# Patient Record
Sex: FEMALE | Race: BLACK OR AFRICAN AMERICAN | NOT HISPANIC OR LATINO | Employment: UNEMPLOYED | URBAN - METROPOLITAN AREA
[De-identification: names, ages, dates, MRNs, and addresses within clinical notes are randomized per-mention and may not be internally consistent; named-entity substitution may affect disease eponyms.]

---

## 2017-01-13 ENCOUNTER — TELEPHONE (OUTPATIENT)
Dept: FAMILY MEDICINE | Facility: CLINIC | Age: 27
End: 2017-01-13

## 2017-01-13 ENCOUNTER — OFFICE VISIT (OUTPATIENT)
Dept: FAMILY MEDICINE | Facility: CLINIC | Age: 27
End: 2017-01-13

## 2017-01-13 VITALS
WEIGHT: 266 LBS | HEART RATE: 100 BPM | DIASTOLIC BLOOD PRESSURE: 80 MMHG | HEIGHT: 68 IN | TEMPERATURE: 98.2 F | OXYGEN SATURATION: 99 % | RESPIRATION RATE: 16 BRPM | SYSTOLIC BLOOD PRESSURE: 133 MMHG | BODY MASS INDEX: 40.32 KG/M2

## 2017-01-13 DIAGNOSIS — Z11.3 ROUTINE SCREENING FOR STI (SEXUALLY TRANSMITTED INFECTION): ICD-10-CM

## 2017-01-13 DIAGNOSIS — Z13.9 SCREENING FOR CONDITION: ICD-10-CM

## 2017-01-13 DIAGNOSIS — A59.01 TRICHOMONAL VAGINITIS: Primary | ICD-10-CM

## 2017-01-13 DIAGNOSIS — J06.9 VIRAL URI: ICD-10-CM

## 2017-01-13 LAB
BACTERIA: NORMAL
CLUE CELLS: NORMAL
HEMOGLOBIN: 14 G/DL (ref 11.7–15.7)
HIV 1+2 AB+HIV1 P24 AG SERPL QL IA: NEGATIVE
MOTILE TRICHOMONAS: POSITIVE
ODOR: NORMAL
PH WET PREP: NORMAL
WBC WET PREP: NORMAL
YEAST: NORMAL

## 2017-01-13 RX ORDER — METRONIDAZOLE 500 MG/1
TABLET ORAL
Qty: 4 TABLET | Refills: 0 | Status: ON HOLD | OUTPATIENT
Start: 2017-01-13 | End: 2018-04-19

## 2017-01-13 RX ORDER — IBUPROFEN 400 MG/1
400 TABLET, FILM COATED ORAL EVERY 6 HOURS PRN
Qty: 120 TABLET | Refills: 3 | Status: ON HOLD | OUTPATIENT
Start: 2017-01-13 | End: 2018-04-19

## 2017-01-13 NOTE — MR AVS SNAPSHOT
After Visit Summary   1/13/2017    Rianna Man    MRN: [id number]           Patient Information     Date Of Birth          1990        Visit Information        Provider Department      1/13/2017 3:00 PM Jane Paez APRN CNP [facility]        Today's Diagnoses     Viral URI    -  1     Routine screening for STI (sexually transmitted infection)         Screening for condition           Care Instructions          ~~~~~~~~~~~~~~~~~~~~~~~~~    Your medication list is printed, please keep this with you, it is helpful to bring this current list to any other medical appointments, the emergency room or hospital.    If you had lab testing today and your results are reassuring or normal they will be be mailed to you within 7 days.     If the lab tests need quick action we will call you with the results.   The phone number we will call with results is # [phone] (home) . If this is not the best number please call our clinic and change the number.    If you need any refills please call your pharmacy and they will contact us.    If you have any further concerns or wish to schedule another appointment you must call our office during normal business hours  [phone] (8-5:00 M-F)  If you have urgent medical questions that cannot wait  you may also call [phone] at any time of day.  If you have a medical emergency please call [phone].    Thank you for coming to [facility].          Follow-ups after your visit        Who to contact     Please call your clinic at [phone] to:    Ask questions about your health    Make or cancel appointments    Discuss your medicines    Learn about your test results    Speak to your doctor   If you have compliments or concerns about an experience at your clinic, or if you wish to file a complaint, please contact [facility] Patient Relations at [phone] or email us at [email or web address]       "   Additional Information About Your Visit        MyChart Information     [employer] is an electronic gateway that provides easy, online access to your medical records. With [employer], you can request a clinic appointment, read your test results, renew a prescription or communicate with your care team.     To sign up for [employer] visit the website at [email or web address]   You will be asked to enter the access code listed below, as well as some personal information. Please follow the directions to create your username and password.     Your access code is: [email or web address]  Expires: 2017  3:26 PM     Your access code will  in 90 days. If you need help or a new code, please contact your [facility] or call [phone] for assistance.        Care EveryWhere ID     This is your Care EveryWhere ID. This could be used by other organizations to access your [place] medical records  [id number]        Your Vitals Were     Pulse Temperature Respirations Height BMI (Body Mass Index) Pulse Oximetry    100 98.2  F (36.8  C) (Oral) 16 5' 8\" (172.7 cm) 40.45 kg/m2 99%       Blood Pressure from Last 3 Encounters:   17 133/80   16 117/82   06/15/15 111/71    Weight from Last 3 Encounters:   17 266 lb (120.657 kg)   16 250 lb 3.2 oz (113.49 kg)   06/15/15 219 lb 6.4 oz (99.519 kg)              We Performed the Following     Chlamydia/Gono Amplified ([facility])     Hemoglobin (HGB) ([facility])     Hepatitis B Surface Ab [facility]     Hepatitis B Surface Ag ([facility])     Hepatitis C Antibody ([facility])     HIV Ag/Ab Screen [place] ([facility])     Syphilis Screen [place] ([facility])     Wet Prep ([facility])        Primary Care Provider Office Phone # Fax #    Hilary Edouard [phone]       [facility] [address] [place] [address]        Thank you!     Thank you for choosing [facility]  for your care. Our goal is " always to provide you with excellent care. Hearing back from our patients is one way we can continue to improve our services. Please take a few minutes to complete the written survey that you may receive in the mail after your visit with us. Thank you!             Your Updated Medication List - Protect others around you: Learn how to safely use, store and throw away your medicines at www.disposemymeds.org.          This list is accurate as of: 1/13/17  4:55 PM.  Always use your most recent med list.                   Brand Name Dispense Instructions for use    etonogestrel 68 MG Impl    IMPLANON/NEXPLANON    1 each    1 each (68 mg) by Subdermal route once for 1 dose       ibuprofen 400 MG tablet    ADVIL/MOTRIN    120 tablet    Take 1 tablet (400 mg) by mouth every 6 hours as needed for moderate pain

## 2017-01-13 NOTE — PROGRESS NOTES
"SUBJECTIVE:   Rianna Man is a 26 year old female for routine annual evaluation.   /80 mmHg  Pulse 100  Temp(Src) 98.2  F (36.8  C) (Oral)  Resp 16  Ht 5' 8\" (172.7 cm)  Wt 266 lb (120.657 kg)  BMI 40.45 kg/m2  SpO2 99%   Obstetric History       T0      TAB0   SAB0   E0   M0   L0       # Outcome Date GA Lbr Dominik/2nd Weight Sex Delivery Anes PTL Lv   1 Para               Has a 6 year old son who lives with her.    Current medications:   Current Outpatient Prescriptions   Medication Sig Dispense Refill     etonogestrel (IMPLANON/NEXPLANON) 68 MG IMPL 1 each (68 mg) by Subdermal route once for 1 dose 1 each 0     ibuprofen (ADVIL,MOTRIN) 400 MG tablet Take 1 tablet (400 mg) by mouth every 6 hours as needed for moderate pain 120 tablet 3     Drug allergies: Lactose and No known allergies  Last mammogram: not performed  Last pap smear: 2/13/15- NILM-normal  Current Method Family Planning: Nexplanon in place. Was changed and a new constance inserted 2016.  Social History: Substance Use: Drinks on the weekends, smokes some weed at times. No trouble, controlling things better.   Lost a part-time contract recently, is looking for work and interviewing.  PSH-GYNE: .None.  PSH-general: None.  PMH: The patient has a history of alcohol dependence. Depression, obesity, smoker, and iron deficiency anemiaThe patient denies a   history of current alcohol or chemical dependence.Currently smokes about 8 cigarettes per day.Is not yet ready to quit but \"I think about it.\"  Past Medical History   Diagnosis Date     Varicella      had disease     Schizoaffective disorder (H)        Family History   Problem Relation Age of Onset     DIABETES Father      DIABETES Paternal Aunt      Thyroid Disease Mother      grave's disease     Rheumatoid Arthritis Sister      Crohn Disease Maternal Aunt      Past Gyne History: Pap 2015, normal.  Childbirth x1.  ROS:  No TIA's or unusual headaches, no dysphagia. No " "prolonged   cough. No dyspnea or chest pain on exertion.  No abdominal pain,   change in bowel habits, black or bloody stools.  No urinary tract   symptoms. On self exam, has noted no new or enlarging breast lumps,   nipple discharge or breast pain.    OBJECTIVE:   /80 mmHg  Pulse 100  Temp(Src) 98.2  F (36.8  C) (Oral)  Resp 16  Ht 5' 8\" (172.7 cm)  Wt 266 lb (120.657 kg)  BMI 40.45 kg/m2  SpO2 99%    HEAD AND NECK:  Ears normal.  Throat, oral cavity and tongue normal.    Neck supple. No adenopathy or masses in the neck or supraclavicular   regions.  No carotid bruits. No thyromegaly.  NEURO: Cranial nerves and fundi are normal. Neck supple.   DTR's normal and symmetric.    CHEST:  Clear, good air entry, no wheezes, rhonci or rales.   HEART:  S1 and S2 normal, no murmurs, clicks, gallops or rubs.    Regular rate and rhythm.  No edema.  ABDOMEN:  Soft without tenderness, guarding, mass or organomegaly.   No CVA tenderness or inguinal adenopathy.  EXTREMITIES:  Extremities, reflexes and peripheral pulses are   normal.    SKIN:  No rashes or suspicious skin lesions noted.  BREAST EXAM: Inspection negative. No nipple discharge or bleeding. No masses.  PELVIC EXAM: External genitalia and vagina normal. Bimanual and rectovaginal deferred due to physical discomfort with speculum exam.   Positive findings:  vaginal discharge - moderate, grey, thin, milky and somewhat odorous    Microscopic wet-mount exam shows trichomonads and excessive bacteria.    ASSESSMENT:   (A59.01) Trichomonal vaginitis  (primary encounter diagnosis)  Comment: Counseled re: vaginitis and trichomonas as STD-single celled organism which is harbored and proliferates in the vagina. Male partner(s) and any of his recent/current partners need treatment. May not believe he is infected as men tend to be without symptoms. No sexual contact until 1 week or more after partner(s) have completed treatment. No alcohol intake for 2 days before and 2 " days after completion of therapy to avoid interaction between metronidazole and ETOH, with severe vomiting and quite possibly dehydration.    Plan: metroNIDAZOLE (FLAGYL) 500 MG tablet        2 grams x1.    (Z11.3) Routine screening for STI (sexually transmitted infection)  Comment: All negative except trich on wet prep. Will try and contact by phone as patient plans to enter treatment soon and won't be reachable except VM may receive messages.  Plan: Chlamydia/Gono Amplified ([facility]), Wet Prep        ([facility]), Syphilis Screen [place] ([facility]),        HIV Ag/Ab Screen [place] ([facility]),         Hepatitis B Surface Ab ([facility]), Hepatitis         B Surface Ag ([facility]), Hepatitis C Antibody        ([facility])            (Z13.9) Screening for condition  Comment: Normal-patient requested screening for anemia today.  Plan: Hemoglobin (HGB) ([facility])    HEMOGLOBIN   Date Value Ref Range Status   01/13/2017 14.0 11.7 - 15.7 g/dL Final     (J06.9,  B97.89) Viral URI  Comment: symptomatic care.  Plan: ibuprofen (ADVIL/MOTRIN) 400 MG tablet     Rest, fluids and time required. RTC if cough complicates with increase in sputum production, SOB, pain in chest or chest wall, fever, chills, increasing fatigue    Encouraged patient in plans for drug treatment and assured her will balance need to contact her w/results with respect for her confidentiality-may wait for her RTC or contact by phone to provide   Test results as she prefers not to receive a letter at this time. Has no other questions or concerns.    45 minutes was spent on this visit, >50% counseling and coordination of care re:vaginitis, STI screening and treatment, partner treatment, chemical health.    Jane Paez,EMELIP

## 2017-01-13 NOTE — PATIENT INSTRUCTIONS
~~~~~~~~~~~~~~~~~~~~~~~~~    Your medication list is printed, please keep this with you, it is helpful to bring this current list to any other medical appointments, the emergency room or hospital.    If you had lab testing today and your results are reassuring or normal they will be be mailed to you within 7 days.     If the lab tests need quick action we will call you with the results.   The phone number we will call with results is # [phone] (home) . If this is not the best number please call our clinic and change the number.    If you need any refills please call your pharmacy and they will contact us.    If you have any further concerns or wish to schedule another appointment you must call our office during normal business hours  [phone] (8-5:00 M-F)  If you have urgent medical questions that cannot wait  you may also call [phone] at any time of day.  If you have a medical emergency please call [phone].    Thank you for coming to [facility].

## 2017-01-16 LAB
C TRACH RRNA CVX QL NAA+PROBE: NEGATIVE
HBV SURFACE AB SER-ACNC: POSITIVE M[IU]/ML
HBV SURFACE AG SERPL QL IA: NEGATIVE
HCV AB SER QL: NEGATIVE
N GONORRHOEA RRNA SPEC QL NAA+PROBE: NEGATIVE
RPR SER QL: NORMAL

## 2018-03-30 ENCOUNTER — OFFICE VISIT (OUTPATIENT)
Dept: FAMILY MEDICINE | Facility: CLINIC | Age: 28
End: 2018-03-30
Payer: COMMERCIAL

## 2018-03-30 VITALS
HEIGHT: 68 IN | SYSTOLIC BLOOD PRESSURE: 127 MMHG | OXYGEN SATURATION: 98 % | DIASTOLIC BLOOD PRESSURE: 86 MMHG | RESPIRATION RATE: 20 BRPM | WEIGHT: 236.4 LBS | TEMPERATURE: 98.4 F | BODY MASS INDEX: 35.83 KG/M2 | HEART RATE: 80 BPM

## 2018-03-30 DIAGNOSIS — B96.89 BACTERIAL VAGINOSIS: ICD-10-CM

## 2018-03-30 DIAGNOSIS — Z30.8 ENCOUNTER FOR OTHER CONTRACEPTIVE MANAGEMENT: ICD-10-CM

## 2018-03-30 DIAGNOSIS — Z11.3 SCREEN FOR STD (SEXUALLY TRANSMITTED DISEASE): Primary | ICD-10-CM

## 2018-03-30 DIAGNOSIS — N76.0 BACTERIAL VAGINOSIS: ICD-10-CM

## 2018-03-30 LAB
BACTERIA: NORMAL
BILIRUBIN UR: NEGATIVE
BLOOD UR: ABNORMAL
CLUE CELLS: NORMAL
GLUCOSE URINE: NEGATIVE
HCG UR QL: NEGATIVE
HIV 1+2 AB+HIV1 P24 AG SERPL QL IA: NEGATIVE
KETONES UR QL: NEGATIVE
LEUKOCYTE ESTERASE UR: ABNORMAL
MOTILE TRICHOMONAS: NEGATIVE
NITRITE UR QL STRIP: NEGATIVE
ODOR: NORMAL
PH UR STRIP: 6 [PH] (ref 5–7)
PH WET PREP: NORMAL
PROTEIN UR: NEGATIVE
SP GR UR STRIP: 1.02
UROBILINOGEN UR STRIP-ACNC: ABNORMAL
WBC WET PREP: <2
YEAST: NORMAL

## 2018-03-30 RX ORDER — METRONIDAZOLE 7.5 MG/G
1 GEL VAGINAL 2 TIMES DAILY
Qty: 70 G | Refills: 0 | Status: SHIPPED | OUTPATIENT
Start: 2018-03-30 | End: 2018-04-04

## 2018-03-30 NOTE — MR AVS SNAPSHOT
"              After Visit Summary   3/30/2018    Rianna Man    MRN: [id number]           Patient Information     Date Of Birth          1990        Visit Information        Provider Department      3/30/2018 2:40 PM Palla, Misbah Yousuf, MD [facility]        Today's Diagnoses     Screen for STD (sexually transmitted disease)    -  1    Bacterial vaginosis        Encounter for other contraceptive management           Follow-ups after your visit        Who to contact     Please call your clinic at [phone] to:    Ask questions about your health    Make or cancel appointments    Discuss your medicines    Learn about your test results    Speak to your doctor            Additional Information About Your Visit        Care EveryWhere ID     This is your Care EveryWhere ID. This could be used by other organizations to access your [place] medical records  [id number]        Your Vitals Were     Pulse Temperature Respirations Height Pulse Oximetry BMI (Body Mass Index)    80 98.4  F (36.9  C) (Oral) 20 5' 8\" (172.7 cm) 98% 35.94 kg/m2       Blood Pressure from Last 3 Encounters:   03/30/18 127/86   01/13/17 133/80   03/21/16 117/82    Weight from Last 3 Encounters:   03/30/18 236 lb 6.4 oz (107.2 kg)   01/13/17 266 lb (120.7 kg)   03/21/16 250 lb 3.2 oz (113.5 kg)              We Performed the Following     Chlamydia/Gono Amplified ([facility])     GYN Cytology ([facility])     HCG Qualitative Urine (UPT)  ([facility])     Hepatitis B Surface Ag ([facility])     Hepatitis C Antibody ([facility])     HIV Ag/Ab Screen [place] ([facility])     HPV [place] PCR ([facility])     Syphilis Screen [place] ([facility])     Urinalysis ([facility])     Urine Culture ([facility])     Wet Prep ([facility])          Today's Medication Changes          These changes are accurate as of 3/30/18 11:59 PM.  If you have any questions, ask your nurse or doctor.               Start taking these medicines.        Dose/Directions "    metroNIDAZOLE 0.75 % vaginal gel   Commonly known as:  METROGEL   Used for:  Screen for STD (sexually transmitted disease), Bacterial vaginosis   Started by:  Palla, Misbah Yousuf, MD        Dose:  1 applicator   Place 1 applicator (5 g) vaginally 2 times daily for 5 days   Quantity:  70 g   Refills:  0            Where to get your medicines      These medications were sent to [facility] ([place]) - [place], [place] - [address] [facility] [address]  [address], [facility] [address]     Phone:  [phone]     metroNIDAZOLE 0.75 % vaginal gel                Primary Care Provider Office Phone # Fax #    Hilary Edouard [phone]       [facility] [address] [place] [address]        Equal Access to Services     [facility] AH: Hadii aad ku hadasho Soomaali, waaxda luqadaha, qaybta kaalmada adeegyada, waxay idiin hayaan adesaurav ch . So [facility] [phone].    ATENCIÓN: Si habla español, tiene a roland disposición servicios gratuitos de asistencia lingüística. Llame al [phone].    We comply with applicable federal civil rights laws and [place] laws. We do not discriminate on the basis of race, color, national origin, age, disability, sex, sexual orientation, or gender identity.            Thank you!     Thank you for choosing [facility]  for your care. Our goal is always to provide you with excellent care. Hearing back from our patients is one way we can continue to improve our services. Please take a few minutes to complete the written survey that you may receive in the mail after your visit with us. Thank you!             Your Updated Medication List - Protect others around you: Learn how to safely use, store and throw away your medicines at www.disposemymeds.org.          This list is accurate as of 3/30/18 11:59 PM.  Always use your most recent med list.                   Brand Name Dispense Instructions  for use Diagnosis    etonogestrel 68 MG Impl    IMPLANON/NEXPLANON    1 each    1 each (68 mg) by Subdermal route once for 1 dose    Surveillance of previously prescribed implantable subdermal contraceptive       ibuprofen 400 MG tablet    ADVIL/MOTRIN    120 tablet    Take 1 tablet (400 mg) by mouth every 6 hours as needed for moderate pain    Viral URI       metroNIDAZOLE 0.75 % vaginal gel    METROGEL    70 g    Place 1 applicator (5 g) vaginally 2 times daily for 5 days    Screen for STD (sexually transmitted disease), Bacterial vaginosis       metroNIDAZOLE 500 MG tablet    FLAGYL    4 tablet    Take 4 tablets for one time with food and water.    Trichomonal vaginitis

## 2018-03-30 NOTE — PROGRESS NOTES
"HPI    Rianna Man is 28 year old yo female presenting for:    1. STD testing  - last unprotected sexual contact was 2 days ago  - asymptomatic   - no bleeding, discharge, pain/discomfort  - patient has a nexplanon in place which was placed in 3/2016 - expiration date listed at 3/2018  - UPT conducted today, negative    OBJECTIVE    Vitals  /86  Pulse 80  Temp 98.4  F (36.9  C) (Oral)  Resp 20  Ht 5' 8\" (172.7 cm)  Wt 236 lb 6.4 oz (107.2 kg)  SpO2 98%  BMI 35.94 kg/m2    Physical Exam  General: No acute distress  CV: RRR  Respiratory: CTA bilaterally, no wheezes/rhonchi/rhales appreciated, no respiratory distress  Back: no paraspinal tenderness, no spinal tenderness, no vertebral step offs appreciated  Abdomen: soft, non-distended, non-tender, normoactive bowel sounds  : normal external genitalia, no abnormal odor/discharge noted, cervix nulliparous, no discharge/bleeding  Neuro: no focal deficits noted, reflexes within normal limits  Psych: appropriate affect    ASSESSMENT/PLAN    # STI screening  - gonorrhea/chlamydia, wet prep pending  - PAP smear/gyn cytology w/HPV pending  - UPT negative    # Contraception  - schedule nexplanon removal and placement         Precepted with Dr. Astudillo    "

## 2018-03-31 LAB
CULTURE: NORMAL
HBV SURFACE AG SERPL QL IA: NEGATIVE
HCV AB SER QL: NEGATIVE
TREPONEMA ANTIBODY (SYPHILIS): NEGATIVE

## 2018-04-02 LAB
C TRACH RRNA SPEC QL NAA+PROBE: NEGATIVE
FINAL DIAGNOSIS: NORMAL
HPV 16 DNA: NEGATIVE
HPV 18 DNA: NEGATIVE
HPV SOURCE: NORMAL
N GONORRHOEA RRNA SPEC QL NAA+PROBE: NEGATIVE
OTHER HR HPV: NEGATIVE
SPECIMEN DESCRIPTION: NORMAL

## 2018-04-05 ENCOUNTER — RECORDS - HEALTHEAST (OUTPATIENT)
Dept: ADMINISTRATIVE | Facility: OTHER | Age: 28
End: 2018-04-05

## 2018-04-05 LAB
CYTOLOGY CVX/VAG DOC THIN PREP: NORMAL
HIGH RISK?: NO
HPV REFLEX?: NORMAL
LAB AP ABNORMAL BLEEDING: NO
LAB AP BIRTH CONTROL/HORMONES: NORMAL
LAB AP CASE REPORT: NORMAL
LAB AP CERVICAL APPEARANCE: NORMAL
LAB AP MALIGNANT?: NORMAL
LAB AP PATIENT STATUS: NORMAL
LAB AP PREVIOUS ABNL: NORMAL
LAB AP PREVIOUS NORMAL: NORMAL
LAST MENS PERIOD: NORMAL
SPECIMEN ADEQUACY:: NORMAL

## 2018-04-10 NOTE — PROGRESS NOTES
Preceptor Attestation:   Patient seen, evaluated and discussed with the resident. I have verified the content of the note, which accurately reflects my assessment of the patient and the plan of care.    Supervising Physician:Nino Astudillo MD    [facility]

## 2018-04-18 ENCOUNTER — HOSPITAL ENCOUNTER (INPATIENT)
Facility: CLINIC | Age: 28
LOS: 5 days | Discharge: HOME | DRG: 882 | End: 2018-04-24
Attending: EMERGENCY MEDICINE | Admitting: PSYCHIATRY & NEUROLOGY
Payer: COMMERCIAL

## 2018-04-18 DIAGNOSIS — F25.9 SCHIZOAFFECTIVE DISORDER, UNSPECIFIED TYPE (H): ICD-10-CM

## 2018-04-18 DIAGNOSIS — G43.909 MIGRAINE WITHOUT STATUS MIGRAINOSUS, NOT INTRACTABLE, UNSPECIFIED MIGRAINE TYPE: ICD-10-CM

## 2018-04-18 DIAGNOSIS — Z72.0 TOBACCO USE: ICD-10-CM

## 2018-04-18 DIAGNOSIS — L90.6 STRIAE DISTENSAE: ICD-10-CM

## 2018-04-18 DIAGNOSIS — F42.9 OBSESSIVE-COMPULSIVE DISORDER, UNSPECIFIED TYPE: ICD-10-CM

## 2018-04-18 DIAGNOSIS — F12.10 MARIJUANA ABUSE: ICD-10-CM

## 2018-04-18 DIAGNOSIS — F33.9 RECURRENT MAJOR DEPRESSIVE DISORDER, REMISSION STATUS UNSPECIFIED (H): Primary | ICD-10-CM

## 2018-04-18 LAB
ALCOHOL BREATH TEST: 0.18 (ref 0–0.01)
AMPHETAMINES UR QL SCN: NEGATIVE
BARBITURATES UR QL: NEGATIVE
BENZODIAZ UR QL: NEGATIVE
CANNABINOIDS UR QL SCN: POSITIVE
COCAINE UR QL: NEGATIVE
ETHANOL UR QL SCN: POSITIVE
HCG UR QL: NEGATIVE
OPIATES UR QL SCN: NEGATIVE

## 2018-04-18 PROCEDURE — 81025 URINE PREGNANCY TEST: CPT | Performed by: FAMILY MEDICINE

## 2018-04-18 PROCEDURE — 82075 ASSAY OF BREATH ETHANOL: CPT | Performed by: EMERGENCY MEDICINE

## 2018-04-18 PROCEDURE — 80307 DRUG TEST PRSMV CHEM ANLYZR: CPT | Performed by: FAMILY MEDICINE

## 2018-04-18 PROCEDURE — 90791 PSYCH DIAGNOSTIC EVALUATION: CPT

## 2018-04-18 PROCEDURE — 25000132 ZZH RX MED GY IP 250 OP 250 PS 637: Performed by: EMERGENCY MEDICINE

## 2018-04-18 PROCEDURE — 80320 DRUG SCREEN QUANTALCOHOLS: CPT | Performed by: FAMILY MEDICINE

## 2018-04-18 PROCEDURE — 99285 EMERGENCY DEPT VISIT HI MDM: CPT | Mod: 25 | Performed by: EMERGENCY MEDICINE

## 2018-04-18 PROCEDURE — 99285 EMERGENCY DEPT VISIT HI MDM: CPT | Mod: Z6 | Performed by: EMERGENCY MEDICINE

## 2018-04-18 PROCEDURE — HZ2ZZZZ DETOXIFICATION SERVICES FOR SUBSTANCE ABUSE TREATMENT: ICD-10-PCS | Performed by: PSYCHIATRY & NEUROLOGY

## 2018-04-18 RX ORDER — ACETAMINOPHEN 500 MG
1000 TABLET ORAL ONCE
Status: COMPLETED | OUTPATIENT
Start: 2018-04-18 | End: 2018-04-18

## 2018-04-18 RX ORDER — OLANZAPINE 10 MG/1
10 TABLET, ORALLY DISINTEGRATING ORAL ONCE
Status: COMPLETED | OUTPATIENT
Start: 2018-04-18 | End: 2018-04-18

## 2018-04-18 RX ADMIN — ACETAMINOPHEN 1000 MG: 500 TABLET, FILM COATED ORAL at 23:06

## 2018-04-18 RX ADMIN — OLANZAPINE 10 MG: 10 TABLET, ORALLY DISINTEGRATING ORAL at 23:06

## 2018-04-18 ASSESSMENT — ENCOUNTER SYMPTOMS
NERVOUS/ANXIOUS: 1
HEADACHES: 1
PHOTOPHOBIA: 1
SLEEP DISTURBANCE: 1
DECREASED CONCENTRATION: 1
DYSPHORIC MOOD: 1
HALLUCINATIONS: 1

## 2018-04-18 NOTE — LETTER
To Whom It May Concern,      Rianna Man was hospitalized at [facility] from 4/19/2018 - 4/24/2018. She has shown some improvement with medications and therapy offered in the hospital, however she will still need some time to recover, which would take 2-3 months. During this time, she would not be adequate to work given the level of dysfunction her illness has on her daily life. With continued treatment, social stability, and financial stability/assitance, she could be more adequate to obtain and sustain employment in the future.     Sincerely,      Seun Coleman MD  [facility]

## 2018-04-18 NOTE — IP AVS SNAPSHOT
STOP!!! DO NOT PRINT OR REFERENCE THIS AVS!!!  AVS displayed here is NOT the version that was given to the patient at discharge.  GO TO CHART REVIEW to print or review a copy of the AVS that was frozen/printed at time of discharge.                           MRN:[id number]                      After Visit Summary   4/18/2018    Rianna Man    MRN: [id number]           Thank you!     Thank you for choosing [facility] for your care. Our goal is always to provide you with excellent care.        Patient Information     Date Of Birth          1990        Designated Caregiver       Most Recent Value    Caregiver    Will someone help with your care after discharge? no      About your hospital stay     You were admitted on:  April 19, 2018 You last received care in the:  [facility]    You were discharged on:  April 24, 2018       Who to Call     For medical emergencies, please call 911.  For non-urgent questions about your medical care, please call your primary care provider or clinic, [phone]          Attending Provider     Provider Specialty    Becky Baldwin MD Emergency Medicine    Jared, Seun Carter MD Psychiatry       Primary Care Provider Office Phone # Fax #    Hilary Maral Edouard [phone]      Further instructions from your care team        Behavioral Discharge Planning and Instructions      Summary:  You were admitted on 4/18/2018  due to Depression and Anxiety.  You were treated by Dr Seun Coleman MD and discharged on 4/24/18 from [facility] to her brother's home.      Principal Diagnosis: Obsessive Compulsive Disorder; Bipolar Disorder, mixed, PTSD      Health Care Follow-up Appointments:   [facility]  Medication Management Walk-in Appointments every Thursday at 8:30am to establish care [address] [place], [place]  [phone]  [phone]    Therapist Latrell Loaiza - Ori Appointment on Wed April 9th at 3pm  [facility]  [facility]-- Go to the [place] [facility]/ Room [address] (Next [place])  [place] [address]  [phone]      RESOURCES:  [facility] Adult Shelter Team  [address] [place] [address]  [address]  [phone]    [facility] Economic Assistance - cash assistance  [address] [place] [address]  [place], [place]  [phone]    Social Security Office  [address] [place] [address]  [phone]      Attend all scheduled appointments with your outpatient providers. Call at least 24 hours in advance if you need to reschedule an appointment to ensure continued access to your outpatient providers.   Major Treatments, Procedures and Findings:  You were provided with: a psychiatric assessment, assessed for medical stability, medication evaluation and/or management, group therapy and milieu management    Symptoms to Report: losing more sleep, mood getting worse or thoughts of suicide    Early warning signs can include: sleep disturbances increased thoughts or behaviors of suicide or self-harm  increased unusual thinking, such as paranoia or hearing voices    Safety and Wellness:  Take all medicines as directed.  Make no changes unless your doctor suggests them.      Follow treatment recommendations.  Refrain from alcohol and non-prescribed drugs.  If there is a concern for safety, call 911.    Resources:   Crisis Intervention: 636.831.2420 or 791-948-1343 (TTY: 546.668.3875).  Call anytime for help.  [place] [employer] [place]ness (www.[place].hailey.org): 516.759.1463 or 318-172-4868.    The treatment team has appreciated the opportunity to work with you.     If you have any questions or concerns our unit number is [phone].          Pending Results     No orders found from 4/16/2018 to 4/19/2018.            Admission Information     Date & Time Department Dept. Phone    4/18/2018 [facility] [phone]      Your Vitals Were     Blood Pressure Pulse  "Temperature Respirations Height Weight    123/85 77 97.7  F (36.5  C) 16 1.727 m (5' 8\") 109.4 kg (241 lb 1.6 oz)    Pulse Oximetry BMI (Body Mass Index)                97% 36.66 kg/m2          Care EveryWhere ID     This is your Care EveryWhere ID. This could be used by other organizations to access your [facility] medical records  [id number]        Equal Access to Services     VICENTE SEE : Hadii jose cruz ku hadtjo Soestelaali, waaxda luqadaha, qaybta kaalmada adeegyada, aye hernandez pepevic rouse nayeli lagarethvic . So [facility] [phone].    ATENCIÓN: Si radhala emmett, tiene a roland disposición servicios gratuitos de asistencia lingüística. Llame al [phone].    We comply with applicable federal civil rights laws and [place] laws. We do not discriminate on the basis of race, color, national origin, age, disability, sex, sexual orientation, or gender identity.               Review of your medicines      START taking        Dose / Directions    escitalopram 10 MG tablet   Commonly known as:  LEXAPRO   Used for:  Obsessive-compulsive disorder, unspecified type, Schizoaffective disorder, unspecified type (H)        Dose:  10 mg   Take 1 tablet (10 mg) by mouth daily   Quantity:  30 tablet   Refills:  1       gabapentin 600 MG tablet   Commonly known as:  NEURONTIN   Used for:  Migraine without status migrainosus, not intractable, unspecified migraine type, Recurrent major depressive disorder, remission status unspecified (H), Obsessive-compulsive disorder, unspecified type        Dose:  600 mg   Take 1 tablet (600 mg) by mouth 3 times daily   Quantity:  90 tablet   Refills:  1       hydrocortisone 2.5 % cream   Used for:  Striae distensae        Apply topically 2 times daily as needed for rash   Quantity:  3.5 g   Refills:  1       hydrOXYzine 25 MG tablet   Commonly known as:  ATARAX   Used for:  Schizoaffective disorder, unspecified type (H)        Dose:  25 mg   Take 1 tablet (25 mg) by mouth every 4 hours as needed for " anxiety   Quantity:  60 tablet   Refills:  1       lactase 3000 UNIT tablet   Commonly known as:  LACTAID   Used for:  Lactase deficiency        Dose:  6000 Units   Take 2 tablets (6,000 Units) by mouth 3 times daily (with meals)   Quantity:  90 tablet   Refills:  1       mirtazapine 7.5 MG Tabs tablet   Commonly known as:  REMERON   Used for:  Schizoaffective disorder, unspecified type (H), Obsessive-compulsive disorder, unspecified type        Dose:  7.5 mg   Take 1 tablet (7.5 mg) by mouth At Bedtime   Quantity:  30 tablet   Refills:  1       multivitamin, therapeutic with minerals Tabs tablet   Used for:  Schizoaffective disorder, unspecified type (H)        Dose:  1 tablet   Take 1 tablet by mouth daily   Quantity:  30 each   Refills:  1       nicotine 14 MG/24HR 24 hr patch   Commonly known as:  NICODERM CQ   Used for:  Tobacco use        Dose:  1 patch   Place 1 patch onto the skin daily   Quantity:  30 patch   Refills:  1       * OLANZapine 10 MG tablet   Commonly known as:  zyPREXA   Used for:  Schizoaffective disorder, unspecified type (H)        Dose:  10 mg   Take 1 tablet (10 mg) by mouth 2 times daily as needed (For agitation, aggression, anxiety.)   Quantity:  60 tablet   Refills:  1       * OLANZapine 10 MG tablet   Commonly known as:  zyPREXA   Used for:  Schizoaffective disorder, unspecified type (H)        Dose:  10 mg   Take 1 tablet (10 mg) by mouth At Bedtime   Quantity:  30 tablet   Refills:  1       * Notice:  This list has 2 medication(s) that are the same as other medications prescribed for you. Read the directions carefully, and ask your doctor or other care provider to review them with you.      CONTINUE these medicines which have NOT CHANGED        Dose / Directions    aspirin-acetaminophen-caffeine 250-250-65 MG per tablet   Commonly known as:  EXCEDRIN MIGRAINE   Used for:  Migraine without status migrainosus, not intractable, unspecified migraine type        Dose:  2 tablet   Take 2  tablets by mouth every 6 hours as needed for headaches   Quantity:  60 tablet   Refills:  1       etonogestrel 68 MG Impl   Commonly known as:  IMPLANON/NEXPLANON   Used for:  Surveillance of previously prescribed implantable subdermal contraceptive        Dose:  1 each   1 each (68 mg) by Subdermal route once for 1 dose   Quantity:  1 each   Refills:  0            Where to get your medicines      These medications were sent to [id number] - [place], [place] - [address] AT [address]  [address], [place] [address]     Phone:  [phone]     aspirin-acetaminophen-caffeine 250-250-65 MG per tablet    escitalopram 10 MG tablet    gabapentin 600 MG tablet    hydrocortisone 2.5 % cream    hydrOXYzine 25 MG tablet    lactase 3000 UNIT tablet    mirtazapine 7.5 MG Tabs tablet    multivitamin, therapeutic with minerals Tabs tablet    nicotine 14 MG/24HR 24 hr patch    OLANZapine 10 MG tablet    OLANZapine 10 MG tablet                Protect others around you: Learn how to safely use, store and throw away your medicines at www.disposemymeds.org.             Medication List: This is a list of all your medications and when to take them. Check marks below indicate your daily home schedule. Keep this list as a reference.      Medications           Morning Afternoon Evening Bedtime As Needed    aspirin-acetaminophen-caffeine 250-250-65 MG per tablet   Commonly known as:  EXCEDRIN MIGRAINE   Take 2 tablets by mouth every 6 hours as needed for headaches   Last time this was given:  1 tablet on 5/17/2018  4:59 PM                                escitalopram 10 MG tablet   Commonly known as:  LEXAPRO   Take 1 tablet (10 mg) by mouth daily   Last time this was given:  10 mg on 5/18/2018  8:29 AM                                etonogestrel 68 MG Impl   Commonly known as:  IMPLANON/NEXPLANON   1 each (68 mg) by Subdermal route once for 1 dose                                gabapentin  600 MG tablet   Commonly known as:  NEURONTIN   Take 1 tablet (600 mg) by mouth 3 times daily   Last time this was given:  600 mg on 4/24/2018  2:46 PM                                hydrocortisone 2.5 % cream   Apply topically 2 times daily as needed for rash   Last time this was given:  4/22/2018  8:15 AM                                hydrOXYzine 25 MG tablet   Commonly known as:  ATARAX   Take 1 tablet (25 mg) by mouth every 4 hours as needed for anxiety   Last time this was given:  25 mg on 5/18/2018  8:31 AM                                lactase 3000 UNIT tablet   Commonly known as:  LACTAID   Take 2 tablets (6,000 Units) by mouth 3 times daily (with meals)   Last time this was given:  6,000 Units on 5/18/2018 12:00 PM                                mirtazapine 7.5 MG Tabs tablet   Commonly known as:  REMERON   Take 1 tablet (7.5 mg) by mouth At Bedtime   Last time this was given:  7.5 mg on 5/17/2018  8:24 PM                                multivitamin, therapeutic with minerals Tabs tablet   Take 1 tablet by mouth daily   Last time this was given:  1 tablet on 5/18/2018  8:29 AM                                nicotine 14 MG/24HR 24 hr patch   Commonly known as:  NICODERM CQ   Place 1 patch onto the skin daily   Last time this was given:  1 patch on 5/18/2018  8:28 AM                                * OLANZapine 10 MG tablet   Commonly known as:  zyPREXA   Take 1 tablet (10 mg) by mouth 2 times daily as needed (For agitation, aggression, anxiety.)   Last time this was given:  10 mg on 5/17/2018  8:24 PM                                * OLANZapine 10 MG tablet   Commonly known as:  zyPREXA   Take 1 tablet (10 mg) by mouth At Bedtime   Last time this was given:  10 mg on 5/17/2018  8:24 PM                                * Notice:  This list has 2 medication(s) that are the same as other medications prescribed for you. Read the directions carefully, and ask your doctor or other care provider to review them with  you.

## 2018-04-18 NOTE — IP AVS SNAPSHOT
[address]    [address] [place] [address]    Phone:  [phone]                                       After Visit Summary   4/18/2018    Rianna Man    MRN: [id number]           After Visit Summary Signature Page     I have received my discharge instructions, and my questions have been answered. I have discussed any challenges I see with this plan with the nurse or doctor.    ..........................................................................................................................................  Patient/Patient Representative Signature      ..........................................................................................................................................  Patient Representative Print Name and Relationship to Patient    ..................................................               ................................................  Date                                            Time    ..........................................................................................................................................  Reviewed by Signature/Title    ...................................................              ..............................................  Date                                                            Time

## 2018-04-19 PROBLEM — F30.9 MANIA (H): Status: ACTIVE | Noted: 2018-04-19

## 2018-04-19 LAB
ALCOHOL BREATH TEST: 0 (ref 0–0.01)
BACTERIA SPEC CULT: NORMAL
C TRACH SPEC QL CULT: NORMAL
CHLAMYDIA PRELIM: NORMAL
SPECIMEN SOURCE: NORMAL
SPECIMEN SOURCE: NORMAL

## 2018-04-19 PROCEDURE — 99223 1ST HOSP IP/OBS HIGH 75: CPT | Mod: AI | Performed by: PSYCHIATRY & NEUROLOGY

## 2018-04-19 PROCEDURE — 12400007 ZZH R&B MH INTERMEDIATE UMMC

## 2018-04-19 PROCEDURE — 25000132 ZZH RX MED GY IP 250 OP 250 PS 637: Performed by: NURSE PRACTITIONER

## 2018-04-19 PROCEDURE — 90853 GROUP PSYCHOTHERAPY: CPT

## 2018-04-19 PROCEDURE — 87491 CHLMYD TRACH DNA AMP PROBE: CPT | Performed by: NURSE PRACTITIONER

## 2018-04-19 PROCEDURE — 25000132 ZZH RX MED GY IP 250 OP 250 PS 637: Performed by: PSYCHIATRY & NEUROLOGY

## 2018-04-19 PROCEDURE — 87591 N.GONORRHOEAE DNA AMP PROB: CPT | Performed by: NURSE PRACTITIONER

## 2018-04-19 RX ORDER — MIRTAZAPINE 15 MG/1
15 TABLET, FILM COATED ORAL AT BEDTIME
Status: DISCONTINUED | OUTPATIENT
Start: 2018-04-19 | End: 2018-04-23

## 2018-04-19 RX ORDER — OLANZAPINE 10 MG/1
10 TABLET, ORALLY DISINTEGRATING ORAL AT BEDTIME
Status: DISCONTINUED | OUTPATIENT
Start: 2018-04-19 | End: 2018-04-20

## 2018-04-19 RX ORDER — LORAZEPAM 1 MG/1
1-4 TABLET ORAL EVERY 30 MIN PRN
Status: DISCONTINUED | OUTPATIENT
Start: 2018-04-19 | End: 2018-04-21

## 2018-04-19 RX ORDER — MULTIPLE VITAMINS W/ MINERALS TAB 9MG-400MCG
1 TAB ORAL DAILY
Status: DISCONTINUED | OUTPATIENT
Start: 2018-04-19 | End: 2018-04-24 | Stop reason: HOSPADM

## 2018-04-19 RX ORDER — CHOLECALCIFEROL (VITAMIN D3) 125 MCG
6000 CAPSULE ORAL
Status: DISCONTINUED | OUTPATIENT
Start: 2018-04-19 | End: 2018-04-24 | Stop reason: HOSPADM

## 2018-04-19 RX ORDER — HYDROXYZINE HYDROCHLORIDE 25 MG/1
25 TABLET, FILM COATED ORAL EVERY 4 HOURS PRN
Status: DISCONTINUED | OUTPATIENT
Start: 2018-04-19 | End: 2018-04-24 | Stop reason: HOSPADM

## 2018-04-19 RX ORDER — HYDROCORTISONE 2.5 %
CREAM (GRAM) TOPICAL 2 TIMES DAILY
Status: DISCONTINUED | OUTPATIENT
Start: 2018-04-19 | End: 2018-04-24

## 2018-04-19 RX ORDER — OLANZAPINE 10 MG/1
10 TABLET ORAL 3 TIMES DAILY PRN
Status: DISCONTINUED | OUTPATIENT
Start: 2018-04-19 | End: 2018-04-24 | Stop reason: HOSPADM

## 2018-04-19 RX ORDER — NICOTINE 21 MG/24HR
1 PATCH, TRANSDERMAL 24 HOURS TRANSDERMAL DAILY
Status: DISCONTINUED | OUTPATIENT
Start: 2018-04-19 | End: 2018-04-24 | Stop reason: HOSPADM

## 2018-04-19 RX ORDER — GABAPENTIN 400 MG/1
400 CAPSULE ORAL 3 TIMES DAILY
Status: DISCONTINUED | OUTPATIENT
Start: 2018-04-19 | End: 2018-04-23

## 2018-04-19 RX ORDER — OLANZAPINE 10 MG/2ML
10 INJECTION, POWDER, FOR SOLUTION INTRAMUSCULAR 3 TIMES DAILY PRN
Status: DISCONTINUED | OUTPATIENT
Start: 2018-04-19 | End: 2018-04-24 | Stop reason: HOSPADM

## 2018-04-19 RX ORDER — IBUPROFEN 400 MG/1
400 TABLET, FILM COATED ORAL EVERY 6 HOURS PRN
Status: DISCONTINUED | OUTPATIENT
Start: 2018-04-19 | End: 2018-04-24 | Stop reason: HOSPADM

## 2018-04-19 RX ADMIN — HYDROCORTISONE: 25 CREAM TOPICAL at 21:56

## 2018-04-19 RX ADMIN — MULTIPLE VITAMINS W/ MINERALS TAB 1 TABLET: TAB at 15:10

## 2018-04-19 RX ADMIN — LACTASE TAB 3000 UNIT 6000 UNITS: 3000 TAB at 18:22

## 2018-04-19 RX ADMIN — MIRTAZAPINE 15 MG: 15 TABLET, FILM COATED ORAL at 21:56

## 2018-04-19 RX ADMIN — ACETAMINOPHEN, ASPIRIN AND CAFFEINE 1 TABLET: 250; 250; 65 TABLET, FILM COATED ORAL at 13:41

## 2018-04-19 RX ADMIN — GABAPENTIN 400 MG: 400 CAPSULE ORAL at 15:10

## 2018-04-19 RX ADMIN — GABAPENTIN 400 MG: 400 CAPSULE ORAL at 20:37

## 2018-04-19 RX ADMIN — NICOTINE 1 PATCH: 14 PATCH, EXTENDED RELEASE TRANSDERMAL at 16:08

## 2018-04-19 RX ADMIN — OLANZAPINE 10 MG: 10 TABLET, ORALLY DISINTEGRATING ORAL at 20:37

## 2018-04-19 ASSESSMENT — ACTIVITIES OF DAILY LIVING (ADL)
DRESS: SCRUBS (BEHAVIORAL HEALTH);STREET CLOTHES
RETIRED_COMMUNICATION: 0-->UNDERSTANDS/COMMUNICATES WITHOUT DIFFICULTY
TOILETING: 0-->INDEPENDENT
TRANSFERRING: 0-->INDEPENDENT
ORAL_HYGIENE: INDEPENDENT
SWALLOWING: 0-->SWALLOWS FOODS/LIQUIDS WITHOUT DIFFICULTY
FALL_HISTORY_WITHIN_LAST_SIX_MONTHS: NO
ORAL_HYGIENE: INDEPENDENT
AMBULATION: 0-->INDEPENDENT
BATHING: 0-->INDEPENDENT
LAUNDRY: WITH SUPERVISION
GROOMING: INDEPENDENT
RETIRED_EATING: 0-->INDEPENDENT
DRESS: 0-->INDEPENDENT
DRESS: SCRUBS (BEHAVIORAL HEALTH);INDEPENDENT
WHICH_OF_THE_ABOVE_FUNCTIONAL_RISKS_HAD_A_RECENT_ONSET_OR_CHANGE?: COGNITION
COGNITION: 2 - DIFFICULTY WITH ORGANIZING THOUGHTS

## 2018-04-19 NOTE — ED PROVIDER NOTES
The pt was signed out at shift change pending inpt bed availability. She rested comfortably throughout the night without difficulty. The pt will be signed out again at shift change, still awaiting and inpt bed.       Lacey Montenegro MD  04/19/18 0735
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
## 2018-04-19 NOTE — ED PROVIDER NOTES
"     Emergency Department Patient Sign-out       Brief HPI:  This is a 28 year old female signed out to me by Dr. Montenegro.  See initial ED Provider note for details of the presentation.          Patient is medically cleared for admission to a Behavioral Health unit.      The patient is not on a hold.      The patient has required medication for agitation. Zyprexa. Also given Tylenol.  Awaiting Transfer to Mental Health Facility        Significant Events prior to my assuming care: Hx of Schizoaffective disorder, not taking medications. Having SI and HI.       Exam:   Patient Vitals for the past 24 hrs:   BP Temp Temp src Heart Rate Resp SpO2 Height Weight   04/18/18 2007 (!) 134/95 96.8  F (36  C) Oral 113 18 98 % 1.727 m (5' 8\") 105.2 kg (232 lb)           ED RESULTS:   Results for orders placed or performed during the hospital encounter of 04/18/18 (from the past 24 hour(s))   Drug abuse screen 6 urine (tox)     Status: Abnormal    Collection Time: 04/18/18  8:14 PM   Result Value Ref Range    Amphetamine Qual Urine Negative NEG^Negative    Barbiturates Qual Urine Negative NEG^Negative    Benzodiazepine Qual Urine Negative NEG^Negative    Cannabinoids Qual Urine Positive (A) NEG^Negative    Cocaine Qual Urine Negative NEG^Negative    Ethanol Qual Urine Positive (A) NEG^Negative    Opiates Qualitative Urine Negative NEG^Negative   HCG qualitative urine     Status: None    Collection Time: 04/18/18  8:14 PM   Result Value Ref Range    HCG Qual Urine Negative NEG^Negative   Alcohol breath test POCT     Status: Abnormal    Collection Time: 04/18/18 11:13 PM   Result Value Ref Range    Alcohol Breath Test 0.182 (A) 0.00 - 0.01       ED MEDICATIONS:   Medications   OLANZapine zydis (zyPREXA) ODT tab 10 mg (10 mg Oral Given 4/18/18 2306)   acetaminophen (TYLENOL) tablet 1,000 mg (1,000 mg Oral Given 4/18/18 2306)         Impression:    ICD-10-CM    1. Schizoaffective disorder, unspecified type (H) F25.9    2. Marijuana " abuse F12.10    3. Migraine without status migrainosus, not intractable, unspecified migraine type G43.909        Plan:    Awaiting admission to mental health unit.      MD DIANNA Diaz Katrina Anne, MD  04/19/18 0734

## 2018-04-19 NOTE — ED NOTES
"ED to Behavioral Floor Handoff    SITUATION  Rianna Man is a 28 year old female who speaks English and lives is homeless with others The patient arrived in the ED by private car from home with a complaint of Suicidal (Suicidal with plan to jump from bridge in [facility]. H/O previous attempts) and Hallucinations (c/o auditory hallucinations)  .The patient's current symptoms started/worsened 7 day(s) ago and during this time the symptoms have increased.   In the ED, pt was diagnosed with   Final diagnoses:   Schizoaffective disorder, unspecified type (H)   Marijuana abuse   Migraine without status migrainosus, not intractable, unspecified migraine type        Initial vitals were: BP: (!) 134/95  Heart Rate: 113  Temp: 96.8  F (36  C)  Resp: 18  Height: 172.7 cm (5' 8\")  Weight: 105.2 kg (232 lb)  SpO2: 98 %   --------  Is the patient diabetic? No   If yes, last blood glucose? --     If yes, was this treated in the ED? --  --------  Is the patient inebriated (ETOH) No or Impaired on other substances? No  MSSA done? Yes    Last MSSA score:7   --    Were withdrawal symptoms treated? No  Does the patient have a seizure history? No. If yes, date of most recent seizure--  --------  Is the patient patient experiencing suicidal ideation? reports the following suicide factors: suicidal thoughts and hallucinations.     Homicidal ideation? denies current or recent homicidal ideation or behaviors.    Self-injurious behavior/urges? denies current or recent self injurious behavior or ideation.  ------  Was pt aggressive in the ED No  Was a code called no  Is the pt now cooperative? Yes  -------  Meds given in ED:   Medications   OLANZapine zydis (zyPREXA) ODT tab 10 mg (10 mg Oral Given 4/18/18 2306)   acetaminophen (TYLENOL) tablet 1,000 mg (1,000 mg Oral Given 4/18/18 2306)      Family present during ED course? No  Family currently present? No    BACKGROUND  Does the patient have a cognitive impairment or developmental " "disability? No  Allergies:   Allergies   Allergen Reactions     Lactose Other (See Comments)     No Known Allergies    .   Social demographics are   Social History     Social History     Marital status: Single     Spouse name: N/A     Number of children: N/A     Years of education: N/A     Social History Main Topics     Smoking status: Current Every Day Smoker     Packs/day: 0.40     Types: Cigarettes     Smokeless tobacco: Never Used      Comment: less than 1/2 pack     Alcohol use 0.0 oz/week     0 Standard drinks or equivalent per week      Comment: Drinks socially weekends.     Drug use: Yes     Special: Marijuana     Sexual activity: Yes     Partners: Male      Comment: monogamous relationship     Other Topics Concern     None     Social History Narrative        ASSESSMENT  Labs results   Labs Ordered and Resulted from Time of ED Arrival Up to the Time of Departure from the ED   DRUG ABUSE SCREEN 6 CHEM DEP URINE ([facility]) - Abnormal; Notable for the following:        Result Value    Cannabinoids Qual Urine Positive (*)     Ethanol Qual Urine Positive (*)     All other components within normal limits   ALCOHOL BREATH TEST POCT - Abnormal; Notable for the following:     Alcohol Breath Test 0.182 (*)     All other components within normal limits   ALCOHOL BREATH TEST POCT - Normal   HCG QUALITATIVE URINE      Imaging Studies: No results found for this or any previous visit (from the past 24 hour(s)).   Most recent vital signs /76  Temp 97.8  F (36.6  C) (Oral)  Resp 16  Ht 1.727 m (5' 8\")  Wt 105.2 kg (232 lb)  SpO2 98%  Breastfeeding? No  BMI 35.28 kg/m2   Abnormal labs/tests/findings requiring intervention:---   Pain control: pt had none  Nausea control: pt had none    RECOMMENDATION  Are any infection precautions needed (MRSA, VRE, etc.)? No If yes, what infection? --  ---  Does the patient have mobility issues? independently. If yes, what device does the pt use? ---  ---  Is patient on 72 hour hold " or commitment? No If on 72 hour hold, have hold and rights been given to patient? N/A  Are admitting orders written if after 10 p.m. ?N/A  Tasks needing to be completed:---     Donna Rizzo   [facility]-- [phone] [place] [facility]   [phone] [facility] ED
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
## 2018-04-19 NOTE — ED NOTES
"Pt viewed the \"Welcome to Behavioral Health\" video and was given an opportunity to ask questions.  "
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
## 2018-04-19 NOTE — PLAN OF CARE
Problem: Patient Care Overview  Goal: Team Discussion  Team Plan:   BEHAVIORAL TEAM DISCUSSION    Participants: Dr. Seun Coleman; Julia Ellis LICSW; Beverley Anderson OTR/L; Vielka Jean-Baptiste RN    Progress: Patient in the milieu and is attending all programming offered.     Continued Stay Criteria/Rationale: New admit    Medical/Physical: See consult     Precautions:   Behavioral Orders   Procedures     Code 1 - Restrict to Unit     Routine Programming     As clinically indicated     Sexual precautions     Status 15     Every 15 minutes.     Plan: The patient needs medication management.  She will let the staff know if she has questions or concerns.  She will need therapist, psychiatric med management at discharge.      Rationale for change in precautions or plan: No changes
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
## 2018-04-19 NOTE — ED NOTES
Pt. wanded and safety search conducted by writer. Pt. cooperative w/search and belongings given to security. UA collected and warm blanket offered to pt.

## 2018-04-19 NOTE — PROGRESS NOTES
04/19/18 1233   Patient Belongings   Did you bring any home meds/supplements to the hospital?  No   Disposition of Belongings Other (see comment)   Belongings Search Yes   Clothing Search Yes   Second Staff Zonia       White jeans, white long sleeve shirt, grey sweatshirt, white bandana, brown boots, black smart phone, green winter jacket, army patterned backpack, 2 pair jeans, pair pajama shorts, swim bottoms, pair grey jeans, pair white socks, Deoderant, toothbrush, body spray, green long sleeve shirt, grey cristian, pink long sleeve blouse, plaid button up, green sweater, pink cristian, blue tank top, LG smart phone w/cracked screen, pink smart phone, comb, lotion, 1 pack cigarettes, red wallet, [place] drivers license, [employer] card, [employer] card, $2.00 cash    In envelope [id number]:  Blue [employer] visa *6386  [employer] visa *9419  Black mastercard *8762  Black visa debit *0151  [employer] visa *6408  [employer] visa *5603  [employer] loyalty visa *6639  [place] EBT *5739  [place] pay card visa *8933  [facility] visa-debit *3331  Social security card                   Admission:  I am responsible for any personal items that are not sent to the safe or pharmacy.  [facility] is not responsible for loss, theft or damage of any property in my possession.    Signature:  _________________________________ Date: _______  Time: _____                                              Staff Signature:  ____________________________ Date: ________  Time: _____      2nd Staff person, if patient is unable/unwilling to sign:    Signature: ________________________________ Date: ________  Time: _____     Discharge:  [facility] has returned all of my personal belongings:    Signature: _________________________________ Date: ________  Time: _____                                          Staff Signature:  ____________________________ Date: ________  Time: _____

## 2018-04-19 NOTE — ED NOTES
"ED to Behavioral Floor Handoff    SITUATION  Rianna Man is a 28 year old female who speaks English and lives in a home unknown The patient arrived in the ED by public transportation from home with a complaint of Suicidal (Suicidal with plan to jump from bridge in [facility] H/O previous attempts) and Hallucinations (c/o auditory hallucinations)  .The patient's current symptoms started/worsened over the last month and during this time the symptoms have increased.   In the ED, pt was diagnosed with   Final diagnoses:   Schizoaffective disorder, unspecified type (H)   Marijuana abuse   Migraine without status migrainosus, not intractable, unspecified migraine type        Initial vitals were: BP: (!) 134/95  Heart Rate: 113  Temp: 96.8  F (36  C)  Resp: 18  Height: 172.7 cm (5' 8\")  Weight: 105.2 kg (232 lb)  SpO2: 98 %   --------  Is the patient diabetic? No   If yes, last blood glucose? --     If yes, was this treated in the ED? --  --------  Is the patient inebriated (ETOH) Yes or Impaired on other substances? No  MSSA done? Not yet  Last MSSA score: --    Were withdrawal symptoms treated? N/A  Does the patient have a seizure history? No. If yes, date of most recent seizure--  --------  Is the patient patient experiencing suicidal ideation? Suicidal with a plan to jump from a bridge; pt has command hallucinations telling her to jump  Homicidal ideation? denies current or recent homicidal ideation or behaviors.    Self-injurious behavior/urges? Pt was seen scratching at arm with folded up piece of paper while in ED.   ------  Was pt aggressive in the ED No  Was a code called No  Is the pt now cooperative? Yes  -------  Meds given in ED:   Medications   OLANZapine zydis (zyPREXA) ODT tab 10 mg (10 mg Oral Given 4/18/18 2306)   acetaminophen (TYLENOL) tablet 1,000 mg (1,000 mg Oral Given 4/18/18 2306)      Family present during ED course? No  Family currently present? No    BACKGROUND  Does the patient have a " "cognitive impairment or developmental disability? No  Allergies:   Allergies   Allergen Reactions     Lactose Other (See Comments)     No Known Allergies    .   Social demographics are   Social History     Social History     Marital status: Single     Spouse name: N/A     Number of children: N/A     Years of education: N/A     Social History Main Topics     Smoking status: Current Every Day Smoker     Packs/day: 0.40     Types: Cigarettes     Smokeless tobacco: Never Used      Comment: less than 1/2 pack     Alcohol use 0.0 oz/week     0 Standard drinks or equivalent per week      Comment: Drinks socially weekends.     Drug use: Yes     Special: Marijuana     Sexual activity: Yes     Partners: Male      Comment: monogamous relationship     Other Topics Concern     None     Social History Narrative        ASSESSMENT  Labs results   Labs Ordered and Resulted from Time of ED Arrival Up to the Time of Departure from the ED   DRUG ABUSE SCREEN 6 CHEM DEP URINE ([facility]) - Abnormal; Notable for the following:        Result Value    Cannabinoids Qual Urine Positive (*)     Ethanol Qual Urine Positive (*)     All other components within normal limits   ALCOHOL BREATH TEST POCT - Abnormal; Notable for the following:     Alcohol Breath Test 0.182 (*)     All other components within normal limits   HCG QUALITATIVE URINE      Imaging Studies: No results found for this or any previous visit (from the past 24 hour(s)).   Most recent vital signs BP (!) 134/95  Temp 96.8  F (36  C) (Oral)  Resp 18  Ht 1.727 m (5' 8\")  Wt 105.2 kg (232 lb)  SpO2 98%  Breastfeeding? No  BMI 35.28 kg/m2   Abnormal labs/tests/findings requiring intervention:---   Pain control: fair  Nausea control: pt had none    RECOMMENDATION  Are any infection precautions needed (MRSA, VRE, etc.)? No If yes, what infection? --  ---  Does the patient have mobility issues? independently. If yes, what device does the pt use? ---  ---  Is patient on 72 hour hold or " commitment? No If on 72 hour hold, have hold and rights been given to patient? No  Are admitting orders written if after 10 p.m. ?Not yet  Tasks needing to be completed:---     Patel Barroso   ascom-- [phone] [place] [facility]   [phone] [facility]

## 2018-04-19 NOTE — ED NOTES
Pt was found scratching her R forearm with a folded up piece of paper. This RN removed the paper from pt's possession. No injuries noted.

## 2018-04-20 LAB
ALBUMIN SERPL-MCNC: 3 G/DL (ref 3.4–5)
ALP SERPL-CCNC: 82 U/L (ref 40–150)
ALT SERPL W P-5'-P-CCNC: 22 U/L (ref 0–50)
ANION GAP SERPL CALCULATED.3IONS-SCNC: 7 MMOL/L (ref 3–14)
AST SERPL W P-5'-P-CCNC: 14 U/L (ref 0–45)
BASOPHILS # BLD AUTO: 0 10E9/L (ref 0–0.2)
BASOPHILS NFR BLD AUTO: 0.2 %
BILIRUB DIRECT SERPL-MCNC: 0.1 MG/DL (ref 0–0.2)
BILIRUB SERPL-MCNC: 0.4 MG/DL (ref 0.2–1.3)
BUN SERPL-MCNC: 10 MG/DL (ref 7–30)
C TRACH DNA SPEC QL NAA+PROBE: NEGATIVE
CALCIUM SERPL-MCNC: 9.1 MG/DL (ref 8.5–10.1)
CHLORIDE SERPL-SCNC: 110 MMOL/L (ref 94–109)
CHOLEST SERPL-MCNC: 176 MG/DL
CO2 SERPL-SCNC: 26 MMOL/L (ref 20–32)
CREAT SERPL-MCNC: 0.92 MG/DL (ref 0.52–1.04)
DIFFERENTIAL METHOD BLD: ABNORMAL
EOSINOPHIL # BLD AUTO: 0.1 10E9/L (ref 0–0.7)
EOSINOPHIL NFR BLD AUTO: 1.9 %
ERYTHROCYTE [DISTWIDTH] IN BLOOD BY AUTOMATED COUNT: 13.5 % (ref 10–15)
GFR SERPL CREATININE-BSD FRML MDRD: 73 ML/MIN/1.7M2
GLUCOSE SERPL-MCNC: 93 MG/DL (ref 70–99)
HBV SURFACE AG SERPL QL IA: NONREACTIVE
HCT VFR BLD AUTO: 47.9 % (ref 35–47)
HCV AB SERPL QL IA: NONREACTIVE
HDLC SERPL-MCNC: 95 MG/DL
HGB BLD-MCNC: 15.1 G/DL (ref 11.7–15.7)
HIV 1+2 AB+HIV1 P24 AG SERPL QL IA: NONREACTIVE
IMM GRANULOCYTES # BLD: 0 10E9/L (ref 0–0.4)
IMM GRANULOCYTES NFR BLD: 0.2 %
INTERPRETATION ECG - MUSE: NORMAL
LDLC SERPL CALC-MCNC: 70 MG/DL
LYMPHOCYTES # BLD AUTO: 2.2 10E9/L (ref 0.8–5.3)
LYMPHOCYTES NFR BLD AUTO: 45.3 %
MCH RBC QN AUTO: 29.5 PG (ref 26.5–33)
MCHC RBC AUTO-ENTMCNC: 31.5 G/DL (ref 31.5–36.5)
MCV RBC AUTO: 94 FL (ref 78–100)
MONOCYTES # BLD AUTO: 0.5 10E9/L (ref 0–1.3)
MONOCYTES NFR BLD AUTO: 10.4 %
N GONORRHOEA DNA SPEC QL NAA+PROBE: NEGATIVE
NEUTROPHILS # BLD AUTO: 2 10E9/L (ref 1.6–8.3)
NEUTROPHILS NFR BLD AUTO: 42 %
NONHDLC SERPL-MCNC: 81 MG/DL
NRBC # BLD AUTO: 0 10*3/UL
NRBC BLD AUTO-RTO: 0 /100
PLATELET # BLD AUTO: 217 10E9/L (ref 150–450)
POTASSIUM SERPL-SCNC: 4 MMOL/L (ref 3.4–5.3)
PROT SERPL-MCNC: 6.7 G/DL (ref 6.8–8.8)
RBC # BLD AUTO: 5.11 10E12/L (ref 3.8–5.2)
SODIUM SERPL-SCNC: 143 MMOL/L (ref 133–144)
SPECIMEN SOURCE: NORMAL
SPECIMEN SOURCE: NORMAL
TRIGL SERPL-MCNC: 54 MG/DL
WBC # BLD AUTO: 4.8 10E9/L (ref 4–11)

## 2018-04-20 PROCEDURE — 12400007 ZZH R&B MH INTERMEDIATE UMMC

## 2018-04-20 PROCEDURE — 25000132 ZZH RX MED GY IP 250 OP 250 PS 637: Performed by: PSYCHIATRY & NEUROLOGY

## 2018-04-20 PROCEDURE — 93005 ELECTROCARDIOGRAM TRACING: CPT | Performed by: PSYCHIATRY & NEUROLOGY

## 2018-04-20 PROCEDURE — 99207 ZZC CDG-MDM COMPONENT: MEETS MODERATE - UP CODED: CPT | Performed by: PSYCHIATRY & NEUROLOGY

## 2018-04-20 PROCEDURE — 86803 HEPATITIS C AB TEST: CPT | Performed by: PSYCHIATRY & NEUROLOGY

## 2018-04-20 PROCEDURE — 80061 LIPID PANEL: CPT | Performed by: PSYCHIATRY & NEUROLOGY

## 2018-04-20 PROCEDURE — 87340 HEPATITIS B SURFACE AG IA: CPT | Performed by: PSYCHIATRY & NEUROLOGY

## 2018-04-20 PROCEDURE — 80076 HEPATIC FUNCTION PANEL: CPT | Performed by: PSYCHIATRY & NEUROLOGY

## 2018-04-20 PROCEDURE — 80048 BASIC METABOLIC PNL TOTAL CA: CPT | Performed by: PSYCHIATRY & NEUROLOGY

## 2018-04-20 PROCEDURE — 85025 COMPLETE CBC W/AUTO DIFF WBC: CPT | Performed by: PSYCHIATRY & NEUROLOGY

## 2018-04-20 PROCEDURE — 99232 SBSQ HOSP IP/OBS MODERATE 35: CPT | Performed by: PSYCHIATRY & NEUROLOGY

## 2018-04-20 PROCEDURE — 87389 HIV-1 AG W/HIV-1&-2 AB AG IA: CPT | Performed by: PSYCHIATRY & NEUROLOGY

## 2018-04-20 PROCEDURE — 36415 COLL VENOUS BLD VENIPUNCTURE: CPT | Performed by: PSYCHIATRY & NEUROLOGY

## 2018-04-20 RX ORDER — ESCITALOPRAM OXALATE 5 MG/1
5 TABLET ORAL DAILY
Status: DISCONTINUED | OUTPATIENT
Start: 2018-04-20 | End: 2018-04-23

## 2018-04-20 RX ADMIN — ESCITALOPRAM 5 MG: 5 TABLET, FILM COATED ORAL at 12:03

## 2018-04-20 RX ADMIN — GABAPENTIN 400 MG: 400 CAPSULE ORAL at 19:18

## 2018-04-20 RX ADMIN — ACETAMINOPHEN, ASPIRIN AND CAFFEINE 2 TABLET: 250; 250; 65 TABLET, FILM COATED ORAL at 16:56

## 2018-04-20 RX ADMIN — LACTASE TAB 3000 UNIT 6000 UNITS: 3000 TAB at 08:03

## 2018-04-20 RX ADMIN — OLANZAPINE 10 MG: 10 TABLET, FILM COATED ORAL at 05:37

## 2018-04-20 RX ADMIN — LACTASE TAB 3000 UNIT 6000 UNITS: 3000 TAB at 17:57

## 2018-04-20 RX ADMIN — HYDROCORTISONE: 25 CREAM TOPICAL at 09:07

## 2018-04-20 RX ADMIN — OLANZAPINE 7.5 MG: 5 TABLET, FILM COATED ORAL at 20:39

## 2018-04-20 RX ADMIN — GABAPENTIN 400 MG: 400 CAPSULE ORAL at 08:03

## 2018-04-20 RX ADMIN — GABAPENTIN 400 MG: 400 CAPSULE ORAL at 13:36

## 2018-04-20 RX ADMIN — HYDROCORTISONE: 25 CREAM TOPICAL at 19:18

## 2018-04-20 RX ADMIN — MULTIPLE VITAMINS W/ MINERALS TAB 1 TABLET: TAB at 08:03

## 2018-04-20 RX ADMIN — NICOTINE 1 PATCH: 14 PATCH, EXTENDED RELEASE TRANSDERMAL at 08:03

## 2018-04-20 RX ADMIN — OLANZAPINE 10 MG: 10 TABLET, FILM COATED ORAL at 16:56

## 2018-04-20 RX ADMIN — MIRTAZAPINE 15 MG: 15 TABLET, FILM COATED ORAL at 20:40

## 2018-04-20 RX ADMIN — LACTASE TAB 3000 UNIT 6000 UNITS: 3000 TAB at 12:03

## 2018-04-20 ASSESSMENT — ACTIVITIES OF DAILY LIVING (ADL)
DRESS: INDEPENDENT
GROOMING: INDEPENDENT
GROOMING: SHOWER;INDEPENDENT
ORAL_HYGIENE: INDEPENDENT
LAUNDRY: UNABLE TO COMPLETE
ORAL_HYGIENE: INDEPENDENT
DRESS: SCRUBS (BEHAVIORAL HEALTH)

## 2018-04-20 NOTE — PROGRESS NOTES
Pt. Was visible in the milieu, social and engaged with others. She declined to attend groups today. Pt. Reports auditory hallucinations, but states they are quiet and mumbling. She paced the halls listening to headphones at times. Pt. Stated she feels safe while in the hospital and the last time she was hospitalized she found it to be helpful. She endorses anxiety and depression. Pt. Reports an increase in appetite, she is unsure whether this is due to her medications. She reports poor sleep last evening. Pt. Stated it feels good to drink water and not alcohol and that she is physically feeling better. Denies SI/SIB.       04/20/18 1329   Behavioral Health   Hallucinations auditory   Thinking distractable   Orientation person: oriented;place: oriented;date: oriented   Memory baseline memory   Insight admits / accepts   Judgement impaired   Eye Contact at examiner   Affect full range affect   Mood anxious   Physical Appearance/Attire disheveled   Hygiene neglected grooming - unclean body, hair, teeth   Suicidality other (see comments)  (denies)   1. Wish to be Dead No   2. Non-Specific Active Suicidal Thoughts  No   Self Injury other (see comment)  (denies)   Elopement (Pt. is not presently on elopement precautions.)   Activity other (see comment)  (Visible in the milieu, social and engaged with others.)   Speech clear;coherent   Medication Sensitivity no stated side effects   Psychomotor / Gait balanced;steady   Coping/Psychosocial   Verbalized Emotional State acceptance;anxiety;depression   Psycho Education   Type of Intervention 1:1 intervention   Response participates, initiates socially appropriate   Hours 0.5   Treatment Detail 1:1 check-in   Activities of Daily Living   Hygiene/Grooming independent   Oral Hygiene independent   Dress scrubs (behavioral health)   Laundry unable to complete   Room Organization independent

## 2018-04-20 NOTE — PROGRESS NOTES
"CLINICAL NUTRITION SERVICES - ASSESSMENT NOTE     Nutrition Prescription    RECOMMENDATIONS FOR MDs/PROVIDERS TO ORDER:  None today     Malnutrition Status:    Severe malnutrition in the context of acute illness.     Recommendations already ordered by Registered Dietitian (RD):  Ordered Boost Plus (strawberry) TID with meals    Future/Additional Recommendations:  Encourage PO intakes of TID meals and supplements. Adjust per pt preference or if pt begins to gain weight excessively.      REASON FOR ASSESSMENT  Rianna Man is a 28 year old female assessed by the dietitian for Admission Nutrition Risk Screen for unintentional loss of 10# or more in the past two months    NUTRITION HISTORY  Patient reports she has had no appetite over the past 2 months. She would typically eat 1 meal/day but would often go a few days without eating.     CURRENT NUTRITION ORDERS  Diet: Regular  Intake/Tolerance: Patient reports she ate a little more today than usual, likely because she doesn't have to think about the food or what to have, it just appears.     LABS  Labs reviewed    MEDICATIONS  Medications reviewed  Lactaid with meals   Multivitamin with minerals    ANTHROPOMETRICS  Height: 172.7 cm (5' 8\")  Most Recent Weight: 103.4 kg (228 lb)    IBW: 64 kg  BMI: Obesity Grade I BMI 30-34.9  Weight History: Patient reports UBW of 260 lb x 1 month ago. Weight loss of 14.8 kg (13%).   Wt Readings from Last 10 Encounters:   04/19/18 103.4 kg (228 lb)   03/30/18 107.2 kg (236 lb 6.4 oz)   01/13/17 120.7 kg (266 lb)   03/21/16 113.5 kg (250 lb 3.2 oz)   06/15/15 99.5 kg (219 lb 6.4 oz)   06/10/15 99.6 kg (219 lb 9.6 oz)   02/13/15 99.5 kg (219 lb 6.4 oz)   02/09/15 100.2 kg (220 lb 12.8 oz)   01/23/15 98.9 kg (218 lb)   04/23/14 95.3 kg (210 lb)     Dosing Weight: 74 kg (adjusted for obesity)     ASSESSED NUTRITION NEEDS  Estimated Energy Needs: 2028-4786 kcals/day (25 - 30 kcals/kg)  Justification: Maintenance  Estimated Protein " Needs: 74-89 grams protein/day (1 - 1.2 grams of pro/kg)  Justification: Repletion  Estimated Fluid Needs: 1 mL/kcal  Justification: Maintenance    PHYSICAL FINDINGS  See malnutrition section below.    MALNUTRITION  % Intake: </= 50% for >/= 5 days (severe)  % Weight Loss: > 5% in 1 month (severe)  Subcutaneous Fat Loss: None observed  Muscle Loss: None observed  Fluid Accumulation/Edema: None noted  Malnutrition Diagnosis: Severe malnutrition in the context of acute illness.     NUTRITION DIAGNOSIS  Inadequate oral intake related to decreased appetite 2/2 mental health status as evidenced by patient report of eating 1x/day, likely <50% for > 5 days, weight loss of 13% body weight x 1 month.       INTERVENTIONS  Implementation  Nutrition education for recommended modifications - discussed importance of small, frequent meals/snacks to optimize nutritional intake with poor appetite. Encouraged pt to come out for meals and at least try to eat something TID. Discussed supplements as another option to improve nutritional intake, pt requests Boost Plus.   Medical food supplement therapy - ordered Boost Plus TID (strawberry) with meals    Goals  Patient to consume % of nutritionally adequate meal trays TID, or the equivalent with supplements/snacks.     Monitoring/Evaluation  Progress toward goals will be monitored and evaluated per protocol.    Ronit Valentine RD, LD  Unit Pager: [phone]
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
## 2018-04-20 NOTE — PROGRESS NOTES
"[facility], [place]   Psychiatric Progress Note        Interim History:   Reason for Hospitalization:Rianna is a 28 year old female with history of depression/manic symptoms, substance use issues (alcohol, cannabis, cocaine), and trauma history who is admitted on a voluntary basis for continued depressive moods, suicidal ideation, and worsening voices.     Subjective: \"I want to feel better and willing to take medications and do therapy to get better\"     As per today's interview: Patient had felt some grogginess this morning, but felt some calmness with the medications that were started yesterday (Gabapentin and Zyprexa). She has been going to groups and eating her meals. She describes her sexual thoughts/behaviors are ongoing, persistent, intrusive and both distressive and pleasurable at times. She admits that these thoughts lead to social, interpersonal, occupational dysfunction. Willing to accept any recommendations.          Medications:       escitalopram  5 mg Oral Daily     gabapentin  400 mg Oral TID     hydrocortisone   Topical BID     lactase  6,000 Units Oral TID w/meals     mirtazapine  15 mg Oral At Bedtime     multivitamin, therapeutic with minerals  1 tablet Oral Daily     nicotine  1 patch Transdermal Daily     nicotine   Transdermal Q8H     nicotine   Transdermal Daily     OLANZapine  7.5 mg Oral At Bedtime          Allergies:     Allergies   Allergen Reactions     No Known Allergies           Labs:     Recent Results (from the past 48 hour(s))   Drug abuse screen 6 urine (tox)    Collection Time: 04/18/18  8:14 PM   Result Value Ref Range    Amphetamine Qual Urine Negative NEG^Negative    Barbiturates Qual Urine Negative NEG^Negative    Benzodiazepine Qual Urine Negative NEG^Negative    Cannabinoids Qual Urine Positive (A) NEG^Negative    Cocaine Qual Urine Negative NEG^Negative    Ethanol Qual Urine Positive (A) NEG^Negative    Opiates Qualitative Urine Negative " NEG^Negative   HCG qualitative urine    Collection Time: 04/18/18  8:14 PM   Result Value Ref Range    HCG Qual Urine Negative NEG^Negative   Alcohol breath test POCT    Collection Time: 04/18/18 11:13 PM   Result Value Ref Range    Alcohol Breath Test 0.182 (A) 0.00 - 0.01   Alcohol breath test POCT    Collection Time: 04/19/18  9:56 AM   Result Value Ref Range    Alcohol Breath Test 0.00 0.00 - 0.01   Neisseria gonorrhoea culture    Collection Time: 04/19/18  4:42 PM   Result Value Ref Range    Specimen Description Urine     Culture Micro       Canceled, Test credited  Test canceled by physician  PCR being performed instead. Received call from Abacast canjosé miguel for Caden Jamil.     Chlamydia culture    Collection Time: 04/19/18  4:42 PM   Result Value Ref Range    Chlamydia Culture Specimen Urine     Chlamydia Prelim Canceled, Test credited     Chlamydia Cult Final Canceled, Test credited    CBC with platelets differential    Collection Time: 04/20/18  7:52 AM   Result Value Ref Range    WBC 4.8 4.0 - 11.0 10e9/L    RBC Count 5.11 3.8 - 5.2 10e12/L    Hemoglobin 15.1 11.7 - 15.7 g/dL    Hematocrit 47.9 (H) 35.0 - 47.0 %    MCV 94 78 - 100 fl    MCH 29.5 26.5 - 33.0 pg    MCHC 31.5 31.5 - 36.5 g/dL    RDW 13.5 10.0 - 15.0 %    Platelet Count 217 150 - 450 10e9/L    Diff Method Automated Method     % Neutrophils 42.0 %    % Lymphocytes 45.3 %    % Monocytes 10.4 %    % Eosinophils 1.9 %    % Basophils 0.2 %    % Immature Granulocytes 0.2 %    Nucleated RBCs 0 0 /100    Absolute Neutrophil 2.0 1.6 - 8.3 10e9/L    Absolute Lymphocytes 2.2 0.8 - 5.3 10e9/L    Absolute Monocytes 0.5 0.0 - 1.3 10e9/L    Absolute Eosinophils 0.1 0.0 - 0.7 10e9/L    Absolute Basophils 0.0 0.0 - 0.2 10e9/L    Abs Immature Granulocytes 0.0 0 - 0.4 10e9/L    Absolute Nucleated RBC 0.0    Basic metabolic panel    Collection Time: 04/20/18  7:52 AM   Result Value Ref Range    Sodium 143 133 - 144 mmol/L    Potassium 4.0 3.4 - 5.3 mmol/L     "Chloride 110 (H) 94 - 109 mmol/L    Carbon Dioxide 26 20 - 32 mmol/L    Anion Gap 7 3 - 14 mmol/L    Glucose 93 70 - 99 mg/dL    Urea Nitrogen 10 7 - 30 mg/dL    Creatinine 0.92 0.52 - 1.04 mg/dL    GFR Estimate 73 >60 mL/min/1.7m2    GFR Estimate If Black 88 >60 mL/min/1.7m2    Calcium 9.1 8.5 - 10.1 mg/dL   Hepatic panel    Collection Time: 04/20/18  7:52 AM   Result Value Ref Range    Bilirubin Direct 0.1 0.0 - 0.2 mg/dL    Bilirubin Total 0.4 0.2 - 1.3 mg/dL    Albumin 3.0 (L) 3.4 - 5.0 g/dL    Protein Total 6.7 (L) 6.8 - 8.8 g/dL    Alkaline Phosphatase 82 40 - 150 U/L    ALT 22 0 - 50 U/L    AST 14 0 - 45 U/L   Lipid profile    Collection Time: 04/20/18  7:52 AM   Result Value Ref Range    Cholesterol 176 <200 mg/dL    Triglycerides 54 <150 mg/dL    HDL Cholesterol 95 >49 mg/dL    LDL Cholesterol Calculated 70 <100 mg/dL    Non HDL Cholesterol 81 <130 mg/dL   EKG 12-lead, complete    Collection Time: 04/20/18  8:56 AM   Result Value Ref Range    Interpretation ECG Click View Image link to view waveform and result           Psychiatric Examination:   /87 (BP Location: Left arm)  Pulse 77  Temp 98.1  F (36.7  C)  Resp 18  Ht 1.727 m (5' 8\")  Wt 103.4 kg (228 lb)  SpO2 97%  Breastfeeding? No  BMI 34.67 kg/m2  Weight is 228 lbs 0 oz  Body mass index is 34.67 kg/(m^2).    Appearance:  female, mild-moderately disheveled appearance. Mild emotional discomfort.   Attitude:  Somewhat anxious, but pleasant and cooperative.   Eye Contact:  Adequate   Mood:  \"Worried\"  Affect:  Anxious   Speech:  Regular in rate and rhythm.   Language: fluent and intact in English  Psychomotor, Gait, Musculoskeletal:  Unremarkable.   Throught Process:  Linear, coherent   Associations:  Intact   Thought Content:  no evidence of suicidal ideation or homicidal ideation, no evidence of psychotic thought, no auditory hallucinations present and no visual hallucinations present  Insight:  fair  Judgement:  " poor  Oriented to:  time, person, and place  Attention Span and Concentration:  intact  Recent and Remote Memory:  intact  Fund of Knowledge:  appropriate        Assessment & Plan       Principal Diagnosis:   Obsessive Compulsive Disorder vs. Compulsive Sexual Behavior   Bipolar 1 Disorder, recent episode mixed   PTSD  Cluster B Personality Disorder Traits (particularly Borderline Personality Disorder)   Alcohol Use Disorder  Cannabis Use Disorder  Cocaine Use Disorder  Migraine without status migrainosus, not intractable, unspecified migraine type      Assessment:  (Initial Assessment 4/19): Rianna appears to have had exacerbation of her manic symptoms, including impulsivity (particularly sexual promiscuity), rapid/flight of ideas, lack of sleep, and increased bouts of energy lasting most of the day. She also has intrusive auditory hallucinations that are derogatory and commanding, which have worsened overtime, particularly in times of stress. She seems to have PTSD symptoms as well, which could also be a factor contributing to her anxiety, auditory hallucinations, and perseverative thoughts. She has also been utilizing illicit substances to cope with her symptoms and stressors, which can also perpetuate her mood lability/dysreagulation. An ongoing psychosocial stressor likely precipitating/perpetuating her symptoms is her stress of homelessness after breaking up with her boyfriend in January. She has not been able to sustain continued employment after being homeless, and seems to have an unstable living environment. She is willing to get back on psychotropic medications, along with getting established with outpatient providers (therapy and psychiatry). Given her continued alcohol use prior to admission (BAL 0.18), and unclear frequency of use, I will start MSSA protocol (with Ativan). STD testing will be checked again since she had unprotected sex with multiple partners in the context of maria isabel.     Follow Up  "(4/20): Today, we discussed her sexual behavior more in depth. She describes having recurring overpowering obsessive thoughts about sex, and compulsively watches porn, masturbates, and/or has sex with multiple different men (even when she was in a relationship). She watches porn excessively (usually the violent, aggressive, bondage genre), and masturbates excessively to the point of missing out on self care, eating, work and other responsibilities. Patient describes these thoughts and behaviors as sometimes pleasurable and also distressing, although she does admit these thoughts/behaviors lead to much dysfunction in her social, interpersonal, and occupational life. Her friends have told her that she has a \"sexual addiction\" problem. The important difference between Compulsive Sexual Behavior vs. OCD is that in the former, the thoughts/behavior are pleasurable, while in the latter, the thoughts/behaviors are distressing (ego-syntonic vs. Ego- dystonic). Nevertheless, apart from semantics, and diagnostic nosology of OCD and CSB, both have empiric evidence of improvement with SSRIs. We discussed benefit of Lexapro, which she was open to taking. Of note, her Hep, HIV, GC/Chlamymia tests were negative. EKG showed a QTc of 424.      Plan:  1.) Medication Plan:  - Lexapro 5 mg Daily   - Zyprexa  5 mg HS  - Gabapentin 400 mg TID  - Remeron 15 mg HS  - Lactaid 6000 units TID with meals   - Excedrin 2 tablets Q6H PRN. Can also use Imitrex 50 mg if migraine pain recurs.   - MSSA protocol with Ativan available (can discontinue MSSA protocol if patient continues to have low (<5) scores for the next 24 hours).      2.) Psychosocial Plans:  - Work with CTC's to help set up resources/referrals for CBT therapy.        Legal:  Voluntary      Disposition:  Discharge when patient is more stable, likely next week.        Safety Assessment:   Checks: Status 15  Precautions: Sexual  Pt has not required locked seclusion or restraints in " the past 24 hours to maintain safety, please refer to RN documentation for further details.       The risks, benefits, alternatives and side effects have been discussed and are understood by the patient and other caregivers.     Seun Coleman MD  [facility] Psychiatry

## 2018-04-20 NOTE — PROGRESS NOTES
Pt has only attended x5 minutes of an occupational therapy group since admission. Encourage attendance and participation. Pt will be given self-assessment form, and OT staff will explain the purpose of including them in their treatment plan and offer options for meeting their needs.

## 2018-04-20 NOTE — PROGRESS NOTES
SPIRITUAL HEALTH SERVICES    [facility] ([facility]) [address]  ON-CALL VISIT      REFERRAL SOURCE: patient/family request at admission for chaplaincy support    Offered visit to pt who declined stating that her stomach hurt and she wanted to rest. She would rather speak with a  another day.    PLAN: Refer to on-call  for triage or follow up.                                                                                                                            Zonia Barreto MDiv, [facility]  Staff   Pager [phone]

## 2018-04-20 NOTE — PROGRESS NOTES
Pt was calm and socially interactive with peers. Pt was visible in lounge and was calm and cooperative upon check in. Pt endorsed having auditory hallucinations and rated 8 for depression, 10 for sadness, and 8 for anxiety. Pt denied having thoughts of paranoia and thoughts of SI/SIB/HI. Pt reported having to staff having poor concentrations. Pt took shower and ate her dinner. Nothing else to report.        04/19/18 2131   Behavioral Health   Hallucinations denies / not responding to hallucinations   Thinking poor concentration   Orientation place: oriented;date: oriented;person: oriented;time: oriented   Memory baseline memory   Insight poor   Eye Contact at examiner   Affect full range affect   Mood anxious;depressed;mood is calm;labile   Physical Appearance/Attire neat   Hygiene well groomed   Suicidality other (see comments)  (Denied)   Self Injury other (see comment)  (Denied)   Activity other (see comment)  (Interactive and social)   Speech clear;coherent   Medication Sensitivity no stated side effects;no observed side effects   Psychomotor / Gait balanced;steady   Activities of Daily Living   Hygiene/Grooming shower;independent;handwashing;bath   Oral Hygiene independent   Dress scrubs (behavioral health);street clothes   Laundry with supervision   Room Organization independent

## 2018-04-21 PROCEDURE — 25000132 ZZH RX MED GY IP 250 OP 250 PS 637: Performed by: PSYCHIATRY & NEUROLOGY

## 2018-04-21 PROCEDURE — 12400007 ZZH R&B MH INTERMEDIATE UMMC

## 2018-04-21 PROCEDURE — 90853 GROUP PSYCHOTHERAPY: CPT

## 2018-04-21 RX ORDER — ALUMINA, MAGNESIA, AND SIMETHICONE 2400; 2400; 240 MG/30ML; MG/30ML; MG/30ML
30 SUSPENSION ORAL EVERY 4 HOURS PRN
Status: DISCONTINUED | OUTPATIENT
Start: 2018-04-21 | End: 2018-04-24 | Stop reason: HOSPADM

## 2018-04-21 RX ADMIN — MIRTAZAPINE 15 MG: 15 TABLET, FILM COATED ORAL at 21:08

## 2018-04-21 RX ADMIN — OLANZAPINE 10 MG: 10 TABLET, FILM COATED ORAL at 11:18

## 2018-04-21 RX ADMIN — LACTASE TAB 3000 UNIT 6000 UNITS: 3000 TAB at 11:18

## 2018-04-21 RX ADMIN — LACTASE TAB 3000 UNIT 6000 UNITS: 3000 TAB at 08:09

## 2018-04-21 RX ADMIN — ESCITALOPRAM 5 MG: 5 TABLET, FILM COATED ORAL at 08:09

## 2018-04-21 RX ADMIN — GABAPENTIN 400 MG: 400 CAPSULE ORAL at 14:25

## 2018-04-21 RX ADMIN — MULTIPLE VITAMINS W/ MINERALS TAB 1 TABLET: TAB at 08:09

## 2018-04-21 RX ADMIN — ACETAMINOPHEN, ASPIRIN AND CAFFEINE 1 TABLET: 250; 250; 65 TABLET, FILM COATED ORAL at 08:10

## 2018-04-21 RX ADMIN — GABAPENTIN 400 MG: 400 CAPSULE ORAL at 08:09

## 2018-04-21 RX ADMIN — GABAPENTIN 400 MG: 400 CAPSULE ORAL at 19:42

## 2018-04-21 RX ADMIN — NICOTINE 1 PATCH: 14 PATCH, EXTENDED RELEASE TRANSDERMAL at 08:09

## 2018-04-21 RX ADMIN — HYDROXYZINE HYDROCHLORIDE 25 MG: 25 TABLET ORAL at 17:11

## 2018-04-21 RX ADMIN — HYDROCORTISONE: 25 CREAM TOPICAL at 21:08

## 2018-04-21 RX ADMIN — OLANZAPINE 10 MG: 10 TABLET, FILM COATED ORAL at 19:42

## 2018-04-21 RX ADMIN — ACETAMINOPHEN, ASPIRIN AND CAFFEINE 1 TABLET: 250; 250; 65 TABLET, FILM COATED ORAL at 08:35

## 2018-04-21 RX ADMIN — OLANZAPINE 10 MG: 10 TABLET, FILM COATED ORAL at 08:35

## 2018-04-21 RX ADMIN — ALUMINUM HYDROXIDE, MAGNESIUM HYDROXIDE, AND DIMETHICONE 30 ML: 400; 400; 40 SUSPENSION ORAL at 21:10

## 2018-04-21 RX ADMIN — ACETAMINOPHEN, ASPIRIN AND CAFFEINE 2 TABLET: 250; 250; 65 TABLET, FILM COATED ORAL at 17:09

## 2018-04-21 ASSESSMENT — ACTIVITIES OF DAILY LIVING (ADL)
DRESS: SCRUBS (BEHAVIORAL HEALTH)
GROOMING: INDEPENDENT
LAUNDRY: WITH SUPERVISION
ORAL_HYGIENE: INDEPENDENT

## 2018-04-21 NOTE — PROGRESS NOTES
"SPIRITUAL HEALTH SERVICES   Spiritual Assessment Progress Note (Behavioral Health Focus)  [facility] ([facility]) 10NB    REFERRAL SOURCE: Patient requested a SH encounter.     On this visit, Patient shared with me that she wonders why she is this way, \"I just want to be normal.\"      EXPERIENCE OF ILLNESS/HOSPITALIZATION:  She shared that she was on a  unit as a teenager and has not been admitted since that time.    MEANING-MAKING:  She is trying to make meaning of her life but is having a difficult time.    SPIRITUALITY/VALUES/Quaker:  She states that she was raised [place], and is spiritual, and prays, but is finding it hard to feel God's presence.  She voices low self esteem at present but wants to feel that God is with her helping her through this.    COPING/SPIRITUAL PRACTICES: Patient asked for prayer    SUPPORT SYSTEMS: She did not discuss any outside support other than her ricardo today.     PLAN: I let the patient know that she can request a SH visit and I told her about the groups provided by unit .  I will notify unit  of care provided today.                                                                                                                                               Marisela Castelan  Staff   Pager [phone]  "

## 2018-04-21 NOTE — PROVIDER NOTIFICATION
Dr. Abreu informed by telephone of pt's abnormal EKG results. He stated Dr. Coleman should be informed Monday, or Internal Medicine if they are on the unit in the meantime.

## 2018-04-21 NOTE — PROGRESS NOTES
"Pt came to writer stating her voices were \"strong\" and she needed her \"head medicine\" PRN zyprexa 10mg given with very little relief and was given a second dose which she said was very helpful. PT ate breakfast and lunch and a snack before lunch. Pt stated she had new back pain and was given an egg crate mattress and Excedrin with positive results. Pt states she continues to have bad dreams. Encouraged pt to not sleep with her nicotine patch and pt stated she had the nightmares at home without the patch on. MSSA 6. Social with select peers.   "

## 2018-04-21 NOTE — PROGRESS NOTES
"   04/20/18 1903   Behavioral Health   Hallucinations auditory   Thinking poor concentration   Orientation person: oriented;place: oriented;date: oriented;time: oriented   Memory long term   Insight admits / accepts   Judgement intact   Affect full range affect   Mood mood is calm   Physical Appearance/Attire attire appropriate to age and situation;appears stated age   Hygiene well groomed   Suicidality (denies)   1. Wish to be Dead No   2. Non-Specific Active Suicidal Thoughts  No   Activity (Mingle with peers)   Speech clear;coherent   Sleep/Rest/Relaxation   Sleep/Rest/Relaxation sleep interrupted   Daily Care   Activity activity adjusted per tolerance   Patient Performed Hygiene shower;shampoo;teeth brushed   Activities of Daily Living   Hygiene/Grooming shower;independent   Oral Hygiene independent   Dress independent   Laundry (Independent)   Room Organization stan Real was very pleasant, calm and mingle with her peers. Pt slept for some part of the evening and woke around dinner time. Pt was social with peers after dinner, washed her hair and took a shower. When patient was asked if she is feeling suicidal or have any self-injury ideations, pt denies SI and SIB. Pt reported that she does feel a bit confused and rated it a 7/10. Pt rated depression 6/10 and anxiety 4/10. Pt shared that she has been experiencing racing thoughts and having poor concentration. \"i was trying to read my book and I read the same page more than ten times. I'm just worried about the future\". Pt stated that sleep has been good with a little bit of difficulties staying asleep. \" I hear voices but they are pretty quiet right now. They are more active at night.\" \" sleep is sabine bad because I have vivid dreams\". Pt stated that her appetite has been good and denies any pain. She reported that she only have headaches. Her daily goal is : \" Checking in with staff and let them know how i'm feeling and doing.\" Her means of " "upholding this goal is: \"to just be open about how i'm feeling and taking feedback.\"   "

## 2018-04-21 NOTE — PROGRESS NOTES
Pt attended OT clinic group, was interested in learning how to make a dream catcher, stating she has bad dreams.  Appreciative of assistance from writer, very polite and patient when asking for help.  Appeared relaxed while engage in activity, most conversation had to do with project.

## 2018-04-21 NOTE — PROGRESS NOTES
Pt was visible in the milieu for part of the shift, she attended group, socialized with a couple of peers, and was calm and cooperative with staff. Pt denied SI, SIB depression and VH. Pt endorsed AH stating they do tell her to do things, but she reported she feels safe. Pt endorsed anxiety rating it 6/10.        04/21/18 4355   Behavioral Health   Hallucinations auditory;appears responding   Thinking distractable;poor concentration   Orientation person: oriented;place: oriented   Insight poor   Judgement impaired   Eye Contact at examiner   Affect blunted, flat   Mood anxious;mood is calm   Physical Appearance/Attire posture slouched;untidy;multiple layers;disheveled   Hygiene well groomed   Suicidality other (see comments)  (Denies)   1. Wish to be Dead No   Self Injury other (see comment)  (Denies)   Elopement (None observed)   Activity isolative;withdrawn   Speech clear;coherent   Psychomotor / Gait slow;balanced;steady   Activities of Daily Living   Hygiene/Grooming independent   Oral Hygiene independent   Dress scrubs (behavioral health)   Laundry with supervision   Room Organization independent

## 2018-04-22 PROCEDURE — 25000132 ZZH RX MED GY IP 250 OP 250 PS 637: Performed by: PSYCHIATRY & NEUROLOGY

## 2018-04-22 PROCEDURE — 12400007 ZZH R&B MH INTERMEDIATE UMMC

## 2018-04-22 RX ORDER — RISPERIDONE 1 MG/1
1 TABLET ORAL ONCE
Status: COMPLETED | OUTPATIENT
Start: 2018-04-22 | End: 2018-04-22

## 2018-04-22 RX ADMIN — MULTIPLE VITAMINS W/ MINERALS TAB 1 TABLET: TAB at 08:11

## 2018-04-22 RX ADMIN — LACTASE TAB 3000 UNIT 6000 UNITS: 3000 TAB at 17:47

## 2018-04-22 RX ADMIN — OLANZAPINE 10 MG: 10 TABLET, FILM COATED ORAL at 20:58

## 2018-04-22 RX ADMIN — GABAPENTIN 400 MG: 400 CAPSULE ORAL at 20:58

## 2018-04-22 RX ADMIN — HYDROXYZINE HYDROCHLORIDE 25 MG: 25 TABLET ORAL at 12:02

## 2018-04-22 RX ADMIN — ESCITALOPRAM 5 MG: 5 TABLET, FILM COATED ORAL at 08:11

## 2018-04-22 RX ADMIN — LACTASE TAB 3000 UNIT 6000 UNITS: 3000 TAB at 08:11

## 2018-04-22 RX ADMIN — MIRTAZAPINE 15 MG: 15 TABLET, FILM COATED ORAL at 20:58

## 2018-04-22 RX ADMIN — NICOTINE 1 PATCH: 14 PATCH, EXTENDED RELEASE TRANSDERMAL at 08:11

## 2018-04-22 RX ADMIN — RISPERIDONE 1 MG: 1 TABLET ORAL at 16:55

## 2018-04-22 RX ADMIN — GABAPENTIN 400 MG: 400 CAPSULE ORAL at 16:16

## 2018-04-22 RX ADMIN — HYDROCORTISONE: 25 CREAM TOPICAL at 08:15

## 2018-04-22 RX ADMIN — LACTASE TAB 3000 UNIT 6000 UNITS: 3000 TAB at 12:02

## 2018-04-22 RX ADMIN — GABAPENTIN 400 MG: 400 CAPSULE ORAL at 08:12

## 2018-04-22 RX ADMIN — OLANZAPINE 10 MG: 10 TABLET, FILM COATED ORAL at 09:30

## 2018-04-22 ASSESSMENT — ACTIVITIES OF DAILY LIVING (ADL)
ORAL_HYGIENE: INDEPENDENT
GROOMING: INDEPENDENT
GROOMING: INDEPENDENT
LAUNDRY: WITH SUPERVISION
ORAL_HYGIENE: INDEPENDENT
DRESS: SCRUBS (BEHAVIORAL HEALTH)
DRESS: SCRUBS (BEHAVIORAL HEALTH)
LAUNDRY: WITH SUPERVISION

## 2018-04-22 NOTE — PLAN OF CARE
"Problem: Cognitive Impairment (Psychotic Signs/Symptoms) (Adult)  Goal: Improved Thought Clarity/Organization (Psychotic Signs/Symptoms)  Outcome: Therapy, progress toward functional goals is gradual    RN ASSESSMENT    Pt visible in milieu, social with peers. Appears bright when interacting with peers, seems to be a good distraction technique for her. At other times, pt appears tense, anxious, preoccupied. States her anxiety level has been \"really up and down\" today. Also states she has been experiencing AH off and on throughout the day and afternoon. States Zyprexa PRN is helpful. Pt took Zyprexa 10 mg PRN x 2 earlier on day shift. Asking for another dose this evening around 1700. Informed pt that if she took another dose she would reach the daily recommended max of 30 mg before taking an additional 7.5 mg at HS. She agreed to plan to take Vistaril 25 mg po PNR at that time, skip the 7.5 mg scheduled dose at HS and to take another 10 mg po PRN at HS instead, which she did at 1942, thus reaching her daily max of 30 mg. She stated at that time she would like to discuss with her pscyhiatrist on Monday the possibility of decreasing the daytime PRN dose and increasing the scheduled HS dose. She stated she would also like to discuss increasing the Vistaril PRN dose. Pt denies depression per se and feels relatively hopeful; denies SI/SIB. Appetite, hygiene, sleep good. Pt requested to be allowed to wear bandana in her locker as hair covering. Dr. Abreu approved provided pt give it to staff when not wearing (pt agreed but states she wants to wear it x 24 hour/day anyway). Pt is pleasant, polite, cooperative. Continue with current treatment plan and recommendations. Continue to monitor and reassess symptoms. Monitor response to medications. Monitor progress towards treatment goals. Encourage groups and participation.      "

## 2018-04-23 PROCEDURE — 97150 GROUP THERAPEUTIC PROCEDURES: CPT | Mod: GO

## 2018-04-23 PROCEDURE — 99232 SBSQ HOSP IP/OBS MODERATE 35: CPT | Performed by: PSYCHIATRY & NEUROLOGY

## 2018-04-23 PROCEDURE — 25000132 ZZH RX MED GY IP 250 OP 250 PS 637: Performed by: PSYCHIATRY & NEUROLOGY

## 2018-04-23 PROCEDURE — 99207 ZZC CDG-MDM COMPONENT: MEETS MODERATE - UP CODED: CPT | Performed by: PSYCHIATRY & NEUROLOGY

## 2018-04-23 PROCEDURE — 12400007 ZZH R&B MH INTERMEDIATE UMMC

## 2018-04-23 RX ORDER — GABAPENTIN 600 MG/1
600 TABLET ORAL 3 TIMES DAILY
Status: DISCONTINUED | OUTPATIENT
Start: 2018-04-23 | End: 2018-04-24 | Stop reason: HOSPADM

## 2018-04-23 RX ORDER — MIRTAZAPINE 7.5 MG/1
7.5 TABLET, FILM COATED ORAL AT BEDTIME
Status: DISCONTINUED | OUTPATIENT
Start: 2018-04-23 | End: 2018-04-24 | Stop reason: HOSPADM

## 2018-04-23 RX ORDER — ESCITALOPRAM OXALATE 10 MG/1
10 TABLET ORAL DAILY
Status: DISCONTINUED | OUTPATIENT
Start: 2018-04-24 | End: 2018-04-24 | Stop reason: HOSPADM

## 2018-04-23 RX ORDER — OLANZAPINE 5 MG/1
5 TABLET ORAL DAILY
Status: DISCONTINUED | OUTPATIENT
Start: 2018-04-23 | End: 2018-04-24 | Stop reason: HOSPADM

## 2018-04-23 RX ADMIN — LACTASE TAB 3000 UNIT 6000 UNITS: 3000 TAB at 08:47

## 2018-04-23 RX ADMIN — OLANZAPINE 10 MG: 10 TABLET, FILM COATED ORAL at 20:53

## 2018-04-23 RX ADMIN — MULTIPLE VITAMINS W/ MINERALS TAB 1 TABLET: TAB at 08:41

## 2018-04-23 RX ADMIN — HYDROXYZINE HYDROCHLORIDE 25 MG: 25 TABLET ORAL at 12:35

## 2018-04-23 RX ADMIN — MIRTAZAPINE 7.5 MG: 7.5 TABLET, FILM COATED ORAL at 20:53

## 2018-04-23 RX ADMIN — ACETAMINOPHEN, ASPIRIN AND CAFFEINE 2 TABLET: 250; 250; 65 TABLET, FILM COATED ORAL at 08:41

## 2018-04-23 RX ADMIN — LACTASE TAB 3000 UNIT 6000 UNITS: 3000 TAB at 17:45

## 2018-04-23 RX ADMIN — GABAPENTIN 400 MG: 400 CAPSULE ORAL at 08:41

## 2018-04-23 RX ADMIN — GABAPENTIN 600 MG: 600 TABLET, FILM COATED ORAL at 20:53

## 2018-04-23 RX ADMIN — HYDROXYZINE HYDROCHLORIDE 25 MG: 25 TABLET ORAL at 08:41

## 2018-04-23 RX ADMIN — HYDROXYZINE HYDROCHLORIDE 25 MG: 25 TABLET ORAL at 20:55

## 2018-04-23 RX ADMIN — ESCITALOPRAM 5 MG: 5 TABLET, FILM COATED ORAL at 08:41

## 2018-04-23 RX ADMIN — LACTASE TAB 3000 UNIT 6000 UNITS: 3000 TAB at 12:35

## 2018-04-23 RX ADMIN — OLANZAPINE 10 MG: 10 TABLET, FILM COATED ORAL at 08:41

## 2018-04-23 RX ADMIN — NICOTINE 1 PATCH: 14 PATCH, EXTENDED RELEASE TRANSDERMAL at 08:41

## 2018-04-23 RX ADMIN — GABAPENTIN 600 MG: 600 TABLET, FILM COATED ORAL at 16:09

## 2018-04-23 RX ADMIN — OLANZAPINE 5 MG: 5 TABLET, FILM COATED ORAL at 12:35

## 2018-04-23 ASSESSMENT — ACTIVITIES OF DAILY LIVING (ADL)
DRESS: SCRUBS (BEHAVIORAL HEALTH)
GROOMING: INDEPENDENT
GROOMING: INDEPENDENT
ORAL_HYGIENE: INDEPENDENT
DRESS: INDEPENDENT
ORAL_HYGIENE: INDEPENDENT

## 2018-04-23 NOTE — PROGRESS NOTES
"Pt requested Zyprexa 10 mg po PRN this afternoon. Had taken same earlier today x 1. Stated \"Am I getting a lower dose of this lately? It worked better when I got the higher dose.\" Stated the 10 mg PRN dose was the max possible single dose and that it had not been changed. She stated it was not helpful with anxiety or AH, which used to be more frequent and intense in the evenings but are now bothering her throughout the day. (Yesterday pt used 30 mg of Zyprexa total.) Writer offered to contact on-call psychiatrist for recommendation prior to administering another dose of Zyprexa. Dr. Abreu recommended trying Risperdal 1 mg po PRN and assessing its efficacy. He further recommended relating this information to attending on Monday.     Pt later stated Risperdal had noticeably helped diminsh her AH.  "

## 2018-04-23 NOTE — PROGRESS NOTES
INITIAL OT ASSESSMENT       04/23/18 1600   General Information   Date Initially Attended OT 04/21/18   Clinical Impression   Affect Flat;Restricted   Orientation Oriented to person, place and time   Appearance and ADLs Neglected   Attention to Internal Stimuli No observed signs   Interaction Skills Interacts appropriately with staff;Interacts appropriately with peers   Ability to Communicate Needs Independent   Verbal Content Appropriate to topic   Ability to Maintain Boundaries Maintains appropriate physical boundaries;Maintains appropriate verbal boundaries   Participation Independently participates   Concentration Concentrates 50 minutes   Ability to Concentrate Without difficulty   Follows and Comprehends Directions Independently follows multi-step directions   Memory Delayed and immediate recall intact   Organization Independently organizes all tasks   Decision Making Independent   Planning and Problem Solving Independently plans ahead   Ability to Apply and Learn Concepts Applies within group structure   Frustrations / Stress Tolerance Independently identifies skills    Level of Insight Identifies needs with structure/support   Self Esteem Poor self esteem   Social Supports Identifies utilizing supports

## 2018-04-23 NOTE — PLAN OF CARE
"Problem: Cognitive Impairment (Psychotic Signs/Symptoms) (Adult)  Goal: Improved Thought Clarity/Organization (Psychotic Signs/Symptoms)  Outcome: No Change  Pts goal today was to not sleep so much and pt states she was not able to met this goal.  Pt is unsure where she will living once she is discharged and states she needs assistance finding housing. Rates her depression 7/10 and her anxiety as high.  Pt continues to have auditory hallucinations but states they are \"more quiet\"   Concentration is \"horrible\" and yes to racing thoughts. Pt denies SI and SIB but states \"it would be easier if dead\" Pt contracts for safety.  Pt is irritable. Pt states she woke up angry and is angry at everything.  Pt states she is really hungry. Informed her double portion has been ordered for her. Pt was eating snacks at the time and requested a sandwich which pt gave her as well. Pt states she did not sleep well last night due to going to bathroom several times and having vivid dreams.  Pt denies pain or side effects from the medications.       "

## 2018-04-23 NOTE — PLAN OF CARE
"Problem: Social/Occupational/Functional Impairment (Psychotic Signs/Symptoms) (Adult)  Goal: Improved Social/Occupational/Functional Skills (Psychotic Signs/Symptoms)    Pt attended 2 out of 3 OT groups offered. Pt actively participated in occupational therapy clinic. Pt was able to ask for assistance as needed, and independently returned to a multi-step, goal-directed task. Pt demonstrated good focus, planning, and problem solving in the context of a more complex task. Organized in her task approach. Pt mostly kept to herself during group, though did appropriately assist a peer who was working on a similar task. Expressed pride in her finished project, and shared that she was looking forward to using it to cope with her \"nightmares.\" Blunted affect. Calm and cooperative throughout group.    OT Self-Assessment  Pt was given and completed a written self-assessment form. OT staff reviewed with pt and explained the value of having them involved in their treatment plan, and provided options to meet current needs/self-identified goals.     Pt identified \"being homeless\" as stressors/events that led to hospitalization.    Pt identified the following symptoms that they are currently dealing with:   Emotions: Pt was unable to prioritize, as she selected all emotions on the list.  Thoughts: Pt was unable to prioritize, as she selected all thoughts on the list.   Behaviors: Pt was unable to prioritize, as she selected all behaviors on the list, including eating disorder (\"not eating\")    Self-identified coping skills: \"writing\"  Self-identified social supports: \"family, friends, therapist\"  Self-identified personal strengths: Pt left this section blank - poor self-esteem noted.    Goals: Work on accepting \"life,\" manage anxiety, manage anger, and improve self-esteem        "

## 2018-04-23 NOTE — PROGRESS NOTES
04/22/18 2103   Behavioral Health   Hallucinations denies / not responding to hallucinations   Thinking poor concentration   Orientation time: oriented;date: oriented;place: oriented   Memory baseline memory   Insight insight appropriate to events   Judgement impaired   Eye Contact at examiner   Affect blunted, flat   Mood mood is calm   Physical Appearance/Attire attire appropriate to age and situation   Hygiene neglected grooming - unclean body, hair, teeth   Suicidality (denied )   1. Wish to be Dead No   2. Non-Specific Active Suicidal Thoughts  No   Self Injury (denied )   Elopement (denied )   Activity (attending groups )   Speech coherent;clear   Medication Sensitivity no observed side effects;no stated side effects   Psychomotor / Gait balanced   Psycho Education   Type of Intervention 1:1 intervention   Response participates, initiates socially appropriate   Hours 0.5   Activities of Daily Living   Hygiene/Grooming independent   Oral Hygiene independent   Dress scrubs (behavioral health)   Laundry with supervision   Room Organization independent   Activity   Activity Assistance Provided independent     Pt was was calm, cooperative and polite with blunted mood affect. She was visibile in milieu for the majority part of the shift watching tv. And socializing with peer. She remains experiencing  some auditory hallucinations. Rate her depression 7/10 and anxiety 7/10. Denied SI/SIB. No major concerns during this shift. Appetite was good and sleeping well.

## 2018-04-23 NOTE — PROGRESS NOTES
"Pt has been in the milieu.  She attended OT groups and has been social with select peers.  Pt reports that she is feeling frustrated and irritable.  She reports that is \"not having a good day\".  Pt reports that she is hearing voices today.  Pt reports having SI thoughts but no active plan and can contract for safety.  Pt reports her depression level is 8/10 and her anxiety level is 10/10 with 10 the most.      04/23/18 1406   Behavioral Health   Hallucinations auditory   Thinking distractable;poor concentration   Orientation person: oriented;place: oriented;date: oriented   Insight (fair)   Judgement impaired   Eye Contact at examiner   Affect blunted, flat;tense   Mood irritable   Physical Appearance/Attire attire appropriate to age and situation   Hygiene neglected grooming - unclean body, hair, teeth   Suicidality thoughts only   1. Wish to be Dead (no)   2. Non-Specific Active Suicidal Thoughts  (no)   Self Injury (denies SIB thinking)   Elopement (none observed)   Activity (attending OT groups/social with select peers)   Speech clear;coherent   Psychomotor / Gait balanced;steady   Activities of Daily Living   Hygiene/Grooming independent   Oral Hygiene independent   Dress scrubs (behavioral health)   Room Organization independent      "

## 2018-04-24 VITALS
BODY MASS INDEX: 36.54 KG/M2 | TEMPERATURE: 97.7 F | WEIGHT: 241.1 LBS | RESPIRATION RATE: 16 BRPM | HEART RATE: 77 BPM | SYSTOLIC BLOOD PRESSURE: 123 MMHG | HEIGHT: 68 IN | DIASTOLIC BLOOD PRESSURE: 85 MMHG | OXYGEN SATURATION: 97 %

## 2018-04-24 PROBLEM — Z72.0 TOBACCO USE: Status: ACTIVE | Noted: 2018-04-24

## 2018-04-24 PROBLEM — F41.1 GENERALIZED ANXIETY DISORDER: Status: ACTIVE | Noted: 2018-04-24

## 2018-04-24 PROBLEM — F42.9 OCD (OBSESSIVE COMPULSIVE DISORDER): Status: ACTIVE | Noted: 2018-04-24

## 2018-04-24 PROCEDURE — 90853 GROUP PSYCHOTHERAPY: CPT

## 2018-04-24 PROCEDURE — 97150 GROUP THERAPEUTIC PROCEDURES: CPT | Mod: GO

## 2018-04-24 PROCEDURE — 25000132 ZZH RX MED GY IP 250 OP 250 PS 637: Performed by: PSYCHIATRY & NEUROLOGY

## 2018-04-24 PROCEDURE — 99238 HOSP IP/OBS DSCHRG MGMT 30/<: CPT | Performed by: PSYCHIATRY & NEUROLOGY

## 2018-04-24 RX ORDER — MIRTAZAPINE 7.5 MG/1
7.5 TABLET, FILM COATED ORAL AT BEDTIME
Qty: 30 TABLET | Refills: 1 | Status: ON HOLD | OUTPATIENT
Start: 2018-04-24 | End: 2018-05-23

## 2018-04-24 RX ORDER — HYDROXYZINE HYDROCHLORIDE 25 MG/1
25 TABLET, FILM COATED ORAL EVERY 4 HOURS PRN
Qty: 60 TABLET | Refills: 1 | Status: ON HOLD | OUTPATIENT
Start: 2018-04-24 | End: 2018-05-23

## 2018-04-24 RX ORDER — HYDROCORTISONE 2.5 %
CREAM (GRAM) TOPICAL 2 TIMES DAILY PRN
Qty: 3.5 G | Refills: 1 | Status: ON HOLD | OUTPATIENT
Start: 2018-04-24 | End: 2018-05-23

## 2018-04-24 RX ORDER — GABAPENTIN 600 MG/1
600 TABLET ORAL 3 TIMES DAILY
Qty: 90 TABLET | Refills: 1 | Status: ON HOLD | OUTPATIENT
Start: 2018-04-24 | End: 2018-05-23

## 2018-04-24 RX ORDER — CHOLECALCIFEROL (VITAMIN D3) 125 MCG
6000 CAPSULE ORAL
Qty: 90 TABLET | Refills: 1 | Status: ON HOLD | OUTPATIENT
Start: 2018-04-24 | End: 2018-05-23

## 2018-04-24 RX ORDER — MULTIPLE VITAMINS W/ MINERALS TAB 9MG-400MCG
1 TAB ORAL DAILY
Qty: 30 EACH | Refills: 1 | Status: ON HOLD | OUTPATIENT
Start: 2018-04-25 | End: 2018-05-23

## 2018-04-24 RX ORDER — OLANZAPINE 10 MG/1
10 TABLET ORAL AT BEDTIME
Status: DISCONTINUED | OUTPATIENT
Start: 2018-04-24 | End: 2018-04-24 | Stop reason: HOSPADM

## 2018-04-24 RX ORDER — OLANZAPINE 10 MG/1
10 TABLET ORAL AT BEDTIME
Qty: 30 TABLET | Refills: 1 | Status: ON HOLD | OUTPATIENT
Start: 2018-04-24 | End: 2018-05-23

## 2018-04-24 RX ORDER — ESCITALOPRAM OXALATE 10 MG/1
10 TABLET ORAL DAILY
Qty: 30 TABLET | Refills: 1 | Status: ON HOLD | OUTPATIENT
Start: 2018-04-25 | End: 2018-05-23

## 2018-04-24 RX ORDER — NICOTINE 21 MG/24HR
1 PATCH, TRANSDERMAL 24 HOURS TRANSDERMAL DAILY
Qty: 30 PATCH | Refills: 1 | Status: ON HOLD | OUTPATIENT
Start: 2018-04-25 | End: 2018-05-23

## 2018-04-24 RX ORDER — HYDROCORTISONE 2.5 %
CREAM (GRAM) TOPICAL 2 TIMES DAILY PRN
Status: DISCONTINUED | OUTPATIENT
Start: 2018-04-24 | End: 2018-04-24 | Stop reason: HOSPADM

## 2018-04-24 RX ORDER — OLANZAPINE 10 MG/1
10 TABLET ORAL 2 TIMES DAILY PRN
Qty: 60 TABLET | Refills: 1 | Status: ON HOLD | OUTPATIENT
Start: 2018-04-24 | End: 2018-05-23

## 2018-04-24 RX ADMIN — LACTASE TAB 3000 UNIT 6000 UNITS: 3000 TAB at 12:17

## 2018-04-24 RX ADMIN — GABAPENTIN 600 MG: 600 TABLET, FILM COATED ORAL at 08:51

## 2018-04-24 RX ADMIN — OLANZAPINE 5 MG: 5 TABLET, FILM COATED ORAL at 12:17

## 2018-04-24 RX ADMIN — OLANZAPINE 10 MG: 10 TABLET, FILM COATED ORAL at 08:51

## 2018-04-24 RX ADMIN — MULTIPLE VITAMINS W/ MINERALS TAB 1 TABLET: TAB at 08:51

## 2018-04-24 RX ADMIN — NICOTINE 1 PATCH: 14 PATCH, EXTENDED RELEASE TRANSDERMAL at 08:51

## 2018-04-24 RX ADMIN — ESCITALOPRAM OXALATE 10 MG: 10 TABLET ORAL at 08:51

## 2018-04-24 RX ADMIN — GABAPENTIN 600 MG: 600 TABLET, FILM COATED ORAL at 14:46

## 2018-04-24 RX ADMIN — ACETAMINOPHEN, ASPIRIN AND CAFFEINE 2 TABLET: 250; 250; 65 TABLET, FILM COATED ORAL at 08:51

## 2018-04-24 ASSESSMENT — ACTIVITIES OF DAILY LIVING (ADL)
ORAL_HYGIENE: INDEPENDENT
LAUNDRY: WITH SUPERVISION
GROOMING: INDEPENDENT;SHOWER
DRESS: STREET CLOTHES;INDEPENDENT

## 2018-04-24 NOTE — PROGRESS NOTES
"[facility], [place]   Psychiatric Progress Note        Interim History:   Reason for Hospitalization:Rianna is a 28 year old female with history of depression/manic symptoms, substance use issues (alcohol, cannabis, cocaine), and trauma history who is admitted on a voluntary basis for continued depressive moods, suicidal ideation, and worsening voices.     Subjective: \"I want to feel better and willing to take medications and do therapy to get better\"     As per today's interview: Patient had felt ongoing anxiety and some improvement in her depression. She has been going to groups and eating her meals. She describes her sexual thoughts/behaviors are ongoing, persistent, intrusive and both distressive and pleasurable at times. She feels that she prefers Zyprexa for her anxiety, but does not want to sleep all day. Denies current SI/HI/AH/VH. She feels uncertainty about her living situation post hospitalization.          Medications:       [START ON 4/24/2018] escitalopram  10 mg Oral Daily     gabapentin  600 mg Oral TID     hydrocortisone   Topical BID     lactase  6,000 Units Oral TID w/meals     mirtazapine  7.5 mg Oral At Bedtime     multivitamin, therapeutic with minerals  1 tablet Oral Daily     nicotine  1 patch Transdermal Daily     nicotine   Transdermal Q8H     nicotine   Transdermal Daily     OLANZapine  5 mg Oral Daily     OLANZapine  7.5 mg Oral At Bedtime          Allergies:     Allergies   Allergen Reactions     No Known Allergies           Labs:     No results found for this or any previous visit (from the past 48 hour(s)).       Psychiatric Examination:   /85  Pulse 77  Temp 96  F (35.6  C) (Oral)  Resp 18  Ht 1.727 m (5' 8\")  Wt 108.2 kg (238 lb 8 oz)  SpO2 97%  Breastfeeding? No  BMI 36.26 kg/m2  Weight is 238 lbs 8 oz  Body mass index is 36.26 kg/(m^2).    Appearance:  female, mild-moderately disheveled appearance. Mild emotional " "discomfort.   Attitude:  Somewhat anxious, but pleasant and cooperative.   Eye Contact:  Adequate   Mood:  \"Worried\"  Affect:  Anxious   Speech:  Regular in rate and rhythm.   Language: fluent and intact in English  Psychomotor, Gait, Musculoskeletal:  Unremarkable.   Throught Process:  Linear, coherent   Associations:  Intact   Thought Content:  no evidence of suicidal ideation or homicidal ideation, no evidence of psychotic thought, no auditory hallucinations present and no visual hallucinations present  Insight:  fair  Judgement:  poor  Oriented to:  time, person, and place  Attention Span and Concentration:  intact  Recent and Remote Memory:  intact  Fund of Knowledge:  appropriate        Assessment & Plan       Principal Diagnosis:   Obsessive Compulsive Disorder vs. Compulsive Sexual Behavior   Bipolar 1 Disorder, recent episode mixed   PTSD  Cluster B Personality Disorder Traits (particularly Borderline Personality Disorder)   Alcohol Use Disorder  Cannabis Use Disorder  Cocaine Use Disorder  Migraine without status migrainosus, not intractable, unspecified migraine type      Assessment:  (Initial Assessment 4/19): Rianna appears to have had exacerbation of her manic symptoms, including impulsivity (particularly sexual promiscuity), rapid/flight of ideas, lack of sleep, and increased bouts of energy lasting most of the day. She also has intrusive auditory hallucinations that are derogatory and commanding, which have worsened overtime, particularly in times of stress. She seems to have PTSD symptoms as well, which could also be a factor contributing to her anxiety, auditory hallucinations, and perseverative thoughts. She has also been utilizing illicit substances to cope with her symptoms and stressors, which can also perpetuate her mood lability/dysreagulation. An ongoing psychosocial stressor likely precipitating/perpetuating her symptoms is her stress of homelessness after breaking up with her boyfriend " "in January. She has not been able to sustain continued employment after being homeless, and seems to have an unstable living environment. She is willing to get back on psychotropic medications, along with getting established with outpatient providers (therapy and psychiatry). Given her continued alcohol use prior to admission (BAL 0.18), and unclear frequency of use, I will start MSSA protocol (with Ativan). STD testing will be checked again since she had unprotected sex with multiple partners in the context of maria isabel.     Follow Up (4/20): Today, we discussed her sexual behavior more in depth. She describes having recurring overpowering obsessive thoughts about sex, and compulsively watches porn, masturbates, and/or has sex with multiple different men (even when she was in a relationship). She watches porn excessively (usually the violent, aggressive, bondage genre), and masturbates excessively to the point of missing out on self care, eating, work and other responsibilities. Patient describes these thoughts and behaviors as sometimes pleasurable and also distressing, although she does admit these thoughts/behaviors lead to much dysfunction in her social, interpersonal, and occupational life. Her friends have told her that she has a \"sexual addiction\" problem. The important difference between Compulsive Sexual Behavior vs. OCD is that in the former, the thoughts/behavior are pleasurable, while in the latter, the thoughts/behaviors are distressing (ego-syntonic vs. Ego- dystonic). Nevertheless, apart from semantics, and diagnostic nosology of OCD and CSB, both have empiric evidence of improvement with SSRIs. We discussed benefit of Lexapro, which she was open to taking. Of note, her Hep, HIV, GC/Chlamymia tests were negative. EKG showed a QTc of 424.     Follow Up (4/23): Continued anxiety, but some mild improvement in depression. She tried Risperdal over the weekend (provided by the on call provider), although " prefers Zyprexa and has been taking 2-3 doses of 10 mg PRN tabs per day (which seems to offer some mild subjective benefit for her anxiety). She still has intrusive obsession of sexual content, but denied any compulsive behavior of any kind while in the hospital. Feels worried about the uncertainly of her living situation post hospitalization. Discussed adjusting her Gabapentin dose, Zyprexa dose, and Lexapro dose.      Plan:  1.) Medication Plan:  - Lexapro 10 mg Daily   - Zyprexa  5 mg at noon and 7.5 mg HS (10 mg tabs available UP TO 30 mg per day as PRNs)   - Gabapentin 600 mg TID  - Remeron 7.5 mg HS  - Lactaid 6000 units TID with meals   - Excedrin 2 tablets Q6H PRN. Can also use Imitrex 50 mg if migraine pain recurs.        2.) Psychosocial Plans:  - Work with CTC's to help set up resources/referrals for CBT therapy.        Legal:  Voluntary      Disposition:  Discharge when patient is more stable, likely later this week       Safety Assessment:   Checks: Status 15  Precautions: Sexual  Pt has not required locked seclusion or restraints in the past 24 hours to maintain safety, please refer to RN documentation for further details.       The risks, benefits, alternatives and side effects have been discussed and are understood by the patient and other caregivers.     Seun Coleman MD  [facility]

## 2018-04-24 NOTE — DISCHARGE INSTRUCTIONS
Behavioral Discharge Planning and Instructions      Summary:  You were admitted on 4/18/2018  due to Depression and Anxiety.  You were treated by Dr Seun Coleman MD and discharged on 4/24/18 from [facility] to her brother's home.      Principal Diagnosis: Obsessive Compulsive Disorder; Bipolar Disorder, mixed, PTSD      Health Care Follow-up Appointments:   [facility]  Medication Management Walk-in Appointments every Thursday at 8:30am to establish care  [address] [place], [place]  [phone]  [phone]    Therapist Latrell Loaiza - Ori Appointment on Wed April 9th at 3pm  [facility]-- Go to the [facility]/ [address] ([place] [address] [phone]      RESOURCES:  [facility] Adult Shelter Team  [address]  [phone]    [facility] Economic Assistance - cash assistance  [address] [place] [address]  [place], [place]  [phone]    Social Security Office  [address] [place] [id number] [phone]      Attend all scheduled appointments with your outpatient providers. Call at least 24 hours in advance if you need to reschedule an appointment to ensure continued access to your outpatient providers.   Major Treatments, Procedures and Findings:  You were provided with: a psychiatric assessment, assessed for medical stability, medication evaluation and/or management, group therapy and milieu management    Symptoms to Report: losing more sleep, mood getting worse or thoughts of suicide    Early warning signs can include: sleep disturbances increased thoughts or behaviors of suicide or self-harm  increased unusual thinking, such as paranoia or hearing voices    Safety and Wellness:  Take all medicines as directed.  Make no changes unless your doctor suggests them.      Follow treatment recommendations.  Refrain from  alcohol and non-prescribed drugs.  If there is a concern for safety, call 911.    Resources:   Crisis Intervention: [phone] or [phone] (TTY: [phone]).  Call anytime for help.  [place] [employer]ness (www.[place].hailey.org): 605.932.1898 or 673-240-6520.    The treatment team has appreciated the opportunity to work with you.     If you have any questions or concerns our unit number is [phone].

## 2018-04-24 NOTE — PROGRESS NOTES
The patient will be discharged soon.  Discussed outpatient plan with her.  She is homeless and cannot live with her family.  Patient reports she was looking at transitional housing but she has no income to pay for it.  Will refer her to [facility] because it is close to [facility].  Will refer her to the shelter team.      Patient now states she can stay with her brother.  She has psychiatric appt, therapy, resources for Economic Assistance, shelter team (if neeed) and also Social Security Office in [facility].  Discharging today.

## 2018-04-24 NOTE — PROGRESS NOTES
"   04/23/18 2135   Behavioral Health   Hallucinations auditory   Thinking poor concentration   Orientation person: oriented;place: oriented;date: oriented;time: oriented   Memory baseline memory   Insight admits / accepts   Judgement intact   Eye Contact at examiner   Affect full range affect   Mood mood is calm;anxious   Activities of Daily Living   Hygiene/Grooming independent   Oral Hygiene independent   Dress independent   Laundry (Independent)   Room Organization independent       Pt appears calm, anxious but corporative with staff and get along very well with peers. Pt concerns are not knowing what is going to be her housing situation when is get discharged from here. \" I just want to get back to my normal life and i'm really worried if I'll get housing\". Pt rated depression and anxiety both a 5/10. Denies SI, SIB and racing thoughts. Pt stated however that her thoughts race a little when she thinks about her life outside of these doors. \" im just worried about what i'm going to do when I leave here\".  Overall pt appetite has been good, sleep is still an issue. \" hard to stay asleep. I get reoccurring nightmares. Pt goal is to keep journaling an steps to get DC are \" by making a safety plan and figuring out where I'm going to go from here.  "

## 2018-04-24 NOTE — PROGRESS NOTES
Pt states her rash has resolved and refused the cream.  Pt also requested to take 10mg zyprexa PRN vs the scheduled dose of 7.5mg.  PRN visteral given per pts request as well at hs

## 2018-04-24 NOTE — PLAN OF CARE
"Problem: Mental State/Mood Impairment (Psychotic Signs/Symptoms) (Adult)  Goal: Improved Mental State/Mood (Psychotic Signs/Symptoms)  Outcome: Adequate for Discharge Date Met: 04/24/18 04/24/18 1517   Improved Mental State/Mood (Psychotic Signs/Symptoms)   Improved Mental State/Mood Time Frame for Action Step (STG) 1 day     48 hour nursing observation     Marijuana abuse [F12.10]  Schizoaffective disorder, unspecified type (H) [F25.9]  Migraine without status migrainosus, not intractable, unspecified migraine type [G43.909]    Admit Date: 4/18/2018    Patient evaluation continues. Assessed mood,anxiety,thoughts and behavior. Patient is progressing towards goals. Patient is encouraged to participate in groups and assisted to develop healthy coping skills.      /85  Pulse 77  Temp 97.7  F (36.5  C)  Resp 16  Ht 1.727 m (5' 8\")  Wt 109.4 kg (241 lb 1.6 oz)  SpO2 97%  Breastfeeding? No  BMI 36.66 kg/m2    Patient reports depression level of 5 and anxiety level of  5 with 10 being the worst. Patient's affect is a little brighter at times and she states she feels ready for discharge today. Patient denies SI, denies SIB, denies HI and states she feels hopeful about the future. Patient states concentration is \"so-so\" but denies racing thoughts.  Patient states \"the voices are quiet right now, just hear humming\".  Pt denies visual hallucinations or paranoia. Patient reports sleeping 10 hours last night and appetite is good.  Patient is compliant with medications and side effects reported are none. Patient attended some groups, visible, social with select peers.  ADLs are good, showers every day.     Writer reviewed AVS/meds with pt and given a copy-she states she understands.  Please see AVS for further details.  Pt discharged to her friend's house at 1515 with all of her belongings.  Discharge meds called into OP [facility] by Dr Coleman and pt is aware that she needs to pick them up.  Pt stated " she was going to call her medical transport for a ride to her friend's but also asked for a bus token.  Pt was escorted to [facility] Entrance by Susana Harrington, Psych Assoc.  Stable discharge status.

## 2018-04-30 NOTE — DISCHARGE SUMMARY
[facility], [place]   Psychiatric Discharge Summary      Rianna Man MRN# [id number]   Age: 28 year old YOB: 1990     Date of Admission:  4/18/2018  Date of Discharge:  04/24/18  Admitting Physician:  Seun Coleman MD  Discharge Physician:  Seun Coleman MD         Summary/Hospital Course/Disposition:   Reason for Hospitalization: Rianna is a 28 year old female with history of depression/manic symptoms, substance use issues (alcohol, cannabis, cocaine), and trauma history who is admitted on a voluntary basis for continued depressive moods, suicidal ideation, and worsening voices.       Hospital Course:   (Initial Assessment 4/19): Rianna appears to have had exacerbation of her manic symptoms, including impulsivity (particularly sexual promiscuity), rapid/flight of ideas, lack of sleep, and increased bouts of energy lasting most of the day. She also has intrusive auditory hallucinations that are derogatory and commanding, which have worsened overtime, particularly in times of stress. She seems to have PTSD symptoms as well, which could also be a factor contributing to her anxiety, auditory hallucinations, and perseverative thoughts. She has also been utilizing illicit substances to cope with her symptoms and stressors, which can also perpetuate her mood lability/dysreagulation. An ongoing psychosocial stressor likely precipitating/perpetuating her symptoms is her stress of homelessness after breaking up with her boyfriend in January. She has not been able to sustain continued employment after being homeless, and seems to have an unstable living environment. She is willing to get back on psychotropic medications, along with getting established with outpatient providers (therapy and psychiatry). Given her continued alcohol use prior to admission (BAL 0.18), and unclear frequency of use, I will start MSSA protocol (with Ativan). STD testing will be checked  "again since she had unprotected sex with multiple partners in the context of maria isabel.      Follow Up (4/20): Today, we discussed her sexual behavior more in depth. She describes having recurring overpowering obsessive thoughts about sex, and compulsively watches porn, masturbates, and/or has sex with multiple different men (even when she was in a relationship). She watches porn excessively (usually the violent, aggressive, bondage genre), and masturbates excessively to the point of missing out on self care, eating, work and other responsibilities. Patient describes these thoughts and behaviors as sometimes pleasurable and also distressing, although she does admit these thoughts/behaviors lead to much dysfunction in her social, interpersonal, and occupational life. Her friends have told her that she has a \"sexual addiction\" problem. The important difference between Compulsive Sexual Behavior vs. OCD is that in the former, the thoughts/behavior are pleasurable, while in the latter, the thoughts/behaviors are distressing (ego-syntonic vs. Ego- dystonic). Nevertheless, apart from semantics, and diagnostic nosology of OCD and CSB, both have empiric evidence of improvement with SSRIs. We discussed benefit of Lexapro, which she was open to taking. Of note, her Hep, HIV, GC/Chlamymia tests were negative. EKG showed a QTc of 424.      Follow Up (4/23): Continued anxiety, but some mild improvement in depression. She tried Risperdal over the weekend (provided by the on call provider), although prefers Zyprexa and has been taking 2-3 doses of 10 mg PRN tabs per day (which seems to offer some mild subjective benefit for her anxiety). She still has intrusive obsession of sexual content, but denied any compulsive behavior of any kind while in the hospital. Feels worried about the uncertainly of her living situation post hospitalization. Discussed adjusting her Gabapentin dose, Zyprexa dose, and Lexapro dose.     Day of Discharge " (4/24): Patient felt subjective improvement with the medication she had been taking in the hospital. She felt relative calmness in her intrusive sexual thoughts along with her AH. She denied SI/HI. She was future oriented. Her plan was to stay with a family member (her brother), and follow up with her recommended follow ups (which include therapy, particularly CBT therapist). She desired to be discharged, at which point I did not have any reason to hold her against her will.          DIagnoses:   Obsessive Compulsive Disorder vs. Compulsive Sexual Behavior   Bipolar 1 Disorder, recent episode mixed   PTSD  Cluster B Personality Disorder Traits (particularly Borderline Personality Disorder)   Alcohol Use Disorder  Cannabis Use Disorder  Cocaine Use Disorder  Migraine without status migrainosus, not intractable, unspecified migraine type         Labs:   No results found for this or any previous visit (from the past 24 hour(s)).         Consults:   None            Discharge Medications:        Review of your medicines      START taking       Dose / Directions    escitalopram 10 MG tablet   Commonly known as:  LEXAPRO   Used for:  Obsessive-compulsive disorder, unspecified type, Schizoaffective disorder, unspecified type (H)        Dose:  10 mg   Take 1 tablet (10 mg) by mouth daily   Quantity:  30 tablet   Refills:  1       gabapentin 600 MG tablet   Commonly known as:  NEURONTIN   Used for:  Migraine without status migrainosus, not intractable, unspecified migraine type, Recurrent major depressive disorder, remission status unspecified (H), Obsessive-compulsive disorder, unspecified type        Dose:  600 mg   Take 1 tablet (600 mg) by mouth 3 times daily   Quantity:  90 tablet   Refills:  1       hydrocortisone 2.5 % cream   Used for:  Striae distensae        Apply topically 2 times daily as needed for rash   Quantity:  3.5 g   Refills:  1       hydrOXYzine 25 MG tablet   Commonly known as:  ATARAX   Used for:   Schizoaffective disorder, unspecified type (H)        Dose:  25 mg   Take 1 tablet (25 mg) by mouth every 4 hours as needed for anxiety   Quantity:  60 tablet   Refills:  1       lactase 3000 UNIT tablet   Commonly known as:  LACTAID   Used for:  Lactase deficiency        Dose:  6000 Units   Take 2 tablets (6,000 Units) by mouth 3 times daily (with meals)   Quantity:  90 tablet   Refills:  1       mirtazapine 7.5 MG Tabs tablet   Commonly known as:  REMERON   Used for:  Schizoaffective disorder, unspecified type (H), Obsessive-compulsive disorder, unspecified type        Dose:  7.5 mg   Take 1 tablet (7.5 mg) by mouth At Bedtime   Quantity:  30 tablet   Refills:  1       multivitamin, therapeutic with minerals Tabs tablet   Used for:  Schizoaffective disorder, unspecified type (H)        Dose:  1 tablet   Take 1 tablet by mouth daily   Quantity:  30 each   Refills:  1       nicotine 14 MG/24HR 24 hr patch   Commonly known as:  NICODERM CQ   Used for:  Tobacco use        Dose:  1 patch   Place 1 patch onto the skin daily   Quantity:  30 patch   Refills:  1       * OLANZapine 10 MG tablet   Commonly known as:  zyPREXA   Used for:  Schizoaffective disorder, unspecified type (H)        Dose:  10 mg   Take 1 tablet (10 mg) by mouth 2 times daily as needed (For agitation, aggression, anxiety.)   Quantity:  60 tablet   Refills:  1       * OLANZapine 10 MG tablet   Commonly known as:  zyPREXA   Used for:  Schizoaffective disorder, unspecified type (H)        Dose:  10 mg   Take 1 tablet (10 mg) by mouth At Bedtime   Quantity:  30 tablet   Refills:  1       * Notice:  This list has 2 medication(s) that are the same as other medications prescribed for you. Read the directions carefully, and ask your doctor or other care provider to review them with you.      CONTINUE these medicines which have NOT CHANGED       Dose / Directions    aspirin-acetaminophen-caffeine 250-250-65 MG per tablet   Commonly known as:  EXCEDRIN MIGRAINE  "  Used for:  Migraine without status migrainosus, not intractable, unspecified migraine type        Dose:  2 tablet   Take 2 tablets by mouth every 6 hours as needed for headaches   Quantity:  60 tablet   Refills:  1       etonogestrel 68 MG Impl   Commonly known as:  IMPLANON/NEXPLANON   Used for:  Surveillance of previously prescribed implantable subdermal contraceptive        Dose:  1 each   1 each (68 mg) by Subdermal route once for 1 dose   Quantity:  1 each   Refills:  0            Where to get your medicines      These medications were sent to [id number] - [place], [place] - [address]  [address], [place] [address]     Phone:  [phone]      aspirin-acetaminophen-caffeine 250-250-65 MG per tablet     escitalopram 10 MG tablet     gabapentin 600 MG tablet     hydrocortisone 2.5 % cream     hydrOXYzine 25 MG tablet     lactase 3000 UNIT tablet     mirtazapine 7.5 MG Tabs tablet     multivitamin, therapeutic with minerals Tabs tablet     nicotine 14 MG/24HR 24 hr patch     OLANZapine 10 MG tablet     OLANZapine 10 MG tablet                  Mental Status Examination:   Appearance:  female, mild-moderately disheveled appearance. Mild emotional discomfort.   Attitude:  Somewhat anxious, but pleasant and cooperative.   Eye Contact:  Adequate   Mood:  \"Better\"  Affect: Relatively less Anxious   Speech:  Regular in rate and rhythm.   Language: fluent and intact in English  Psychomotor, Gait, Musculoskeletal:  Unremarkable.   Throught Process:  Linear, coherent   Associations:  Intact   Thought Content:  no evidence of suicidal ideation or homicidal ideation, no evidence of psychotic thought, no auditory hallucinations present and no visual hallucinations present  Insight:  fair  Judgement:  poor  Oriented to:  time, person, and place  Attention Span and Concentration:  intact  Recent and Remote Memory:  intact  Fund of Knowledge: "  appropriate         Discharge Plan:   No discharge procedures on file.     - Patient discharged with medications (sent to her choice pharmacy)       As per :  Discussed outpatient plan with her.  She is homeless and cannot live with her family.  Patient reports she was looking at transitional housing but she has no income to pay for it.  Will refer her to [facility] because it is close to [facility].  Will refer her to the shelter team.       Patient now states she can stay with her brother.  She has psychiatric appt, therapy, resources for Economic Assistance, shelter team (if neeed) and also Social Security Office in [facility].  Discharging today.    Seun Coleman MD  [facility]

## 2018-05-17 ENCOUNTER — HOSPITAL ENCOUNTER (INPATIENT)
Facility: CLINIC | Age: 28
LOS: 7 days | Discharge: HOME | End: 2018-05-24
Attending: PSYCHIATRY & NEUROLOGY | Admitting: PSYCHIATRY & NEUROLOGY
Payer: MEDICAID

## 2018-05-17 ENCOUNTER — TRANSFERRED RECORDS (OUTPATIENT)
Dept: HEALTH INFORMATION MANAGEMENT | Facility: CLINIC | Age: 28
End: 2018-05-17

## 2018-05-17 DIAGNOSIS — F41.1 GENERALIZED ANXIETY DISORDER: ICD-10-CM

## 2018-05-17 DIAGNOSIS — T30.0 BURN: ICD-10-CM

## 2018-05-17 DIAGNOSIS — F25.9 SCHIZOAFFECTIVE DISORDER, UNSPECIFIED TYPE (H): ICD-10-CM

## 2018-05-17 DIAGNOSIS — G43.719 INTRACTABLE CHRONIC MIGRAINE WITHOUT AURA AND WITHOUT STATUS MIGRAINOSUS: Primary | ICD-10-CM

## 2018-05-17 DIAGNOSIS — L90.6 STRIAE DISTENSAE: ICD-10-CM

## 2018-05-17 DIAGNOSIS — F42.9 OBSESSIVE-COMPULSIVE DISORDER, UNSPECIFIED TYPE: ICD-10-CM

## 2018-05-17 DIAGNOSIS — Z72.0 TOBACCO USE: ICD-10-CM

## 2018-05-17 DIAGNOSIS — F12.10 MARIJUANA ABUSE: ICD-10-CM

## 2018-05-17 PROBLEM — R45.851 SUICIDAL IDEATIONS: Status: ACTIVE | Noted: 2018-05-17

## 2018-05-17 LAB
AMPHETAMINES UR QL SCN: NEGATIVE
BARBITURATES UR QL: NEGATIVE
BENZODIAZ UR QL: NEGATIVE
CANNABINOIDS UR QL SCN: POSITIVE
COCAINE UR QL: NEGATIVE
ETHANOL UR QL SCN: NEGATIVE
HCG UR QL: NEGATIVE
OPIATES UR QL SCN: NEGATIVE

## 2018-05-17 PROCEDURE — 25000132 ZZH RX MED GY IP 250 OP 250 PS 637: Performed by: PSYCHIATRY & NEUROLOGY

## 2018-05-17 PROCEDURE — 80307 DRUG TEST PRSMV CHEM ANLYZR: CPT | Performed by: FAMILY MEDICINE

## 2018-05-17 PROCEDURE — 81025 URINE PREGNANCY TEST: CPT | Performed by: FAMILY MEDICINE

## 2018-05-17 PROCEDURE — 80320 DRUG SCREEN QUANTALCOHOLS: CPT | Performed by: FAMILY MEDICINE

## 2018-05-17 PROCEDURE — 25000132 ZZH RX MED GY IP 250 OP 250 PS 637: Performed by: NURSE PRACTITIONER

## 2018-05-17 PROCEDURE — 99285 EMERGENCY DEPT VISIT HI MDM: CPT | Mod: 25 | Performed by: PSYCHIATRY & NEUROLOGY

## 2018-05-17 PROCEDURE — 12400007 ZZH R&B MH INTERMEDIATE UMMC

## 2018-05-17 PROCEDURE — 99285 EMERGENCY DEPT VISIT HI MDM: CPT | Mod: Z6 | Performed by: PSYCHIATRY & NEUROLOGY

## 2018-05-17 PROCEDURE — 90791 PSYCH DIAGNOSTIC EVALUATION: CPT

## 2018-05-17 RX ORDER — TRAZODONE HYDROCHLORIDE 50 MG/1
50 TABLET, FILM COATED ORAL
Status: DISCONTINUED | OUTPATIENT
Start: 2018-05-17 | End: 2018-05-24 | Stop reason: HOSPADM

## 2018-05-17 RX ORDER — ALUMINA, MAGNESIA, AND SIMETHICONE 2400; 2400; 240 MG/30ML; MG/30ML; MG/30ML
30 SUSPENSION ORAL EVERY 4 HOURS PRN
Status: DISCONTINUED | OUTPATIENT
Start: 2018-05-17 | End: 2018-05-24 | Stop reason: HOSPADM

## 2018-05-17 RX ORDER — CHOLECALCIFEROL (VITAMIN D3) 125 MCG
6000 CAPSULE ORAL
Status: DISCONTINUED | OUTPATIENT
Start: 2018-05-17 | End: 2018-05-24 | Stop reason: HOSPADM

## 2018-05-17 RX ORDER — HYDROXYZINE HYDROCHLORIDE 25 MG/1
25 TABLET, FILM COATED ORAL EVERY 4 HOURS PRN
Status: DISCONTINUED | OUTPATIENT
Start: 2018-05-17 | End: 2018-05-22

## 2018-05-17 RX ORDER — MULTIPLE VITAMINS W/ MINERALS TAB 9MG-400MCG
1 TAB ORAL DAILY
Status: DISCONTINUED | OUTPATIENT
Start: 2018-05-17 | End: 2018-05-24 | Stop reason: HOSPADM

## 2018-05-17 RX ORDER — ESCITALOPRAM OXALATE 10 MG/1
10 TABLET ORAL DAILY
Status: DISCONTINUED | OUTPATIENT
Start: 2018-05-17 | End: 2018-05-24 | Stop reason: HOSPADM

## 2018-05-17 RX ORDER — MIRTAZAPINE 7.5 MG/1
7.5 TABLET, FILM COATED ORAL AT BEDTIME
Status: DISCONTINUED | OUTPATIENT
Start: 2018-05-17 | End: 2018-05-24 | Stop reason: HOSPADM

## 2018-05-17 RX ORDER — GABAPENTIN 300 MG/1
300 CAPSULE ORAL 3 TIMES DAILY
Status: DISCONTINUED | OUTPATIENT
Start: 2018-05-17 | End: 2018-05-24 | Stop reason: HOSPADM

## 2018-05-17 RX ORDER — BISACODYL 10 MG
10 SUPPOSITORY, RECTAL RECTAL DAILY PRN
Status: DISCONTINUED | OUTPATIENT
Start: 2018-05-17 | End: 2018-05-24 | Stop reason: HOSPADM

## 2018-05-17 RX ORDER — NICOTINE 21 MG/24HR
1 PATCH, TRANSDERMAL 24 HOURS TRANSDERMAL DAILY
Status: DISCONTINUED | OUTPATIENT
Start: 2018-05-17 | End: 2018-05-17

## 2018-05-17 RX ORDER — OLANZAPINE 10 MG/1
10 TABLET ORAL AT BEDTIME
Status: DISCONTINUED | OUTPATIENT
Start: 2018-05-17 | End: 2018-05-21

## 2018-05-17 RX ORDER — GINSENG 100 MG
CAPSULE ORAL 2 TIMES DAILY
Status: DISCONTINUED | OUTPATIENT
Start: 2018-05-17 | End: 2018-05-24 | Stop reason: HOSPADM

## 2018-05-17 RX ORDER — NICOTINE 21 MG/24HR
1 PATCH, TRANSDERMAL 24 HOURS TRANSDERMAL DAILY
Status: DISCONTINUED | OUTPATIENT
Start: 2018-05-18 | End: 2018-05-24 | Stop reason: HOSPADM

## 2018-05-17 RX ORDER — OLANZAPINE 10 MG/2ML
10 INJECTION, POWDER, FOR SOLUTION INTRAMUSCULAR 2 TIMES DAILY PRN
Status: DISCONTINUED | OUTPATIENT
Start: 2018-05-17 | End: 2018-05-24 | Stop reason: HOSPADM

## 2018-05-17 RX ORDER — OLANZAPINE 10 MG/1
10 TABLET ORAL 2 TIMES DAILY PRN
Status: DISCONTINUED | OUTPATIENT
Start: 2018-05-17 | End: 2018-05-24 | Stop reason: HOSPADM

## 2018-05-17 RX ADMIN — LACTASE TAB 3000 UNIT 6000 UNITS: 3000 TAB at 20:24

## 2018-05-17 RX ADMIN — ACETAMINOPHEN, ASPIRIN AND CAFFEINE 1 TABLET: 250; 250; 65 TABLET, FILM COATED ORAL at 16:59

## 2018-05-17 RX ADMIN — OLANZAPINE 10 MG: 10 TABLET, FILM COATED ORAL at 20:24

## 2018-05-17 RX ADMIN — ESCITALOPRAM OXALATE 10 MG: 10 TABLET ORAL at 20:24

## 2018-05-17 RX ADMIN — GABAPENTIN 300 MG: 300 CAPSULE ORAL at 20:24

## 2018-05-17 RX ADMIN — TRAZODONE HYDROCHLORIDE 50 MG: 50 TABLET ORAL at 21:59

## 2018-05-17 RX ADMIN — HYDROXYZINE HYDROCHLORIDE 25 MG: 25 TABLET ORAL at 20:24

## 2018-05-17 RX ADMIN — MULTIPLE VITAMINS W/ MINERALS TAB 1 TABLET: TAB at 20:24

## 2018-05-17 RX ADMIN — MIRTAZAPINE 7.5 MG: 7.5 TABLET, FILM COATED ORAL at 20:24

## 2018-05-17 ASSESSMENT — ENCOUNTER SYMPTOMS
SHORTNESS OF BREATH: 0
FEVER: 0
CHEST TIGHTNESS: 0
HALLUCINATIONS: 1
NERVOUS/ANXIOUS: 1
DYSPHORIC MOOD: 1
BACK PAIN: 0
ABDOMINAL PAIN: 0
DIZZINESS: 0

## 2018-05-17 NOTE — ED NOTES
Burned self on top of left hand on Monday with a lighter.. Has a small open area of open noted on top of left hand. Since being discharged last month, pt states she has done too well

## 2018-05-17 NOTE — ED PROVIDER NOTES
History     Chief Complaint   Patient presents with     Suicidal     has a plan: thoughts on actring on plan: seen not to long ago: noit able to get meds     The history is provided by the patient and medical records.     Rianna Man is a 28 year old female who comes in due to her feeling suicidal.  She has several plans including hanging, overdose or cutting herself.  She states she has tried in the past by cutting (although it was hard to distinguish from self injury).  She states she hears voices telling her to kill herself but this actually sounds like her internal dialogue instead of an actual hallucination.  She is homeless and has been couch surfing.  She often exchanges sex for a place to stay. She admits to using marijuana, alcohol, meth and cocaine but only marijuana regularly.  She denies that it is an issue.  She was admitted and discharged a month ago.  She did not  her medications that were ordered.  She seems to have little insight into her issues.  She was diagnosed with schizoaffective disorder.  She missed her follow up appointment that was set up by the inpatient team.  She is tearful.  She did burn herself with a lighter on her left hand by her thumb.  It is around dime size open wound.  There are no signs of infection.      Please see the 's assessment in EPIC from today (5/17/18) for further details.    I have reviewed the Medications, Allergies, Past Medical and Surgical History, and Social History in the Epic system.    Review of Systems   Constitutional: Negative for fever.   Eyes: Negative for visual disturbance.   Respiratory: Negative for chest tightness and shortness of breath.    Cardiovascular: Negative for chest pain.   Gastrointestinal: Negative for abdominal pain.   Musculoskeletal: Negative for back pain.   Neurological: Negative for dizziness.   Psychiatric/Behavioral: Positive for dysphoric mood, hallucinations and suicidal ideas. Negative for  self-injury. The patient is nervous/anxious.    All other systems reviewed and are negative.      Physical Exam   BP: (!) 143/103  Temp: 98.5  F (36.9  C)  Resp: 16  SpO2: 98 %      Physical Exam   Constitutional: She is oriented to person, place, and time. She appears well-developed and well-nourished.   HENT:   Head: Normocephalic and atraumatic.   Mouth/Throat: Oropharynx is clear and moist.   Eyes: Pupils are equal, round, and reactive to light.   Neck: Normal range of motion. Neck supple.   Cardiovascular: Normal rate, regular rhythm and normal heart sounds.    Pulmonary/Chest: Effort normal and breath sounds normal.   Abdominal: Soft. Bowel sounds are normal.   Musculoskeletal: Normal range of motion.   Neurological: She is alert and oriented to person, place, and time.   Skin: Skin is warm and dry.   Psychiatric: Her speech is normal. Her mood appears anxious. She is actively hallucinating (question if they are real hallucinations or her own voice in her head). Thought content is not paranoid and not delusional. Cognition and memory are normal. She expresses inappropriate judgment. She exhibits a depressed mood. She expresses suicidal ideation. She expresses no homicidal ideation. She expresses suicidal plans. She expresses no homicidal plans.   Rianna is a 29 y/o female who looks her age.  She is well groomed with good eye contact.   Nursing note and vitals reviewed.      ED Course     ED Course     Procedures               Labs Ordered and Resulted from Time of ED Arrival Up to the Time of Departure from the ED   DRUG ABUSE SCREEN 6 CHEM DEP URINE ([facility]) - Abnormal; Notable for the following:        Result Value    Cannabinoids Qual Urine Positive (*)     All other components within normal limits   HCG QUALITATIVE URINE            Assessments & Plan (with Medical Decision Making)   Rianna will be admitted to the hospital due to her worsening symptoms including possible hallucinations, suicidal thoughts  with several plans and not taking medications.  She seems lower in functioning whether from her mental illness, drugs or otherwise and may need many services in the community in order to be successful.  She will go to station 10 under Dr. Coleman.    I have reviewed the nursing notes.    I have reviewed the findings, diagnosis, plan and need for follow up with the patient.    New Prescriptions    No medications on file       Final diagnoses:   Schizoaffective disorder, unspecified type (H)   Marijuana abuse       5/17/2018   [facility] [place], EMERGENCY DEPARTMENT     Bertrand Goldstein MD  05/17/18 [id number]

## 2018-05-17 NOTE — ED NOTES
ED to Behavioral Floor Handoff    SITUATION  Rianna Man is a 28 year old female who speaks English and lives in a home unknown The patient arrived in the ED by private car from home with a complaint of Suicidal (has a plan: thoughts on actring on plan: seen not to long ago: noit able to get meds)  .The patient's current symptoms started/worsened 2 month(s) ago and during this time the symptoms have increased.   In the ED, pt was diagnosed with   Final diagnoses:   Schizoaffective disorder, unspecified type (H)   Marijuana abuse        Initial vitals were: BP: (!) 143/103  Temp: 98.5  F (36.9  C)  Resp: 16  SpO2: 98 %   --------  Is the patient diabetic? No   If yes, last blood glucose? --     If yes, was this treated in the ED? --  --------  Is the patient inebriated (ETOH) No or Impaired on other substances? No  MSSA done? N/A  Last MSSA score: --    Were withdrawal symptoms treated? N/A  Does the patient have a seizure history? No. If yes, date of most recent seizure--  --------  Is the patient patient experiencing suicidal ideation? reports suicidal ideation with out intention or a suicidal plan    Homicidal ideation? denies current or recent homicidal ideation or behaviors.    Self-injurious behavior/urges? denies current or recent self injurious behavior or ideation.  ------  Was pt aggressive in the ED No  Was a code called No  Is the pt now cooperative? Yes  -------  Meds given in ED:   Medications   aspirin-acetaminophen-caffeine (EXCEDRIN MIGRAINE) per tablet 1 tablet (not administered)      Family present during ED course? No  Family currently present? No    BACKGROUND  Does the patient have a cognitive impairment or developmental disability? No  Allergies:   Allergies   Allergen Reactions     No Known Allergies    .   Social demographics are   Social History     Social History     Marital status: Single     Spouse name: N/A     Number of children: N/A     Years of education: N/A     Social  History Main Topics     Smoking status: Current Every Day Smoker     Packs/day: 0.40     Types: Cigarettes     Smokeless tobacco: Never Used      Comment: less than 1/2 pack     Alcohol use 0.0 oz/week     0 Standard drinks or equivalent per week      Comment: Drinks socially weekends.     Drug use: Yes     Special: Marijuana     Sexual activity: Yes     Partners: Male      Comment: monogamous relationship     Other Topics Concern     None     Social History Narrative        ASSESSMENT  Labs results   Labs Ordered and Resulted from Time of ED Arrival Up to the Time of Departure from the ED   DRUG ABUSE SCREEN 6 CHEM DEP URINE ([facility]) - Abnormal; Notable for the following:        Result Value    Cannabinoids Qual Urine Positive (*)     All other components within normal limits   HCG QUALITATIVE URINE      Imaging Studies: No results found for this or any previous visit (from the past 24 hour(s)).   Most recent vital signs BP (!) 143/103  Temp 98.5  F (36.9  C) (Oral)  Resp 16  SpO2 98%   Abnormal labs/tests/findings requiring intervention:---   Pain control: pt had none  Nausea control: pt had none    RECOMMENDATION  Are any infection precautions needed (MRSA, VRE, etc.)? No If yes, what infection? --  ---  Does the patient have mobility issues? independently. If yes, what device does the pt use? ---  ---  Is patient on 72 hour hold or commitment? No If on 72 hour hold, have hold and rights been given to patient? No  Are admitting orders written if after 10 p.m. ?N/A  Tasks needing to be completed:---     Sophie linton--    [phone] [place] ED   [phone] [facility] ED

## 2018-05-17 NOTE — IP AVS SNAPSHOT
[address] [address]    Phone:  [phone]                                       After Visit Summary   5/17/2018    Rianna Man    MRN: [id number]           After Visit Summary Signature Page     I have received my discharge instructions, and my questions have been answered. I have discussed any challenges I see with this plan with the nurse or doctor.    ..........................................................................................................................................  Patient/Patient Representative Signature      ..........................................................................................................................................  Patient Representative Print Name and Relationship to Patient    ..................................................               ................................................  Date                                            Time    ..........................................................................................................................................  Reviewed by Signature/Title    ...................................................              ..............................................  Date                                                            Time

## 2018-05-17 NOTE — IP AVS SNAPSHOT
MRN:[id number]                      After Visit Summary   5/17/2018    Rianna Man    MRN: [id number]           Thank you!     Thank you for choosing [facility] for your care. Our goal is always to provide you with excellent care.        Patient Information     Date Of Birth          1990        About your hospital stay     You were admitted on:  May 17, 2018 You last received care in the:   [facility]    You were discharged on:  May 24, 2018       Who to Call     For medical emergencies, please call 911.  For non-urgent questions about your medical care, please call your primary care provider or clinic, [phone]          Attending Provider     Provider Specialty    Bertrand Goldstein MD Emergency Medicine    Jared, Seun Carter MD Psychiatry    Desrosier, Giuliano Madrigal MD Psychiatry       Primary Care Provider Office Phone # Fax #    Hilary Maral Edouard [phone]      Additional Services     Medication Therapy Management Referral       MTM referral reason            antidepressants: 3 or more prescribed     This service is designed to help you get the most from your medications.  A specially trained pharmacist will work closely with you and your doctors  to solve any problems related to your medications and to help you get the   best results from taking them.      The Medication Therapy Management staff will call you to schedule an appointment.                  Further instructions from your care team        Behavioral Discharge Planning and Instructions      Summary:  You were admitted on 5/17/2018  due to passive suicidal ideation and homicidal ideation toward no on in particular that you could name.  You were treated by Dr. Giuliano Huynh MD and Dr. Seun Coleman MD and discharged on 5/24/18 from [facility]  to [facility] with [facility].       Principal Diagnosis: Obsessive Compulsive Disorder; Bipolar Disorder; PTSD; Sexual  addiction      Health Care Follow-up:  [facility]  Post Hospital Walk-in Appts on Thursdays at 9am for ongoing medication management  [address] [place], [place]    [phone]    [facility] Shelter Team   [address]  [address]  [phone]    [facility]  [address]  [place], [place]  [phone]     Rule 25 Appointment Information  [facility] Central Unit  Front Door  [phone]    Social Security Office  [address] [place]  [address]  [phone]    Attend all scheduled appointments with your outpatient providers. Call at least 24 hours in advance if you need to reschedule an appointment to ensure continued access to your outpatient providers.   Major Treatments, Procedures and Findings:  You were provided with: a psychiatric assessment, assessed for medical stability, medication evaluation and/or management, group therapy and milieu management    Symptoms to Report: thoughts of suicide    Early warning signs can include: increased thoughts or behaviors of suicide or self-harm     Safety and Wellness:  Take all medicines as directed.  Make no changes unless your doctor suggests them.      Follow treatment recommendations.  Refrain from alcohol and non-prescribed drugs.  If there is a concern for safety, call 911.    Resources:   Crisis Intervention: 608.733.1009 or 098-348-0725 (TTY: 462.922.4845).  Call anytime for help.  [place] on Mental [place]ness (www.[place].hailey.org): 929.926.9866 or 341-275-7296.    The treatment team has appreciated the opportunity to work with you.     If you have any questions or concerns our unit number is [phone].      Pending Results     No orders found from 5/15/2018 to 5/18/2018.            Admission Information     Date & Time Provider Department Dept. Phone    5/17/2018 Giuliano Huynh MD UR  [facility] [phone]      Your Vitals Were     Blood Pressure Pulse Temperature Respirations Height Weight    128/77 90 99  F (37.2  C) 16 1.727 m (5' 8\") 115.8 kg (255 lb 3.2 oz)    Pulse Oximetry BMI (Body Mass Index)                98% 38.8 kg/m2          Care EveryWhere ID     This is your Care EveryWhere ID. This could be used by other organizations to access your [facility] medical records  [id number]        Equal Access to Services     VICENTE SEE : Hadii jose cruz ku hadasho Soomaali, waaxda luqadaha, qaybta kaalmada adeegyada, waxay jhin darion hoang. So [facility] [phone].    ATENCIÓN: Si habla español, tiene a roland disposición servicios gratuitos de asistencia lingüística. [facility] [phone].    We comply with applicable federal civil rights laws and [place] laws. We do not discriminate on the basis of race, color, national origin, age, disability, sex, sexual orientation, or gender identity.               Review of your medicines      START taking        Dose / Directions    bacitracin 500 UNIT/GM Oint        Apply topically 2 times daily   Quantity:  1 Tube   Refills:  0       gabapentin 300 MG capsule   Commonly known as:  NEURONTIN   Used for:  Generalized anxiety disorder   Replaces:  gabapentin 600 MG tablet        Dose:  300 mg   Take 1 capsule (300 mg) by mouth 3 times daily   Quantity:  90 capsule   Refills:  1       prazosin 2 MG capsule   Commonly known as:  MINIPRESS   Used for:  Schizoaffective disorder, unspecified type (H), Obsessive-compulsive disorder, unspecified type        Dose:  2 mg   Take 1 capsule (2 mg) by mouth At Bedtime   Quantity:  30 capsule   Refills:  1       traZODone 50 MG tablet   Commonly known as:  DESYREL   Used for:  Obsessive-compulsive disorder, unspecified type, Generalized anxiety disorder        Dose:  50 mg   Take 1 tablet (50 mg) by mouth nightly as needed for sleep   Quantity:  30 tablet   Refills:  1         CONTINUE these medicines which may " have CHANGED, or have new prescriptions. If we are uncertain of the size of tablets/capsules you have at home, strength may be listed as something that might have changed.        Dose / Directions    hydrOXYzine 50 MG tablet   Commonly known as:  ATARAX   This may have changed:    - medication strength  - how much to take   Used for:  Schizoaffective disorder, unspecified type (H), Obsessive-compulsive disorder, unspecified type, Generalized anxiety disorder        Dose:  50 mg   Take 1 tablet (50 mg) by mouth every 4 hours as needed for anxiety   Quantity:  90 tablet   Refills:  1       OLANZapine 20 MG tablet   Commonly known as:  zyPREXA   This may have changed:    - medication strength  - how much to take  - Another medication with the same name was removed. Continue taking this medication, and follow the directions you see here.   Used for:  Schizoaffective disorder, unspecified type (H), Obsessive-compulsive disorder, unspecified type, Generalized anxiety disorder        Dose:  20 mg   Take 1 tablet (20 mg) by mouth At Bedtime   Quantity:  30 tablet   Refills:  1         CONTINUE these medicines which have NOT CHANGED        Dose / Directions    aspirin-acetaminophen-caffeine 250-250-65 MG per tablet   Commonly known as:  EXCEDRIN MIGRAINE        Dose:  2 tablet   Take 2 tablets by mouth every 6 hours as needed for headaches   Quantity:  90 tablet   Refills:  1       escitalopram 10 MG tablet   Commonly known as:  LEXAPRO   Used for:  Obsessive-compulsive disorder, unspecified type, Schizoaffective disorder, unspecified type (H)        Dose:  10 mg   Take 1 tablet (10 mg) by mouth daily   Quantity:  30 tablet   Refills:  1       etonogestrel 68 MG Impl   Commonly known as:  IMPLANON/NEXPLANON   Used for:  Surveillance of previously prescribed implantable subdermal contraceptive        Dose:  1 each   1 each (68 mg) by Subdermal route once for 1 dose   Quantity:  1 each   Refills:  0       hydrocortisone 2.5 %  cream   Used for:  Striae distensae        Apply topically 2 times daily as needed for rash   Quantity:  3.5 g   Refills:  1       lactase 3000 UNIT tablet   Commonly known as:  LACTAID   Used for:  Lactase deficiency        Dose:  6000 Units   Take 2 tablets (6,000 Units) by mouth 3 times daily (with meals)   Quantity:  180 tablet   Refills:  1       mirtazapine 7.5 MG Tabs tablet   Commonly known as:  REMERON   Used for:  Schizoaffective disorder, unspecified type (H), Obsessive-compulsive disorder, unspecified type        Dose:  7.5 mg   Take 1 tablet (7.5 mg) by mouth At Bedtime   Quantity:  30 tablet   Refills:  1       multivitamin, therapeutic with minerals Tabs tablet   Used for:  Schizoaffective disorder, unspecified type (H)        Dose:  1 tablet   Take 1 tablet by mouth daily   Quantity:  30 each   Refills:  1       nicotine 14 MG/24HR 24 hr patch   Commonly known as:  NICODERM CQ   Used for:  Tobacco use        Dose:  1 patch   Place 1 patch onto the skin daily   Quantity:  30 patch   Refills:  1         STOP taking     gabapentin 600 MG tablet   Commonly known as:  NEURONTIN   Replaced by:  gabapentin 300 MG capsule                Where to get your medicines      These medications were sent to [facility] [place], [place] - [address]  [address] [address]     Phone:  [phone]     aspirin-acetaminophen-caffeine 250-250-65 MG per tablet    bacitracin 500 UNIT/GM Oint    escitalopram 10 MG tablet    gabapentin 300 MG capsule    hydrocortisone 2.5 % cream    hydrOXYzine 50 MG tablet    lactase 3000 UNIT tablet    mirtazapine 7.5 MG Tabs tablet    multivitamin, therapeutic with minerals Tabs tablet    nicotine 14 MG/24HR 24 hr patch    OLANZapine 20 MG tablet    prazosin 2 MG capsule    traZODone 50 MG tablet                Protect others around you: Learn how to safely use, store and throw away your medicines at www.disposemymeds.org.             Medication  List: This is a list of all your medications and when to take them. Check marks below indicate your daily home schedule. Keep this list as a reference.      Medications           Morning Afternoon Evening Bedtime As Needed    aspirin-acetaminophen-caffeine 250-250-65 MG per tablet   Commonly known as:  EXCEDRIN MIGRAINE   Take 2 tablets by mouth every 6 hours as needed for headaches   Last time this was given:  2 tablets on 5/23/2018  8:55 AM                                bacitracin 500 UNIT/GM Oint   Apply topically 2 times daily   Last time this was given:  5/23/2018  8:39 PM                                escitalopram 10 MG tablet   Commonly known as:  LEXAPRO   Take 1 tablet (10 mg) by mouth daily   Last time this was given:  10 mg on 5/24/2018  8:26 AM                                etonogestrel 68 MG Impl   Commonly known as:  IMPLANON/NEXPLANON   1 each (68 mg) by Subdermal route once for 1 dose                                gabapentin 300 MG capsule   Commonly known as:  NEURONTIN   Take 1 capsule (300 mg) by mouth 3 times daily   Last time this was given:  300 mg on 5/24/2018  8:26 AM                                hydrocortisone 2.5 % cream   Apply topically 2 times daily as needed for rash                                hydrOXYzine 50 MG tablet   Commonly known as:  ATARAX   Take 1 tablet (50 mg) by mouth every 4 hours as needed for anxiety   Last time this was given:  50 mg on 5/24/2018  8:29 AM                                lactase 3000 UNIT tablet   Commonly known as:  LACTAID   Take 2 tablets (6,000 Units) by mouth 3 times daily (with meals)   Last time this was given:  6,000 Units on 5/24/2018 12:10 PM                                mirtazapine 7.5 MG Tabs tablet   Commonly known as:  REMERON   Take 1 tablet (7.5 mg) by mouth At Bedtime   Last time this was given:  7.5 mg on 5/23/2018  7:07 PM                                multivitamin, therapeutic with minerals Tabs tablet   Take 1 tablet by  mouth daily   Last time this was given:  1 tablet on 5/24/2018  8:26 AM                                nicotine 14 MG/24HR 24 hr patch   Commonly known as:  NICODERM CQ   Place 1 patch onto the skin daily   Last time this was given:  1 patch on 5/24/2018  8:25 AM                                OLANZapine 20 MG tablet   Commonly known as:  zyPREXA   Take 1 tablet (20 mg) by mouth At Bedtime   Last time this was given:  20 mg on 5/23/2018  8:35 PM                                prazosin 2 MG capsule   Commonly known as:  MINIPRESS   Take 1 capsule (2 mg) by mouth At Bedtime   Last time this was given:  2 mg on 5/23/2018  8:35 PM                                traZODone 50 MG tablet   Commonly known as:  DESYREL   Take 1 tablet (50 mg) by mouth nightly as needed for sleep   Last time this was given:  50 mg on 5/23/2018  9:25 PM

## 2018-05-17 NOTE — PHARMACY-ADMISSION MEDICATION HISTORY
Admission medication history for the May 17, 2018 admission is complete.     Interview sources:  Patient, chart review    Reliability of source: Moderate (see comments below)    Medication compliance: Poor - patient stopped all medications following discharge from here on 4/24/18. She never picked up the prescriptions after discharge.     Changes made to PTA medication list (reason)  Added: none  Deleted: none  Changed: none    Additional medication history information:   - recently hospitalized here from 4/18-4/24/18. Patient never picked up her prescriptions following discharge and thus has not taken medications since 4/24/18.     Prior to Admission medications    Medication Sig Last Dose Taking? Auth Provider   aspirin-acetaminophen-caffeine (EXCEDRIN MIGRAINE) 250-250-65 MG per tablet Take 2 tablets by mouth every 6 hours as needed for headaches not taking since 4/24/17  Seun Coleman MD   escitalopram (LEXAPRO) 10 MG tablet Take 1 tablet (10 mg) by mouth daily not taking since 4/24/17  Jared, Seun Carter MD   etonogestrel (IMPLANON/NEXPLANON) 68 MG IMPL 1 each (68 mg) by Subdermal route once for 1 dose In place  Sandy Rudd MD   gabapentin (NEURONTIN) 600 MG tablet Take 1 tablet (600 mg) by mouth 3 times daily not taking since 4/24/17  Jared, Seun Carter MD   hydrocortisone 2.5 % cream Apply topically 2 times daily as needed for rash not taking since 4/24/17  Seun Coleman MD   hydrOXYzine (ATARAX) 25 MG tablet Take 1 tablet (25 mg) by mouth every 4 hours as needed for anxiety not taking since 4/24/17  Seun Coleman MD   lactase (LACTAID) 3000 UNIT tablet Take 2 tablets (6,000 Units) by mouth 3 times daily (with meals) not taking since 4/24/17  Seun Coleman MD   mirtazapine (REMERON) 7.5 MG TABS tablet Take 1 tablet (7.5 mg) by mouth At Bedtime not taking since 4/24/17  Jared, Seun Carter MD   multivitamin, therapeutic with minerals (THERA-VIT-M) TABS tablet Take  1 tablet by mouth daily not taking since 4/24/17  Seun Coleman MD   nicotine (NICODERM CQ) 14 MG/24HR 24 hr patch Place 1 patch onto the skin daily not taking since 4/24/17  Seun Coleman MD   OLANZapine (ZYPREXA) 10 MG tablet Take 1 tablet (10 mg) by mouth 2 times daily as needed (For agitation, aggression, anxiety.) not taking since 4/24/17  Seun Coleman MD   OLANZapine (ZYPREXA) 10 MG tablet Take 1 tablet (10 mg) by mouth At Bedtime not taking since 4/24/17  Seun Coleman MD       Time spent: 15 minutes    Medication history completed by:   Lupe Vargas, Luis  [facility]  Available daily from 1-9 PM: phone [phone], pager [phone]

## 2018-05-17 NOTE — ED NOTES
I have performed an in person assessment of the patient. Based on this assessment the patient no longer requires a one on one attendant at this point in time.    Ashkan Rushing MD  2:44 PM  May 17, 2018           Boy Luther MD  05/17/18 1446

## 2018-05-17 NOTE — ED NOTES
Patient arrives to [facility]. Psych Associate explains process and gives patient urine cup. Patient told about meeting with Mental Health  and Psychiatrist. Patient told about 2-5 hour time frame for complete evaluation.

## 2018-05-18 PROCEDURE — 25000132 ZZH RX MED GY IP 250 OP 250 PS 637: Performed by: NURSE PRACTITIONER

## 2018-05-18 PROCEDURE — 12400007 ZZH R&B MH INTERMEDIATE UMMC

## 2018-05-18 PROCEDURE — 25000132 ZZH RX MED GY IP 250 OP 250 PS 637: Performed by: PSYCHIATRY & NEUROLOGY

## 2018-05-18 PROCEDURE — 99222 1ST HOSP IP/OBS MODERATE 55: CPT | Mod: AI | Performed by: PSYCHIATRY & NEUROLOGY

## 2018-05-18 PROCEDURE — 99207 ZZC CDG-MDM COMPONENT: MEETS LOW - DOWN CODED: CPT | Performed by: PSYCHIATRY & NEUROLOGY

## 2018-05-18 PROCEDURE — 25000125 ZZHC RX 250: Performed by: NURSE PRACTITIONER

## 2018-05-18 RX ADMIN — GABAPENTIN 300 MG: 300 CAPSULE ORAL at 19:36

## 2018-05-18 RX ADMIN — HYDROXYZINE HYDROCHLORIDE 25 MG: 25 TABLET ORAL at 08:31

## 2018-05-18 RX ADMIN — LACTASE TAB 3000 UNIT 6000 UNITS: 3000 TAB at 12:00

## 2018-05-18 RX ADMIN — NICOTINE 1 PATCH: 14 PATCH, EXTENDED RELEASE TRANSDERMAL at 08:28

## 2018-05-18 RX ADMIN — HYDROXYZINE HYDROCHLORIDE 25 MG: 25 TABLET ORAL at 13:21

## 2018-05-18 RX ADMIN — OLANZAPINE 10 MG: 10 TABLET, FILM COATED ORAL at 19:36

## 2018-05-18 RX ADMIN — GABAPENTIN 300 MG: 300 CAPSULE ORAL at 08:29

## 2018-05-18 RX ADMIN — GABAPENTIN 300 MG: 300 CAPSULE ORAL at 13:21

## 2018-05-18 RX ADMIN — MULTIPLE VITAMINS W/ MINERALS TAB 1 TABLET: TAB at 08:29

## 2018-05-18 RX ADMIN — BACITRACIN: 500 OINTMENT TOPICAL at 08:34

## 2018-05-18 RX ADMIN — ESCITALOPRAM OXALATE 10 MG: 10 TABLET ORAL at 08:29

## 2018-05-18 RX ADMIN — LACTASE TAB 3000 UNIT 6000 UNITS: 3000 TAB at 08:29

## 2018-05-18 RX ADMIN — HYDROXYZINE HYDROCHLORIDE 25 MG: 25 TABLET ORAL at 17:33

## 2018-05-18 RX ADMIN — MIRTAZAPINE 7.5 MG: 7.5 TABLET, FILM COATED ORAL at 21:54

## 2018-05-18 RX ADMIN — LACTASE TAB 3000 UNIT 6000 UNITS: 3000 TAB at 17:33

## 2018-05-18 ASSESSMENT — ACTIVITIES OF DAILY LIVING (ADL)
DRESS: SCRUBS (BEHAVIORAL HEALTH);INDEPENDENT
LAUNDRY: WITH SUPERVISION
GROOMING: INDEPENDENT
ORAL_HYGIENE: INDEPENDENT
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
## 2018-05-18 NOTE — PROGRESS NOTES
Pt continues to have SI thoughts with no intent or plan in the hospital. Pt contracts for safety. When writer reassessed her SIB thoughts pt stated she did not have any. Pt states she will come to staff if thoughts increase or she feels she will act on these thoughts. Left hand wound has no drainage and no signs of infections, bacitracin was applied as ordered and a band-aid was placed per pts request.

## 2018-05-18 NOTE — PROGRESS NOTES
SPIRITUAL HEALTH SERVICES  SPIRITUAL ASSESSMENT Progress Note  [facility] ([facility]) [facility]NB     REFERRAL SOURCE: Hospital  request    Consulted with unit staff to assess immediate spiritual health needs. Pt has just been admitted. Will re-assess early next week as is appropriate.    PLAN: Will continue to follow while on unit.  [facility] remains available for any further needs or requests.      Lobito Rodriguez  Chaplain Resident  Pager [phone]

## 2018-05-18 NOTE — PROGRESS NOTES
05/17/18 1927   Patient Belongings   Did you bring any home meds/supplements to the hospital?  No   Patient Belongings body jewelry;cell phone/electronics;clothing;earings;ring;shoes;wallet;other (see comments)   Disposition of Belongings Locker;Sent to security per site process   Belongings Search Yes   Clothing Search Yes   Second Staff Tierra BARBOUR     With patient: silver colored ring, earrings, bra, 3 books    In patient locker: backpack, misc. Body jewelry, toothbrush, mascara, lotion, deodorant x2, various clothing, brown boots, sandals, brown belt, perfume, rebollar, lighter x3, blue headphones, 2 hairbrush, black ZTE phone (no back), pony tails, jocelyn pins, pink bag, hair gel, wig, black LG phone (cracked screen), black and pink kyocera phone (cracked screen), toothpaste, eye shadow palette, red wallet, medica card, [place] ID x2    To security (#[id number]): social security card, EBT card #[id number], [employer] gift card, Mastercard #[id number], [employer] debit card #[id number], Visa #'s [id number], [id number] [phone], [phone] [phone] [id number]  A               Admission:  I am responsible for any personal items that are not sent to the safe or pharmacy.  [facility] is not responsible for loss, theft or damage of any property in my possession.    Signature:  _________________________________ Date: _______  Time: _____                                              Staff Signature:  ____________________________ Date: ________  Time: _____      2nd Staff person, if patient is unable/unwilling to sign:    Signature: ________________________________ Date: ________  Time: _____     Discharge:  [facility] has returned all of my personal belongings:    Signature: _________________________________ Date: ________  Time: _____                                          Staff Signature:  ____________________________ Date: ________  Time: _____

## 2018-05-18 NOTE — PLAN OF CARE
Problem: Patient Care Overview  Goal: Team Discussion  Team Plan:   BEHAVIORAL TEAM DISCUSSION    Participants: Dr. Seun Coleman; Julai Ellis LICSW; Rosita Murray OTR/L; Eusebia Mejia RN     Progress: The patient is refusing to attend programming and has spent the day lounging with a book out in the milieu.  No interaction with staff or other patients.    Continued Stay Criteria/Rationale: New admit    Medical/Physical: See Consult    Precautions:   Behavioral Orders   Procedures     Assault precautions     Code 1 - Restrict to Unit     Routine Programming     As clinically indicated     Self Injury Precaution     Sexual precautions     Single Room     Status 15     Every 15 minutes.     Suicide precautions     Patients on Suicide Precautions should have a Combination Diet ordered that includes a Diet selection(s) AND a Behavioral Tray selection for Safe Tray - with utensils, or Safe Tray - NO utensils       Plan: The patient was just here a month ago.  She refused to  her dc medications and did not follow through with any appointments or resources.  She reported she was passively suicidal with no specific plan and made a statement about being homicidal but had no one particular in mind.  Team to consider malingering.      Rationale for change in precautions or plan: No changes

## 2018-05-18 NOTE — PROGRESS NOTES
"Pt c/o her breakfast being \"wrong\" and fussed a little but settled down when this writer came up with other food. Pt was calm and appropriate otherwise. While eating she read her book and did not socialize at all. Pt did not attend any groups. Pt slept much of the day. Pt has thoughts of SI and SIB but feels safe here and contracted for safety. Pt said she's depressed and anxious but did not give any number ratings.     05/18/18 3994   Behavioral Health   Hallucinations denies / not responding to hallucinations   Thinking distractable   Orientation person: oriented;place: oriented;date: oriented;time: oriented   Memory baseline memory   Insight poor   Judgement impaired   Eye Contact out of corner of eyes   Affect blunted, flat   Mood depressed;anxious   Physical Appearance/Attire appears stated age;untidy   Hygiene (fair)   Suicidality thoughts only   1. Wish to be Dead No   2. Non-Specific Active Suicidal Thoughts  No   Self Injury thoughts only   Elopement (not an elopement risk)   Activity isolative;withdrawn   Speech coherent;clear   Medication Sensitivity no stated side effects;no observed side effects   Psychomotor / Gait steady;balanced;slow     "
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
## 2018-05-19 PROCEDURE — 25000132 ZZH RX MED GY IP 250 OP 250 PS 637: Performed by: PSYCHIATRY & NEUROLOGY

## 2018-05-19 PROCEDURE — 25000132 ZZH RX MED GY IP 250 OP 250 PS 637: Performed by: NURSE PRACTITIONER

## 2018-05-19 PROCEDURE — 12400007 ZZH R&B MH INTERMEDIATE UMMC

## 2018-05-19 RX ADMIN — ESCITALOPRAM OXALATE 10 MG: 10 TABLET ORAL at 07:59

## 2018-05-19 RX ADMIN — MIRTAZAPINE 7.5 MG: 7.5 TABLET, FILM COATED ORAL at 20:07

## 2018-05-19 RX ADMIN — LACTASE TAB 3000 UNIT 6000 UNITS: 3000 TAB at 12:23

## 2018-05-19 RX ADMIN — TRAZODONE HYDROCHLORIDE 50 MG: 50 TABLET ORAL at 21:42

## 2018-05-19 RX ADMIN — LACTASE TAB 3000 UNIT 6000 UNITS: 3000 TAB at 17:53

## 2018-05-19 RX ADMIN — LACTASE TAB 3000 UNIT 6000 UNITS: 3000 TAB at 07:59

## 2018-05-19 RX ADMIN — GABAPENTIN 300 MG: 300 CAPSULE ORAL at 14:07

## 2018-05-19 RX ADMIN — MULTIPLE VITAMINS W/ MINERALS TAB 1 TABLET: TAB at 07:59

## 2018-05-19 RX ADMIN — HYDROXYZINE HYDROCHLORIDE 25 MG: 25 TABLET ORAL at 12:23

## 2018-05-19 RX ADMIN — BACITRACIN: 500 OINTMENT TOPICAL at 20:08

## 2018-05-19 RX ADMIN — GABAPENTIN 300 MG: 300 CAPSULE ORAL at 07:59

## 2018-05-19 RX ADMIN — BACITRACIN: 500 OINTMENT TOPICAL at 08:03

## 2018-05-19 RX ADMIN — OLANZAPINE 10 MG: 10 TABLET, FILM COATED ORAL at 22:44

## 2018-05-19 RX ADMIN — OLANZAPINE 10 MG: 10 TABLET, FILM COATED ORAL at 20:06

## 2018-05-19 RX ADMIN — NICOTINE 1 PATCH: 14 PATCH, EXTENDED RELEASE TRANSDERMAL at 07:58

## 2018-05-19 RX ADMIN — HYDROXYZINE HYDROCHLORIDE 25 MG: 25 TABLET ORAL at 07:59

## 2018-05-19 RX ADMIN — GABAPENTIN 300 MG: 300 CAPSULE ORAL at 20:06

## 2018-05-19 ASSESSMENT — ACTIVITIES OF DAILY LIVING (ADL)
GROOMING: INDEPENDENT
ORAL_HYGIENE: INDEPENDENT
LAUNDRY: WITH SUPERVISION
DRESS: INDEPENDENT

## 2018-05-19 NOTE — PROGRESS NOTES
"Pt was visible in the milieu for most of the shift. She was very polite and cooperative with staff, she was social with a few peers, and engaged in group activities. Pt endorsed depression and anxiety, she rated depression 7-8/10 and anxiety 8/10. Pt stated \"my anxiety is pretty bad right now because of what just happened, it's kind of a trigger for me\" (Pt was referring to the Code 21 with security alert that was called on a different patient around 21:00. Writer asked if pt was ok and she replied \"yes\" when asked what kinds of coping methods she uses for her anxiety she stated \"I've tried meditation and mindfulness, but it's not helping right now\". Writer offered her a lavender patch and a sound machine for her room, pt accepted both and said \"they both helped a lot\". Writer asked if pt felt safe pt replied \"yes\". When asked about SI pt said \"I don't wish to die, but sometimes I feel so overwhelmed I think about suicide\". Pt said she has SI thoughts only, no plan or intent to act. Pt did contract for safety. Pt denied SIB. Pt endorsed having Ah at time, but \"right now they are quiet\", pt did state \"I will let you know if they come back\". Pt expressed several concerns she stated \" I need help finding a stable place to live. I don't want to be like this anymore, I'm so tired\".        "

## 2018-05-19 NOTE — PROGRESS NOTES
Occupational Therapy Initial Note:         Pt attended occupational therapy group today independently.  Pt was cooperative and chose project independently.  Pt appeared to have difficulty initiating project once chose.  Pt was given 1-2 verbal cues, and then was able to begin.  Pt was quiet and sat by herself at a table. Affect was flat and pt appeared depressed.  Pt did not interact with peers however would interact with staff if approached.  Pt was able to focus on 1 step of the task, but then  needed a verbal cue to move onto the next part.  Pt will continue to be offered groups for further assessment and to address goals identified on plan of care.
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
## 2018-05-20 PROCEDURE — 25000132 ZZH RX MED GY IP 250 OP 250 PS 637: Performed by: PSYCHIATRY & NEUROLOGY

## 2018-05-20 PROCEDURE — 90853 GROUP PSYCHOTHERAPY: CPT

## 2018-05-20 PROCEDURE — 25000132 ZZH RX MED GY IP 250 OP 250 PS 637: Performed by: NURSE PRACTITIONER

## 2018-05-20 PROCEDURE — 12400007 ZZH R&B MH INTERMEDIATE UMMC

## 2018-05-20 RX ADMIN — ESCITALOPRAM OXALATE 10 MG: 10 TABLET ORAL at 08:48

## 2018-05-20 RX ADMIN — TRAZODONE HYDROCHLORIDE 50 MG: 50 TABLET ORAL at 22:04

## 2018-05-20 RX ADMIN — LACTASE TAB 3000 UNIT 6000 UNITS: 3000 TAB at 12:13

## 2018-05-20 RX ADMIN — HYDROXYZINE HYDROCHLORIDE 25 MG: 25 TABLET ORAL at 12:13

## 2018-05-20 RX ADMIN — NICOTINE 1 PATCH: 14 PATCH, EXTENDED RELEASE TRANSDERMAL at 08:48

## 2018-05-20 RX ADMIN — GABAPENTIN 300 MG: 300 CAPSULE ORAL at 13:14

## 2018-05-20 RX ADMIN — TRAZODONE HYDROCHLORIDE 50 MG: 50 TABLET ORAL at 21:08

## 2018-05-20 RX ADMIN — HYDROXYZINE HYDROCHLORIDE 25 MG: 25 TABLET ORAL at 17:02

## 2018-05-20 RX ADMIN — MIRTAZAPINE 7.5 MG: 7.5 TABLET, FILM COATED ORAL at 21:07

## 2018-05-20 RX ADMIN — GABAPENTIN 300 MG: 300 CAPSULE ORAL at 08:48

## 2018-05-20 RX ADMIN — GABAPENTIN 300 MG: 300 CAPSULE ORAL at 21:07

## 2018-05-20 RX ADMIN — ALUMINUM HYDROXIDE, MAGNESIUM HYDROXIDE, AND DIMETHICONE 30 ML: 400; 400; 40 SUSPENSION ORAL at 22:05

## 2018-05-20 RX ADMIN — OLANZAPINE 10 MG: 10 TABLET, FILM COATED ORAL at 21:08

## 2018-05-20 RX ADMIN — LACTASE TAB 3000 UNIT 6000 UNITS: 3000 TAB at 17:02

## 2018-05-20 RX ADMIN — BACITRACIN: 500 OINTMENT TOPICAL at 21:10

## 2018-05-20 RX ADMIN — ACETAMINOPHEN, ASPIRIN AND CAFFEINE 2 TABLET: 250; 250; 65 TABLET, FILM COATED ORAL at 13:14

## 2018-05-20 RX ADMIN — MULTIPLE VITAMINS W/ MINERALS TAB 1 TABLET: TAB at 08:48

## 2018-05-20 RX ADMIN — LACTASE TAB 3000 UNIT 6000 UNITS: 3000 TAB at 08:48

## 2018-05-20 ASSESSMENT — ACTIVITIES OF DAILY LIVING (ADL)
DRESS: INDEPENDENT
LAUNDRY: WITH SUPERVISION
GROOMING: INDEPENDENT
ORAL_HYGIENE: INDEPENDENT

## 2018-05-20 NOTE — PROGRESS NOTES
"Pt c/o blurred vision. Reports it began yesterday. She has also realized she is unable to focus on distant words now either. Plan: pass on info to oncoming night & day nurses, requesting on-call physician be notified tomorrow when he is on the unit/in the building.    Also c/o poor sleep and \"freaky dreams.\" Took Trazodone 50 mg and returned for another dose, but accepted offer of Zyprexa 10 mg po PRN due to c/o racing thoughts. Pt states Seroquel works better for sleep than Trazodone and plans to talk her sleep issues over with her physician on Monday.  "

## 2018-05-20 NOTE — PROGRESS NOTES
Pt continues to state blurry vision. PT states the blurry vision started on Saturday. VS this am were sitting 138/99 pulse 79 then standing 143/104 pulse 135.  Dr. Oreilly was made aware of both of these items and stated Dr. Coleman would follow up with them on Monday.  Attempted to recheck VS but pt slept most of the morning after breakfast. On recheck sitting was 129/86 pulse 102 and standing 117/82 pulse of 67.  Will continue to monitor pt's VS and blurry vision as needed. Pt also continues to have nightmares. Writer encouraged pt to take off nicotine patch when she sleeps and pt stated she had the nightmares before using the nicotine patch and does not believe that will help but said she would try it tonight. Writer explained to pt that she can receive a new patch as soon as she wakes in the morning if that would help.

## 2018-05-20 NOTE — PROGRESS NOTES
SPIRITUAL HEALTH SERVICES  SPIRITUAL ASSESSMENT Progress Note  [facility] [place] [facility]     REFERRAL SOURCE: [facility] consult order for emotional support    Consulted with pt's nurse and other unit staff to assess immediate needs. Staff said that Rianna had asked for a  a couple times but was not asking for one today. They said the plan made on Friday was that the unit  Lobito would visit with Rianna on Monday.     PLAN: Will notify unit  of triage and staff consultation. [facility] remains available for any further needs or requests.    ALAN Gonzalez.  Associate    Pager [phone]    * [facility] remains available 24/7 for emergent requests/referrals, either by having the switchboard page the on-call  or by entering an ASAP/STAT consult in Epic (this will also page the on-call ).*

## 2018-05-20 NOTE — PROGRESS NOTES
"Pt has been in her room most of the day, but has come out for meals and meds. Pt has a good appetite but said she's sleeping \"bad\" and added, \"I'm having disturbing dreams so bad that I don't want to go to bed\". Pt said she is still hearing voices but \"not as much today\". Pt said she is depressed and anxious but denies SI and SIB thoughts.      05/20/18 1404   Behavioral Health   Hallucinations auditory   Thinking distractable   Orientation person: oriented;place: oriented;date: oriented;time: oriented   Memory baseline memory   Insight insight appropriate to events   Judgement impaired   Eye Contact at examiner   Affect blunted, flat   Mood depressed;anxious   Physical Appearance/Attire appears stated age;untidy   Hygiene (fair)   Suicidality (denies)   1. Wish to be Dead No   2. Non-Specific Active Suicidal Thoughts  No   Self Injury (denies)   Elopement (not an elopement risk)   Activity isolative;withdrawn   Speech coherent;clear   Medication Sensitivity no observed side effects;no stated side effects   Psychomotor / Gait steady;balanced;slow     "

## 2018-05-21 PROCEDURE — 99232 SBSQ HOSP IP/OBS MODERATE 35: CPT | Performed by: PSYCHIATRY & NEUROLOGY

## 2018-05-21 PROCEDURE — 25000132 ZZH RX MED GY IP 250 OP 250 PS 637: Performed by: PSYCHIATRY & NEUROLOGY

## 2018-05-21 PROCEDURE — 97150 GROUP THERAPEUTIC PROCEDURES: CPT | Mod: GO

## 2018-05-21 PROCEDURE — 99207 ZZC CDG-MDM COMPONENT: MEETS MODERATE - UP CODED: CPT | Performed by: PSYCHIATRY & NEUROLOGY

## 2018-05-21 PROCEDURE — 12400007 ZZH R&B MH INTERMEDIATE UMMC

## 2018-05-21 PROCEDURE — 25000132 ZZH RX MED GY IP 250 OP 250 PS 637: Performed by: NURSE PRACTITIONER

## 2018-05-21 RX ORDER — OLANZAPINE 10 MG/1
20 TABLET ORAL AT BEDTIME
Status: DISCONTINUED | OUTPATIENT
Start: 2018-05-21 | End: 2018-05-24 | Stop reason: HOSPADM

## 2018-05-21 RX ORDER — PRAZOSIN HYDROCHLORIDE 2 MG/1
2 CAPSULE ORAL AT BEDTIME
Status: DISCONTINUED | OUTPATIENT
Start: 2018-05-21 | End: 2018-05-24 | Stop reason: HOSPADM

## 2018-05-21 RX ADMIN — ACETAMINOPHEN, ASPIRIN AND CAFFEINE 2 TABLET: 250; 250; 65 TABLET, FILM COATED ORAL at 08:29

## 2018-05-21 RX ADMIN — OLANZAPINE 20 MG: 10 TABLET, FILM COATED ORAL at 20:54

## 2018-05-21 RX ADMIN — LACTASE TAB 3000 UNIT 6000 UNITS: 3000 TAB at 08:21

## 2018-05-21 RX ADMIN — ESCITALOPRAM OXALATE 10 MG: 10 TABLET ORAL at 08:21

## 2018-05-21 RX ADMIN — HYDROXYZINE HYDROCHLORIDE 25 MG: 25 TABLET ORAL at 09:24

## 2018-05-21 RX ADMIN — TRAZODONE HYDROCHLORIDE 50 MG: 50 TABLET ORAL at 21:46

## 2018-05-21 RX ADMIN — MULTIPLE VITAMINS W/ MINERALS TAB 1 TABLET: TAB at 08:21

## 2018-05-21 RX ADMIN — GABAPENTIN 300 MG: 300 CAPSULE ORAL at 13:23

## 2018-05-21 RX ADMIN — NICOTINE 1 PATCH: 14 PATCH, EXTENDED RELEASE TRANSDERMAL at 08:21

## 2018-05-21 RX ADMIN — BACITRACIN: 500 OINTMENT TOPICAL at 20:53

## 2018-05-21 RX ADMIN — HYDROXYZINE HYDROCHLORIDE 25 MG: 25 TABLET ORAL at 16:42

## 2018-05-21 RX ADMIN — HYDROXYZINE HYDROCHLORIDE 25 MG: 25 TABLET ORAL at 20:54

## 2018-05-21 RX ADMIN — LACTASE TAB 3000 UNIT 6000 UNITS: 3000 TAB at 17:49

## 2018-05-21 RX ADMIN — LACTASE TAB 3000 UNIT 6000 UNITS: 3000 TAB at 12:35

## 2018-05-21 RX ADMIN — GABAPENTIN 300 MG: 300 CAPSULE ORAL at 20:54

## 2018-05-21 RX ADMIN — GABAPENTIN 300 MG: 300 CAPSULE ORAL at 08:21

## 2018-05-21 RX ADMIN — OLANZAPINE 10 MG: 10 TABLET, FILM COATED ORAL at 19:37

## 2018-05-21 RX ADMIN — MIRTAZAPINE 7.5 MG: 7.5 TABLET, FILM COATED ORAL at 20:54

## 2018-05-21 ASSESSMENT — ACTIVITIES OF DAILY LIVING (ADL)
GROOMING: INDEPENDENT
LAUNDRY: WITH SUPERVISION
ORAL_HYGIENE: INDEPENDENT
DRESS: INDEPENDENT

## 2018-05-21 NOTE — PROGRESS NOTES
"   05/20/18 2200   Behavioral Health   Hallucinations auditory   Thinking distractable   Orientation person: oriented;place: oriented   Memory baseline memory   Insight insight appropriate to situation   Judgement impaired   Eye Contact at examiner   Affect blunted, flat   Mood mood is calm   Physical Appearance/Attire neat   Hygiene well groomed   Suicidality other (see comments)  (denies )   1. Wish to be Dead No   2. Non-Specific Active Suicidal Thoughts  No   Self Injury other (see comment)  (denies )   Elopement (none reported or observed )   Activity other (see comment)  (visible in the milieu and socialized with others )   Speech clear;coherent   Activities of Daily Living   Hygiene/Grooming independent   Oral Hygiene independent   Dress independent   Laundry with supervision   Room Organization independent       Pt denied SI and SIB.  Pt reported feeling depressed (5), agitated (4), irritated (2), sad (7), confused (3), anxious (8) and labile (5) \" I'm just afraid that I'm not going to get better leave the hospital no where to go you know.\"  Pt reported hearing voices \" hurting myself\" \" hurting other people\" \" I'm not worth it.\"  Pt reported experiencing psychotic symptoms and racing thought.  Pt seems calm, visible in the milieu, coloring, watching tv, ate supper, showered and socialized with others.  Pt daily goal \" I don't have one.\"  Pt goal after discharge \" hopefully getting into some place that can help me with my mental healthy stay on my meds.\"  Pt reported good appetite, sleep \" not good\" and no pain.  Pt reported blurry vision and dry mouth from zyprexa.  Pt wants visitors.     "

## 2018-05-21 NOTE — PROGRESS NOTES
"SPIRITUAL HEALTH SERVICES  SPIRITUAL ASSESSMENT Progress Note  [facility] ([facility]) 10NB     REFERRAL SOURCE: Hospital  request    Rianna was tentative about meeting but agreed \"for just a minute\". She stated she is having a migraine and that \"it is hard to think\". We sat in the dimly lit are of the common space. Rianna shared that she was raised in the [employer] tradition, attended [employer] with her family and likes to read the bible.  She is here due to suicidal ideation and sought help by admitting here. She is open to meeting again to share scripture reading and work on some breathing techniques.    PLAN: Will continue to follow while on unit.  [facility] remains available for any further needs or requests.      Lobito Rodriguez  Chaplain Resident  Pager [phone]    "

## 2018-05-21 NOTE — PLAN OF CARE
Problem: Patient Care Overview  Goal: Team Discussion  Team Plan:   BEHAVIORAL TEAM DISCUSSION    Participants: ORSY Dunn, Seun Coleman MD, Vaughn Ponce RN  Progress: Pt is back on her medicine  Continued Stay Criteria/Rationale: Stabilization  Medical/Physical: Per Internal Medicine  Precautions:   Behavioral Orders   Procedures     Assault precautions     Code 1 - Restrict to Unit     Routine Programming     As clinically indicated     Self Injury Precaution     Sexual precautions     Single Room     Status 15     Every 15 minutes.     Suicide precautions     Patients on Suicide Precautions should have a Combination Diet ordered that includes a Diet selection(s) AND a Behavioral Tray selection for Safe Tray - with utensils, or Safe Tray - NO utensils       Plan: Stabilize and discharge with aftercare plan to follow up at [facility] outpatient clinics.  Rationale for change in precautions or plan: No changes.

## 2018-05-21 NOTE — PLAN OF CARE
Problem: Social/Occupational/Functional Impairment (Depressive Signs/Symptoms) (Adult)  Goal: Improved Social/Occupational/Functional Skills (Depressive Signs/Symptoms)    Pt attended 2 out of 2 OT groups offered. Pt actively participated in occupational therapy clinic. Pt was able to ask for assistance as needed, and independently return to a novel, creative expression task. Pt demonstrated good focus, planning, and problem solving. Pt sat at a table by herself and did not interact with peers, however frequently requested guidance from writer and was pleasant upon interaction. Pt had a flat affect throughout group, and thanked writer for group prior to leaving room.

## 2018-05-21 NOTE — PROGRESS NOTES
Met with Pt briefly to discuss aftercare plan.  Told her plan is to refer her to [facility] walk in clinic where she can get a referral to the [facility] 25 .  She said this does not work for her she has to get a placement because she has no place to live.  Told her this is the Team plan and if she disagrees she can discuss it tomorrow with the

## 2018-05-21 NOTE — PLAN OF CARE
"Problem: Social/Occupational/Functional Impairment (Depressive Signs/Symptoms) (Adult)  Goal: Improved Social/Occupational/Functional Skills (Depressive Signs/Symptoms)    OT Self-Assessment  Pt was given and completed a written self-assessment form. OT staff reviewed with pt and explained the value of having them involved in their treatment plan, and provided options to meet current needs/self-identified goals.     Pt did not identify any stressors/events that led to hospitalization.    Pt identified the following symptoms that they are currently dealing with:   Emotions: selected all options  Thoughts: selected all options  Behaviors: selected all options    Self-identified coping skills: none identified  Self-identified social supports: \"no one, maybe mom\"  Self-identified personal strengths: \"N/A\"    Goals: selected all options        "

## 2018-05-22 PROCEDURE — 99232 SBSQ HOSP IP/OBS MODERATE 35: CPT | Performed by: PSYCHIATRY & NEUROLOGY

## 2018-05-22 PROCEDURE — 12400007 ZZH R&B MH INTERMEDIATE UMMC

## 2018-05-22 PROCEDURE — 25000132 ZZH RX MED GY IP 250 OP 250 PS 637: Performed by: PSYCHIATRY & NEUROLOGY

## 2018-05-22 PROCEDURE — 25000132 ZZH RX MED GY IP 250 OP 250 PS 637: Performed by: NURSE PRACTITIONER

## 2018-05-22 RX ORDER — HYDROXYZINE HYDROCHLORIDE 50 MG/1
50 TABLET, FILM COATED ORAL EVERY 4 HOURS PRN
Status: DISCONTINUED | OUTPATIENT
Start: 2018-05-22 | End: 2018-05-24 | Stop reason: HOSPADM

## 2018-05-22 RX ADMIN — ALUMINUM HYDROXIDE, MAGNESIUM HYDROXIDE, AND DIMETHICONE 30 ML: 400; 400; 40 SUSPENSION ORAL at 19:17

## 2018-05-22 RX ADMIN — ESCITALOPRAM OXALATE 10 MG: 10 TABLET ORAL at 08:31

## 2018-05-22 RX ADMIN — TRAZODONE HYDROCHLORIDE 50 MG: 50 TABLET ORAL at 21:02

## 2018-05-22 RX ADMIN — GABAPENTIN 300 MG: 300 CAPSULE ORAL at 13:27

## 2018-05-22 RX ADMIN — BACITRACIN: 500 OINTMENT TOPICAL at 08:33

## 2018-05-22 RX ADMIN — HYDROXYZINE HYDROCHLORIDE 50 MG: 50 TABLET, FILM COATED ORAL at 18:07

## 2018-05-22 RX ADMIN — GABAPENTIN 300 MG: 300 CAPSULE ORAL at 08:31

## 2018-05-22 RX ADMIN — TRAZODONE HYDROCHLORIDE 50 MG: 50 TABLET ORAL at 21:45

## 2018-05-22 RX ADMIN — LACTASE TAB 3000 UNIT 6000 UNITS: 3000 TAB at 12:05

## 2018-05-22 RX ADMIN — PRAZOSIN HYDROCHLORIDE 2 MG: 2 CAPSULE ORAL at 21:02

## 2018-05-22 RX ADMIN — NICOTINE 1 PATCH: 14 PATCH, EXTENDED RELEASE TRANSDERMAL at 08:30

## 2018-05-22 RX ADMIN — GABAPENTIN 300 MG: 300 CAPSULE ORAL at 21:02

## 2018-05-22 RX ADMIN — OLANZAPINE 20 MG: 10 TABLET, FILM COATED ORAL at 21:02

## 2018-05-22 RX ADMIN — HYDROXYZINE HYDROCHLORIDE 25 MG: 25 TABLET ORAL at 10:59

## 2018-05-22 RX ADMIN — HYDROXYZINE HYDROCHLORIDE 50 MG: 50 TABLET, FILM COATED ORAL at 13:27

## 2018-05-22 RX ADMIN — LACTASE TAB 3000 UNIT 6000 UNITS: 3000 TAB at 08:31

## 2018-05-22 RX ADMIN — BACITRACIN: 500 OINTMENT TOPICAL at 21:15

## 2018-05-22 RX ADMIN — MIRTAZAPINE 7.5 MG: 7.5 TABLET, FILM COATED ORAL at 21:02

## 2018-05-22 RX ADMIN — MULTIPLE VITAMINS W/ MINERALS TAB 1 TABLET: TAB at 08:31

## 2018-05-22 ASSESSMENT — ACTIVITIES OF DAILY LIVING (ADL)
GROOMING: INDEPENDENT
LAUNDRY: WITH SUPERVISION
ORAL_HYGIENE: INDEPENDENT
DRESS: INDEPENDENT

## 2018-05-22 NOTE — PROGRESS NOTES
"[facility], [place]   Psychiatric Progress Note        Interim History:   Reason for Hospitalization:Jessica is a 28 year old female with history of Bipolar Disorder, substance use disorder (alcohol, cannabis, cocaine), and PTSD is admitted on a voluntary basis for worsening depressive moods, suicidal thoughts, with intent and plan.    Subjective: \"I'm not ready to go, I'm still feeling suicidal and depressed\"     As per today's interview: Patient was seen attending groups, and was anxious and cooperative to meet with me. She feels that she's still depressed and harbors passive SI. The primary source of anxiety is not having a place to stay. She mentions that \"the problems after my last discharge is that I didn't have a place to stay, and ended up doing bad things and becoming pretty depressed.\" She wanted to have an inpatient Rule 25 and have certainty for housing.          Medications:       bacitracin   Topical BID     escitalopram  10 mg Oral Daily     gabapentin  300 mg Oral TID     lactase  6,000 Units Oral TID w/meals     mirtazapine  7.5 mg Oral At Bedtime     multivitamin, therapeutic with minerals  1 tablet Oral Daily     nicotine  1 patch Transdermal Daily     nicotine   Transdermal Q8H     nicotine   Transdermal Daily     OLANZapine  15 mg Oral At Bedtime     prazosin  2 mg Oral At Bedtime          Allergies:     Allergies   Allergen Reactions     No Known Allergies           Labs:   No results found for this or any previous visit (from the past 48 hour(s)).       Psychiatric Examination:   /59  Temp 96.2  F (35.7  C) (Oral)  Resp 16  Ht 1.727 m (5' 8\")  Wt 110.2 kg (243 lb)  SpO2 98%  BMI 36.95 kg/m2  Weight is 243 lbs 0 oz  Body mass index is 36.95 kg/(m^2).    Appearance:  female, mild-moderately disheveled appearance. Mild emotional discomfort.   Attitude:  Somewhat anxious, but pleasant and cooperative.   Eye Contact:  Adequate   Mood: " " \"Worried\"  Affect:  Anxious   Speech:  Regular in rate and rhythm.   Language: fluent and intact in English  Psychomotor, Gait, Musculoskeletal:  Unremarkable.   Throught Process:  Linear, coherent   Associations:  Intact   Thought Content:  Passive suicidal thoughts. No evidence of psychotic thought, no auditory hallucinations present and no visual hallucinations present  Insight:  fair  Judgement:  poor  Oriented to:  time, person, and place  Attention Span and Concentration:  intact  Recent and Remote Memory:  intact  Fund of Knowledge:  appropriate      Assessment & Plan       Principal Diagnosis:   Bipolar 1 Disorder with psychotic symptoms vs. Schizoaffective Disorder   PTSD  Cluster B Personality Disorder Traits (particularly Borderline Personality Disorder)   Alcohol Use Disorder  Cannabis Use Disorder  Cocaine Use Disorder  Migraine without status migrainosus, not intractable, unspecified migraine type      Assessment:  (Initial Assessment 5/18): Rianna appears to have decompensated again after recently being hospitalized less than a month ago. She did not follow through with any outpatient follow up plan, did not  her medication, nor even look into shelter stay. 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 She recently injured herself, which is characteristic of her Cluster B traits (particularly BPD). No other SIB noted. She will be reinstated on her prior medications which I had initiated in her prior hospitalization, which seems to have helped her symptoms back then.     Follow Up (5/21): Has been attending groups, and seen in the milieu. Med compliant, and minimal subjective improvement. She attributes much of her problems to not having housing, and feels that getting a inpt Rule 25 will provide her with housing. I explained to her that after her last discharge, she did not " "follow up with outpatient resources, did not  medications, nor shelter resources. She was under the impression that shelters are all booked for months, and had to explain that shelters are emptied out every morning, and she would have to arrive there daily to have a spot. Will make medication adjustments today, and likely discharge tomorrow, with the idea that she has approached a limit to how much inpatient stay can help her, and that we cannot provide housing for patients. Rule 25 would take a long time inpatient and the wait for CD facility would take even longer. It would not be appropriate to \"house\" a patient in the hospital until CD treatment is acquired.  Regarding her suicidal thoughts, she was not suicidal on 5/18, but when the reality of her not securing housing while she was hospitalized became evident, she suddenly is now suicidal. This behavior does point to more characterological, reactive SI, and thus a chronic risk rather than an imminent risk.      Plan:  1.) Medication Plan:  - Zyprexa 20 mg HS  - Prazosin 2 mg HS   - Lexapro 10 mg Daily   - Gabapentin 300 mg TID  - Remeron 15 mg HS  - Lactaid 6000 units TID with meals   - Excedrin 2 tablets Q6H PRN. Can also use Imitrex 50 mg if migraine pain recurs.      2.) Psychosocial Plan:  - Will again ensure that patient has resources for therapy, medication management and shelter      Legal:  Voluntary      Disposition:  Likely discharge tomorrow with medications and resources      Legal Status: Voluntary     Safety Assessment:   Checks: Status 15  Precautions: Suicide  Self-harm  Assault  Sexual  Pt has not required locked seclusion or restraints in the past 24 hours to maintain safety, please refer to RN documentation for further details.       The risks, benefits, alternatives and side effects have been discussed and are understood by the patient and other caregivers.       Seun Coleman MD  [facility]       "

## 2018-05-22 NOTE — PROGRESS NOTES
"   05/21/18 1945   Behavioral Health   Hallucinations denies / not responding to hallucinations   Thinking poor concentration   Orientation person: oriented;place: oriented;date: oriented;time: oriented   Memory baseline memory   Insight insight appropriate to situation   Judgement intact   Eye Contact at examiner   Affect sad   Mood anxious;depressed;hopeless   Hygiene well groomed   1. Wish to be Dead No   2. Non-Specific Active Suicidal Thoughts  No   Activities of Daily Living   Hygiene/Grooming independent   Oral Hygiene independent   Dress independent   Laundry with supervision   Room Organization independent     Pt was active and social with others for the most part of the evening. Pt had couple of break downs about an hour before dinner and few minutes after dinner. Pt expressed concerns about her safety when she leaves the hospital when asked about SI and SIB. \"I won't feel safe if I eave here and don't feel like I've gotten the help that I needed yet\". Pt stated that she doesn't feel like hurting herself while in her but just doesn't feel stable mentally and emotionally due to everything that is going on her life. Pt said yes to issues concentrating and yes to racing thoughts. Pt rated depression 6/10 and anxiety 7/10 and expressed feeling hopeless because she doesn't know where her life is headed. \"I just want my life to get better so I can feel human again\". \" my goal is to talk to this lady for my housing but she never called me back and my goal towards getting discharge is find a place to go to because I have no place else to go and that makes my depression worse. Pt said her appetite good and sleep is \"messed up\" . Pt did express that her sleep is messed due to voices and all of her worries.   "

## 2018-05-23 PROBLEM — T30.0 BURN: Status: ACTIVE | Noted: 2018-05-23

## 2018-05-23 PROBLEM — G43.719 INTRACTABLE CHRONIC MIGRAINE WITHOUT AURA: Status: ACTIVE | Noted: 2018-05-23

## 2018-05-23 PROCEDURE — 25000132 ZZH RX MED GY IP 250 OP 250 PS 637: Performed by: PSYCHIATRY & NEUROLOGY

## 2018-05-23 PROCEDURE — 25000132 ZZH RX MED GY IP 250 OP 250 PS 637: Performed by: NURSE PRACTITIONER

## 2018-05-23 PROCEDURE — 99233 SBSQ HOSP IP/OBS HIGH 50: CPT | Performed by: PSYCHIATRY & NEUROLOGY

## 2018-05-23 PROCEDURE — 90853 GROUP PSYCHOTHERAPY: CPT

## 2018-05-23 PROCEDURE — 12400007 ZZH R&B MH INTERMEDIATE UMMC

## 2018-05-23 RX ORDER — NICOTINE 21 MG/24HR
1 PATCH, TRANSDERMAL 24 HOURS TRANSDERMAL DAILY
Qty: 30 PATCH | Refills: 1 | Status: ON HOLD | OUTPATIENT
Start: 2018-05-23 | End: 2018-07-03

## 2018-05-23 RX ORDER — ESCITALOPRAM OXALATE 10 MG/1
10 TABLET ORAL DAILY
Qty: 30 TABLET | Refills: 1 | Status: ON HOLD | OUTPATIENT
Start: 2018-05-23 | End: 2018-07-03

## 2018-05-23 RX ORDER — CHOLECALCIFEROL (VITAMIN D3) 125 MCG
6000 CAPSULE ORAL
Qty: 180 TABLET | Refills: 1 | Status: ON HOLD | OUTPATIENT
Start: 2018-05-23 | End: 2018-07-03

## 2018-05-23 RX ORDER — GINSENG 100 MG
CAPSULE ORAL 2 TIMES DAILY
Qty: 1 TUBE | Refills: 0 | Status: SHIPPED | OUTPATIENT
Start: 2018-05-23 | End: 2018-06-27

## 2018-05-23 RX ORDER — GABAPENTIN 300 MG/1
300 CAPSULE ORAL 3 TIMES DAILY
Qty: 90 CAPSULE | Refills: 1 | Status: ON HOLD | OUTPATIENT
Start: 2018-05-23 | End: 2018-07-03

## 2018-05-23 RX ORDER — MULTIPLE VITAMINS W/ MINERALS TAB 9MG-400MCG
1 TAB ORAL DAILY
Qty: 30 EACH | Refills: 1 | Status: SHIPPED | OUTPATIENT
Start: 2018-05-23 | End: 2019-06-11

## 2018-05-23 RX ORDER — MIRTAZAPINE 7.5 MG/1
7.5 TABLET, FILM COATED ORAL AT BEDTIME
Qty: 30 TABLET | Refills: 1 | Status: ON HOLD | OUTPATIENT
Start: 2018-05-23 | End: 2018-07-03

## 2018-05-23 RX ORDER — OLANZAPINE 20 MG/1
20 TABLET ORAL AT BEDTIME
Qty: 30 TABLET | Refills: 1 | Status: ON HOLD | OUTPATIENT
Start: 2018-05-23 | End: 2018-07-03

## 2018-05-23 RX ORDER — HYDROCORTISONE 2.5 %
CREAM (GRAM) TOPICAL 2 TIMES DAILY PRN
Qty: 3.5 G | Refills: 1 | Status: SHIPPED | OUTPATIENT
Start: 2018-05-23 | End: 2018-06-27

## 2018-05-23 RX ORDER — TRAZODONE HYDROCHLORIDE 50 MG/1
50 TABLET, FILM COATED ORAL
Qty: 30 TABLET | Refills: 1 | Status: ON HOLD | OUTPATIENT
Start: 2018-05-23 | End: 2018-07-03

## 2018-05-23 RX ORDER — HYDROXYZINE HYDROCHLORIDE 50 MG/1
50 TABLET, FILM COATED ORAL EVERY 4 HOURS PRN
Qty: 90 TABLET | Refills: 1 | Status: ON HOLD | OUTPATIENT
Start: 2018-05-23 | End: 2018-07-03

## 2018-05-23 RX ORDER — PRAZOSIN HYDROCHLORIDE 2 MG/1
2 CAPSULE ORAL AT BEDTIME
Qty: 30 CAPSULE | Refills: 1 | Status: ON HOLD | OUTPATIENT
Start: 2018-05-23 | End: 2018-07-03

## 2018-05-23 RX ADMIN — LACTASE TAB 3000 UNIT 6000 UNITS: 3000 TAB at 11:42

## 2018-05-23 RX ADMIN — BACITRACIN: 500 OINTMENT TOPICAL at 08:56

## 2018-05-23 RX ADMIN — HYDROXYZINE HYDROCHLORIDE 50 MG: 50 TABLET, FILM COATED ORAL at 17:32

## 2018-05-23 RX ADMIN — ACETAMINOPHEN, ASPIRIN AND CAFFEINE 2 TABLET: 250; 250; 65 TABLET, FILM COATED ORAL at 08:55

## 2018-05-23 RX ADMIN — HYDROXYZINE HYDROCHLORIDE 50 MG: 50 TABLET, FILM COATED ORAL at 11:42

## 2018-05-23 RX ADMIN — ESCITALOPRAM OXALATE 10 MG: 10 TABLET ORAL at 08:56

## 2018-05-23 RX ADMIN — TRAZODONE HYDROCHLORIDE 50 MG: 50 TABLET ORAL at 20:35

## 2018-05-23 RX ADMIN — OLANZAPINE 20 MG: 10 TABLET, FILM COATED ORAL at 20:35

## 2018-05-23 RX ADMIN — MIRTAZAPINE 7.5 MG: 7.5 TABLET, FILM COATED ORAL at 19:07

## 2018-05-23 RX ADMIN — MULTIPLE VITAMINS W/ MINERALS TAB 1 TABLET: TAB at 08:55

## 2018-05-23 RX ADMIN — GABAPENTIN 300 MG: 300 CAPSULE ORAL at 08:56

## 2018-05-23 RX ADMIN — BACITRACIN: 500 OINTMENT TOPICAL at 20:39

## 2018-05-23 RX ADMIN — TRAZODONE HYDROCHLORIDE 50 MG: 50 TABLET ORAL at 21:25

## 2018-05-23 RX ADMIN — GABAPENTIN 300 MG: 300 CAPSULE ORAL at 13:28

## 2018-05-23 RX ADMIN — PRAZOSIN HYDROCHLORIDE 2 MG: 2 CAPSULE ORAL at 20:35

## 2018-05-23 RX ADMIN — NICOTINE 1 PATCH: 14 PATCH, EXTENDED RELEASE TRANSDERMAL at 08:55

## 2018-05-23 RX ADMIN — LACTASE TAB 3000 UNIT 6000 UNITS: 3000 TAB at 08:56

## 2018-05-23 RX ADMIN — LACTASE TAB 3000 UNIT 6000 UNITS: 3000 TAB at 17:32

## 2018-05-23 RX ADMIN — GABAPENTIN 300 MG: 300 CAPSULE ORAL at 19:07

## 2018-05-23 RX ADMIN — OLANZAPINE 10 MG: 10 TABLET, FILM COATED ORAL at 08:56

## 2018-05-23 ASSESSMENT — ACTIVITIES OF DAILY LIVING (ADL)
DRESS: INDEPENDENT
ORAL_HYGIENE: INDEPENDENT
GROOMING: SHOWER

## 2018-05-23 NOTE — PROGRESS NOTES
[facility], [place]   Psychiatric Progress Note  Rianna Man  [id number]  05/23/18    Chief Complaint: Continued medical care  Time: 38 minutes on encounter, >50% of which was spent in counseling and/or coordination of care consisting of: communication and education with the patient, communication with family and or friends if documented in note, lab/image/study evaluation, support staff communication, and other sources pertinent to excellent patient care.          Interim History:   The patient's care was discussed with the treatment team during the daily team meeting and/or staff's chart notes were reviewed.  Staff report patient is upset that she was called manipulative and that she is not ready to leave the hospital.    Upon meeting with her she reports that her sleep was not very good overnight and that she sometimes still has auditory hallucinations and seemed to fluctuate with her mood.  She does describe auditory hallucinations as potentially being all over her head sometimes awaken her from sleep and that the medications are helpful.  She has been under a great deal of stress because of her homelessness not having any support in the area her mother currently resides in [place].  She does not feel safe and does not believe she could set up an outpatient plan by herself and go to a rule 25 assessment in the outpatient setting.  She denies any side effects from medication and believes she has a sex addiction and feels overwhelmed by what she has to get done.  Denies any hopelessness or helplessness and is hoping to get a rule 25 completed here.  Denies any thoughts of harming herself or others currently or any worsening of hallucinations or paranoia.    We discussed how it is reasonable to potentially get a rule 25 done though we would likely not wait for a treatment bed availability prior to discharge which she was accepting of.  Additionally discussed the possibility of  "trying naltrexone for her sexual addiction behaviors.         Medications:       bacitracin   Topical BID     escitalopram  10 mg Oral Daily     gabapentin  300 mg Oral TID     lactase  6,000 Units Oral TID w/meals     mirtazapine  7.5 mg Oral At Bedtime     multivitamin, therapeutic with minerals  1 tablet Oral Daily     nicotine  1 patch Transdermal Daily     nicotine   Transdermal Q8H     nicotine   Transdermal Daily     OLANZapine  20 mg Oral At Bedtime     prazosin  2 mg Oral At Bedtime          Allergies:     Allergies   Allergen Reactions     No Known Allergies           Labs:   No results found for this or any previous visit (from the past 24 hour(s)).       Psychiatric Examination:     /80  Pulse 102  Temp 97.1  F (36.2  C) (Oral)  Resp 14  Ht 1.727 m (5' 8\")  Wt 112.9 kg (249 lb)  SpO2 98%  BMI 37.86 kg/m2  Weight is 249 lbs 0 oz  Body mass index is 37.86 kg/(m^2).                Sitting Orthostatic BP: 130/86      Sitting Orthostatic Pulse: 83 bpm      Standing Orthostatic BP: 134/85      Standing Orthostatic Pulse: 103 bpm       Weight over time:  Vitals:    05/20/18 0826 05/22/18 0839   Weight: 110.2 kg (243 lb) 112.9 kg (249 lb)       Orthostatic Vitals       Most Recent      Sitting Orthostatic /86 05/23 0843    Sitting Orthostatic Pulse (bpm) 83 05/23 1320    Standing Orthostatic /85 05/23 0843    Standing Orthostatic Pulse (bpm) 103 05/23 1320            Rianna is a 28-year-old female wearing a gray shirt.  Her speech is of an appropriate rate and tone and her language is intact.  Her behavior is appropriate and she does not have any abnormal movements.  Her affect is neutral and she smiles at times.  Her mood she describes as okay.  Her thought content consists of the above without thoughts of harming herself or others or current delusions.  Her thought processes logical without looseness of association.  She does not have any abnormal perceptions.  She is alert and aware " of her current location and circumstances.  Her attention concentration appears adequate.  Her cognition and fund of knowledge appears normal.  Her long-term/short-term/remote memory appears intact.  Her insight and judgment are both limited.         Precautions:     Behavioral Orders   Procedures     Assault precautions     Code 1 - Restrict to Unit     Routine Programming     As clinically indicated     Self Injury Precaution     Sexual precautions     Single Room     Status 15     Every 15 minutes.     Suicide precautions     Patients on Suicide Precautions should have a Combination Diet ordered that includes a Diet selection(s) AND a Behavioral Tray selection for Safe Tray - with utensils, or Safe Tray - NO utensils            DIagnoses:     Borderline personality disorder  Rule out schizoaffective disorder  Rule out malingering  Alcohol use disorder  Cannabis use disorder  Cocaine use disorder  Sexual addiction         Assessment & Plan:     Rianna seems to have been under the assumption that she would get a rule 25 completed inside the hospital because she is incapable of planning and organizing it herself.  Unclear to me at this point in time if she has made this up for this was previously mentioned as a possibility for her.  At any rate I believe to complete patient care we should give her the rule 25 and then have her follow-up in the outpatient setting.  In the future if she does not comply after the service is done would not offer it to her and have her do it in the outpatient setting.  She may need a care plan in place for future hospitalizations.    Continue voluntary hospitalization  Complete rule 25 with subsequent discharge  Consider naltrexone for sexual addiction    The risks, benefits, alternatives and side effects have been discussed and are understood by the patient and other caregivers.      Giuliano Adams  [facility] Psychiatry      The following document has been created  with voice recognition software and may contain unintentional word substitutions.    Non clinically relevant CMS requirements:  Clinical Global Impressions  First:  Considering your total clinical experience with this particular patient population, how severe are the patient's symptoms at this time?: 5 (05/18/18 1711)  Compared to the patient's condition at the START of treatment, this patient's condition is:: 5 (05/18/18 1711)  Most recent:  Considering your total clinical experience with this particular patient population, how severe are the patient's symptoms at this time?: 5 (05/18/18 1711)  Compared to the patient's condition at the START of treatment, this patient's condition is:: 5 (05/18/18 1711)    # Pain Assessment:  Current Pain Score 5/23/2018   Patient currently in pain? yes   Pain score (0-10) -   Pain location Head   Pain descriptors Aching;Constant;Throbbing       Any incidence of pain both chronic or acute reported will be documented in above documentation though further documentation can also be found in the internal medicine documentation or pain specialist documentation.

## 2018-05-23 NOTE — PROGRESS NOTES
"[facility], [place]   Psychiatric Progress Note        Interim History:   Reason for Hospitalization:Jessica is a 28 year old female with history of Bipolar Disorder, substance use disorder (alcohol, cannabis, cocaine), and PTSD is admitted on a voluntary basis for worsening depressive moods, suicidal thoughts, with intent and plan.    Subjective: \"I'm not ready to go, I'm still feeling suicidal and depressed\"     As per today's interview: Patient was seen attending groups, and was anxious and cooperative to meet with me. She feels that she's still depressed and harbors passive SI. The primary source of anxiety is not having a place to stay. She mentions that \"the problems after my last discharge is that I didn't have a place to stay, and ended up doing bad things and becoming pretty depressed.\" She then mentioned \"Maybe I should do something to myself so I can stay longer.\" I mentioned that this was rather manipulative to say something like that, to which she became upset and requested another provider. She wanted to have an inpatient Rule 25 and have certainty for housing.          Medications:       bacitracin   Topical BID     escitalopram  10 mg Oral Daily     gabapentin  300 mg Oral TID     lactase  6,000 Units Oral TID w/meals     mirtazapine  7.5 mg Oral At Bedtime     multivitamin, therapeutic with minerals  1 tablet Oral Daily     nicotine  1 patch Transdermal Daily     nicotine   Transdermal Q8H     nicotine   Transdermal Daily     OLANZapine  20 mg Oral At Bedtime     prazosin  2 mg Oral At Bedtime          Allergies:     Allergies   Allergen Reactions     No Known Allergies           Labs:   No results found for this or any previous visit (from the past 48 hour(s)).       Psychiatric Examination:   /80  Pulse 102  Temp 98.1  F (36.7  C) (Tympanic)  Resp 16  Ht 1.727 m (5' 8\")  Wt 112.9 kg (249 lb)  SpO2 98%  BMI 37.86 kg/m2  Weight is 249 lbs 0 oz  Body mass " "index is 37.86 kg/(m^2).    Appearance:  female, mild-moderately disheveled appearance. Mild emotional discomfort.   Attitude:  Somewhat anxious, but pleasant and cooperative.   Eye Contact:  Adequate   Mood:  \"Worried\"  Affect:  Anxious   Speech:  Regular in rate and rhythm.   Language: fluent and intact in English  Psychomotor, Gait, Musculoskeletal:  Unremarkable.   Throught Process:  Linear, coherent   Associations:  Intact   Thought Content:  Passive suicidal thoughts. No evidence of psychotic thought, no auditory hallucinations present and no visual hallucinations present  Insight:  fair  Judgement:  poor  Oriented to:  time, person, and place  Attention Span and Concentration:  intact  Recent and Remote Memory:  intact  Fund of Knowledge:  appropriate      Assessment & Plan       Principal Diagnosis:   Bipolar 1 Disorder with psychotic symptoms vs. Schizoaffective Disorder   PTSD  Cluster B Personality Disorder Traits (particularly Borderline Personality Disorder)   Alcohol Use Disorder  Cannabis Use Disorder  Cocaine Use Disorder  Migraine without status migrainosus, not intractable, unspecified migraine type      Assessment:  (Initial Assessment 5/18): Rianna appears to have decompensated again after recently being hospitalized less than a month ago. She did not follow through with any outpatient follow up plan, did not  her medication, nor even look into shelter stay. 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 She recently injured herself, which is characteristic of her Cluster B traits (particularly BPD). No other SIB noted. She will be reinstated on her prior medications which I had initiated in her prior hospitalization, which seems to have helped her symptoms back then.     Follow Up (5/21): Has been attending groups, and seen in the milieu. Med compliant, and " "minimal subjective improvement. She attributes much of her problems to not having housing, and feels that getting a inpt Rule 25 will provide her with housing. I explained to her that after her last discharge, she did not follow up with outpatient resources, did not  medications, nor shelter resources. She was under the impression that shelters are all booked for months, and had to explain that shelters are emptied out every morning, and she would have to arrive there daily to have a spot. Will make medication adjustments today, and likely discharge tomorrow, with the idea that she has approached a limit to how much inpatient stay can help her, and that we cannot provide housing for patients. Rule 25 would take a long time inpatient and the wait for CD facility would take even longer. It would not be appropriate to \"house\" a patient in the hospital until CD treatment is acquired.  Regarding her suicidal thoughts, she was not suicidal on 5/18, but when the reality of her not securing housing while she was hospitalized became evident, she suddenly is now suicidal. This behavior does point to more characterological, reactive SI, and thus a chronic risk rather than an imminent risk.     Follow up (5/22): Please refer to prior note. The patient continues to complain of depressive moods and suicidal thoughts.She will continue to harbor chronic suicidal ideation as long as her stressors external of the hospital are continued, particularly with homelessness. I expressed in length that the continued level of care would likely not warrant inpatient treatment. SW was looking into if we could provide inpatient Rule 25, but likely this cannot be done. She told me that she might intentially do something to herself so she could stay in the hospital, to which I responded with telling her that it was manipulative to tell me about such behavior (that she would intentially harm herself to stay hospitalized longer). She became " angry and told me that she wanted another provider. I discussed the case with Dr. Huynh, who will take over seeing the patient tomorrow.      Plan:  1.) Medication Plan:  - Zyprexa 20 mg HS  - Prazosin 2 mg HS   - Lexapro 10 mg Daily   - Gabapentin 300 mg TID  - Remeron 15 mg HS  - Lactaid 6000 units TID with meals   - Excedrin 2 tablets Q6H PRN. Can also use Imitrex 50 mg if migraine pain recurs.      2.) Psychosocial Plan:  - Will again ensure that patient has resources for therapy, medication management and shelter      Legal:  Voluntary      Disposition:  Likely discharge this week with medications and resources      Legal Status: Voluntary     Safety Assessment:   Checks: Status 15  Precautions: Suicide  Self-harm  Assault  Sexual  Pt has not required locked seclusion or restraints in the past 24 hours to maintain safety, please refer to RN documentation for further details.       The risks, benefits, alternatives and side effects have been discussed and are understood by the patient and other caregivers.       Seun Coleman MD  [facility]iat[facility]

## 2018-05-23 NOTE — PROGRESS NOTES
"Pt's affect has been blunted. Pt rated her depression and anxiety an \"8\" and a \"4\" respectively. Pt said that she is having suicidal thoughts and \"wouldn't care if [she] \". She is safe here but said, \"I would probably do something if I were discharged\". Pt has attended apprx 25% of the groups but did not stay for the whole time. Pt has a huge appetite and ate twice as much as what was on her trays. Pt said she slept \"bad\" last night; \"I'm afraid to fall asleep because of the bad dreams\". Pt slept a lot during the day shift.     18 1500   Behavioral Health   Hallucinations auditory  (per pt's report)   Thinking distractable   Orientation person: oriented;place: oriented;date: oriented;time: oriented   Memory baseline memory   Insight poor   Judgement impaired   Eye Contact at examiner   Affect blunted, flat;sad   Mood depressed;hopeless;anxious   Physical Appearance/Attire appears stated age;attire appropriate to age and situation;neat   Hygiene well groomed   Suicidality chronic thoughts with no stated plan   1. Wish to be Dead Yes   2. Non-Specific Active Suicidal Thoughts  Yes   Self Injury (denies)   Elopement (not an elopement risk)   Activity withdrawn   Speech coherent;clear   Medication Sensitivity no stated side effects;no observed side effects   Psychomotor / Gait balanced;steady;slow     "

## 2018-05-23 NOTE — PLAN OF CARE
"Problem: Feelings of Worthlessness, Hopelessness, Excessive Guilt (Depressive Signs/Symptoms) (Adult)  Intervention: Promote Confidence and Self-Esteem  RN 48 hour assessment note:  Patient endorses chronic SI, no plans for suicide while in the hospital. Denies SIB and HI. Affect flat, blunted, and tense. Patient reports feeling anxious and depressed. Patient reports she is not ready for discharge and is upset at being called \"manipulative.\" Patient stated that she doesn't want to think about outpatient care until she is ready for discharge. Patient reports if she is discharged tomorrow \"I might not make it back to the hospital before I do something to myself, or hurt someone else.\" Patient agrees to be free from self harm or suicide gestures while in the hospital at this time. Wound on hand is healing. Given bacitracin and new bandaid this evening.         "

## 2018-05-23 NOTE — PROGRESS NOTES
SPIRITUAL HEALTH SERVICES  SPIRITUAL ASSESSMENT Progress Note  [facility] ([facility]) 10NB     REFERRAL SOURCE: Hospital  follow up    Left bible with unit staff. Pt was sleeping soundly at time of visit.    PLAN: Will continue to follow while on unit.  [facility] remains available for any further needs or requests.      Lobito Rodriguez  Chaplain Resident  Pager [phone]

## 2018-05-24 VITALS
DIASTOLIC BLOOD PRESSURE: 77 MMHG | SYSTOLIC BLOOD PRESSURE: 128 MMHG | RESPIRATION RATE: 16 BRPM | HEIGHT: 68 IN | TEMPERATURE: 99 F | OXYGEN SATURATION: 98 % | WEIGHT: 255.2 LBS | BODY MASS INDEX: 38.68 KG/M2 | HEART RATE: 90 BPM

## 2018-05-24 PROCEDURE — 99238 HOSP IP/OBS DSCHRG MGMT 30/<: CPT | Performed by: PSYCHIATRY & NEUROLOGY

## 2018-05-24 PROCEDURE — 25000132 ZZH RX MED GY IP 250 OP 250 PS 637: Performed by: NURSE PRACTITIONER

## 2018-05-24 PROCEDURE — 97150 GROUP THERAPEUTIC PROCEDURES: CPT | Mod: GO

## 2018-05-24 PROCEDURE — 90853 GROUP PSYCHOTHERAPY: CPT

## 2018-05-24 PROCEDURE — 25000132 ZZH RX MED GY IP 250 OP 250 PS 637: Performed by: PSYCHIATRY & NEUROLOGY

## 2018-05-24 RX ADMIN — MULTIPLE VITAMINS W/ MINERALS TAB 1 TABLET: TAB at 08:26

## 2018-05-24 RX ADMIN — GABAPENTIN 300 MG: 300 CAPSULE ORAL at 08:26

## 2018-05-24 RX ADMIN — HYDROXYZINE HYDROCHLORIDE 50 MG: 50 TABLET, FILM COATED ORAL at 08:29

## 2018-05-24 RX ADMIN — LACTASE TAB 3000 UNIT 6000 UNITS: 3000 TAB at 08:26

## 2018-05-24 RX ADMIN — ESCITALOPRAM OXALATE 10 MG: 10 TABLET ORAL at 08:26

## 2018-05-24 RX ADMIN — LACTASE TAB 3000 UNIT 6000 UNITS: 3000 TAB at 12:10

## 2018-05-24 RX ADMIN — NICOTINE 1 PATCH: 14 PATCH, EXTENDED RELEASE TRANSDERMAL at 08:25

## 2018-05-24 RX ADMIN — GABAPENTIN 300 MG: 300 CAPSULE ORAL at 13:41

## 2018-05-24 RX ADMIN — HYDROXYZINE HYDROCHLORIDE 50 MG: 50 TABLET, FILM COATED ORAL at 13:41

## 2018-05-24 ASSESSMENT — ACTIVITIES OF DAILY LIVING (ADL)
ORAL_HYGIENE: INDEPENDENT
DRESS: STREET CLOTHES;INDEPENDENT
GROOMING: INDEPENDENT
LAUNDRY: WITH SUPERVISION

## 2018-05-24 NOTE — PLAN OF CARE
"Problem: Mood Impairment (Depressive Signs/Symptoms) (Adult)  Goal: Improved Mood Symptoms (Depressive Signs/Symptoms)  Outcome: Adequate for Discharge Date Met: 05/24/18 05/24/18 1433   Improved Mood Symptoms (Depressive Signs/Symptoms)   Improved Mood Symptoms Action Step (STG) Outcome making progress toward outcome     48 hour nursing observation     Marijuana abuse [F12.10]  Schizoaffective disorder, unspecified type (H) [F25.9]    Admit Date: 5/17/2018    Patient evaluation continues. Assessed mood,anxiety,thoughts and behavior. Patient is progressing towards goals. Patient is encouraged to participate in groups and assisted to develop healthy coping skills.      /77  Pulse 90  Temp 99  F (37.2  C)  Resp 16  Ht 1.727 m (5' 8\")  Wt 115.8 kg (255 lb 3.2 oz)  SpO2 98%  BMI 38.8 kg/m2    Pt came to writer this AM and stated, \"I want to leave today, I am just too anxious to be here\".  Dr Coleman (on-call for Dr Huynh) notified who did see pt to assess and discharge order for today written. Patient reports depression level of 5 and anxiety level of  8 with 10 being the worst. Patient's affect is overall still flat and blunted. Patient denies SI, denies SIB, denies HI and states she does feel hopeful about the future. Patient states concentration is \"bad and my thoughts always go too fast\".  Patient denies auditory or visual hallucinations. Patient reports sleeping \"really bad last night\"  and appetite \"good\".      Patient is compliant with medications and side effects reported are none. Reinforced with pt the importance of staying on her meds and pt states she understands. Pt denies any physical pain and VSS. Patient attended no groups, mostly isolative to her room, cooperative and appropriate.  ADLs are fair.  Writer reviewed AVS/meds with pt and given a copy-pt states she understands.  Please see AVS for further details.  Pt was discharged to her friend's house at 1433 with all of her belongings " and a 30 day supply of current meds.  Stable discharge status.  Pt transported via cab and also given 1 bus token at her request.

## 2018-05-24 NOTE — PROGRESS NOTES
Writer met with patient to conduct Rule 25 assessment.  Pt collaborated in the assessment.  She was assessed at the residential level of care.      She verbalized preference for placement at [facility]'s residential treatment.  Patient signed RONNI for [facility] and assessment was sent via fax ([phone]-fax [phone]).      Patient signed all funding authorization documents for [facility], and these were sent via fax to ([phone]/fax [phone])    Patient will discharge from our facility today and will follow up with Jabier Godfrey herself.  AVS updated.

## 2018-05-24 NOTE — PROGRESS NOTES
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 volunteer, spiritual practices. Include time spent obtaining, using, recovering from drugs or alcohol. (DSM) I haven't been doing much of anything as of late and spend most of the day using or with things related to my use. I don't do much of anything except for using and I lack daily structure.      2B. How often do you spend more time than you planned using or use more than you planned? (DSM)   All the time when I am actively using.     3. How important is using to your social connections? Do many of your family or friends use?   Most of my friends use but it's not important to me anymore.     4A. Are you currently in a significant relationship? No    4C. Sexual Orientation: Heterosexual    5A. Who do you live with?  Homeless - will be staying with a friend (hopefully) in [facility] upon discharge    5B. Tell me about their alcohol/drug use and mental health issues. NA    5C. Are you concerned for your safety there? no.    5D. Are you concerned about the safety of anyone else who lives with you? no.    6A. Do you have children who live with you? No    6B. Do you have children who do not live with you? Yes.  (Ask follow up  questions to learn where the children are, who has custody and what the person s relationship and responsibility is with these children and what hopes the person has for his or her future with these children.) Her son is 7 and he lives with his father    7A. Who supports you in making changes in your alcohol or drug use? What are they willing to do to support you? Who is upset or angry about you making changes in your alcohol or drug use? How big a problem is this for you?      My family and friends are supportive. I am sure some would help if I needed something and if they knew I was working hard on my sobriety.     7B. This table is provided to record information about the person s relationships and available support It is not necessary to ask each item; only to get a comprehensive picture of their support system.  How often can you count on the following people when you need someone?   Partner / Spouse N/A   Parent(s)/Aunt(s)/Uncle(s)/Grandparents Usually supportive   Sibling(s)/Cousin(s) Usually supportive   Child(subhash) Usually supportive   Other relative(s) Usually supportive   Friend(s)/neighbor(s) N/A   Child(subhash) s father(s)/mother(s) Rarely supportive   Support group member(s) Usually supportive   Community of ricardo members N/A   /counselor/therapist/healer N/A   Other (specify) N/A     8A. What is your current living situation? I am currently living Homeless - I stay with friends most of the time in [facility].    8B. What is your long term plan for where you will be living? I would like to be independent    8C. Tell me about your living environment/neighborhood? Ask enough follow up questions to determine safety, criminal activity, availability of alcohol and drugs, supportive or antagonistic to the person making changes.      I'm homeless - I do need my own situation     9. Criminal justice history: Gather current/recent history and any significant history related to substance use--Arrests?  Convictions? Circumstances? Alcohol or drug involvement? Sentences? Still on probation or parole? Expectations of the court? Current court order? Any sex offenses - lifetime? What level? (DSM)  Violated probation after DUI for not taking driving with care class    10. What obstacles exist to participating in treatment? (Time off work, childcare, funding, transportation, pending custodial time, living situation)  Court date coming up in [facility] on     Dimension VI Ratings   Recovery environment - The placing authority must use the criteria in Dimension VI to determine a client s recovery environment.   RISK DESCRIPTIONS - Severity ratin Client has (A) Chronically antagonistic significant other, living environment, family, peer group or long-term criminal justice involvement that is harmful to recovery or treatment progress, or (B) Client has an actively antagonistic significant other, family, work, or living environment with immediate threat to the client s safety and well-being.    REASONS SEVERITY WAS ASSIGNED Patient reports that her current living situation is unsupportive towards her recovery. Pt reports that she is currently homeless in the [facility]. Pt reports that she lacks a daily structure and meaningful activities. Pt reports that she is currently unemployed and is not attending school at this time. Pt reports fracturing relationships with family and friends due to her use. Pt lacks a sober support network. Pt reports that she has some legal involvement for DUI/Probation violation and is on probation for it.        Client Choice/Exceptions   Would you like services specific to language, age, gender, culture, Anglican preference, race, ethnicity, sexual orientation or disability?  No    What particular treatment choices and options would you like to have? Residential treatment   Do you have a preference for a particular treatment program? [facility]    Criteria for Diagnosis  "    Criteria for Diagnosis  DSM-5 Criteria for Substance Use Disorder  Instructions: Determine whether the client currently meets the criteria for Substance Use Disorder using the diagnostic criteria in the DSM-V pp.481-587. Current means during the most recent 12 months outside a facility that controls access to substances    Category of Substance Severity (ICD-10 Code / DSM 5 Code)     Alcohol Use Disorder Severe  (10.20) (303.90)   Cannabis Use Disorder Moderate  (F12.20) (304.30)   Hallucinogen Use Disorder NA   Inhalant Use Disorder NA   Opioid Use Disorder NA   Sedative, Hypnotic, or Anxiolytic Use Disorder NA   Stimulant Related Disorder NA   Tobacco Use Disorder Moderate   (F17.200) (305.1)   Other (or unknown) Substance Use Disorder NA     Collateral Contact Summary   Number of contacts made: 2    Contact with referring person:  N/A.    If court related records were reviewed, summarize here: N/A    Rule 25 Assessment Summary and Plan   's Recommendation    1)  Complete a residential based treatment program similar to [facility]'s Program.   2)  Abstain from all mood-altering chemicals unless prescribed by a licensed provider.   3)  Attend weekly 12-step support group meetings.     4)  Actively work with a female recovery mentor/sponsor or  through [place] [employer] ([phone]).   5)  Follow all the recommendations of your treatment/medical providers.  6)  Remain law abiding and follow all recommendations of the Courts/PO.  7)  Patient may benefit from obtaining a full mental health evaluation and 1:1 psychotherapy      Collateral Contacts     Name    Seun Coleman MD Relationship    ER Physician Phone Number    [phone] Releases           \"Patient's H&P follows\"  Seun Coleman MD Physician Signed Psychiatry H&P   Date of Service: 5/18/2018 12:12 PM Creation Time: 5/18/2018  5:12 PM      Expand All Collapse All    []Hide copied text  [facility]   [place] & Physical  Admission date: 5/17/2018         Chief Complaint:   \"I just couldn't handle being out there and things went downhill\"          HPI:      Jessica is a 28 year old female with history of Bipolar Disorder, substance use disorder (alcohol, cannabis, cocaine), and PTSD is admitted on a voluntary basis for worsening depressive moods, suicidal thoughts, with intent and plan. The patient was recently hospitalized from 4/18/2018 - 4/24/2018 for depression and SI and was discharged with medications (sent to her choice pharmacy), outpatient follow up for medication management/therapy. She mentions that she did not follow up, and did not  her medications. She did not utilize there Shelter information the  had provided her, and instead had exchanged sex for temporary housing during the past couple weeks. Reports indicate that she had used various substances during the interim (including meth, THC, cocaine, alcohol abuse), but her recent Utox had only been positive for THC. She reports experiencing worsening depressive moods, guilt, hopelessness, poor sleep, and suicidal ideation. She seems to have engaged in self injurious behavior via burning her hand with a lighter. She mentions having passive suicidal ideation currently, but no intent or plan. Denies current homicidal ideation, intent or plan. She mentions having some intrusive AH involving derogatory voices. She also continues to have intrusive sexual thoughts. Regarding her other social issues, she also seems to have failed to follow up with court orders, and has a planned court appearance at [facility] on June 6th.             Past Psychiatric History:   Past inpatient treatment: Recent [facility] hospitalization from 4/18/2018 - 4/24/2018. Had been hospitalized when she was 16 years old for a suicide attempt.   Current outpatient psychiatrist: None   Current outpatient therapist: None   Other " "(ACT team etc): None   Past suicide attempts: Admits to one attempt in her teens via cutting her forearm, and \"overdosing\" on alcohol.   History of commitments: None  History of ECT: Denies          Substance Use and History:   History of using meth, cocaine, alcohol and cannabis. Reports indicate recent use of various illicit substances, although recent Utox reveals only THC positive. Mentions that she's gone to MI/CD program at  in the past.          Past Medical History:   PAST MEDICAL HISTORY:    Past Medical History         Past Medical History:   Diagnosis Date     Schizoaffective disorder (H)       Varicella       had disease            PAST SURGICAL HISTORY:    Past Surgical History          Past Surgical History:   Procedure Laterality Date     NO HISTORY OF SURGERY                       Family History:   FAMILY HISTORY:    Family History           Family History   Problem Relation Age of Onset     DIABETES Father       DIABETES Paternal Aunt       Thyroid Disease Mother         grave's disease     Rheumatoid Arthritis Sister       Crohn Disease Maternal Aunt                  Social History:   SOCIAL HISTORY:           Social History   Substance Use Topics     Smoking status: Current Every Day Smoker       Packs/day: 0.40       Types: Cigarettes     Smokeless tobacco: Never Used         Comment: less than 1/2 pack     Alcohol use 0.0 oz/week        0 Standard drinks or equivalent per week          Comment: Drinks socially weekends.              Physical ROS:   The patient endorsed fatigue, lethargy, and some hand pain. The remainder of 10-point review of systems was negative except as noted in HPI.          PTA Medications:       Prescriptions Prior to Admission            Prescriptions Prior to Admission   Medication Sig Dispense Refill Last Dose     aspirin-acetaminophen-caffeine (EXCEDRIN MIGRAINE) 250-250-65 MG per tablet Take 2 tablets by mouth every 6 hours as needed for headaches 60 tablet 1 not " taking since 4/24/17     escitalopram (LEXAPRO) 10 MG tablet Take 1 tablet (10 mg) by mouth daily 30 tablet 1 not taking since 4/24/17     etonogestrel (IMPLANON/NEXPLANON) 68 MG IMPL 1 each (68 mg) by Subdermal route once for 1 dose 1 each 0       gabapentin (NEURONTIN) 600 MG tablet Take 1 tablet (600 mg) by mouth 3 times daily 90 tablet 1 not taking since 4/24/17     hydrocortisone 2.5 % cream Apply topically 2 times daily as needed for rash 3.5 g 1 not taking since 4/24/17     hydrOXYzine (ATARAX) 25 MG tablet Take 1 tablet (25 mg) by mouth every 4 hours as needed for anxiety 60 tablet 1 not taking since 4/24/17     lactase (LACTAID) 3000 UNIT tablet Take 2 tablets (6,000 Units) by mouth 3 times daily (with meals) 90 tablet 1 not taking since 4/24/17     mirtazapine (REMERON) 7.5 MG TABS tablet Take 1 tablet (7.5 mg) by mouth At Bedtime 30 tablet 1 not taking since 4/24/17     multivitamin, therapeutic with minerals (THERA-VIT-M) TABS tablet Take 1 tablet by mouth daily 30 each 1 not taking since 4/24/17     nicotine (NICODERM CQ) 14 MG/24HR 24 hr patch Place 1 patch onto the skin daily 30 patch 1 not taking since 4/24/17     OLANZapine (ZYPREXA) 10 MG tablet Take 1 tablet (10 mg) by mouth 2 times daily as needed (For agitation, aggression, anxiety.) 60 tablet 1 not taking since 4/24/17     OLANZapine (ZYPREXA) 10 MG tablet Take 1 tablet (10 mg) by mouth At Bedtime 30 tablet 1 not taking since 4/24/17              Allergies:           Allergies   Allergen Reactions     No Known Allergies             Labs:       Recent Results          Recent Results (from the past 48 hour(s))   Drug abuse screen 6 urine (tox)     Collection Time: 05/17/18  2:24 PM   Result Value Ref Range     Amphetamine Qual Urine Negative NEG^Negative     Barbiturates Qual Urine Negative NEG^Negative     Benzodiazepine Qual Urine Negative NEG^Negative     Cannabinoids Qual Urine Positive (A) NEG^Negative     Cocaine Qual Urine Negative  "NEG^Negative     Ethanol Qual Urine Negative NEG^Negative     Opiates Qualitative Urine Negative NEG^Negative   HCG qualitative urine     Collection Time: 05/17/18  2:24 PM   Result Value Ref Range     HCG Qual Urine Negative NEG^Negative              Physical and Psychiatric Examination:      /59  Temp 98.1  F (36.7  C) (Oral)  Resp 14  Ht 1.727 m (5' 8\")  SpO2 99%  Weight is 0 lbs 0 oz  There is no height or weight on file to calculate BMI.     Physical Exam:  I have reviewed the physical exam as documented by ED provider and agree with findings and assessment and have no additional findings to add at this time.     Mental Status Exam:  Appearance:  female, mild-moderately disheveled appearance. Mild emotional discomfort.   Attitude:  Somewhat anxious, but pleasant and cooperative.   Eye Contact:  Adequate   Mood:  \"Worried\"  Affect:  Anxious   Speech:  Regular in rate and rhythm.   Language: fluent and intact in English  Psychomotor, Gait, Musculoskeletal:  Unremarkable.   Throught Process:  Linear, coherent   Associations:  Intact   Thought Content:  no evidence of suicidal ideation or homicidal ideation, no evidence of psychotic thought, no auditory hallucinations present and no visual hallucinations present  Insight:  fair  Judgement:  poor  Oriented to:  time, person, and place  Attention Span and Concentration:  intact  Recent and Remote Memory:  intact  Fund of Knowledge:  appropriate          Admission Diagnoses:       Bipolar 1 Disorder with psychotic symptoms vs. Schizoaffective Disorder   PTSD  Cluster B Personality Disorder Traits (particularly Borderline Personality Disorder)   Alcohol Use Disorder  Cannabis Use Disorder  Cocaine Use Disorder  Migraine without status migrainosus, not intractable, unspecified migraine type          Assessment & Plan:      Assessment:  Rianna appears to have decompensated again after recently being hospitalized less than a month ago. She did " not follow through with any outpatient follow up plan, did not  her medication, nor even look into shelter stay. 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 She recently injured herself, which is characteristic of her Cluster B traits (particularly BPD). No other SIB noted. She will be reinstated on her prior medications which I had initiated in her prior hospitalization, which seems to have helped her symptoms back then.      Plan:  1.) Medication Plan:  - Zyprexa 10 mg HS  - Lexapro 10 mg Daily   - Gabapentin 300 mg TID  - Remeron 15 mg HS  - Lactaid 6000 units TID with meals   - Excedrin 2 tablets Q6H PRN. Can also use Imitrex 50 mg if migraine pain recurs.      2.) Psychosocial Plan:  - Will again ensure that patient has resources for therapy, medication management and shelter      Legal:  Voluntary      Disposition:  Likely discharge early next week with medications and resources         Seun Coleman MD  [facility] Psychiatry               Collateral Contacts     Name    [facility]'s EMR   Relationship     Phone Number     Releases           Information Provided:  This writer reviewed this patients Electronic Medical Record and the information reviewed largely supports the information the patient reported during their CD evaluation.    llateral Contacts      A problematic pattern of alcohol/drug use leading to clinically significant impairment or distress, as manifested by at least two of the following, occurring within a 12-month period:    Alcohol/drug is often taken in larger amounts or over a longer period than was intended.  There is a persistent desire or unsuccessful efforts to cut down or control alcohol/drug use  A great deal of time is spent in activities necessary to obtain alcohol, use alcohol, or recover from its effects.  Craving, or a strong desire or  urge to use alcohol/drug  Recurrent alcohol/drug use resulting in a failure to fulfill major role obligations at work, school or home.  Continued alcohol use despite having persistent or recurrent social or interpersonal problems caused or exacerbated by the effects of alcohol/drug.  Important social, occupational, or recreational activities are given up or reduced because of alcohol/drug use.  Recurrent alcohol/drug use in situations in which it is physically hazardous.  Alcohol/drug use is continued despite knowledge of having a persistent or recurrent physical or psychological problem that is likely to have been caused or exacerbated by alcohol.      Specify if: In early remission:  Severe: Presence of 6 or more symptoms

## 2018-05-24 NOTE — PROGRESS NOTES
Patient states her SI thoughts have gotten less and she is doing better than other days. Patients still feels low and was very grateful for some new books to read. Patient was visible on the unit and watched the movie with peers. Patient was calm on the unit and did shower this shift. Still hearing voices.

## 2018-05-24 NOTE — DISCHARGE INSTRUCTIONS
Behavioral Discharge Planning and Instructions      Summary:  You were admitted on 5/17/2018  due to passive suicidal ideation and homicidal ideation toward no on in particular that you could name.  You were treated by Dr. Giuliano Huynh MD and Dr. Seun Coleman MD and discharged on 5/24/18 from [facility]  to [facility] with [facility].       Principal Diagnosis: Obsessive Compulsive Disorder; Bipolar Disorder; PTSD; Sexual addiction    You received a Rule 25 assessment during your hospitalization and were referred for residential treatment at [facility].  You can follow up with them at the contact info listed below:    [facility] Substance Use Treatment  [phone]    Health Care Follow-up:  [facility]  Post Hospital Walk-in Appts on Thursdays at 9am for ongoing medication management  [address] [place], [place]    [phone]    [facility] Team   [address]  [address]  [phone]    [facility] Economic Assistance  [address] [place] [address]  [place], [place]  [phone]     Social Security Office  [address] [place] [address]  [id number]  [phone]    Attend all scheduled appointments with your outpatient providers. Call at least 24 hours in advance if you need to reschedule an appointment to ensure continued access to your outpatient providers.   Major Treatments, Procedures and Findings:  You were provided with: a psychiatric assessment, assessed for medical stability, medication evaluation and/or management, group therapy and milieu management    Symptoms to Report: thoughts of suicide    Early warning signs can include: increased thoughts or behaviors of suicide or self-harm     Safety and Wellness:  Take all medicines as directed.  Make no changes unless your doctor suggests them.      Follow treatment recommendations.  Refrain from  alcohol and non-prescribed drugs.  If there is a concern for safety, call 911.    Resources:   Crisis Intervention: 845.227.9450 or 112-350-9353 (TTY: 300.414.1205).  Call anytime for help.  [place] [employer] Mental Illness (www.[place].hailey.org): 212.356.5007 or 781-799-6115.    The treatment team has appreciated the opportunity to work with you.     If you have any questions or concerns our unit number is [phone].

## 2018-05-30 ENCOUNTER — TELEPHONE (OUTPATIENT)
Dept: PHARMACY | Facility: CLINIC | Age: 28
End: 2018-05-30

## 2018-05-30 NOTE — TELEPHONE ENCOUNTER
Attempted to reach patient for scheduled [facility] transitions of care visit today, 5/30/18 at 10 am. Left message for her to return call to [facility] pharmacist at [phone].     Tiffani Fraser, Pharm.D.  Medication Therapy Management Resident  Phone: [phone]  Pager: [phone]

## 2018-06-06 NOTE — DISCHARGE SUMMARY
[facility], [place]   Psychiatric Discharge Summary      Rianna Man MRN# [id number]   Age: 28 year old YOB: 1990     Date of Admission:  5/17/2018  Date of Discharge:  05/24//18  Admitting Physician:  Seun Coleman MD  Discharge Physician:  Seun Coleman MD         Summary/Hospital Course/Disposition:   Reason for Hospitalization: Jessica is a 28 year old female with history of Bipolar Disorder, substance use disorder (alcohol, cannabis, cocaine), and PTSD is admitted on a voluntary basis for worsening depressive moods, suicidal thoughts, with intent and plan.      Hospital Course:   (Initial Assessment 5/18): Rianna appears to have decompensated again after recently being hospitalized less than a month ago. She did not follow through with any outpatient follow up plan, did not  her medication, nor even look into shelter stay. 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 She recently injured herself, which is characteristic of her Cluster B traits (particularly BPD). No other SIB noted. She will be reinstated on her prior medications which I had initiated in her prior hospitalization, which seems to have helped her symptoms back then.      Follow Up (5/21): Has been attending groups, and seen in the milieu. Med compliant, and minimal subjective improvement. She attributes much of her problems to not having housing, and feels that getting a inpt Rule 25 will provide her with housing. I explained to her that after her last discharge, she did not follow up with outpatient resources, did not  medications, nor shelter resources. She was under the impression that shelters are all booked for months, and had to explain that shelters are emptied out every morning, and she would have to arrive there daily to have a spot. Will make  "medication adjustments today, and likely discharge tomorrow, with the idea that she has approached a limit to how much inpatient stay can help her, and that we cannot provide housing for patients. Rule 25 would take a long time inpatient and the wait for CD facility would take even longer. It would not be appropriate to \"house\" a patient in the hospital until CD treatment is acquired.  Regarding her suicidal thoughts, she was not suicidal on 5/18, but when the reality of her not securing housing while she was hospitalized became evident, she suddenly is now suicidal. This behavior does point to more characterological, reactive SI, and thus a chronic risk rather than an imminent risk.      Follow up (5/22): Please refer to prior note. The patient continues to complain of depressive moods and suicidal thoughts.She will continue to harbor chronic suicidal ideation as long as her stressors external of the hospital are continued, particularly with homelessness. I expressed in length that the continued level of care would likely not warrant inpatient treatment. SW was looking into if we could provide inpatient Rule 25, but likely this cannot be done. She told me that she might intentially do something to herself so she could stay in the hospital, to which I responded with telling her that it was manipulative to tell me about such behavior (that she would intentially harm herself to stay hospitalized longer). She became angry and told me that she wanted another provider. I discussed the case with Dr. Huynh, who will take over seeing the patient tomorrow.     Follow Up (5/23): Rianna seems to have been under the assumption that she would get a rule 25 completed inside the hospital because she is incapable of planning and organizing it herself.  Unclear to me at this point in time if she has made this up for this was previously mentioned as a possibility for her.  At any rate I believe to complete patient care we " should give her the rule 25 and then have her follow-up in the outpatient setting.  In the future if she does not comply after the service is done would not offer it to her and have her do it in the outpatient setting.  She may need a care plan in place for future hospitalizations.    Day of Discharge Assessment (5/24): Patient had Rule 25 assessment. She told  that she preferred [facility]. Later in the day, she requested to leave because she felt anxious in the hospital. She denied any SI/intent or plan, nor any HI/AH/VH. She had some anxious thoughts, but they were not as intense as before. She will plan to stay with a friend post discharge. She agreed to follow up with outpatient recommendations. She was given a 30 day supply of her medications and safely discharged.          DIagnoses:     Bipolar 1 Disorder with psychotic symptoms vs. Schizoaffective Disorder   PTSD  Cluster B Personality Disorder Traits (particularly Borderline Personality Disorder)   Alcohol Use Disorder  Cannabis Use Disorder  Cocaine Use Disorder  Migraine without status migrainosus, not intractable, unspecified migraine type       Labs:   No results found for this or any previous visit (from the past 24 hour(s)).         Consults:   None            Discharge Medications:        Review of your medicines      START taking       Dose / Directions    bacitracin 500 UNIT/GM Oint   Used for:  Burn        Apply topically 2 times daily   Quantity:  1 Tube   Refills:  0       gabapentin 300 MG capsule   Commonly known as:  NEURONTIN   Used for:  Generalized anxiety disorder   Replaces:  gabapentin 600 MG tablet        Dose:  300 mg   Take 1 capsule (300 mg) by mouth 3 times daily   Quantity:  90 capsule   Refills:  1       prazosin 2 MG capsule   Commonly known as:  MINIPRESS   Used for:  Schizoaffective disorder, unspecified type (H), Obsessive-compulsive disorder, unspecified type        Dose:  2 mg   Take 1  capsule (2 mg) by mouth At Bedtime   Quantity:  30 capsule   Refills:  1       traZODone 50 MG tablet   Commonly known as:  DESYREL   Used for:  Obsessive-compulsive disorder, unspecified type, Generalized anxiety disorder        Dose:  50 mg   Take 1 tablet (50 mg) by mouth nightly as needed for sleep   Quantity:  30 tablet   Refills:  1         CONTINUE these medicines which may have CHANGED, or have new prescriptions. If we are uncertain of the size of tablets/capsules you have at home, strength may be listed as something that might have changed.       Dose / Directions    hydrOXYzine 50 MG tablet   Commonly known as:  ATARAX   This may have changed:    - medication strength  - how much to take   Used for:  Schizoaffective disorder, unspecified type (H), Obsessive-compulsive disorder, unspecified type, Generalized anxiety disorder        Dose:  50 mg   Take 1 tablet (50 mg) by mouth every 4 hours as needed for anxiety   Quantity:  90 tablet   Refills:  1       OLANZapine 20 MG tablet   Commonly known as:  zyPREXA   This may have changed:    - medication strength  - how much to take  - Another medication with the same name was removed. Continue taking this medication, and follow the directions you see here.   Used for:  Schizoaffective disorder, unspecified type (H), Obsessive-compulsive disorder, unspecified type, Generalized anxiety disorder        Dose:  20 mg   Take 1 tablet (20 mg) by mouth At Bedtime   Quantity:  30 tablet   Refills:  1         CONTINUE these medicines which have NOT CHANGED       Dose / Directions    aspirin-acetaminophen-caffeine 250-250-65 MG per tablet   Commonly known as:  EXCEDRIN MIGRAINE   Used for:  Intractable chronic migraine without aura and without status migrainosus        Dose:  2 tablet   Take 2 tablets by mouth every 6 hours as needed for headaches   Quantity:  90 tablet   Refills:  1       escitalopram 10 MG tablet   Commonly known as:  LEXAPRO   Used for:   Obsessive-compulsive disorder, unspecified type, Schizoaffective disorder, unspecified type (H)        Dose:  10 mg   Take 1 tablet (10 mg) by mouth daily   Quantity:  30 tablet   Refills:  1       etonogestrel 68 MG Impl   Commonly known as:  IMPLANON/NEXPLANON   Used for:  Surveillance of previously prescribed implantable subdermal contraceptive        Dose:  1 each   1 each (68 mg) by Subdermal route once for 1 dose   Quantity:  1 each   Refills:  0       hydrocortisone 2.5 % cream   Used for:  Striae distensae        Apply topically 2 times daily as needed for rash   Quantity:  3.5 g   Refills:  1       lactase 3000 UNIT tablet   Commonly known as:  LACTAID   Used for:  Lactase deficiency        Dose:  6000 Units   Take 2 tablets (6,000 Units) by mouth 3 times daily (with meals)   Quantity:  180 tablet   Refills:  1       mirtazapine 7.5 MG Tabs tablet   Commonly known as:  REMERON   Used for:  Schizoaffective disorder, unspecified type (H), Obsessive-compulsive disorder, unspecified type        Dose:  7.5 mg   Take 1 tablet (7.5 mg) by mouth At Bedtime   Quantity:  30 tablet   Refills:  1       multivitamin, therapeutic with minerals Tabs tablet   Used for:  Schizoaffective disorder, unspecified type (H)        Dose:  1 tablet   Take 1 tablet by mouth daily   Quantity:  30 each   Refills:  1       nicotine 14 MG/24HR 24 hr patch   Commonly known as:  NICODERM CQ   Used for:  Tobacco use        Dose:  1 patch   Place 1 patch onto the skin daily   Quantity:  30 patch   Refills:  1         STOP taking          gabapentin 600 MG tablet   Commonly known as:  NEURONTIN   Replaced by:  gabapentin 300 MG capsule                Where to get your medicines      These medications were sent to [facility] [place], [place] - [address]  [address] [address]     Phone:  [phone]      aspirin-acetaminophen-caffeine 250-250-65 MG per tablet     bacitracin 500 UNIT/GM Oint      "escitalopram 10 MG tablet     gabapentin 300 MG capsule     hydrocortisone 2.5 % cream     hydrOXYzine 50 MG tablet     lactase 3000 UNIT tablet     mirtazapine 7.5 MG Tabs tablet     multivitamin, therapeutic with minerals Tabs tablet     nicotine 14 MG/24HR 24 hr patch     OLANZapine 20 MG tablet     prazosin 2 MG capsule     traZODone 50 MG tablet                  Mental Status Examination:   Appearance:  female, mild-moderately disheveled appearance. Mild emotional discomfort.   Attitude:  Somewhat anxious, but pleasant and cooperative.   Eye Contact:  Adequate   Mood:  \"Worried\"  Affect:  Anxious   Speech:  Regular in rate and rhythm.   Language: fluent and intact in English  Psychomotor, Gait, Musculoskeletal:  Unremarkable.   Throught Process:  Linear, coherent   Associations:  Intact   Thought Content:  Denied SI, intent or plan. No evidence of psychotic thought, no auditory hallucinations present and no visual hallucinations present  Insight:  fair  Judgement:  poor  Oriented to:  time, person, and place  Attention Span and Concentration:  intact  Recent and Remote Memory:  intact  Fund of Knowledge:  appropriate         Discharge Plan:     Medication Therapy Management Referral         - Patient was safely discharged with 30 day supply of her medications and informations/resources for follow up.     Seun Coleman MD  [facility]    "

## 2018-06-27 ENCOUNTER — HOSPITAL ENCOUNTER (INPATIENT)
Facility: CLINIC | Age: 28
LOS: 6 days | Discharge: PSYCHIATRIC OR SUBSTANCE USE FACILITY | DRG: 897 | End: 2018-07-03
Attending: EMERGENCY MEDICINE | Admitting: PSYCHIATRY & NEUROLOGY
Payer: COMMERCIAL

## 2018-06-27 DIAGNOSIS — F41.1 GENERALIZED ANXIETY DISORDER: Primary | ICD-10-CM

## 2018-06-27 DIAGNOSIS — F25.9 SCHIZOAFFECTIVE DISORDER, UNSPECIFIED TYPE (H): ICD-10-CM

## 2018-06-27 DIAGNOSIS — F19.10 POLYSUBSTANCE ABUSE (H): ICD-10-CM

## 2018-06-27 DIAGNOSIS — F42.9 OBSESSIVE-COMPULSIVE DISORDER, UNSPECIFIED TYPE: ICD-10-CM

## 2018-06-27 DIAGNOSIS — R45.851 SUICIDAL IDEATION: ICD-10-CM

## 2018-06-27 DIAGNOSIS — G47.9 TROUBLE IN SLEEPING: ICD-10-CM

## 2018-06-27 LAB
ALBUMIN SERPL-MCNC: 3.5 G/DL (ref 3.4–5)
ALCOHOL BREATH TEST: 0.01 (ref 0–0.01)
ALP SERPL-CCNC: 65 U/L (ref 40–150)
ALT SERPL W P-5'-P-CCNC: 20 U/L (ref 0–50)
AMPHETAMINES UR QL SCN: POSITIVE
ANION GAP SERPL CALCULATED.3IONS-SCNC: 13 MMOL/L (ref 3–14)
APAP SERPL-MCNC: <2 MG/L (ref 10–20)
AST SERPL W P-5'-P-CCNC: 24 U/L (ref 0–45)
BARBITURATES UR QL: NEGATIVE
BASOPHILS # BLD AUTO: 0 10E9/L (ref 0–0.2)
BASOPHILS NFR BLD AUTO: 0.4 %
BENZODIAZ UR QL: NEGATIVE
BILIRUB SERPL-MCNC: 0.6 MG/DL (ref 0.2–1.3)
BUN SERPL-MCNC: 13 MG/DL (ref 7–30)
CALCIUM SERPL-MCNC: 8.5 MG/DL (ref 8.5–10.1)
CANNABINOIDS UR QL SCN: POSITIVE
CHLORIDE SERPL-SCNC: 107 MMOL/L (ref 94–109)
CO2 SERPL-SCNC: 22 MMOL/L (ref 20–32)
COCAINE UR QL: NEGATIVE
CREAT SERPL-MCNC: 0.92 MG/DL (ref 0.52–1.04)
DIFFERENTIAL METHOD BLD: NORMAL
EOSINOPHIL # BLD AUTO: 0.1 10E9/L (ref 0–0.7)
EOSINOPHIL NFR BLD AUTO: 1.4 %
ERYTHROCYTE [DISTWIDTH] IN BLOOD BY AUTOMATED COUNT: 12.9 % (ref 10–15)
ETHANOL UR QL SCN: POSITIVE
GFR SERPL CREATININE-BSD FRML MDRD: 73 ML/MIN/1.7M2
GLUCOSE SERPL-MCNC: 71 MG/DL (ref 70–99)
HCG UR QL: NEGATIVE
HCT VFR BLD AUTO: 39.7 % (ref 35–47)
HGB BLD-MCNC: 13.2 G/DL (ref 11.7–15.7)
IMM GRANULOCYTES # BLD: 0 10E9/L (ref 0–0.4)
IMM GRANULOCYTES NFR BLD: 0.3 %
LYMPHOCYTES # BLD AUTO: 3.1 10E9/L (ref 0.8–5.3)
LYMPHOCYTES NFR BLD AUTO: 39.1 %
MCH RBC QN AUTO: 29.4 PG (ref 26.5–33)
MCHC RBC AUTO-ENTMCNC: 33.2 G/DL (ref 31.5–36.5)
MCV RBC AUTO: 88 FL (ref 78–100)
MONOCYTES # BLD AUTO: 0.5 10E9/L (ref 0–1.3)
MONOCYTES NFR BLD AUTO: 6.4 %
NEUTROPHILS # BLD AUTO: 4.1 10E9/L (ref 1.6–8.3)
NEUTROPHILS NFR BLD AUTO: 52.4 %
NRBC # BLD AUTO: 0 10*3/UL
NRBC BLD AUTO-RTO: 0 /100
OPIATES UR QL SCN: NEGATIVE
PLATELET # BLD AUTO: 290 10E9/L (ref 150–450)
POTASSIUM SERPL-SCNC: 4 MMOL/L (ref 3.4–5.3)
PROT SERPL-MCNC: 7 G/DL (ref 6.8–8.8)
RBC # BLD AUTO: 4.49 10E12/L (ref 3.8–5.2)
SALICYLATES SERPL-MCNC: 3 MG/DL
SODIUM SERPL-SCNC: 142 MMOL/L (ref 133–144)
WBC # BLD AUTO: 7.8 10E9/L (ref 4–11)

## 2018-06-27 PROCEDURE — 80329 ANALGESICS NON-OPIOID 1 OR 2: CPT | Performed by: EMERGENCY MEDICINE

## 2018-06-27 PROCEDURE — 12400007 ZZH R&B MH INTERMEDIATE UMMC

## 2018-06-27 PROCEDURE — 80307 DRUG TEST PRSMV CHEM ANLYZR: CPT | Performed by: FAMILY MEDICINE

## 2018-06-27 PROCEDURE — 80320 DRUG SCREEN QUANTALCOHOLS: CPT | Performed by: FAMILY MEDICINE

## 2018-06-27 PROCEDURE — 99285 EMERGENCY DEPT VISIT HI MDM: CPT | Mod: 25 | Performed by: EMERGENCY MEDICINE

## 2018-06-27 PROCEDURE — 85025 COMPLETE CBC W/AUTO DIFF WBC: CPT | Performed by: EMERGENCY MEDICINE

## 2018-06-27 PROCEDURE — 99285 EMERGENCY DEPT VISIT HI MDM: CPT | Mod: Z6 | Performed by: EMERGENCY MEDICINE

## 2018-06-27 PROCEDURE — 25000132 ZZH RX MED GY IP 250 OP 250 PS 637: Performed by: STUDENT IN AN ORGANIZED HEALTH CARE EDUCATION/TRAINING PROGRAM

## 2018-06-27 PROCEDURE — 90791 PSYCH DIAGNOSTIC EVALUATION: CPT

## 2018-06-27 PROCEDURE — 81025 URINE PREGNANCY TEST: CPT | Performed by: FAMILY MEDICINE

## 2018-06-27 PROCEDURE — 80053 COMPREHEN METABOLIC PANEL: CPT | Performed by: EMERGENCY MEDICINE

## 2018-06-27 RX ORDER — HYDROXYZINE HYDROCHLORIDE 50 MG/1
50 TABLET, FILM COATED ORAL EVERY 4 HOURS PRN
Status: DISCONTINUED | OUTPATIENT
Start: 2018-06-27 | End: 2018-07-03 | Stop reason: HOSPADM

## 2018-06-27 RX ORDER — OLANZAPINE 10 MG/1
10 TABLET ORAL AT BEDTIME
Status: DISCONTINUED | OUTPATIENT
Start: 2018-06-27 | End: 2018-07-03 | Stop reason: HOSPADM

## 2018-06-27 RX ORDER — OLANZAPINE 10 MG/1
10 TABLET ORAL
Status: DISCONTINUED | OUTPATIENT
Start: 2018-06-27 | End: 2018-07-02

## 2018-06-27 RX ORDER — FOLIC ACID 1 MG/1
1 TABLET ORAL DAILY
Status: DISCONTINUED | OUTPATIENT
Start: 2018-06-28 | End: 2018-07-03 | Stop reason: HOSPADM

## 2018-06-27 RX ORDER — OLANZAPINE 10 MG/2ML
10 INJECTION, POWDER, FOR SOLUTION INTRAMUSCULAR
Status: DISCONTINUED | OUTPATIENT
Start: 2018-06-27 | End: 2018-07-02

## 2018-06-27 RX ORDER — MULTIPLE VITAMINS W/ MINERALS TAB 9MG-400MCG
1 TAB ORAL DAILY
Status: DISCONTINUED | OUTPATIENT
Start: 2018-06-28 | End: 2018-07-03 | Stop reason: HOSPADM

## 2018-06-27 RX ORDER — TRAZODONE HYDROCHLORIDE 50 MG/1
50 TABLET, FILM COATED ORAL
Status: DISCONTINUED | OUTPATIENT
Start: 2018-06-27 | End: 2018-06-29 | Stop reason: ALTCHOICE

## 2018-06-27 RX ORDER — LANOLIN ALCOHOL/MO/W.PET/CERES
100 CREAM (GRAM) TOPICAL DAILY
Status: DISCONTINUED | OUTPATIENT
Start: 2018-06-28 | End: 2018-07-03 | Stop reason: HOSPADM

## 2018-06-27 RX ADMIN — HYDROXYZINE HYDROCHLORIDE 50 MG: 50 TABLET, FILM COATED ORAL at 21:26

## 2018-06-27 RX ADMIN — ACETAMINOPHEN, ASPIRIN AND CAFFEINE 2 TABLET: 250; 250; 65 TABLET, FILM COATED ORAL at 21:26

## 2018-06-27 RX ADMIN — HYDROXYZINE HYDROCHLORIDE 50 MG: 50 TABLET, FILM COATED ORAL at 21:27

## 2018-06-27 RX ADMIN — NICOTINE 1 PATCH: 7 PATCH TRANSDERMAL at 21:22

## 2018-06-27 RX ADMIN — ACETAMINOPHEN, ASPIRIN AND CAFFEINE 2 TABLET: 250; 250; 65 TABLET, FILM COATED ORAL at 21:28

## 2018-06-27 RX ADMIN — TRAZODONE HYDROCHLORIDE 50 MG: 50 TABLET ORAL at 21:22

## 2018-06-27 RX ADMIN — OLANZAPINE 10 MG: 10 TABLET, FILM COATED ORAL at 21:22

## 2018-06-27 ASSESSMENT — ENCOUNTER SYMPTOMS
NERVOUS/ANXIOUS: 1
HALLUCINATIONS: 0
DYSPHORIC MOOD: 1

## 2018-06-27 ASSESSMENT — ACTIVITIES OF DAILY LIVING (ADL)
LAUNDRY: WITH SUPERVISION
GROOMING: INDEPENDENT
DRESS: INDEPENDENT
ORAL_HYGIENE: INDEPENDENT

## 2018-06-27 NOTE — IP AVS SNAPSHOT
[address] [address]    Phone:  [phone]                                       After Visit Summary   6/27/2018    Rianna Man    MRN: [id number]           After Visit Summary Signature Page     I have received my discharge instructions, and my questions have been answered. I have discussed any challenges I see with this plan with the nurse or doctor.    ..........................................................................................................................................  Patient/Patient Representative Signature      ..........................................................................................................................................  Patient Representative Print Name and Relationship to Patient    ..................................................               ................................................  Date                                            Time    ..........................................................................................................................................  Reviewed by Signature/Title    ...................................................              ..............................................  Date                                                            Time

## 2018-06-27 NOTE — ED NOTES
ED to Behavioral Floor Handoff    SITUATION  Rianna Man is a 28 year old female who speaks English and lives is homeless alone The patient arrived in the ED by walking from home with a complaint of Suicidal (Suicidal with plan to OD on meds.  Wants CD treatment for cocaine and alcohol.  Insomnia and has not eaten since Sat.)  .The patient's current symptoms started/worsened 2 day(s) ago and during this time the symptoms have increased.   In the ED, pt was diagnosed with   Final diagnoses:   Suicidal ideation   Polysubstance abuse        Initial vitals were: BP: 136/87  Pulse: 101  Temp: 98.8  F (37.1  C)  Resp: 16  Weight: 114.6 kg (252 lb 9 oz)  SpO2: 96 %   --------  Is the patient diabetic? No   If yes, last blood glucose? --     If yes, was this treated in the ED? --  --------  Is the patient inebriated (ETOH) No or Impaired on other substances? No  MSSA done? N/A  Last MSSA score: --    Were withdrawal symptoms treated? N/A  Does the patient have a seizure history? No. If yes, date of most recent seizure--  --------  Is the patient patient experiencing suicidal ideation? reports the following suicide factors: suicidal with plan to overdose on drugs, attempted overdose on drugs yesterday    Homicidal ideation? reports current or recent homicidal ideation or behaviors including wants to hurt/scare father of her child who lives in [place]    Self-injurious behavior/urges? reports current or recent self injurious behavior or ideation including cutting 1-2 weeks ago.  ------  Was pt aggressive in the ED No  Was a code called No  Is the pt now cooperative? Yes  -------  Meds given in ED: Medications - No data to display   Family present during ED course? No  Family currently present? No    BACKGROUND  Does the patient have a cognitive impairment or developmental disability? No  Allergies:   Allergies   Allergen Reactions     No Known Allergies    .   Social demographics are   Social History     Social  History     Marital status: Single     Spouse name: N/A     Number of children: N/A     Years of education: N/A     Social History Main Topics     Smoking status: Current Every Day Smoker     Packs/day: 0.50     Types: Cigarettes     Smokeless tobacco: Never Used      Comment: less than 1/2 pack     Alcohol use 0.0 oz/week     0 Standard drinks or equivalent per week      Comment: Drinks every chance I get.     Drug use: Yes     Special: Marijuana, Codeine      Comment: Snorts cocaine     Sexual activity: Yes     Partners: Male     Birth control/ protection: Implant      Comment: monogamous relationship     Other Topics Concern     None     Social History Narrative        ASSESSMENT  Labs results   Labs Ordered and Resulted from Time of ED Arrival Up to the Time of Departure from the ED   DRUG ABUSE SCREEN 6 CHEM DEP URINE ([facility]) - Abnormal; Notable for the following:        Result Value    Amphetamine Qual Urine Positive (*)     Cannabinoids Qual Urine Positive (*)     Ethanol Qual Urine Positive (*)     All other components within normal limits   ALCOHOL BREATH TEST POCT - Normal   HCG QUALITATIVE URINE   COMPREHENSIVE METABOLIC PANEL   ACETAMINOPHEN LEVEL   SALICYLATE LEVEL   CBC WITH PLATELETS DIFFERENTIAL      Imaging Studies: No results found for this or any previous visit (from the past 24 hour(s)).   Most recent vital signs /87  Pulse 101  Temp 98.8  F (37.1  C) (Oral)  Resp 16  Wt 114.6 kg (252 lb 9 oz)  SpO2 96%  Breastfeeding? No  BMI 38.4 kg/m2   Abnormal labs/tests/findings requiring intervention:---   Pain control: pt had none  Nausea control: pt had none    RECOMMENDATION  Are any infection precautions needed (MRSA, VRE, etc.)? No If yes, what infection? --  ---  Does the patient have mobility issues? independently. If yes, what device does the pt use? ---  ---  Is patient on 72 hour hold or commitment? No If on 72 hour hold, have hold and rights been given to patient? N/A  Are admitting  orders written if after 10 p.m. ?N/A  Tasks needing to be completed:---     , Karl hannah-- [phone] [facility]   [phone] [facility]

## 2018-06-27 NOTE — ED NOTES
I have performed an in person assessment of the patient. Based on this assessment the patient no longer requires a one on one attendant at this point in time.    Ralph Murray MD  1:26 PM  June 27, 2018           Ralph Murray MD  06/27/18 [id number]

## 2018-06-27 NOTE — IP AVS SNAPSHOT
MRN:[id number]                      After Visit Summary   6/27/2018    Rianna Man    MRN: [id number]           Thank you!     Thank you for choosing [facility] for your care. Our goal is always to provide you with excellent care.        Patient Information     Date Of Birth          1990        Designated Caregiver       Most Recent Value    Caregiver    Will someone help with your care after discharge? no      About your hospital stay     You were admitted on:  June 27, 2018 You last received care in the:  [facility]    You were discharged on:  July 3, 2018       Who to Call     For medical emergencies, please call 911.  For non-urgent questions about your medical care, please call your primary care provider or clinic, [phone]          Attending Provider     Provider Specialty    Becky Baldwin MD Emergency Medicine    [facility], Rosa Glass MD Psychiatry    Jaiden Tong MD Psychiatry       Primary Care Provider Office Phone # Fax #    Hilary Maral Edouard [phone]      Further instructions from your care team        Behavioral Discharge Planning and Instructions      Summary:  You were admitted on 6/27/2018  due to Suicidal Ideations and Homicidal Ideations/Threatening Behaviors.  You were treated by Dr. Jaiden Tong MD and discharged on 7/3/18 [facility].       Principal Diagnosis: Substance induced mood disorder   BPAD- depression with psychotic symptoms      Health Care Follow-up Appointments:     You will be attending MI/CD treatment at [facility] where you will be seeing mental health providers while in their program. After discharge from [facility] you will need to schedule with an outpatient medication provider for continued care  .  [facility]  Medication Management Walk-in Appointments every Thursday at 8:30am to establish care  [address] [place],  "[place]  [phone]  [phone]    Major Treatments, Procedures and Findings:  You were provided with: a psychiatric assessment, medication evaluation and/or management, group therapy, CD evaluation/assessment and milieu management    Symptoms to Report: feeling more aggressive, increased confusion or thoughts of suicide    Early warning signs can include: increased depression or anxiety increased thoughts or behaviors of suicide or self-harm  increased unusual thinking, such as paranoia or hearing voices    Safety and Wellness:  Take all medicines as directed.  Make no changes unless your doctor suggests them.      Follow treatment recommendations.  Refrain from alcohol and non-prescribed drugs.  If there is a concern for safety, call 911.    Resources:   [facility] Adult Shelter Team  [address]  [address]  [phone]    [facility] Economic Assistance - cash assistance  [address]  [place], [place]  [phone]    Social Security Office  [address] [place] [address]  [phone]  Crisis Intervention: 614.424.5823 or 250-524-5993 (TTY: 317.644.9475).  Call anytime for help.  [place] on Mental Illness (www.[place].hailey.org): 342.706.3768 or 791-027-1568.  National Suicide Prevention Line (www.mentalhealthmn.org): 646-319-IJLV (3136)  Mental Health Consumer/Survivor Network of [place] (www.mhcsn.net): 238.336.9305 or 127-272-9091  Mental Health Association of [place] (www.mentalhealth.org): 322.225.6353 or 824-174-7919  Self- Management and Recovery Training., SMART-- Toll free: 792.722.9200  www.[employer].org  [facility] Crisis (COPE) Response - Adult 700 362-7378  Text 4 Life: txt \"LIFE\" to 26554 for immediate support and crisis intervention  Crisis text line: Text \"[place]\" to 404480. Free, confidential, 24/7.  Crisis Intervention: 971.296.5202 or 342-808-1943. Call anytime for help.   [facility]" "Health Crisis Team - Child: [phone]    The treatment team has appreciated the opportunity to work with you.     If you have any questions or concerns our unit number is [phone]  You may be receiving a follow-up phone call within the next three days from a representative from behavioral health.    You have identified the best phone number to reach you as [phone]          Pending Results     No orders found from 2018 to 2018.            Statement of Approval     Ordered          18 7773  I have reviewed and agree with all the recommendations and orders detailed in this document.  EFFECTIVE NOW     Approved and electronically signed by:  Nai Xavier MD             Admission Information     Date & Time Provider Department Dept. Phone    2018 Jaiden Tong MD  [facility] [phone]      Your Vitals Were     Blood Pressure Pulse Temperature Respirations Height Weight    125/85 92 97.9  F (36.6  C) (Tympanic) 16 1.727 m (5' 8\") 118.4 kg (261 lb)    Pulse Oximetry BMI (Body Mass Index)                98% 39.68 kg/m2          MyChart Information     [employer] lets you send messages to your doctor, view your test results, renew your prescriptions, schedule appointments and more. To sign up, go to [email or web address] . Click on \"Log in\" on the left side of the screen, which will take you to the Welcome page. Then click on \"Sign up Now\" on the right side of the page.     You will be asked to enter the access code listed below, as well as some personal information. Please follow the directions to create your username and password.     Your access code is: [email or web address]  Expires: 10/1/2018  4:14 PM     Your access code will  in 90 days. If you need help or a new code, please call your [facility] clinic or [phone].        Care EveryWhere ID     This is your Care EveryWhere ID. This could be used by other organizations to access your [facility] medical records  [id number]   "      Equal Access to Services     [facility] : Hadii jose cruz Ma, waaxda luqadaha, qaybta elaine pandey, aye hoang. So [facility] [phone].    ATENCIÓN: Si habla español, tiene a roland disposición servicios gratuitos de asistencia lingüística. Llame al [phone].    We comply with applicable federal civil rights laws and [place] laws. We do not discriminate on the basis of race, color, national origin, age, disability, sex, sexual orientation, or gender identity.               Review of your medicines      CONTINUE these medicines which may have CHANGED, or have new prescriptions. If we are uncertain of the size of tablets/capsules you have at home, strength may be listed as something that might have changed.        Dose / Directions    * gabapentin 100 MG capsule   Commonly known as:  NEURONTIN   This may have changed:    - medication strength  - how much to take  - when to take this  - reasons to take this   Used for:  Generalized anxiety disorder        Dose:  200 mg   Take 2 capsules (200 mg) by mouth 3 times daily as needed (for anxiety)   Quantity:  90 capsule   Refills:  0       * gabapentin 400 MG capsule   Commonly known as:  NEURONTIN   This may have changed:  You were already taking a medication with the same name, and this prescription was added. Make sure you understand how and when to take each.   Used for:  Generalized anxiety disorder        Dose:  400 mg   Take 1 capsule (400 mg) by mouth At Bedtime   Quantity:  90 capsule   Refills:  0       * gabapentin 100 MG capsule   Commonly known as:  NEURONTIN   This may have changed:  You were already taking a medication with the same name, and this prescription was added. Make sure you understand how and when to take each.   Used for:  Generalized anxiety disorder        Dose:  200 mg   Start taking on:  7/4/2018   Take 2 capsules (200 mg) by mouth 2 times daily   Quantity:  90 capsule   Refills:  0        OLANZapine 10 MG tablet   Commonly known as:  zyPREXA   This may have changed:    - medication strength  - how much to take   Used for:  Suicidal ideation, Schizoaffective disorder, unspecified type (H)        Dose:  10 mg   Take 1 tablet (10 mg) by mouth At Bedtime   Quantity:  30 tablet   Refills:  0       prazosin 2 MG capsule   Commonly known as:  MINIPRESS   This may have changed:  how much to take   Used for:  Schizoaffective disorder, unspecified type (H), Obsessive-compulsive disorder, unspecified type        Dose:  4 mg   Take 2 capsules (4 mg) by mouth At Bedtime   Quantity:  30 capsule   Refills:  1       traZODone 100 MG tablet   Commonly known as:  DESYREL   This may have changed:    - medication strength  - how much to take   Used for:  Trouble in sleeping        Dose:  100 mg   Take 1 tablet (100 mg) by mouth nightly as needed for sleep   Quantity:  15 tablet   Refills:  0       * Notice:  This list has 3 medication(s) that are the same as other medications prescribed for you. Read the directions carefully, and ask your doctor or other care provider to review them with you.      CONTINUE these medicines which have NOT CHANGED        Dose / Directions    multivitamin, therapeutic with minerals Tabs tablet   Used for:  Schizoaffective disorder, unspecified type (H)        Dose:  1 tablet   Take 1 tablet by mouth daily   Quantity:  30 each   Refills:  1         STOP taking     aspirin-acetaminophen-caffeine 250-250-65 MG per tablet   Commonly known as:  EXCEDRIN MIGRAINE           escitalopram 10 MG tablet   Commonly known as:  LEXAPRO           etonogestrel 68 MG Impl   Commonly known as:  IMPLANON/NEXPLANON           hydrOXYzine 50 MG tablet   Commonly known as:  ATARAX           lactase 3000 UNIT tablet   Commonly known as:  LACTAID           mirtazapine 7.5 MG Tabs tablet   Commonly known as:  REMERON           nicotine 14 MG/24HR 24 hr patch   Commonly known as:  NICODERM CQ                Where to  get your medicines      These medications were sent to [facility] [place], [place] - [address]  [address] [facility], [facility] [address]     Phone:  [phone]     gabapentin 100 MG capsule    gabapentin 100 MG capsule    gabapentin 400 MG capsule    OLANZapine 10 MG tablet    prazosin 2 MG capsule    traZODone 100 MG tablet                Protect others around you: Learn how to safely use, store and throw away your medicines at www.disposemymeds.org.             Medication List: This is a list of all your medications and when to take them. Check marks below indicate your daily home schedule. Keep this list as a reference.      Medications           Morning Afternoon Evening Bedtime As Needed    * gabapentin 100 MG capsule   Commonly known as:  NEURONTIN   Take 2 capsules (200 mg) by mouth 3 times daily as needed (for anxiety)   Last time this was given:  200 mg on 7/3/2018 12:39 PM                                * gabapentin 400 MG capsule   Commonly known as:  NEURONTIN   Take 1 capsule (400 mg) by mouth At Bedtime   Last time this was given:  200 mg on 7/3/2018 12:39 PM                                * gabapentin 100 MG capsule   Commonly known as:  NEURONTIN   Take 2 capsules (200 mg) by mouth 2 times daily   Start taking on:  7/4/2018   Last time this was given:  200 mg on 7/3/2018 12:39 PM                                multivitamin, therapeutic with minerals Tabs tablet   Take 1 tablet by mouth daily   Last time this was given:  1 tablet on 7/3/2018  8:15 AM                                OLANZapine 10 MG tablet   Commonly known as:  zyPREXA   Take 1 tablet (10 mg) by mouth At Bedtime   Last time this was given:  5 mg on 7/3/2018  3:42 PM                                prazosin 2 MG capsule   Commonly known as:  MINIPRESS   Take 2 capsules (4 mg) by mouth At Bedtime   Last time this was given:  3 mg on 7/2/2018  9:42 PM                                traZODone 100 MG tablet    Commonly known as:  DESYREL   Take 1 tablet (100 mg) by mouth nightly as needed for sleep   Last time this was given:  100 mg on 7/2/2018  9:42 PM                                * Notice:  This list has 3 medication(s) that are the same as other medications prescribed for you. Read the directions carefully, and ask your doctor or other care provider to review them with you.
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
## 2018-06-27 NOTE — ED PROVIDER NOTES
History     Chief Complaint   Patient presents with     Suicidal     Suicidal with plan to OD on meds.  Wants CD treatment for cocaine and alcohol.  Insomnia and has not eaten since Sat.     HPI  Rianna Man is a 28 year old female who presents to the ED stating she is suicidal.  She was recently admitted here.  She says she has no support after discharge and is unable to get to the appointments.  She says she has not eaten in several days because she is depressed.  She says she is ready to give up and is tired of living.  She is feeling suicidal  She says last night she tried to overdose on melatonin and drugs.  She says she has been using drugs every day over the past few days.  She uses meth, cocaine and alcohol.  She is homeless.  She has been involved in risky sexual behavior.  She has hx of cutting and last cut 1-2 weeks ago to feel something.  She says she feels homicidal towards her child's father who lives in [place].  He won't let her see her child.   She hasn't seen her son in years.  She would like to shoot him or run him over.  She has no providers.  She says she quit her job.  She has been stealing.  She lost her wallet.  She has a dwi pending.  She says her whole body hurts.        I have reviewed the Medications, Allergies, Past Medical and Surgical History, and Social History in the Epic system.    Review of Systems   Psychiatric/Behavioral: Positive for dysphoric mood, self-injury and suicidal ideas. Negative for hallucinations. The patient is nervous/anxious.    All other systems reviewed and are negative.      Physical Exam   BP: 136/87  Pulse: 101  Temp: 98.8  F (37.1  C)  Resp: 16  Weight: 114.6 kg (252 lb 9 oz)  SpO2: 96 %      Physical Exam   Constitutional: She is oriented to person, place, and time. She appears well-developed and well-nourished. No distress.   HENT:   Head: Normocephalic and atraumatic.   Right Ear: External ear normal.   Left Ear: External ear normal.   Nose:  Nose normal.   Eyes: EOM are normal.   Neck: Normal range of motion. Neck supple.   Cardiovascular: Normal rate, regular rhythm and normal heart sounds.    Pulmonary/Chest: Effort normal and breath sounds normal.   Abdominal: Soft.   Musculoskeletal: Normal range of motion.   Neurological: She is alert and oriented to person, place, and time.   Skin: She is not diaphoretic.   Old cutting scars right forearm.    Psychiatric: Her speech is normal and behavior is normal. Judgment normal. Cognition and memory are normal. She exhibits a depressed mood. She expresses suicidal ideation.   Nursing note and vitals reviewed.      ED Course     ED Course     Procedures           Labs Ordered and Resulted from Time of ED Arrival Up to the Time of Departure from the ED   DRUG ABUSE SCREEN 6 CHEM DEP URINE ([facility]) - Abnormal; Notable for the following:        Result Value    Amphetamine Qual Urine Positive (*)     Cannabinoids Qual Urine Positive (*)     Ethanol Qual Urine Positive (*)     All other components within normal limits   ALCOHOL BREATH TEST POCT - Normal   HCG QUALITATIVE URINE   COMPREHENSIVE METABOLIC PANEL   ACETAMINOPHEN LEVEL   SALICYLATE LEVEL   CBC WITH PLATELETS DIFFERENTIAL            Assessments & Plan (with Medical Decision Making)   The patient is a voluntary admission to mental health for suicidal ideation. She says she is just done with life and has suicidal thoughts.  She says she tried to overdose on drugs last night.  She was seen by myself and the DEC  and is medically cleared for admission to inpatient mental health.     I have reviewed the nursing notes.    I have reviewed the findings, diagnosis, plan and need for follow up with the patient.    New Prescriptions    No medications on file       Final diagnoses:   Suicidal ideation   Polysubstance abuse       6/27/2018   [facility], [place], EMERGENCY DEPARTMENT     Becky Baldwin MD  06/27/18 1523       Becky Baldwin MD  06/27/18 1524

## 2018-06-27 NOTE — PHARMACY-ADMISSION MEDICATION HISTORY
Admission medication history for the June 27, 2018 admission is complete.     Interview sources:  patient    Reliability of source: moderate     Medication compliance: poor - patient reports she has not taken any of her medications since her last visit on 5/17/2018.     Changes made to PTA medication list (reason)  Added: N/A    Deleted:     Bacitracin 500 unit/gm ointment: Apply topically 2 times daily.   -patient reports that she no longer uses or needs this ointment    Hydrocortisone 2.5% cream: Apply topically 2 times daily as needed for rash.   -patient reports that she no longer uses or needs this cream    Changed: N/A    Additional medication history information:     The patient was a decent historian. She reported that she has not taken any of her medications since the last time she was here (5/17/2018). The patient confirmed that she had been prescribed each of the medications on the list although she has not been taking them. She also reported that she is no longer using or in need of the bacitracin ointment and hydrocortisone cream. She also stated that she had filled medications at [facility] in the past. The patient denied all other prescription and over-the-counter medications.    FYI: The patient complained of nausea and asked if there was medication she could be given to control her nausea symptoms.    Prior to Admission medications    Medication Sig Last Dose Taking? Auth Provider   aspirin-acetaminophen-caffeine (EXCEDRIN MIGRAINE) 250-250-65 MG per tablet Take 2 tablets by mouth every 6 hours as needed for headaches More than a month at Unknown time  Seun Coleman MD   escitalopram (LEXAPRO) 10 MG tablet Take 1 tablet (10 mg) by mouth daily More than a month at Unknown time  Seun Coleman MD   etonogestrel (IMPLANON/NEXPLANON) 68 MG IMPL 1 each (68 mg) by Subdermal route once for 1 dose   Sandy Rudd MD   gabapentin (NEURONTIN) 300 MG capsule Take 1 capsule (300 mg) by mouth 3  times daily More than a month at Unknown time  Seun Coleman MD   hydrOXYzine (ATARAX) 50 MG tablet Take 1 tablet (50 mg) by mouth every 4 hours as needed for anxiety More than a month at Unknown time  Seun Coleman MD   lactase (LACTAID) 3000 UNIT tablet Take 2 tablets (6,000 Units) by mouth 3 times daily (with meals) More than a month at Unknown time  Seun Coleman MD   mirtazapine (REMERON) 7.5 MG TABS tablet Take 1 tablet (7.5 mg) by mouth At Bedtime More than a month at Unknown time  Seun Coleman MD   multivitamin, therapeutic with minerals (THERA-VIT-M) TABS tablet Take 1 tablet by mouth daily More than a month at Unknown time  Seun Coleman MD   nicotine (NICODERM CQ) 14 MG/24HR 24 hr patch Place 1 patch onto the skin daily More than a month at Unknown time  Seun Coleman MD   OLANZapine (ZYPREXA) 20 MG tablet Take 1 tablet (20 mg) by mouth At Bedtime More than a month at Unknown time  Seun Coleman MD   prazosin (MINIPRESS) 2 MG capsule Take 1 capsule (2 mg) by mouth At Bedtime More than a month at Unknown time  Seun Coleman MD   traZODone (DESYREL) 50 MG tablet Take 1 tablet (50 mg) by mouth nightly as needed for sleep More than a month at Unknown time  Seun Coleman MD       Time spent: 30 minutes    Medication history completed by: Winter Aranda

## 2018-06-28 PROCEDURE — 12400007 ZZH R&B MH INTERMEDIATE UMMC

## 2018-06-28 PROCEDURE — 25000132 ZZH RX MED GY IP 250 OP 250 PS 637: Performed by: STUDENT IN AN ORGANIZED HEALTH CARE EDUCATION/TRAINING PROGRAM

## 2018-06-28 PROCEDURE — 99223 1ST HOSP IP/OBS HIGH 75: CPT | Mod: AI | Performed by: PSYCHIATRY & NEUROLOGY

## 2018-06-28 RX ORDER — PRAZOSIN HYDROCHLORIDE 1 MG/1
2 CAPSULE ORAL AT BEDTIME
Status: DISCONTINUED | OUTPATIENT
Start: 2018-06-28 | End: 2018-07-02

## 2018-06-28 RX ADMIN — OLANZAPINE 10 MG: 10 TABLET, FILM COATED ORAL at 22:03

## 2018-06-28 RX ADMIN — TRAZODONE HYDROCHLORIDE 50 MG: 50 TABLET ORAL at 22:03

## 2018-06-28 RX ADMIN — NICOTINE 1 PATCH: 7 PATCH TRANSDERMAL at 08:35

## 2018-06-28 RX ADMIN — HYDROXYZINE HYDROCHLORIDE 50 MG: 50 TABLET, FILM COATED ORAL at 08:35

## 2018-06-28 RX ADMIN — FOLIC ACID 1 MG: 1 TABLET ORAL at 08:35

## 2018-06-28 RX ADMIN — Medication 100 MG: at 08:35

## 2018-06-28 RX ADMIN — MULTIPLE VITAMINS W/ MINERALS TAB 1 TABLET: TAB at 08:35

## 2018-06-28 RX ADMIN — HYDROXYZINE HYDROCHLORIDE 50 MG: 50 TABLET, FILM COATED ORAL at 18:40

## 2018-06-28 RX ADMIN — OLANZAPINE 10 MG: 10 TABLET, FILM COATED ORAL at 10:56

## 2018-06-28 RX ADMIN — PRAZOSIN HYDROCHLORIDE 2 MG: 1 CAPSULE ORAL at 22:27

## 2018-06-28 NOTE — PLAN OF CARE
Problem: Patient Care Overview  Goal: Team Discussion  Team Plan:   BEHAVIORAL TEAM DISCUSSION    Participants: Roseanne Neely SW, Dr. Jaiden Tong MD, Nellie Hampton MD, Nai Xavier RN, Juliano Martell MS4  Progress: Initiated with hospitalization  Continued Stay Criteria/Rationale: Admitted due to suicidal and homicidal ideation.   Medical/Physical: No complaints  Precautions:   Behavioral Orders   Procedures     Code 1 - Restrict to Unit     Routine Programming     As clinically indicated     Self Injury Precaution     Sexual precautions     Single Room     Status 15     Every 15 minutes.     Suicide precautions     Patients on Suicide Precautions should have a Combination Diet ordered that includes a Diet selection(s) AND a Behavioral Tray selection for Safe Tray - with utensils, or Safe Tray - NO utensils       Withdrawal precautions     Plan: Continue to monitor patient's symptoms as a medication regiment is initiated and document changes until proper discharge is set in place.   Rationale for change in precautions or plan: No change has been indicated.

## 2018-06-28 NOTE — PROGRESS NOTES
"    ----------------------------------------------------------------------------------------------------------  [facility], [place]   Psychiatric Progress Note  Hospital Day #1     Interim History:   The patient's care was discussed with the treatment team and chart notes were reviewed.    Sleep: 7 hours  Scheduled Medications: Adherent   Staff Report: Patient was observed to be looking distressed and reporting SI. She asked for \"better food\" and wanted to sleep as she had not slept in days. She slept without any disturbance after admission to the seth. The nursing staff did not note any signs of withdrawal an did not observe any clinical signs or autonomic instability. She was mostly calm after the admission with limited interaction with other patients and staff.     Patient Interview: Patient was interviewed in the milieu and in the conference room. History of presenting complaints was verified with focus on her current suicidal ideation, depressive and psychotic symptoms and substance use. Past history and treatments given were also reviewed. Patient reported having multiple hospital admissions and periods of sobriety lasting upto a year in between. She endorses continuing depressive symptoms and thoughts of self harm. She reports that her auditory hallucinations were more yesterday and she gets them when she is actively on substances of abuse. She reported infrequent use of alcohol, and continued use of nicotine and cannabis with craving for nicotine. She reported feeling sleepy but did not have any other clinical signs of withdrawal.  We discussed her current medication plan including continuing with lower dose of Olanzapine at 10mg a day and restarting Prazosin 0.2mg at night for sleep disturbance and PTSD symptoms. Other psychiatric medication (Gabapentin, Escitalopram, Mirtazapine) which had been discontinued by patient following last hospital admission were not re-started at " "this time.We discussed her current homelessness and making a plan for continued care following discharge from inpatient unit. Factors for past medication and treatment non-compliance were explored. She reported trying to get help for disability but had issues with communication with providers, which she needs help with. The need for continued regular psychiatric care was stressed upon the patient.   The patient had earlier expressed wanting to kill her ex- while in the ED. On enquiry she reports feeling frustrated and sad that she hasn't seen her son Micah for a long time. She reports that she was angry but would not act out on her actions. We discussed checking in with her about these thoughts of harming someone else and need for safety precautions. Treating team is continuing to assess risk of harm to other persons on a continuing basis and considering reporting as necessary based on clinical condition.       The risks, benefits, alternatives and side effects of any medication changes have been discussed and are understood by the patient and other caregivers.    Review of systems:     ROS was negative unless noted above.          Allergies:     Allergies   Allergen Reactions     No Known Allergies             Psychiatric Examination:   /74  Pulse 115  Temp 97.9  F (36.6  C) (Tympanic)  Resp 16  Ht 1.727 m (5' 8\")  Wt 113.4 kg (250 lb)  SpO2 95%  Breastfeeding? No  BMI 38.01 kg/m2  Weight is 250 lbs 0 oz  Body mass index is 38.01 kg/(m^2).    Appearance:  dressed in hospital scrubs and poorly groomed  Attitude:  somewhat cooperative  Eye Contact:  fair  Mood:  depressed  Affect:  intensity is blunted  Speech:  decreased prosody  Psychomotor Behavior:  no evidence of tardive dyskinesia, dystonia, or tics and physical retardation  Thought Process:  linear  Associations:  no loose associations  Thought Content:  active suicidal ideation present  Insight:  limited  Judgment:  poor  Oriented to:  " time, person, and place  Attention Span and Concentration:  intact  Recent and Remote Memory:  intact  Language: Able to name objects, Able to repeat phrases and Able to read and write  Fund of Knowledge: appropriate  Muscle Strength and Tone: normal  Gait and Station: Normal         Labs:     Positive Urine tox screen for amphetamines, cannabis and alcohol     Assessment    Diagnostic Impression: 28 year old lady with family history of mood and psychotic illness, premorbid cluster B traits and probable traumatic life events in childhood and adulthood requiring further clarification, who presented with intentional self harm by consumption of amphetamines, cocaine, cannabis and alcohol, with syndrome of depressive symptoms, command auditory hallucinations and referential delusions, probable brief substance related manic symptoms and disturbed sleep. She also reported increased libido and multiple unprotected sexual encounters.   At the time of admission the possibility of substance related mood disorder was considered with temporal correlation of mood and psychotic symptoms to substance use and remission during periods of sobriety. Further longitudinal history with improving patient condition, attempts at collateral information and review of past reports was planned to clarify the presence of an independent Bipolar disorder. Underlying personality and psychosocial factors associated with her condition including unemployment, homelessness, loss of finances and social support require to be addressed. Risk of harm to self and others requiring continuous precautions and assessment are to implemented.       Hospital course: Rianna Man was admitted to station 22 as a voluntary patient  Discontinued Medications (& Rationale): Patient stopped all medication from past admission as inpatient at [facility] in May immediately after discharge.     Medical course: Patient was seen in ED and cleared medically by ED  physician.    Plan   Principal Diagnosis:   Biploar disorder mixed episode with psychosis vs substance induced psychosis  Intentional self harm  Secondary psychiatric diagnoses of concern this admission:   PTSD  Cluster B traits  Cannabis use  Cocaine use  Alcohol use  Nicotine dependence      Psychotropic Medications:    Start  Medication:    Olanazapine 10mg at night  Prazosin 0.1mg at night    Tablet olanzapine 10mg P.O for agitation.    Inj Olanzapine 10mg prn for agitation  Hydroxyzine 50 mg prn Q4hrly for sedation as needed      Patient will be treated in therapeutic milieu with appropriate individual and group therapies as described.      Medical diagnoses to be addressed this admission:    Increased body mass index - Lipid profile and dietary and exercise recommendations  Address risk of sexually transmitted infections  On implanted estrogen for contraception    Consults: Nil pending further results    Legal Status: Voluntary    Safety Assessment:   Behavioral Orders   Procedures     Code 1 - Restrict to Unit     Routine Programming     As clinically indicated     Self Injury Precaution     Sexual precautions     Single Room     Status 15     Every 15 minutes.     Suicide precautions     Patients on Suicide Precautions should have a Combination Diet ordered that includes a Diet selection(s) AND a Behavioral Tray selection for Safe Tray - with utensils, or Safe Tray - NO utensils       Withdrawal precautions       Disposition: Pending further stabilization and treatment.      This documentation accurately reflects the services I personally performed and treatment decisions made by me, in consultation with the attending physician Jaiden Tong MD.      Nai Xavier MD,  PGY1 Psychiatry resident    Psychiatry Attending Attestation:  This patient has been seen and evaluated by me, Jaiden Tong M.D.  The patient's condition and treatment plan were discussed with the resident, and care coordinated with  the CTC and RN. I reviewed, edited and agree with the findings and plan in this note.     Jaiden Tong M.D.

## 2018-06-28 NOTE — PROGRESS NOTES
1. What PRN did patient receive? Zyprexa    2. What was the patient doing that led to the PRN medication? Anxiety/agitation    3. Did they require R/S? No    4. Side effects to PRN medication? none    5. After 1 Hour, patient appeared: calm and cooperative

## 2018-06-28 NOTE — PROGRESS NOTES
06/27/18 2222   Patient Belongings   Did you bring any home meds/supplements to the hospital?  No   Patient Belongings cell phone/electronics;clothing;jewelry;money (see comment);purse;shoes;other (see comments)   Disposition of Belongings Kept with patient;Locker;Sent to security per site process   Belongings Search Yes   Clothing Search Yes   Second Staff Anny FREY       -Items kept on pt-  [place]  [place]  2 bras    -Items in pt locker-  Phone  2 purses  Shoes with laces  5 pairs of socks  Dorian pin  Hoodie with strings  Rubber duckie  Loose change  Cigarettes  Lighter  Jeans  2 long sleeve tops  3 tank tops  earbuds  4 short sleeve tops  Shorts  2 shorts with strings  3 leggings   Pants  Deodorant  Fragrance  Lotions  Toothpaste   Toothbrush  2 hair brushes  Hair gel    -Items sent to security in envelope #[id number]  Go to card [id number]  Visa Debit Card [id number]    A               Admission:  I am responsible for any personal items that are not sent to the safe or pharmacy.  [facility] is not responsible for loss, theft or damage of any property in my possession.    Signature:  _________________________________ Date: _______  Time: _____                                              Staff Signature:  ____________________________ Date: ________  Time: _____      2nd Staff person, if patient is unable/unwilling to sign:    Signature: ________________________________ Date: ________  Time: _____     Discharge:  [facility] has returned all of my personal belongings:    Signature: _________________________________ Date: ________  Time: _____                                          Staff Signature:  ____________________________ Date: ________  Time: _____

## 2018-06-28 NOTE — PROGRESS NOTES
Initial Psychosocial Assessment    I have reviewed the chart, met with the patient, and developed Care Plan.  Information for assessment was obtained from:     Patient: Interview and Chart: Reviewed    Presenting Problem:  Per H&P: She presented to the ED stating she is suicidal.  She was recently admitted there.  She says she has no support after discharge and was unable to get to the appointments.  She says she had not eaten in several days because she is depressed.  She said she was ready to give up and was tired of living.  She was feeling suicidal  She says last night she tried to overdose on melatonin and drugs.  She says she has been using drugs every day over the past few days.     History of Mental Health and Chemical Dependency:  Has completed CD treatment in the past at age 20 was sober for a number of years but has been abusing drugs for many years now.     Family Description ([place], Family Psychiatric History):  Grew up in [place] with both parents and siblings.  Has a 7 year old son who is with his father.     Significant Life Events (Illness, Abuse, Trauma, Death):  Start drinking and doing drugs at 13 years old     Living Situation:  Homeless     Educational Background:  Dropped out in 11th grade     Occupational History:  Unemployed     Financial Status:  Food stamps    Legal Issues:  Past DWI     Ethnic/Cultural Issues:  None    Spiritual Orientation:  [facility]      Service History:  None    Social Functioning (organization, interests):  None stated     Current Treatment Providers are:  None    Social Service Assessment/Plan:  Patient has been admitted for psychiatric stabilization for this acute crisis. Patient will meet with treatment team including a psychiatrist to have psychiatric assessment and medication management. Team will coordinate with the any outpatient medication providers to review and adjust medications as appropriate. Staff will provide therapeutic  programming and structure to help maintain a safe environment for healing. Staff will continue to assess patient as needed. Patient will participate in unit groups and activities. Patient will receive individual and group support on the unit. CTC will coordinate with outpatient providers and will place referrals to ensure appropriate follow up care is in place. Follow up with CD assessment for placement in appropriate program.

## 2018-06-28 NOTE — PLAN OF CARE
Admission:     Admitted voluntarily to [facility] for increasing suicidal ideation. States she was doing meth, cocaine, and alcohol since last weekend in an attempt at suicide. Denies IV drug use. Also reports engaging in risky sexual behavior. Is currently homeless and reports no support system. States mom is supportive, but lives in [place] and she has infrequent contact with her. Past hx of MH and CD admissions. States she has not slept since Saturday. MSSA upon admission is 6. Reporting derogatory, and command auditory hallucinations. Hx sib. (See chart review for further details and hx)    Presents as calm, polite, and cooperative completing admission. Affect is flat, appears tired. Provided meal. Oriented to room and unit. Encouraged sleep and nutrition. Pt denies current thoughts to harm self or others.Encouraged to voice needs, questions, and concerns. Pt verbalizes understanding.

## 2018-06-28 NOTE — PROGRESS NOTES
----------------------------------------------------------------------------------------------------------  [facility], [place]   Psychiatric Progress Note  Hospital Day #1     Interim History:   The patient's care was discussed with the treatment team and chart notes were reviewed.    Sleep: 7 hours  Scheduled Medications: Adherent   Staff Report: Slept for 7 hours after admission. No signs of withdrawal clinically. Actively speaks about SI and feeling depressed. Also reports feeling tired and hungry.    Patient Interview: Patient was interviewed in conference room by treating team. Her history was clarified including past use of substances which started at age 13, and past treatment at a CD facility till when she turned 20 years old. She does not report past sexual trauma at this time, which will be explored later depending on her mental status. She appears distressed and angry. She speaks about wanting to die as she feels hopeless.No manic symptoms were noted. She reports being angry at her ex  and wanting to kill him, but she clarifies that she will not act out on that threat.( Continued assessment to determine need to mandatory report of HI required).  She reports command auditory hallucinations, though she says it is less than yesterday. She reports frustration at not being able to communicate with her past providers and her unemployment and homelessness contributing to her poor mental status and poor medication compliance. She expressed interest in CD treatment facilities and agrees to speak to  about the same.   Upon clarification of past medical history, she does not report sustained improvement with any past treatments, with noted inadequate trials of all psychotropics. She requests being on gabapentin as a mood stabiliser. The plan to maintain on a low dose of Olanzapine and prazosin for now was discussed with her.      The risks, benefits, alternatives  "and side effects of any medication changes have been discussed and are understood by the patient and other caregivers.    Review of systems:     ROS was negative unless noted above.          Allergies:     Allergies   Allergen Reactions     No Known Allergies             Psychiatric Examination:   /74  Pulse 115  Temp 97.9  F (36.6  C) (Tympanic)  Resp 16  Ht 1.727 m (5' 8\")  Wt 113.4 kg (250 lb)  SpO2 95%  Breastfeeding? No  BMI 38.01 kg/m2  Weight is 250 lbs 0 oz  Body mass index is 38.01 kg/(m^2).    Appearance:  poorly groomed, morbidly obese and disheveled   Attitude:  somewhat cooperative  Eye Contact:  poor   Mood:  sad   Affect:  mood congruent  Speech:  clear, coherent  Psychomotor Behavior:  no evidence of tardive dyskinesia, dystonia, or tics and physical retardation  Thought Process:  linear  Associations:  no loose associations  Thought Content:  active suicidal ideation present  Insight:  limited  Judgment:  limited  Oriented to:  time, person, and place  Attention Span and Concentration:  intact  Recent and Remote Memory:  intact  Language: Able to name objects, Able to repeat phrases and Able to read and write  Fund of Knowledge: low-normal  Muscle Strength and Tone: normal  Gait and Station: Normal         Labs:   Positive Urine Tox screen for Amphetamines, Cannabis and Alcohol       Assessment    Diagnostic Impression: This patient is a 28 year old female with history of BPAD type 1/ Schizoaffective disorder, PTSD, Cluster B traits, Cocaine abuse, Cannabis abuse, alcohol abuse who presented to [facility] ED  on 06/27/2018 due to recent suicide attempt and continuing SI. This is in the context of underlying untreated psychiatric illness, substance use and psychosocial stressors and personality factors.   Family history is notable for mood and psychotic illness and MSE was notable for depressive syndrome with CAH, referential ideas and ongoing SI.   The patient is admitted with a probable " substance induced mood disorder with differential diagnosis of BPAD..       Hospital course: Rianna Man was admitted to station 22 green team as a voluntary patient.  Since she had discontinued Medications following past admission in May she was reassessed and started on low dose Olanzapine 10mg/day for reported CAH, agitation and need for sedation. Considering recent substance abuse, she was monitored for evolving withdrawal symptoms which she did not exhibit after 24 hours of admission, but delayed symptoms  were monitored for. She was noted to be calmer after admission though she initially still endorsed suicidal ideas. A close follow up under supervision for stated Hi against ex  was planned and reporting was to be considered as necessary.    Medical course : She was cleared from ED for medical issues and had no further medical concerns after admisison.    Plan   Principal Diagnosis:   Substance induced mood disorder   BPAD- depression with psychotic symptoms          Secondary psychiatric diagnoses of concern this admission:   Cannabis abuse  Alcohol abuse  Cocaine use  Amphetamine use  PTSD  Cluster B traits      Psychotropic Medications:  Restart:    Tab Olanzapine 10mg at night  Tab Prazosin 0.1mg at night    Tab Hydroxyzine 25mg prn q4hrly  Tab Trazadone 50mg prn hsod  Olanzapine PO or IM 10mg prn Q2hrly prn    Patient will be treated in therapeutic milieu with appropriate individual and group therapies as described.      Medical diagnoses to be addressed this admission:    Migraine unspecified on Excedrin, to follow up with outpatient provider      Legal Status: Voluntary    Safety Assessment:   Behavioral Orders   Procedures     Code 1 - Restrict to Unit     Routine Programming     As clinically indicated     Self Injury Precaution     Sexual precautions     Single Room     Status 15     Every 15 minutes.     Suicide precautions     Patients on Suicide Precautions should have a  Combination Diet ordered that includes a Diet selection(s) AND a Behavioral Tray selection for Safe Tray - with utensils, or Safe Tray - NO utensils       Withdrawal precautions       Disposition: Pending further stabilization and treatment.       I have reviewed and edited the documentation recorded by the scribe. This documentation accurately reflects the services I personally performed and treatment decisions made by me, in consultation with the attending physician Jaiden Tong MD.      Nai Xavier MD,  PGY1 Psychiatry resident    Attestation:

## 2018-06-28 NOTE — PROGRESS NOTES
Pt refused to check in with writer due to being asleep. Pt was sleeping most of the shift. Pt did not attend any groups, did not shower, but she ate meals.      06/28/18 1500   Behavioral Health   Hallucinations denies / not responding to hallucinations   Thinking distractable;poor concentration   Affect tense;blunted, flat   Mood mood is calm   Physical Appearance/Attire disheveled   Hygiene neglected grooming - unclean body, hair, teeth

## 2018-06-28 NOTE — PROGRESS NOTES
SPIRITUAL HEALTH SERVICES  SPIRITUAL ASSESSMENT Progress Note  [facility] Station 22    REFERRAL SOURCE: I tried to visit pt Rianna this morning twice but pt was asleep of my both visit attempts. However, on my third visit attempts pt was half awake but she rather want to sleep and told me to come back some other day. I did respect what pt needs at this moment but I will try to visit her again when she is ready to talk.    PLAN: I will try to visit the pt again tomorrow.    Vijay Martin M.Div. ([place]), M.Th., D.Min., [facility]  Staff   Pager [phone]

## 2018-06-29 LAB
CHOLEST SERPL-MCNC: 166 MG/DL
HDLC SERPL-MCNC: 88 MG/DL
LDLC SERPL CALC-MCNC: 62 MG/DL
NONHDLC SERPL-MCNC: 78 MG/DL
TRIGL SERPL-MCNC: 81 MG/DL

## 2018-06-29 PROCEDURE — 80061 LIPID PANEL: CPT | Performed by: STUDENT IN AN ORGANIZED HEALTH CARE EDUCATION/TRAINING PROGRAM

## 2018-06-29 PROCEDURE — 25000132 ZZH RX MED GY IP 250 OP 250 PS 637: Performed by: STUDENT IN AN ORGANIZED HEALTH CARE EDUCATION/TRAINING PROGRAM

## 2018-06-29 PROCEDURE — 99232 SBSQ HOSP IP/OBS MODERATE 35: CPT | Mod: GC | Performed by: PSYCHIATRY & NEUROLOGY

## 2018-06-29 PROCEDURE — 25000132 ZZH RX MED GY IP 250 OP 250 PS 637

## 2018-06-29 PROCEDURE — 36415 COLL VENOUS BLD VENIPUNCTURE: CPT | Performed by: STUDENT IN AN ORGANIZED HEALTH CARE EDUCATION/TRAINING PROGRAM

## 2018-06-29 PROCEDURE — 12400007 ZZH R&B MH INTERMEDIATE UMMC

## 2018-06-29 RX ORDER — GABAPENTIN 100 MG/1
200 CAPSULE ORAL AT BEDTIME
Status: DISCONTINUED | OUTPATIENT
Start: 2018-06-29 | End: 2018-07-02

## 2018-06-29 RX ORDER — ACETAMINOPHEN 325 MG/1
650 TABLET ORAL EVERY 6 HOURS PRN
Status: DISCONTINUED | OUTPATIENT
Start: 2018-06-29 | End: 2018-07-03 | Stop reason: HOSPADM

## 2018-06-29 RX ORDER — NICOTINE 21 MG/24HR
1 PATCH, TRANSDERMAL 24 HOURS TRANSDERMAL DAILY
Status: DISCONTINUED | OUTPATIENT
Start: 2018-06-30 | End: 2018-07-03 | Stop reason: HOSPADM

## 2018-06-29 RX ORDER — TRAZODONE HYDROCHLORIDE 50 MG/1
50 TABLET, FILM COATED ORAL
Status: DISCONTINUED | OUTPATIENT
Start: 2018-06-29 | End: 2018-07-02

## 2018-06-29 RX ORDER — GABAPENTIN 100 MG/1
200 CAPSULE ORAL 3 TIMES DAILY PRN
Status: DISCONTINUED | OUTPATIENT
Start: 2018-06-29 | End: 2018-07-03 | Stop reason: HOSPADM

## 2018-06-29 RX ADMIN — GABAPENTIN 200 MG: 100 CAPSULE ORAL at 19:57

## 2018-06-29 RX ADMIN — GABAPENTIN 200 MG: 100 CAPSULE ORAL at 21:14

## 2018-06-29 RX ADMIN — GABAPENTIN 200 MG: 100 CAPSULE ORAL at 13:19

## 2018-06-29 RX ADMIN — TRAZODONE HYDROCHLORIDE 50 MG: 50 TABLET ORAL at 21:33

## 2018-06-29 RX ADMIN — MULTIPLE VITAMINS W/ MINERALS TAB 1 TABLET: TAB at 08:24

## 2018-06-29 RX ADMIN — HYDROXYZINE HYDROCHLORIDE 50 MG: 50 TABLET, FILM COATED ORAL at 08:28

## 2018-06-29 RX ADMIN — NICOTINE 1 PATCH: 7 PATCH TRANSDERMAL at 08:22

## 2018-06-29 RX ADMIN — Medication 100 MG: at 08:24

## 2018-06-29 RX ADMIN — FOLIC ACID 1 MG: 1 TABLET ORAL at 08:24

## 2018-06-29 RX ADMIN — PRAZOSIN HYDROCHLORIDE 2 MG: 1 CAPSULE ORAL at 21:12

## 2018-06-29 RX ADMIN — HYDROXYZINE HYDROCHLORIDE 50 MG: 50 TABLET, FILM COATED ORAL at 19:57

## 2018-06-29 RX ADMIN — OLANZAPINE 10 MG: 10 TABLET, FILM COATED ORAL at 13:17

## 2018-06-29 ASSESSMENT — ACTIVITIES OF DAILY LIVING (ADL)
DRESS: INDEPENDENT
GROOMING: INDEPENDENT
LAUNDRY: UNABLE TO COMPLETE
GROOMING: INDEPENDENT
ORAL_HYGIENE: INDEPENDENT

## 2018-06-29 NOTE — PROGRESS NOTES
Pt is pleasant and cooperative, but spent most of the day in her room sleeping. Asked RN for tylenol, but by the time nurse got it for her she was sleeping. She ate meals in the milieu. Did not attend any groups. RN mentioned that pt has been hearing voices. Pt did not mention anything about SI/SIB. Pt has a blunted/flat affect, and was withdrawn for most of the shift.       06/29/18 1429   Behavioral Health   Hallucinations auditory   Thinking poor concentration   Orientation person: oriented;place: oriented   Memory baseline memory   Insight poor   Judgement impaired   Eye Contact at examiner   Affect blunted, flat;sad;irritable   Mood depressed;mood is calm   Physical Appearance/Attire untidy   Hygiene other (see comment)  (fair)   Suicidality (nothing stated/observed)   1. Wish to be Dead No   2. Non-Specific Active Suicidal Thoughts  No   Self Injury other (see comment)  (nothing stated/observed)   Elopement (nothing observed this shift)   Activity withdrawn   Speech clear;coherent   Medication Sensitivity no stated side effects;no observed side effects   Psychomotor / Gait balanced;steady   Psycho Education   Type of Intervention other (see comment)   Response unavailable   Treatment Detail slept most of day, did not attend groups   Activities of Daily Living   Hygiene/Grooming independent   Oral Hygiene independent   Dress independent   Laundry unable to complete   Room Organization independent   Groups   Details did not attend

## 2018-06-29 NOTE — PROGRESS NOTES
"    ----------------------------------------------------------------------------------------------------------  [facility], [place]   Psychiatric Progress Note  Hospital Day #2     Interim History:   The patient's care was discussed with the treatment team and chart notes were reviewed.    Sleep: 7 hours  Scheduled Medications: Adherent   Staff Report: No signs of withdrawal clinically.requests extra food as feeling hungry all the time. Appears less dysphoric. Does not interact much in groups. No active elaboration of suicidal ideation.    Patient Interview: Patient was interviewed in her room by treating team. She reported being angry that she was not receiving more medication for mood stabilization. She indicates that all her problems are related to the fact that her mind is not alright and she needs more medication. Upon bringing up her substance use and correlation of her condition to substance she refers to self medicating herself because she doesn't have the medication. She wants to start Gabapentin for \"mood stabilization'. She reports not having active suicidal or homicidal ideation but feeling distressed. She did not report any current hallucinations  But at a later time in the day , approached the staff nurse and reported needing prn Olanzapine as she was having hallucinations. The rationale for medication selection, the need to avoid polypharmacy especially with previously ineffective medication, potential side effects and long term risks were discussed. We planned  on starting gabapentin 200mg hsod and 200mg tid prn while increasing her  Prazosin to 0.2mg at night for reducing her nightmares and helping with anxiety. The need for non-pharmacological measures, the availability of OT services and groups was discussed with the patient.     The risks, benefits, alternatives and side effects of any medication changes have been discussed and are understood by the patient.    Review " "of systems:     ROS was negative unless noted above.          Allergies:     Allergies   Allergen Reactions     No Known Allergies             Psychiatric Examination:   /81  Pulse 108  Temp 98.7  F (37.1  C) (Tympanic)  Resp 16  Ht 1.727 m (5' 8\")  Wt 113.4 kg (250 lb)  SpO2 98%  Breastfeeding? No  BMI 38.01 kg/m2  Weight is 250 lbs 0 oz  Body mass index is 38.01 kg/(m^2).    Appearance:  poorly groomed, morbidly obese and disheveled   Attitude:  somewhat cooperative  Eye Contact:  poor   Mood:  sad   Affect:  mood congruent  Speech:  clear, coherent  Psychomotor Behavior:  no evidence of tardive dyskinesia, dystonia, or tics and physical retardation  Thought Process:  linear  Associations:  no loose associations  Thought Content:  No active suicidal ideation, no active delusions reported  Insight:  limited  Judgment:  limited  Oriented to:  time, person, and place  Attention Span and Concentration:  intact  Recent and Remote Memory:  intact  Language: Able to name objects, Able to repeat phrases and Able to read and write  Fund of Knowledge: low-normal  Muscle Strength and Tone: normal  Gait and Station: Normal         Labs:   Lipid profile was within normal limits    Consult : Nutrition services for adjustment in dietary recommendations       Assessment    Diagnostic Impression: This patient is a 28 year old female with history of BPAD type 1/ Schizoaffective disorder, PTSD, Cluster B traits, Cocaine abuse, Cannabis abuse, alcohol abuse who presented to [facility] ED  on 06/27/2018 due to recent suicide attempt and continuing SI. This is in the context of underlying untreated psychiatric illness, substance use and psychosocial stressors and personality factors.   Family history is notable for mood and psychotic illness and MSE was notable for depressive syndrome with CAH, referential ideas and ongoing SI.   The patient is admitted with a probable substance induced mood disorder with differential diagnosis " of BPAD..       Hospital course: Rianna Man was admitted to station 22 green team as a voluntary patient. Since she had discontinued all medications following past admission in May she was reassessed and started on low dose Olanzapine 10mg/day for reported CAH, agitation and need for sedation. Considering recent substance abuse, she was monitored for evolving withdrawal symptoms which she did not exhibit after 24 hours of admission, but delayed symptoms  were monitored for. She was noted to be calmer after admission though she initially still endorsed suicidal ideas.  On day 2 of her admission, we started Gabapentin 200mg QHS and 200mg tid prn while increasing her  Prazosin to 0.2mg at night for reducing her nightmares and helping with anxiety. She was intermittently irritable, asking for her meds to be restarted but without clear indication or history of benefit.       Medical course : She was cleared from ED for medical issues and had no further medical concerns after admisison.    Plan   Principal Diagnosis:   Substance induced mood disorder   BPAD- depression with psychotic symptoms          Secondary psychiatric diagnoses of concern this admission:   Cannabis abuse  Alcohol abuse  Cocaine use  Amphetamine use  PTSD  Cluster B traits      Psychotropic Medications:      Tab Olanzapine 10mg at night  Tab Prazosin 0.2mg at night  Tab gabapentin 200mg at night  Tab gabapentin 200mg tid prn    Tab Hydroxyzine 25mg prn q4hrly  Tab Trazadone 50mg prn hsod  Olanzapine PO or IM 10mg prn Q2hrly prn    Patient will be treated in therapeutic milieu with appropriate individual and group therapies as described.      Medical diagnoses to be addressed this admission:    Migraine unspecified on Excedrin, to follow up with outpatient provider      Legal Status: Voluntary    Safety Assessment:   Behavioral Orders   Procedures     Code 1 - Restrict to Unit     Routine Programming     As clinically indicated     Self  Injury Precaution     Sexual precautions     Single Room     Status 15     Every 15 minutes.     Suicide precautions     Patients on Suicide Precautions should have a Combination Diet ordered that includes a Diet selection(s) AND a Behavioral Tray selection for Safe Tray - with utensils, or Safe Tray - NO utensils       Withdrawal precautions       Disposition: Pending further stabilization and treatment.       I have reviewed and edited the documentation recorded by the scribe. This documentation accurately reflects the services I personally performed and treatment decisions made by me, in consultation with the attending physician Jaiden Tong MD.      Nai Xavier MD,  PGY1 Psychiatry resident    Psychiatry Attending Attestation:  This patient has been seen and evaluated by me, Jaiden Tong M.D.  The patient's condition and treatment plan were discussed with the resident, and care coordinated with the CTC and RN. I reviewed, edited and agree with the findings and plan in this note.     Jaiden Tong M.D.

## 2018-06-29 NOTE — PROGRESS NOTES
SPIRITUAL HEALTH SERVICES  SPIRITUAL ASSESSMENT Progress Note  [facility] ([facility]) Station 22     REFERRAL SOURCE: I tried to visit pt Rianna this morning per follow up visit but pt still sleeping like yesterday. I informed to the unit staff about my visit attempts. Pt prefer to sleep than to go out from her room and due to that reason I couldn't get a chance to talk with the pt.    PLAN: I will remain open to provide spiritual care for the pt as needed.    Vijay Martin M.Div. ([place]), M.Th., D.Min., [facility]  Staff   Pager [phone]

## 2018-06-29 NOTE — PROGRESS NOTES
Rule 25 Chemical Health Assessment Update:   Rianna Man had a Rule 25 Evaluation with RAE Petty LPC at [facility] on 5/24/18 and is in the patients EMR ([employer]). The original Rule 25 chemical health assessment was reviewed and updated on 6/29/18 by Rima Mtz LPC.  Please refer to the patients EMR (BeOnDesk) for the aforementioned assessment.    Rianna Man is a 28 year old female who presents to the ED stating she is suicidal.  She was recently admitted here.  She says she has no support after discharge and is unable to get to the appointments.  She says she has not eaten in several days because she is depressed.  She says she is ready to give up and is tired of living.  She is feeling suicidal  She says last night she tried to overdose on melatonin and drugs.  She says she has been using drugs every day over the past few days.  She uses meth, cocaine and alcohol.  She is homeless.  She has been involved in risky sexual behavior.  She has hx of cutting and last cut 1-2 weeks ago to feel something.  She says she feels homicidal towards her child's father who lives in [place].  He won't let her see her child.   She hasn't seen her son in years.  She would like to shoot him or run him over.  She has no providers.  She says she quit her job.  She has been stealing.  She lost her wallet.  She has a dwi pending.  She says her whole body hurts.     Updated Information:    DIMENSION I - Acute Intoxication /Withdrawal Potential   1. Chemical use most recent 12 months outside a facility and other significant use history (client self-report)              X = Primary Drug Used   Age of First Use Most Recent Pattern of Use and Duration   Need enough information to show pattern (both frequency and amounts) and to show tolerance for each chemical that has a diagnosis   Date of last use and time, if needed   Withdrawal Potential? Requiring special care Method of use  (oral, smoked, snort,  IV, etc)      Alcohol     13  Current: 1-2 x/week 1/2 liter vodka   6/27/18 No Oral      Marijuana/  Hashish   16 Current: 1 g/day for past 2 weeks 6/24/18 No Smoke      Cocaine/Crack     22 Current: 1g/day since Sunday 6/24, only used 2 days 6/27/18 No Snort      Meth/  Amphetamines   28 Current: First use, unsure of ammount used since it was pt's first use.  6/27/18 No Snort  Smoke      Heroin     N/A          Other Opiates/  Synthetics   N/A          Inhalants     N/A          Benzodiazepines     N/A          Hallucinogens     N/A          Barbiturates/  Sedatives/  Hypnotics N/A          Over-the-Counter Drugs   N/A          Other     N/A          Nicotine     13 1/2 pack per day 6/27/18 Yes Smoke     ASAM PLACEMENT CRITERIA:   DIMENSION 1: Intoxication/Withdrawal: Risk level 0. The patient reports drug of choice is alcohol and last date of use is 06/26/18. Pt was admitted to [facility] for symptoms of withdrawal and mental health. Withdrawal should not interfere with her ability to participate in treatment.   DIMENSION 2: Biomedical Conditions: Risk level 1. The patient denies any major medical conditions that would interfere with treatment. Reports history of low Iron. Pt reports last medical exam was March 2018 to discuss birth control and safe sex. Denies any concerns that came about. Pt is able to navigate health care system and get services as needed.    DIMENSION 3: Emotional/Behavioral: Risk level 2. The patient reports she was raised by both parents whom are currently . Pt reports a total of 8 siblings from different partnerships. Pt reports family history of substance abuse with 2 brothers and her father. Reports family history of mental health with grandmother, mother, and sister. Pt denies history of childhood abuse or neglect. Reports she felt supported while growing up. Pt reports  diagnosis of bipolar disorder for which she has been hospitalized for. Pt reports history  "of suicide ideation, presently hospitalized for suicidal thinking. Reports history of self-harm by cutting. Pt has difficulty with impulse control and lacks coping skills. Pt has difficulty functioning in significant life areas.  DIMENSION 4: Treatment Readiness: Risk level 2. The patient has continued to use despite consequences. Pt appears to be in the \"pre-contemplation\" stage of change from within the stages of change model.   DIMENSION 5: Relapse & Continued Use Potential: Risk level 4. Patient reports having been involved in 10 past treatments (completed 5), 2 past detox admissions, and minimal past 12-Step support group participation. Pt reports having minimal sober time (2 months) and has tried to quit using and drinking in the past but relapsed. Pt lacks insight into her personal relapse process along with early warning signs and triggers. Pt lacks impulse control, sober coping skills and long-term sober maintenance skills. Pt lacks insight into the effects her use has had on her physical and mental health. Pt is at a high risk for relapse/continued use.  DIMENSION 6: Recovery Environment: Risk level 4. The patient reports she is currently homeless and unemployed. Pt lacks daily structure and significant meaningful activity. Pt reports fractured relationships with family and friends due to her use. Pt has current legal involvement for DUI and a probation violation charge. Denies history of sex offenses. Pt would benefit from residential treatment and building sober support system.    Counselor Notes: N/A    Rima Mtz LPC    "

## 2018-06-29 NOTE — PROGRESS NOTES
Pt sleeping or in bed for entire shift besides dinner. Pt neurtral to pleasant for the short interactions this writer had with Pt. Pt was short in responses and had no concerns besides feeling tired and a little light headed. When asked if Pt had any hallucinations Pt responded no, however hesitated before answering as if thinking. Pt's answers appeared superficial. Denied SI/SIB.         06/28/18 2117   Behavioral Health   Hallucinations denies / not responding to hallucinations   Thinking distractable   Insight poor   Judgement impaired   Affect blunted, flat   Mood mood is calm   Physical Appearance/Attire appears stated age   1. Wish to be Dead No   2. Non-Specific Active Suicidal Thoughts  No

## 2018-06-29 NOTE — PROGRESS NOTES
Writer met with pt, completed Rule 25 update, 2 page assessment and placement summary, and CPA form. Faxed to [facility] requesting funding for [employer]. Faxed to [facility] for admission. Pt is aware that [facility] has no openings for 3 weeks and states this is her only choice. Pt is aware that she will need to follow-up with [facility] after hospital d/c to finalize admission. CTC to follow-up with [facility] to verify funding for [employer] has been approved.

## 2018-06-29 NOTE — PLAN OF CARE
Problem: Social/Occupational/Functional Impairment (Depressive Signs/Symptoms) (Adult)  Goal: Improved Social/Occupational/Functional Skills (Depressive Signs/Symptoms)    Pt has not attended OT since admit.  Will continue to encourage participation and completion of  self-assessment as able. OT staff will explain the purpose of being involved with treatment plan and provide options to meet current needs and goals.

## 2018-06-30 PROCEDURE — 12400007 ZZH R&B MH INTERMEDIATE UMMC

## 2018-06-30 PROCEDURE — 25000132 ZZH RX MED GY IP 250 OP 250 PS 637: Performed by: STUDENT IN AN ORGANIZED HEALTH CARE EDUCATION/TRAINING PROGRAM

## 2018-06-30 PROCEDURE — 25000132 ZZH RX MED GY IP 250 OP 250 PS 637

## 2018-06-30 RX ADMIN — HYDROXYZINE HYDROCHLORIDE 50 MG: 50 TABLET, FILM COATED ORAL at 17:41

## 2018-06-30 RX ADMIN — ACETAMINOPHEN 650 MG: 325 TABLET, FILM COATED ORAL at 19:15

## 2018-06-30 RX ADMIN — TRAZODONE HYDROCHLORIDE 50 MG: 50 TABLET ORAL at 21:48

## 2018-06-30 RX ADMIN — OLANZAPINE 10 MG: 10 TABLET, FILM COATED ORAL at 08:35

## 2018-06-30 RX ADMIN — GABAPENTIN 200 MG: 100 CAPSULE ORAL at 01:39

## 2018-06-30 RX ADMIN — GABAPENTIN 200 MG: 100 CAPSULE ORAL at 17:41

## 2018-06-30 RX ADMIN — NICOTINE 1 PATCH: 14 PATCH, EXTENDED RELEASE TRANSDERMAL at 08:34

## 2018-06-30 RX ADMIN — GABAPENTIN 200 MG: 100 CAPSULE ORAL at 21:43

## 2018-06-30 RX ADMIN — HYDROXYZINE HYDROCHLORIDE 50 MG: 50 TABLET, FILM COATED ORAL at 01:39

## 2018-06-30 RX ADMIN — FOLIC ACID 1 MG: 1 TABLET ORAL at 08:34

## 2018-06-30 RX ADMIN — Medication 100 MG: at 08:34

## 2018-06-30 RX ADMIN — PRAZOSIN HYDROCHLORIDE 2 MG: 1 CAPSULE ORAL at 21:44

## 2018-06-30 RX ADMIN — GABAPENTIN 200 MG: 100 CAPSULE ORAL at 09:47

## 2018-06-30 RX ADMIN — OLANZAPINE 10 MG: 10 TABLET, FILM COATED ORAL at 21:43

## 2018-06-30 RX ADMIN — MULTIPLE VITAMINS W/ MINERALS TAB 1 TABLET: TAB at 08:34

## 2018-06-30 ASSESSMENT — ACTIVITIES OF DAILY LIVING (ADL)
ORAL_HYGIENE: INDEPENDENT
DRESS: SCRUBS (BEHAVIORAL HEALTH);INDEPENDENT
GROOMING: INDEPENDENT
DRESS: SCRUBS (BEHAVIORAL HEALTH)
LAUNDRY: WITH SUPERVISION
ORAL_HYGIENE: INDEPENDENT
GROOMING: SHOWER;INDEPENDENT

## 2018-06-30 NOTE — PLAN OF CARE
"Problem: Mood Impairment (Depressive Signs/Symptoms) (Adult)  Goal: Improved Mood Symptoms (Depressive Signs/Symptoms)  Rianna came to med window with demands for her morning medication, her head scarf and all the prn's she could have at once,  \"The doctor told me that I could wear the scarf.  I have been wearing it and I won't take meds or have blood pressure until you give my scarf back!\".  She did take medications and given one prn at time.  She wanted Zyprexa first , \"For voices and anxiety.  I should have (Neurontin, Vistaril) too.  They give it to me all at once.\".  Tried to bargain about 2nd prn , \"How about in 30 minutes?,  Thirty minutes please, okay?\".  She fell asleep in the chair outside med room waiting the one hour and allowed the rationale that doctor/team better able to assess which meds help for what.  When asked she said, \"Yeah, the Zypexa helped for the voices a little bit.  A little it for the anxiety but I hardly noticed it.\".  She appeared to sleep in chair but might have been resting until another prn available.  \"I need different meds for different reasons at once.  The voices for one and the anxiety for another.\".  At first she was hard to hear, even thought stated demands loudly her words were not distinct.  She was angry, challenging but worked out of it and came back for Neurontin at 0945, \"Thank you.\".  She put a towel over her head.  Her diction much more able to be understood readily.      "

## 2018-06-30 NOTE — PROGRESS NOTES
Brief Psychiatry Progress Note:  Was informed by nursing staff that patient is requesting prn Trazodone. Per chart review, she has received this the last 2 nights. This medication was discontinued this morning, but the medication is listed in the most recent psychiatry progress note as an active medication.   - Restart Trazodone 50 mg HS prn  - Primary team can reassess after the weekend

## 2018-06-30 NOTE — PROGRESS NOTES
"CLINICAL NUTRITION SERVICES - brief note    Received patient/family request - \"Patient feels excessively hungry and requests more frequent meals and consult with nutrition services.\"    Per discussion with patient, since admission she has been experiencing increased hunger and questions if this is related to medications she is taking. She currently consumes three meals/day, but is requesting snacks between meals to help satisfy hunger. Currently follows a regular diet, with no known food allergies.     Nutrition Interventions:  Nutrition Education - Discussed healthy tips for coping with increased hunger. Recommended increase in fruits/vegetables/whole grains as these foods are higher in fiber and will help to satisfy hunger. Discussed small, frequent meals with scheduled snacks per below:   10am: boiled eggs, fruit   2pm: yogurt, carrots with ranch   8pm: 1/2 sandwich (turkey, cheese, lettuce/tomato, nichole), fruit    No nutrition follow-up warranted at this time.  Please consult if further needs arise.    Jenelle Crawford RD, LD  Weekend/On-call Pager: [phone]    "

## 2018-06-30 NOTE — PROGRESS NOTES
06/29/18 2051   Behavioral Health   Hallucinations auditory   Thinking intact   Insight insight appropriate to situation   Judgement intact   Eye Contact at examiner   Affect blunted, flat   Mood mood is calm   Physical Appearance/Attire attire appropriate to age and situation   Hygiene well groomed   Activity isolative   Speech clear;coherent   Psycho Education   Type of Intervention 1:1 intervention   Response participates, initiates socially appropriate   Hours 0.5   Activities of Daily Living   Hygiene/Grooming independent   Pt affect is blunted. She is visible in milieu watching tv most evening. Pt denies SI and HI. Pt endorsing auditory hallucinations stating she tom by attending groups and watching tv. Pt is upset she is being taken off of medications and hopes for meds to help with her sx's. Pt states no further concerns.

## 2018-07-01 PROCEDURE — 25000132 ZZH RX MED GY IP 250 OP 250 PS 637: Performed by: STUDENT IN AN ORGANIZED HEALTH CARE EDUCATION/TRAINING PROGRAM

## 2018-07-01 PROCEDURE — 25000132 ZZH RX MED GY IP 250 OP 250 PS 637

## 2018-07-01 PROCEDURE — 12400007 ZZH R&B MH INTERMEDIATE UMMC

## 2018-07-01 RX ADMIN — FOLIC ACID 1 MG: 1 TABLET ORAL at 08:58

## 2018-07-01 RX ADMIN — NICOTINE 1 PATCH: 14 PATCH, EXTENDED RELEASE TRANSDERMAL at 08:58

## 2018-07-01 RX ADMIN — Medication 100 MG: at 08:58

## 2018-07-01 RX ADMIN — ACETAMINOPHEN, ASPIRIN AND CAFFEINE 2 TABLET: 250; 250; 65 TABLET, FILM COATED ORAL at 20:01

## 2018-07-01 RX ADMIN — OLANZAPINE 10 MG: 10 TABLET, FILM COATED ORAL at 08:59

## 2018-07-01 RX ADMIN — GABAPENTIN 200 MG: 100 CAPSULE ORAL at 17:16

## 2018-07-01 RX ADMIN — MULTIPLE VITAMINS W/ MINERALS TAB 1 TABLET: TAB at 08:58

## 2018-07-01 RX ADMIN — HYDROXYZINE HYDROCHLORIDE 50 MG: 50 TABLET, FILM COATED ORAL at 00:28

## 2018-07-01 RX ADMIN — OLANZAPINE 10 MG: 10 TABLET, FILM COATED ORAL at 20:01

## 2018-07-01 RX ADMIN — PRAZOSIN HYDROCHLORIDE 2 MG: 1 CAPSULE ORAL at 21:40

## 2018-07-01 RX ADMIN — TRAZODONE HYDROCHLORIDE 50 MG: 50 TABLET ORAL at 21:40

## 2018-07-01 RX ADMIN — HYDROXYZINE HYDROCHLORIDE 50 MG: 50 TABLET, FILM COATED ORAL at 22:22

## 2018-07-01 RX ADMIN — GABAPENTIN 200 MG: 100 CAPSULE ORAL at 21:40

## 2018-07-01 ASSESSMENT — ACTIVITIES OF DAILY LIVING (ADL)
GROOMING: INDEPENDENT
ORAL_HYGIENE: INDEPENDENT
DRESS: SCRUBS (BEHAVIORAL HEALTH)
LAUNDRY: WITH SUPERVISION

## 2018-07-01 NOTE — PROGRESS NOTES
Pt present in milieu, however isolative. Pt reported feeling sad because she doesn't get any visitors, and reported feeling irritable and angry and was not able to understand why. Pt wants to talk with doctors as she has questions and concerns about her medications. Pt said she is not sleeping at night and that is why she sleeps during the day.,       07/01/18 1811   Behavioral Health   Hallucinations auditory   Thinking distractable   Orientation person: oriented;place: oriented;date: oriented;time: oriented   Memory baseline memory   Insight admits / accepts   Judgement impaired   Eye Contact at examiner   Affect sad;blunted, flat;irritable   Mood mood is calm   Physical Appearance/Attire attire appropriate to age and situation   Hygiene well groomed   1. Wish to be Dead No   2. Non-Specific Active Suicidal Thoughts  No   Speech clear;coherent   Psychomotor / Gait balanced;steady

## 2018-07-01 NOTE — PLAN OF CARE
"Problem: Feelings of Worthlessness, Hopelessness, Excessive Guilt (Depressive Signs/Symptoms) (Adult)  Goal: Enhanced Self-Esteem/Confidence (Depressive Signs/Symptoms)  Rianna waited fairly patiently at window for her meds.  She did pace while waiting, and flared up a few times, then said, \"Give me all the rest (meaning prn's).  Why do you ask me why I want my Trazadone or why I want Zyprexa.  It's there - why don't you ask me why I want my vitamin it is the same thing.\".  \"I want Zyprexa and Neurontin now.\".  She did agree to take the Zyprexa first and come back in an hour if she wanted more prn's.  She took Zyprexa, \"For the voices.\", and then went to her room and appeared to sleep for about 3 hours.  She was up around 1100 then back to sleep.  She is irritable with her requests, tries to provide some logic for her requests and then does comply with trying one prn (wants for anxiety, voices).  She was very polite when offered service in the form of getting her extra juice with her breakfast.  An offer to clear away her empty dishes etc., caused her to thank staff twice, give eye contact and appreciate these small things.  When asked about her mood she shook her head \"yes\" that she was sad,  \"I am irritable.  I was irritable. Sleeping late is just me, not the medicine.\".    "

## 2018-07-01 NOTE — PROGRESS NOTES
Pt was present but was socially withdrawn in the milieu throughout the shift. Pt was observed watchign TV, laying in bed, and eating dinner. Pt did not attend the community meeting. During check-in pt presented with blunt/flat affect and stated she was feeling a bit depressed. Pt also stated she had been experiencing auditory hallucinations. This  did not observe any major safety concerns with the pt throughout the shift.        06/30/18 2201   Behavioral Health   Hallucinations auditory   Thinking poor concentration   Orientation person: oriented;place: oriented   Memory (appears baseline)   Insight poor   Judgement impaired   Eye Contact at examiner   Affect blunted, flat   Mood mood is calm   Physical Appearance/Attire attire appropriate to age and situation   Hygiene well groomed   Suicidality (pt did not report thoughts or urges)   Self Injury (pt did not report thoughts or urges)   Elopement (no apparent risk)   Activity (pt present in the milieu)   Speech clear;coherent   Medication Sensitivity no stated side effects;no observed side effects   Psychomotor / Gait balanced;steady   Psycho Education   Type of Intervention 1:1 intervention   Response participates with encouragement   Hours 0.5   Treatment Detail (pt did not attend community meeting,participated in check-in)   Activities of Daily Living   Hygiene/Grooming shower;independent   Oral Hygiene independent   Dress scrubs (behavioral health);independent   Laundry (pt did not complete)   Room Organization independent   Activity   Activity Assistance Provided independent        06/30/18 2201   Behavioral Health   Hallucinations auditory   Thinking poor concentration   Orientation person: oriented;place: oriented   Memory (appears baseline)   Insight poor   Judgement impaired   Eye Contact at examiner   Affect blunted, flat   Mood mood is calm   Physical Appearance/Attire attire appropriate to age and situation   Hygiene well groomed   Suicidality  (pt did not report thoughts or urges)   Self Injury (pt did not report thoughts or urges)   Elopement (no apparent risk)   Activity (pt present in the milieu)   Speech clear;coherent   Medication Sensitivity no stated side effects;no observed side effects   Psychomotor / Gait balanced;steady   Psycho Education   Type of Intervention 1:1 intervention   Response participates with encouragement   Hours 0.5   Treatment Detail (pt did not attend community meeting,participated in check-in)   Activities of Daily Living   Hygiene/Grooming shower;independent   Oral Hygiene independent   Dress scrubs (behavioral health);independent   Laundry (pt did not complete)   Room Organization independent   Activity   Activity Assistance Provided independent

## 2018-07-02 PROCEDURE — 25000132 ZZH RX MED GY IP 250 OP 250 PS 637: Performed by: STUDENT IN AN ORGANIZED HEALTH CARE EDUCATION/TRAINING PROGRAM

## 2018-07-02 PROCEDURE — 99232 SBSQ HOSP IP/OBS MODERATE 35: CPT | Mod: GC | Performed by: PSYCHIATRY & NEUROLOGY

## 2018-07-02 PROCEDURE — 97150 GROUP THERAPEUTIC PROCEDURES: CPT | Mod: GO

## 2018-07-02 PROCEDURE — 25000132 ZZH RX MED GY IP 250 OP 250 PS 637

## 2018-07-02 PROCEDURE — 12400007 ZZH R&B MH INTERMEDIATE UMMC

## 2018-07-02 RX ORDER — OLANZAPINE 5 MG/1
5-10 TABLET ORAL
Status: DISCONTINUED | OUTPATIENT
Start: 2018-07-02 | End: 2018-07-03

## 2018-07-02 RX ORDER — GABAPENTIN 300 MG/1
300 CAPSULE ORAL AT BEDTIME
Status: DISCONTINUED | OUTPATIENT
Start: 2018-07-02 | End: 2018-07-02

## 2018-07-02 RX ORDER — GABAPENTIN 100 MG/1
200 CAPSULE ORAL EVERY MORNING
Status: DISCONTINUED | OUTPATIENT
Start: 2018-07-03 | End: 2018-07-02

## 2018-07-02 RX ORDER — GABAPENTIN 100 MG/1
200 CAPSULE ORAL 2 TIMES DAILY
Status: DISCONTINUED | OUTPATIENT
Start: 2018-07-02 | End: 2018-07-03 | Stop reason: HOSPADM

## 2018-07-02 RX ORDER — OLANZAPINE 10 MG/2ML
10 INJECTION, POWDER, FOR SOLUTION INTRAMUSCULAR
Status: DISCONTINUED | OUTPATIENT
Start: 2018-07-02 | End: 2018-07-03

## 2018-07-02 RX ORDER — PRAZOSIN HYDROCHLORIDE 1 MG/1
3 CAPSULE ORAL AT BEDTIME
Status: DISCONTINUED | OUTPATIENT
Start: 2018-07-02 | End: 2018-07-03 | Stop reason: HOSPADM

## 2018-07-02 RX ORDER — TRAZODONE HYDROCHLORIDE 100 MG/1
100 TABLET ORAL
Status: DISCONTINUED | OUTPATIENT
Start: 2018-07-02 | End: 2018-07-03 | Stop reason: HOSPADM

## 2018-07-02 RX ADMIN — ACETAMINOPHEN, ASPIRIN AND CAFFEINE 2 TABLET: 250; 250; 65 TABLET, FILM COATED ORAL at 17:28

## 2018-07-02 RX ADMIN — OLANZAPINE 10 MG: 10 TABLET, FILM COATED ORAL at 11:28

## 2018-07-02 RX ADMIN — TRAZODONE HYDROCHLORIDE 100 MG: 100 TABLET ORAL at 21:42

## 2018-07-02 RX ADMIN — MULTIPLE VITAMINS W/ MINERALS TAB 1 TABLET: TAB at 08:48

## 2018-07-02 RX ADMIN — PRAZOSIN HYDROCHLORIDE 3 MG: 1 CAPSULE ORAL at 21:42

## 2018-07-02 RX ADMIN — GABAPENTIN 200 MG: 100 CAPSULE ORAL at 12:59

## 2018-07-02 RX ADMIN — GABAPENTIN 200 MG: 100 CAPSULE ORAL at 11:21

## 2018-07-02 RX ADMIN — GABAPENTIN 400 MG: 300 CAPSULE ORAL at 21:41

## 2018-07-02 RX ADMIN — OLANZAPINE 10 MG: 10 TABLET, FILM COATED ORAL at 19:39

## 2018-07-02 RX ADMIN — GABAPENTIN 200 MG: 100 CAPSULE ORAL at 08:48

## 2018-07-02 RX ADMIN — GABAPENTIN 200 MG: 100 CAPSULE ORAL at 17:28

## 2018-07-02 RX ADMIN — NICOTINE 1 PATCH: 14 PATCH, EXTENDED RELEASE TRANSDERMAL at 08:48

## 2018-07-02 RX ADMIN — Medication 100 MG: at 08:48

## 2018-07-02 RX ADMIN — FOLIC ACID 1 MG: 1 TABLET ORAL at 08:48

## 2018-07-02 ASSESSMENT — ACTIVITIES OF DAILY LIVING (ADL)
DRESS: SCRUBS (BEHAVIORAL HEALTH);INDEPENDENT
HYGIENE/GROOMING: INDEPENDENT
ORAL_HYGIENE: INDEPENDENT
GROOMING: INDEPENDENT
ORAL_HYGIENE: INDEPENDENT
DRESS: SCRUBS (BEHAVIORAL HEALTH)
LAUNDRY: WITH SUPERVISION

## 2018-07-02 NOTE — PROGRESS NOTES
County of financial responsibility is [facility]. Writer forwarded patient's Rule 25 to [facility] for approval.

## 2018-07-02 NOTE — PROGRESS NOTES
07/02/18 1341   Behavioral Health   Hallucinations auditory   Thinking confused;distractable   Orientation person: oriented;place: oriented;time: oriented   Memory baseline memory   Insight admits / accepts   Judgement impaired   Eye Contact at examiner   Affect blunted, flat;sad;irritable   Mood mood is calm;irritable   Physical Appearance/Attire attire appropriate to age and situation   Hygiene well groomed   Suicidality other (see comments)  (Pt denies)   1. Wish to be Dead No   2. Non-Specific Active Suicidal Thoughts  No   Self Injury other (see comment)  (Pt denies)   Speech clear;coherent   Psychomotor / Gait balanced;steady   Activities of Daily Living   Hygiene/Grooming independent   Oral Hygiene independent   Dress scrubs (behavioral health)   Laundry with supervision   Room Organization independent     Pt denied thoughts of SI/SIB. Pt stated that sh's still hearing voices in her head. Pt ate all meals and attended groups. Pt active in milieu and talked with staff and peers. Pt watched a movie with peers.

## 2018-07-02 NOTE — PROGRESS NOTES
"    ----------------------------------------------------------------------------------------------------------  [facility], [place]   Psychiatric Progress Note  Hospital Day #5     Interim History:   The patient's care was discussed with the treatment team and chart notes were reviewed.    Sleep: 6 hours  Scheduled Medications: Adherent   Staff Report: Patient has been irritable. Refuses vitals checks. She has been asking for more medication for her mood, anxiety and sleep. She feels like no one is listening to her. She has repeatedly asked for prn Zyprexa for \"hallucinations\" and \"feeling agitated\".    Patient Interview: Patient was interviewed in the milieu by treating team. She expressed her concerns about not feeling better after coming to the hospital as she was under-medicated still. On specific questioning, she reported that she was still hearing \"voices\" and was feeling low and angry and upset and frustrated. She appeared distressed about her nightmares, which she was apparently continuing to have. She also reported a poor subjective quality of sleep. Through the interview, her distress and concerns were validated. There were no active threats of harming self or others. She was tearful during the interview. Patient reported being unable to read her book because of concentration problems. The need for medication and treatment compliance, her rule 25 assessment and plan for follow up in an inpatient treatment facility, for which she appeared to show interest. She was encouraged to connect with  to work on appropriate referrals for continued care. Through sessions with patient in room, TIPP skills were discussed as well. Safety plans were discussed and she agreed to report to staff in case of any SI/HI.  Her concerns about medication changes were discussed and a round the clock regimen of Gabapentin and increased dose of Prazosin 3mg/day was implemented. We also " "discussed use of prn Gabapentin for anxiety when patient requested.   The risks, benefits, alternatives and side effects of any medication changes have been discussed and are understood by the patient.    Review of systems:     ROS was negative unless noted above.          Allergies:     Allergies   Allergen Reactions     No Known Allergies             Psychiatric Examination:   /83  Pulse 115  Temp 98.7  F (37.1  C) (Tympanic)  Resp 17  Ht 1.727 m (5' 8\")  Wt 113.4 kg (250 lb)  SpO2 98%  Breastfeeding? No  BMI 38.01 kg/m2  Weight is 250 lbs 0 oz  Body mass index is 38.01 kg/(m^2).    Appearance:  poorly groomed, morbidly obese and disheveled   Attitude:  somewhat cooperative  Eye Contact:  poor   Mood:  sad   Affect:  mood congruent  Speech:  clear, coherent  Psychomotor Behavior:  no evidence of tardive dyskinesia, dystonia, or tics and physical retardation  Thought Process:  linear  Associations:  no loose associations  Thought Content:  Reports occasional wishes to die with no active suicide plans, no active delusions reported  Insight:  limited  Judgment:  limited  Oriented to:  time, person, and place  Attention Span and Concentration:  intact  Recent and Remote Memory:  intact  Language: Able to name objects, Able to repeat phrases and Able to read and write  Fund of Knowledge: low-normal  Muscle Strength and Tone: normal  Gait and Station: Normal         Labs:   Lipid profile was within normal limits    Consult : Nutrition services for adjustment in dietary recommendations       Assessment    Diagnostic Impression: This patient is a 28 year old female with history of BPAD type 1/ Schizoaffective disorder, PTSD, Cluster B traits, Cocaine abuse, Cannabis abuse, alcohol abuse who presented to [facility] ED  on 06/27/2018 due to recent suicide attempt and continuing SI. This is in the context of underlying untreated psychiatric illness, substance use and psychosocial stressors and personality factors.   " Family history is notable for mood and psychotic illness and MSE was notable for depressive syndrome with CAH, referential ideas and ongoing SI.   The patient is admitted with a probable substance induced mood disorder with differential diagnosis of BPAD..       Hospital course: Rianna Man was admitted to station 22 green team as a voluntary patient. Since she had discontinued all medications following past admission in May she was reassessed and started on low dose Olanzapine 10mg/day for reported CAH, agitation and need for sedation. Considering recent substance abuse, she was monitored for evolving withdrawal symptoms which she did not exhibit after 24 hours of admission, but delayed symptoms  were monitored for. She was noted to be calmer after admission though she initially still endorsed suicidal ideas.  On day 2 of her admission, we started Gabapentin 200mg QHS and 200mg tid prn while increasing her  Prazosin to 2mg at night for reducing her nightmares and helping with anxiety. She was intermittently irritable, asking for her meds to be restarted but without clear indication or history of benefit.  Patient continued to report nightmares and poor quality of sleep along with minimal reduction in her mood and anxiety symptoms on day 4 of her admission. Gabapentin was scheduled at 200-200-400 mg with additional prn doses of 200 mg as needed and Prazosin was increased to 3 mg at night. Her distress and concerns were validated during sessions and TIPP skills were discussed with the patient. She was agreeable for continued care at an inpatient treatment facility and was willing to work with the team and  towards that goal.      Medical course : She was cleared from ED for medical issues and had no further medical concerns after admisison.    Plan   Principal Diagnosis:   Substance induced mood disorder   BPAD- depression with psychotic symptoms          Secondary psychiatric diagnoses of  concern this admission:   Cannabis abuse  Alcohol abuse  Cocaine use  Amphetamine use  PTSD  Cluster B traits      Psychotropic Medications:      Tab Olanzapine 10mg at night  Tab Prazosin 3 mg at night  Tab gabapentin 200mg- 200- 400mg  Tab gabapentin 200mg tid prn    Tab Hydroxyzine 25mg prn q4hrly  Tab Trazadone 100mg prn hsod  Olanzapine PO or IM 10mg prn Q2hrly prn    Patient will be treated in therapeutic milieu with appropriate individual and group therapies as described.      Medical diagnoses to be addressed this admission:    Migraine unspecified on Excedrin, to follow up with outpatient provider      Legal Status: Voluntary    Safety Assessment:   Behavioral Orders   Procedures     Code 1 - Restrict to Unit     Routine Programming     As clinically indicated     Self Injury Precaution     Sexual precautions     Single Room     Status 15     Every 15 minutes.     Suicide precautions     Patients on Suicide Precautions should have a Combination Diet ordered that includes a Diet selection(s) AND a Behavioral Tray selection for Safe Tray - with utensils, or Safe Tray - NO utensils       Withdrawal precautions       Disposition: Pending further stabilization and treatment.       This documentation accurately reflects the services I personally performed and treatment decisions made by me, in consultation with the attending physician Jaiden Tong MD.      Nai Xavier MD,  PGY1 Psychiatry resident    Psychiatry Attending Attestation:  This patient has been seen and evaluated by me, Jaiden Tong M.D.  The patient's condition and treatment plan were discussed with the resident, and care coordinated with the CTC and RN. I reviewed, edited and agree with the findings and plan in this note.     Jaiden Tong M.D.   of Psychiatry

## 2018-07-02 NOTE — PROGRESS NOTES
Rianna initially seen in OT this date.  Actively participated in 3 of 3 groups offered.  Completed self-assessment and reviewed with this writer.  OT evaluation to follow.

## 2018-07-03 VITALS
HEART RATE: 92 BPM | SYSTOLIC BLOOD PRESSURE: 125 MMHG | WEIGHT: 261 LBS | BODY MASS INDEX: 39.56 KG/M2 | OXYGEN SATURATION: 98 % | HEIGHT: 68 IN | DIASTOLIC BLOOD PRESSURE: 85 MMHG | RESPIRATION RATE: 16 BRPM | TEMPERATURE: 97.9 F

## 2018-07-03 PROCEDURE — G0177 OPPS/PHP; TRAIN & EDUC SERV: HCPCS

## 2018-07-03 PROCEDURE — 25000132 ZZH RX MED GY IP 250 OP 250 PS 637

## 2018-07-03 PROCEDURE — 25000132 ZZH RX MED GY IP 250 OP 250 PS 637: Performed by: STUDENT IN AN ORGANIZED HEALTH CARE EDUCATION/TRAINING PROGRAM

## 2018-07-03 PROCEDURE — 99238 HOSP IP/OBS DSCHRG MGMT 30/<: CPT | Mod: GC | Performed by: PSYCHIATRY & NEUROLOGY

## 2018-07-03 PROCEDURE — 97150 GROUP THERAPEUTIC PROCEDURES: CPT | Mod: GO

## 2018-07-03 RX ORDER — OLANZAPINE 10 MG/1
10 TABLET ORAL AT BEDTIME
Qty: 30 TABLET | Refills: 0 | Status: ON HOLD | OUTPATIENT
Start: 2018-07-03 | End: 2018-08-31

## 2018-07-03 RX ORDER — OLANZAPINE 10 MG/2ML
10 INJECTION, POWDER, FOR SOLUTION INTRAMUSCULAR
Status: DISCONTINUED | OUTPATIENT
Start: 2018-07-03 | End: 2018-07-03 | Stop reason: HOSPADM

## 2018-07-03 RX ORDER — GABAPENTIN 400 MG/1
400 CAPSULE ORAL AT BEDTIME
Qty: 90 CAPSULE | Refills: 0 | Status: SHIPPED | OUTPATIENT
Start: 2018-07-03 | End: 2019-06-11

## 2018-07-03 RX ORDER — TRAZODONE HYDROCHLORIDE 100 MG/1
100 TABLET ORAL
Qty: 15 TABLET | Refills: 0 | Status: ON HOLD | OUTPATIENT
Start: 2018-07-03 | End: 2018-08-31

## 2018-07-03 RX ORDER — GABAPENTIN 100 MG/1
200 CAPSULE ORAL 2 TIMES DAILY
Qty: 90 CAPSULE | Refills: 0 | Status: SHIPPED | OUTPATIENT
Start: 2018-07-04 | End: 2019-06-11

## 2018-07-03 RX ORDER — PRAZOSIN HYDROCHLORIDE 2 MG/1
4 CAPSULE ORAL AT BEDTIME
Qty: 30 CAPSULE | Refills: 1 | Status: ON HOLD | OUTPATIENT
Start: 2018-07-03 | End: 2018-08-31

## 2018-07-03 RX ORDER — GABAPENTIN 100 MG/1
200 CAPSULE ORAL 3 TIMES DAILY PRN
Qty: 90 CAPSULE | Refills: 0 | Status: SHIPPED | OUTPATIENT
Start: 2018-07-03 | End: 2019-06-11

## 2018-07-03 RX ORDER — OLANZAPINE 5 MG/1
5 TABLET ORAL
Status: DISCONTINUED | OUTPATIENT
Start: 2018-07-03 | End: 2018-07-03 | Stop reason: HOSPADM

## 2018-07-03 RX ADMIN — Medication 100 MG: at 08:15

## 2018-07-03 RX ADMIN — FOLIC ACID 1 MG: 1 TABLET ORAL at 08:15

## 2018-07-03 RX ADMIN — GABAPENTIN 200 MG: 100 CAPSULE ORAL at 08:16

## 2018-07-03 RX ADMIN — GABAPENTIN 200 MG: 100 CAPSULE ORAL at 12:39

## 2018-07-03 RX ADMIN — NICOTINE 1 PATCH: 14 PATCH, EXTENDED RELEASE TRANSDERMAL at 08:14

## 2018-07-03 RX ADMIN — OLANZAPINE 5 MG: 5 TABLET, FILM COATED ORAL at 15:42

## 2018-07-03 RX ADMIN — OLANZAPINE 5 MG: 10 TABLET, FILM COATED ORAL at 10:27

## 2018-07-03 RX ADMIN — MULTIPLE VITAMINS W/ MINERALS TAB 1 TABLET: TAB at 08:15

## 2018-07-03 ASSESSMENT — ACTIVITIES OF DAILY LIVING (ADL)
LAUNDRY: WITH SUPERVISION
DRESS: INDEPENDENT
ORAL_HYGIENE: INDEPENDENT
GROOMING: INDEPENDENT

## 2018-07-03 NOTE — PROGRESS NOTES
Patient left with all belongings, but would not wait for DC meds from pharmacy.   Pt denies any SI/SIB.

## 2018-07-03 NOTE — PROGRESS NOTES
Pt was visible in the milieu, slightly withdrawn from peers. She denies SI/SIB and medication side affects. Expressed feeling anxious about going to a new place and about her sister who may visit today. Pt stated she is having homicidal thoughts, they are not towards a particular pt, just thoughts because of the milieu energy. Pt was calm throughout the shift, stated she would be able to approach staff when thoughts become overwhelming.      07/03/18 1358   Behavioral Health   Hallucinations auditory   Thinking distractable   Orientation person: oriented;place: oriented;date: oriented;time: oriented   Memory baseline memory   Insight admits / accepts   Judgement impaired   Eye Contact at examiner   Affect blunted, flat;irritable   Mood irritable;mood is calm   Physical Appearance/Attire attire appropriate to age and situation   Hygiene well groomed   Suicidality other (see comments)  (denies )   1. Wish to be Dead No   2. Non-Specific Active Suicidal Thoughts  No   Self Injury other (see comment)  (denies)   Elopement (No current concerns )   Activity other (see comment)   Speech clear;coherent   Medication Sensitivity no stated side effects;no observed side effects   Psychomotor / Gait balanced;steady   Psycho Education   Type of Intervention 1:1 intervention   Response participates with encouragement   Hours 0.5   Treatment Detail check in    Activities of Daily Living   Hygiene/Grooming independent   Oral Hygiene independent   Dress independent   Laundry with supervision   Room Organization independent   Activity   Activity Assistance Provided independent
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
## 2018-07-03 NOTE — PROGRESS NOTES
"Pt was present in the milieu throughout the majority of the shift but was somewhat socially withdrawn. Pt was observed watching TV, eating dinner, and attending the community meeting. During staff check-in the pt presented with a blunt/flat affect and appeared somewhat depressed. Pt stated she was experiencing the same mental health problems as yesterday but was more depressed today because she couldn't see her sister today (her birthday.) Pt denied SI, HI, or SIB. Pt also stated she had a preoccupations with cleaning her hands today, which she described as a feeling which \"comes and goes.\"        07/02/18 2131   Behavioral Health   Hallucinations auditory   Thinking distractable   Orientation person: oriented;place: oriented;date: oriented;time: oriented   Memory (appears baseline)   Insight admits / accepts   Judgement impaired   Eye Contact at examiner   Affect blunted, flat;irritable   Mood mood is calm;irritable   Physical Appearance/Attire attire appropriate to age and situation   Hygiene well groomed   Suicidality (pt denied thoughts and urges)   Self Injury (pt denied thoughts and urges)   Elopement (no apparent risk)   Activity withdrawn   Speech clear;coherent   Medication Sensitivity no stated side effects;no observed side effects   Psychomotor / Gait balanced;steady   Psycho Education   Type of Intervention (structured group, 1:1 intervention)   Response participates with encouragement   Hours 0.5   Treatment Detail (community meeting, check-in)   Activities of Daily Living   Hygiene/Grooming independent   Oral Hygiene independent   Dress scrubs (behavioral health);independent   Laundry (pt did not complete)   Room Organization independent   Activity   Activity Assistance Provided independent     "

## 2018-07-03 NOTE — DISCHARGE INSTRUCTIONS
Behavioral Discharge Planning and Instructions      Summary:  You were admitted on 6/27/2018  due to Suicidal Ideations and Homicidal Ideations/Threatening Behaviors.  You were treated by Dr. Jaiden Tong MD and discharged on 7/3/18 from [facility] to [facility] Abuse Treatment Program [facility].       Principal Diagnosis: Substance induced mood disorder   BPAD- depression with psychotic symptoms      Health Care Follow-up Appointments:     You will be attending MI/CD treatment at [facility] where you will be seeing mental health providers while in their program. After discharge from [facility] you will need to schedule with an outpatient medication provider for continued care  .  [facility]  Medication Management Walk-in Appointments every Thursday at 8:30am to establish care  [address] [place], [place]  [phone]  [phone]    Major Treatments, Procedures and Findings:  You were provided with: a psychiatric assessment, medication evaluation and/or management, group therapy, CD evaluation/assessment and milieu management    Symptoms to Report: feeling more aggressive, increased confusion or thoughts of suicide    Early warning signs can include: increased depression or anxiety increased thoughts or behaviors of suicide or self-harm  increased unusual thinking, such as paranoia or hearing voices    Safety and Wellness:  Take all medicines as directed.  Make no changes unless your doctor suggests them.      Follow treatment recommendations.  Refrain from alcohol and non-prescribed drugs.  If there is a concern for safety, call 911.    Resources:   [facility] Adult Shelter Team  [address]  [phone]    [facility] Economic Assistance - cash assistance  [address] [place] [address]  [place], [place]  [phone]    Social Security Office  [address] [place] [address]  "[id number]  [phone]  Crisis Intervention: 647.239.3545 or 196-339-8020 (TTY: 185.858.3336).  Call anytime for help.  National [place] on Mental Illness (www.[place].hailey.org): 943.687.1389 or 865-276-5395.  National Suicide Prevention Line (www.mentalhealthmn.org): 690-145-ALPL (9617)  Mental Health Consumer/Survivor Network of [place] (www.mhcsn.net): 524.291.1509 or 606-824-4256  Mental Health Association of [place] (www.mentalhealth.org): 475.603.1894 or 378-932-3486  Self- Management and Recovery Training., SMART-- Toll free: 542.440.4247  www.[employer]  [facility] Crisis (COPE) Response - Adult 745 758-1149  Text 4 Life: txt \"LIFE\" to 58731 for immediate support and crisis intervention  Crisis text line: Text \"[place]\" to 749064. Free, confidential, 24/7.  Crisis Intervention: 282.560.4686 or 682-561-3707. Call anytime for help.   [facility] Mental Health Crisis Team - Child: [phone]    The treatment team has appreciated the opportunity to work with you.     If you have any questions or concerns our unit number is [phone]  You may be receiving a follow-up phone call within the next three days from a representative from behavioral health.    You have identified the best phone number to reach you as [phone]        "

## 2018-07-03 NOTE — PROGRESS NOTES
"    ----------------------------------------------------------------------------------------------------------  [facility] [place]   Psychiatric Progress Note  Hospital Day #6     Interim History:   The patient's care was discussed with the treatment team and chart notes were reviewed.    Sleep: 6.75  hours  Scheduled Medications: Adherent   Staff Report: Patient has been looking blunt, depressed. She was upset about missing her sister's birthday yesterday. She has been attending OT and group, but appears to be isolated, wanting to avoid company.  Patient Interview: Patient was interviewed in the conference room. She reported a reduction in hallucinations which have become a \"hum\" now. She did not describe active suicidal ideation.Patient's affect was more reactive during the interview. She looked less dysphoric than during admission. She reports that she is still unable to concentrate and read her book and that her quality of sleep was still poor. She reports having nightmares and vivid dreams. Her concerns about medication changes were discussed and increased dose of Prazosin 4mg/day was planned from the night.  We also discussed use of prn Gabapentin for anxiety when patient requested.   The risks, benefits, alternatives and side effects of any medication changes have been discussed and are understood by the patient.Patient's concerns and distress were validated and explored coping strategies. She as encouraged to participate in her treatment program. The risks, benefits, indications and alternatives to her current medications were discussed with the patient. She was also encouraged to work with [facility] for appropriate referrals to MICD programs.      Review of systems:     ROS was negative unless noted above.          Allergies:     Allergies   Allergen Reactions     No Known Allergies             Psychiatric Examination:   /85  Pulse 92  Temp 97.9  F (36.6  C) (Tympanic)  Resp " "16  Ht 1.727 m (5' 8\")  Wt 118.4 kg (261 lb)  SpO2 98%  Breastfeeding? No  BMI 39.68 kg/m2  Weight is 261 lbs 0 oz  Body mass index is 39.68 kg/(m^2).    Appearance:  poorly groomed, morbidly obese and disheveled   Attitude:  somewhat cooperative  Eye Contact:  poor   Mood:  sad   Affect:  mood congruent  Speech:  clear, coherent  Psychomotor Behavior:  no evidence of tardive dyskinesia, dystonia, or tics and physical retardation  Thought Process:  linear  Associations:  no loose associations  Thought Content:  Reports occasional wishes to die with no active suicide plans, no active delusions reported  Insight:  limited  Judgment:  limited  Oriented to:  time, person, and place  Attention Span and Concentration:  intact  Recent and Remote Memory:  intact  Language: Able to name objects, Able to repeat phrases and Able to read and write  Fund of Knowledge: low-normal  Muscle Strength and Tone: normal  Gait and Station: Normal         Labs:   Lipid profile was within normal limits    Consult : Nutrition services for adjustment in dietary recommendations       Assessment    Diagnostic Impression: This patient is a 28 year old female with history of BPAD type 1/ Schizoaffective disorder, PTSD, Cluster B traits, Cocaine abuse, Cannabis abuse, alcohol abuse who presented to [facility] ED  on 06/27/2018 due to recent suicide attempt and continuing SI. This is in the context of underlying untreated psychiatric illness, substance use and psychosocial stressors and personality factors.   Family history is notable for mood and psychotic illness and MSE was notable for depressive syndrome with CAH, referential ideas and ongoing SI.   The patient is admitted with a probable substance induced mood disorder with differential diagnosis of BPAD..       Hospital course: Rianna Man was admitted to station 22 green team as a voluntary patient. Since she had discontinued all medications following past admission in May she was " reassessed and started on low dose Olanzapine 10mg/day for reported CAH, agitation and need for sedation. Considering recent substance abuse, she was monitored for evolving withdrawal symptoms which she did not exhibit after 24 hours of admission, but delayed symptoms  were monitored for. She was noted to be calmer after admission though she initially still endorsed suicidal ideas.  On day 2 of her admission, we started Gabapentin 200mg QHS and 200mg tid prn while increasing her  Prazosin to 2mg at night for reducing her nightmares and helping with anxiety. She was intermittently irritable, asking for her meds to be restarted but without clear indication or history of benefit.  Patient continued to report nightmares and poor quality of sleep along with minimal reduction in her mood and anxiety symptoms on day 4 of her admission. Gabapentin was scheduled at 200-200-400 mg with additional prn doses of 200 mg as needed and Prazosin was increased to 3 mg at night. Her distress and concerns were validated during sessions and TIPP skills were discussed with the patient. She was agreeable for continued care at an inpatient treatment facility and was willing to work with the team and  towards that goal. Patient continued to report nightmares and decreased sleep as on 07/03/2018 and Prazosin was increased to 4 mg/day (Blood pressure today 125/85mmHg). Evaluation of risk of SI/HI was continued along with safety precautions in the inpatient setting.  Patient's concerns and distress were validated and explored coping strategies. She as encouraged to participate in her treatment program. The risks, benefits, indications and alternatives to her current medications were discussed with the patient. She was also encouraged to work with [facility] for appropriate referrals to MICD programs.    Medical course : She was cleared from ED for medical issues and had no further medical concerns after admisison.    Plan   Principal  Diagnosis:   Substance induced mood disorder   BPAD- depression with psychotic symptoms          Secondary psychiatric diagnoses of concern this admission:   Cannabis abuse  Alcohol abuse  Cocaine use  Amphetamine use  PTSD  Cluster B traits      Psychotropic Medications: (recent changes in bold)    Tab Prazosin 4 mg at night  Tab Olanzapine 10mg at night  Tab gabapentin 200mg- 200- 400mg  Tab gabapentin 200mg tid prn    Tab Hydroxyzine 25mg prn q4hrly  Tab Trazadone 100mg prn hsod  [id number] PO or IM 10mg prn Q2hrly prn    Patient will be treated in therapeutic milieu with appropriate individual and group therapies as described.      Medical diagnoses to be addressed this admission:    Migraine unspecified on Excedrin, to follow up with outpatient provider      Legal Status: Voluntary    Safety Assessment:   Behavioral Orders   Procedures     Code 1 - Restrict to Unit     Routine Programming     As clinically indicated     Self Injury Precaution     Sexual precautions     Single Room     Status 15     Every 15 minutes.     Suicide precautions     Patients on Suicide Precautions should have a Combination Diet ordered that includes a Diet selection(s) AND a Behavioral Tray selection for Safe Tray - with utensils, or Safe Tray - NO utensils       Withdrawal precautions       Disposition: Pending further stabilization and treatment.       This documentation accurately reflects the services I personally performed and treatment decisions made by me, in consultation with the attending physician Jaiden Tong MD.      Nai Xavier MD,  PGY1 Psychiatry resident    Psychiatry Attending Attestation:  This patient has been seen and evaluated by me, Jaiden Tong M.D.  The patient's condition and treatment plan were discussed with the resident, and care coordinated with the CTC and RN. I reviewed, edited and agree with the findings and plan in this note.     Jaiden Tong M.D.   of  Psychiatry

## 2018-07-03 NOTE — PROGRESS NOTES
"INITIAL O.T. ASSESSMENT:  Bhanu has attended OT for 5 sessions since admission. Actively participated in all.  Good focus, planning and follow through on task. Has handled frustration well and able to adapt plans and expectations.  In discussion groups has talked about need to stay away from unhealthy relationships and trying to stay positive. Has been willing to address disruptive behavior of another group member in clear and calm way.    Was given and completed a written self assessment. Cited \"feeling down\" as the reason for current hospitalization. When asked what staff can do to be most helpful during hospitalization, pt responded \"listen to me\".      Goals prioritized for hospitalization (self-described):  Getting on medications and learn coping skills, particularly with anger,    OT staff explained the purpose of pt being involved in current treatment plan.      Plan: Encourage ongoing attendance as able to meet self-stated goals, implement positive coping skills, engage in graded opportunities for success, and provide assessment ongoing.          07/02/18 1500   General Information   Date Initially Attended OT 07/02/18   Clinical Impression   Affect Appropriate to situation   Orientation Oriented to person, place and time   Appearance and ADLs General cleanliness observed in most areas   Attention to Internal Stimuli No observed signs   Interaction Skills Interacts appropriately with staff;Interacts appropriately with peers   Ability to Communicate Needs Independent   Verbal Content Clear;Appropriate to topic   Ability to Maintain Boundaries Maintains appropriate physical boundaries;Maintains appropriate verbal boundaries   Participation Initiates participation   Concentration Concentrates 50 minutes   Ability to Concentrate Without difficulty   Follows and Comprehends Directions Independently follows multi-step directions   Memory Delayed and immediate recall intact   Organization Independently " organizes all tasks   Decision Making Independent   Planning and Problem Solving Independently plans ahead   Ability to Apply and Learn Concepts Applies within group structure   Frustrations / Stress Tolerance Independently identifies sources of frustration/stress  (negative relationships, chemical use, impulsive behavior)   Level of Insight Insightful into needs, issues, goals   Self Esteem Accepts positive feedback   Social Supports Has knowledge of support systems   General Observation/Plan   General Observations/Plan See Comments

## 2018-08-21 ENCOUNTER — HOSPITAL ENCOUNTER (INPATIENT)
Facility: CLINIC | Age: 28
LOS: 9 days | Discharge: HOME | DRG: 885 | End: 2018-08-31
Attending: EMERGENCY MEDICINE | Admitting: PSYCHIATRY & NEUROLOGY
Payer: COMMERCIAL

## 2018-08-21 DIAGNOSIS — R45.851 SUICIDAL IDEATIONS: ICD-10-CM

## 2018-08-21 DIAGNOSIS — E55.9 VITAMIN D DEFICIENCY: ICD-10-CM

## 2018-08-21 DIAGNOSIS — G47.9 TROUBLE IN SLEEPING: Primary | ICD-10-CM

## 2018-08-21 DIAGNOSIS — F25.9 SCHIZOAFFECTIVE DISORDER, UNSPECIFIED TYPE (H): ICD-10-CM

## 2018-08-21 DIAGNOSIS — F19.10 DRUG ABUSE (H): ICD-10-CM

## 2018-08-21 DIAGNOSIS — F19.10 POLYSUBSTANCE ABUSE (H): ICD-10-CM

## 2018-08-21 PROCEDURE — 99285 EMERGENCY DEPT VISIT HI MDM: CPT | Mod: 25

## 2018-08-21 PROCEDURE — 99284 EMERGENCY DEPT VISIT MOD MDM: CPT | Mod: Z6 | Performed by: EMERGENCY MEDICINE

## 2018-08-21 NOTE — IP AVS SNAPSHOT
MRN:[id number]                      After Visit Summary   8/21/2018    Rianna Man    MRN: [id number]           Thank you!     Thank you for choosing [facility] for your care. Our goal is always to provide you with excellent care.        Patient Information     Date Of Birth          1990        About your hospital stay     You were admitted on:  August 22, 2018 You last received care in the:  [facility]    You were discharged on:  August 31, 2018       Who to Call     For medical emergencies, please call 911.  For non-urgent questions about your medical care, please call your primary care provider or clinic, [phone]          Attending Provider     Provider Specialty    Arcadio Cervantes MD Emergency Medicine    Shyam Mary MD Psychiatry       Primary Care Provider Office Phone # Fax #    Hilary Maral Edouard [phone]      Further instructions from your care team       Behavioral Discharge Planning and Instructions    Summary:   You were admitted to [facility] on 8/21/18 with increased depression, suicidal ideation and polysubstance use under the care of Dr. Mary.  You met with Dr. Mary and his team daily for ongoing psychiatric assessment and medication management.  You had opportunities to participate in therapeutic groups on the unit.   You completed a CD assessment and were waiting on funding/placement but requested to leave the hospital with plans to move to [place] with your mother.  At this time you report your mood has stabilized and you report you are not having thoughts or intent to harm yourself or others. You will be discharged home and will make arrangements for psychiatric care when you arrive in [place].    Main Diagnosis:   Schizoaffective Disorder  Polysubstance Use Disorder: cannabis, cocaine, methamphetamine and alcohol.    Major Treatments, Procedures and Findings:   Medications were  managed throughout your stay. An internal  medicine consult was completed during your stay. You had the opportunity to participate in treatment programming while on the unit including occupational therapy, mental health support and education and spiritual services.     Symptoms to Report:   Please report if you are experiencing increased aggression and/or confusion, problematic loss of sleep, worsening mood, or thoughts of suicide to your treatment team or notify your primary provider.   IF THE SYMPTOMS YOU ARE EXPERIENCING ARE A MEDICAL EMERGENCY, CALL 911 IMMEDIATELY    Lifestyle Adjustment:   1. Adjust your lifestyle to get enough sleep, relaxation, exercise and good nutrition.  Continue to develop healthy coping skills to decrease stress and promote a healthy and sober lifestyle.  2. Abstain from all substances of abuse.  3. Take medications as prescribed.  Please work with your doctor to discuss any concerns you have with your medications or side effects you may be experiencing.  4. Follow up with appointments as scheduled.        Psychiatry Follow-up:   Patient plans to move to [place] next week.      [facility]  Medication Management Walk-in Appointments every Thursday at 8:30am to establish care  [address] [place], [place]  [phone]    Your CD assessment was completed by [facility]: [phone]      Resources:   [facility] Adult Shelter Team  [address]  [address]  [phone]     [facility] Economic Assistance - cash assistance  [address] [place] [address]  [place], [place]  [phone]     Social Security Office  [address] [place] [address]  [phone]    Resources:   *[facility] Crisis: COPE: ([phone]) 24 hour mobile crisis support for people having a mental health crisis in [facility].   *Acute Psychiatric Services ([phone]). 24-hour walk-in crisis psychiatric support  "at [facility]; Emergency Medications Clinic available 7:30am - 2:00pm  *Fraq5gtzw: Text  \"life\"  to [phone]   A trained crisis counselor will respond immediately  *[facility] Emergency Room: [phone]    General Medication Instructions:   See your medication sheet(s) for instructions.   Take all medicines as directed.  Make no changes unless your doctor suggests them.   Go to all your doctor visits.  Be sure to have all your required lab tests. This way, your medicines can be refilled on time.  Do not use any drugs not prescribed by your doctor.    The treatment team has appreciated the opportunity to work with you.  We wish you the best in the future.    If you have any questions or concerns our unit number is [phone]. .      Pending Results     No orders found from 8/19/2018 to 8/22/2018.            Statement of Approval     Ordered          08/31/18 1556  I have reviewed and agree with all the recommendations and orders detailed in this document.  EFFECTIVE NOW     Approved and electronically signed by:  Nai Xavier MD             Admission Information     Date & Time Provider Department Dept. Phone    8/21/2018 Shyam Mary MD [facility] [phone]      Your Vitals Were     Blood Pressure Pulse Temperature Respirations Height Weight    132/82 88 98.4  F (36.9  C) (Oral) 16 1.727 m (5' 8\") 127 kg (280 lb)    Pulse Oximetry BMI (Body Mass Index)                95% 42.57 kg/m2          [employer] Information     [employer] lets you send messages to your doctor, view your test results, renew your prescriptions, schedule appointments and more. To sign up, go to [email or web address] . Click on \"Log in\" on the left side of the screen, which will take you to the Welcome page. Then click on \"Sign up Now\" on the right side of the page.     You will be asked to enter the access code listed below, as well as some personal information. Please follow the " directions to create your username and password.     Your access code is: [email or web address]  Expires: 10/1/2018  4:14 PM     Your access code will  in 90 days. If you need help or a new code, please call your [facility] clinic or [phone].        Care EveryWhere ID     This is your Care EveryWhere ID. This could be used by other organizations to access your [facility] medical records  [id number]        Equal Access to Services     VICENTE SEE : Hadii jose cruz stewart hadasho Soomaali, waaxda luqadaha, qaybta kaalmada adeegyada, aye ch . So [facility] [phone].    ATENCIÓN: Si habla español, tiene a roland disposición servicios gratuitos de asistencia lingüística. Llame al [phone].    We comply with applicable federal civil rights laws and [place] laws. We do not discriminate on the basis of race, color, national origin, age, disability, sex, sexual orientation, or gender identity.               Review of your medicines      START taking        Dose / Directions    calcium carbonate 500 mg-vitamin D 200 units 500-200 MG-UNIT per tablet   Commonly known as:  OSCAL with D;OYSTER SHELL CALCIUM   Used for:  Vitamin D deficiency        Dose:  1 tablet   Start taking on:  2018   Take 1 tablet by mouth daily   Quantity:  90 tablet   Refills:  0       cyproheptadine 4 MG tablet   Commonly known as:  PERIACTIN   Used for:  Trouble in sleeping        Dose:  8 mg   Take 2 tablets (8 mg) by mouth At Bedtime   Quantity:  60 tablet   Refills:  0       DRISDOL 43849 units Caps   Used for:  Vitamin D deficiency        Dose:  85239 Units   Start taking on:  2018   Take 50,000 Units by mouth every 7 days   Quantity:  10 capsule   Refills:  0       naltrexone 50 MG tablet   Commonly known as:  DEPADE;REVIA   Used for:  Polysubstance abuse        Dose:  50 mg   Start taking on:  2018   Take 1 tablet (50 mg) by mouth daily   Quantity:  30 tablet   Refills:  0       risperiDONE 4 MG tablet    Commonly known as:  risperDAL   Used for:  Schizoaffective disorder, unspecified type (H)        Dose:  4 mg   Take 1 tablet (4 mg) by mouth daily   Quantity:  30 tablet   Refills:  0         CONTINUE these medicines which have NOT CHANGED        Dose / Directions    * gabapentin 100 MG capsule   Commonly known as:  NEURONTIN   Used for:  Generalized anxiety disorder        Dose:  200 mg   Take 2 capsules (200 mg) by mouth 3 times daily as needed (for anxiety)   Quantity:  90 capsule   Refills:  0       * gabapentin 400 MG capsule   Commonly known as:  NEURONTIN   Used for:  Generalized anxiety disorder        Dose:  400 mg   Take 1 capsule (400 mg) by mouth At Bedtime   Quantity:  90 capsule   Refills:  0       * gabapentin 100 MG capsule   Commonly known as:  NEURONTIN   Used for:  Generalized anxiety disorder        Dose:  200 mg   Take 2 capsules (200 mg) by mouth 2 times daily   Quantity:  90 capsule   Refills:  0       multivitamin, therapeutic with minerals Tabs tablet   Used for:  Schizoaffective disorder, unspecified type (H)        Dose:  1 tablet   Take 1 tablet by mouth daily   Quantity:  30 each   Refills:  1       prazosin 2 MG capsule   Commonly known as:  MINIPRESS   Used for:  Trouble in sleeping        Dose:  4 mg   Take 2 capsules (4 mg) by mouth At Bedtime   Quantity:  60 capsule   Refills:  0       * Notice:  This list has 3 medication(s) that are the same as other medications prescribed for you. Read the directions carefully, and ask your doctor or other care provider to review them with you.      STOP taking     OLANZapine 10 MG tablet   Commonly known as:  zyPREXA           traZODone 100 MG tablet   Commonly known as:  DESYREL                Where to get your medicines      These medications were sent to [facility] [place], [place] - [address]  [address] [address]     Phone:  [phone]     cyproheptadine 4 MG tablet    DRISDOL 28108 units  Caps    naltrexone 50 MG tablet    prazosin 2 MG capsule    risperiDONE 4 MG tablet         Some of these will need a paper prescription and others can be bought over the counter. Ask your nurse if you have questions.     Bring a paper prescription for each of these medications     calcium carbonate 500 mg-vitamin D 200 units 500-200 MG-UNIT per tablet                Protect others around you: Learn how to safely use, store and throw away your medicines at www.disposemymeds.org.             Medication List: This is a list of all your medications and when to take them. Check marks below indicate your daily home schedule. Keep this list as a reference.      Medications           Morning Afternoon Evening Bedtime As Needed    calcium carbonate 500 mg-vitamin D 200 units 500-200 MG-UNIT per tablet   Commonly known as:  OSCAL with D;OYSTER SHELL CALCIUM   Take 1 tablet by mouth daily   Start taking on:  9/1/2018   Last time this was given:  1 tablet on 8/31/2018  8:54 AM                                cyproheptadine 4 MG tablet   Commonly known as:  PERIACTIN   Take 2 tablets (8 mg) by mouth At Bedtime   Last time this was given:  8 mg on 8/30/2018  9:34 PM                                DRISDOL 15816 units Caps   Take 50,000 Units by mouth every 7 days   Start taking on:  9/6/2018   Last time this was given:  50,000 Units on 8/30/2018  9:33 PM                                * gabapentin 100 MG capsule   Commonly known as:  NEURONTIN   Take 2 capsules (200 mg) by mouth 3 times daily as needed (for anxiety)   Last time this was given:  200 mg on 8/31/2018  2:34 PM                                * gabapentin 400 MG capsule   Commonly known as:  NEURONTIN   Take 1 capsule (400 mg) by mouth At Bedtime   Last time this was given:  200 mg on 8/31/2018  2:34 PM                                * gabapentin 100 MG capsule   Commonly known as:  NEURONTIN   Take 2 capsules (200 mg) by mouth 2 times daily   Last time this was given:   200 mg on 8/31/2018  2:34 PM                                multivitamin, therapeutic with minerals Tabs tablet   Take 1 tablet by mouth daily   Last time this was given:  1 tablet on 8/31/2018  8:54 AM                                naltrexone 50 MG tablet   Commonly known as:  DEPADE;REVIA   Take 1 tablet (50 mg) by mouth daily   Start taking on:  9/1/2018   Last time this was given:  50 mg on 8/31/2018  8:54 AM                                prazosin 2 MG capsule   Commonly known as:  MINIPRESS   Take 2 capsules (4 mg) by mouth At Bedtime   Last time this was given:  4 mg on 8/30/2018  9:34 PM                                risperiDONE 4 MG tablet   Commonly known as:  risperDAL   Take 1 tablet (4 mg) by mouth daily   Last time this was given:  4 mg on 8/30/2018  9:34 PM                                * Notice:  This list has 3 medication(s) that are the same as other medications prescribed for you. Read the directions carefully, and ask your doctor or other care provider to review them with you.

## 2018-08-21 NOTE — IP AVS SNAPSHOT
[address] [address]    Phone:  [phone]                                       After Visit Summary   8/21/2018    Rianna Man    MRN: [id number]           After Visit Summary Signature Page     I have received my discharge instructions, and my questions have been answered. I have discussed any challenges I see with this plan with the nurse or doctor.    ..........................................................................................................................................  Patient/Patient Representative Signature      ..........................................................................................................................................  Patient Representative Print Name and Relationship to Patient    ..................................................               ................................................  Date                                            Time    ..........................................................................................................................................  Reviewed by Signature/Title    ...................................................              ..............................................  Date                                                            Time          22EPIC Rev 08/18

## 2018-08-22 LAB
ALCOHOL BREATH TEST: 0.07 (ref 0–0.01)
AMPHETAMINES UR QL SCN: POSITIVE
BARBITURATES UR QL: NEGATIVE
BENZODIAZ UR QL: NEGATIVE
CANNABINOIDS UR QL SCN: POSITIVE
COCAINE UR QL: POSITIVE
DEPRECATED CALCIDIOL+CALCIFEROL SERPL-MC: 13 UG/L (ref 20–75)
ETHANOL UR QL SCN: POSITIVE
HCG UR QL: NEGATIVE
HCV AB SERPL QL IA: NONREACTIVE
HIV 1+2 AB+HIV1 P24 AG SERPL QL IA: NONREACTIVE
OPIATES UR QL SCN: NEGATIVE
T PALLIDUM AB SER QL: NONREACTIVE
TSH SERPL DL<=0.005 MIU/L-ACNC: 1.54 MU/L (ref 0.4–4)

## 2018-08-22 PROCEDURE — 12400007 ZZH R&B MH INTERMEDIATE UMMC

## 2018-08-22 PROCEDURE — 36415 COLL VENOUS BLD VENIPUNCTURE: CPT | Performed by: STUDENT IN AN ORGANIZED HEALTH CARE EDUCATION/TRAINING PROGRAM

## 2018-08-22 PROCEDURE — 86803 HEPATITIS C AB TEST: CPT | Performed by: STUDENT IN AN ORGANIZED HEALTH CARE EDUCATION/TRAINING PROGRAM

## 2018-08-22 PROCEDURE — 87591 N.GONORRHOEAE DNA AMP PROB: CPT | Performed by: STUDENT IN AN ORGANIZED HEALTH CARE EDUCATION/TRAINING PROGRAM

## 2018-08-22 PROCEDURE — 84443 ASSAY THYROID STIM HORMONE: CPT | Performed by: STUDENT IN AN ORGANIZED HEALTH CARE EDUCATION/TRAINING PROGRAM

## 2018-08-22 PROCEDURE — 82306 VITAMIN D 25 HYDROXY: CPT | Performed by: STUDENT IN AN ORGANIZED HEALTH CARE EDUCATION/TRAINING PROGRAM

## 2018-08-22 PROCEDURE — 25000132 ZZH RX MED GY IP 250 OP 250 PS 637: Performed by: STUDENT IN AN ORGANIZED HEALTH CARE EDUCATION/TRAINING PROGRAM

## 2018-08-22 PROCEDURE — 81025 URINE PREGNANCY TEST: CPT | Performed by: EMERGENCY MEDICINE

## 2018-08-22 PROCEDURE — 87389 HIV-1 AG W/HIV-1&-2 AB AG IA: CPT | Performed by: STUDENT IN AN ORGANIZED HEALTH CARE EDUCATION/TRAINING PROGRAM

## 2018-08-22 PROCEDURE — 90791 PSYCH DIAGNOSTIC EVALUATION: CPT

## 2018-08-22 PROCEDURE — 80307 DRUG TEST PRSMV CHEM ANLYZR: CPT | Performed by: EMERGENCY MEDICINE

## 2018-08-22 PROCEDURE — 80320 DRUG SCREEN QUANTALCOHOLS: CPT | Performed by: EMERGENCY MEDICINE

## 2018-08-22 PROCEDURE — HZ2ZZZZ DETOXIFICATION SERVICES FOR SUBSTANCE ABUSE TREATMENT: ICD-10-PCS | Performed by: PSYCHIATRY & NEUROLOGY

## 2018-08-22 PROCEDURE — 86780 TREPONEMA PALLIDUM: CPT | Performed by: STUDENT IN AN ORGANIZED HEALTH CARE EDUCATION/TRAINING PROGRAM

## 2018-08-22 PROCEDURE — 87491 CHLMYD TRACH DNA AMP PROBE: CPT | Performed by: STUDENT IN AN ORGANIZED HEALTH CARE EDUCATION/TRAINING PROGRAM

## 2018-08-22 PROCEDURE — 99223 1ST HOSP IP/OBS HIGH 75: CPT | Mod: AI | Performed by: PSYCHIATRY & NEUROLOGY

## 2018-08-22 RX ORDER — TRAZODONE HYDROCHLORIDE 100 MG/1
100 TABLET ORAL
Status: DISCONTINUED | OUTPATIENT
Start: 2018-08-22 | End: 2018-08-27

## 2018-08-22 RX ORDER — PRAZOSIN HYDROCHLORIDE 2 MG/1
4 CAPSULE ORAL AT BEDTIME
Status: DISCONTINUED | OUTPATIENT
Start: 2018-08-22 | End: 2018-08-29

## 2018-08-22 RX ORDER — ACETAMINOPHEN 325 MG/1
650 TABLET ORAL EVERY 4 HOURS PRN
Status: DISCONTINUED | OUTPATIENT
Start: 2018-08-22 | End: 2018-08-31 | Stop reason: HOSPADM

## 2018-08-22 RX ORDER — LORAZEPAM 1 MG/1
1-4 TABLET ORAL EVERY 30 MIN PRN
Status: DISCONTINUED | OUTPATIENT
Start: 2018-08-22 | End: 2018-08-27

## 2018-08-22 RX ORDER — OLANZAPINE 10 MG/1
10 TABLET ORAL
Status: DISCONTINUED | OUTPATIENT
Start: 2018-08-22 | End: 2018-08-31 | Stop reason: HOSPADM

## 2018-08-22 RX ORDER — OLANZAPINE 10 MG/1
10 TABLET ORAL AT BEDTIME
Status: DISCONTINUED | OUTPATIENT
Start: 2018-08-22 | End: 2018-08-22

## 2018-08-22 RX ORDER — GABAPENTIN 100 MG/1
200 CAPSULE ORAL 3 TIMES DAILY PRN
Status: DISCONTINUED | OUTPATIENT
Start: 2018-08-22 | End: 2018-08-28

## 2018-08-22 RX ORDER — GABAPENTIN 400 MG/1
400 CAPSULE ORAL AT BEDTIME
Status: DISCONTINUED | OUTPATIENT
Start: 2018-08-22 | End: 2018-08-31 | Stop reason: HOSPADM

## 2018-08-22 RX ORDER — RISPERIDONE 1 MG/1
1 TABLET ORAL DAILY
Status: DISCONTINUED | OUTPATIENT
Start: 2018-08-22 | End: 2018-08-24

## 2018-08-22 RX ORDER — OLANZAPINE 10 MG/2ML
10 INJECTION, POWDER, FOR SOLUTION INTRAMUSCULAR
Status: DISCONTINUED | OUTPATIENT
Start: 2018-08-22 | End: 2018-08-31 | Stop reason: HOSPADM

## 2018-08-22 RX ORDER — IBUPROFEN 600 MG/1
600 TABLET, FILM COATED ORAL EVERY 6 HOURS PRN
Status: DISCONTINUED | OUTPATIENT
Start: 2018-08-22 | End: 2018-08-31 | Stop reason: HOSPADM

## 2018-08-22 RX ORDER — MULTIPLE VITAMINS W/ MINERALS TAB 9MG-400MCG
1 TAB ORAL DAILY
Status: DISCONTINUED | OUTPATIENT
Start: 2018-08-22 | End: 2018-08-31 | Stop reason: HOSPADM

## 2018-08-22 RX ORDER — GABAPENTIN 100 MG/1
200 CAPSULE ORAL 2 TIMES DAILY
Status: DISCONTINUED | OUTPATIENT
Start: 2018-08-22 | End: 2018-08-31 | Stop reason: HOSPADM

## 2018-08-22 RX ORDER — NICOTINE 21 MG/24HR
1 PATCH, TRANSDERMAL 24 HOURS TRANSDERMAL DAILY
Status: DISCONTINUED | OUTPATIENT
Start: 2018-08-22 | End: 2018-08-31 | Stop reason: HOSPADM

## 2018-08-22 RX ORDER — OLANZAPINE 5 MG/1
5 TABLET ORAL AT BEDTIME
Status: DISCONTINUED | OUTPATIENT
Start: 2018-08-22 | End: 2018-08-22

## 2018-08-22 RX ADMIN — GABAPENTIN 400 MG: 400 CAPSULE ORAL at 20:31

## 2018-08-22 RX ADMIN — TRAZODONE HYDROCHLORIDE 100 MG: 100 TABLET ORAL at 20:33

## 2018-08-22 RX ADMIN — RISPERIDONE 1 MG: 1 TABLET ORAL at 20:31

## 2018-08-22 RX ADMIN — NICOTINE 1 PATCH: 21 PATCH, EXTENDED RELEASE TRANSDERMAL at 14:59

## 2018-08-22 RX ADMIN — NICOTINE POLACRILEX 4 MG: 2 GUM, CHEWING BUCCAL at 09:50

## 2018-08-22 RX ADMIN — PRAZOSIN HYDROCHLORIDE 4 MG: 2 CAPSULE ORAL at 20:31

## 2018-08-22 RX ADMIN — MULTIPLE VITAMINS W/ MINERALS TAB 1 TABLET: TAB at 09:50

## 2018-08-22 RX ADMIN — ACETAMINOPHEN 650 MG: 325 TABLET, FILM COATED ORAL at 09:54

## 2018-08-22 RX ADMIN — GABAPENTIN 200 MG: 100 CAPSULE ORAL at 09:50

## 2018-08-22 ASSESSMENT — ACTIVITIES OF DAILY LIVING (ADL)
ORAL_HYGIENE: INDEPENDENT
DRESS: SCRUBS (BEHAVIORAL HEALTH)
DRESS: SCRUBS (BEHAVIORAL HEALTH);INDEPENDENT
LAUNDRY: WITH SUPERVISION
ORAL_HYGIENE: INDEPENDENT
GROOMING: INDEPENDENT
GROOMING: INDEPENDENT

## 2018-08-22 NOTE — PROGRESS NOTES
Pt has been out of room for meals only.  Pt denied SI.  Pt stated that she would like to go to [place] this is where her mother is lives  Pt states she does not have nay money to get there..  Pt teared up while talking about this.  Pt when asked what CD tx she liked pt stated she didn't like any of them.  Pt no s/sx  s of alcohol withdrawal this shift.

## 2018-08-22 NOTE — PROGRESS NOTES
08/22/18 0725   Patient Belongings   Patient Belongings clothing;cell phone/electronics;money (see comment)   A               Admission:2 cell phone,  Phone ,shoes. Unit(s) care insurance. Perfumes not to be given to the pt. Knife(security). Credit card to security # [id number]  I am responsible for any personal items that are not sent to the safe or pharmacy.  [facility] is not responsible for loss, theft or damage of any property in my possession.    Signature:  _________________________________ Date: _______  Time: _____                                              Staff Signature:  ____________________________ Date: ________  Time: _____      2nd Staff person, if patient is unable/unwilling to sign:    Signature: ________________________________ Date: ________  Time: _____     Discharge:  [facility] has returned all of my personal belongings:    Signature: _________________________________ Date: ________  Time: _____                                          Staff Signature:  ____________________________ Date: ________  Time: _____
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
## 2018-08-22 NOTE — PLAN OF CARE
Problem: Patient Care Overview  Goal: Team Discussion  Team Plan:   BEHAVIORAL TEAM DISCUSSION    Participants:   Dr. Mary, Dr. Moura, Dr. Xavier, Jane Romero MA.LP, med student, pharm student    Continued Stay Criteria/Rationale:   Suicidal ideation    Medical/Physical:   Migraine HA's    Precautions:   Behavioral Orders   Procedures     Code 1 - Restrict to Unit     Routine Programming     As clinically indicated     Sexual precautions     Single Room     Status 15     Every 15 minutes.     Suicide precautions     Patients on Suicide Precautions should have a Combination Diet ordered that includes a Diet selection(s) AND a Behavioral Tray selection for Safe Tray - with utensils, or Safe Tray - NO utensils       Withdrawal precautions     Plan:  Patient will have  psychiatric assessment.  Patient will be monitored for alcohol withdrawal. Medications will be reviewed and adjusted per MD as indicated. Substance use will be addressed- treatment options will be offered.  Hospital staff will provide a safe environment and a therapeutic milieu. Patient will be encouraged to participate in unit groups and activities.    CTC will continue to assess needs and make appropriate referrals as indicated.     Rationale for change in precautions or plan:   No change in plan/precautions    I have reviewed and agree with the plan of care as written above.    Giuseppe Moura DO, MA  PGY-2 Psychiatry Resident  Pager: [phone]

## 2018-08-22 NOTE — PROGRESS NOTES
Pt has not attended scheduled occupational therapy sessions. Encourage attendance and participation.  As group attendance is established, Pt will be given self-assessment form and OT will explain the purpose of including them in their treatment plan and offer options for meeting their needs.

## 2018-08-22 NOTE — PROGRESS NOTES
SPIRITUAL HEALTH SERVICES   Spiritual Assessment Progress Note (Behavioral Health)  [facility] ([facility]) 20NB    REFERRAL SOURCE: Patient requested a Sh encounter    On this visit, Patient shared that feeling depressed is not new for her.   She wished she had reached out to her Latter-day sooner.     EXPERIENCE OF ILLNESS/HOSPITALIZATION:  She voices that she is glad she is here as she feels she needs help.     MEANING-MAKING:  Not discussed today.     SPIRITUALITY/VALUES/Spiritism:  Patient by ricardo tradition is part of the [employer].  She asked if we could supply a bible today.     COPING/SPIRITUAL PRACTICES: She voices that her ricardo is important to her and her Latter-day has helped her in the past.  She asked for scripture and prayer today.     SUPPORT SYSTEMS: We did not discuss other than her Latter-day support.    PLAN: I let he know that she can request a  visit as needed for support.  I dropped a bible off at the nurses station for her.                                                                                                                                                Marisela Castelan  Staff   Pager [phone]
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
## 2018-08-22 NOTE — ED NOTES
ED to Behavioral Floor Handoff    SITUATION  Rianna Man is a 28 year old female who speaks English and lives is homeless with others The patient arrived in the ED by walking from relatives' house with a complaint of Suicidal (recent death in family, having suicidal htoughts. Stated she would take pills. pt tearful.ETOh)  .The patient's current symptoms started/worsened 1 day(s) ago and during this time the symptoms have increased.   In the ED, pt was diagnosed with   Final diagnoses:   Suicidal ideations   Polysubstance abuse        Initial vitals were: BP: 133/84  Heart Rate: 90  Temp: 97.9  F (36.6  C)  Resp: 18  SpO2: 98 %   --------  Is the patient diabetic? No   If yes, last blood glucose? --     If yes, was this treated in the ED? --  --------  Is the patient inebriated (ETOH) Yes or Impaired on other substances? Yes  MSSA done? N/A  Last MSSA score: --    Were withdrawal symptoms treated? N/A  Does the patient have a seizure history? No. If yes, date of most recent seizure--  --------  Is the patient patient experiencing suicidal ideation? reports occasional suicidal thoughts representing feeling that life is not worth feeling    Homicidal ideation? denies current or recent homicidal ideation or behaviors.    Self-injurious behavior/urges? denies current or recent self injurious behavior or ideation.  ------  Was pt aggressive in the ED No  Was a code called No  Is the pt now cooperative? No  -------  Meds given in ED: Medications - No data to display   Family present during ED course? No  Family currently present? No    BACKGROUND  Does the patient have a cognitive impairment or developmental disability? No  Allergies:   Allergies   Allergen Reactions     No Known Allergies    .   Social demographics are   Social History     Social History     Marital status: Single     Spouse name: N/A     Number of children: N/A     Years of education: N/A     Social History Main Topics     Smoking status:  Current Every Day Smoker     Packs/day: 0.50     Types: Cigarettes     Smokeless tobacco: Never Used      Comment: less than 1/2 pack     Alcohol use 0.0 oz/week     0 Standard drinks or equivalent per week      Comment: occasional, drank today     Drug use: Yes     Special: Marijuana      Comment: cocaine in the past     Sexual activity: Yes     Partners: Male     Birth control/ protection: Implant      Comment: monogamous relationship     Other Topics Concern     None     Social History Narrative        ASSESSMENT  Labs results   Labs Ordered and Resulted from Time of ED Arrival Up to the Time of Departure from the ED   DRUG ABUSE SCREEN 6 CHEM DEP URINE ([facility]) - Abnormal; Notable for the following:        Result Value    Amphetamine Qual Urine Positive (*)     Cannabinoids Qual Urine Positive (*)     Cocaine Qual Urine Positive (*)     Ethanol Qual Urine Positive (*)     All other components within normal limits   ALCOHOL BREATH TEST POCT - Abnormal; Notable for the following:     Alcohol Breath Test 0.075 (*)     All other components within normal limits      Imaging Studies: No results found for this or any previous visit (from the past 24 hour(s)).   Most recent vital signs /84  Temp 97.9  F (36.6  C)  Resp 18  SpO2 98%   Abnormal labs/tests/findings requiring intervention:---   Pain control: pt had none  Nausea control: pt had none    RECOMMENDATION  Are any infection precautions needed (MRSA, VRE, etc.)? No If yes, what infection? --  ---  Does the patient have mobility issues? independently. If yes, what device does the pt use? ---  ---  Is patient on 72 hour hold or commitment? No If on 72 hour hold, have hold and rights been given to patient? N/A  Are admitting orders written if after 10 p.m. ?N/A  Tasks needing to be completed:---     Haley Pena   [facility]-- [phone] ED   [phone] [address] ED

## 2018-08-22 NOTE — PROGRESS NOTES
"Patient admitted to unit from  ED as a voluntary patient.  She presents here \"because I was feeling suicidal\".  However patient reports that she is not actively suicidal here in the hospital and will let staff know if that changes. Per report, patient has a history of schizoaffective disorder, OCD and FITZ.  Utox is positive for amphetamines, cocaine, cannibinoids and alcohol.  MSSA score is 6.  Patient denies any history of seizures.  By report, patient is homeless.     Patient presents calmly and cooperatively.  She is quite fatigued and reports having a headache \"like a migraine\".  Resident MD was informed of this and ibuprofen was ordered and administered.  Patient requested to go right to sleep.  She was escorted to her room where she currently appears to be sleeping.  "

## 2018-08-22 NOTE — ED PROVIDER NOTES
"  History     Chief Complaint   Patient presents with     Suicidal     recent death in family, having suicidal htoughts. Stated she would take pills. pt tearful.ETOh     HPI  Rianna Yamila Man is a 28 year old female with history of bipolar disorder and polysubstance abuse who presents with suicidal ideation, auditory hallucinations including command type hallucinations. She reports using alcohol, marijuana, and cocaine.   No recent illness or injury.  I have reviewed the Medications, Allergies, Past Medical and Surgical History, and Social History in the [employer] system.  Past Medical History:   Diagnosis Date     Schizoaffective disorder (H)      Varicella     had disease       Review of Systems   Constitutional: Positive for fatigue. Negative for chills, diaphoresis and fever.   HENT: Negative for congestion, rhinorrhea and sore throat.    Eyes: Negative for visual disturbance.   Respiratory: Negative for cough, chest tightness and shortness of breath.    Cardiovascular: Negative for chest pain and palpitations.   Gastrointestinal: Negative for abdominal pain, nausea and vomiting.   Musculoskeletal: Negative for arthralgias, gait problem, neck pain and neck stiffness.   Skin: Negative for rash and wound.   Psychiatric/Behavioral: Positive for decreased concentration, dysphoric mood, hallucinations, sleep disturbance and suicidal ideas. Negative for agitation and confusion. The patient is nervous/anxious.        Physical Exam   BP: 133/84  Pulse: 82  Heart Rate: 90  Temp: 97.9  F (36.6  C)  Resp: 18  Height: 172.7 cm (5' 8\")  Weight: 117 kg (258 lb)  SpO2: 98 %  Sitting Orthostatic BP: 143/80  Sitting Orthostatic Pulse: 94 bpm  Standing Orthostatic BP: 141/87  Standing Orthostatic Pulse: 114 bpm      Physical Exam   Constitutional: She is oriented to person, place, and time. She appears well-developed and well-nourished. No distress.   HENT:   Head: Normocephalic and atraumatic.   Mouth/Throat: Oropharynx is clear " and moist.   Eyes: Conjunctivae and EOM are normal. Pupils are equal, round, and reactive to light.   Neck: Normal range of motion. Neck supple.   Cardiovascular: Normal rate, regular rhythm, normal heart sounds and intact distal pulses.    Pulmonary/Chest: Effort normal and breath sounds normal. No respiratory distress.   Abdominal: Soft. There is no tenderness.   Musculoskeletal: Normal range of motion.   Neurological: She is alert and oriented to person, place, and time.   Skin: Skin is warm and dry. No rash noted.   Psychiatric: Her speech is normal. Her mood appears anxious. She is actively hallucinating. She is not agitated. Thought content is not paranoid. She exhibits a depressed mood. She expresses suicidal ideation. She expresses no homicidal ideation. She expresses suicidal plans.   Nursing note and vitals reviewed.      ED Course     ED Course     Procedures             Critical Care time:  none             Labs Ordered and Resulted from Time of ED Arrival Up to the Time of Departure from the ED   DRUG ABUSE SCREEN 6 CHEM DEP URINE ([facility]) - Abnormal; Notable for the following:        Result Value    Amphetamine Qual Urine Positive (*)     Cannabinoids Qual Urine Positive (*)     Cocaine Qual Urine Positive (*)     Ethanol Qual Urine Positive (*)     All other components within normal limits   ALCOHOL BREATH TEST POCT - Abnormal; Notable for the following:     Alcohol Breath Test 0.075 (*)     All other components within normal limits            Assessments & Plan (with Medical Decision Making)   Emergency admission to psychiatry with suicidal ideation and plan. See also mental health  note.    I have reviewed the nursing notes.    I have reviewed the findings, diagnosis, plan and need for follow up with the patient.    Discharge Medication List as of 8/31/2018  4:29 PM      START taking these medications    Details   calcium carbonate 500 mg-vitamin D 200 units (OSCAL WITH D;OYSTER SHELL CALCIUM)  500-200 MG-UNIT per tablet Take 1 tablet by mouth daily, Disp-90 tablet, R-0, Local Print      cyproheptadine (PERIACTIN) 4 MG tablet Take 2 tablets (8 mg) by mouth At Bedtime, Disp-60 tablet, R-0, E-Prescribe      Ergocalciferol (VITAMIN D) 31759 units CAPS Take 50,000 Units by mouth every 7 days, Disp-10 capsule, R-0, E-Prescribe      naltrexone (DEPADE;REVIA) 50 MG tablet Take 1 tablet (50 mg) by mouth daily, Disp-30 tablet, R-0, E-Prescribe      risperiDONE (RISPERDAL) 4 MG tablet Take 1 tablet (4 mg) by mouth daily, Disp-30 tablet, R-0, E-Prescribe             Final diagnoses:   Suicidal ideations   Polysubstance abuse       8/21/2018   [facility], [place], EMERGENCY DEPARTMENT     Arcadio Cervantes MD  09/28/18 0112

## 2018-08-22 NOTE — ED NOTES
I have performed an in person assessment of the patient. Based on this assessment the patient no longer requires a one on one attendant at this point in time.    Johnnie Medeiros MD  12:03 AM  August 22, 2018           Johnnie Medeiros MD  08/22/18 0003

## 2018-08-22 NOTE — PLAN OF CARE
Problem: Patient Care Overview  Goal: Individualization & Mutuality  Patient's goals:  Feel better    Plan for admission:  1. Stabilization of mood disorder symptoms  2. Absence of SI- safe with self  3. Medication management per MD's  4. Safe withdrawal from substances complete- Substance use addressed  5. Coordination of care with outpatient providers, family  6. Psychiatric follow up care in place

## 2018-08-23 LAB
C TRACH DNA SPEC QL NAA+PROBE: NEGATIVE
N GONORRHOEA DNA SPEC QL NAA+PROBE: NEGATIVE
SPECIMEN SOURCE: NORMAL
SPECIMEN SOURCE: NORMAL

## 2018-08-23 PROCEDURE — 25000132 ZZH RX MED GY IP 250 OP 250 PS 637: Performed by: STUDENT IN AN ORGANIZED HEALTH CARE EDUCATION/TRAINING PROGRAM

## 2018-08-23 PROCEDURE — 12400007 ZZH R&B MH INTERMEDIATE UMMC

## 2018-08-23 PROCEDURE — 99232 SBSQ HOSP IP/OBS MODERATE 35: CPT | Mod: GC | Performed by: PSYCHIATRY & NEUROLOGY

## 2018-08-23 RX ORDER — ERGOCALCIFEROL 1.25 MG/1
50000 CAPSULE, LIQUID FILLED ORAL
Status: DISCONTINUED | OUTPATIENT
Start: 2018-08-23 | End: 2018-08-31 | Stop reason: HOSPADM

## 2018-08-23 RX ADMIN — RISPERIDONE 1 MG: 1 TABLET ORAL at 20:15

## 2018-08-23 RX ADMIN — LORAZEPAM 2 MG: 1 TABLET ORAL at 03:20

## 2018-08-23 RX ADMIN — ERGOCALCIFEROL 50000 UNITS: 1.25 CAPSULE ORAL at 13:50

## 2018-08-23 RX ADMIN — GABAPENTIN 200 MG: 100 CAPSULE ORAL at 18:13

## 2018-08-23 RX ADMIN — MULTIPLE VITAMINS W/ MINERALS TAB 1 TABLET: TAB at 09:27

## 2018-08-23 RX ADMIN — PRAZOSIN HYDROCHLORIDE 4 MG: 2 CAPSULE ORAL at 20:14

## 2018-08-23 RX ADMIN — GABAPENTIN 200 MG: 100 CAPSULE ORAL at 09:27

## 2018-08-23 RX ADMIN — TRAZODONE HYDROCHLORIDE 100 MG: 100 TABLET ORAL at 20:16

## 2018-08-23 RX ADMIN — NICOTINE 1 PATCH: 21 PATCH, EXTENDED RELEASE TRANSDERMAL at 09:27

## 2018-08-23 RX ADMIN — LORAZEPAM 2 MG: 1 TABLET ORAL at 12:15

## 2018-08-23 RX ADMIN — BENZOCAINE, MENTHOL 1 LOZENGE: 15; 3.6 LOZENGE ORAL at 22:44

## 2018-08-23 RX ADMIN — OYSTER SHELL CALCIUM WITH VITAMIN D 1 TABLET: 500; 200 TABLET, FILM COATED ORAL at 13:50

## 2018-08-23 RX ADMIN — GABAPENTIN 200 MG: 100 CAPSULE ORAL at 13:50

## 2018-08-23 RX ADMIN — GABAPENTIN 400 MG: 400 CAPSULE ORAL at 20:14

## 2018-08-23 NOTE — PROGRESS NOTES
Pt spent most of her time in room lying in her bed reading and she comes out for meals and making requests.. She states that she is hearing voices to hurts others. She denies suicidal ideations and appears cooperative. Pt had no interaction with others this shift except with staff.     08/22/18 2143   Behavioral Health   Hallucinations auditory   Thinking intact   Orientation time: oriented;date: oriented;place: oriented;person: oriented   Memory baseline memory   Insight poor   Judgement impaired   Eye Contact at examiner   Affect full range affect   Mood mood is calm;depressed   Hygiene well groomed   Suicidality other (see comments)  (Denies)   1. Wish to be Dead No   2. Non-Specific Active Suicidal Thoughts  No   Speech clear;coherent   Medication Sensitivity no stated side effects   Psychomotor / Gait balanced;steady   Psycho Education   Type of Intervention 1:1 intervention   Response participates, initiates socially appropriate   Hours 0.5   Activities of Daily Living   Hygiene/Grooming independent   Oral Hygiene independent   Dress scrubs (behavioral health)   Laundry with supervision   Room Organization independent   Activity   Activity Assistance Provided independent
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
## 2018-08-23 NOTE — PROGRESS NOTES
Pt spent the shift resting or napping in her room. She ate well, but did have to be redirected for taking food off of another peer's tray. Pt endorses paranoia and auditory hallucinations, rates her depression at a 7 and anxiety at a 5. Denies SI/SIB.          08/23/18 1437   Behavioral Health   Self Injury other (see comment)  (Denies )   Activity withdrawn;isolative   Speech clear;coherent   Medication Sensitivity no observed side effects;no stated side effects   Psychomotor / Gait steady;balanced   Activities of Daily Living   Hygiene/Grooming independent   Oral Hygiene independent   Dress scrubs (behavioral health);independent   Laundry with supervision   Room Organization independent   Activity   Activity Assistance Provided independent

## 2018-08-24 PROCEDURE — 25000132 ZZH RX MED GY IP 250 OP 250 PS 637: Performed by: STUDENT IN AN ORGANIZED HEALTH CARE EDUCATION/TRAINING PROGRAM

## 2018-08-24 PROCEDURE — 99232 SBSQ HOSP IP/OBS MODERATE 35: CPT | Mod: GC | Performed by: PSYCHIATRY & NEUROLOGY

## 2018-08-24 PROCEDURE — 97127 ZZHC OT THERAPEUTIC INTERVENTION W/FOCUS ON COGNITIVE FUNCTION,EA 15 MIN: CPT | Mod: GO

## 2018-08-24 PROCEDURE — 12400007 ZZH R&B MH INTERMEDIATE UMMC

## 2018-08-24 RX ORDER — HYDROXYZINE HYDROCHLORIDE 25 MG/1
25-50 TABLET, FILM COATED ORAL 3 TIMES DAILY PRN
Status: DISCONTINUED | OUTPATIENT
Start: 2018-08-24 | End: 2018-08-31 | Stop reason: HOSPADM

## 2018-08-24 RX ORDER — DIPHENHYDRAMINE HCL 12.5MG/5ML
25 LIQUID (ML) ORAL EVERY 8 HOURS PRN
Status: DISCONTINUED | OUTPATIENT
Start: 2018-08-24 | End: 2018-08-31 | Stop reason: HOSPADM

## 2018-08-24 RX ORDER — RISPERIDONE 1 MG/1
2 TABLET ORAL DAILY
Status: DISCONTINUED | OUTPATIENT
Start: 2018-08-24 | End: 2018-08-27

## 2018-08-24 RX ADMIN — NICOTINE 1 PATCH: 21 PATCH, EXTENDED RELEASE TRANSDERMAL at 09:21

## 2018-08-24 RX ADMIN — TRAZODONE HYDROCHLORIDE 100 MG: 100 TABLET ORAL at 21:52

## 2018-08-24 RX ADMIN — RISPERIDONE 2 MG: 1 TABLET ORAL at 20:57

## 2018-08-24 RX ADMIN — OYSTER SHELL CALCIUM WITH VITAMIN D 1 TABLET: 500; 200 TABLET, FILM COATED ORAL at 09:20

## 2018-08-24 RX ADMIN — GABAPENTIN 200 MG: 100 CAPSULE ORAL at 09:20

## 2018-08-24 RX ADMIN — BENZOCAINE, MENTHOL 1 LOZENGE: 15; 3.6 LOZENGE ORAL at 09:21

## 2018-08-24 RX ADMIN — HYDROXYZINE HYDROCHLORIDE 50 MG: 25 TABLET ORAL at 16:08

## 2018-08-24 RX ADMIN — MULTIPLE VITAMINS W/ MINERALS TAB 1 TABLET: TAB at 09:20

## 2018-08-24 RX ADMIN — BENZOCAINE, MENTHOL 1 LOZENGE: 15; 3.6 LOZENGE ORAL at 13:05

## 2018-08-24 RX ADMIN — OLANZAPINE 10 MG: 10 TABLET, FILM COATED ORAL at 16:08

## 2018-08-24 RX ADMIN — BENZOCAINE AND MENTHOL 1 LOZENGE: 15; 3.6 LOZENGE ORAL at 21:52

## 2018-08-24 RX ADMIN — ACETAMINOPHEN 650 MG: 325 TABLET, FILM COATED ORAL at 13:04

## 2018-08-24 RX ADMIN — BENZOCAINE AND MENTHOL 1 LOZENGE: 15; 3.6 LOZENGE ORAL at 15:30

## 2018-08-24 RX ADMIN — GABAPENTIN 200 MG: 100 CAPSULE ORAL at 13:04

## 2018-08-24 RX ADMIN — GABAPENTIN 400 MG: 400 CAPSULE ORAL at 20:57

## 2018-08-24 RX ADMIN — LORAZEPAM 2 MG: 1 TABLET ORAL at 03:31

## 2018-08-24 RX ADMIN — PRAZOSIN HYDROCHLORIDE 4 MG: 2 CAPSULE ORAL at 20:57

## 2018-08-24 ASSESSMENT — ACTIVITIES OF DAILY LIVING (ADL)
GROOMING: INDEPENDENT
LAUNDRY: WITH SUPERVISION
DRESS: INDEPENDENT
LAUNDRY: WITH SUPERVISION
DRESS: INDEPENDENT
GROOMING: INDEPENDENT
ORAL_HYGIENE: INDEPENDENT
ORAL_HYGIENE: INDEPENDENT

## 2018-08-24 NOTE — PLAN OF CARE
Problem: Occupational Therapy Goals (Adult)  Goal: Occupational Therapy Goals  Stand Alone Therapy Goal    Pt attended 1 out of 3 OT groups offered.   Pt attended leisure exploration and participation group for increased intrinsic motivation to engage in social, non-obligatory occupations. Structured group was used to promote sharing and discussion for group cohesion and individual self-esteem. Pt actively participated in proposed activity. Congruent and engaged throughout group. Demonstrated openness with writer in conversation; interests in playing games and leisure pursuits around hometown.

## 2018-08-24 NOTE — PROGRESS NOTES
"   08/23/18 2100   Behavioral Health   Hallucinations denies / not responding to hallucinations   Thinking poor concentration   Orientation person: oriented;place: oriented   Memory baseline memory   Insight poor   Judgement impaired   Eye Contact at examiner   Affect blunted, flat   Mood depressed;anxious;mood is calm   Physical Appearance/Attire attire appropriate to age and situation   Hygiene other (see comment)   1. Wish to be Dead No   2. Non-Specific Active Suicidal Thoughts  No   Pt isolated in room out for meals. Pt states\" I feeling depressed  7 out of 10, racing thoughts nightmares at night and anxious\" I am homeless and hopeless because I have no where to go and nobody to help me\". Pt reports \" my mother lives in [facility]. Pt reports SI with no plan.  "
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
## 2018-08-24 NOTE — PROGRESS NOTES
Patient spent the majority of the shift napping. Came out of room mid shift sat in lounge and watched television for few minutes. Patient was calm, had a flat affect, isolative and withdrawn to self. No interaction or engaging with staff or other patients. Attended and participated in OT leisure group only. Reports poor sleep at night due to sleeping during the day and wokeing up by nightmares. States she is afraid of the dark. Reports feeling depressed (6), suicidal ideation and hearing voices. States voices are telling her to hurt herself and others. Patient observed taking food from other patients' trays. When notified patient stated she thought the food was out for grasps.        08/24/18 1410   Behavioral Health   Hallucinations denies / not responding to hallucinations   Thinking distractable;poor concentration   Orientation person: oriented;place: oriented;date: oriented;time: oriented   Memory baseline memory   Insight poor   Judgement impaired   Eye Contact at examiner   Affect blunted, flat   Mood depressed   Physical Appearance/Attire appears stated age   Hygiene well groomed   Suicidality thoughts only   1. Wish to be Dead No   2. Non-Specific Active Suicidal Thoughts  No   Self Injury other (see comment)  (denied )   Elopement (none observed )   Activity isolative;withdrawn   Speech clear;coherent   Medication Sensitivity no stated side effects   Psychomotor / Gait balanced;steady   Safety   Suicidality Status 15   Psycho Education   Type of Intervention 1:1 intervention   Response participates, initiates socially appropriate   Hours 0.5   Treatment Detail (check in)   Activities of Daily Living   Hygiene/Grooming independent   Oral Hygiene independent   Dress independent   Laundry with supervision   Room Organization independent   Groups   Details (attended and participated in OT leisure group only)

## 2018-08-25 PROCEDURE — 12400007 ZZH R&B MH INTERMEDIATE UMMC

## 2018-08-25 PROCEDURE — 25000132 ZZH RX MED GY IP 250 OP 250 PS 637: Performed by: STUDENT IN AN ORGANIZED HEALTH CARE EDUCATION/TRAINING PROGRAM

## 2018-08-25 RX ADMIN — LORAZEPAM 1 MG: 1 TABLET ORAL at 10:10

## 2018-08-25 RX ADMIN — GABAPENTIN 200 MG: 100 CAPSULE ORAL at 13:39

## 2018-08-25 RX ADMIN — NICOTINE 1 PATCH: 21 PATCH, EXTENDED RELEASE TRANSDERMAL at 09:05

## 2018-08-25 RX ADMIN — BENZOCAINE AND MENTHOL 1 LOZENGE: 15; 3.6 LOZENGE ORAL at 09:29

## 2018-08-25 RX ADMIN — MULTIPLE VITAMINS W/ MINERALS TAB 1 TABLET: TAB at 09:04

## 2018-08-25 RX ADMIN — OYSTER SHELL CALCIUM WITH VITAMIN D 1 TABLET: 500; 200 TABLET, FILM COATED ORAL at 09:04

## 2018-08-25 RX ADMIN — OLANZAPINE 10 MG: 10 TABLET, FILM COATED ORAL at 09:09

## 2018-08-25 RX ADMIN — GABAPENTIN 400 MG: 400 CAPSULE ORAL at 20:27

## 2018-08-25 RX ADMIN — HYDROXYZINE HYDROCHLORIDE 50 MG: 25 TABLET ORAL at 09:09

## 2018-08-25 RX ADMIN — RISPERIDONE 2 MG: 1 TABLET ORAL at 20:27

## 2018-08-25 RX ADMIN — GABAPENTIN 200 MG: 100 CAPSULE ORAL at 09:05

## 2018-08-25 RX ADMIN — TRAZODONE HYDROCHLORIDE 100 MG: 100 TABLET ORAL at 20:27

## 2018-08-25 RX ADMIN — PRAZOSIN HYDROCHLORIDE 4 MG: 2 CAPSULE ORAL at 20:27

## 2018-08-25 RX ADMIN — HYDROXYZINE HYDROCHLORIDE 50 MG: 25 TABLET ORAL at 20:27

## 2018-08-25 ASSESSMENT — ACTIVITIES OF DAILY LIVING (ADL)
GROOMING: INDEPENDENT
DRESS: INDEPENDENT
ORAL_HYGIENE: INDEPENDENT
LAUNDRY: WITH SUPERVISION

## 2018-08-25 NOTE — PLAN OF CARE
Problem: Suicide Risk (Adult)  Goal: Identify Related Risk Factors and Signs and Symptoms  Related risk factors and signs and symptoms are identified upon initiation of Human Response Clinical Practice Guideline (CPG).   Outcome: Improving  Patient denied suicidal ideations or wishing to be dead now. She was able to contract for safety on the unit. Patient is active and participate in offered groups and community meeting. Patient is willing to stay clean and sober after discharge and follow with [facility].     Problem: Substance Withdrawal  Goal: Substance Withdrawal  Signs and symptoms of listed problems will be absent or manageable.  Outcome: Improving  Patient reported increased anxiety this morning. Her HR was up to 120/regular at rest, patient said she had been told that she has a heart murmur in the past. Patient has upper respiratory congestion and productive moist cough. Lungs are clear in inspiration.   MSSA was scored at 8  X 2 due to increased heart rate. Given PRN Lorazepam 1 mg PO as per MSSA. Patient also took prn Hydroxyzine and Zyprexa early in the morning for feeling anxious and agitated.   Patient was able to eat well. Temp 98.2. She is able to participate in offered groups and community meeting. Encouraged to increase fluid intake and offered hot beverage. Will continue to monitor and inform MD if patient does not exhibit improvement of heart rate after ativan administration.     Addendum:  Patient's MSSA score was 6 on recheck. However, her heart rate is still elevated at this time at 118, regular.

## 2018-08-25 NOTE — PROGRESS NOTES
Patient denied suicidal ideations or wishing to be dead now. She was able to contract for safety on the unit. Patient is active and participate in offered groups and community meeting. Patient is willing to stay clean and sober after discharge and follow with [facility].      ETOH Withdrawal    Patient reported increased anxiety this morning. Her HR was up to 120/regular at rest, patient said she had been told that she has a heart murmur in the past. Patient has upper respiratory congestion and productive moist cough. Lungs are clear in inspiration.   MSSA was scored at 8  X 2 due to increased heart rate. Given PRN Lorazepam 1 mg PO as per MSSA. Patient also took prn Hydroxyzine and Zyprexa early in the morning for feeling anxious and agitated.   Patient was able to eat well. Temp 98.2. She is able to participate in offered groups and community meeting. Encouraged to increase fluid intake and offered hot beverage. Will continue to monitor and inform MD if patient does not exhibit improvement of heart rate after ativan administration.      Addendum:  Patient's MSSA score was 6 on recheck. However, her heart rate is still elevated at this time at 118, regular. On call resident was updated.     Addendum:  The next MSSA was scored at 6 again due to HR of 111.

## 2018-08-25 NOTE — PROGRESS NOTES
08/24/18 2000   Behavioral Health   Hallucinations denies / not responding to hallucinations   Thinking distractable   Orientation person: oriented;place: oriented   Memory baseline memory   Insight poor   Judgement impaired   Eye Contact at examiner   Affect blunted, flat   Mood mood is calm   Physical Appearance/Attire neat   Hygiene well groomed   Suicidality other (see comments)  (denies )   1. Wish to be Dead No   2. Non-Specific Active Suicidal Thoughts  No   Self Injury other (see comment)  (denies )   Elopement (none reported or observed )   Activity other (see comment)  (attended group and socialized with others )   Speech clear;coherent   Activities of Daily Living   Hygiene/Grooming independent   Oral Hygiene independent   Dress independent   Laundry with supervision   Room Organization independent        08/24/18 2000   Behavioral Health   Hallucinations denies / not responding to hallucinations   Thinking distractable   Orientation person: oriented;place: oriented   Memory baseline memory   Insight poor   Judgement impaired   Eye Contact at examiner   Affect blunted, flat   Mood mood is calm   Physical Appearance/Attire neat   Hygiene well groomed   Suicidality other (see comments)  (denies )   1. Wish to be Dead No   2. Non-Specific Active Suicidal Thoughts  No   Self Injury other (see comment)  (denies )   Elopement (none reported or observed )   Activity other (see comment)  (attended group and socialized with others )   Speech clear;coherent   Activities of Daily Living   Hygiene/Grooming independent   Oral Hygiene independent   Dress independent   Laundry with supervision   Room Organization independent       Pt denied SI and SIB.  Pt seems calm, ate supper and visible in the milieu.  Pt is independent with ADL's.

## 2018-08-26 PROCEDURE — 12400007 ZZH R&B MH INTERMEDIATE UMMC

## 2018-08-26 PROCEDURE — 25000132 ZZH RX MED GY IP 250 OP 250 PS 637: Performed by: STUDENT IN AN ORGANIZED HEALTH CARE EDUCATION/TRAINING PROGRAM

## 2018-08-26 PROCEDURE — 97127 ZZHC OT THERAPEUTIC INTERVENTION W/FOCUS ON COGNITIVE FUNCTION,EA 15 MIN: CPT | Mod: GO

## 2018-08-26 RX ADMIN — RISPERIDONE 2 MG: 1 TABLET ORAL at 19:53

## 2018-08-26 RX ADMIN — OLANZAPINE 10 MG: 10 TABLET, FILM COATED ORAL at 08:53

## 2018-08-26 RX ADMIN — HYDROXYZINE HYDROCHLORIDE 50 MG: 25 TABLET ORAL at 08:53

## 2018-08-26 RX ADMIN — MULTIPLE VITAMINS W/ MINERALS TAB 1 TABLET: TAB at 08:50

## 2018-08-26 RX ADMIN — NICOTINE 1 PATCH: 21 PATCH, EXTENDED RELEASE TRANSDERMAL at 08:51

## 2018-08-26 RX ADMIN — BENZOCAINE AND MENTHOL 1 LOZENGE: 15; 3.6 LOZENGE ORAL at 17:29

## 2018-08-26 RX ADMIN — PRAZOSIN HYDROCHLORIDE 4 MG: 2 CAPSULE ORAL at 19:53

## 2018-08-26 RX ADMIN — GABAPENTIN 400 MG: 400 CAPSULE ORAL at 19:53

## 2018-08-26 RX ADMIN — GABAPENTIN 200 MG: 100 CAPSULE ORAL at 08:51

## 2018-08-26 RX ADMIN — GABAPENTIN 200 MG: 100 CAPSULE ORAL at 14:36

## 2018-08-26 RX ADMIN — OLANZAPINE 10 MG: 10 TABLET, FILM COATED ORAL at 17:29

## 2018-08-26 RX ADMIN — HYDROXYZINE HYDROCHLORIDE 50 MG: 25 TABLET ORAL at 17:29

## 2018-08-26 RX ADMIN — BENZOCAINE AND MENTHOL 1 LOZENGE: 15; 3.6 LOZENGE ORAL at 09:03

## 2018-08-26 RX ADMIN — OYSTER SHELL CALCIUM WITH VITAMIN D 1 TABLET: 500; 200 TABLET, FILM COATED ORAL at 08:50

## 2018-08-26 ASSESSMENT — ACTIVITIES OF DAILY LIVING (ADL)
LAUNDRY: WITH SUPERVISION
GROOMING: INDEPENDENT
DRESS: SCRUBS (BEHAVIORAL HEALTH)
ORAL_HYGIENE: INDEPENDENT
GROOMING: INDEPENDENT
LAUNDRY: WITH SUPERVISION
ORAL_HYGIENE: INDEPENDENT
DRESS: SCRUBS (BEHAVIORAL HEALTH)

## 2018-08-26 NOTE — PLAN OF CARE
Problem: Occupational Therapy Goals (Adult)  Goal: Occupational Therapy Goals  Stand Alone Therapy Goal     Pt actively participated in occupational therapy clinic. Overall affect/behavior/cognition: content, quiet, yet highly engaged during independent project. Pt demonstrated great focus, planning, and problem solving. Performance skills: Able to independently initiate self-selected task, gather materials, sequence multi-step task, and adjust to workspace demands as needed. Social Interaction: Able to ask for assistance as needed and appeared comfortable interacting with staff.

## 2018-08-26 NOTE — PROGRESS NOTES
Pt complained of having cold sx.  T 97.6 B/P 126/89 P-105.O2 Sats were 98% She had a non- productive cough. She received a Cepacol cough lozenge and was encouraged  Fluids.

## 2018-08-26 NOTE — PROGRESS NOTES
Pt attended groups this shift and she was observed watching shows on TV. She somewhat control the TV when she is in the lounge not allowing other to watch any channel till done her show program. She denies suicidal ideation /hallucinations. She went back to bed after lunch.     08/26/18 1446   Behavioral Health   Hallucinations denies / not responding to hallucinations   Thinking poor concentration   Orientation time: oriented;date: oriented;person: oriented;place: oriented   Memory baseline memory   Insight poor   Judgement impaired   Eye Contact at examiner   Affect irritable;blunted, flat   Mood irritable   Physical Appearance/Attire appears stated age   Hygiene well groomed   Suicidality other (see comments)  (Denies)   1. Wish to be Dead No   2. Non-Specific Active Suicidal Thoughts  No   Self Injury other (see comment)  (Denies)   Activity isolative;withdrawn   Speech clear;coherent   Medication Sensitivity no stated side effects   Psychomotor / Gait balanced;steady   Psycho Education   Type of Intervention 1:1 intervention   Response participates, initiates socially appropriate   Hours 0.5   Activities of Daily Living   Hygiene/Grooming independent   Oral Hygiene independent   Dress scrubs (behavioral health)   Laundry with supervision   Room Organization independent   Activity   Activity Assistance Provided independent

## 2018-08-26 NOTE — PROGRESS NOTES
Pt calm this shift. Says her goal was to not isolate so she stayed in milieu all shift but was mostly dozing off or sleeping in the chairs. Mostly isolative and not as social w/ peers; just watching movies. Claims she doesn't sleep well due to her night terrors and voices at night so sleeps throughout the morning instead. Along with her cold, she is just very tired. Pt denying any SI/SIB. Also denying she has issues so she doesn't need treatment but then says she has a mental illness which makes it hard for others to understand. Hopes to go to a group home afterwards. Thinks she might have a visitor tomorrow so hoping for some chiptole. Otherwise, no major concerns presented.        08/25/18 2133   Behavioral Health   Hallucinations auditory   Thinking distractable   Orientation place: oriented;person: oriented   Memory baseline memory   Insight poor   Judgement impaired   Eye Contact at examiner   Affect blunted, flat   Mood depressed   Physical Appearance/Attire disheveled   Hygiene neglected grooming - unclean body, hair, teeth   Suicidality other (see comments)  (denies)   1. Wish to be Dead No   2. Non-Specific Active Suicidal Thoughts  No   Self Injury other (see comment)  (denies)   Elopement (none)   Activity other (see comment);isolative  (in milieu but dozing or sleeping)   Speech coherent;clear   Medication Sensitivity no stated side effects;no observed side effects   Psychomotor / Gait steady;balanced

## 2018-08-27 PROCEDURE — 25000132 ZZH RX MED GY IP 250 OP 250 PS 637: Performed by: STUDENT IN AN ORGANIZED HEALTH CARE EDUCATION/TRAINING PROGRAM

## 2018-08-27 PROCEDURE — 12400007 ZZH R&B MH INTERMEDIATE UMMC

## 2018-08-27 PROCEDURE — 97127 ZZHC OT THERAPEUTIC INTERVENTION W/FOCUS ON COGNITIVE FUNCTION,EA 15 MIN: CPT | Mod: GO

## 2018-08-27 PROCEDURE — 99231 SBSQ HOSP IP/OBS SF/LOW 25: CPT | Mod: GC | Performed by: PSYCHIATRY & NEUROLOGY

## 2018-08-27 RX ORDER — RISPERIDONE 1 MG/1
3 TABLET ORAL DAILY
Status: DISCONTINUED | OUTPATIENT
Start: 2018-08-27 | End: 2018-08-27

## 2018-08-27 RX ORDER — RIZATRIPTAN BENZOATE 5 MG/1
5 TABLET ORAL DAILY PRN
Status: COMPLETED | OUTPATIENT
Start: 2018-08-27 | End: 2018-08-27

## 2018-08-27 RX ORDER — RISPERIDONE 4 MG/1
4 TABLET ORAL DAILY
Status: DISCONTINUED | OUTPATIENT
Start: 2018-08-27 | End: 2018-08-31 | Stop reason: HOSPADM

## 2018-08-27 RX ORDER — TRAZODONE HYDROCHLORIDE 100 MG/1
200 TABLET ORAL
Status: DISCONTINUED | OUTPATIENT
Start: 2018-08-27 | End: 2018-08-31 | Stop reason: HOSPADM

## 2018-08-27 RX ADMIN — GABAPENTIN 200 MG: 100 CAPSULE ORAL at 15:22

## 2018-08-27 RX ADMIN — RIZATRIPTAN BENZOATE 5 MG: 5 TABLET ORAL at 13:23

## 2018-08-27 RX ADMIN — OYSTER SHELL CALCIUM WITH VITAMIN D 1 TABLET: 500; 200 TABLET, FILM COATED ORAL at 07:43

## 2018-08-27 RX ADMIN — HYDROXYZINE HYDROCHLORIDE 50 MG: 25 TABLET ORAL at 10:11

## 2018-08-27 RX ADMIN — RISPERIDONE 4 MG: 4 TABLET ORAL at 20:42

## 2018-08-27 RX ADMIN — PRAZOSIN HYDROCHLORIDE 4 MG: 2 CAPSULE ORAL at 20:41

## 2018-08-27 RX ADMIN — OLANZAPINE 10 MG: 10 TABLET, FILM COATED ORAL at 18:10

## 2018-08-27 RX ADMIN — GABAPENTIN 400 MG: 400 CAPSULE ORAL at 20:40

## 2018-08-27 RX ADMIN — TRAZODONE HYDROCHLORIDE 200 MG: 100 TABLET ORAL at 22:03

## 2018-08-27 RX ADMIN — MULTIPLE VITAMINS W/ MINERALS TAB 1 TABLET: TAB at 07:42

## 2018-08-27 RX ADMIN — BENZOCAINE AND MENTHOL 1 LOZENGE: 15; 3.6 LOZENGE ORAL at 17:07

## 2018-08-27 RX ADMIN — GABAPENTIN 200 MG: 100 CAPSULE ORAL at 07:43

## 2018-08-27 RX ADMIN — OLANZAPINE 10 MG: 10 TABLET, FILM COATED ORAL at 10:11

## 2018-08-27 RX ADMIN — NICOTINE 1 PATCH: 21 PATCH, EXTENDED RELEASE TRANSDERMAL at 07:44

## 2018-08-27 RX ADMIN — ACETAMINOPHEN 650 MG: 325 TABLET, FILM COATED ORAL at 12:42

## 2018-08-27 ASSESSMENT — ACTIVITIES OF DAILY LIVING (ADL)
LAUNDRY: WITH SUPERVISION
DRESS: INDEPENDENT
GROOMING: SHOWER;INDEPENDENT
DRESS: SCRUBS (BEHAVIORAL HEALTH);INDEPENDENT
GROOMING: INDEPENDENT
ORAL_HYGIENE: INDEPENDENT
ORAL_HYGIENE: INDEPENDENT
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
## 2018-08-27 NOTE — PLAN OF CARE
"Problem: Suicide Risk (Adult)  Goal: Identify Related Risk Factors and Signs and Symptoms  Related risk factors and signs and symptoms are identified upon initiation of Human Response Clinical Practice Guideline (CPG). Illness Management Recovery model: Objectives    Patient will identify reason(s) for hospitalization from their perspective.  Patient will identify a minimum of three goals for discharge.  Patient will identify a minimum of three triggers that may increase their symptoms.  Patient will identify a minimum of three coping skills they can do to stay well.   Patient will identify their support system to demonstrate readiness for discharge.  Outcome: No Change  48 hour assessment- pt states her auditory hallucinations get worse at night.  Pt stated her voices have not gotten better since admission.  Pt had CD eval today.  Asked pt if she was going ot CD treatment pt responded, \"something like that.\"  Pt for a period of time sitting in the lounge social with another pt.  Pt was asking for food off other patients trays.          "

## 2018-08-27 NOTE — PROGRESS NOTES
Pt observed in the lounge watching movies with others, She stated to the  writer that she hears voices. She appears isolative to self and has minimal interactions. She denies suicidal ideations.     08/26/18 2209   Behavioral Health   Hallucinations denies / not responding to hallucinations   Thinking distractable   Orientation time: oriented;date: oriented;place: oriented;person: oriented   Memory baseline memory   Insight insight appropriate to situation   Judgement impaired   Eye Contact at examiner   Affect full range affect   Mood mood is calm   Physical Appearance/Attire appears stated age   Hygiene well groomed   Suicidality other (see comments)  (Denies)   1. Wish to be Dead No   2. Non-Specific Active Suicidal Thoughts  No   Self Injury other (see comment)  (Denies)   Activity isolative;withdrawn   Speech clear;coherent   Medication Sensitivity no stated side effects   Psychomotor / Gait balanced;steady   Psycho Education   Type of Intervention 1:1 intervention   Response participates, initiates socially appropriate   Hours 0.5   Activities of Daily Living   Hygiene/Grooming independent   Oral Hygiene independent   Dress scrubs (behavioral health)   Laundry with supervision   Room Organization independent   Activity   Activity Assistance Provided independent

## 2018-08-27 NOTE — PROGRESS NOTES
Patient requested IRTS placement however due to her ongoing substance use, I suggested that she need more CD treatment and sobriety prior to placement in an IRTS facility.  Patient was agreeable to this and I was able to arrange a CD assessment this afternoon with [facility].   Will await recommendations and coordinate care as indicated.

## 2018-08-28 PROCEDURE — 25000132 ZZH RX MED GY IP 250 OP 250 PS 637: Performed by: STUDENT IN AN ORGANIZED HEALTH CARE EDUCATION/TRAINING PROGRAM

## 2018-08-28 PROCEDURE — 99232 SBSQ HOSP IP/OBS MODERATE 35: CPT | Mod: GC | Performed by: PSYCHIATRY & NEUROLOGY

## 2018-08-28 PROCEDURE — 97127 ZZHC OT THERAPEUTIC INTERVENTION W/FOCUS ON COGNITIVE FUNCTION,EA 15 MIN: CPT | Mod: GO

## 2018-08-28 PROCEDURE — 12400007 ZZH R&B MH INTERMEDIATE UMMC

## 2018-08-28 RX ORDER — GABAPENTIN 300 MG/1
300 CAPSULE ORAL 3 TIMES DAILY PRN
Status: DISCONTINUED | OUTPATIENT
Start: 2018-08-28 | End: 2018-08-31 | Stop reason: HOSPADM

## 2018-08-28 RX ORDER — LANOLIN ALCOHOL/MO/W.PET/CERES
3 CREAM (GRAM) TOPICAL AT BEDTIME
Status: DISCONTINUED | OUTPATIENT
Start: 2018-08-28 | End: 2018-08-31 | Stop reason: HOSPADM

## 2018-08-28 RX ADMIN — Medication 25 MG: at 11:17

## 2018-08-28 RX ADMIN — TRAZODONE HYDROCHLORIDE 200 MG: 100 TABLET ORAL at 22:13

## 2018-08-28 RX ADMIN — HYDROXYZINE HYDROCHLORIDE 50 MG: 25 TABLET ORAL at 01:14

## 2018-08-28 RX ADMIN — OLANZAPINE 10 MG: 10 TABLET, FILM COATED ORAL at 09:17

## 2018-08-28 RX ADMIN — HYDROXYZINE HYDROCHLORIDE 50 MG: 25 TABLET ORAL at 17:20

## 2018-08-28 RX ADMIN — BENZOCAINE AND MENTHOL 1 LOZENGE: 15; 3.6 LOZENGE ORAL at 14:45

## 2018-08-28 RX ADMIN — NICOTINE 1 PATCH: 21 PATCH, EXTENDED RELEASE TRANSDERMAL at 09:11

## 2018-08-28 RX ADMIN — MULTIPLE VITAMINS W/ MINERALS TAB 1 TABLET: TAB at 09:10

## 2018-08-28 RX ADMIN — PRAZOSIN HYDROCHLORIDE 4 MG: 2 CAPSULE ORAL at 20:19

## 2018-08-28 RX ADMIN — GABAPENTIN 300 MG: 300 CAPSULE ORAL at 12:13

## 2018-08-28 RX ADMIN — MELATONIN TAB 3 MG 3 MG: 3 TAB at 22:14

## 2018-08-28 RX ADMIN — RANITIDINE 150 MG: 150 TABLET ORAL at 14:46

## 2018-08-28 RX ADMIN — GABAPENTIN 400 MG: 400 CAPSULE ORAL at 20:20

## 2018-08-28 RX ADMIN — RISPERIDONE 4 MG: 4 TABLET ORAL at 20:19

## 2018-08-28 RX ADMIN — GABAPENTIN 200 MG: 100 CAPSULE ORAL at 09:10

## 2018-08-28 RX ADMIN — GABAPENTIN 200 MG: 100 CAPSULE ORAL at 14:45

## 2018-08-28 RX ADMIN — OYSTER SHELL CALCIUM WITH VITAMIN D 1 TABLET: 500; 200 TABLET, FILM COATED ORAL at 09:10

## 2018-08-28 ASSESSMENT — ACTIVITIES OF DAILY LIVING (ADL)
LAUNDRY: WITH SUPERVISION
ORAL_HYGIENE: INDEPENDENT
LAUNDRY: WITH SUPERVISION
GROOMING: INDEPENDENT
GROOMING: INDEPENDENT
DRESS: SCRUBS (BEHAVIORAL HEALTH)
ORAL_HYGIENE: INDEPENDENT
DRESS: SCRUBS (BEHAVIORAL HEALTH)

## 2018-08-28 NOTE — PROGRESS NOTES
Patient completed a CD assessment yesterday with [facility]  She has made recommendation for [facility]- dual diagnosis residential tx.  I left a  this morning to coordinate care and nquire what the wait is.    Patient requested assistance with [place] application.  Application was completed by patient and send by mail to Chava Roberts today.

## 2018-08-28 NOTE — PLAN OF CARE
"Problem: Occupational Therapy Goals (Adult)  Goal: Occupational Therapy Goals  Stand Alone Therapy Goal     Pt attended 3 out of 3 OT groups offered.   Pt actively participated in occupational therapy clinic. Overall affect/behavior/cognition: congruent and engaged. Pt demonstrated good focus, planning, and problem solving to complete multiple projects. Performance skills: Able to independently initiate self-selected task, gather materials, sequence multi-step task, and adjust to workspace demands as needed. Social Interaction: Able to ask for assistance as needed and appeared comfortable interacting with peers and staff.     1:30 Group: Pt appeared excessively bright and laughable; reported new medications \"are making me loopy\". No apparent disurbances in ability to participate to 60-minute game, however distractible 3x with 1 VC to redirect.   Leisure exploration and participation group was used for increased intrinsic motivation to engage in social, non-obligatory occupations. Structured group was used to promote sharing and discussion for group cohesion and individual self-esteem.      "

## 2018-08-28 NOTE — PROGRESS NOTES
08/27/18 1859   Clinical Impression   Affect Appropriate to situation   Orientation Oriented to person, place and time   Appearance and ADLs Neatly groomed   Attention to Internal Stimuli No observed signs   Interaction Skills Interacts appropriately with staff;Interacts appropriately with peers   Ability to Communicate Needs Independent   Verbal Content Clear;Appropriate to topic   Ability to Maintain Boundaries Maintains appropriate physical boundaries;Maintains appropriate verbal boundaries   Participation Initiates participation   Concentration Concentrates 50 minutes   Ability to Concentrate Without difficulty   Follows and Comprehends Directions Independently follows multi-step directions   Memory Delayed and immediate recall intact   Organization Independently organizes all tasks   Decision Making Independent   Planning and Problem Solving Independently plans ahead   Ability to Apply and Learn Concepts Applies within group structure   Frustrations / Stress Tolerance Independently identifies sources of frustration/stress   Level of Insight Identifies needs with structure/support   Self Esteem Needs further assessment   Social Supports Has knowledge of support systems

## 2018-08-28 NOTE — PROGRESS NOTES
"   08/27/18 2000   Behavioral Health   Hallucinations denies / not responding to hallucinations   Thinking intact   Orientation person: oriented;place: oriented   Memory baseline memory   Insight poor   Judgement impaired   Eye Contact at examiner   Affect full range affect   Mood mood is calm   Physical Appearance/Attire neat   Hygiene well groomed   Suicidality other (see comments)  (denies )   1. Wish to be Dead No   2. Non-Specific Active Suicidal Thoughts  No   Self Injury other (see comment)  (denies )   Activity other (see comment)  (visible in the milieu and socialized with others )   Speech clear;coherent   Activities of Daily Living   Hygiene/Grooming independent   Oral Hygiene independent   Dress independent   Laundry with supervision   Room Organization independent       Pt denied SI and SIB.  Pt reported feeling depressed (4), sad (4) and anxious (7) \" I'm not sure.\"  Pt reported overall feeling \" aright.\"  Pt seems calm, showered, ate sipper, visible in thew milieu, attended group and socialized with others.  Pt is independent with ADL's.  Pt reported no nutritional concerns.    "

## 2018-08-28 NOTE — PROGRESS NOTES
Up times one for a snack. Says poor sleep due to nightmares. Took Vistaril and appeared to sleep since then.

## 2018-08-28 NOTE — PROGRESS NOTES
"   08/28/18 1323   Behavioral Health   Hallucinations denies / not responding to hallucinations   Thinking intact   Orientation person: oriented;place: oriented;date: oriented;time: oriented   Memory baseline memory   Insight poor   Judgement impaired   Eye Contact at examiner   Affect full range affect   Mood mood is calm   Physical Appearance/Attire neat   Hygiene well groomed   Suicidality other (see comments)  (denies)   1. Wish to be Dead No   2. Non-Specific Active Suicidal Thoughts  No   Self Injury other (see comment)  (denies)   Activity other (see comment)  (particpated in groups)   Speech clear;coherent   Psychomotor / Gait balanced;steady   Safety   Suicidality Status 15   Activities of Daily Living   Hygiene/Grooming independent   Oral Hygiene independent   Dress scrubs (behavioral health)   Laundry with supervision   Room Organization independent   Activity   Activity Assistance Provided independent         Pt was visible in the milieu throughout the day shift. Pt participated in groups and did laundry. Pt rates her anxiety at a 8/10 and depression at a 6/10. \"I'm irritated because people are whispering about me and that makes me paranoid. I feel like hurting them. They are whispering because I am watching the TV and using the washing machine, I was in line first!\" When writer asked pt about HI, pt states \"I am feeling a little homicidal against those two patients who are whispering about me.\" Pt denies SI/SIB.   "
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
## 2018-08-28 NOTE — PROGRESS NOTES
Pt had anxious mood but had full range of affect. Pt was social with others. She appeared craving for drugs. She asked for all her available prn medications. Pt needed to be redirected. Pt denied having SI or SIB. She denied having auditory or visual hallucination. She told staff that she was depressed, sad, and anxious. Pt took a shower this evening. She participated in group.

## 2018-08-28 NOTE — PLAN OF CARE
"Problem: Occupational Therapy Goals (Adult)  Goal: Occupational Therapy Goals  Stand Alone Therapy Goal     Pt attended 3 out of 3 OT groups offered.   Pt actively participated in a mental health management group with a focus on coping through movement to facilitate relaxation and stress management via chair yoga and guided visualization. Pt reported participating in biking as a way to maintain physical wellness. Requested to try guided visualization for additional relaxation. Pt followed and engaged in all of the yoga poses and visualization techniques, and verbalized feeling \"so relaxed.. As if I just got high\" at the end of group.  Pt actively participated in occupational therapy clinic. Pt expressed positive feelings r/t finished result from previous project. Overall affect/behavior/cognition: congruent and highly engaged. Pt demonstrated good focus, planning, and problem solving. Performance skills: Able to independently initiate self-selected task, gather materials, sequence multi-step task, and adjust to workspace demands as needed. Social Interaction: Able to ask for assistance as needed and appeared comfortable interacting with peers and staff.         "

## 2018-08-29 PROCEDURE — 25000132 ZZH RX MED GY IP 250 OP 250 PS 637: Performed by: STUDENT IN AN ORGANIZED HEALTH CARE EDUCATION/TRAINING PROGRAM

## 2018-08-29 PROCEDURE — 97127 ZZHC OT THERAPEUTIC INTERVENTION W/FOCUS ON COGNITIVE FUNCTION,EA 15 MIN: CPT | Mod: GO

## 2018-08-29 PROCEDURE — 12400007 ZZH R&B MH INTERMEDIATE UMMC

## 2018-08-29 PROCEDURE — 99232 SBSQ HOSP IP/OBS MODERATE 35: CPT | Mod: GC | Performed by: PSYCHIATRY & NEUROLOGY

## 2018-08-29 RX ORDER — CYPROHEPTADINE HYDROCHLORIDE 4 MG/1
4 TABLET ORAL AT BEDTIME
Status: DISCONTINUED | OUTPATIENT
Start: 2018-08-29 | End: 2018-08-30

## 2018-08-29 RX ORDER — PRAZOSIN HYDROCHLORIDE 2 MG/1
2 CAPSULE ORAL AT BEDTIME
Status: DISCONTINUED | OUTPATIENT
Start: 2018-08-29 | End: 2018-08-30

## 2018-08-29 RX ADMIN — OYSTER SHELL CALCIUM WITH VITAMIN D 1 TABLET: 500; 200 TABLET, FILM COATED ORAL at 09:13

## 2018-08-29 RX ADMIN — TRAZODONE HYDROCHLORIDE 200 MG: 100 TABLET ORAL at 21:59

## 2018-08-29 RX ADMIN — Medication 25 MG: at 09:13

## 2018-08-29 RX ADMIN — GABAPENTIN 200 MG: 100 CAPSULE ORAL at 09:13

## 2018-08-29 RX ADMIN — GABAPENTIN 400 MG: 400 CAPSULE ORAL at 21:19

## 2018-08-29 RX ADMIN — RANITIDINE 150 MG: 150 TABLET ORAL at 18:17

## 2018-08-29 RX ADMIN — CYPROHEPTADINE HYDROCHLORIDE 4 MG: 4 TABLET ORAL at 21:20

## 2018-08-29 RX ADMIN — NICOTINE 1 PATCH: 21 PATCH, EXTENDED RELEASE TRANSDERMAL at 09:14

## 2018-08-29 RX ADMIN — TRAZODONE HYDROCHLORIDE 200 MG: 100 TABLET ORAL at 21:19

## 2018-08-29 RX ADMIN — RISPERIDONE 4 MG: 4 TABLET ORAL at 21:19

## 2018-08-29 RX ADMIN — GABAPENTIN 200 MG: 100 CAPSULE ORAL at 14:37

## 2018-08-29 RX ADMIN — PRAZOSIN HYDROCHLORIDE 2 MG: 2 CAPSULE ORAL at 21:20

## 2018-08-29 RX ADMIN — MULTIPLE VITAMINS W/ MINERALS TAB 1 TABLET: TAB at 09:13

## 2018-08-29 RX ADMIN — HYDROXYZINE HYDROCHLORIDE 50 MG: 25 TABLET ORAL at 18:18

## 2018-08-29 RX ADMIN — HYDROXYZINE HYDROCHLORIDE 50 MG: 25 TABLET ORAL at 11:57

## 2018-08-29 RX ADMIN — MELATONIN TAB 3 MG 3 MG: 3 TAB at 21:19

## 2018-08-29 ASSESSMENT — ACTIVITIES OF DAILY LIVING (ADL)
DRESS: SCRUBS (BEHAVIORAL HEALTH)
ORAL_HYGIENE: INDEPENDENT
GROOMING: INDEPENDENT
LAUNDRY: WITH SUPERVISION
ORAL_HYGIENE: INDEPENDENT
DRESS: STREET CLOTHES
LAUNDRY: WITH SUPERVISION
GROOMING: INDEPENDENT

## 2018-08-29 NOTE — PROGRESS NOTES
Received VM from [facility] stating that they have reviewed patient's assessment and declined referral as they feel they can not address  patient's mental health needs.  I have faxed patient's CD assessment to [facility] and will await review.

## 2018-08-29 NOTE — PLAN OF CARE
Problem: Patient Care Overview  Goal: Team Discussion  Team Plan:   BEHAVIORAL TEAM DISCUSSION    Participants:   Dr. Mary, Dr. Xavier, Jane Romero MA.LP, Pharm student    Progress:   Patient reporting AH, intrusive sexual thoughts.    Continued Stay Criteria/Rationale:  Psychotic sx's, OCD    Medical/Physical:   Migraine HA's     Precautions:   Behavioral Orders   Procedures     Code 1 - Restrict to Unit     Routine Programming     As clinically indicated     Sexual precautions     Single Room     Status 15     Every 15 minutes.     Suicide precautions     Patients on Suicide Precautions should have a Combination Diet ordered that includes a Diet selection(s) AND a Behavioral Tray selection for Safe Tray - with utensils, or Safe Tray - NO utensils       Plan:   Meds continue to be adjusted per MD's.  Patient completed a CD assessment - awaiting funding and placement authorization.    Rationale for change in precautions or plan:   No change in plan/precautions

## 2018-08-29 NOTE — PROGRESS NOTES
Pt attended groups this shift and she denies suicidal ideations/hallucinations. She reports feeling much better and she is waiting for placement.     08/29/18 1413   Behavioral Health   Hallucinations denies / not responding to hallucinations   Thinking intact   Orientation time: oriented;date: oriented;place: oriented;person: oriented   Memory baseline memory   Insight insight appropriate to situation   Judgement impaired   Eye Contact at examiner   Affect full range affect   Mood mood is calm   Physical Appearance/Attire appears stated age   Hygiene well groomed   1. Wish to be Dead No   2. Non-Specific Active Suicidal Thoughts  No   Self Injury other (see comment)  (Denies)   Speech coherent;clear   Medication Sensitivity no stated side effects   Psychomotor / Gait balanced;steady   Psycho Education   Type of Intervention 1:1 intervention   Response participates, initiates socially appropriate   Hours 0.5   Activities of Daily Living   Hygiene/Grooming independent   Oral Hygiene independent   Dress scrubs (behavioral health)   Laundry with supervision   Room Organization independent   Activity   Activity Assistance Provided independent

## 2018-08-29 NOTE — PLAN OF CARE
Problem: Occupational Therapy Goals (Adult)  Goal: Occupational Therapy Goals  Stand Alone Therapy Goal     Pt attended 2 out of 2 OT groups offered.   Participated in Leisure Pictionary game. Education was provided on purpose of leisure as an occupation in daily/weekly routine and exploration of healthy leisure interests that support recovery. Displayed congruent affect and demonstrated appropriate interaction with peers.   Pt actively participated in occupational therapy clinic. Pt expressed interest exploring additional 'beadie critter' craft. Overall affect/behavior/cognition: congruent and highly engaged. Pt demonstrated excellent focus, planning, and problem solving through the use of individual ipad to obtain and correctly follow visual and written directions. Performance skills: Able to independently initiate self-selected task, gather materials, sequence multi-step task, and adjust to workspace demands as needed. Social Interaction: Able to ask for assistance as needed and appeared comfortable interacting with peers and staff.
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
## 2018-08-29 NOTE — PLAN OF CARE
"Problem: Suicide Risk (Adult)  Intervention: Promote/Enhance Psychosocial Well-Being  Patient denies SI/SIB. Patient affect full range, mood is calm. Patient trending towards impulsive behavior and inappropriate hypersexual talk. Patient needed to be re-directed. Was asking \"Can my boobs be used to check blood pressure?\" and laughed about it. Patient also rubbed up against writer and patient asked to refrain, and patient did. Patient otherwise visible in the milieu and social with peers. Patient showered. Reported right was twitching, but it seemed to resolve. Medication compliant. Had prn hydroxyzine 50 mg for anxiety, and took prn trazodone 200 mg at HS for sleep. No other concerns reported.        "

## 2018-08-30 PROCEDURE — 12400007 ZZH R&B MH INTERMEDIATE UMMC

## 2018-08-30 PROCEDURE — 99232 SBSQ HOSP IP/OBS MODERATE 35: CPT | Mod: GC | Performed by: PSYCHIATRY & NEUROLOGY

## 2018-08-30 PROCEDURE — 25000132 ZZH RX MED GY IP 250 OP 250 PS 637: Performed by: STUDENT IN AN ORGANIZED HEALTH CARE EDUCATION/TRAINING PROGRAM

## 2018-08-30 RX ORDER — SUMATRIPTAN 25 MG/1
25 TABLET, FILM COATED ORAL ONCE
Status: COMPLETED | OUTPATIENT
Start: 2018-08-30 | End: 2018-08-30

## 2018-08-30 RX ORDER — CYPROHEPTADINE HYDROCHLORIDE 4 MG/1
8 TABLET ORAL AT BEDTIME
Status: DISCONTINUED | OUTPATIENT
Start: 2018-08-30 | End: 2018-08-31 | Stop reason: HOSPADM

## 2018-08-30 RX ORDER — PRAZOSIN HYDROCHLORIDE 2 MG/1
4 CAPSULE ORAL AT BEDTIME
Status: DISCONTINUED | OUTPATIENT
Start: 2018-08-30 | End: 2018-08-31 | Stop reason: HOSPADM

## 2018-08-30 RX ORDER — NALTREXONE HYDROCHLORIDE 50 MG/1
50 TABLET, FILM COATED ORAL DAILY
Status: DISCONTINUED | OUTPATIENT
Start: 2018-08-31 | End: 2018-08-31 | Stop reason: HOSPADM

## 2018-08-30 RX ADMIN — RISPERIDONE 4 MG: 4 TABLET ORAL at 21:34

## 2018-08-30 RX ADMIN — GABAPENTIN 200 MG: 100 CAPSULE ORAL at 08:09

## 2018-08-30 RX ADMIN — NICOTINE 1 PATCH: 21 PATCH, EXTENDED RELEASE TRANSDERMAL at 08:09

## 2018-08-30 RX ADMIN — GABAPENTIN 400 MG: 400 CAPSULE ORAL at 21:34

## 2018-08-30 RX ADMIN — MULTIPLE VITAMINS W/ MINERALS TAB 1 TABLET: TAB at 08:08

## 2018-08-30 RX ADMIN — BENZOCAINE AND MENTHOL 1 LOZENGE: 15; 3.6 LOZENGE ORAL at 10:47

## 2018-08-30 RX ADMIN — GABAPENTIN 300 MG: 300 CAPSULE ORAL at 10:10

## 2018-08-30 RX ADMIN — Medication 25 MG: at 08:09

## 2018-08-30 RX ADMIN — MELATONIN TAB 3 MG 3 MG: 3 TAB at 21:37

## 2018-08-30 RX ADMIN — CYPROHEPTADINE HYDROCHLORIDE 8 MG: 4 TABLET ORAL at 21:34

## 2018-08-30 RX ADMIN — GABAPENTIN 200 MG: 100 CAPSULE ORAL at 14:50

## 2018-08-30 RX ADMIN — SUMATRIPTAN 25 MG: 25 TABLET, FILM COATED ORAL at 17:58

## 2018-08-30 RX ADMIN — PRAZOSIN HYDROCHLORIDE 4 MG: 2 CAPSULE ORAL at 21:34

## 2018-08-30 RX ADMIN — HYDROXYZINE HYDROCHLORIDE 50 MG: 25 TABLET ORAL at 03:31

## 2018-08-30 RX ADMIN — OYSTER SHELL CALCIUM WITH VITAMIN D 1 TABLET: 500; 200 TABLET, FILM COATED ORAL at 08:09

## 2018-08-30 RX ADMIN — ERGOCALCIFEROL 50000 UNITS: 1.25 CAPSULE ORAL at 21:33

## 2018-08-30 RX ADMIN — TRAZODONE HYDROCHLORIDE 200 MG: 100 TABLET ORAL at 21:36

## 2018-08-30 ASSESSMENT — ACTIVITIES OF DAILY LIVING (ADL)
ORAL_HYGIENE: INDEPENDENT
LAUNDRY: WITH SUPERVISION
HYGIENE/GROOMING: INDEPENDENT
DRESS: INDEPENDENT
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
## 2018-08-30 NOTE — PROGRESS NOTES
Patient woke up and reported that she had had a nightmare.  She reported that she had hoped to not have a nightmare as she had started a new medication (Prazosin) to manage the nightmares. She appears to be quite distressed by this experience.  50mg hydroxyzine PO PRN was administered.

## 2018-08-30 NOTE — PROGRESS NOTES
08/30/18 1447   Behavioral Health   Hallucinations denies / not responding to hallucinations   Thinking intact   Orientation person: oriented;place: oriented;time: oriented;date: oriented   Memory baseline memory   Insight admits / accepts   Judgement impaired   Eye Contact at examiner   Affect blunted, flat   Mood mood is calm   Physical Appearance/Attire neat   Hygiene well groomed   Suicidality other (see comments)  (See notes)   1. Wish to be Dead No   2. Non-Specific Active Suicidal Thoughts  No   Self Injury other (see comment)  (Denied)   Elopement (None observed)   Activity other (see comment)  (Engaging in groups)   Speech coherent;clear   Medication Sensitivity no stated side effects   Psychomotor / Gait balanced;steady   Psycho Education   Type of Intervention 1:1 intervention   Response participates, initiates socially appropriate   Hours 0.5   Treatment Detail Check-In   Activities of Daily Living   Hygiene/Grooming independent   Oral Hygiene independent   Dress independent   Laundry with supervision   Room Organization independent     Pt was isolative in her room for half of the shift and visible in the milieu for the other half. While in the milieu, pt attended and engaged in community meeting and almost all other therapeutic groups. Pt expressed feeling very exhausted. Pt reported having nightmares last night and due to that, pt was not able to get any sleep. Moreover,  Pt reported pain all of her body, especially her knees. When asked the pt if she was having any SI and SIB thoughts, pt answered yes, when she woke up from her nightmares. But denied at the time this writer checked in with her.

## 2018-08-31 VITALS
WEIGHT: 280 LBS | BODY MASS INDEX: 42.44 KG/M2 | HEIGHT: 68 IN | OXYGEN SATURATION: 95 % | SYSTOLIC BLOOD PRESSURE: 132 MMHG | TEMPERATURE: 98.4 F | RESPIRATION RATE: 16 BRPM | DIASTOLIC BLOOD PRESSURE: 82 MMHG | HEART RATE: 88 BPM

## 2018-08-31 PROCEDURE — 99238 HOSP IP/OBS DSCHRG MGMT 30/<: CPT | Mod: GC | Performed by: PSYCHIATRY & NEUROLOGY

## 2018-08-31 PROCEDURE — 97127 ZZHC OT THERAPEUTIC INTERVENTION W/FOCUS ON COGNITIVE FUNCTION,EA 15 MIN: CPT | Mod: GO

## 2018-08-31 PROCEDURE — 25000132 ZZH RX MED GY IP 250 OP 250 PS 637: Performed by: STUDENT IN AN ORGANIZED HEALTH CARE EDUCATION/TRAINING PROGRAM

## 2018-08-31 PROCEDURE — 25000128 H RX IP 250 OP 636: Performed by: STUDENT IN AN ORGANIZED HEALTH CARE EDUCATION/TRAINING PROGRAM

## 2018-08-31 RX ORDER — SUMATRIPTAN 25 MG/1
25 TABLET, FILM COATED ORAL EVERY 12 HOURS PRN
Status: DISCONTINUED | OUTPATIENT
Start: 2018-08-31 | End: 2018-08-31 | Stop reason: HOSPADM

## 2018-08-31 RX ORDER — NALTREXONE HYDROCHLORIDE 50 MG/1
50 TABLET, FILM COATED ORAL DAILY
Qty: 30 TABLET | Refills: 0 | Status: SHIPPED | OUTPATIENT
Start: 2018-09-01 | End: 2019-06-11

## 2018-08-31 RX ORDER — CYPROHEPTADINE HYDROCHLORIDE 4 MG/1
8 TABLET ORAL AT BEDTIME
Qty: 60 TABLET | Refills: 0 | Status: SHIPPED | OUTPATIENT
Start: 2018-08-31 | End: 2019-06-11

## 2018-08-31 RX ORDER — PRAZOSIN HYDROCHLORIDE 2 MG/1
4 CAPSULE ORAL AT BEDTIME
Qty: 60 CAPSULE | Refills: 0 | Status: SHIPPED | OUTPATIENT
Start: 2018-08-31 | End: 2019-06-11

## 2018-08-31 RX ORDER — RISPERIDONE 4 MG/1
4 TABLET ORAL DAILY
Qty: 30 TABLET | Refills: 0 | Status: SHIPPED | OUTPATIENT
Start: 2018-08-31 | End: 2019-06-11

## 2018-08-31 RX ORDER — ERGOCALCIFEROL 1.25 MG/1
50000 CAPSULE, LIQUID FILLED ORAL
Qty: 10 CAPSULE | Refills: 0 | Status: SHIPPED | OUTPATIENT
Start: 2018-09-06 | End: 2019-06-11

## 2018-08-31 RX ADMIN — MULTIPLE VITAMINS W/ MINERALS TAB 1 TABLET: TAB at 08:54

## 2018-08-31 RX ADMIN — NALTREXONE HYDROCHLORIDE 50 MG: 50 TABLET, FILM COATED ORAL at 08:54

## 2018-08-31 RX ADMIN — GABAPENTIN 200 MG: 100 CAPSULE ORAL at 14:34

## 2018-08-31 RX ADMIN — RISPERIDONE 25 MG: KIT at 16:10

## 2018-08-31 RX ADMIN — GABAPENTIN 200 MG: 100 CAPSULE ORAL at 08:54

## 2018-08-31 RX ADMIN — RANITIDINE 150 MG: 150 TABLET ORAL at 14:10

## 2018-08-31 RX ADMIN — NICOTINE 1 PATCH: 21 PATCH, EXTENDED RELEASE TRANSDERMAL at 08:54

## 2018-08-31 RX ADMIN — OYSTER SHELL CALCIUM WITH VITAMIN D 1 TABLET: 500; 200 TABLET, FILM COATED ORAL at 08:54

## 2018-08-31 ASSESSMENT — ACTIVITIES OF DAILY LIVING (ADL)
LAUNDRY: WITH SUPERVISION
DRESS: SCRUBS (BEHAVIORAL HEALTH)
GROOMING: INDEPENDENT
ORAL_HYGIENE: INDEPENDENT

## 2018-08-31 NOTE — PROGRESS NOTES
"Pt states she has no SI or SIB. She feels \"much better\". She denies halving auditory or visual hallucination. She states she is \"ready to go home\". She requests a cab ride. Pt's mood is calm and has full range of affect. She is social with others. At discharge, pt states she is not a danger to herself or to others.   Discharge instructions are reviewed with pt. She has no concerns. She verbalizes understanding the discharge instructions. Discharge medications are given to pt and instructions are reviewed with pt. Her belongings are returned to pt. Pt is escorted to security by Maxime villarreal to retrieve her valuables and knife. Pt is discharged to her brother's home per physician order.   Pt has received Risperdal Consta per physician order prior to discharge. No side effect is observed at discharge. This writer encourage pt to call if any concerns. Pt agrees. Station [facility] phone number is given to pt.   "

## 2018-08-31 NOTE — PLAN OF CARE
"Problem: Occupational Therapy Goals (Adult)  Goal: Occupational Therapy Goals  Stand Alone Therapy Goal     Pt attended 1 out of 3 OT groups offered.   Pt actively participated in occupational therapy clinic. Overall affect/behavior/cognition: content, pleasant, and highly engaged in independent project. Pt demonstrated excellent focus, planning, and problem solving. Performance skills: Able to independently initiate self-selected task, gather materials, sequence multi-step task, and adjust to workspace demands as needed. Social Interaction: Able to ask for assistance as needed and appeared comfortable interacting with peers and staff.     Expressed 1 sexual comment r/t individual project. After completion of animal beaded keychain feet Pt stated \"It looks like he has some huge kahonas\". Pt was redirected and given positive feedback on realistic depiction of animal feet - Pt accepted feedback and verbalized no further sexual thoughts.         "
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
## 2018-08-31 NOTE — PROGRESS NOTES
Pt denies suicidal ideations and hallucinations. She reports doing well and discharged today. She has been cooperative and pleasant. She did shower and laundry.     08/31/18 1411   Behavioral Health   Hallucinations denies / not responding to hallucinations   Thinking intact   Orientation time: oriented;date: oriented;place: oriented;person: oriented   Memory baseline memory   Insight insight appropriate to situation   Judgement impaired   Eye Contact at examiner   Affect full range affect   Mood mood is calm   Physical Appearance/Attire appears stated age   Hygiene well groomed   Suicidality other (see comments)  (Denies)   1. Wish to be Dead No   2. Non-Specific Active Suicidal Thoughts  No   Self Injury other (see comment)  (Denies)   Activity other (see comment)  (Social on approach)   Speech coherent;clear   Medication Sensitivity no stated side effects   Psychomotor / Gait balanced;steady   Psycho Education   Type of Intervention 1:1 intervention   Response participates, initiates socially appropriate   Hours 0.5   Activities of Daily Living   Hygiene/Grooming independent   Oral Hygiene independent   Dress scrubs (behavioral health)   Laundry with supervision   Room Organization independent   Activity   Activity Assistance Provided independent

## 2018-08-31 NOTE — DISCHARGE INSTRUCTIONS
Behavioral Discharge Planning and Instructions    Summary:   You were admitted to [facility] on 8/21/18 with increased depression, suicidal ideation and polysubstance use under the care of Dr. Mary.  You met with Dr. Mary and his team daily for ongoing psychiatric assessment and medication management.  You had opportunities to participate in therapeutic groups on the unit.   You completed a CD assessment and were waiting on funding/placement but requested to leave the hospital with plans to move to [place] with your mother.  At this time you report your mood has stabilized and you report you are not having thoughts or intent to harm yourself or others. You will be discharged home and will make arrangements for psychiatric care when you arrive in [place].    Main Diagnosis:   Schizoaffective Disorder  Polysubstance Use Disorder: cannabis, cocaine, methamphetamine and alcohol.    Major Treatments, Procedures and Findings:   Medications were  managed throughout your stay. An internal medicine consult was completed during your stay. You had the opportunity to participate in treatment programming while on the unit including occupational therapy, mental health support and education and spiritual services.     Symptoms to Report:   Please report if you are experiencing increased aggression and/or confusion, problematic loss of sleep, worsening mood, or thoughts of suicide to your treatment team or notify your primary provider.   IF THE SYMPTOMS YOU ARE EXPERIENCING ARE A MEDICAL EMERGENCY, CALL 911 IMMEDIATELY    Lifestyle Adjustment:   1. Adjust your lifestyle to get enough sleep, relaxation, exercise and good nutrition.  Continue to develop healthy coping skills to decrease stress and promote a healthy and sober lifestyle.  2. Abstain from all substances of abuse.  3. Take medications as prescribed.  Please work with your doctor to discuss any concerns you have with your medications or side effects you may be  "experiencing.  4. Follow up with appointments as scheduled.        Psychiatry Follow-up:   Patient plans to move to [place] next week.      [facility]  Medication Management Walk-in Appointments every Thursday at 8:30am to establish care  [address] [place], [place]  [phone]    Your CD assessment was completed by [facility]: [phone]      Resources:   [facility] Adult Shelter Team  [address]  [address]  [phone]     [facility] Economic Assistance - cash assistance  [address] [place] [address]  [place], [place]  [phone]     Social Security Office  [address] [place] [address]  [phone]    Resources:   *[facility] Crisis: COPE: ([phone]) 24 hour mobile crisis support for people having a mental health crisis in [facility].   *Acute Psychiatric Services ([phone]). 24-hour walk-in crisis psychiatric support at [facility]; Emergency Medications Clinic available 7:30am - 2:00pm  *[employer]: Text  \"life\"  to 62939   A trained crisis counselor will respond immediately  *[facility] Emergency Room: [phone]    General Medication Instructions:   See your medication sheet(s) for instructions.   Take all medicines as directed.  Make no changes unless your doctor suggests them.   Go to all your doctor visits.  Be sure to have all your required lab tests. This way, your medicines can be refilled on time.  Do not use any drugs not prescribed by your doctor.    The treatment team has appreciated the opportunity to work with you.  We wish you the best in the future.    If you have any questions or concerns our unit number is [phone]. .    "

## 2018-08-31 NOTE — PROGRESS NOTES
Patient requested she be discharged today.  Stated that she spoke with her mother in [facility] and mother has agreed to have her move there next week.  Patient can stay with her brother here until she leaves.  Patient had completed a CD eval and we were awaiting funding/authorization for treatment.  Patient stated that she will get care set up in [place].  She stated that her sister has bipolar d/o and she can arrange to see her sisters' outpatient providers.  Patient was encouraged to stay in the hospital until more substancial arrangements can be made but patient declined.  She reports she is feeling much better than when admitted and denies any thoughts of self harm.  Patient will be provided resources for assistance/care if she changes her mind and wishes to remain in [place].

## 2018-08-31 NOTE — PROGRESS NOTES
"The pt was pleasant, calm and appropriate in the milieu.  She did report to this writer that she has been having \"night terrors that are violent and unpleasant.  I feel like my sexual addiction has seeped into my dreams and I do stuff I don't want to do.\"  She is hopeful that a new medication can help with that.  She stated to this writer that she has been thinking about going to live with her mom in [place], because she is a safe person to be around \"she doesn't do drugs, she's retired and she loves me and wants me there.\"  She told this writer that she is \"the type of person who keeps my problems to myself, but wants to get better so she has to let people help her.\"  This writer encouraged her to be honest with her treatment team so that they have the knowledge they need to adequately help her and meet her needs.  She watched movies, was laughing at times and social at times as well.  She reports that her voices \"get worse at night\".  SHe denies any active SI/SIB urges at this time.     08/30/18 2056   Behavioral Health   Hallucinations auditory;other (see comment)  (baseline has voices)   Thinking intact   Orientation person: oriented;place: oriented;date: oriented;time: oriented   Memory baseline memory   Insight admits / accepts   Judgement impaired   Eye Contact at examiner   Affect full range affect   Mood mood is calm   Physical Appearance/Attire attire appropriate to age and situation   Hygiene well groomed   Suicidality other (see comments)  (pt denies)   1. Wish to be Dead No   2. Non-Specific Active Suicidal Thoughts  No   Self Injury other (see comment)  (pt denies)   Elopement (no statements or attempts)   Activity other (see comment)  (attended groups, visible and social)   Speech clear;coherent   Medication Sensitivity no stated side effects   Psychomotor / Gait balanced;steady     "

## 2018-09-28 ASSESSMENT — ENCOUNTER SYMPTOMS
NERVOUS/ANXIOUS: 1
ABDOMINAL PAIN: 0
WOUND: 0
VOMITING: 0
DECREASED CONCENTRATION: 1
FATIGUE: 1
SLEEP DISTURBANCE: 1
CHILLS: 0
DIAPHORESIS: 0
CHEST TIGHTNESS: 0
RHINORRHEA: 0
NAUSEA: 0
HALLUCINATIONS: 1
PALPITATIONS: 0
ARTHRALGIAS: 0
SHORTNESS OF BREATH: 0
SORE THROAT: 0
AGITATION: 0
DYSPHORIC MOOD: 1
FEVER: 0
COUGH: 0
NECK STIFFNESS: 0
CONFUSION: 0
NECK PAIN: 0

## 2019-05-01 ENCOUNTER — TELEPHONE (OUTPATIENT)
Dept: PSYCHIATRY | Facility: CLINIC | Age: 29
End: 2019-05-01

## 2019-05-13 DIAGNOSIS — K04.7 DENTAL ABSCESS: Primary | ICD-10-CM

## 2019-05-13 RX ORDER — AMOXICILLIN 500 MG/1
500 CAPSULE ORAL 3 TIMES DAILY
Qty: 21 CAPSULE | Refills: 0 | Status: SHIPPED | OUTPATIENT
Start: 2019-05-13 | End: 2019-06-11

## 2019-05-13 NOTE — PROGRESS NOTES
Pharmacy called to clarify rx.  Patient prescribed amoxicillin 500mg cap 3x/day x 7 days after dental visit.  Patient cannot get filled unless signed by PCP.  Has tolerated amoxicillin in past.      Agreed to fill but patient needs appointment for further prscriptions

## 2019-05-14 ENCOUNTER — TELEPHONE (OUTPATIENT)
Dept: FAMILY MEDICINE | Facility: CLINIC | Age: 29
End: 2019-05-14

## 2019-05-14 NOTE — TELEPHONE ENCOUNTER
Received faxed request from [facility] in [place] for Tylenol with Codeine #3 # 8 take 1 to 2 tablets by mouth every 4 hours as needed for pain. Dentist is not a Medicaid provider and would like to know if Dr Edouard would be able to write this prescription for patient. I discussed this request with Dr Edouard. Per clinic policy and also the fact Dr Edouard has seen or patient has established care with her, per physician Rianna will need to be seen prior to completing above request.    Informed Rianna of above recommendation. Barrier to coming into clinic is transportation. Will plan on going into Urgent Care. She states understanding and will come in, in the future to re-establish care with new provider. Previous providers she has seen are no longer practicing at [facility]. Afua LLOYD

## 2019-06-11 ENCOUNTER — OFFICE VISIT (OUTPATIENT)
Dept: FAMILY MEDICINE | Facility: CLINIC | Age: 29
End: 2019-06-11
Payer: MEDICAID

## 2019-06-11 VITALS
HEIGHT: 68 IN | SYSTOLIC BLOOD PRESSURE: 114 MMHG | OXYGEN SATURATION: 95 % | WEIGHT: 226 LBS | TEMPERATURE: 99.1 F | HEART RATE: 99 BPM | BODY MASS INDEX: 34.25 KG/M2 | DIASTOLIC BLOOD PRESSURE: 80 MMHG | RESPIRATION RATE: 18 BRPM

## 2019-06-11 DIAGNOSIS — R30.0 DYSURIA: ICD-10-CM

## 2019-06-11 DIAGNOSIS — J30.1 SEASONAL ALLERGIC RHINITIS DUE TO POLLEN: ICD-10-CM

## 2019-06-11 DIAGNOSIS — F33.9 RECURRENT MAJOR DEPRESSIVE DISORDER, REMISSION STATUS UNSPECIFIED (H): ICD-10-CM

## 2019-06-11 DIAGNOSIS — J06.9 VIRAL URI WITH COUGH: Primary | ICD-10-CM

## 2019-06-11 DIAGNOSIS — F41.1 GENERALIZED ANXIETY DISORDER: ICD-10-CM

## 2019-06-11 DIAGNOSIS — F25.9 SCHIZOAFFECTIVE DISORDER, UNSPECIFIED TYPE (H): ICD-10-CM

## 2019-06-11 DIAGNOSIS — Z11.3 SCREEN FOR STD (SEXUALLY TRANSMITTED DISEASE): ICD-10-CM

## 2019-06-11 DIAGNOSIS — F42.9 OBSESSIVE-COMPULSIVE DISORDER, UNSPECIFIED TYPE: ICD-10-CM

## 2019-06-11 LAB
BILIRUBIN UR: NEGATIVE
BLOOD UR: NEGATIVE
GLUCOSE URINE: NEGATIVE
HIV 1+2 AB+HIV1 P24 AG SERPL QL IA: NEGATIVE
KETONES UR QL: NEGATIVE
LEUKOCYTE ESTERASE UR: NEGATIVE
NITRITE UR QL STRIP: POSITIVE
PH UR STRIP: 5.5 [PH] (ref 4.5–8)
PROTEIN UR: NEGATIVE
SP GR UR STRIP: 1.02 (ref 1–1.03)
UROBILINOGEN UR STRIP-ACNC: ABNORMAL

## 2019-06-11 RX ORDER — FLUTICASONE PROPIONATE 50 MCG
1 SPRAY, SUSPENSION (ML) NASAL DAILY
Qty: 15.8 ML | Refills: 0 | Status: SHIPPED | OUTPATIENT
Start: 2019-06-11 | End: 2020-05-13

## 2019-06-11 RX ORDER — NITROFURANTOIN 25; 75 MG/1; MG/1
100 CAPSULE ORAL 2 TIMES DAILY
Qty: 14 CAPSULE | Refills: 0 | Status: SHIPPED | OUTPATIENT
Start: 2019-06-11 | End: 2019-06-18

## 2019-06-11 RX ORDER — CETIRIZINE HYDROCHLORIDE 5 MG/1
5 TABLET ORAL DAILY
Qty: 30 TABLET | Refills: 0 | Status: SHIPPED | OUTPATIENT
Start: 2019-06-11 | End: 2019-06-30

## 2019-06-11 RX ORDER — ACETAMINOPHEN 500 MG
500-1000 TABLET ORAL EVERY 6 HOURS PRN
Qty: 100 TABLET | Refills: 0 | Status: SHIPPED | OUTPATIENT
Start: 2019-06-11 | End: 2020-05-13

## 2019-06-11 ASSESSMENT — MIFFLIN-ST. JEOR: SCORE: 1803.63

## 2019-06-11 NOTE — PATIENT INSTRUCTIONS

## 2019-06-11 NOTE — PROGRESS NOTES
Preceptor Attestation:   Patient seen, evaluated and discussed with the resident. I have verified the content of the note, which accurately reflects my assessment of the patient and the plan of care.  Supervising Physician:Hope Ayon MD  [facility]

## 2019-06-11 NOTE — PROGRESS NOTES
HPI:       Rianna Man is a 29 year old  female with a significant past medical history of anemia, schizoaffective disorder, depression, OCD, anxiety and hx of suicidal ideations  who presents for the new concern(s) of    Cold Symptoms  - Started about 1 week ago with coughing, vocal hoarseness, subjective fevers, rhinorrhea, teary ear, ear fullness, wheezing and dyspnea, hearing changes or vision changes, diarrhea and some itchiness with urinating  - No chest pain, nausea, vomiting, weakness or numbness  - Was stung by an insect 4 days ago, black color insect but unclear what exact was the insect  - Monday, pulled out a possible stinger  - Has tried tylenol with minimal relief, topical hydrocortisone which seems to help a bit  - No sick contacts or recent travel  - Hx of season allergies but currently on no medications    Psych  - Is planning on follow up with [facility]  - Has not been on any of her medications including Gabapentin and Risperidone  - Would like to wait until she sees psychiatry before restarting any medications  - Feels safe at this time, no HI or SI    Sexual Transmitted Disease  - Two new sex partners (but has had sex with them prior), unprotected both times  - No new vaginal discharge or groin skin changes  - Normally check every 3 months with public health but given new partners would like to be recheck         PMHX:     Patient Active Problem List   Diagnosis     Trouble in sleeping     Contact dermatitis and other eczema, due to unspecified cause     Schizoaffective disorder (H)     Striae distensae     Nexplanon in place     H/O alcohol dependence (H)     Pap smear for cervical cancer screening     Iron deficiency anemia     Encounter for initial prescription of implantable subdermal contraceptive     Recurrent major depressive disorder (H)     Heather (H)     OCD (obsessive compulsive disorder)     Generalized anxiety disorder     Lactase deficiency     Tobacco use      Suicidal ideations     Intractable chronic migraine without aura     Burn     Suicidal intent     Suicidal ideation       Current Outpatient Medications   Medication Sig Dispense Refill     amoxicillin (AMOXIL) 500 MG capsule Take 1 capsule (500 mg) by mouth 3 times daily (Patient not taking: Reported on 6/11/2019) 21 capsule 0     calcium carbonate 500 mg-vitamin D 200 units (OSCAL WITH D;OYSTER SHELL CALCIUM) 500-200 MG-UNIT per tablet Take 1 tablet by mouth daily (Patient not taking: Reported on 6/11/2019) 90 tablet 0     cyproheptadine (PERIACTIN) 4 MG tablet Take 2 tablets (8 mg) by mouth At Bedtime (Patient not taking: Reported on 6/11/2019) 60 tablet 0     Ergocalciferol (VITAMIN D) 79499 units CAPS Take 50,000 Units by mouth every 7 days (Patient not taking: Reported on 6/11/2019) 10 capsule 0     gabapentin (NEURONTIN) 100 MG capsule Take 2 capsules (200 mg) by mouth 3 times daily as needed (for anxiety) (Patient not taking: Reported on 6/11/2019) 90 capsule 0     gabapentin (NEURONTIN) 100 MG capsule Take 2 capsules (200 mg) by mouth 2 times daily (Patient not taking: Reported on 6/11/2019) 90 capsule 0     gabapentin (NEURONTIN) 400 MG capsule Take 1 capsule (400 mg) by mouth At Bedtime (Patient not taking: Reported on 6/11/2019) 90 capsule 0     multivitamin, therapeutic with minerals (THERA-VIT-M) TABS tablet Take 1 tablet by mouth daily (Patient not taking: Reported on 6/11/2019) 30 each 1     naltrexone (DEPADE;REVIA) 50 MG tablet Take 1 tablet (50 mg) by mouth daily (Patient not taking: Reported on 6/11/2019) 30 tablet 0     prazosin (MINIPRESS) 2 MG capsule Take 2 capsules (4 mg) by mouth At Bedtime (Patient not taking: Reported on 6/11/2019) 60 capsule 0     risperiDONE (RISPERDAL) 4 MG tablet Take 1 tablet (4 mg) by mouth daily (Patient not taking: Reported on 6/11/2019) 30 tablet 0       Social History     Socioeconomic History     Marital status: Single     Spouse name: Not on file     Number of  children: Not on file     Years of education: Not on file     Highest education level: Not on file   Occupational History     Not on file   Social Needs     Financial resource strain: Not on file     Food insecurity:     Worry: Not on file     Inability: Not on file     Transportation needs:     Medical: Not on file     Non-medical: Not on file   Tobacco Use     Smoking status: Current Every Day Smoker     Packs/day: 0.50     Types: Cigarettes     Smokeless tobacco: Never Used     Tobacco comment: less than 1/2 pack   Substance and Sexual Activity     Alcohol use: Yes     Alcohol/week: 0.0 oz     Comment: occasional, drank today     Drug use: Yes     Types: Marijuana     Comment: cocaine in the past     Sexual activity: Yes     Partners: Male     Birth control/protection: Implant     Comment: monogamous relationship   Lifestyle     Physical activity:     Days per week: Not on file     Minutes per session: Not on file     Stress: Not on file   Relationships     Social connections:     Talks on phone: Not on file     Gets together: Not on file     Attends Restorationism service: Not on file     Active member of club or organization: Not on file     Attends meetings of clubs or organizations: Not on file     Relationship status: Not on file     Intimate partner violence:     Fear of current or ex partner: Not on file     Emotionally abused: Not on file     Physically abused: Not on file     Forced sexual activity: Not on file   Other Topics Concern     Parent/sibling w/ CABG, MI or angioplasty before 65F 55M? Not Asked   Social History Narrative     Not on file       Family History   Problem Relation Age of Onset     Diabetes Father      Diabetes Paternal Aunt      Thyroid Disease Mother         grave's disease     Rheumatoid Arthritis Sister      Crohn's Disease Maternal Aunt           Allergies   Allergen Reactions     No Known Allergies        No results found for this or any previous visit (from the past 24  "hour(s)).         Review of Systems:   CONSTITUTIONAL: Fatigue  INTEGUMENTARY/SKIN: Chest skin with irritation  E: NEGATIVE for acute vision problems or changes  ENT/MOUTH:Coughing, rhinorrhea, ear fullness  RESP:Coughing  CV: NEGATIVE for chest pain, palpitations or new or worsening peripheral edema  GI: Diarrhea, no constipation, nausea, vomiting   male :Dysuria  M: NEGATIVE for claudication, myalgias  N: NEGATIVE for weakness, dizziness, syncope, headaches  P: NEGATIVE for changes in mood or affect          Physical Exam:     Vitals:    06/11/19 1424   BP: 114/80   Pulse: 99   Resp: 18   Temp: 99.1  F (37.3  C)   TempSrc: Oral   SpO2: 95%   Weight: 102.5 kg (226 lb)   Height: 1.727 m (5' 8\")     Body mass index is 34.36 kg/m .    GENERAL APPEARANCE: healthy, alert and no distress,  HENT: Right TM was normal with slightly redness at the rim, bilaterally inflamed turbinates, thick mucus in the posterior pharynx  RESP: lungs clear to auscultation - no rales, rhonchi or wheezes  CV: regular rate and rhythm,  and no murmur, click,  rub or gallop  ABDOMEN: soft, nontender, without hepatosplenomegaly or masses  MS: extremities normal- no gross deformities noted  SKIN: papule with hyperpigmented lesion measure 2 cm x 3 cm over the midline anterior check  NEURO: Grossly normal  PSYCH: mood and affect normal/bright    Assessment and Plan     1. Viral URI with cough  Likely given history and exam. Likely given acute symptoms. Lung exam was unremarkable making pneumonia less likely. Discuss with patient likely etiology and plans for supportive cares which she is agreeable to.  - Tylenol as needed for discomfort  - Continue with good hydration and nutrition  - Gave warning signs to follow up in clinic, which patient understands    2. Seasonal allergic rhinitis due to pollen  Hx of seasonal allergies with similar symptoms in the past. Does have associated bilateral teary eyes. Discuss with patient and will treat " supportively.   - fluticasone (FLONASE) 50 MCG/ACT nasal spray; Spray 1 spray into both nostrils daily  Dispense: 15.8 mL; Refill: 0  - cetirizine (ZYRTEC) 5 MG tablet; Take 1 tablet (5 mg) by mouth daily  Dispense: 30 tablet; Refill: 0    3. Dysuria  Since onset of viral symptoms 1 week ago. Likely irritation from dehydration secondary to viral illness but will obtained urinalysis for concerns of UTI.  UA shows isolated nitrates. Given symptoms and positive UA, will treat with Macrobid.  - Macrobid 100 mg BID x 7 days  - Urine Culture pending    4. Screen for STD (sexually transmitted disease)  New exposure with concerns in the setting of unprotected sex. No symptoms other than irritation with urination. No discharge. Request STI testing which would be reasonable given history  - Chlamydia/Gono Amplified ([facility])  - Syphilis Screen [place] ([facility])  - HIV Ag/Ab Screen [place] ([facility])    5. Schizoaffective disorder, unspecified type (H)  6. Recurrent major depressive disorder, remission status unspecified (H)  7. Obsessive-compulsive disorder, unspecified type  8. Generalized anxiety disorder  No acute concerns. Lost to following up but planning on re-establishing with [facility]. Currently on no medications but deferred until seen by psychiatry.  - Contracted for safety  - Follow up with Psychiatry as scheduled    Options for treatment and follow-up care were reviewed with the patient and/or guardian. Rianna Man and/or guardian engaged in the decision making process and verbalized understanding of the options discussed and agreed with the final plan.    Terence Leroy MD  [facility]  Pager: [phone]    Precepted today with: Hope Ayon MD

## 2019-06-11 NOTE — LETTER
June 14, 2019      Rianna Man  [address]  [place] [facility] [address]        Dear Rianna,    Thank you for visiting our clinic on Jun 11, 2019.     The result of your recent labwork (Urine Culture) has returned and was positive for Escherichia coli infection of the urine. Sensitivities shows that it is sensitive to Macrobid which is the antibiotic that you are taking. Continue to take your medications as prescribed. Return to clinic if you are having more symptoms or any new concerns.     If you have any questions or concerns, please feel free to give us a call.     Please see below for your test results.    Resulted Orders   Urinalysis ([facility])   Result Value Ref Range    Specific Gravity Urine 1.020 1.005 - 1.030    pH Urine 5.5 4.5 - 8.0    Leukocyte Esterase UR Negative -ATIVE    Nitrite Urine Positive (A) -ATIVE    Protein UR Negative -ATIVE    Glucose Urine Negative -ATIVE    Ketones Urine Negative -ATIVE    Urobilinogen mg/dL 0.2 E.U./dL 0.2 E.U./dL    Bilirubin UR Negative -ATIVE    Blood UR Negative -ATIVE   Chlamydia/Gono Amplified ([facility])   Result Value Ref Range    Chlamydia trac,Amplified Prb Negative Negative    N gonorrhoeae,Amplified Prb Negative Negative    Narrative    Test performed by:  [facility] [address] [place] [address]   Syphilis Screen [place] ([facility])   Result Value Ref Range    Treponema Antibody (Syphilis) Negative Negative    Narrative    Test performed by:  [facility] [address] [place] [address]   HIV Ag/Ab Screen [place] ([facility])   Result Value Ref Range    HIV Antigen/Antibody Negative Negative    Narrative    Test performed by:  [facility] [address] [place] [address]  Method is Abbott HIV Ag/Ab for the detection of HIV p24 antigen, HIV-1   antibodies and HIV-2 antibodies.   Culture Urine ([facility])   Result Value Ref Range    Culture (A)       Comment:      CULTURE, URINE   SOURCE: Urine, Clean  Catch   CULTURE RESULTS:    Org 1: >100,000 col/ml Escherichia coli    Org 2: 10,000-50,000 col/ml Group B Streptococcus                                      Org 1  Amikacin                         <=8 S                           Amoxicillin + Clavulanate        8/4 S                           Ampicillin                       >16 R                           Cefazolin                        2 S                             Cefepime                         <=1 S                           Ceftriaxone                      <=1 S                           Ciprofloxacin                    <=0.5 S                         Gentamicin                       >8 R                            Imipenem                         0.5 S                           Levofloxacin                     <=1 S                           Meropenem                        <=0.25 S                        Nitrofurantoin                   <=16 S                          Tetracycline                       <=2 S                           Tobramycin                       8 I                             Trimethoprim + Sulfamethoxazole  <=0.5 S                             Narrative    Test performed by:  [facility]  [address] [place] [address]       If you have any questions, please call the clinic to make an appointment.    Sincerely,    Terence Leroy MD

## 2019-06-12 LAB
C TRACH RRNA SPEC QL NAA+PROBE: NEGATIVE
N GONORRHOEA RRNA SPEC QL NAA+PROBE: NEGATIVE
TREPONEMA ANTIBODY (SYPHILIS): NEGATIVE

## 2019-06-13 ENCOUNTER — TELEPHONE (OUTPATIENT)
Dept: FAMILY MEDICINE | Facility: CLINIC | Age: 29
End: 2019-06-13

## 2019-06-13 NOTE — TELEPHONE ENCOUNTER
Second attempt to contact patient. No answer, did not leave VM r/t previous. Patient to schedule today or tomorrow, may also report to ED if symptoms become worse overnight. Lupe LLOYD

## 2019-06-13 NOTE — TELEPHONE ENCOUNTER
Called patient to discuss concerns. Given abnormal UC and worsening symptoms,  advises patient come into clinic today or tomorrow to be seen to rule out pyelonephritis. Also wanted to confirm patient is taking prescribed macrobid. No answer, left VM to call clinic back. Lupe LLOYD

## 2019-06-13 NOTE — TELEPHONE ENCOUNTER
[facility] phone call message-patient reporting a symptom:     Symptom: Not feeling well, chills, upset stomach    Same Day Visit Offered: Yes, declined    Additional comments: Patient states she was seen earlier this week and was told to call the provider if symptoms got worse. Patient states she was feeling better but then today she started with the chills and an upset stomach. Patient would like to know what is recommended to do. Please call and advise.     OK to leave message on voice mail? Yes    Primary language: English      needed? No    Call taken on June 13, 2019 at 12:26 PM by Rachael Riojas

## 2019-06-14 LAB — CULTURE: ABNORMAL

## 2019-06-30 ENCOUNTER — TELEPHONE (OUTPATIENT)
Dept: BEHAVIORAL HEALTH | Facility: CLINIC | Age: 29
End: 2019-06-30

## 2019-06-30 ENCOUNTER — HOSPITAL ENCOUNTER (INPATIENT)
Facility: CLINIC | Age: 29
LOS: 2 days | Discharge: LEFT BEFORE DISCHARGE | End: 2019-07-03
Attending: FAMILY MEDICINE | Admitting: PSYCHIATRY & NEUROLOGY
Payer: MEDICAID

## 2019-06-30 DIAGNOSIS — R45.851 SUICIDAL IDEATION: ICD-10-CM

## 2019-06-30 DIAGNOSIS — F25.1 SCHIZOAFFECTIVE DISORDER, DEPRESSIVE TYPE (H): ICD-10-CM

## 2019-06-30 LAB
AMPHETAMINES UR QL SCN: POSITIVE
BARBITURATES UR QL: NEGATIVE
BENZODIAZ UR QL: NEGATIVE
CANNABINOIDS UR QL SCN: POSITIVE
COCAINE UR QL: NEGATIVE
ETHANOL UR QL SCN: NEGATIVE
OPIATES UR QL SCN: NEGATIVE

## 2019-06-30 PROCEDURE — 80307 DRUG TEST PRSMV CHEM ANLYZR: CPT | Performed by: FAMILY MEDICINE

## 2019-06-30 PROCEDURE — 80320 DRUG SCREEN QUANTALCOHOLS: CPT | Performed by: FAMILY MEDICINE

## 2019-06-30 PROCEDURE — 25000131 ZZH RX MED GY IP 250 OP 636 PS 637: Performed by: FAMILY MEDICINE

## 2019-06-30 PROCEDURE — 99285 EMERGENCY DEPT VISIT HI MDM: CPT | Mod: 25 | Performed by: FAMILY MEDICINE

## 2019-06-30 PROCEDURE — 25000132 ZZH RX MED GY IP 250 OP 250 PS 637: Performed by: FAMILY MEDICINE

## 2019-06-30 PROCEDURE — 99285 EMERGENCY DEPT VISIT HI MDM: CPT | Mod: Z6 | Performed by: FAMILY MEDICINE

## 2019-06-30 PROCEDURE — 90791 PSYCH DIAGNOSTIC EVALUATION: CPT

## 2019-06-30 RX ORDER — NICOTINE 21 MG/24HR
1 PATCH, TRANSDERMAL 24 HOURS TRANSDERMAL ONCE
Status: COMPLETED | OUTPATIENT
Start: 2019-06-30 | End: 2019-06-30

## 2019-06-30 RX ORDER — RISPERIDONE 3 MG/1
3 TABLET ORAL 2 TIMES DAILY
COMMUNITY
End: 2019-12-29

## 2019-06-30 RX ORDER — CETIRIZINE HYDROCHLORIDE 10 MG/1
10 TABLET ORAL DAILY
COMMUNITY
End: 2020-01-24

## 2019-06-30 RX ORDER — MIRTAZAPINE 15 MG/1
15 TABLET, FILM COATED ORAL ONCE
Status: COMPLETED | OUTPATIENT
Start: 2019-06-30 | End: 2019-06-30

## 2019-06-30 RX ORDER — ONDANSETRON 4 MG/1
4 TABLET, ORALLY DISINTEGRATING ORAL ONCE
Status: COMPLETED | OUTPATIENT
Start: 2019-06-30 | End: 2019-06-30

## 2019-06-30 RX ORDER — GABAPENTIN 300 MG/1
300 CAPSULE ORAL 3 TIMES DAILY
Status: DISCONTINUED | OUTPATIENT
Start: 2019-06-30 | End: 2019-07-03 | Stop reason: HOSPADM

## 2019-06-30 RX ORDER — CLOTRIMAZOLE AND BETAMETHASONE DIPROPIONATE 10; .64 MG/G; MG/G
CREAM TOPICAL 2 TIMES DAILY
Status: DISCONTINUED | OUTPATIENT
Start: 2019-06-30 | End: 2019-07-03 | Stop reason: HOSPADM

## 2019-06-30 RX ORDER — RISPERIDONE 1 MG/1
3 TABLET, ORALLY DISINTEGRATING ORAL ONCE
Status: COMPLETED | OUTPATIENT
Start: 2019-06-30 | End: 2019-06-30

## 2019-06-30 RX ADMIN — MIRTAZAPINE 15 MG: 15 TABLET, FILM COATED ORAL at 22:04

## 2019-06-30 RX ADMIN — ONDANSETRON 4 MG: 4 TABLET, ORALLY DISINTEGRATING ORAL at 15:32

## 2019-06-30 RX ADMIN — NICOTINE 1 PATCH: 21 PATCH, EXTENDED RELEASE TRANSDERMAL at 20:23

## 2019-06-30 RX ADMIN — RISPERIDONE 3 MG: 1 TABLET, ORALLY DISINTEGRATING ORAL at 22:04

## 2019-06-30 RX ADMIN — GABAPENTIN 300 MG: 300 CAPSULE ORAL at 22:04

## 2019-06-30 RX ADMIN — CLOTRIMAZOLE AND BETAMETHASONE DIPROPIONATE: 10; .64 CREAM TOPICAL at 22:37

## 2019-06-30 ASSESSMENT — ENCOUNTER SYMPTOMS
SHORTNESS OF BREATH: 0
NERVOUS/ANXIOUS: 1
ABDOMINAL PAIN: 0
FEVER: 0
DYSPHORIC MOOD: 1

## 2019-06-30 NOTE — PHARMACY-ADMISSION MEDICATION HISTORY
"Admission medication history interview status for the 6/30/2019 admission is complete. See Epic admission navigator for allergy information, pharmacy, prior to admission medications and immunization status.     Medication history interview sources:  Patient, [facility] ([phone]), [facility][phone]), [facility] ([phone]), Patient's Chart    Changes made to PTA medication list (reason)  Added: none  Deleted: none  Changed: Cetrizine 10 mg every day (confirmed from [facility])    Additional medication history information (including reliability of information, actions taken by pharmacist):    Patient confirmed what was currently on her medication list. Patient was unable to remember the dose of Cetirizine she was on and was able to confirm that it was Cetirizine 10 mg every day from [facility] which was last filled on [6/11/2019]. Patient states she took Risperidone 3 mg yesterday morning [6/29/2019], but didn't take it last night because she \"was drinking\". She also stated she takes Remeron \"for sleep\", and her last dose would have been 2 nights ago [6/28/2019]. There is no record of her filling Risperidone or Remeron at [facility], [facility] or [facility]. There is also a progress note from [6/11/2019] that states she doesn't want to take Risperidone or Gabapentin until she is seen. When asked if she is taking Gabapentin, she stated that \"someone threw it away\" and that she hasn't taken it since January.       Prior to Admission medications    Medication Sig Last Dose Taking? Auth Provider   cetirizine (ZYRTEC) 10 MG tablet Take 10 mg by mouth daily 6/28/2019 Yes Unknown, Entered By History   fluticasone (FLONASE) 50 MCG/ACT nasal spray Spray 1 spray into both nostrils daily 6/30/2019 at AM Yes Hope Ayon MD   risperiDONE (RISPERDAL) 3 MG tablet Take 3 mg by mouth 2 times daily 6/29/2019 at AM Yes Reported, Patient   acetaminophen (TYLENOL) 500 MG tablet Take 1-2 tablets (500-1,000 mg) by mouth every 6 hours as " needed for mild pain Unknown at Unknown time  Hope Ayon MD         Medication history completed by: Mario Sood, Pharmacy Intern

## 2019-06-30 NOTE — ED NOTES
I have performed an in person assessment of the patient. Based on this assessment the patient no longer requires a one on one attendant at this point in time.    Garcia Ann MD  1:55 PM  June 30, 2019         Garcia Ann MD  06/30/19 [id number]

## 2019-06-30 NOTE — TELEPHONE ENCOUNTER
"S:Pt is a 28 yo female in the [place] ED for SI/HI/hallucinations.     B: Pt has a hx of schizoaffective do. IP 4 times in the past. Brought self in due to several stressors. Thoughts of being dead due to no support. Thoughts to jump off of bridge and not feeling safe outside hospital. Stressors include father overdosing, fight with BF, lack of stable housing, engaging in risky behaviors (sex and \"other things\") Warrant out for arrest since 2015,unable to get job. yesterday used meth, weed, and etoh. Reports daily Auditory Hallucinations telling her to hurt self or others. 8 year old son, hasn't seen since 6 yo. Drinking daily for several years. Psych consult in June and prescribe Risperdal and Remeron. Pt also endorsed past suicide attempts by overdose and cutting    A: Pt is voluntary. Pt willing to go outside [facility] system for admission.     R:   "

## 2019-07-01 PROCEDURE — 25000132 ZZH RX MED GY IP 250 OP 250 PS 637: Performed by: FAMILY MEDICINE

## 2019-07-01 PROCEDURE — 25000132 ZZH RX MED GY IP 250 OP 250 PS 637: Performed by: EMERGENCY MEDICINE

## 2019-07-01 PROCEDURE — HZ2ZZZZ DETOXIFICATION SERVICES FOR SUBSTANCE ABUSE TREATMENT: ICD-10-PCS | Performed by: FAMILY MEDICINE

## 2019-07-01 PROCEDURE — 25000132 ZZH RX MED GY IP 250 OP 250 PS 637: Performed by: PSYCHIATRY & NEUROLOGY

## 2019-07-01 PROCEDURE — 12400001 ZZH R&B MH UMMC

## 2019-07-01 RX ORDER — HYDROXYZINE HYDROCHLORIDE 50 MG/1
50 TABLET, FILM COATED ORAL EVERY 4 HOURS PRN
Status: DISCONTINUED | OUTPATIENT
Start: 2019-07-01 | End: 2019-07-03

## 2019-07-01 RX ORDER — ALUMINA, MAGNESIA, AND SIMETHICONE 2400; 2400; 240 MG/30ML; MG/30ML; MG/30ML
30 SUSPENSION ORAL EVERY 4 HOURS PRN
Status: DISCONTINUED | OUTPATIENT
Start: 2019-07-01 | End: 2019-07-03 | Stop reason: HOSPADM

## 2019-07-01 RX ORDER — CHOLECALCIFEROL (VITAMIN D3) 125 MCG
6000 CAPSULE ORAL
Status: DISCONTINUED | OUTPATIENT
Start: 2019-07-01 | End: 2019-07-03 | Stop reason: HOSPADM

## 2019-07-01 RX ORDER — TRAZODONE HYDROCHLORIDE 50 MG/1
50 TABLET, FILM COATED ORAL
Status: DISCONTINUED | OUTPATIENT
Start: 2019-07-01 | End: 2019-07-03 | Stop reason: HOSPADM

## 2019-07-01 RX ORDER — NICOTINE 21 MG/24HR
1 PATCH, TRANSDERMAL 24 HOURS TRANSDERMAL DAILY
Status: DISCONTINUED | OUTPATIENT
Start: 2019-07-01 | End: 2019-07-03 | Stop reason: HOSPADM

## 2019-07-01 RX ORDER — FLUTICASONE PROPIONATE 50 MCG
1 SPRAY, SUSPENSION (ML) NASAL DAILY
Status: DISCONTINUED | OUTPATIENT
Start: 2019-07-01 | End: 2019-07-03 | Stop reason: HOSPADM

## 2019-07-01 RX ORDER — ACETAMINOPHEN 325 MG/1
650 TABLET ORAL EVERY 4 HOURS PRN
Status: DISCONTINUED | OUTPATIENT
Start: 2019-07-01 | End: 2019-07-03 | Stop reason: HOSPADM

## 2019-07-01 RX ORDER — LANOLIN ALCOHOL/MO/W.PET/CERES
100 CREAM (GRAM) TOPICAL DAILY
Status: DISCONTINUED | OUTPATIENT
Start: 2019-07-01 | End: 2019-07-03 | Stop reason: HOSPADM

## 2019-07-01 RX ORDER — RISPERIDONE 3 MG/1
3 TABLET ORAL 2 TIMES DAILY
Status: DISCONTINUED | OUTPATIENT
Start: 2019-07-01 | End: 2019-07-01

## 2019-07-01 RX ORDER — CETIRIZINE HYDROCHLORIDE 10 MG/1
10 TABLET ORAL DAILY
Status: DISCONTINUED | OUTPATIENT
Start: 2019-07-01 | End: 2019-07-03 | Stop reason: HOSPADM

## 2019-07-01 RX ORDER — DIAZEPAM 5 MG
5-20 TABLET ORAL EVERY 30 MIN PRN
Status: DISCONTINUED | OUTPATIENT
Start: 2019-07-01 | End: 2019-07-03

## 2019-07-01 RX ADMIN — GABAPENTIN 300 MG: 300 CAPSULE ORAL at 22:12

## 2019-07-01 RX ADMIN — NICOTINE 1 PATCH: 21 PATCH, EXTENDED RELEASE TRANSDERMAL at 16:05

## 2019-07-01 RX ADMIN — TRAZODONE HYDROCHLORIDE 50 MG: 50 TABLET ORAL at 22:15

## 2019-07-01 RX ADMIN — FLUTICASONE PROPIONATE 1 SPRAY: 50 SPRAY, METERED NASAL at 19:00

## 2019-07-01 RX ADMIN — THIAMINE HCL TAB 100 MG 100 MG: 100 TAB at 19:00

## 2019-07-01 RX ADMIN — TRAZODONE HYDROCHLORIDE 50 MG: 50 TABLET ORAL at 23:02

## 2019-07-01 RX ADMIN — GABAPENTIN 300 MG: 300 CAPSULE ORAL at 07:45

## 2019-07-01 RX ADMIN — RISPERIDONE 3 MG: 1 TABLET, ORALLY DISINTEGRATING ORAL at 07:45

## 2019-07-01 RX ADMIN — RISPERIDONE 3 MG: 1 TABLET, ORALLY DISINTEGRATING ORAL at 22:12

## 2019-07-01 RX ADMIN — GABAPENTIN 300 MG: 300 CAPSULE ORAL at 16:05

## 2019-07-01 RX ADMIN — HYDROXYZINE HYDROCHLORIDE 50 MG: 50 TABLET, FILM COATED ORAL at 19:11

## 2019-07-01 RX ADMIN — CLOTRIMAZOLE AND BETAMETHASONE DIPROPIONATE: 10; .64 CREAM TOPICAL at 07:45

## 2019-07-01 RX ADMIN — CETIRIZINE HYDROCHLORIDE 10 MG: 10 TABLET, FILM COATED ORAL at 19:00

## 2019-07-01 ASSESSMENT — ACTIVITIES OF DAILY LIVING (ADL)
AMBULATION: 0-->INDEPENDENT
HYGIENE/GROOMING: INDEPENDENT
ORAL_HYGIENE: INDEPENDENT
FALL_HISTORY_WITHIN_LAST_SIX_MONTHS: NO
TRANSFERRING: 0-->INDEPENDENT
RETIRED_EATING: 0-->INDEPENDENT
TOILETING: 0-->INDEPENDENT
SWALLOWING: 0-->SWALLOWS FOODS/LIQUIDS WITHOUT DIFFICULTY
DRESS: INDEPENDENT
PRIOR_FUNCTIONAL_LEVEL_COMMENT: GOOD
BATHING: 0-->INDEPENDENT
DRESS: 0-->INDEPENDENT
RETIRED_COMMUNICATION: 0-->UNDERSTANDS/COMMUNICATES WITHOUT DIFFICULTY
COGNITION: 0 - NO COGNITION ISSUES REPORTED

## 2019-07-01 ASSESSMENT — MIFFLIN-ST. JEOR: SCORE: 1835.38

## 2019-07-01 NOTE — ED NOTES
ED to Behavioral Floor Handoff    SITUATION  Rianna Mna is a 29 year old female who speaks English and lives in a home with others The patient arrived in the ED by private car from home with a complaint of Suicidal  .The patient's current symptoms started/worsened 2 week(s) ago and during this time the symptoms have increased.   In the ED, pt was diagnosed with   Final diagnoses:   Schizoaffective disorder, depressive type (H)   Suicidal ideation        Initial vitals were: BP: 142/89  Pulse: 92  Temp: 97.1  F (36.2  C)  Resp: 18  SpO2: 96 %   --------  Is the patient diabetic? No   If yes, last blood glucose? --     If yes, was this treated in the ED? --  --------  Is the patient inebriated (ETOH) No or Impaired on other substances? Yes  MSSA done? N/A  Last MSSA score: --    Were withdrawal symptoms treated? N/A  Does the patient have a seizure history? No. If yes, date of most recent seizure--  --------  Is the patient patient experiencing suicidal ideation? reports the following suicide factors: recent break up with boyfriend, dad overdosed.    Homicidal ideation? denies current or recent homicidal ideation or behaviors.    Self-injurious behavior/urges? reports current or recent self injurious behavior or ideation including drinking and drugs daily..  ------  Was pt aggressive in the ED No  Was a code called No  Is the pt now cooperative? Yes  -------  Meds given in ED:   Medications   gabapentin (NEURONTIN) capsule 300 mg (300 mg Oral Given 6/30/19 2204)   clotrimazole-betamethasone (LOTRISONE) cream ( Topical Given 6/30/19 2237)   ondansetron (ZOFRAN-ODT) ODT tab 4 mg (4 mg Oral Given 6/30/19 1532)   nicotine (NICODERM CQ) 21 MG/24HR 24 hr patch 1 patch (1 patch Transdermal Given 6/30/19 2023)   mirtazapine (REMERON) tablet 15 mg (15 mg Oral Given 6/30/19 2204)   risperiDONE (risperDAL M-TABS) ODT tab 3 mg (3 mg Oral Given 6/30/19 2204)      Family present during ED course? No  Family currently  present? No    BACKGROUND  Does the patient have a cognitive impairment or developmental disability? No  Allergies:   Allergies   Allergen Reactions     No Known Allergies    .   Social demographics are   Social History     Socioeconomic History     Marital status: Single     Spouse name: None     Number of children: None     Years of education: None     Highest education level: None   Occupational History     None   Social Needs     Financial resource strain: None     Food insecurity:     Worry: None     Inability: None     Transportation needs:     Medical: None     Non-medical: None   Tobacco Use     Smoking status: Current Every Day Smoker     Packs/day: 0.50     Types: Cigarettes     Smokeless tobacco: Never Used     Tobacco comment: less than 1/2 pack   Substance and Sexual Activity     Alcohol use: Yes     Alcohol/week: 0.0 oz     Comment: occasional, drank today     Drug use: Yes     Types: Marijuana, Methamphetamines     Comment: cocaine in the past     Sexual activity: Yes     Partners: Male     Birth control/protection: Implant     Comment: monogamous relationship   Lifestyle     Physical activity:     Days per week: None     Minutes per session: None     Stress: None   Relationships     Social connections:     Talks on phone: None     Gets together: None     Attends Druze service: None     Active member of club or organization: None     Attends meetings of clubs or organizations: None     Relationship status: None     Intimate partner violence:     Fear of current or ex partner: None     Emotionally abused: None     Physically abused: None     Forced sexual activity: None   Other Topics Concern     Parent/sibling w/ CABG, MI or angioplasty before 65F 55M? Not Asked   Social History Narrative     None        ASSESSMENT  Labs results   Labs Ordered and Resulted from Time of ED Arrival Up to the Time of Departure from the ED   DRUG ABUSE SCREEN 6 CHEM DEP URINE ([facility]) - Abnormal; Notable for the  following components:       Result Value    Amphetamine Qual Urine Positive (*)     Cannabinoids Qual Urine Positive (*)     All other components within normal limits      Imaging Studies: No results found for this or any previous visit (from the past 24 hour(s)).   Most recent vital signs /83   Pulse 83   Temp 97.3  F (36.3  C) (Oral)   Resp 16   SpO2 98%    Abnormal labs/tests/findings requiring intervention:---   Pain control: good  Nausea control: good    RECOMMENDATION  Are any infection precautions needed (MRSA, VRE, etc.)? No If yes, what infection? --  ---  Does the patient have mobility issues? independently. If yes, what device does the pt use? ---  ---  Is patient on 72 hour hold or commitment? No If on 72 hour hold, have hold and rights been given to patient? N/A  Are admitting orders written if after 10 p.m. ?N/A  Tasks needing to be completed:---     Nicole Arias   [facility]-- [phone] [place] [facility]   [phone] [facility] ED

## 2019-07-01 NOTE — ED NOTES
Emergency Department Patient Sign-out       Brief HPI:  This is a 29 year old female signed out to me by Dr. Anguiano.  See initial ED Provider note for details of the presentation.       Patient is medically cleared for admission to a Behavioral Health unit.      The patient is not on a hold.      The patient has not required medication for agitation.    Awaiting Transfer to Mental Health Facility        Significant Events prior to my assuming care: Positive urine drug screen for methamphetamine and cannabinoids      Exam:   Patient Vitals for the past 24 hrs:   BP Temp Temp src Pulse Resp SpO2   07/01/19 0653 118/70 96.9  F (36.1  C) Oral 80 15 99 %   06/30/19 2025 125/83 97.3  F (36.3  C) Oral 83 16 98 %   06/30/19 1257 142/89 97.1  F (36.2  C) Oral 92 18 96 %           ED RESULTS:   Results for orders placed or performed during the hospital encounter of 06/30/19 (from the past 24 hour(s))   Drug abuse screen 6 urine (tox)     Status: Abnormal    Collection Time: 06/30/19  2:51 PM   Result Value Ref Range    Amphetamine Qual Urine Positive (A) NEG^Negative    Barbiturates Qual Urine Negative NEG^Negative    Benzodiazepine Qual Urine Negative NEG^Negative    Cannabinoids Qual Urine Positive (A) NEG^Negative    Cocaine Qual Urine Negative NEG^Negative    Ethanol Qual Urine Negative NEG^Negative    Opiates Qualitative Urine Negative NEG^Negative       ED MEDICATIONS:   Medications   gabapentin (NEURONTIN) capsule 300 mg (300 mg Oral Given 6/30/19 2204)   clotrimazole-betamethasone (LOTRISONE) cream ( Topical Given 6/30/19 2237)   risperiDONE (risperDAL M-TABS) ODT tab 3 mg (has no administration in time range)   ondansetron (ZOFRAN-ODT) ODT tab 4 mg (4 mg Oral Given 6/30/19 1532)   nicotine (NICODERM CQ) 21 MG/24HR 24 hr patch 1 patch (1 patch Transdermal Given 6/30/19 2023)   mirtazapine (REMERON) tablet 15 mg (15 mg Oral Given 6/30/19 2204)   risperiDONE (risperDAL M-TABS) ODT tab 3 mg (3 mg Oral Given 6/30/19  2204)         Impression:    ICD-10-CM    1. Schizoaffective disorder, depressive type (H) F25.1    2. Suicidal ideation R45.851        Plan:    29-year-old female with schizoaffective disorder who presented with suicidal ideation and inability to contract for safety.  Morning medications including risperidone 3 mg twice daily ordered.  Awaiting placement in mental health.      Gary Mancia MD  07/01/19 0761
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
## 2019-07-01 NOTE — ED NOTES
Writer provided pt with new scrubs, hygiene materials (soap, no-wash shower cap, toothbrush, toothpaste, Deoderant, ect.) and a boxed lunch.

## 2019-07-01 NOTE — ED NOTES
"Pt reports chronic auditory hallucinations that are constant. Pt reports not feeling homicidal stating, \"I don't want to hurt anybody, I just am feeling down and sad and want help with that.\"  "
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
## 2019-07-02 PROCEDURE — 25000132 ZZH RX MED GY IP 250 OP 250 PS 637: Performed by: FAMILY MEDICINE

## 2019-07-02 PROCEDURE — 12400001 ZZH R&B MH UMMC

## 2019-07-02 PROCEDURE — 99223 1ST HOSP IP/OBS HIGH 75: CPT | Mod: AI | Performed by: PSYCHIATRY & NEUROLOGY

## 2019-07-02 PROCEDURE — 25000132 ZZH RX MED GY IP 250 OP 250 PS 637: Performed by: EMERGENCY MEDICINE

## 2019-07-02 PROCEDURE — 25000132 ZZH RX MED GY IP 250 OP 250 PS 637: Performed by: PSYCHIATRY & NEUROLOGY

## 2019-07-02 RX ORDER — MIRTAZAPINE 7.5 MG/1
7.5 TABLET, FILM COATED ORAL AT BEDTIME
Status: DISCONTINUED | OUTPATIENT
Start: 2019-07-02 | End: 2019-07-03 | Stop reason: HOSPADM

## 2019-07-02 RX ORDER — PRAZOSIN HYDROCHLORIDE 1 MG/1
1 CAPSULE ORAL AT BEDTIME
Status: DISCONTINUED | OUTPATIENT
Start: 2019-07-02 | End: 2019-07-03 | Stop reason: HOSPADM

## 2019-07-02 RX ADMIN — CETIRIZINE HYDROCHLORIDE 10 MG: 10 TABLET, FILM COATED ORAL at 09:11

## 2019-07-02 RX ADMIN — GABAPENTIN 300 MG: 300 CAPSULE ORAL at 14:36

## 2019-07-02 RX ADMIN — THIAMINE HCL TAB 100 MG 100 MG: 100 TAB at 09:11

## 2019-07-02 RX ADMIN — RISPERIDONE 3 MG: 1 TABLET, ORALLY DISINTEGRATING ORAL at 09:11

## 2019-07-02 RX ADMIN — CLOTRIMAZOLE AND BETAMETHASONE DIPROPIONATE: 10; .64 CREAM TOPICAL at 19:00

## 2019-07-02 RX ADMIN — LACTASE TAB 3000 UNIT 6000 UNITS: 3000 TAB at 09:11

## 2019-07-02 RX ADMIN — GABAPENTIN 300 MG: 300 CAPSULE ORAL at 20:40

## 2019-07-02 RX ADMIN — MIRTAZAPINE 7.5 MG: 7.5 TABLET, FILM COATED ORAL at 20:40

## 2019-07-02 RX ADMIN — GABAPENTIN 300 MG: 300 CAPSULE ORAL at 09:11

## 2019-07-02 RX ADMIN — FLUTICASONE PROPIONATE 1 SPRAY: 50 SPRAY, METERED NASAL at 09:14

## 2019-07-02 RX ADMIN — PRAZOSIN HYDROCHLORIDE 1 MG: 1 CAPSULE ORAL at 20:40

## 2019-07-02 RX ADMIN — LACTASE TAB 3000 UNIT 6000 UNITS: 3000 TAB at 18:07

## 2019-07-02 RX ADMIN — CLOTRIMAZOLE AND BETAMETHASONE DIPROPIONATE: 10; .64 CREAM TOPICAL at 09:14

## 2019-07-02 RX ADMIN — NICOTINE 1 PATCH: 21 PATCH, EXTENDED RELEASE TRANSDERMAL at 09:11

## 2019-07-02 RX ADMIN — LACTASE TAB 3000 UNIT 6000 UNITS: 3000 TAB at 12:37

## 2019-07-02 RX ADMIN — RISPERIDONE 3 MG: 1 TABLET, ORALLY DISINTEGRATING ORAL at 20:40

## 2019-07-02 ASSESSMENT — ACTIVITIES OF DAILY LIVING (ADL)
LAUNDRY: WITH SUPERVISION
DRESS: INDEPENDENT
HYGIENE/GROOMING: INDEPENDENT
ORAL_HYGIENE: INDEPENDENT

## 2019-07-02 ASSESSMENT — MIFFLIN-ST. JEOR: SCORE: 1821.77

## 2019-07-02 NOTE — PROGRESS NOTES
Locker: Leopard print scarf, Samsung tablet with case, tan backpack, feminine care products, lotion, toothpaste, perfume, flushable wipes, smoking pipe, blue purse, reading glasses, sunglasses, charging cable, makeup products, personal massager, soap, ear buds, $12 cash, and [place] pack of cigarettes,        07/01/19 1855   Patient Belongings   Did you bring any home meds/supplements to the hospital?  No   Patient Belongings other (see comments)   Belongings Search Yes     .A               Admission:  I am responsible for any personal items that are not sent to the safe or pharmacy.  [facility] is not responsible for loss, theft or damage of any property in my possession.    Signature:  _________________________________ Date: _______  Time: _____                                              Staff Signature:  ____________________________ Date: ________  Time: _____      2nd Staff person, if patient is unable/unwilling to sign:    Signature: ________________________________ Date: ________  Time: _____     Discharge:  [facility] has returned all of my personal belongings:    Signature: _________________________________ Date: ________  Time: _____                                          Staff Signature:  ____________________________ Date: ________  Time: _____
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
## 2019-07-02 NOTE — PLAN OF CARE
BEHAVIORAL TEAM DISCUSSION    Participants:Giuliano Adams MD; GUANAKITO Fields, [facility]; Sonal Little RN  Progress: Rianna Man, 29 year old  female was admitted to [facility] station 30 on a voluntary basis due to suicidal ideation with plan to jump off a bridge. She has a history of schizoaffective disorder, substance use disorder, PTSD, and cluster B traits. She reported feeling increasingly overwhelmed and stressed out due to many stressors.  She notes a lack of social supports.  She also endorses daily auditory hallucinations that command her to hurt herself and other.   Continued Stay Criteria/Rationale: suicidal ideations with plan.  Medical/Physical: no acute issues.   Precautions:   Behavioral Orders   Procedures    Code 1 - Restrict to Unit    Routine Programming     As clinically indicated    Self Injury Precaution    Status 15     Every 15 minutes.    Suicide precautions     Patients on Suicide Precautions should have a Combination Diet ordered that includes a Diet selection(s) AND a Behavioral Tray selection for Safe Tray - with utensils, or Safe Tray - NO utensils      Withdrawal precautions     Plan: Patient will continue stay on a voluntary basis.  Patient will meet with attending psychiatrist to discuss medication therapy interventions.  At that time medications may be started, stopped, and/or adjusted in hopes of targeting current symptoms.  Psychosocial stressors will be addressed with group programing and referrals and resources provided by the unit .  Patient will remain hospitalized until symptoms are stable and suicidal thoughts are diminished.  Rationale for change in precautions or plan: no changes.

## 2019-07-02 NOTE — PROGRESS NOTES
SPIRITUAL HEALTH SERVICES  SPIRITUAL ASSESSMENT Progress Note  [facility] ([facility] [place], [place] Station 30    REFERRAL SOURCE: I tried to visit patient Yamila this morning twice but in my both visit attempts pt was asleep. However, I informed for the unit staff about my both visit attempts.    PLAN: Pt was asleep this morning and can't visit him today but I will remain open to provide spiritual care for the pt as needed.    Vijay Martin M.Div. ([place]), M.Th., D.Min., [facility]  Staff   Pager [phone]

## 2019-07-02 NOTE — PROGRESS NOTES
patient spent most of the shift sleeping, coming out briefly for a group and for meals. No check-in possible this shift.

## 2019-07-02 NOTE — PROGRESS NOTES
CTC attempted to meet with patient, however patient was in her bed sleeping with the blanket over her head.  CTC called patient's name twice but patient did not respond.  Will check in with patient tomorrow.

## 2019-07-02 NOTE — PROGRESS NOTES
"   07/01/19 2300   Behavioral Health   Hallucinations auditory   Thinking distractable   Orientation person: oriented;place: oriented   Memory baseline memory   Insight admits / accepts   Judgement impaired   Eye Contact at examiner   Affect sad   Mood mood is calm   Physical Appearance/Attire attire appropriate to age and situation   Hygiene well groomed   1. Wish to be Dead No   2. Non-Specific Active Suicidal Thoughts  No   Self Injury   (pt denies current SIB)   Elopement   (none stated/observed)   Activity   (active on milieu)   Speech clear;coherent   Medication Sensitivity no observed side effects   Psychomotor / Gait balanced;steady   Activities of Daily Living   Hygiene/Grooming independent   Oral Hygiene independent   Dress independent   Room Organization independent     Pt has been seen out in milieu throughout this shift. She is social with peers, often socializing and playing games. Pt denies current SI/SIB/HI but does admit that she is hearing voices that tell her \"I'm not worth it\". Pt rates her anxiety and depression at a 6 out of 10. Pt states she is also a bit shaky this evening and thinks it's due to withdrawal.   "

## 2019-07-03 VITALS
RESPIRATION RATE: 16 BRPM | HEART RATE: 77 BPM | WEIGHT: 230 LBS | TEMPERATURE: 98.8 F | OXYGEN SATURATION: 97 % | HEIGHT: 68 IN | DIASTOLIC BLOOD PRESSURE: 74 MMHG | BODY MASS INDEX: 34.86 KG/M2 | SYSTOLIC BLOOD PRESSURE: 119 MMHG

## 2019-07-03 PROCEDURE — G0177 OPPS/PHP; TRAIN & EDUC SERV: HCPCS

## 2019-07-03 PROCEDURE — 99239 HOSP IP/OBS DSCHRG MGMT >30: CPT | Performed by: PSYCHIATRY & NEUROLOGY

## 2019-07-03 PROCEDURE — 25000132 ZZH RX MED GY IP 250 OP 250 PS 637: Performed by: EMERGENCY MEDICINE

## 2019-07-03 PROCEDURE — 90853 GROUP PSYCHOTHERAPY: CPT

## 2019-07-03 PROCEDURE — 25000132 ZZH RX MED GY IP 250 OP 250 PS 637: Performed by: PSYCHIATRY & NEUROLOGY

## 2019-07-03 PROCEDURE — 99232 SBSQ HOSP IP/OBS MODERATE 35: CPT | Performed by: PHYSICIAN ASSISTANT

## 2019-07-03 PROCEDURE — 25000132 ZZH RX MED GY IP 250 OP 250 PS 637: Performed by: FAMILY MEDICINE

## 2019-07-03 RX ORDER — HYDROXYZINE HYDROCHLORIDE 25 MG/1
25-50 TABLET, FILM COATED ORAL EVERY 4 HOURS PRN
Status: DISCONTINUED | OUTPATIENT
Start: 2019-07-03 | End: 2019-07-03 | Stop reason: HOSPADM

## 2019-07-03 RX ADMIN — RISPERIDONE 3 MG: 1 TABLET, ORALLY DISINTEGRATING ORAL at 20:03

## 2019-07-03 RX ADMIN — FLUTICASONE PROPIONATE 1 SPRAY: 50 SPRAY, METERED NASAL at 07:53

## 2019-07-03 RX ADMIN — LACTASE TAB 3000 UNIT 6000 UNITS: 3000 TAB at 12:32

## 2019-07-03 RX ADMIN — GABAPENTIN 300 MG: 300 CAPSULE ORAL at 14:55

## 2019-07-03 RX ADMIN — NICOTINE 1 PATCH: 21 PATCH, EXTENDED RELEASE TRANSDERMAL at 07:48

## 2019-07-03 RX ADMIN — LACTASE TAB 3000 UNIT 6000 UNITS: 3000 TAB at 07:52

## 2019-07-03 RX ADMIN — THIAMINE HCL TAB 100 MG 100 MG: 100 TAB at 07:48

## 2019-07-03 RX ADMIN — GABAPENTIN 300 MG: 300 CAPSULE ORAL at 07:48

## 2019-07-03 RX ADMIN — CLOTRIMAZOLE AND BETAMETHASONE DIPROPIONATE: 10; .64 CREAM TOPICAL at 07:53

## 2019-07-03 RX ADMIN — GABAPENTIN 300 MG: 300 CAPSULE ORAL at 20:03

## 2019-07-03 RX ADMIN — LACTASE TAB 3000 UNIT 6000 UNITS: 3000 TAB at 16:39

## 2019-07-03 RX ADMIN — CETIRIZINE HYDROCHLORIDE 10 MG: 10 TABLET, FILM COATED ORAL at 07:48

## 2019-07-03 RX ADMIN — RISPERIDONE 3 MG: 1 TABLET, ORALLY DISINTEGRATING ORAL at 07:48

## 2019-07-03 RX ADMIN — PRAZOSIN HYDROCHLORIDE 1 MG: 1 CAPSULE ORAL at 20:04

## 2019-07-03 ASSESSMENT — ACTIVITIES OF DAILY LIVING (ADL)
DRESS: INDEPENDENT
DRESS: INDEPENDENT
HYGIENE/GROOMING: INDEPENDENT
HYGIENE/GROOMING: INDEPENDENT
LAUNDRY: WITH SUPERVISION
ORAL_HYGIENE: INDEPENDENT
LAUNDRY: WITH SUPERVISION
ORAL_HYGIENE: INDEPENDENT

## 2019-07-03 NOTE — PLAN OF CARE
Initial Note:     Patient participated in ot clinic. She knew what she wanted to work on and was familiar with the task. She was a bit demanding in her communication style but receptive to clear responses and expectations from writer. She asked staff to print off a pattern for her, which she independently read, followed, and duplicated. She demonstrated focus and problem solving. She communicated clearly what she wanted and accepted compliments from others. She was responsive to clean up cues.

## 2019-07-03 NOTE — PLAN OF CARE
"Pt states she is feeling kind of down today and \"flat.\" Denies SI, but endorses some depression and anxiety. States voices are at baseline.   She has been resting this evening in the lounge and has been less social this evening than yesterday.   No withdrawal sx noted.   No paranoia observed or endorsed.   Length of stay: 1     Assessment of mood, thought process, response to medications and potential side effects continues.     Patient encouraged to participate in therapeutic groups and develop self-care skills.     Appropriate precautions maintained.    "
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
## 2019-07-03 NOTE — CONSULTS
Internal Medicine Initial Visit      Rianna Man MRN# [id number]   YOB: 1990 Age: 29 year old   Date of Admission: 6/30/2019  PCP: Hilary Edouard    Referring Provider: Behavioral Health - Khoa Juares MD  Reason for Visit: Rash         Assessment and Recommendations:   Rianna Man is a 29 year old year old woman with a history of schizoaffective disorder bipolar type, PTSD, borderline personality disorder, and substance abuse including methamphetamines who is admitted to station 30 with suicidal ideation and auditory hallucinations.        Schizoaffective Disorder. Management per psychiatry team.     Pruritis. For unclear amount of time involving primarily the breasts but also a few areas on the neck and arms. No obvious rash. Does not appear c/w scabies. Could be related to recent methamphetamine use.   - Reasonable to continue trialing cream ordered by primary team as this has steroid for itching but also clotrimazole in case of fungal component w/ few areas of skin darkening.   - Can use hydroxyzine PRN for itching.   - Continue home cetirizine.   - OP dermatology eval if persistent. Call IM if worsening here.     Medicine will sign off, please page with any additional concerns.     Francheska Shaw PA-C  Hospitalist Service   Pager: [phone]     Reason for Admission:   Suicidal Ideation         History of Present Illness:   History is obtained from the patient and medical record.     Rianna Man is a 29 year old year old woman with a history of schizoaffective disorder and substance abuse admitted to behavioral health for worsening auditory hallucinations and suicidal thoughts in context of multiple stressors.     Internal Medicine service was asked to see patient for a rash. Currently, patient reports itching primarily of her breasts but also the arms. States she has not tried anything for this. Denies any known exposures or close contacts with  itching. Denies any STI concerns or other physical complaints. Had eczema and allergies as a child.           Review of Systems:   4 point ROS performed and negative unless otherwise noted in HPI (CV, Resp, Skin, ).           Past Medical History:   Reviewed and updated in Epic.  Past Medical History:   Diagnosis Date     Anxiety      Depressive disorder      Schizoaffective disorder (H)      Varicella     had disease             Past Surgical History:   Reviewed and updated in Epic.  Past Surgical History:   Procedure Laterality Date     NO HISTORY OF SURGERY               Social History:     0.25 ppd smoker.   Methamphetamine use - last 2 days PTA.           Family History:   Reviewed and updated in Epic.  Family History   Problem Relation Age of Onset     Diabetes Father      Substance Abuse Father      Diabetes Paternal Aunt      Thyroid Disease Mother         grave's disease     Rheumatoid Arthritis Sister      Depression Sister      Crohn's Disease Maternal Aunt      Schizophrenia Maternal Grandmother              Allergies:     Allergies   Allergen Reactions     No Known Allergies              Medications:     Medications Prior to Admission   Medication Sig Dispense Refill Last Dose     cetirizine (ZYRTEC) 10 MG tablet Take 10 mg by mouth daily   2019     fluticasone (FLONASE) 50 MCG/ACT nasal spray Spray 1 spray into both nostrils daily 15.8 mL 0 2019 at AM     risperiDONE (RISPERDAL) 3 MG tablet Take 3 mg by mouth 2 times daily   2019 at AM     acetaminophen (TYLENOL) 500 MG tablet Take 1-2 tablets (500-1,000 mg) by mouth every 6 hours as needed for mild pain 100 tablet 0 Unknown at Unknown time     [] nitroFURantoin macrocrystal-monohydrate (MACROBID) 100 MG capsule Take 1 capsule (100 mg) by mouth 2 times daily for 7 days 14 capsule 0         Current Facility-Administered Medications   Medication     acetaminophen (TYLENOL) tablet 650 mg     alum & mag hydroxide-simethicone  "(MYLANTA ES/MAALOX  ES) suspension 30 mL     aspirin-acetaminophen-caffeine (EXCEDRIN MIGRAINE) per tablet 1 tablet     cetirizine (zyrTEC) tablet 10 mg     clotrimazole-betamethasone (LOTRISONE) cream     diazepam (VALIUM) tablet 5-20 mg     fluticasone (FLONASE) 50 MCG/ACT spray 1 spray     gabapentin (NEURONTIN) capsule 300 mg     hydrOXYzine (ATARAX) tablet 25-50 mg     lactase (LACTAID) tablet 6,000 Units     magnesium hydroxide (MILK OF MAGNESIA) suspension 30 mL     mirtazapine (REMERON) tablet TABS 7.5 mg     nicotine (NICODERM CQ) 21 MG/24HR 24 hr patch 1 patch     nicotine Patch in Place     nicotine patch REMOVAL     prazosin (MINIPRESS) capsule 1 mg     risperiDONE (risperDAL M-TABS) ODT tab 3 mg     traZODone (DESYREL) tablet 50 mg     vitamin B1 (THIAMINE) tablet 100 mg            Physical Exam:   Blood pressure 121/87, pulse 92, temperature 97.2  F (36.2  C), temperature source Tympanic, resp. rate 16, height 1.727 m (5' 8\"), weight 104.3 kg (230 lb), SpO2 97 %, not currently breastfeeding.  Body mass index is 34.97 kg/m .  GENERAL: Alert and oriented x 3. Sitting up in bed, appears comfortable. Conversant.   HEENT: Anicteric sclera. Mucous membranes moist.   NEUROLOGICAL: No focal deficits. Moves all extremities.   SKIN: Single erythematous papule on right forearm that may be c/w insect bite. Otherwise has a few areas of slight discoloration/darkening of the skin on right neck and left breast.           Data:   CBC:  Recent Labs   Lab Test 06/27/18  1506   WBC 7.8   RBC 4.49   HGB 13.2   HCT 39.7   MCV 88   MCH 29.4   MCHC 33.2   RDW 12.9          CMP:  Recent Labs   Lab Test 06/27/18  1506      POTASSIUM 4.0   CHLORIDE 107   GISELLA 8.5   CO2 22   BUN 13   CR 0.92   GLC 71   AST 24   ALT 20   BILITOTAL 0.6   ALBUMIN 3.5   PROTTOTAL 7.0   ALKPHOS 65       TSH:  TSH   Date Value Ref Range Status   08/22/2018 1.54 0.40 - 4.00 mU/L Final       Unresulted Labs Ordered in the Past 30 Days of this " Admission     No orders found from 5/1/2019 to 7/1/2019.

## 2019-07-03 NOTE — PROGRESS NOTES
"[facility], [place]   Psychiatric Progress Note        Interim History:   The patient's care was discussed with the treatment team during the daily team meeting and/or staff's chart notes were reviewed.  Staff report patient scored low on MSSA scale. She socialized with some peers and was compliant with meds. During today's visit with me she admitted to drinking from 1L to 1.75 L of hard liquor on pretty much daily basis, still said that she was not interested in CD treatment: \"I can do it myself\". She reported that she recently been under quite a lot of stress:\" but now I am doing better\". She told me that she would be glad to go to a IRTS program. She denied presence of Suicidal ideation, subjectively, appeared to be sad and drawn, had poor eye contact.          Medications:       cetirizine  10 mg Oral Daily     clotrimazole-betamethasone   Topical BID     fluticasone  1 spray Both Nostrils Daily     gabapentin  300 mg Oral TID     lactase  6,000 Units Oral TID w/meals     mirtazapine  7.5 mg Oral At Bedtime     nicotine  1 patch Transdermal Daily     nicotine   Transdermal Q8H     nicotine   Transdermal Daily     prazosin  1 mg Oral At Bedtime     risperiDONE  3 mg Oral BID     vitamin B1  100 mg Oral Daily          Allergies:     Allergies   Allergen Reactions     No Known Allergies           Labs:   No results found for this or any previous visit (from the past 24 hour(s)).       Psychiatric Examination:     /74   Pulse 77   Temp 98.8  F (37.1  C) (Oral)   Resp 16   Ht 1.727 m (5' 8\")   Wt 104.3 kg (230 lb)   SpO2 97%   BMI 34.97 kg/m    Weight is 230 lbs 0 oz  Body mass index is 34.97 kg/m .  Orthostatic Vitals       Most Recent      Sitting Orthostatic /74 07/03 0720    Sitting Orthostatic Pulse (bpm) 82 07/03 0720    Standing Orthostatic /94 07/03 0720    Standing Orthostatic Pulse (bpm) 115 07/03 0720          Appearance: appeared older than stated " age  Attitude:  guarded and somewhat cooperative  Eye Contact:  poor   Mood:  anxious and depressed  Affect:  restricted range  Speech:  decreased prosody and paucity of speech  Psychomotor Behavior:  physical retardation  Throught Process:  linear  Associations:  no loose associations  Thought Content:  no evidence of suicidal ideation or homicidal ideation, no evidence of psychotic thought and no auditory hallucinations present  Insight:  limited  Judgement:  limited  Oriented to:  time, person, and place  Attention Span and Concentration:  fair  Recent and Remote Memory:  fair    Clinical Global Impressions  First:  Considering your total clinical experience with this particular patient population, how severe are the patient's symptoms at this time?: 7 (07/02/19 2047)  Compared to the patient's condition at the START of treatment, this patient's condition is:: 4 (07/02/19 2047)  Most recent:  Considering your total clinical experience with this particular patient population, how severe are the patient's symptoms at this time?: 7 (07/02/19 2047)  Compared to the patient's condition at the START of treatment, this patient's condition is:: 4 (07/02/19 2047)         Precautions:     Behavioral Orders   Procedures     Code 1 - Restrict to Unit     Routine Programming     As clinically indicated     Self Injury Precaution     Status 15     Every 15 minutes.     Suicide precautions     Patients on Suicide Precautions should have a Combination Diet ordered that includes a Diet selection(s) AND a Behavioral Tray selection for Safe Tray - with utensils, or Safe Tray - NO utensils            DIagnoses:   Schizoaffective disorder bipolar type  Posttraumatic stress disorder  Borderline personality disorder  Traumatic brain injury  Obsessive-compulsive disorder traits  Pornography addiction  Shopping addiction  Alcohol use disorder severe  Cannabis use disorder  Methamphetamine use disorder  Rule out malingering         Plan:    Will discontinue MSSA. I discussed with the patient need for CD treatment, but she was adamant about not going there. Will continue to provide support and structure, refer to IRTS.

## 2019-07-04 NOTE — PROGRESS NOTES
07/03/19 1700   Group Therapy Session   Group Attendance attended group session   Total Time (minutes) 30   Group Type psychotherapeutic   Group Topic Covered emotions/expression   Patient Participation/Contribution cooperative with task;discussed personal experience with topic   Psychotherapy group goal:  Identify and process emotions using techniques from expressive therapies.    Rianna arrived late to group but was able to catch up and complete the activity as well as share with the group. Mood pleasant, affect flat.

## 2019-07-04 NOTE — DISCHARGE INSTRUCTIONS
Behavioral Discharge Planning and Instructions      Summary:  You were admitted on 6/30/2019  due to Suicidal Ideations.  You were treated by Dr. Giuliano Huynh MD and Dr. Mor MD and discharged on 07/03/2019 from [facility] back to your home. You signed a 12 hour intent to leave, you were encouraged to stay until tomorrow, but are wanting to leave this evening. You are being discharged against medical advice      Principal Diagnosis:   Schizoaffective disorder bipolar type  Posttraumatic stress disorder  Borderline personality disorder  Traumatic brain injury  Obsessive-compulsive disorder traits  Pornography addiction  Shopping addiction  Alcohol use disorder severe  Cannabis use disorder  Methamphetamine use disorder  Rule out malingering    Health Care Follow-up Appointments:     If no appointments scheduled, Patient leaving AMA, no appointments set up for patient, states she sees a therapist and will do on her own.   Attend all scheduled appointments with your outpatient providers. Call at least 24 hours in advance if you need to reschedule an appointment to ensure continued access to your outpatient providers.   Major Treatments, Procedures and Findings:  You were provided with: a psychiatric assessment, assessed for medical stability, medication evaluation and/or management, group therapy, CD evaluation/assessment, milieu management and medical interventions    Symptoms to Report: feeling more aggressive, increased confusion, losing more sleep, mood getting worse or thoughts of suicide    Early warning signs can include: increased depression or anxiety sleep disturbances increased thoughts or behaviors of suicide or self-harm  increased unusual thinking, such as paranoia or hearing voices    Safety and Wellness:  Take all medicines as directed.  Make no changes unless your doctor suggests them.      Follow treatment recommendations.  Refrain from alcohol and non-prescribed drugs.  If  "there is a concern for safety, call 911.    Resources:   National [place] on Mental Illness (www.[place].hailey.org): 548.138.3024 or 373-252-1790.  Alcoholics Anonymous (www.alcoholics-anonymous.org): Check your phone book for your local chapter.  Suicide Awareness Voices of Education (SAVE) (www.save.org): 240-482-OPNK (0316)  National Suicide Prevention Line (www.mentalhealthmn.org): 917-725-DEBM (9638)  Mental Health Consumer/Survivor Network of [place] (www.mhcsn.net): 345.635.2560 or 013-567-6177  Mental Health Association of [place] (www.mentalhealth.org): 968.127.2240 or 391-481-6723  Self- Management and Recovery Training., SMART-- Toll free: 644.395.1124  www.[employer]  [facility] Crisis Response - Adult [phone]  Text 4 Life: txt \"LIFE\" to 10038 for immediate support and crisis intervention  Crisis text line: Text \"[place]\" to 172882. Free, confidential, 24/7.  [facility] Crisis Response [facility]: - [phone]  Crisis Stabilization Services are available for [facility] [place] at this phone number. - [phone]  Urgent Care for Adult Mental Health ([facility] and [facility])   [address] [place] [address] - [facility] [place] - [phone], also is a 24/7 Mobile Crisis Team and Crisis Phone Line   Monday - Friday 8:00 a.m. to 9:00 p.m.   Saturday - Sunday 11:00 a.m. to 3:00 p.m.   [facility] provides a safe, supportive environment for crisis stabilization and promoting personal responsibility for [facility] and [facility] residents with mental illness.  [address] [place] [address]  Phone: [phone]  Fax: [phone]  [facility] Emergency Room: Phone:[phone].    In the metro area, people in crisis can call **CRISIS (883128) from a mobile phone. This mobile phone service will soon be available statewide.     Crisis Text Line is a text-based crisis line available 24/7. Text [place] to " 067869.    The National Suicide Prevention Lifeline will continue to be available without interruption at 636-348-NYBV (5966).     National [place] of Mental Illness  You and your family would benefit from the support groups at [facility].   www.namihelps.org  www.[facility].org    The [facility] [place] [facility] has a parent support and educator staff - Mina Porras - that can be a support for your parents. There are also many classes that are available to you and your family.  Mina can be reached at [phone] xt 106 or through e-mail at [email or web address].    The treatment team has appreciated the opportunity to work with you.     If you have any questions or concerns our unit number is [phone].  If you have the need for records regarding this hospitalization please contact the medical records department at [phone].  You may be receiving a follow-up phone call within the next three days from a representative from behavioral health.    You have identified the best phone number to reach you as _______________________

## 2019-07-04 NOTE — PROGRESS NOTES
Patient discharging 7/3/2019 accompanied by self and destination is back home.     Discharge paperwork reviewed and medications reviewed with Rianna Man who verbalizes understanding.     Patient has verbalized understanding of discharge instructions and medication administration.     Patient approached writer earlier in shift asking about leaving and what I thought. I encouraged patient to speak with her psychiatrist tomorrow and leave with her prescribed medications and any appointments the  might set up for her. She agreed and then about hour later stated that she wanted to go, a 12 hour intent was given. Patient psychiatrist was paged and patient is discharging AMA.     Patient denies suicidal ideation and self injurious thoughts. Denies homicidal ideation. States her anxiety is a 2 (1 low-10 high) and doesn't feel depressed. Continues to have auditory hallucinations but says there always there but less and more manageable. Affect is bright and pleasant on approach.     Copy of AVS received and signed for.    Locker items returned and signed for.    Survey provided

## 2019-07-10 NOTE — DISCHARGE SUMMARY
Admit Date:     06/30/2019   Discharge Date:     07/03/2019      PSYCHIATRIC DISCHARGE SUMMARY      The patient was hospitalized at this facility between 06/30/2019-07/03/2019.        TOTAL TIME SPENT:  On the day of discharge, I spent with the patient 40 minutes; greater than 50% of the time was spent on counseling and coordinating care, clarifying diagnostic prognostic issues, and discharge planning.       DISPOSITION:  The patient was discharged against medical advice.  I met with her twice on the day of discharge; our first time during a regular visit, then she approached me again and requested to be discharged.      CHIEF COMPLAINT AND REASON FOR ADMISSION:  The patient is a 29-year-old female with past history of schizoaffective disorder bipolar type, migraine, anxiety, polysubstance use, posttraumatic stress disorder, and borderline personality disorder who was admitted for worsening auditory hallucinations, suicidal thoughts in the context of multiple stressors.  It appears that she did not follow through with previous recommendations about care plans.  Auditory hallucinations had gotten worse including thoughts of harming herself, a recent breakup with boyfriend, father accidentally overdosed on medications.  Those were additional major stressors.  The patient reported that she wanted to change her medications back to previous meds and she just found out that she is wanted for DUI charges.  She started using methamphetamines, cannabis and alcohol again.  For more details of the patient's presentation, in past psychiatric history, please see Dr. Giuliano Huynh's note from 07/02/2019.  I reviewed this note and agree with it.      DISCHARGE DIAGNOSES:   1.  Schizoaffective disorder, bipolar type.   2.  Posttraumatic stress disorder.   3.  Borderline personality disorder.     4.  Traumatic brain injury.     5.  Obsessive-compulsive disorder traits of shopping addiction and pornography addiction.   6.  Alcohol  "use disorder, severe.   7.  Cannabis use disorder.   8.  Methamphetamine use disorder.      CONSULTATIONS:  The patient was seen by Internal Medicine due to concerns of pruritus of unclear origin.  Internal Medicine recommended treating the patient with clotrimazole steroid cream.  I appreciate Internal Medicine's help with this patient.      LABORATORY WORK:  Urine drug screen was positive for amphetamines and positive for cannabis.  The rest of test was negative.      HOSPITAL COURSE:  The patient was restarted on her home medications.  Small dose of Remeron was added for sleep.  I met with the patient 1 time only.  I was covering for Dr. Huynh.  The patient was put on MSSA protocol, but scored low.  Overall during the only visit with me she admitted to drinking from 1-1.75 L of hard liquor on pretty much a daily basis.  She still said  that she was not interested in CD treatment; stated \"I can do it myself.\"  She also reported \"I've recently been under quite a lot of stress, but now I am doing better.\"  The patient showed limited cooperation throughout this hospitalization.  She, for example, refused to talk to clinical treatment coordinator.  On the day of discharge, she met with me and initially did not ask to be discharged; however, later on approached again and said that she would like to be discharged.  She contracted for safety and was discharged against medical advice.  The patient was recommended to continue her home medications so they were not refilled in our pharmacy.      DISCHARGE HOME MEDICATIONS:   1.  Tylenol 500-1000 mg every 6 hours as needed for mild pain.   2.  Zyrtec 10 mg daily.   3.  Flonase 50 mcg 1 spray to both nostrils daily.   4.  Macrobid 100 mg 2 times a day for 7 days.     5.  Risperdal 3 mg 2 times a day.        No appointments were scheduled as the patient left AMA.         SUJEY GILLESPIE MD             D: 07/09/2019   T: 07/09/2019   MT:       Name:     JOSE ALLEN "   MRN:      [id number]        Account:        [id number]   :      1990           Admit Date:     2019                                  Discharge Date: 2019      Document: [id number]       cc: GUNJAN BUSTILLO MD

## 2019-07-31 ENCOUNTER — OFFICE VISIT (OUTPATIENT)
Dept: FAMILY MEDICINE | Facility: CLINIC | Age: 29
End: 2019-07-31
Payer: COMMERCIAL

## 2019-07-31 VITALS
HEIGHT: 67 IN | RESPIRATION RATE: 18 BRPM | DIASTOLIC BLOOD PRESSURE: 81 MMHG | BODY MASS INDEX: 36.82 KG/M2 | HEART RATE: 87 BPM | TEMPERATURE: 97.9 F | WEIGHT: 234.6 LBS | OXYGEN SATURATION: 100 % | SYSTOLIC BLOOD PRESSURE: 121 MMHG

## 2019-07-31 DIAGNOSIS — Z72.51 UNPROTECTED SEXUAL INTERCOURSE: ICD-10-CM

## 2019-07-31 DIAGNOSIS — R21 RASH: ICD-10-CM

## 2019-07-31 DIAGNOSIS — Z11.3 ROUTINE SCREENING FOR STI (SEXUALLY TRANSMITTED INFECTION): Primary | ICD-10-CM

## 2019-07-31 DIAGNOSIS — Z30.46 SURVEILLANCE OF PREVIOUSLY PRESCRIBED IMPLANTABLE SUBDERMAL CONTRACEPTIVE: ICD-10-CM

## 2019-07-31 LAB — HIV 1+2 AB+HIV1 P24 AG SERPL QL IA: NEGATIVE

## 2019-07-31 RX ORDER — PRENATAL VIT/IRON FUM/FOLIC AC 27MG-0.8MG
1 TABLET ORAL DAILY
Qty: 90 TABLET | Refills: 3 | Status: SHIPPED | OUTPATIENT
Start: 2019-07-31 | End: 2020-01-24

## 2019-07-31 RX ORDER — CETIRIZINE HYDROCHLORIDE 10 MG/1
10 TABLET ORAL DAILY
Qty: 30 TABLET | Refills: 2 | Status: SHIPPED | OUTPATIENT
Start: 2019-07-31 | End: 2020-01-24

## 2019-07-31 ASSESSMENT — MIFFLIN-ST. JEOR: SCORE: 1825.64

## 2019-07-31 NOTE — PROGRESS NOTES
"Assessment and Plan       Rianna Man is a 29 year old female with past medical hx including *** who presented to clinic today for ***    Screening for condition  ***  - Chlamydia/Gono Amplified ([facility])  - Syphilis Screen [place] ([facility])  - HIV Ag/Ab Screen [place] ([facility])      ***    Options for treatment and follow-up care were reviewed with the ***. Rianna Man ***engaged in the decision making process and verbalized understanding of the options discussed and agreed with the final plan.    Benjamin Rosenstein, MD, MA  [facility]  P: [phone]      Precepted today with: {pvpreceptors:070033}             HPI:       Chief Complaint   Patient presents with     Physical     rash both breast, arms, and stomach. started a couple months ago, hydrocortisone has not help     Procedure     remove nexplaoon     Medication Reconciliation       Rianna Man is a 29 year old  female { :832599} who presents {FOLLOW UP OR NEW CONCERN:809285}    Birth Control   -Would like to have birth control removed  -Has had implant for 8 years continuously  -Planning to have children, wants one more child  -Been with current partner for about 1 year  -No menstrual period since having implant    STI screening  -Desire STI screening as previously had been with other partners  -Thinks had Trichomoniasis 4-5 years ago  -No vaginal itching, burning, no abnormal discharge no dysuria/hematuria    Rash  -Rash on arms, belly, neck  -On and of pruritic  -Tried hydrocortisone cream for it  -Does not seem to be getting better  -Does have seasonal  -Poison sumac near beginning of June     {:901319}         Review of Systems:     {ROS DM :259008::\"C: NEGATIVE for fatigue, unexpected change in weight\",\"E: NEGATIVE for acute vision problems or changes\",\"R: NEGATIVE for significant cough or shortness of breath\",\"CV: NEGATIVE for chest pain, palpitations or new or worsening peripheral " "edema\",\"P: NEGATIVE for changes in mood or affect\"}            PFSH:   Problem List   Patient Active Problem List   Diagnosis     Trouble in sleeping     Contact dermatitis and other eczema, due to unspecified cause     Schizoaffective disorder (H)     Striae distensae     Nexplanon in place     H/O alcohol dependence (H)     Pap smear for cervical cancer screening     Iron deficiency anemia     Encounter for initial prescription of implantable subdermal contraceptive     Recurrent major depressive disorder (H)     Heather (H)     OCD (obsessive compulsive disorder)     Generalized anxiety disorder     Lactase deficiency     Tobacco use     Suicidal ideations     Intractable chronic migraine without aura     Burn     Suicidal intent     Suicidal ideation        Medications   Current Outpatient Medications   Medication Sig Dispense Refill     acetaminophen (TYLENOL) 500 MG tablet Take 1-2 tablets (500-1,000 mg) by mouth every 6 hours as needed for mild pain 100 tablet 0     cetirizine (ZYRTEC) 10 MG tablet Take 10 mg by mouth daily       fluticasone (FLONASE) 50 MCG/ACT nasal spray Spray 1 spray into both nostrils daily 15.8 mL 0     risperiDONE (RISPERDAL) 3 MG tablet Take 3 mg by mouth 2 times daily          Social History    {SOCIAL HISTORY:598629842}  History   Smoking Status     Current Every Day Smoker     Packs/day: 0.50     Types: Cigarettes   Smokeless Tobacco     Never Used     Comment: less than 1/2 pack          Allergies   Allergen Reactions     No Known Allergies    tgtg  ***: {For any other updates to [facility]}  Physical Exam        Physical Exam:     Vitals:    07/31/19 1542   BP: 121/81   Pulse: 87   Resp: 18   Temp: 97.9  F (36.6  C)   TempSrc: Oral   SpO2: 100%   Weight: 106.4 kg (234 lb 9.6 oz)   Height: 1.7 m (5' 6.93\")     Body mass index is 36.82 kg/m .    {EXAM -OPEN/NORMAL/ABNL:678191::\"GENERAL APPEARANCE: healthy, alert and no distress,\"}      No results found for this or any previous visit (from " "the past 24 hour(s)).    MDM- For Billing Purposes Only  Diagnosis:  {MDMDiag :860902}  ***  Data  {MDMData:197151}  ***  Risk:  {[facility] Risk:775052}  ***    There are no discontinued medications.    Patient Instructions   There are no Patient Instructions on file for this visit.        This note was completed with the assistance of dictation software. Typos and word substitution-errors are expected and unintended.          Procedure Note-Nexplanon Removal    Rianna Man is a patient of Hilary Plata here for removal of etonogestrel implant Nexplanon/Implanon    Indication: ***    Consent: Affirmation of informed consent was signed and scanned into the medical record. Risks, benefits and alternatives were discussed. Patient's questions were elicited and answered.   Procedure safety checklist was completed:  {YES / NO SURGERY:317431}  Time Out (Pause for the Cause) completed: {YES / NO SURGERY:512704}      Preoperative Diagnosis: etonogestrel implant  Postoperative Diagnosis: etonogestrel implant removed    Technique:   Skin prep {BETADINE/TECHNICARE:199772209}  Anesthesia {LIDOCAINE:677348737}  Procedure: Small incision (<5mm) was made at distal end of palpable implant, curved hemostat was used to isolate the implant and bring it to the incision, the fibrous capsule containing the implant  was incised and the Implant was removed intact.  EBL: minimal  Complications:  {YES / NO SURGERY:678171}  Tolerance:  Pt tolerated procedure well and was in stable condition.     Contraception was discussed and patient chose the following method {CONTRACEPTION:706}      Follow up: Pt was instructed to call if bleeding, severe pain or foul smell.     {FOLLOW UP Plan Flexible (DB):950798::\"Follow up in 2-4 weeks.\"}      Resident: Benjamin Rosenstein  Faculty: Benjamin Rosenstein, MD present for and supervised this entire procedure.    "

## 2019-07-31 NOTE — PROGRESS NOTES
Preceptor Attestation:   Patient seen, evaluated and discussed with the resident. I was present for and supervised the entire procedure. I have verified the content of the note, which accurately reflects my assessment of the patient and the plan of care.  Supervising Physician:Hope Ayon MD  [facility]

## 2019-07-31 NOTE — PATIENT INSTRUCTIONS
Thank you for coming in    For your rash, use aquaphor or cetaphil after you shower    Take the zyrtec daily to help with itching    We will call with results of your tests

## 2019-08-01 NOTE — RESULT ENCOUNTER NOTE
Please call patient with the following    Luis Angel Blanca      The STI testing we did was all negative. Please start your prenatal vitamin daily if you have not yet already.     If you have further questions or concerns, please call the clinic or schedule a follow up visit.    Thank you  Benjamin Rosenstein, MD, MA

## 2019-08-01 NOTE — PROGRESS NOTES
Assessment and Plan       Rianna Man is a 29 year old female with past medical hx including significant psychiatric history with recent admission who presented to clinic today for the follwoing    Routine screening for STI (sexually transmitted infection)  No concerns for recent exposure. Reports possible history of trichomoniasis, unable to find this in chart or care everywhere. Desires routine screening due to history of multiple partners and plans for unprotected intercourse with current partner to become pregnant.  - Chlamydia/Gono Amplified ([facility])  - Syphilis Screen [place] ([facility])  - HIV Ag/Ab Screen [place] ([facility])    Unprotected sexual intercourse  Reviewed healthful changes for pregnancy including cessation of smoking, weight loss, and starting prenatal vitamin. Is currently reducing smoking on own, states now at 1 pack/week. Agrees to start prenatal vitamin.   - Prenatal Vit-Fe Fumarate-FA (PRENATAL MULTIVITAMIN W/IRON) 27-0.8 MG tablet  Dispense: 90 tablet; Refill: 3    Surveillance of previously prescribed implantable subdermal contraceptive  Desires removal for pregnancy  - REMOVAL NEXPLANON    Procedure Note-Nexplanon Removal    Rianna Man is a patient of Hilary Plata here for removal of etonogestrel implant Nexplanon/Implanon    Indication: Desire for pregnancy    Consent: Affirmation of informed consent was signed and scanned into the medical record. Risks, benefits and alternatives were discussed. Patient's questions were elicited and answered.   Procedure safety checklist was completed:  Yes  Time Out (Pause for the Cause) completed: Yes      Preoperative Diagnosis: etonogestrel implant  Postoperative Diagnosis: etonogestrel implant removed    Technique:   Skin prep Betadine  Anesthesia 1% lidocaine, with epi  Procedure: Small incision (<5mm) was made at distal end of palpable implant, curved hemostat was used to isolate the implant and bring it to  the incision, the fibrous capsule containing the implant  was incised and the Implant was removed intact. Steri-strips placed and arms wrapped with coband  EBL: minimal  Complications:  No  Tolerance:  Pt tolerated procedure well and was in stable condition.     Contraception was discussed and patient chose the following method none as desires to be pregnancy    Follow up: Pt was instructed to call if bleeding, severe pain or foul smell.     Follow up in 2-4 weeks.      Resident: Benjamin Rosenstein  Faculty: Dr. Hope Ayon was present for and supervised this entire procedure.    Rash  Likely irritant dermatitis vs dyshydrosis related to skin-skin contact as well as collection of sweat. Advised moisturizing with aquaphor or cetaphil followign showers. Zyrtec prescribed for itching and possible allergy component.   - cetirizine (ZYRTEC) 10 MG tablet  Dispense: 30 tablet; Refill: 2    Options for treatment and follow-up care were reviewed with the patient. Rianna Man engaged in the decision making process and verbalized understanding of the options discussed and agreed with the final plan.    Benjamin Rosenstein, MD, MA  [facility]  P: [phone]      Precepted today with: Hope Ayon MD           HPI:       Chief Complaint   Patient presents with     Physical     rash both breast, arms, and stomach. started a couple months ago, hydrocortisone has not help     Procedure     remove nexplaoon     Medication Reconciliation       Rianna Man is a 29 year old  female with pmhx including schizoaffective disorder - bipolar, PTSD, TBI, polysubstance use, and borderline personality  who presents concerns as below    Birth Control   -Would like to have birth control removed  -Has had implant for 8 years continuously  -Planning to have children, wants one more child. Other child is now 9 years old with prior partner  -Been with current partner for about 1 year, monogamous relationship   -No  menstrual period since having implant  -States was told by department of health, current implant is good for 4 years  -Denies any nausea, vomiting, breast tenderness    STI screening  -Desire STI screening as previously had been with other partners  -No concerns with current partner, likes to be tested regularly though  -Historically has used condoms in addition to nexplanon  -Thinks had Trichomoniasis 4-5 years ago  -No vaginal itching, burning, no abnormal discharge, no dysuria/hematuria    Rash  -Rash in multiple places  -Small patches on neck, breasts, abdomen, and forearm flexural surfaces  -Has had previously, months to at least 1 year, possibly longer  -Has previously used hydrocortisone creams, helpful with itching but rash returns  -Mainly pruritic, waxes and wains  -Some patches darker and areas of arms appear lighter  -Seems to be getting better currently  -Has seasonal allergies and usually seems to get rash during summers  -Notes recent exposure to poison sumac early in June  -No erythema, swelling, no purulent discharge. No fevers or chills.     PMHx  -As above  -Migraines  -Anemia    Surg Hx  -Negative    Fam Hx  -Denies    Social Hx  -Currently returning to school, not currently working  -Still smoking. Reducing use, down to 1 pack per week  -Denies other current substance use    Healthcare Maintenance  -Pap Tests: Negative cytology, negative HPV (3/2018); Negative cytology (2/2015)             Review of Systems:      ROS: 10 point ROS neg other than the symptoms noted above in the HPI.              PFSH:   Problem List   Patient Active Problem List   Diagnosis     Trouble in sleeping     Contact dermatitis and other eczema, due to unspecified cause     Schizoaffective disorder (H)     Striae distensae     Nexplanon in place     H/O alcohol dependence (H)     Pap smear for cervical cancer screening     Iron deficiency anemia     Encounter for initial prescription of implantable subdermal  "contraceptive     Recurrent major depressive disorder (H)     Heather (H)     OCD (obsessive compulsive disorder)     Generalized anxiety disorder     Lactase deficiency     Tobacco use     Suicidal ideations     Intractable chronic migraine without aura     Burn     Suicidal intent     Suicidal ideation        Medications   Current Outpatient Medications   Medication Sig Dispense Refill     acetaminophen (TYLENOL) 500 MG tablet Take 1-2 tablets (500-1,000 mg) by mouth every 6 hours as needed for mild pain 100 tablet 0     cetirizine (ZYRTEC) 10 MG tablet Take 10 mg by mouth daily       fluticasone (FLONASE) 50 MCG/ACT nasal spray Spray 1 spray into both nostrils daily 15.8 mL 0     risperiDONE (RISPERDAL) 3 MG tablet Take 3 mg by mouth 2 times daily          Social History      History   Smoking Status     Current Every Day Smoker     Packs/day: 0.50     Types: Cigarettes   Smokeless Tobacco     Never Used     Comment: less than 1/2 pack          Allergies   Allergen Reactions     No Known Allergies      Physical Exam        Physical Exam:     Vitals:    07/31/19 1542   BP: 121/81   Pulse: 87   Resp: 18   Temp: 97.9  F (36.6  C)   TempSrc: Oral   SpO2: 100%   Weight: 106.4 kg (234 lb 9.6 oz)   Height: 1.7 m (5' 6.93\")     Body mass index is 36.82 kg/m .    General: Awake, seated comfortably, appears well and NAD  HEENT:  - Head: Atraumatic, normocephalic  - Eyes: Corneas clear, sclera without injection, no discharge, EOMI, PERRLA  - Throat: Oropharynx clear without lesions, tonsils normal, posterior pharynx without erythema, swelling, or exudate   CV: RRR, normal S1/S2, no murmurs/rubs/gallops  Pulm: Normal WOB, lungs CTAB, no wheezes or crackles, good air movement   GI: Abdomen obese, soft, not tender, not distended, BS normal and active throughout   MSK: No gross deformities, full AROM throughout   Skin: Mild hypopigmentation with excoriation of bilateral antecubital fossae, few isolated hyperpigmented macules " under skin folds of abdomen, between breasts, more consolidated scale on posterior neck under bra and tank-top straps  Psych: Alert, answering questions appropriately, normal thought processes; mood normal, affect congruent    Results for orders placed or performed in visit on 07/31/19   HIV Ag/Ab Screen [place] ([facility])   Result Value Ref Range    HIV Antigen/Antibody Negative Negative    Narrative    Test performed by:  [facility] [address] [place] [address]  Method is Abbott HIV Ag/Ab for the detection of HIV p24 antigen, HIV-1   antibodies and HIV-2 antibodies.         There are no discontinued medications.    Patient Instructions   There are no Patient Instructions on file for this visit.        This note was completed with the assistance of dictation software. Typos and word substitution-errors are expected and unintended.

## 2019-08-01 NOTE — PROGRESS NOTES
Assessment and Plan       Rianna Man is a 29 year old female with past medical hx including *** who presented to clinic today for ***    Routine screening for STI (sexually transmitted infection)  ***  - Chlamydia/Gono Amplified ([facility])  - Syphilis Screen [place] ([facility])  - HIV Ag/Ab Screen [place] ([facility])    Unprotected sexual intercourse  ***  - Prenatal Vit-Fe Fumarate-FA (PRENATAL MULTIVITAMIN W/IRON) 27-0.8 MG tablet  Dispense: 90 tablet; Refill: 3    Surveillance of previously prescribed implantable subdermal contraceptive  ***  - REMOVAL NEXPLANON    Rash  ***  - cetirizine (ZYRTEC) 10 MG tablet  Dispense: 30 tablet; Refill: 2      ***    Options for treatment and follow-up care were reviewed with the ***. Rianna Man ***engaged in the decision making process and verbalized understanding of the options discussed and agreed with the final plan.    Benjamin Rosenstein, MD, MA  [facility]  P: [phone]      Precepted today with: {pvpreceptors:251041}             HPI:       Chief Complaint   Patient presents with     Physical     rash both breast, arms, and stomach. started a couple months ago, hydrocortisone has not help     Procedure     remove nexplaoon     Medication Reconciliation       Rianna Man is a 29 year old  female with pmhx including schizoaffective disorder - bipolar, PTSD, TBI, polysubstance use, and borderline personality  who presents concerns as below    Birth Control   -Would like to have birth control removed  -Has had implant for 8 years continuously  -Planning to have children, wants one more child. Other child is now 9 years old with prior partner  -Been with current partner for about 1 year, monogamous relationship   -No menstrual period since having implant  -States was told by department of health, current implant is good for 4 years  -Denies any nausea, vomiting, breast tenderness    STI screening  -Desire STI screening as  previously had been with other partners  -No concerns with current partner, likes to be tested regularly though  -Historically has used condoms in addition to nexplanon  -Thinks had Trichomoniasis 4-5 years ago  -No vaginal itching, burning, no abnormal discharge, no dysuria/hematuria    Rash  -Rash in multiple places  -Small patches on neck, breasts, abdomen, and forearm flexural surfaces  -Has had previously, months to at least 1 year, possibly longer  -Has previously used hydrocortisone creams, helpful with itching but rash returns  -Mainly pruritic, waxes and wains  -Some patches darker and areas of arms appear lighter  -Seems to be getting better currently  -Has seasonal allergies and usually seems to get rash during summers  -Notes recent exposure to poison sumac early in June  -No erythema, swelling, no purulent discharge. No fevers or chills.     PMHx  -As above  -Migraines  -Anemia    Surg Hx  -Negative    Fam Hx  -Denies    Social Hx  -Currently returning to school, not currently working  -Still smoking. Reducing use, down to 1 pack per week  -Denies other current substance use    Healthcare Maintenance  -Pap Tests: Negative cytology, negative HPV (3/2018);              Review of Systems:      ROS: 10 point ROS neg other than the symptoms noted above in the HPI.              PFSH:   Problem List   Patient Active Problem List   Diagnosis     Trouble in sleeping     Contact dermatitis and other eczema, due to unspecified cause     Schizoaffective disorder (H)     Striae distensae     Nexplanon in place     H/O alcohol dependence (H)     Pap smear for cervical cancer screening     Iron deficiency anemia     Encounter for initial prescription of implantable subdermal contraceptive     Recurrent major depressive disorder (H)     Heather (H)     OCD (obsessive compulsive disorder)     Generalized anxiety disorder     Lactase deficiency     Tobacco use     Suicidal ideations     Intractable chronic migraine without  "aura     Burn     Suicidal intent     Suicidal ideation        Medications   Current Outpatient Medications   Medication Sig Dispense Refill     acetaminophen (TYLENOL) 500 MG tablet Take 1-2 tablets (500-1,000 mg) by mouth every 6 hours as needed for mild pain 100 tablet 0     cetirizine (ZYRTEC) 10 MG tablet Take 10 mg by mouth daily       fluticasone (FLONASE) 50 MCG/ACT nasal spray Spray 1 spray into both nostrils daily 15.8 mL 0     risperiDONE (RISPERDAL) 3 MG tablet Take 3 mg by mouth 2 times daily          Social History      History   Smoking Status     Current Every Day Smoker     Packs/day: 0.50     Types: Cigarettes   Smokeless Tobacco     Never Used     Comment: less than 1/2 pack          Allergies   Allergen Reactions     No Known Allergies      Physical Exam        Physical Exam:     Vitals:    07/31/19 1542   BP: 121/81   Pulse: 87   Resp: 18   Temp: 97.9  F (36.6  C)   TempSrc: Oral   SpO2: 100%   Weight: 106.4 kg (234 lb 9.6 oz)   Height: 1.7 m (5' 6.93\")     Body mass index is 36.82 kg/m .    {EXAM -OPEN/NORMAL/ABNL:490016::\"GENERAL APPEARANCE: healthy, alert and no distress,\"}      No results found for this or any previous visit (from the past 24 hour(s)).    [facility]- For Billing Purposes Only  Diagnosis:  {MDMDiag :874341}  ***  Data  {MDMData:807750}  ***  Risk:  {[facility] Risk:154990}  ***    There are no discontinued medications.    Patient Instructions   There are no Patient Instructions on file for this visit.        This note was completed with the assistance of dictation software. Typos and word substitution-errors are expected and unintended.          Procedure Note-Nexplanon Removal    Rianna Man is a patient of Hilary Plata here for removal of etonogestrel implant Nexplanon/Implanon    Indication: ***    Consent: Affirmation of informed consent was signed and scanned into the medical record. Risks, benefits and alternatives were discussed. Patient's questions were " "elicited and answered.   Procedure safety checklist was completed:  {YES / NO SURGERY:892580}  Time Out (Pause for the Cause) completed: {YES / NO SURGERY:118333}      Preoperative Diagnosis: etonogestrel implant  Postoperative Diagnosis: etonogestrel implant removed    Technique:   Skin prep {BETADINE/TECHNICARE:888794057}  Anesthesia {LIDOCAINE:489709356}  Procedure: Small incision (<5mm) was made at distal end of palpable implant, curved hemostat was used to isolate the implant and bring it to the incision, the fibrous capsule containing the implant  was incised and the Implant was removed intact.  EBL: minimal  Complications:  {YES / NO SURGERY:764385}  Tolerance:  Pt tolerated procedure well and was in stable condition.     Contraception was discussed and patient chose the following method {CONTRACEPTION:706}      Follow up: Pt was instructed to call if bleeding, severe pain or foul smell.     {FOLLOW UP Plan Flexible (DB):552400::\"Follow up in 2-4 weeks.\"}      Resident: Benjamin Rosenstein  Faculty: Benjamin Rosenstein, MD present for and supervised this entire procedure.    "

## 2019-08-06 NOTE — RESULT ENCOUNTER NOTE
Called pt, left detailed message with results on voicemail. Patient had called last Thursday, 8/1/2019, at a time I was unable to talk to her but she stated it was okay to leave detailed message on voicemail.

## 2019-09-02 ENCOUNTER — HOSPITAL ENCOUNTER (INPATIENT)
Facility: CLINIC | Age: 29
LOS: 2 days | Discharge: LEFT BEFORE DISCHARGE | End: 2019-09-04
Attending: EMERGENCY MEDICINE | Admitting: PSYCHIATRY & NEUROLOGY
Payer: COMMERCIAL

## 2019-09-02 DIAGNOSIS — R45.851 SUICIDAL IDEATION: ICD-10-CM

## 2019-09-02 DIAGNOSIS — F25.1 SCHIZOAFFECTIVE DISORDER, DEPRESSIVE TYPE (H): ICD-10-CM

## 2019-09-02 PROBLEM — F32.A DEPRESSION: Status: ACTIVE | Noted: 2019-09-02

## 2019-09-02 LAB
ALCOHOL BREATH TEST: 0 (ref 0–0.01)
AMPHETAMINES UR QL SCN: POSITIVE
BARBITURATES UR QL: NEGATIVE
BENZODIAZ UR QL: NEGATIVE
CANNABINOIDS UR QL SCN: POSITIVE
COCAINE UR QL: NEGATIVE
ETHANOL UR QL SCN: NEGATIVE
HCG UR QL: NEGATIVE
OPIATES UR QL SCN: NEGATIVE

## 2019-09-02 PROCEDURE — 81025 URINE PREGNANCY TEST: CPT | Performed by: EMERGENCY MEDICINE

## 2019-09-02 PROCEDURE — HZ2ZZZZ DETOXIFICATION SERVICES FOR SUBSTANCE ABUSE TREATMENT: ICD-10-PCS | Performed by: PSYCHIATRY & NEUROLOGY

## 2019-09-02 PROCEDURE — 25000132 ZZH RX MED GY IP 250 OP 250 PS 637: Performed by: PSYCHIATRY & NEUROLOGY

## 2019-09-02 PROCEDURE — 82075 ASSAY OF BREATH ETHANOL: CPT | Performed by: EMERGENCY MEDICINE

## 2019-09-02 PROCEDURE — 12400001 ZZH R&B MH UMMC

## 2019-09-02 PROCEDURE — 99285 EMERGENCY DEPT VISIT HI MDM: CPT | Performed by: EMERGENCY MEDICINE

## 2019-09-02 PROCEDURE — 87591 N.GONORRHOEAE DNA AMP PROB: CPT | Performed by: PSYCHIATRY & NEUROLOGY

## 2019-09-02 PROCEDURE — 99285 EMERGENCY DEPT VISIT HI MDM: CPT | Mod: Z6 | Performed by: EMERGENCY MEDICINE

## 2019-09-02 PROCEDURE — 80320 DRUG SCREEN QUANTALCOHOLS: CPT | Performed by: EMERGENCY MEDICINE

## 2019-09-02 PROCEDURE — 80307 DRUG TEST PRSMV CHEM ANLYZR: CPT | Performed by: EMERGENCY MEDICINE

## 2019-09-02 PROCEDURE — 87491 CHLMYD TRACH DNA AMP PROBE: CPT | Performed by: PSYCHIATRY & NEUROLOGY

## 2019-09-02 RX ORDER — OLANZAPINE 10 MG/1
10 TABLET ORAL
Status: DISCONTINUED | OUTPATIENT
Start: 2019-09-02 | End: 2019-09-04 | Stop reason: HOSPADM

## 2019-09-02 RX ORDER — BISACODYL 10 MG
10 SUPPOSITORY, RECTAL RECTAL DAILY PRN
Status: DISCONTINUED | OUTPATIENT
Start: 2019-09-02 | End: 2019-09-04 | Stop reason: HOSPADM

## 2019-09-02 RX ORDER — HYDROXYZINE HYDROCHLORIDE 25 MG/1
25 TABLET, FILM COATED ORAL EVERY 4 HOURS PRN
Status: DISCONTINUED | OUTPATIENT
Start: 2019-09-02 | End: 2019-09-04 | Stop reason: HOSPADM

## 2019-09-02 RX ORDER — NICOTINE 21 MG/24HR
1 PATCH, TRANSDERMAL 24 HOURS TRANSDERMAL DAILY
Status: DISCONTINUED | OUTPATIENT
Start: 2019-09-02 | End: 2019-09-04 | Stop reason: HOSPADM

## 2019-09-02 RX ORDER — ALUMINA, MAGNESIA, AND SIMETHICONE 2400; 2400; 240 MG/30ML; MG/30ML; MG/30ML
30 SUSPENSION ORAL EVERY 4 HOURS PRN
Status: DISCONTINUED | OUTPATIENT
Start: 2019-09-02 | End: 2019-09-04 | Stop reason: HOSPADM

## 2019-09-02 RX ORDER — OLANZAPINE 10 MG/2ML
10 INJECTION, POWDER, FOR SOLUTION INTRAMUSCULAR
Status: DISCONTINUED | OUTPATIENT
Start: 2019-09-02 | End: 2019-09-04 | Stop reason: HOSPADM

## 2019-09-02 RX ORDER — LORAZEPAM 1 MG/1
1-4 TABLET ORAL EVERY 30 MIN PRN
Status: DISCONTINUED | OUTPATIENT
Start: 2019-09-02 | End: 2019-09-04

## 2019-09-02 RX ORDER — ACETAMINOPHEN 325 MG/1
650 TABLET ORAL EVERY 4 HOURS PRN
Status: DISCONTINUED | OUTPATIENT
Start: 2019-09-02 | End: 2019-09-04 | Stop reason: HOSPADM

## 2019-09-02 RX ORDER — TRAZODONE HYDROCHLORIDE 50 MG/1
50 TABLET, FILM COATED ORAL
Status: DISCONTINUED | OUTPATIENT
Start: 2019-09-02 | End: 2019-09-04 | Stop reason: HOSPADM

## 2019-09-02 RX ADMIN — TRAZODONE HYDROCHLORIDE 50 MG: 50 TABLET ORAL at 20:15

## 2019-09-02 RX ADMIN — ACETAMINOPHEN 650 MG: 325 TABLET ORAL at 20:43

## 2019-09-02 RX ADMIN — HYDROXYZINE HYDROCHLORIDE 25 MG: 25 TABLET, FILM COATED ORAL at 20:15

## 2019-09-02 ASSESSMENT — ENCOUNTER SYMPTOMS
SEIZURES: 0
SHORTNESS OF BREATH: 0
CHILLS: 0
COUGH: 0
FEVER: 0
DYSURIA: 0
DYSPHORIC MOOD: 1
HALLUCINATIONS: 1
ABDOMINAL PAIN: 0
TREMORS: 0
VOMITING: 0

## 2019-09-02 ASSESSMENT — ACTIVITIES OF DAILY LIVING (ADL)
HYGIENE/GROOMING: INDEPENDENT
ORAL_HYGIENE: INDEPENDENT
DRESS: INDEPENDENT
LAUNDRY: WITH SUPERVISION
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
## 2019-09-02 NOTE — PROGRESS NOTES
Could not complete MSSA as patient sleeping and declined vitals and assessment. No tremor or diaphoresis noted. Sleeping soundly.

## 2019-09-02 NOTE — ED NOTES
Pt requested pt relations number due to concerns about staff talking about her. Door is closed with tv on. Pt given pt relations number and assured staff was not talking about her or her history.

## 2019-09-02 NOTE — PROGRESS NOTES
09/02/19 1345   Patient Belongings   Did you bring any home meds/supplements to the hospital?  No   Patient Belongings locker   Patient Belongings Put in Hospital Secure Location (Security or Locker, etc.) clothing;keys;purse/wallet;other (see comments)   Belongings Search Yes      Items put in the patient's locker include:  Natural spirit cigarettes, white undershirt, black bin pack, sunglasses, perfume (roz secret) x3, lotion x3, charging cords x2, notebook, scarf, (empty) small blue bag,  Make up bag (containing: eyeshadows, lip gloss, mascara, eyeliner, lighter and condoms), tank top x2, print leggings, pink pants, blue shorts, teal shirt, white t shirt, black leggings, monster energy drink, wallet (containing: gift card x2, EBT card, metro card and 1 key), underwear x3, pink bag with tampons, pads, deodorant x2, toothpaste, purple nail polish, pink key chain, 1 sock, red camera (cool pix), wooden brush, pink comb, lip gloss, over the counter eye drops, condoms x20, jocelyn pins, hair binder, empty glass jar, 2x lighter    Items given to or taken by security: 2 pens containing cannabis or CBD oil, pink knife,pipe (which patient stated she used to smoke).     Given to security: Wallet with the 1 key, 2 [employer] gift cards, 2 metro cards, [place] EBT card, 1 visa debit card, 3 pennies, 1 dime
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
## 2019-09-02 NOTE — PROGRESS NOTES
Rianna refused her vital signs this afternoon. RN notified.       09/02/19 7255   Vital Signs   Temp   (Refused)   Pulse   (Refused)   BP   (Refused)

## 2019-09-03 LAB
ALBUMIN SERPL-MCNC: 3.8 G/DL (ref 3.4–5)
ALP SERPL-CCNC: 74 U/L (ref 40–150)
ALT SERPL W P-5'-P-CCNC: 16 U/L (ref 0–50)
ANION GAP SERPL CALCULATED.3IONS-SCNC: 9 MMOL/L (ref 3–14)
AST SERPL W P-5'-P-CCNC: 12 U/L (ref 0–45)
BASOPHILS # BLD AUTO: 0 10E9/L (ref 0–0.2)
BASOPHILS NFR BLD AUTO: 0.4 %
BILIRUB SERPL-MCNC: 0.9 MG/DL (ref 0.2–1.3)
BUN SERPL-MCNC: 9 MG/DL (ref 7–30)
C TRACH DNA SPEC QL NAA+PROBE: NEGATIVE
CALCIUM SERPL-MCNC: 8.8 MG/DL (ref 8.5–10.1)
CHLORIDE SERPL-SCNC: 102 MMOL/L (ref 94–109)
CHOLEST SERPL-MCNC: 226 MG/DL
CO2 SERPL-SCNC: 28 MMOL/L (ref 20–32)
CREAT SERPL-MCNC: 0.87 MG/DL (ref 0.52–1.04)
DIFFERENTIAL METHOD BLD: NORMAL
EOSINOPHIL # BLD AUTO: 0.1 10E9/L (ref 0–0.7)
EOSINOPHIL NFR BLD AUTO: 1.5 %
ERYTHROCYTE [DISTWIDTH] IN BLOOD BY AUTOMATED COUNT: 12.3 % (ref 10–15)
GFR SERPL CREATININE-BSD FRML MDRD: 90 ML/MIN/{1.73_M2}
GLUCOSE SERPL-MCNC: 85 MG/DL (ref 70–99)
HCT VFR BLD AUTO: 42.7 % (ref 35–47)
HDLC SERPL-MCNC: 97 MG/DL
HGB BLD-MCNC: 13.8 G/DL (ref 11.7–15.7)
HIV 1+2 AB+HIV1 P24 AG SERPL QL IA: NONREACTIVE
IMM GRANULOCYTES # BLD: 0 10E9/L (ref 0–0.4)
IMM GRANULOCYTES NFR BLD: 0 %
LDLC SERPL CALC-MCNC: 116 MG/DL
LYMPHOCYTES # BLD AUTO: 2.5 10E9/L (ref 0.8–5.3)
LYMPHOCYTES NFR BLD AUTO: 46.8 %
MCH RBC QN AUTO: 30.1 PG (ref 26.5–33)
MCHC RBC AUTO-ENTMCNC: 32.3 G/DL (ref 31.5–36.5)
MCV RBC AUTO: 93 FL (ref 78–100)
MONOCYTES # BLD AUTO: 0.5 10E9/L (ref 0–1.3)
MONOCYTES NFR BLD AUTO: 8.6 %
N GONORRHOEA DNA SPEC QL NAA+PROBE: NEGATIVE
NEUTROPHILS # BLD AUTO: 2.3 10E9/L (ref 1.6–8.3)
NEUTROPHILS NFR BLD AUTO: 42.7 %
NONHDLC SERPL-MCNC: 129 MG/DL
NRBC # BLD AUTO: 0 10*3/UL
NRBC BLD AUTO-RTO: 0 /100
PLATELET # BLD AUTO: 309 10E9/L (ref 150–450)
POTASSIUM SERPL-SCNC: 3.4 MMOL/L (ref 3.4–5.3)
PROT SERPL-MCNC: 7.7 G/DL (ref 6.8–8.8)
RBC # BLD AUTO: 4.58 10E12/L (ref 3.8–5.2)
SODIUM SERPL-SCNC: 139 MMOL/L (ref 133–144)
SPECIMEN SOURCE: NORMAL
SPECIMEN SOURCE: NORMAL
TRIGL SERPL-MCNC: 66 MG/DL
TSH SERPL DL<=0.005 MIU/L-ACNC: 0.77 MU/L (ref 0.4–4)
WBC # BLD AUTO: 5.3 10E9/L (ref 4–11)

## 2019-09-03 PROCEDURE — 87389 HIV-1 AG W/HIV-1&-2 AB AG IA: CPT | Performed by: PSYCHIATRY & NEUROLOGY

## 2019-09-03 PROCEDURE — 80053 COMPREHEN METABOLIC PANEL: CPT | Performed by: PSYCHIATRY & NEUROLOGY

## 2019-09-03 PROCEDURE — 99223 1ST HOSP IP/OBS HIGH 75: CPT | Mod: AI | Performed by: PSYCHIATRY & NEUROLOGY

## 2019-09-03 PROCEDURE — 84443 ASSAY THYROID STIM HORMONE: CPT | Performed by: PSYCHIATRY & NEUROLOGY

## 2019-09-03 PROCEDURE — 25000132 ZZH RX MED GY IP 250 OP 250 PS 637: Performed by: PSYCHIATRY & NEUROLOGY

## 2019-09-03 PROCEDURE — 85025 COMPLETE CBC W/AUTO DIFF WBC: CPT | Performed by: PSYCHIATRY & NEUROLOGY

## 2019-09-03 PROCEDURE — 36415 COLL VENOUS BLD VENIPUNCTURE: CPT | Performed by: PSYCHIATRY & NEUROLOGY

## 2019-09-03 PROCEDURE — 80061 LIPID PANEL: CPT | Performed by: PSYCHIATRY & NEUROLOGY

## 2019-09-03 PROCEDURE — 12400001 ZZH R&B MH UMMC

## 2019-09-03 RX ORDER — QUETIAPINE FUMARATE 50 MG/1
50 TABLET, FILM COATED ORAL
Status: DISCONTINUED | OUTPATIENT
Start: 2019-09-03 | End: 2019-09-04 | Stop reason: HOSPADM

## 2019-09-03 RX ORDER — SUMATRIPTAN 25 MG/1
25 TABLET, FILM COATED ORAL DAILY PRN
Status: DISCONTINUED | OUTPATIENT
Start: 2019-09-03 | End: 2019-09-04 | Stop reason: HOSPADM

## 2019-09-03 RX ORDER — MIRTAZAPINE 15 MG/1
15 TABLET, FILM COATED ORAL AT BEDTIME
Status: DISCONTINUED | OUTPATIENT
Start: 2019-09-03 | End: 2019-09-04 | Stop reason: HOSPADM

## 2019-09-03 RX ORDER — OLANZAPINE 7.5 MG/1
15 TABLET, FILM COATED ORAL AT BEDTIME
Status: DISCONTINUED | OUTPATIENT
Start: 2019-09-03 | End: 2019-09-04 | Stop reason: HOSPADM

## 2019-09-03 RX ADMIN — SUMATRIPTAN SUCCINATE 25 MG: 25 TABLET, FILM COATED ORAL at 19:39

## 2019-09-03 RX ADMIN — QUETIAPINE FUMARATE 50 MG: 50 TABLET ORAL at 21:15

## 2019-09-03 RX ADMIN — NICOTINE 1 PATCH: 21 PATCH, EXTENDED RELEASE TRANSDERMAL at 08:37

## 2019-09-03 RX ADMIN — HYDROXYZINE HYDROCHLORIDE 25 MG: 25 TABLET, FILM COATED ORAL at 21:14

## 2019-09-03 RX ADMIN — OLANZAPINE 15 MG: 7.5 TABLET, FILM COATED ORAL at 21:13

## 2019-09-03 RX ADMIN — HYDROXYZINE HYDROCHLORIDE 25 MG: 25 TABLET, FILM COATED ORAL at 08:37

## 2019-09-03 RX ADMIN — MIRTAZAPINE 15 MG: 15 TABLET, FILM COATED ORAL at 21:13

## 2019-09-03 RX ADMIN — HYDROXYZINE HYDROCHLORIDE 25 MG: 25 TABLET, FILM COATED ORAL at 19:39

## 2019-09-03 ASSESSMENT — ACTIVITIES OF DAILY LIVING (ADL)
DRESS: INDEPENDENT
HYGIENE/GROOMING: INDEPENDENT
LAUNDRY: WITH SUPERVISION
ORAL_HYGIENE: INDEPENDENT
DRESS: INDEPENDENT
HYGIENE/GROOMING: INDEPENDENT
ORAL_HYGIENE: INDEPENDENT

## 2019-09-03 NOTE — PLAN OF CARE
Pt states she continues to experience AH and SI. Hallucinations are quiet. Denies urges for self harm. Contracts for safety.   Patient spent most of the evening in her room resting. Ate a late dinner. Tylenol effective for headache. Admission profile partially completed.     Assessment of mood, thought process, response to medications and potential side effects continues.     Patient encouraged to participate in therapeutic groups and develop self-care skills.     Appropriate precautions maintained.

## 2019-09-03 NOTE — PROGRESS NOTES
/51   Pulse 96   Temp 98  F (36.7  C) (Oral)   Resp 20   Wt 96.2 kg (212 lb)   SpO2 98%   Breastfeeding? No   BMI 33.27 kg/m        MSSA for shift: 7; 4  -No visible signs of withdrawal; pt denies presence of withdrawal.

## 2019-09-03 NOTE — PROGRESS NOTES
"patient continues to report intense SI and AH \"up and down\" throughout the day. She feels confident she could come to staff if SI progresses. She primarily complains about her migraine, also wanting to know the results of some testing recently conducted. No issues this shift.       09/03/19 1424   Behavioral Health   Hallucinations auditory   Thinking poor concentration   Orientation person: oriented;place: oriented;date: oriented;time: oriented   Memory baseline memory   Insight poor   Judgement impaired   Eye Contact at examiner   Affect blunted, flat   Mood depressed   Physical Appearance/Attire appears stated age;attire appropriate to age and situation   Hygiene well groomed   Suicidality thoughts only   1. Wish to be Dead (Past Month) No   2. Non-Specific Active Suicidal Thoughts (Past Month) No   Activity isolative;withdrawn   Speech clear;coherent   Medication Sensitivity no stated side effects;no observed side effects   Psychomotor / Gait balanced;steady   Psycho Education   Type of Intervention 1:1 intervention   Response participates, initiates socially appropriate   Hours 0.5   Activities of Daily Living   Hygiene/Grooming independent   Oral Hygiene independent   Dress independent   Laundry with supervision   Room Organization independent     "

## 2019-09-03 NOTE — PLAN OF CARE
BEHAVIORAL TEAM DISCUSSION    Participants:   Julia Guadarrama, Aleksandra Israel  RN, Ban Vasquez MSR\OT        Progress:   This 29 year old patient was admitted after presenting to the ER. She has not been taking her medications.  Pt was having paranoia and auditory hallucinations.  Pt did report suicidal ideation.  Pt was asking for detoxification.    Utox positive for amphetamines and cannabis.      Anticipated length of stay:   One week      Continued Stay Criteria/Rationale:   The pt will be discharged when psychiatrically stable.      Medical/Physical:   Frequent STD testing      Precautions:   Behavioral Orders   Procedures     Code 1 - Restrict to Unit     Routine Programming     As clinically indicated     Status 15     Every 15 minutes.     Withdrawal precautions     Plan:   Multidisciplinary evaluation  Medication review and management         Rationale for change in precautions or plan:   Initial review
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
## 2019-09-04 VITALS
WEIGHT: 212 LBS | RESPIRATION RATE: 20 BRPM | SYSTOLIC BLOOD PRESSURE: 107 MMHG | TEMPERATURE: 97 F | DIASTOLIC BLOOD PRESSURE: 79 MMHG | BODY MASS INDEX: 33.27 KG/M2 | OXYGEN SATURATION: 98 % | HEART RATE: 82 BPM

## 2019-09-04 PROCEDURE — 25000132 ZZH RX MED GY IP 250 OP 250 PS 637: Performed by: PSYCHIATRY & NEUROLOGY

## 2019-09-04 PROCEDURE — 99232 SBSQ HOSP IP/OBS MODERATE 35: CPT | Performed by: PSYCHIATRY & NEUROLOGY

## 2019-09-04 RX ADMIN — NICOTINE 1 PATCH: 21 PATCH, EXTENDED RELEASE TRANSDERMAL at 13:49

## 2019-09-04 ASSESSMENT — ACTIVITIES OF DAILY LIVING (ADL)
DRESS: INDEPENDENT
ORAL_HYGIENE: INDEPENDENT
LAUNDRY: WITH SUPERVISION
HYGIENE/GROOMING: INDEPENDENT

## 2019-09-04 NOTE — PROGRESS NOTES
Pt denies SI/SIB. Pt reports she is depressed - pt stated her head hurts and that makes her depressed. Pt attended AA and watched part of movie with peers. Pt requested bra was told we did not have one - pt stated she will ask  If she could have bathing suit top - pt stated she will be safe.     09/03/19 2113   Behavioral Health   Hallucinations denies / not responding to hallucinations   Thinking poor concentration   Orientation person: oriented;place: oriented   Memory baseline memory   Insight poor   Eye Contact at examiner   Affect blunted, flat   Mood depressed;mood is calm   Suicidality other (see comments)  (pt denies )   Self Injury other (see comment)  (pt denies)   Activity   (pt attended AA / pt watched movie)   Speech clear;coherent   Activities of Daily Living   Hygiene/Grooming independent   Oral Hygiene independent   Dress independent   Room Organization prompts

## 2019-09-04 NOTE — PROGRESS NOTES
patient spent the shift isolating in her room. She reports headaches, SI, and AH as less than yesterday, albeit still present. patient feels confident she can contact staff if needed.        09/04/19 1234   Behavioral Health   Hallucinations auditory   Thinking poor concentration   Orientation person: oriented;place: oriented;date: oriented;time: oriented   Memory baseline memory   Insight insight appropriate to situation;insight appropriate to events   Judgement impaired   Eye Contact at examiner   Affect full range affect   Mood mood is calm   Physical Appearance/Attire appears stated age;attire appropriate to age and situation   Hygiene well groomed   Suicidality thoughts only   1. Wish to be Dead (Past Month) No   2. Non-Specific Active Suicidal Thoughts (Past Month) No   Activity isolative;withdrawn   Speech clear;coherent   Medication Sensitivity no stated side effects;no observed side effects   Psychomotor / Gait balanced;steady   Psycho Education   Type of Intervention 1:1 intervention   Response participates, initiates socially appropriate   Hours 0.5   Activities of Daily Living   Hygiene/Grooming independent   Oral Hygiene independent   Dress independent   Laundry with supervision   Room Organization independent

## 2019-09-04 NOTE — PROGRESS NOTES
Dr. Oreilly returned page. Per his instruction, patient was encouraged to stay to review sober living options and CD tx. If unwilling to stay, patient to discontinue AMA.   Patient stated she was hoping to get  A ticket to [place] from her mom and get out of [place]. She did not want to remain in the hospital until this plan was finalized. She has at this time chosen to discharge AMA.

## 2019-09-04 NOTE — PROGRESS NOTES
"Pt requested discharge this evening. Spoke with patient about reasons for leaving (\"I'm just feeling ready to go\"). Encouraged patient to stay and talk with Dr. Oreilly in the morning so she could get medications filled and ensure follow up appointments are in place. Patient declined to do this and signed a 12 hour intent to leave. She reported that she had a 3 month supply of medications from lat hospitalization.  Reviewed possible outcomes of signing the 12 hour intent and patient verbalized understanding. Will page on-call.   "

## 2019-09-04 NOTE — PROGRESS NOTES
[facility], [place]   Psychiatric Progress Note    Length of stay (days): 2        Interim History:   The patient's care was discussed with the treatment team during the daily team meeting and/or staff's chart notes were reviewed.  Staff report: no acute issues over night.  She attended an AA meeting last night.  Watched a movie with a few peers.    Depression severity scale 0-10 (10=most severe):  Today: 7    She reports that her mood is slightly better.  She slept well last night.  Auditory hallucinations continue to occur at a lower intensity.  Suicidal thoughts are present however described as more passive today.  No homicidal thoughts reported.  Tolerating medications without side effects.  Energy is low, concentration is limited, appetite is improving.           Medications:       mirtazapine  15 mg Oral At Bedtime     nicotine  1 patch Transdermal Daily     nicotine   Transdermal Q8H     nicotine   Transdermal Daily     OLANZapine  15 mg Oral At Bedtime          Allergies:     Allergies   Allergen Reactions     No Known Allergies           Labs:   No results found for this or any previous visit (from the past 24 hour(s)).       Psychiatric Examination:     /79   Pulse 82   Temp 97  F (36.1  C)   Resp 20   Wt 96.2 kg (212 lb)   SpO2 98%   Breastfeeding? No   BMI 33.27 kg/m    Weight is 212 lbs 0 oz  Body mass index is 33.27 kg/m .  Orthostatic Vitals       Most Recent      Sitting Orthostatic /146 09/04 0911    Sitting Orthostatic Pulse (bpm) 96 09/04 0911    Standing Orthostatic /91 09/04 0911    Standing Orthostatic Pulse (bpm) 128 09/04 0911            Appearance: awake, alert  Attitude:  cooperative  Eye Contact:  fair  Mood:  anxious and depressed  Affect:  mood congruent  Speech:  clear, coherent  Psychomotor Behavior:  no evidence of tardive dyskinesia, dystonia, or tics  Throught Process:  logical and linear  Associations:  no loose  associations  Thought Content:  passive suicidal ideation present and auditory hallucinations present  Insight:  fair  Judgement:  intact  Oriented to:  time, person, and place  Attention Span and Concentration:  intact  Recent and Remote Memory:  intact    Clinical Global Impressions  First:  Considering your total clinical experience with this particular patient population, how severe are the patient's symptoms at this time?: 7 (09/03/19 1522)  Compared to the patient's condition at the START of treatment, this patient's condition is:: 4 (09/03/19 1522)  Most recent:  Considering your total clinical experience with this particular patient population, how severe are the patient's symptoms at this time?: 7 (09/03/19 1522)  Compared to the patient's condition at the START of treatment, this patient's condition is:: 4 (09/03/19 1522)         Precautions:     Behavioral Orders   Procedures     Code 1 - Restrict to Unit     Routine Programming     As clinically indicated     Status 15     Every 15 minutes.     Withdrawal precautions          DIagnoses:     1.  Schizoaffective disorder, bipolar type.   2.  Posttraumatic stress disorder.   3.  Borderline personality disorder.     4.  Alcohol use disorder, moderate.   7.  Cannabis use disorder, moderate.   8.  Stimulant use disorder, amphetamine type substance, moderate       Plan:     Continue Zyprexa and mirtazapine to address mood stabilization and reduction of psychosis.  Discontinue MSSA protocol noting that she has not scored significantly in the past 24 hours.    Psychosocial treatments to be addressed with social work consulting groups.  We will assist the patient in obtaining a chemical health assessment.  She is interested in exploring sober home options while utilizing an intensive outpatient treatment program.    Legal status: Voluntary    Disposition: To be determined

## 2019-09-05 NOTE — PLAN OF CARE
Pt discharged AMA this shift. She was given a bus token at discharge. No prescriptions or follow up appts.   Denied active SI, stated AH were improved.

## 2019-09-27 ENCOUNTER — TELEPHONE (OUTPATIENT)
Dept: FAMILY MEDICINE | Facility: CLINIC | Age: 29
End: 2019-09-27

## 2019-09-27 NOTE — DISCHARGE SUMMARY
[facility]  Department of Psychiatry    DATE OF ADMISSION:  9/2/2019    DATE OF DISCHARGE:  9/4/2019    DISCHARGE DIAGNOSES:   1.  Schizoaffective disorder, bipolar type.   2.  Posttraumatic stress disorder.   3.  Borderline personality disorder.     4.  Alcohol use disorder, moderate.   7.  Cannabis use disorder, moderate.   8.  Stimulant use disorder, amphetamine type substance, moderate    HOSPITAL COURSE: (Refer to H&P, progress notes, and consult notes for details)    The patient was admitted to our Behavioral Health Unit under voluntary status for depressed mood, psychosis, and suicidal thoughts.  She also reported using substances including alcohol, cannabis, and stimulants.  On initial examination, Zyprexa was initiated targeting mood stabilization and reduction of psychosis while resuming mirtazapine to address insomnia and depressive symptoms.  She tolerated the medications well.  We discussed the plan that would involve pursuing a chemical health assessment.  Unfortunately, while she was awaiting to meet with the , the patient felt well enough to request discharge from the hospital.  Her request for discharge came after usual rounding hours therefore she did not plan her discharge with me.  Staff informed me that she was requesting to leave and I encouraged her to stay to complete her treatment plan as we had discussed.  Unfortunately, she was not willing to do so therefore she was discharged from the hospital AGAINST MEDICAL ADVICE.  She reported having 3 months of medications and would resume treatment on an outpatient basis.    Other interventions received during his hospitalization included:   Psychosocial treatments were addressed with groups, social work consult, and supportive milieu provided by staff.    CONDITION AT DISCHARGE:  Improved.  The patients acute suicide risk is low due to the following factors:  improved mood/anxiety symptoms.  Denies  suicidal ideations. Denies psychotic symptoms.  Not actively intoxicated and plans to abstain from illicit substances and alcohol.  Denies access to guns.  Denies feeling hopeless or helpless. At the time of discharge Rianna Man was determined to not be an immediate danger to herself or others. The patient's acute risk will be higher if noncompliant with treatment plan, medications, follow-up or using illicit substances or alcohol.  These findings along with the risks of noncompliance with medications and treatment plan, which could potentially cause decompensation and increase the risk for suicide, were discussed with the patient.  The patients chronic suicide risk is moderate given the following factors: past suicide attempts; diagnosis of Schizoaffective Disorder, history of chemical dependency; Denied a family history of suicide.  Preventative factors include: social supports, stable housing     MENTAL STATUS EXAMINATION AT TIME OF DISCHARGE:    Appearance: awake, alert  Attitude:  cooperative  Eye Contact:  fair  Mood:  anxious and depressed  Affect:  mood congruent  Speech:  clear, coherent  Psychomotor Behavior:  no evidence of tardive dyskinesia, dystonia, or tics  Throught Process:  logical and linear  Associations:  no loose associations  Thought Content:  passive suicidal ideation present and auditory hallucinations present  Insight:  fair  Judgement:  intact  Oriented to:  time, person, and place  Attention Span and Concentration:  intact  Recent and Remote Memory:  intact  Muscle strength tone and gait appeared normal on visual inspection.     DISPOSITION:  The patient is discharged home at her request and against my medical advice.    FOLLOWUP APPOINTMENTS:  ( per social workers notes and after visit summary)  Given the circumstances surrounding her request to be discharged, the patient did not allow for an opportunity to meet with her treatment team so that outpatient resources and  appointments could be provided to her.    DISCHARGE MEDICATIONS:   Discharge Medication List as of 9/4/2019  7:25 PM      CONTINUE these medications which have NOT CHANGED    Details   acetaminophen (TYLENOL) 500 MG tablet Take 1-2 tablets (500-1,000 mg) by mouth every 6 hours as needed for mild pain, Disp-100 tablet, R-0, E-Prescribe      !! cetirizine (ZYRTEC) 10 MG tablet Take 1 tablet (10 mg) by mouth daily, Disp-30 tablet, R-2, E-Prescribe      !! cetirizine (ZYRTEC) 10 MG tablet Take 10 mg by mouth daily, Historical      fluticasone (FLONASE) 50 MCG/ACT nasal spray Spray 1 spray into both nostrils daily, Disp-15.8 mL, R-0, E-Prescribe      Prenatal Vit-Fe Fumarate-FA (PRENATAL MULTIVITAMIN W/IRON) 27-0.8 MG tablet Take 1 tablet by mouth daily, Disp-90 tablet, R-3, E-Prescribe      risperiDONE (RISPERDAL) 3 MG tablet Take 3 mg by mouth 2 times daily, Historical       !! - Potential duplicate medications found. Please discuss with provider.      STOP taking these medications       nitroFURantoin macrocrystal-monohydrate (MACROBID) 100 MG capsule Comments:   Reason for Stopping:                LABORATORY RESULTS: (past 14 days)  No results found for this or any previous visit (from the past 336 hour(s)).    >30 minutes was spent on this discharge to allow for reviewing the patient's response to treatment, reviewing plan of care, education on medications and diagnosis, and conducting a risk assessment.

## 2019-09-27 NOTE — TELEPHONE ENCOUNTER
I got the pt ED discharge paperwork, I called to check up on the pt and help the pt setup a ED follow up.  The pt was at [facility] for right thumb pain.  I called the pt on 09/25/2019, 09/26/2019, and today.  I have left a vm for the pt to give me a call back, but have not heard from the pt.

## 2019-12-29 ENCOUNTER — HOSPITAL ENCOUNTER (EMERGENCY)
Facility: CLINIC | Age: 29
Discharge: HOME | End: 2019-12-29
Attending: PSYCHIATRY & NEUROLOGY | Admitting: PSYCHIATRY & NEUROLOGY
Payer: COMMERCIAL

## 2019-12-29 ENCOUNTER — TRANSFERRED RECORDS (OUTPATIENT)
Dept: HEALTH INFORMATION MANAGEMENT | Facility: CLINIC | Age: 29
End: 2019-12-29

## 2019-12-29 VITALS
DIASTOLIC BLOOD PRESSURE: 88 MMHG | TEMPERATURE: 98 F | HEART RATE: 82 BPM | RESPIRATION RATE: 18 BRPM | OXYGEN SATURATION: 95 % | SYSTOLIC BLOOD PRESSURE: 135 MMHG

## 2019-12-29 DIAGNOSIS — F43.10 PTSD (POST-TRAUMATIC STRESS DISORDER): ICD-10-CM

## 2019-12-29 DIAGNOSIS — F19.20 CHEMICAL DEPENDENCY (H): ICD-10-CM

## 2019-12-29 DIAGNOSIS — F25.1 SCHIZOAFFECTIVE DISORDER, DEPRESSIVE TYPE (H): ICD-10-CM

## 2019-12-29 PROCEDURE — 99284 EMERGENCY DEPT VISIT MOD MDM: CPT | Mod: Z6 | Performed by: PSYCHIATRY & NEUROLOGY

## 2019-12-29 PROCEDURE — 90791 PSYCH DIAGNOSTIC EVALUATION: CPT

## 2019-12-29 PROCEDURE — 99285 EMERGENCY DEPT VISIT HI MDM: CPT | Mod: 25 | Performed by: PSYCHIATRY & NEUROLOGY

## 2019-12-29 PROCEDURE — 80320 DRUG SCREEN QUANTALCOHOLS: CPT | Performed by: FAMILY MEDICINE

## 2019-12-29 PROCEDURE — 80307 DRUG TEST PRSMV CHEM ANLYZR: CPT | Performed by: FAMILY MEDICINE

## 2019-12-29 PROCEDURE — 81025 URINE PREGNANCY TEST: CPT | Performed by: FAMILY MEDICINE

## 2019-12-29 RX ORDER — ESCITALOPRAM OXALATE 10 MG/1
10 TABLET ORAL DAILY
Qty: 10 TABLET | Refills: 0 | Status: SHIPPED | OUTPATIENT
Start: 2019-12-29 | End: 2020-05-13

## 2019-12-29 RX ORDER — RISPERIDONE 2 MG/1
2 TABLET ORAL 2 TIMES DAILY
Qty: 20 TABLET | Refills: 0 | Status: SHIPPED | OUTPATIENT
Start: 2019-12-29 | End: 2020-05-13

## 2019-12-29 ASSESSMENT — ENCOUNTER SYMPTOMS
CARDIOVASCULAR NEGATIVE: 1
HALLUCINATIONS: 0
EYES NEGATIVE: 1
DYSPHORIC MOOD: 1
ACTIVITY CHANGE: 1
MUSCULOSKELETAL NEGATIVE: 1
ENDOCRINE NEGATIVE: 1
NEUROLOGICAL NEGATIVE: 1
HYPERACTIVE: 0
GASTROINTESTINAL NEGATIVE: 1
DECREASED CONCENTRATION: 1
RESPIRATORY NEGATIVE: 1
SLEEP DISTURBANCE: 1

## 2019-12-29 NOTE — ED TRIAGE NOTES
Pt complaining of feeling depressed and having suicidal thoughts.  Pt states she has been homeless for a couple of years and is having financial relationship issues.

## 2019-12-29 NOTE — ED AVS SNAPSHOT
[facility], [place], Emergency Department  [address]  [place] [address]  Phone:  [phone]  Fax:  [phone]                                    Rianna Man   MRN: [id number]    Department:  [facility], Emergency Department   Date of Visit:  12/29/2019           After Visit Summary Signature Page    I have received my discharge instructions, and my questions have been answered. I have discussed any challenges I see with this plan with the nurse or doctor.    ..........................................................................................................................................  Patient/Patient Representative Signature      ..........................................................................................................................................  Patient Representative Print Name and Relationship to Patient    ..................................................               ................................................  Date                                   Time    ..........................................................................................................................................  Reviewed by Signature/Title    ...................................................              ..............................................  Date                                               Time          22EPIC Rev 08/18

## 2019-12-29 NOTE — ED NOTES
Bed: ED16B  Expected date:   Expected time:   Means of arrival:   Comments:  [facility] 29F VIPIN Bhatti

## 2019-12-29 NOTE — ED NOTES
I have performed an in person assessment of the patient. Based on this assessment the patient no longer requires a one on one attendant at this point in time.    Ralph Murray MD  1:37 PM  December 29, 2019         Ralph Murray MD  12/29/19 2957
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
## 2019-12-29 NOTE — DISCHARGE INSTRUCTIONS
Mailed pamphlet. 10-16-19/lw   You are referred to [facility] for crisis stabilization  You are given a weeks worth of risperidone and Lexapro for mood stabilization  [facility] made a referral for a CD evaluation. Please follow-up with the assessment and treatment recommendations.  Follow-up established care and services for further med refills and monitoring and management

## 2020-01-07 PROCEDURE — 82075 ASSAY OF BREATH ETHANOL: CPT

## 2020-01-07 PROCEDURE — 99285 EMERGENCY DEPT VISIT HI MDM: CPT | Mod: 25

## 2020-01-07 PROCEDURE — 99284 EMERGENCY DEPT VISIT MOD MDM: CPT | Mod: Z6 | Performed by: EMERGENCY MEDICINE

## 2020-01-08 ENCOUNTER — HOSPITAL ENCOUNTER (EMERGENCY)
Facility: CLINIC | Age: 30
Discharge: HOME | End: 2020-01-08
Attending: EMERGENCY MEDICINE | Admitting: EMERGENCY MEDICINE
Payer: COMMERCIAL

## 2020-01-08 VITALS
RESPIRATION RATE: 89 BRPM | OXYGEN SATURATION: 99 % | TEMPERATURE: 98.3 F | HEART RATE: 89 BPM | SYSTOLIC BLOOD PRESSURE: 130 MMHG | DIASTOLIC BLOOD PRESSURE: 75 MMHG

## 2020-01-08 DIAGNOSIS — F60.89 CHRONIC HYPOMANIC PERSONALITY DISORDER (H): ICD-10-CM

## 2020-01-08 DIAGNOSIS — F12.20 CANNABIS DEPENDENCE, CONTINUOUS (H): ICD-10-CM

## 2020-01-08 DIAGNOSIS — R45.851 SUICIDAL IDEATION: ICD-10-CM

## 2020-01-08 DIAGNOSIS — F25.9 SCHIZOAFFECTIVE DISORDER, CHRONIC CONDITION WITH ACUTE EXACERBATION (H): ICD-10-CM

## 2020-01-08 DIAGNOSIS — F15.10 METHAMPHETAMINE USE (H): ICD-10-CM

## 2020-01-08 DIAGNOSIS — Z59.00 HOMELESSNESS: ICD-10-CM

## 2020-01-08 PROCEDURE — 90791 PSYCH DIAGNOSTIC EVALUATION: CPT

## 2020-01-08 PROCEDURE — 81025 URINE PREGNANCY TEST: CPT | Performed by: EMERGENCY MEDICINE

## 2020-01-08 PROCEDURE — 80307 DRUG TEST PRSMV CHEM ANLYZR: CPT | Performed by: EMERGENCY MEDICINE

## 2020-01-08 PROCEDURE — 25000132 ZZH RX MED GY IP 250 OP 250 PS 637: Performed by: EMERGENCY MEDICINE

## 2020-01-08 PROCEDURE — 80320 DRUG SCREEN QUANTALCOHOLS: CPT | Performed by: EMERGENCY MEDICINE

## 2020-01-08 RX ORDER — LANOLIN ALCOHOL/MO/W.PET/CERES
100 CREAM (GRAM) TOPICAL ONCE
Status: COMPLETED | OUTPATIENT
Start: 2020-01-08 | End: 2020-01-08

## 2020-01-08 RX ORDER — FOLIC ACID 1 MG/1
1 TABLET ORAL ONCE
Status: COMPLETED | OUTPATIENT
Start: 2020-01-08 | End: 2020-01-08

## 2020-01-08 RX ORDER — LORAZEPAM 1 MG/1
2 TABLET ORAL ONCE
Status: COMPLETED | OUTPATIENT
Start: 2020-01-08 | End: 2020-01-08

## 2020-01-08 RX ORDER — BENZOCAINE/MENTHOL 6 MG-10 MG
LOZENGE MUCOUS MEMBRANE 2 TIMES DAILY
Qty: 30 G | Refills: 0 | Status: SHIPPED | OUTPATIENT
Start: 2020-01-08 | End: 2020-05-13

## 2020-01-08 RX ORDER — MAGNESIUM OXIDE 400 MG/1
800 TABLET ORAL ONCE
Status: COMPLETED | OUTPATIENT
Start: 2020-01-08 | End: 2020-01-08

## 2020-01-08 RX ORDER — MULTIVITAMIN,THERAPEUTIC
1 TABLET ORAL ONCE
Status: COMPLETED | OUTPATIENT
Start: 2020-01-08 | End: 2020-01-08

## 2020-01-08 RX ADMIN — Medication 800 MG: at 01:39

## 2020-01-08 RX ADMIN — FOLIC ACID 1 MG: 1 TABLET ORAL at 01:39

## 2020-01-08 RX ADMIN — THERA TABS 1 TABLET: TAB at 01:40

## 2020-01-08 RX ADMIN — Medication 100 MG: at 01:39

## 2020-01-08 RX ADMIN — LORAZEPAM 2 MG: 1 TABLET ORAL at 01:39

## 2020-01-08 SDOH — ECONOMIC STABILITY - HOUSING INSECURITY: HOMELESSNESS UNSPECIFIED: Z59.00

## 2020-01-08 ASSESSMENT — ENCOUNTER SYMPTOMS
NECK STIFFNESS: 0
HEADACHES: 0
CHILLS: 0
RHINORRHEA: 0
NERVOUS/ANXIOUS: 1
TREMORS: 0
VOMITING: 0
DECREASED CONCENTRATION: 1
SORE THROAT: 0
NECK PAIN: 0
HALLUCINATIONS: 0
SHORTNESS OF BREATH: 0
SEIZURES: 0
DYSPHORIC MOOD: 1
MYALGIAS: 0
DIAPHORESIS: 0
FEVER: 0
FATIGUE: 0
DIZZINESS: 0
NUMBNESS: 0
DIARRHEA: 0
SLEEP DISTURBANCE: 1
CHEST TIGHTNESS: 0
WOUND: 0
PALPITATIONS: 0
NAUSEA: 0
BRUISES/BLEEDS EASILY: 0
COUGH: 0
ARTHRALGIAS: 0
ABDOMINAL PAIN: 0

## 2020-01-08 NOTE — ED NOTES
Patient now states that she is feeling suicidal since she feels like there is no professional help for her. No plan. Patient will be moved to [facility].

## 2020-01-08 NOTE — ED AVS SNAPSHOT
[facility], [place], Emergency Department  [address]  [facility] [address]  Phone:  [phone]  Fax:  [phone]                                    Rianna Man   MRN: [id number]    Department:  [facility], Emergency Department   Date of Visit:  1/7/2020           After Visit Summary Signature Page    I have received my discharge instructions, and my questions have been answered. I have discussed any challenges I see with this plan with the nurse or doctor.    ..........................................................................................................................................  Patient/Patient Representative Signature      ..........................................................................................................................................  Patient Representative Print Name and Relationship to Patient    ..................................................               ................................................  Date                                   Time    ..........................................................................................................................................  Reviewed by Signature/Title    ...................................................              ..............................................  Date                                               Time          22EPIC Rev 08/18

## 2020-01-08 NOTE — ED PROVIDER NOTES
[facility] EMERGENCY DEPARTMENT ([facility])    1/08/20        History     Chief Complaint   Patient presents with     Drug / Alcohol Assessment     Wants to get off of meth (smokes) and alcohol. Drinks one liter of vodka a day. Last use of meth today at 1400. Last drink of alcohol right before coming to ED. No hx of seizures.      Suicidal     The history is provided by the patient and medical records.     Rianna Man is a 29 year old female with a past medical history significant for schizoaffective disorder and polysubstance abuse who presents here to the Emergency Department seeking detox from methamphetamine and alcohol. Patient reports that she was at a party early tonight when she was partying with an individual, however, reported that she felt afraid of that person. Patient noted that she left the party and got into the car with a random person who dropped her off here. Patient reports that she is paranoid that the individual followed her here. Patient is also noting that she wants to get of of both methamphetamine and alcohol. Reports that she smokes the methamphetamine and last used around 2 PM. States that she uses 1 L of vodka daily and last drank PTA here in the ED. Denies withdrawal seizures. Denies suicidal ideation initially. She is homeless.     I have reviewed the Medications, Allergies, Past Medical and Surgical History, and Social History in the [employer] system.    Past Medical History:   Diagnosis Date     Anxiety      Depressive disorder      Schizoaffective disorder (H)      Varicella     had disease       Past Surgical History:   Procedure Laterality Date     NO HISTORY OF SURGERY         Family History   Problem Relation Age of Onset     Diabetes Father      Substance Abuse Father      Diabetes Paternal Aunt      Thyroid Disease Mother         grave's disease     Rheumatoid Arthritis Sister      Depression Sister      Crohn's Disease Maternal Aunt      Schizophrenia Maternal  Grandmother        Social History     Tobacco Use     Smoking status: Current Every Day Smoker     Packs/day: 0.50     Types: Cigarettes     Smokeless tobacco: Never Used     Tobacco comment: less than 1/2 pack   Substance Use Topics     Alcohol use: Yes     Alcohol/week: 0.0 standard drinks     Comment: Drinks a pint of alcohol a day.        No current facility-administered medications for this encounter.      Current Outpatient Medications   Medication     acetaminophen (TYLENOL) 500 MG tablet     cetirizine (ZYRTEC) 10 MG tablet     cetirizine (ZYRTEC) 10 MG tablet     escitalopram (LEXAPRO) 10 MG tablet     fluticasone (FLONASE) 50 MCG/ACT nasal spray     Prenatal Vit-Fe Fumarate-FA (PRENATAL MULTIVITAMIN W/IRON) 27-0.8 MG tablet     risperiDONE (RISPERDAL) 2 MG tablet        Allergies   Allergen Reactions     No Known Allergies          Review of Systems   Constitutional: Negative for chills, diaphoresis, fatigue and fever.   HENT: Negative for congestion, rhinorrhea and sore throat.    Eyes: Negative for visual disturbance.   Respiratory: Negative for cough, chest tightness and shortness of breath.    Cardiovascular: Negative for chest pain and palpitations.   Gastrointestinal: Negative for abdominal pain, diarrhea, nausea and vomiting.   Musculoskeletal: Negative for arthralgias, myalgias, neck pain and neck stiffness.   Skin: Negative for rash and wound.   Allergic/Immunologic: Negative for immunocompromised state.   Neurological: Negative for dizziness, tremors, seizures, syncope, numbness and headaches.   Hematological: Does not bruise/bleed easily.   Psychiatric/Behavioral: Positive for behavioral problems, decreased concentration, dysphoric mood and sleep disturbance. Negative for hallucinations, self-injury and suicidal ideas. The patient is nervous/anxious.         Positive for paranoia   All other systems reviewed and are negative.      Physical Exam   BP: (!) 150/94  Pulse: 115  Temp: 98.7  F (37.1   C)  Resp: 16  SpO2: 99 %      Physical Exam  Vitals signs and nursing note reviewed.   Constitutional:       General: She is not in acute distress.     Appearance: She is not ill-appearing or diaphoretic.   HENT:      Head: Normocephalic and atraumatic.      Nose: Nose normal.      Mouth/Throat:      Mouth: Mucous membranes are moist.      Pharynx: Oropharynx is clear. No oropharyngeal exudate.   Eyes:      General: No scleral icterus.     Extraocular Movements: Extraocular movements intact.      Conjunctiva/sclera: Conjunctivae normal.      Pupils: Pupils are equal, round, and reactive to light.   Neck:      Musculoskeletal: Normal range of motion and neck supple.   Cardiovascular:      Rate and Rhythm: Normal rate and regular rhythm.      Pulses: Normal pulses.      Heart sounds: Normal heart sounds.   Pulmonary:      Effort: Pulmonary effort is normal. No respiratory distress.      Breath sounds: Normal breath sounds.   Abdominal:      Palpations: Abdomen is soft.      Tenderness: There is no abdominal tenderness.   Musculoskeletal: Normal range of motion.         General: No swelling or tenderness.   Skin:     General: Skin is warm and dry.      Findings: No rash.   Neurological:      General: No focal deficit present.      Mental Status: She is alert and oriented to person, place, and time.   Psychiatric:         Attention and Perception: Attention and perception normal. She does not perceive auditory hallucinations.         Mood and Affect: Mood is anxious.         Speech: Speech normal.         Behavior: Behavior normal. Behavior is cooperative.         Thought Content: Thought content is not paranoid. Thought content does not include homicidal or suicidal ideation.         Cognition and Memory: Cognition normal.         ED Course        Procedures             Critical Care time:  none             Labs Ordered and Resulted from Time of ED Arrival Up to the Time of Departure from the ED   DRUG ABUSE SCREEN 6  CHEM DEP URINE ([facility]) - Abnormal; Notable for the following components:       Result Value    Amphetamine Qual Urine Positive (*)     Cannabinoids Qual Urine Positive (*)     All other components within normal limits   ALCOHOL BREATH TEST POCT - Normal   HCG QUALITATIVE URINE            Assessments & Plan (with Medical Decision Making)   Initial plan to discharge when sober to Addiction clinic. However, she now states she is suicidal and does not feel safe. Will have mental health  see her in the ED. Disposition is pending the assessment by mental health.    I have reviewed the nursing notes.    I have reviewed the findings, diagnosis, plan and need for follow up with the patient.    New Prescriptions    No medications on file       Final diagnoses:   Methamphetamine use (H)   Homelessness   Suicidal ideation     IShahid, am serving as a trained medical scribe to document services personally performed by Arcadio Cervantes MD, based on the provider's statements to me.   Arcadio HENSON MD, was physically present and have reviewed and verified the accuracy of this note documented by Shahid Leon.    1/7/2020   [facility], [place], EMERGENCY DEPARTMENT     Arcadio Cervantes MD  01/08/20 0728

## 2020-01-08 NOTE — ED NOTES
Patient states she was at a party and was partying with someone but will not disclose name and started to feel scared around this person. Pt states she left the party to get away from this person and got into a car with a random person and the person dropped her off here. Now she feels very paranoid and scared that this person is following her into the ER. Patient told that she is safe here.

## 2020-01-08 NOTE — ED PROVIDER NOTES
"    [facility] EMERGENCY DEPARTMENT ([facility])    1/08/20     ED 16C         Emergency Department Patient Sign-out       Brief HPI:  This is a 29 year old female with history of borderline personality disorder, bipolar disorder, schizoaffective disorder, and substance abuse who was signed out to me by Dr. Georges Cervantes.  See initial ED Provider note for details of the presentation.     Patient was allowed to sober and underwent DEC assessment. Please see DEC  notes for further details.     Patient is seen repeatedly in various ERs in the [facility]. Patient was seen by DEC  previously on 12/29/19 and was [facility]. She subsequently eloped from [facility] on 1/1/2020. She states she left because  they weren't meeting my mental health needs.  She went to [facility] on 1/3/2020 requesting detox. She had a full assessment, and was instructed to go back onto medications and have Rule 25.     Briefly, patient was at a party last night where she used alcohol, marijuana and meth. She started feeling uncomfortable and worried someone was going to hurt her and got someone to drop her off at hospital. She states, \"I need help, I am having a breakdown, I m having a midlife crisis.\" Asked if she was suicidal she states that if she is discharged from the ER  [place]  No history of suicide attempt.       Patient is medically cleared for admission to a Behavioral Health unit.      The patient is not on a hold.      The patient has not required medication for agitation.    Awaiting Behavioral Health Assessment (DEC)        Significant Events prior to my assuming care: None      Exam:   Patient Vitals for the past 24 hrs:   BP Temp Temp src Pulse Resp SpO2   01/08/20 0748 131/81 99  F (37.2  C) Oral 104 16 96 %   01/08/20 0115 (!) 150/94 98.7  F (37.1  C) Oral 115 16 99 %           ED RESULTS:   Results for orders placed or performed during the hospital " "encounter of 01/08/20 (from the past 24 hour(s))   Alcohol breath test POCT     Status: Normal    Collection Time: 01/08/20 12:51 AM   Result Value Ref Range    Alcohol Breath Test 0.000 0.00 - 0.01   Drug abuse screen 6 urine (tox)     Status: Abnormal    Collection Time: 01/08/20  1:59 AM   Result Value Ref Range    Amphetamine Qual Urine Positive (A) NEG^Negative    Barbiturates Qual Urine Negative NEG^Negative    Benzodiazepine Qual Urine Negative NEG^Negative    Cannabinoids Qual Urine Positive (A) NEG^Negative    Cocaine Qual Urine Negative NEG^Negative    Ethanol Qual Urine Negative NEG^Negative    Opiates Qualitative Urine Negative NEG^Negative   HCG qualitative urine     Status: None    Collection Time: 01/08/20  1:59 AM   Result Value Ref Range    HCG Qual Urine Negative NEG^Negative       ED MEDICATIONS:   Medications   LORazepam (ATIVAN) tablet 2 mg (2 mg Oral Given 1/8/20 0139)   multivitamin, therapeutic (THERA-VIT) tablet 1 tablet (1 tablet Oral Given 1/8/20 0140)   folic acid (FOLVITE) tablet 1 mg (1 mg Oral Given 1/8/20 0139)   vitamin B1 (THIAMINE) tablet 100 mg (100 mg Oral Given 1/8/20 0139)   magnesium oxide (MAG-OX) tablet 800 mg (800 mg Oral Given 1/8/20 0139)         Impression:    ICD-10-CM    1. Methamphetamine use (H) F15.10    2. Homelessness Z59.0    3. Suicidal ideation R45.851        Plan:    Patient was seen and examined.  Patient was signed out to me as pending reassessment.  This was accomplished and the case discussed at length with behavioral emergency room .  Patient has a safe place to go at a sibling's house.  Patient requested hydrocortisone for her\" eczema\".  Patient will be discharged.               Gary Babcock MD  01/08/20 1418    "

## 2020-01-24 ENCOUNTER — OFFICE VISIT (OUTPATIENT)
Dept: FAMILY MEDICINE | Facility: CLINIC | Age: 30
End: 2020-01-24
Payer: COMMERCIAL

## 2020-01-24 VITALS
HEART RATE: 86 BPM | TEMPERATURE: 98.1 F | OXYGEN SATURATION: 97 % | DIASTOLIC BLOOD PRESSURE: 86 MMHG | RESPIRATION RATE: 18 BRPM | SYSTOLIC BLOOD PRESSURE: 120 MMHG

## 2020-01-24 DIAGNOSIS — Z72.51 UNPROTECTED SEXUAL INTERCOURSE: ICD-10-CM

## 2020-01-24 DIAGNOSIS — L30.9 DERMATITIS: Primary | ICD-10-CM

## 2020-01-24 RX ORDER — DIPHENHYDRAMINE HCL 25 MG
25 CAPSULE ORAL
Qty: 30 CAPSULE | Refills: 1 | Status: SHIPPED | OUTPATIENT
Start: 2020-01-24 | End: 2020-05-13

## 2020-01-24 RX ORDER — TRIAMCINOLONE ACETONIDE 1 MG/G
OINTMENT TOPICAL 2 TIMES DAILY
Qty: 30 G | Refills: 0 | Status: SHIPPED | OUTPATIENT
Start: 2020-01-24 | End: 2020-05-13

## 2020-01-24 RX ORDER — CETIRIZINE HYDROCHLORIDE 10 MG/1
10 TABLET ORAL DAILY
Qty: 60 TABLET | Refills: 3 | Status: SHIPPED | OUTPATIENT
Start: 2020-01-24 | End: 2020-05-13

## 2020-01-24 RX ORDER — PRENATAL VIT/IRON FUM/FOLIC AC 27MG-0.8MG
1 TABLET ORAL DAILY
Qty: 90 TABLET | Refills: 3 | Status: SHIPPED | OUTPATIENT
Start: 2020-01-24 | End: 2020-05-13

## 2020-01-24 RX ORDER — TRIAMCINOLONE ACETONIDE 1 MG/G
OINTMENT TOPICAL 2 TIMES DAILY
Qty: 30 G | Refills: 0 | Status: SHIPPED | OUTPATIENT
Start: 2020-01-24 | End: 2020-01-24

## 2020-01-24 NOTE — PATIENT INSTRUCTIONS
Use zyrtec daily for the itching   Use benadryl at night for itching   Use the Eucerin cream on skin as often as needed   Use the kenalog ointment for 1 week twice a day

## 2020-01-24 NOTE — PROGRESS NOTES
HPI:       Rianna Man is a 29 year old  femalewho presents for:      1. Rash  - 2 weeks ago started on arms and chest, went to the ED and was given cream and now worse. Now on face, neck. Is itching and feels like her skin is peeling  - around this time she used a new perfume. No new lotions or soaps or detergents  - currently homeless and living with her friend so is trying to wash clothes as she is able that have been in contact with the perfume   - cream did not help   - went to the ED 2 weeks ago and was given a cream she used and sort of helped but was given only a small amount.   - calamine lotion helped   - no chills or fevers, diarrhea, vomiting, sob  - no new meds  - no one else with similar rash            PMHX:     Patient Active Problem List   Diagnosis     Trouble in sleeping     Contact dermatitis and other eczema, due to unspecified cause     Schizoaffective disorder (H)     Striae distensae     Nexplanon in place     H/O alcohol dependence (H)     Pap smear for cervical cancer screening     Iron deficiency anemia     Encounter for initial prescription of implantable subdermal contraceptive     Recurrent major depressive disorder (H)     Heather (H)     OCD (obsessive compulsive disorder)     Generalized anxiety disorder     Lactase deficiency     Tobacco use     Suicidal ideations     Intractable chronic migraine without aura     Burn     Suicidal intent     Suicidal ideation     Depression       Current Outpatient Medications   Medication Sig Dispense Refill     cetirizine (ZYRTEC) 10 MG tablet Take 1 tablet (10 mg) by mouth daily 60 tablet 3     diphenhydrAMINE (BENADRYL) 25 MG capsule Take 1 capsule (25 mg) by mouth nightly as needed for itching or allergies 30 capsule 1     Prenatal Vit-Fe Fumarate-FA (PRENATAL MULTIVITAMIN W/IRON) 27-0.8 MG tablet Take 1 tablet by mouth daily 90 tablet 3     Skin Protectants, Misc. (EUCERIN) cream Apply topically as needed for dry skin 120 g 3      triamcinolone (KENALOG) 0.1 % external ointment Apply topically 2 times daily for 7 days 30 g 0     acetaminophen (TYLENOL) 500 MG tablet Take 1-2 tablets (500-1,000 mg) by mouth every 6 hours as needed for mild pain 100 tablet 0     escitalopram (LEXAPRO) 10 MG tablet Take 1 tablet (10 mg) by mouth daily 10 tablet 0     fluticasone (FLONASE) 50 MCG/ACT nasal spray Spray 1 spray into both nostrils daily 15.8 mL 0     hydrocortisone (CORTAID) 1 % external cream Apply topically 2 times daily 30 g 0     risperiDONE (RISPERDAL) 2 MG tablet Take 1 tablet (2 mg) by mouth 2 times daily 20 tablet 0          Allergies   Allergen Reactions     No Known Allergies        No results found for this or any previous visit (from the past 24 hour(s)).         Review of Systems:   10 point ROS negative except noted in above in HPI         Physical Exam:     Vitals:    01/24/20 1244   BP: 120/86   Pulse: 86   Resp: 18   Temp: 98.1  F (36.7  C)   TempSrc: Oral   SpO2: 97%     There is no height or weight on file to calculate BMI.    GENERAL APPEARANCE: healthy, alert and no distress,  EYES: Eyes grossly normal to inspection,  PERRL  HENT: ear canals and TM's normal and nose and mouth without ulcers or lesions  NECK: no adenopathy, no asymmetry, masses, or scars and thyroid normal to palpation  SKIN: dry erythematous patches of skin on upper arms bilaterally, chest, neck, and cheeks. Evidence of excoriations.       Assessment and Plan     (L30.9) Dermatitis  (primary encounter diagnosis)  Comment: likely irritant dermatitis from perfume based on timeline and location. Is not in pattern of contact dermatitis or eczema. Not in mucous membranes.   Plan: cetirizine (ZYRTEC) 10 MG tablet,         diphenhydrAMINE (BENADRYL) 25 MG capsule, Skin         Protectants, Misc. (EUCERIN) cream,         triamcinolone (KENALOG) 0.1 % external         ointment, DISCONTINUED: triamcinolone (KENALOG)        0.1 % external ointment  - zyrtec daily and  benadryl at bedtime PRN for itching   - kenalog ointment daily for 7 days   - Eucerin cream ordered  - advised to wash all clothes that had the perfume on it       Options for treatment and follow-up care were reviewed with the patient and/or guardian. Rianna Man and/or guardian engaged in the decision making process and verbalized understanding of the options discussed and agreed with the final plan.      Dean Pandya MD   [facility] Resident   Pager: [phone]      Precepted today with: Nino Astudillo MD

## 2020-02-16 ENCOUNTER — HEALTH MAINTENANCE LETTER (OUTPATIENT)
Age: 30
End: 2020-02-16

## 2020-04-14 ENCOUNTER — TRANSFERRED RECORDS (OUTPATIENT)
Dept: HEALTH INFORMATION MANAGEMENT | Facility: CLINIC | Age: 30
End: 2020-04-14

## 2020-05-12 ENCOUNTER — VIRTUAL VISIT (OUTPATIENT)
Dept: FAMILY MEDICINE | Facility: CLINIC | Age: 30
End: 2020-05-12
Payer: COMMERCIAL

## 2020-05-12 DIAGNOSIS — R39.15 URINARY URGENCY: ICD-10-CM

## 2020-05-12 DIAGNOSIS — Z72.51 UNPROTECTED SEXUAL INTERCOURSE: Primary | ICD-10-CM

## 2020-05-12 RX ORDER — NITROFURANTOIN 25; 75 MG/1; MG/1
100 CAPSULE ORAL 2 TIMES DAILY
Qty: 14 CAPSULE | Refills: 0 | Status: ON HOLD | OUTPATIENT
Start: 2020-05-12 | End: 2020-05-16

## 2020-05-12 RX ORDER — MULTIVITAMIN
1 TABLET ORAL DAILY
Qty: 90 TABLET | Refills: 3 | Status: SHIPPED | OUTPATIENT
Start: 2020-05-12 | End: 2020-05-13

## 2020-05-12 RX ORDER — LEVONORGESTREL 1.5 MG/1
1.5 TABLET ORAL ONCE
Qty: 1 TABLET | Refills: 0 | Status: ON HOLD | OUTPATIENT
Start: 2020-05-12 | End: 2020-05-16

## 2020-05-12 NOTE — PROGRESS NOTES
"Family Medicine Telephone Visit Note  Rianna is being evaluated via a billable video visit.               Video Visit Consent     Patient was verbally read the following and verbal consent was obtained.  \"Video visits are billed at different rates depending on your insurance coverage. During this emergency period, for some insurers they may be billed the same as an in-person visit.  Please reach out to your insurance provider with any questions.  If during the course of the call the physician/provider feels a telephone visit is not appropriate, you will not be charged for this service.\"     (Name person giving consent:  Patient   Date verbal consent given:  5/12/2020  Time verbal consent given:  10:38 AM)    Patient would like the video invitation sent by: Text to cell phone: [phone]           HPI     Telephone start time: 10:55 (invitation sent via [employer] and no response by patient.  Patient reports she did not see text invitation.  Converted to telephone visit. )    This is a 30-year-old female presenting by phone to discuss urinary symptoms need for Plan B and desire for multivitamin.    Patient reports that she has one new male partner in the last few weeks.  They have been having unprotected sex.  Most recent unprotected sexual activity was today.  Patient reports that last period was on 17 April.  She uses a pregnancy tracker and reports that her next period should be around 20 May.  She would like Plan B today and would also like to talk about long-term contraception.  She is interested in getting Nexplanon again.    In addition to desire for contraception patient reports that she is having symptoms of urinary frequency as well as inability to create significant stream when she does urinate.  Denies any burning with peeing denies any back pain denies fevers.  She associates symptoms with those that she has had any previous urinary tract infections.  Denies vaginal discharge denies pelvic pain denies " "pain with intercourse she is not specifically concerned about sexually transmitted infection today.    Patient reports she had Nexplanon in the past and that was uncomplicated.    Current Outpatient Medications   Medication Sig Dispense Refill     acetaminophen (TYLENOL) 500 MG tablet Take 1-2 tablets (500-1,000 mg) by mouth every 6 hours as needed for mild pain 100 tablet 0     cetirizine (ZYRTEC) 10 MG tablet Take 1 tablet (10 mg) by mouth daily 60 tablet 3     diphenhydrAMINE (BENADRYL) 25 MG capsule Take 1 capsule (25 mg) by mouth nightly as needed for itching or allergies 30 capsule 1     escitalopram (LEXAPRO) 10 MG tablet Take 1 tablet (10 mg) by mouth daily 10 tablet 0     fluticasone (FLONASE) 50 MCG/ACT nasal spray Spray 1 spray into both nostrils daily 15.8 mL 0     hydrocortisone (CORTAID) 1 % external cream Apply topically 2 times daily 30 g 0     Prenatal Vit-Fe Fumarate-FA (PRENATAL MULTIVITAMIN W/IRON) 27-0.8 MG tablet Take 1 tablet by mouth daily 90 tablet 3     risperiDONE (RISPERDAL) 2 MG tablet Take 1 tablet (2 mg) by mouth 2 times daily 20 tablet 0     Skin Protectants, Misc. (EUCERIN) cream Apply topically as needed for dry skin 120 g 3     Allergies   Allergen Reactions     No Known Allergies               Review of Systems:     Constitutional, HEENT, cardiovascular, pulmonary, gi and gu systems are negative, except as otherwise noted.         Physical Exam:     There were no vitals taken for this visit.  Estimated body mass index is 33.27 kg/m  as calculated from the following:    Height as of 7/31/19: 1.7 m (5' 6.93\").    Weight as of 9/3/19: 96.2 kg (212 lb).    GEN: NAD, conversational  Psych: appropriate        Assessment and Plan   1. Unprotected sexual intercourse  - multivitamin (ONE-DAILY) tablet; Take 1 tablet by mouth daily  Dispense: 90 tablet; Refill: 3  - levonorgestrel (PLAN B) 1.5 MG tablet; Take 1 tablet (1.5 mg) by mouth once for 1 dose  Dispense: 1 tablet; Refill: 0  - " nitroFURantoin macrocrystal-monohydrate (MACROBID) 100 MG capsule; Take 1 capsule (100 mg) by mouth 2 times daily  Dispense: 14 capsule; Refill: 0    2. Urinary urgency  -will tx sx with macrobid    3. Desires contraception:discussed option of Nexplanon.  As she tolerated well in the past recommend continuing as patient does not wish to become pregnant.   Plan B today, period next week, Nexplanon 5/26.  Patient in agreement    After Visit Information:  Staff will call to schedule your Nexplanon insertion    Clinic location: [facility]    Phone end time: 11:07 (12 min total)    Hilary Edouard MD

## 2020-05-13 ENCOUNTER — TRANSFERRED RECORDS (OUTPATIENT)
Dept: HEALTH INFORMATION MANAGEMENT | Facility: CLINIC | Age: 30
End: 2020-05-13

## 2020-05-13 ENCOUNTER — HOSPITAL ENCOUNTER (INPATIENT)
Facility: CLINIC | Age: 30
LOS: 3 days | Discharge: HOME | End: 2020-05-16
Attending: EMERGENCY MEDICINE | Admitting: PSYCHIATRY & NEUROLOGY
Payer: COMMERCIAL

## 2020-05-13 ENCOUNTER — TELEPHONE (OUTPATIENT)
Dept: FAMILY MEDICINE | Facility: CLINIC | Age: 30
End: 2020-05-13

## 2020-05-13 ENCOUNTER — TELEPHONE (OUTPATIENT)
Dept: BEHAVIORAL HEALTH | Facility: CLINIC | Age: 30
End: 2020-05-13

## 2020-05-13 DIAGNOSIS — F43.10 POSTTRAUMATIC STRESS DISORDER: ICD-10-CM

## 2020-05-13 DIAGNOSIS — F25.9 SCHIZOAFFECTIVE DISORDER, UNSPECIFIED TYPE (H): ICD-10-CM

## 2020-05-13 DIAGNOSIS — F12.10 CANNABIS ABUSE, CONTINUOUS: ICD-10-CM

## 2020-05-13 DIAGNOSIS — Z72.51 UNPROTECTED SEXUAL INTERCOURSE: ICD-10-CM

## 2020-05-13 DIAGNOSIS — F10.10 ALCOHOL ABUSE, CONTINUOUS: ICD-10-CM

## 2020-05-13 DIAGNOSIS — F19.10 POLYSUBSTANCE ABUSE (H): ICD-10-CM

## 2020-05-13 DIAGNOSIS — F25.0 SCHIZOAFFECTIVE DISORDER, BIPOLAR TYPE (H): ICD-10-CM

## 2020-05-13 DIAGNOSIS — F17.200 NICOTINE USE DISORDER: ICD-10-CM

## 2020-05-13 DIAGNOSIS — F15.10 NONDEPENDENT AMPHETAMINE OR RELATED ACTING SYMPATHOMIMETIC ABUSE, CONTINUOUS (H): ICD-10-CM

## 2020-05-13 DIAGNOSIS — L30.9 ECZEMA, UNSPECIFIED TYPE: Primary | ICD-10-CM

## 2020-05-13 DIAGNOSIS — J30.2 SEASONAL ALLERGIC RHINITIS, UNSPECIFIED TRIGGER: ICD-10-CM

## 2020-05-13 DIAGNOSIS — Z03.818 ENCNTR FOR OBS FOR SUSP EXPSR TO OTH BIOLG AGENTS RULED OUT: ICD-10-CM

## 2020-05-13 LAB
ALBUMIN UR-MCNC: 30 MG/DL
AMORPH CRY #/AREA URNS HPF: ABNORMAL /HPF
AMPHETAMINES UR QL SCN: POSITIVE
APPEARANCE UR: ABNORMAL
BACTERIA #/AREA URNS HPF: ABNORMAL /HPF
BARBITURATES UR QL: NEGATIVE
BENZODIAZ UR QL: NEGATIVE
BILIRUB UR QL STRIP: NEGATIVE
CANNABINOIDS UR QL SCN: POSITIVE
COCAINE UR QL: NEGATIVE
COLOR UR AUTO: ABNORMAL
ETHANOL UR QL SCN: NEGATIVE
GLUCOSE UR STRIP-MCNC: NEGATIVE MG/DL
HCG UR QL: NEGATIVE
HGB UR QL STRIP: NEGATIVE
KETONES UR STRIP-MCNC: 80 MG/DL
LEUKOCYTE ESTERASE UR QL STRIP: NEGATIVE
MUCOUS THREADS #/AREA URNS LPF: PRESENT /LPF
NITRATE UR QL: NEGATIVE
OPIATES UR QL SCN: NEGATIVE
PH UR STRIP: 5.5 PH (ref 5–7)
RBC #/AREA URNS AUTO: 0 /HPF (ref 0–2)
SARS-COV-2 PCR COMMENT: NORMAL
SARS-COV-2 RNA SPEC QL NAA+PROBE: NEGATIVE
SARS-COV-2 RNA SPEC QL NAA+PROBE: NORMAL
SOURCE: ABNORMAL
SP GR UR STRIP: 1.03 (ref 1–1.03)
SPECIMEN SOURCE: NORMAL
SPECIMEN SOURCE: NORMAL
UROBILINOGEN UR STRIP-MCNC: NORMAL MG/DL (ref 0–2)
WBC #/AREA URNS AUTO: 3 /HPF (ref 0–5)

## 2020-05-13 PROCEDURE — 80307 DRUG TEST PRSMV CHEM ANLYZR: CPT | Performed by: FAMILY MEDICINE

## 2020-05-13 PROCEDURE — HZ2ZZZZ DETOXIFICATION SERVICES FOR SUBSTANCE ABUSE TREATMENT: ICD-10-PCS | Performed by: PSYCHIATRY & NEUROLOGY

## 2020-05-13 PROCEDURE — 87635 SARS-COV-2 COVID-19 AMP PRB: CPT | Performed by: EMERGENCY MEDICINE

## 2020-05-13 PROCEDURE — 81001 URINALYSIS AUTO W/SCOPE: CPT | Performed by: FAMILY MEDICINE

## 2020-05-13 PROCEDURE — 80320 DRUG SCREEN QUANTALCOHOLS: CPT | Performed by: FAMILY MEDICINE

## 2020-05-13 PROCEDURE — 87086 URINE CULTURE/COLONY COUNT: CPT | Performed by: EMERGENCY MEDICINE

## 2020-05-13 PROCEDURE — 25000132 ZZH RX MED GY IP 250 OP 250 PS 637: Performed by: CLINICAL NURSE SPECIALIST

## 2020-05-13 PROCEDURE — 99285 EMERGENCY DEPT VISIT HI MDM: CPT | Mod: 25 | Performed by: EMERGENCY MEDICINE

## 2020-05-13 PROCEDURE — 90791 PSYCH DIAGNOSTIC EVALUATION: CPT

## 2020-05-13 PROCEDURE — 12400001 ZZH R&B MH UMMC

## 2020-05-13 PROCEDURE — 99285 EMERGENCY DEPT VISIT HI MDM: CPT | Mod: Z6 | Performed by: EMERGENCY MEDICINE

## 2020-05-13 PROCEDURE — 81025 URINE PREGNANCY TEST: CPT | Performed by: FAMILY MEDICINE

## 2020-05-13 RX ORDER — MULTIPLE VITAMINS W/ MINERALS TAB 9MG-400MCG
1 TAB ORAL DAILY
Status: DISCONTINUED | OUTPATIENT
Start: 2020-05-13 | End: 2020-05-16 | Stop reason: HOSPADM

## 2020-05-13 RX ORDER — HYDROXYZINE HYDROCHLORIDE 25 MG/1
25 TABLET, FILM COATED ORAL EVERY 4 HOURS PRN
Status: DISCONTINUED | OUTPATIENT
Start: 2020-05-13 | End: 2020-05-14

## 2020-05-13 RX ORDER — ACETAMINOPHEN 325 MG/1
650 TABLET ORAL EVERY 4 HOURS PRN
Status: DISCONTINUED | OUTPATIENT
Start: 2020-05-13 | End: 2020-05-16 | Stop reason: HOSPADM

## 2020-05-13 RX ORDER — DIAZEPAM 5 MG
5-20 TABLET ORAL EVERY 30 MIN PRN
Status: DISCONTINUED | OUTPATIENT
Start: 2020-05-13 | End: 2020-05-15

## 2020-05-13 RX ORDER — ALUMINA, MAGNESIA, AND SIMETHICONE 2400; 2400; 240 MG/30ML; MG/30ML; MG/30ML
30 SUSPENSION ORAL EVERY 4 HOURS PRN
Status: DISCONTINUED | OUTPATIENT
Start: 2020-05-13 | End: 2020-05-16 | Stop reason: HOSPADM

## 2020-05-13 RX ORDER — OLANZAPINE 10 MG/1
10 TABLET ORAL
Status: DISCONTINUED | OUTPATIENT
Start: 2020-05-13 | End: 2020-05-16 | Stop reason: HOSPADM

## 2020-05-13 RX ORDER — ATENOLOL 50 MG/1
50 TABLET ORAL DAILY PRN
Status: DISCONTINUED | OUTPATIENT
Start: 2020-05-13 | End: 2020-05-15

## 2020-05-13 RX ORDER — LANOLIN ALCOHOL/MO/W.PET/CERES
100 CREAM (GRAM) TOPICAL DAILY
Status: DISCONTINUED | OUTPATIENT
Start: 2020-05-13 | End: 2020-05-16 | Stop reason: HOSPADM

## 2020-05-13 RX ORDER — BISACODYL 10 MG
10 SUPPOSITORY, RECTAL RECTAL DAILY PRN
Status: DISCONTINUED | OUTPATIENT
Start: 2020-05-13 | End: 2020-05-16 | Stop reason: HOSPADM

## 2020-05-13 RX ORDER — OLANZAPINE 10 MG/2ML
10 INJECTION, POWDER, FOR SOLUTION INTRAMUSCULAR
Status: DISCONTINUED | OUTPATIENT
Start: 2020-05-13 | End: 2020-05-16 | Stop reason: HOSPADM

## 2020-05-13 RX ORDER — FOLIC ACID 1 MG/1
1 TABLET ORAL DAILY
Status: DISCONTINUED | OUTPATIENT
Start: 2020-05-13 | End: 2020-05-16 | Stop reason: HOSPADM

## 2020-05-13 RX ORDER — NICOTINE 21 MG/24HR
1 PATCH, TRANSDERMAL 24 HOURS TRANSDERMAL DAILY
Status: DISCONTINUED | OUTPATIENT
Start: 2020-05-13 | End: 2020-05-16 | Stop reason: HOSPADM

## 2020-05-13 RX ORDER — TRAZODONE HYDROCHLORIDE 50 MG/1
50 TABLET, FILM COATED ORAL
Status: DISCONTINUED | OUTPATIENT
Start: 2020-05-13 | End: 2020-05-14

## 2020-05-13 RX ORDER — NITROFURANTOIN 25; 75 MG/1; MG/1
100 CAPSULE ORAL 2 TIMES DAILY
Status: DISCONTINUED | OUTPATIENT
Start: 2020-05-13 | End: 2020-05-16 | Stop reason: HOSPADM

## 2020-05-13 RX ORDER — LEVONORGESTREL 1.5 MG/1
1.5 TABLET ORAL ONCE
Status: COMPLETED | OUTPATIENT
Start: 2020-05-13 | End: 2020-05-13

## 2020-05-13 RX ADMIN — THIAMINE HCL TAB 100 MG 100 MG: 100 TAB at 19:42

## 2020-05-13 RX ADMIN — NITROFURANTOIN (MONOHYDRATE/MACROCRYSTALS) 100 MG: 75; 25 CAPSULE ORAL at 19:43

## 2020-05-13 RX ADMIN — MULTIPLE VITAMINS W/ MINERALS TAB 1 TABLET: TAB at 19:42

## 2020-05-13 RX ADMIN — TRAZODONE HYDROCHLORIDE 50 MG: 50 TABLET ORAL at 22:03

## 2020-05-13 RX ADMIN — NICOTINE 1 PATCH: 21 PATCH, EXTENDED RELEASE TRANSDERMAL at 19:43

## 2020-05-13 RX ADMIN — LEVONORGESTREL 1.5 MG: 1.5 TABLET ORAL at 19:51

## 2020-05-13 RX ADMIN — FOLIC ACID 1 MG: 1 TABLET ORAL at 19:42

## 2020-05-13 ASSESSMENT — ACTIVITIES OF DAILY LIVING (ADL)
RETIRED_EATING: 0-->INDEPENDENT
DRESS: 0-->INDEPENDENT
FALL_HISTORY_WITHIN_LAST_SIX_MONTHS: NO
ORAL_HYGIENE: INDEPENDENT
DRESS: INDEPENDENT
RETIRED_COMMUNICATION: 0-->UNDERSTANDS/COMMUNICATES WITHOUT DIFFICULTY
TOILETING: 0-->INDEPENDENT
SWALLOWING: 0-->SWALLOWS FOODS/LIQUIDS WITHOUT DIFFICULTY
AMBULATION: 0-->INDEPENDENT
BATHING: 0-->INDEPENDENT
TRANSFERRING: 0-->INDEPENDENT
LAUNDRY: WITH SUPERVISION
HYGIENE/GROOMING: INDEPENDENT
COGNITION: 0 - NO COGNITION ISSUES REPORTED

## 2020-05-13 ASSESSMENT — MIFFLIN-ST. JEOR: SCORE: 1799.54
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
## 2020-05-13 NOTE — ED TRIAGE NOTES
Picked up at a mental health clinic in [facility], was stating of having suicidal thoughts. Cooperative. Plan to jump off of bridge.

## 2020-05-13 NOTE — ED PROVIDER NOTES
ED Provider Note  [facility]      History     Chief Complaint   Patient presents with     Suicidal     HPI  Rianna Man is a 30 year old female with hx of schizoaffective disorder, substance abuse, personality disorder who presents to the ED feeling suicidal with plan.  She says she has been drinking to the point of passing out for the past 4 nights.  She presented to the [facility] today and they sent her here by ambulance due to suicidal thoughts with plan to jump off of a bridge.  She has prior suicide attempts by od and cutting herself.  She says life has been overwhelming and she is at her wits end.  She says she has had suicidal thoughts since October but they have worsened.  She is getting into verbal arguments with everybody.  She is making bad choices and putting herself in high risk sexual behavior.  She isn't caring for her hygiene.  She has an 9 yo sone she hasn't seen in 4 years.  She doesn't want to talk about that.  She denies hi.  She has hx of SIB.  She is using thc daily for the past year.   She has been binging on meth, etoh, ecstasy, cocaine.   She has hx of being homeless but says she has a place to live currently.  She has not been on her meds and wants to be admitted and get back on her meds.  She has not been taking her meds consistently.   She is concerned she could have a uti.  Her pmd called in a script but she hasn't gotten it yet.  She has not been sleeping well, isn't eating well.  She is having trouble getting showered or brushing her teeth.  She says her niece  by SIDS this past October.  She feels isolated and unsupported.  She has a pmd but no other providers.  She has been fighting with her .  Her  wants her to get back on her meds.  Hx of sib, last time was in April.  She says she hears voice sometimes but not currently.  She is experiencing visual trails.  She has been through cd treatment 6 times.  Multiple  prior admissions for mental health. Prior day treatment. Positive family history for mental health.       Past Medical History  Past Medical History:   Diagnosis Date     Anxiety      Depressive disorder      Schizoaffective disorder (H)      Varicella     had disease     Past Surgical History:   Procedure Laterality Date     NO HISTORY OF SURGERY       nitroFURantoin macrocrystal-monohydrate (MACROBID) 100 MG capsule  Prenatal Vit-Fe Fumarate-FA (PRENATAL MULTIVITAMIN W/IRON) 27-0.8 MG tablet  risperiDONE (RISPERDAL) 2 MG tablet  Skin Protectants, Misc. (EUCERIN) cream  levonorgestrel (PLAN B) 1.5 MG tablet      Allergies   Allergen Reactions     No Known Allergies      Past medical history, past surgical history, medications, and allergies were reviewed with the patient. Additional pertinent items: None    Family History  Family History   Problem Relation Age of Onset     Diabetes Father      Substance Abuse Father      Diabetes Paternal Aunt      Thyroid Disease Mother         grave's disease     Rheumatoid Arthritis Sister      Depression Sister      Crohn's Disease Maternal Aunt      Schizophrenia Maternal Grandmother      Family history was reviewed with the patient. Additional pertinent items: None    Social History  Social History     Tobacco Use     Smoking status: Current Every Day Smoker     Packs/day: 0.50     Types: Cigarettes     Smokeless tobacco: Never Used     Tobacco comment: less than 1/2 pack   Substance Use Topics     Alcohol use: Yes     Alcohol/week: 0.0 standard drinks     Comment: L of vodka daily     Drug use: Yes     Types: Marijuana, Methamphetamines     Comment: cocaine in the past      Social history was reviewed with the patient. Additional pertinent items: None    Review of Systems  A complete review of systems was performed with pertinent positives and negatives noted in the HPI, and all other systems negative.    Physical Exam   BP: (!) 153/95  Pulse: 96  Temp: 98.3  F (36.8   C)  Resp: 16  SpO2: 100 %  Physical Exam  Vitals signs and nursing note reviewed.   HENT:      Head: Normocephalic and atraumatic.      Nose: Nose normal.   Eyes:      Extraocular Movements: Extraocular movements intact.   Neck:      Musculoskeletal: Normal range of motion.   Cardiovascular:      Rate and Rhythm: Normal rate.   Pulmonary:      Effort: Pulmonary effort is normal.   Musculoskeletal: Normal range of motion.   Skin:     General: Skin is warm and dry.   Neurological:      General: No focal deficit present.      Mental Status: She is alert and oriented to person, place, and time.   Psychiatric:         Attention and Perception: Attention and perception normal.         Mood and Affect: Mood is depressed. Affect is blunt and flat.         Speech: Speech normal.         Behavior: Behavior normal. Behavior is cooperative.         Thought Content: Thought content includes suicidal ideation. Thought content includes suicidal plan.         Cognition and Memory: Cognition and memory normal.         Judgment: Judgment normal.         ED Course      Procedures         Results for orders placed or performed during the hospital encounter of 05/13/20   Drug abuse screen 6 urine (tox)     Status: Abnormal   Result Value Ref Range    Amphetamine Qual Urine Positive (A) NEG^Negative    Barbiturates Qual Urine Negative NEG^Negative    Benzodiazepine Qual Urine Negative NEG^Negative    Cannabinoids Qual Urine Positive (A) NEG^Negative    Cocaine Qual Urine Negative NEG^Negative    Ethanol Qual Urine Negative NEG^Negative    Opiates Qualitative Urine Negative NEG^Negative   HCG qualitative urine     Status: None   Result Value Ref Range    HCG Qual Urine Negative NEG^Negative     Medications - No data to display     Assessments & Plan (with Medical Decision Making)   The patient has hx of schizoaffective disorder, substance abuse, personality disorder who presents via ems due to suicidal thoughts with plan.  She also has  been abusing several substances.  She was seen by myself and the DEC  and we recommend admission for stabilization.  The patient is in agreement.  This is a voluntary admit.     I have reviewed the nursing notes. I have reviewed the findings, diagnosis, plan and need for follow up with the patient.    New Prescriptions    No medications on file       Final diagnoses:   Schizoaffective disorder, unspecified type (H)   Polysubstance abuse (H)       --  Becky Baldwin MD   Emergency Medicine   [facility], [place], EMERGENCY DEPARTMENT  5/13/2020     Becky Baldwin MD  05/13/20 153     Consent: The patient's verbal consent was obtained including but not limited to risks of crusting, scabbing, blistering, scarring, darker or lighter pigmentary change, recurrence, incomplete removal and infection. Medical Necessity Information: It is in your best interest to select a reason for this procedure from the list below. All of these items fulfill various CMS LCD requirements except the new and changing color options. Post-Care Instructions: I reviewed with the patient in detail post-care instructions. Patient is to wear sunprotection, and avoid picking at any of the treated lesions. Patient may apply Vaseline to crusted or scabbing areas. Render Post-Care Instructions In Note?: no Detail Level: Detailed

## 2020-05-13 NOTE — PHARMACY-ADMISSION MEDICATION HISTORY
Admission medication history for the May 13, 2020 admission is complete.     Interview Sources:  Patient, [facility] records    Reliability of Source: Good    Medication Adherence: Poor    Current Outpatient Pharmacy:    [facility] #[id number] - [place], [place] - [address] AT [facility] [address]  [facility] #[id number] - [place], [place] - [address] AT [id number] - [place], [place] - [address] AT [facility] [address]  [facility] [address] [place], [place] - [address]    Changes made to PTA medication list (reason)  Added: none  Deleted: (Patient is reported no longer taking the following medications)  Acetaminophen 500-1000 mg Q6 hours PRN mild pain  Cetirizine 10 mg daily  escitalopram 10 mg daily  Fluticasone nasal spray Instill 1 spray into both nostrils daily  Hydrocortisone 1% cream Apply topically twice daily  Multivitamin 1 tablet daily  Prenatal vitamin 1 tablet daily  Risperidone 2 mg twice daily  Eucerin cream Apply topically daily PRN  Triamcinolone 0.1% Apply topically BID (7 day course in 01/2020)  Changed: none    Additional medication history information:   The patient reported she is no longer taking any medications. From the [facility] records, she was seen via telemedicine on 05/12/20 and given prescriptions for Plan B and Macrobid (7 day course). The patient reported she has not started taking either of these medications.    The patient reported in the past that Lexapro, Risperdal, trazodone and Minipress worked well for her in the past and she would be willing to restart those medications again.    Prior to Admission Medication List:  Prior to Admission medications    Medication Sig Last Dose Taking? Auth Provider   levonorgestrel (PLAN B) 1.5 MG tablet Take 1 tablet (1.5 mg) by mouth once for 1 dose The patient never too it Yes Hilary Edouard MD    nitroFURantoin macrocrystal-monohydrate (MACROBID) 100 MG capsule Take 1 capsule (100 mg) by mouth 2 times daily The patient never started Yes Hilary Edouard MD       Time spent: 20 minutes    Medication history completed by:   Hailey Esqueda, PharmD, BCPP  Behavioral Health Pharmacist  [facility]  Emergency Room Ascom: *[phone]  Emergency Room Pharmacist phone: [phone]

## 2020-05-13 NOTE — TELEPHONE ENCOUNTER
[facility] crisis sending patient to ED  B: pt worsening depression and anxiety. Off meds for months, has otpt psychiatrist but has not been in contact since Covid.  Pt increase depression, isolating, lost contatc with family,. Irritable, risky sexual behaviors.  Pt has increased etoh use and states Sunday she tried to overdose on etoh and street drugs.  Pt  States SI with thghts to jump off the [place].   A: depression, cooperative

## 2020-05-13 NOTE — PLAN OF CARE
"Admission:     Admitted voluntarily to [facility] for increasing suicidal ideation with a plan to jump off a bridge. Reports increasing depression, isolating, irritability, substance abuse, risky sexual behaviors, no support system, and suicidal thoughts. Last at [facility] in Sept 2019 with a similar presentation where she left against medical advice. States she has been off all medications for the past month and would like to get \"on the right\" medications at this time. States she has been drinking \"more than a liter\" daily for the past month, last drink at 1 am last night. MSSA upon admission is 5. Denies hx of withdrawal seizures. States she has attempted suicide twice in the past, last attempt about 2 years ago. States no current outpatient services. Reports hx of self injurious behavior with cutting and burning. She does verbally contract for safety on the unit and agrees to notify staff of worsening symptoms. (See chart review for further details and hx)    Presents with blunted affect. Is well groomed. Speech is clear and coherent. Calm, polite and cooperative completing admission. Provided meal. Oriented to room and unit. Encouraged to notify staff of needs, questions, and concerns. Pt verbalizes understanding.   "

## 2020-05-13 NOTE — TELEPHONE ENCOUNTER
S: Dec  Mandi called with a clinical on a 30 year old female with SI with a plan.      B: Hx Schizoaffective disorder (H), Suicidal ideations and depression. Hx of overdose and cutting her arm last cut was April 2020 but denies harming other.  Pt States SI with thoughts to jump off the [place]. Dec reports pt is worsening depression and anxiety. Off meds for months, has outpatient psychiatrist but has not been in contact since Covid. Pt increase depression, isolating, and lost contact with family. Pt reports have risky sexual behaviors. Pt reports have increased drinking alcohol and last drink was last night. Pt has Hx of use cocaine and marijuana.    A: Vol  No medical concerns.  Travel screening has been completed.  Pt denied any COVID-related sx or exposure.  COVID-19 test has been ordered and pending result.  Drug screen has been completed: positive    Temp: 98.3 Pulse: 96 BP: 153/95       R: 0350 32/Carlo (Julianna) Placed on queue at 0352. Notified at 4. ED notified at 4:20.

## 2020-05-13 NOTE — PROGRESS NOTES
Patient Belonging in Locker:  Black   Alli slides  Leopard print purse  Blue jeans  White tanktop  Bra  Touch Screen [employer] phone  Pink socks  Hair tie  Wrist bands  Lighter  [place] drivers license  Loose key  [employer] insurance    Sent to Security:  Ring  Visa: [id number]  [employer] [phone]        ..A               Admission:  I am responsible for any personal items that are not sent to the safe or pharmacy.  [facility] is not responsible for loss, theft or damage of any property in my possession.    Signature:  _________________________________ Date: _______  Time: _____                                              Staff Signature:  ____________________________ Date: ________  Time: _____      2nd Staff person, if patient is unable/unwilling to sign:    Signature: ________________________________ Date: ________  Time: _____     Discharge:  [facility] has returned all of my personal belongings:    Signature: _________________________________ Date: ________  Time: _____                                          Staff Signature:  ____________________________ Date: ________  Time: _____

## 2020-05-14 ENCOUNTER — HOSPITAL ENCOUNTER (OUTPATIENT)
Dept: BEHAVIORAL HEALTH | Facility: CLINIC | Age: 30
End: 2020-05-14
Payer: COMMERCIAL

## 2020-05-14 PROBLEM — F30.9 MANIA (H): Status: RESOLVED | Noted: 2018-04-19 | Resolved: 2020-05-14

## 2020-05-14 PROBLEM — J30.2 SEASONAL ALLERGIC RHINITIS: Status: ACTIVE | Noted: 2020-05-14

## 2020-05-14 PROBLEM — F19.10 POLYSUBSTANCE ABUSE (H): Status: ACTIVE | Noted: 2020-05-14

## 2020-05-14 PROBLEM — R45.851 SUICIDAL IDEATIONS: Status: RESOLVED | Noted: 2018-05-17 | Resolved: 2020-05-14

## 2020-05-14 PROBLEM — R45.851 SUICIDAL INTENT: Status: RESOLVED | Noted: 2018-06-27 | Resolved: 2020-05-14

## 2020-05-14 PROBLEM — F32.A DEPRESSION: Status: RESOLVED | Noted: 2019-09-02 | Resolved: 2020-05-14

## 2020-05-14 PROBLEM — F12.10 CANNABIS ABUSE: Status: ACTIVE | Noted: 2020-05-14

## 2020-05-14 LAB
ALBUMIN SERPL-MCNC: 3.3 G/DL (ref 3.4–5)
ALP SERPL-CCNC: 66 U/L (ref 40–150)
ALT SERPL W P-5'-P-CCNC: 34 U/L (ref 0–50)
ANION GAP SERPL CALCULATED.3IONS-SCNC: 5 MMOL/L (ref 3–14)
AST SERPL W P-5'-P-CCNC: 31 U/L (ref 0–45)
BACTERIA SPEC CULT: NORMAL
BASOPHILS # BLD AUTO: 0 10E9/L (ref 0–0.2)
BASOPHILS NFR BLD AUTO: 0.6 %
BILIRUB SERPL-MCNC: 0.8 MG/DL (ref 0.2–1.3)
BUN SERPL-MCNC: 9 MG/DL (ref 7–30)
CALCIUM SERPL-MCNC: 8 MG/DL (ref 8.5–10.1)
CHLORIDE SERPL-SCNC: 109 MMOL/L (ref 94–109)
CHOLEST SERPL-MCNC: 188 MG/DL
CO2 SERPL-SCNC: 25 MMOL/L (ref 20–32)
CREAT SERPL-MCNC: 0.72 MG/DL (ref 0.52–1.04)
DIFFERENTIAL METHOD BLD: NORMAL
EOSINOPHIL # BLD AUTO: 0.1 10E9/L (ref 0–0.7)
EOSINOPHIL NFR BLD AUTO: 1.2 %
ERYTHROCYTE [DISTWIDTH] IN BLOOD BY AUTOMATED COUNT: 12.5 % (ref 10–15)
GFR SERPL CREATININE-BSD FRML MDRD: >90 ML/MIN/{1.73_M2}
GLUCOSE SERPL-MCNC: 100 MG/DL (ref 70–99)
HCT VFR BLD AUTO: 35.9 % (ref 35–47)
HDLC SERPL-MCNC: 126 MG/DL
HGB BLD-MCNC: 12.2 G/DL (ref 11.7–15.7)
IMM GRANULOCYTES # BLD: 0 10E9/L (ref 0–0.4)
IMM GRANULOCYTES NFR BLD: 0 %
LDLC SERPL CALC-MCNC: 51 MG/DL
LYMPHOCYTES # BLD AUTO: 2.9 10E9/L (ref 0.8–5.3)
LYMPHOCYTES NFR BLD AUTO: 58.4 %
Lab: NORMAL
MCH RBC QN AUTO: 30.1 PG (ref 26.5–33)
MCHC RBC AUTO-ENTMCNC: 34 G/DL (ref 31.5–36.5)
MCV RBC AUTO: 89 FL (ref 78–100)
MONOCYTES # BLD AUTO: 0.4 10E9/L (ref 0–1.3)
MONOCYTES NFR BLD AUTO: 8 %
NEUTROPHILS # BLD AUTO: 1.6 10E9/L (ref 1.6–8.3)
NEUTROPHILS NFR BLD AUTO: 31.8 %
NONHDLC SERPL-MCNC: 62 MG/DL
NRBC # BLD AUTO: 0 10*3/UL
NRBC BLD AUTO-RTO: 0 /100
PLATELET # BLD AUTO: 224 10E9/L (ref 150–450)
POTASSIUM SERPL-SCNC: 3.4 MMOL/L (ref 3.4–5.3)
PROT SERPL-MCNC: 6.5 G/DL (ref 6.8–8.8)
RBC # BLD AUTO: 4.05 10E12/L (ref 3.8–5.2)
SODIUM SERPL-SCNC: 139 MMOL/L (ref 133–144)
SPECIMEN SOURCE: NORMAL
TRIGL SERPL-MCNC: 56 MG/DL
TSH SERPL DL<=0.005 MIU/L-ACNC: 2.37 MU/L (ref 0.4–4)
WBC # BLD AUTO: 5 10E9/L (ref 4–11)

## 2020-05-14 PROCEDURE — 80053 COMPREHEN METABOLIC PANEL: CPT | Performed by: CLINICAL NURSE SPECIALIST

## 2020-05-14 PROCEDURE — 12400001 ZZH R&B MH UMMC

## 2020-05-14 PROCEDURE — 84443 ASSAY THYROID STIM HORMONE: CPT | Performed by: CLINICAL NURSE SPECIALIST

## 2020-05-14 PROCEDURE — 25000132 ZZH RX MED GY IP 250 OP 250 PS 637: Performed by: CLINICAL NURSE SPECIALIST

## 2020-05-14 PROCEDURE — 36415 COLL VENOUS BLD VENIPUNCTURE: CPT | Performed by: CLINICAL NURSE SPECIALIST

## 2020-05-14 PROCEDURE — 80061 LIPID PANEL: CPT | Performed by: CLINICAL NURSE SPECIALIST

## 2020-05-14 PROCEDURE — 85025 COMPLETE CBC W/AUTO DIFF WBC: CPT | Performed by: CLINICAL NURSE SPECIALIST

## 2020-05-14 PROCEDURE — 90791 PSYCH DIAGNOSTIC EVALUATION: CPT | Mod: TEL

## 2020-05-14 PROCEDURE — 99223 1ST HOSP IP/OBS HIGH 75: CPT | Mod: 95 | Performed by: NURSE PRACTITIONER

## 2020-05-14 PROCEDURE — 90853 GROUP PSYCHOTHERAPY: CPT

## 2020-05-14 PROCEDURE — 25000132 ZZH RX MED GY IP 250 OP 250 PS 637: Performed by: NURSE PRACTITIONER

## 2020-05-14 PROCEDURE — G0177 OPPS/PHP; TRAIN & EDUC SERV: HCPCS

## 2020-05-14 RX ORDER — TRIAMCINOLONE ACETONIDE 1 MG/G
CREAM TOPICAL 2 TIMES DAILY
Status: DISCONTINUED | OUTPATIENT
Start: 2020-05-14 | End: 2020-05-16 | Stop reason: HOSPADM

## 2020-05-14 RX ORDER — RISPERIDONE 1 MG/1
1 TABLET ORAL AT BEDTIME
Status: DISCONTINUED | OUTPATIENT
Start: 2020-05-14 | End: 2020-05-16 | Stop reason: HOSPADM

## 2020-05-14 RX ORDER — TRAZODONE HYDROCHLORIDE 100 MG/1
200 TABLET ORAL
Status: DISCONTINUED | OUTPATIENT
Start: 2020-05-14 | End: 2020-05-16 | Stop reason: HOSPADM

## 2020-05-14 RX ORDER — FLUTICASONE PROPIONATE 50 MCG
1 SPRAY, SUSPENSION (ML) NASAL DAILY
Status: DISCONTINUED | OUTPATIENT
Start: 2020-05-14 | End: 2020-05-16 | Stop reason: HOSPADM

## 2020-05-14 RX ORDER — RISPERIDONE 1 MG/1
1 TABLET ORAL 2 TIMES DAILY PRN
Status: DISCONTINUED | OUTPATIENT
Start: 2020-05-14 | End: 2020-05-16 | Stop reason: HOSPADM

## 2020-05-14 RX ORDER — PRAZOSIN HYDROCHLORIDE 1 MG/1
1 CAPSULE ORAL AT BEDTIME
Status: DISCONTINUED | OUTPATIENT
Start: 2020-05-14 | End: 2020-05-15

## 2020-05-14 RX ORDER — HYDROXYZINE HYDROCHLORIDE 25 MG/1
25-50 TABLET, FILM COATED ORAL EVERY 4 HOURS PRN
Status: DISCONTINUED | OUTPATIENT
Start: 2020-05-14 | End: 2020-05-16 | Stop reason: HOSPADM

## 2020-05-14 RX ORDER — CETIRIZINE HYDROCHLORIDE 10 MG/1
10 TABLET ORAL DAILY
Status: DISCONTINUED | OUTPATIENT
Start: 2020-05-14 | End: 2020-05-16 | Stop reason: HOSPADM

## 2020-05-14 RX ADMIN — TRAZODONE HYDROCHLORIDE 50 MG: 50 TABLET ORAL at 01:08

## 2020-05-14 RX ADMIN — MULTIPLE VITAMINS W/ MINERALS TAB 1 TABLET: TAB at 09:08

## 2020-05-14 RX ADMIN — NITROFURANTOIN (MONOHYDRATE/MACROCRYSTALS) 100 MG: 75; 25 CAPSULE ORAL at 20:02

## 2020-05-14 RX ADMIN — FOLIC ACID 1 MG: 1 TABLET ORAL at 09:08

## 2020-05-14 RX ADMIN — THIAMINE HCL TAB 100 MG 100 MG: 100 TAB at 09:08

## 2020-05-14 RX ADMIN — HYDROXYZINE HYDROCHLORIDE 50 MG: 25 TABLET, FILM COATED ORAL at 20:03

## 2020-05-14 RX ADMIN — TRIAMCINOLONE ACETONIDE: 1 CREAM TOPICAL at 13:01

## 2020-05-14 RX ADMIN — TRIAMCINOLONE ACETONIDE: 1 CREAM TOPICAL at 20:02

## 2020-05-14 RX ADMIN — PRAZOSIN HYDROCHLORIDE 1 MG: 1 CAPSULE ORAL at 21:12

## 2020-05-14 RX ADMIN — NITROFURANTOIN (MONOHYDRATE/MACROCRYSTALS) 100 MG: 75; 25 CAPSULE ORAL at 09:08

## 2020-05-14 RX ADMIN — NICOTINE 1 PATCH: 21 PATCH, EXTENDED RELEASE TRANSDERMAL at 09:08

## 2020-05-14 RX ADMIN — MAGNESIUM HYDROXIDE 30 ML: 400 SUSPENSION ORAL at 21:12

## 2020-05-14 RX ADMIN — CETIRIZINE HYDROCHLORIDE 10 MG: 10 TABLET, FILM COATED ORAL at 13:00

## 2020-05-14 RX ADMIN — FLUTICASONE PROPIONATE 1 SPRAY: 50 SPRAY, METERED NASAL at 13:01

## 2020-05-14 RX ADMIN — TRAZODONE HYDROCHLORIDE 200 MG: 100 TABLET ORAL at 21:13

## 2020-05-14 RX ADMIN — RISPERIDONE 1 MG: 1 TABLET ORAL at 21:12

## 2020-05-14 RX ADMIN — HYDROXYZINE HYDROCHLORIDE 50 MG: 25 TABLET, FILM COATED ORAL at 13:00

## 2020-05-14 ASSESSMENT — COLUMBIA-SUICIDE SEVERITY RATING SCALE - C-SSRS
TOTAL  NUMBER OF INTERRUPTED ATTEMPTS LIFETIME: YES
TOTAL  NUMBER OF ACTUAL ATTEMPTS LIFETIME: 2
1. IN THE PAST MONTH, HAVE YOU WISHED YOU WERE DEAD OR WISHED YOU COULD GO TO SLEEP AND NOT WAKE UP?: JUMP OFF BRIDGE
TOTAL  NUMBER OF INTERRUPTED ATTEMPTS LIFETIME: 2
2. HAVE YOU ACTUALLY HAD ANY THOUGHTS OF KILLING YOURSELF?: YES
ATTEMPT LIFETIME: YES
5. HAVE YOU STARTED TO WORK OUT OR WORKED OUT THE DETAILS OF HOW TO KILL YOURSELF? DO YOU INTEND TO CARRY OUT THIS PLAN?: YES
3. HAVE YOU BEEN THINKING ABOUT HOW YOU MIGHT KILL YOURSELF?: YES
2. HAVE YOU ACTUALLY HAD ANY THOUGHTS OF KILLING YOURSELF?: YES
1. IN THE PAST MONTH, HAVE YOU WISHED YOU WERE DEAD OR WISHED YOU COULD GO TO SLEEP AND NOT WAKE UP?: YES
ATTEMPT PAST THREE MONTHS: NO
5. HAVE YOU STARTED TO WORK OUT OR WORKED OUT THE DETAILS OF HOW TO KILL YOURSELF? DO YOU INTEND TO CARRY OUT THIS PLAN?: YES
4. HAVE YOU HAD THESE THOUGHTS AND HAD SOME INTENTION OF ACTING ON THEM?: NO
1. HAVE YOU WISHED YOU WERE DEAD OR WISHED YOU COULD GO TO SLEEP AND NOT WAKE UP?: YES
4. HAVE YOU HAD THESE THOUGHTS AND HAD SOME INTENTION OF ACTING ON THEM?: NO

## 2020-05-14 ASSESSMENT — PATIENT HEALTH QUESTIONNAIRE - PHQ9
5. POOR APPETITE OR OVEREATING: NEARLY EVERY DAY
SUM OF ALL RESPONSES TO PHQ QUESTIONS 1-9: 20

## 2020-05-14 ASSESSMENT — ANXIETY QUESTIONNAIRES
GAD7 TOTAL SCORE: 17
2. NOT BEING ABLE TO STOP OR CONTROL WORRYING: NEARLY EVERY DAY
1. FEELING NERVOUS, ANXIOUS, OR ON EDGE: MORE THAN HALF THE DAYS
3. WORRYING TOO MUCH ABOUT DIFFERENT THINGS: NEARLY EVERY DAY
7. FEELING AFRAID AS IF SOMETHING AWFUL MIGHT HAPPEN: MORE THAN HALF THE DAYS
IF YOU CHECKED OFF ANY PROBLEMS ON THIS QUESTIONNAIRE, HOW DIFFICULT HAVE THESE PROBLEMS MADE IT FOR YOU TO DO YOUR WORK, TAKE CARE OF THINGS AT HOME, OR GET ALONG WITH OTHER PEOPLE: VERY DIFFICULT
5. BEING SO RESTLESS THAT IT IS HARD TO SIT STILL: MORE THAN HALF THE DAYS
6. BECOMING EASILY ANNOYED OR IRRITABLE: MORE THAN HALF THE DAYS

## 2020-05-14 ASSESSMENT — ACTIVITIES OF DAILY LIVING (ADL)
ORAL_HYGIENE: INDEPENDENT
LAUNDRY: UNABLE TO COMPLETE
DRESS: SCRUBS (BEHAVIORAL HEALTH)
HYGIENE/GROOMING: INDEPENDENT

## 2020-05-14 NOTE — PLAN OF CARE
BEHAVIORAL TEAM DISCUSSION    Participants: Julianna Fonseca, LARRY; Maureen Angulo and Ruth Glover [facility]'s  Vida Lux RN  Progress: new admission of a 30 year old female who arrived at the ED via ambulance from a clinic due to report of SI with a plan.  Long hx of mental illness. Current and historical substance abuse which pt endorses current use.  Poor support system per pt  Anticipated length of stay: TBD  Continued Stay Criteria/Rationale: pt needs further assessment  Medical/Physical: pt reports UTI  Precautions:   Behavioral Orders   Procedures     Code 1 - Restrict to Unit     Routine Programming     As clinically indicated     Self Injury Precaution     Status 15     Every 15 minutes.     Suicide precautions     Patients on Suicide Precautions should have a Combination Diet ordered that includes a Diet selection(s) AND a Behavioral Tray selection for Safe Tray - with utensils, or Safe Tray - NO utensils       Plan: assess, monitor and stabilize  Rationale for change in precautions or plan: new admission

## 2020-05-14 NOTE — CONSULTS
DA Consult has been acknowledged. This writer spoke with ROSY Miranda for collaboration. Patient will be seen today to complete a diagnostic assessment.    If patient is to discharge prior to being seen, please have her call central intake to set up an outpatient diagnostic assessment evaluation.      ELIZABETH Chavira on 5/14/2020 at 10:00 AM

## 2020-05-14 NOTE — PROGRESS NOTES
05/14/20 1316   Behavioral Health   Hallucinations denies / not responding to hallucinations   Thinking intact   Orientation person: oriented;place: oriented;date: oriented;time: oriented   Memory baseline memory   Insight insight appropriate to situation;insight appropriate to events   Judgement intact   Eye Contact at examiner   Affect full range affect   Mood mood is calm   Physical Appearance/Attire neat   Hygiene well groomed   Suicidality other (see comments)  (She said no)   1. Wish to be Dead (Recent) No   2. Non-Specific Active Suicidal Thoughts (Recent) No   Self Injury other (see comment)  (She said no)   Activity restless   Speech clear;coherent   Medication Sensitivity no observed side effects   Psychomotor / Gait balanced;steady   Activities of Daily Living   Hygiene/Grooming independent   Oral Hygiene independent   Dress scrubs (behavioral health)   Laundry unable to complete   Room Organization independent     Patient had a great shift. She attended all groups that she could. She was pulled from one group for an important meetings. The meeting lasted about 1 1/2 hrs. Rianna said the meeting went well. It was for her going to the outpatient program here at the hospital.        She talked about developing a better support system in her life. She talked of AA and the [employer]. She said both these have been good organizations for her to be involved in but once she is feeling good enough she feels she doesn't need them anymore and therefore stops attending. Staff encouraged her to consider rebuilding her support system again.        Staff strove to encourage her that in going to the outpatient program here at the \[facility]\"" is a step in the right direction. Staff told her to take hope in this right decision and find encouragement in this.

## 2020-05-14 NOTE — PROGRESS NOTES
"Pt denies any active SI/SIB urges at this time.  Exhibiting no overt withdrawal symptoms.  She scored 4 on MSSA protocol at 10 pm.  Affect is flat.  Calm and cooperative.  She showered and took her scheduled medications.  Took PRN Trazodone 50 mg at bedtime.  \"I normally take 100 to 200 milligrams.\"    "

## 2020-05-14 NOTE — PLAN OF CARE
"INITIAL OT NOTE  Problem: OT General Care Plan  Goal: OT Goal 1    Pt attended 1 out of 3 OT groups offered. Pt actively participated in occupational therapy clinic. Pt was able to ask for assistance as needed, and independently initiated a structured creative expression task. Pt demonstrated good focus (x60 minutes), planning, and attention to detail. Pt appeared comfortable interacting with peers and writer. She asked about gardening groups, and shared that gardening is one of her favorite hobbies. 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 Will continue to assess. Initial assessment to be completed upon additional group participation.     OT Self-Assessment  Pt was given and completed a written self-assessment form. OT staff reviewed with pt and explained the value of having them involved in their treatment plan, and provided options to meet current needs/self-identified goals.     Pt identified \"Mother's day\" as stressors/events that led to hospitalization.    Pt identified the following symptoms that they are currently dealing with:   Emotions: Pt selected all options from the list.  Thoughts: Pt selected all options except for memory problems.   Behaviors: Pt selected all options from the list.     Self-identified coping skills: \"writing, reading, puzzles, exercise\"  Self-identified social supports: \"\"  Self-identified personal strengths: \"I can't give up; I want help or change\"    Goals: Manage anger, work on accepting \"not having my son and support system,\" \"learning to take my meds when sched.,\" and \"find supportive people\"       "

## 2020-05-14 NOTE — ADDENDUM NOTE
Encounter addended by: Lor Mcwilliams, LICSW on: 5/14/2020 2:16 PM   Actions taken: Flowsheet accepted

## 2020-05-14 NOTE — PROGRESS NOTES
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 involved intense fear, helplessness, or horror. Note: In children, this may be expressed instead by disorganized or agitated behavior  B. The traumatic event is persistently reexperienced in one (or more) of the following ways:     - Recurrent and intrusive distressing recollections of the event, including images, thoughts, or perceptions. Note: In young children, repetitive play may occur in which themes or aspects of the trauma are expressed.      - Recurrent distressing dreams of the event. Note: In children, there may be frightening dreams without recognizable content.      - Acting or feeling as if the traumatic event were recurring (includes a sense of reliving the experience, illusions, hallucinations, and dissociative flashback episodes, including those that occur on awakening or when intoxicated). Note: In young children, trauma-specific reenactment may occur.      - Intense psychological distress at exposure to internal or external cues that symbolize or resemble an aspect of the traumatic event.      - Physiological reactivity on exposure to internal or external cues that symbolize or resemble an aspect of the traumatic event.   C. Persistent avoidance of  stimuli associated with the trauma and numbing of general responsiveness (not present before the trauma), as indicated by three (or more) of the following:     - Efforts to avoid thoughts, feelings, or conversations associated with the trauma.      - Efforts to avoid activities, places, or people that arouse recollections of the trauma.      - Markedly diminished interest or participation in significant activities.      - Feeling of detachment or estrangement from others.      - Restricted range of affect (e.g., unable to have loving feelings).      - Sense of a foreshortened future (e.g., does not expect to have a career, marriage, children, or a normal life span).   D. Persistent symptoms of increased arousal (not present before the trauma), as indicated by two (or more) of the following:     - Difficulty falling or staying asleep.      - Irritability or outbursts of anger.      - Difficulty concentrating.      - Hypervigilance.      - Exaggerated startle response.   E. Duration of the disturbance is more than 1 month.  F. The disturbance causes clinically significant distress or impairment in social, occupational, or other important areas of functioning.    Functional Status:  Patient reports the following functional impairments: childcare / parenting, chronic disease management, health maintenance, management of the household and or completion of tasks, money management, relationship(s), self-care, social interactions and work / vocational responsibilities.     WHODAS:   WHODAS 2.0 Total Score 5/14/2020   Total Score 35       Clinical Summary:  1. Reason for assessment: Inpatient hospitalization with discharge and admission into [facility]  2. Psychosocial, Cultural and Contextual Factors: , no support system, financial hardship, unhealthy living environment  3. As evidenced by self report and criteria, client meets the following DSM5 Diagnoses:   (Sustained by DSM5 Criteria Listed  Above)  295.70  (F25) Schizoaffective Disorder Bipolar Type  300.02 (F41.1) Generalized Anxiety Disorder  300.3 (F42) Obsessive Compulsive Disorder  309.81 (F43.10) Posttraumatic Stress Disorder (includes Posttraumatic Stress Disorder for Children 6 Years and Younger)  Without dissociative symptoms  Substance-Related & Addictive Disorders Alcohol Use Disorder   303.90 (F10.20) Severe In a controlled environment  304.30 (F12.20) Cannabis Use Disorder Moderate  In a controlled environment  Stimulant Use Disorder:  In a controlled environment, Specify current severity:  Moderate  304.20 (F14.20) Cocaine  Specify if: In a controlled environment, Specify current severity:  305.1 (F17.200) Severe.  6. Prognosis: Relieve Acute Symptoms.  7. Likely consequences of symptoms if not treated: Recurrent hospitalizations, decompensation of mental health and daily functioning.  8. Client strengths include:  good listener and open to learning .     Recommendations:     1. Plan for Safety and Risk Management:A safety and risk management plan has been developed including: Patient consented to co-developed safety plan.  Safety and risk management plan was completed.  Patient agreed to use safety plan should any safety concerns arise.  A copy was given to the patient..  Report to child / adult protection services was NA.     2. Patient's did not identify any needs but stated she will reach out to staff should needs arise.     3. Initial Treatment will focus on: Mood Instability - emotional regulation and distress tolerance skills  Alcohol / Substance Use - build a recovery network and develop skills to abstain from substance use.     4. Resources/Service Plan:       services are not indicated.     Modifications to assist communication are not indicated.     Additional disability accommodations are not indicated.      5. Collaboration:  Collaboration / coordination of treatment will be initiated with the following support  professionals: case management.      6.  Referrals:  The following referral(s) will be initiated: Dual Diagnosis Program. Next Scheduled Appointment: TBD based on hospital discharge.  A Release of Information has been obtained for the following: Emergency Contact, Therapist, Psychiatrist, .    7. ALEKSANDRA: Recommendations: [facility] DDP. Patient reports they are willing to follow these recommendations. Patient does not have a history of opiate use.    8. Records were reviewed at time of assessment.  Information in this assessment was obtained from the medical record and provided by patient who is a vague historian.   Patient will have open access to their mental health medical record.      Eval type:  Dual Diagnosis    Staff Name/Credentials:  GUANAKITO Chavira, ELIZABETH         May 14, 2020

## 2020-05-14 NOTE — PROGRESS NOTES
Pt awakened at approximately 05:50 for MSSA score = 6, no meds given per MSSA protocol, slept approximately 5.5 hours.
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
## 2020-05-15 PROCEDURE — 25000132 ZZH RX MED GY IP 250 OP 250 PS 637: Performed by: CLINICAL NURSE SPECIALIST

## 2020-05-15 PROCEDURE — 12400001 ZZH R&B MH UMMC

## 2020-05-15 PROCEDURE — 25000132 ZZH RX MED GY IP 250 OP 250 PS 637: Performed by: NURSE PRACTITIONER

## 2020-05-15 PROCEDURE — 90853 GROUP PSYCHOTHERAPY: CPT

## 2020-05-15 PROCEDURE — G0177 OPPS/PHP; TRAIN & EDUC SERV: HCPCS

## 2020-05-15 PROCEDURE — 99232 SBSQ HOSP IP/OBS MODERATE 35: CPT | Mod: 95 | Performed by: NURSE PRACTITIONER

## 2020-05-15 RX ORDER — PRAZOSIN HYDROCHLORIDE 2 MG/1
2 CAPSULE ORAL AT BEDTIME
Status: DISCONTINUED | OUTPATIENT
Start: 2020-05-15 | End: 2020-05-16

## 2020-05-15 RX ADMIN — TRIAMCINOLONE ACETONIDE: 1 CREAM TOPICAL at 08:36

## 2020-05-15 RX ADMIN — THIAMINE HCL TAB 100 MG 100 MG: 100 TAB at 08:34

## 2020-05-15 RX ADMIN — PRAZOSIN HYDROCHLORIDE 2 MG: 2 CAPSULE ORAL at 21:08

## 2020-05-15 RX ADMIN — TRIAMCINOLONE ACETONIDE: 1 CREAM TOPICAL at 21:09

## 2020-05-15 RX ADMIN — TRAZODONE HYDROCHLORIDE 200 MG: 100 TABLET ORAL at 21:53

## 2020-05-15 RX ADMIN — NICOTINE 1 PATCH: 21 PATCH, EXTENDED RELEASE TRANSDERMAL at 08:34

## 2020-05-15 RX ADMIN — NITROFURANTOIN (MONOHYDRATE/MACROCRYSTALS) 100 MG: 75; 25 CAPSULE ORAL at 08:34

## 2020-05-15 RX ADMIN — NITROFURANTOIN (MONOHYDRATE/MACROCRYSTALS) 100 MG: 75; 25 CAPSULE ORAL at 21:08

## 2020-05-15 RX ADMIN — HYDROXYZINE HYDROCHLORIDE 50 MG: 25 TABLET, FILM COATED ORAL at 14:15

## 2020-05-15 RX ADMIN — RISPERIDONE 1 MG: 1 TABLET ORAL at 21:08

## 2020-05-15 RX ADMIN — CETIRIZINE HYDROCHLORIDE 10 MG: 10 TABLET, FILM COATED ORAL at 08:34

## 2020-05-15 RX ADMIN — FOLIC ACID 1 MG: 1 TABLET ORAL at 08:34

## 2020-05-15 RX ADMIN — FLUTICASONE PROPIONATE 1 SPRAY: 50 SPRAY, METERED NASAL at 08:36

## 2020-05-15 RX ADMIN — MULTIPLE VITAMINS W/ MINERALS TAB 1 TABLET: TAB at 08:34

## 2020-05-15 RX ADMIN — OLANZAPINE 10 MG: 10 TABLET, FILM COATED ORAL at 17:28

## 2020-05-15 ASSESSMENT — ACTIVITIES OF DAILY LIVING (ADL)
HYGIENE/GROOMING: INDEPENDENT
LAUNDRY: WITH SUPERVISION
DRESS: SCRUBS (BEHAVIORAL HEALTH)
LAUNDRY: WITH SUPERVISION
DRESS: SCRUBS (BEHAVIORAL HEALTH)
ORAL_HYGIENE: INDEPENDENT
HYGIENE/GROOMING: INDEPENDENT
ORAL_HYGIENE: INDEPENDENT

## 2020-05-15 ASSESSMENT — ANXIETY QUESTIONNAIRES: GAD7 TOTAL SCORE: 17

## 2020-05-15 NOTE — PROGRESS NOTES
Missed 1st group but, arrived to 2nd group bright and motivated to work on creative task. Good attention to planning and execution of simple creative task. Worked efficiently with good attention to details. Social with peers and staff. Thoughts organized and tracked conversation well. Positive and futuristic in conversations/discussion.  Declined active participation in cognitive task but, sat next to window journaling x 45 minutes. Will continue to encourage and support group participation and self awareness for self management.

## 2020-05-15 NOTE — PROGRESS NOTES
"Pt had a good shift. She has been pleasant, calm and cooperative with staff and peers.  She spends this shift watching movies.  Pt denies thoughts of SI/SIB and depression.   She reports feeling anxious (4 out of 10), and states \"I just want to make sure when I leave, that I am mentally healthy and ready\".  No behavioral concerns were noted by this writer.        05/15/20 1300   Behavioral Health   Hallucinations denies / not responding to hallucinations   Thinking distractable   Orientation person: oriented;place: oriented   Memory baseline memory   Insight insight appropriate to situation   Judgement intact   Eye Contact at examiner   Affect full range affect   Mood mood is calm   Physical Appearance/Attire attire appropriate to age and situation   Hygiene well groomed   Suicidality other (see comments)  (Pt denies SI)   1. Wish to be Dead (Recent) No   2. Non-Specific Active Suicidal Thoughts (Recent) No   Self Injury other (see comment)  (Pt denies SIB)   Elopement   (None observed)   Activity other (see comment)  (Visible in milieu)   Speech clear;coherent   Medication Sensitivity no stated side effects   Psychomotor / Gait balanced;steady   Activities of Daily Living   Hygiene/Grooming independent   Oral Hygiene independent   Dress scrubs (behavioral health)   Laundry with supervision   Room Organization independent     "

## 2020-05-15 NOTE — PROGRESS NOTES
"Pt denies SI/SIB. Pt rates depression and hopelessness 3/10. Pt has been visible in the milieu. Pt's mood is calm. Pt ate dinner and snacks. When asked if pt had a good support system outside pt said \" No, I don't have a good support system, \"my family and I don't talk\". Pt stated that's because her family doesn't understand mental illness even though her grandmother is schizophrenic. \"It's just a culture thing\" pt said. Pt said multiple times \" I can't give up\". Pt also said \"usually around this time of year when mother's day comes around, I get sad\". Stating she doesn't see her 4 year old son. Pt seemed determined to work harder.          05/14/20 2202   Behavioral Health   Hallucinations denies / not responding to hallucinations   Thinking intact   Orientation person: oriented;place: oriented;date: oriented   Memory baseline memory   Insight insight appropriate to events;insight appropriate to situation   Judgement intact   Eye Contact at examiner   Affect sad;full range affect   Mood mood is calm   Physical Appearance/Attire attire appropriate to age and situation;neat   Hygiene well groomed   Suicidality other (see comments)  (Pt denies)   1. Wish to be Dead (Recent) No   2. Non-Specific Active Suicidal Thoughts (Recent) No   3. Active Sucidal Ideation with any Methods (Not Plan) Without Intent to Act (Recent) No   4. Active Suicidal Ideation with Some Intent to Act, Without Specific Plan (Recent) No   5. Active Suicidal Ideation with Specific Plan and Intent (Recent) No   Self Injury other (see comment)  (Pt denies)   Elopement   (None stated/observed)   Activity other (see comment)  (Visible in the milieu)   Speech coherent;clear   Medication Sensitivity no observed side effects;no stated side effects   Psychomotor / Gait balanced;steady   Psycho Education   Type of Intervention 1:1 intervention   Response participates with encouragement   Hours 0.5   Treatment Detail Check In      "

## 2020-05-15 NOTE — PROGRESS NOTES
05/14/20 2000   Group Therapy Session   Group Attendance attended group session   Total Time (minutes) 50   Group Type psychotherapeutic   Patient Participation/Contribution cooperative with task;discussed personal experience with topic;listened actively;offered helpful suggestions to peers   Psychotherapy goal: Identification of positive attributes and self-reflection through group processing.     Rianna presents as tired. She reports feeling anxious. She expressed frustration due to lack of sleep and hopes tonight will be better. Rianna participated in group, actively listened to others and participated in the group discussion. She wrote down some ideas she heard from others in group that she felt might help her see the positive in situations.  Her mood appeared to be brighter by the end of group.

## 2020-05-15 NOTE — PROGRESS NOTES
"Pt remains calm and pleasant.  Visible in the milieu often watching tv.  Denies SI/SIB/HI at this time.  Attended groups this evening and has been medication compliant.  She denies any withdrawal symptoms but is cooperative with MSSA assessments.  Last score is 5 done at 9 pm.  Chief concern was \"when is the morning after pill suppose to work?\"  Pt took her Morning After pill yesterday and is expecting her menses.    "
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
## 2020-05-16 VITALS
WEIGHT: 226.2 LBS | RESPIRATION RATE: 18 BRPM | OXYGEN SATURATION: 100 % | TEMPERATURE: 98.1 F | BODY MASS INDEX: 34.28 KG/M2 | HEIGHT: 68 IN | DIASTOLIC BLOOD PRESSURE: 87 MMHG | HEART RATE: 112 BPM | SYSTOLIC BLOOD PRESSURE: 127 MMHG

## 2020-05-16 PROCEDURE — 25000132 ZZH RX MED GY IP 250 OP 250 PS 637: Performed by: CLINICAL NURSE SPECIALIST

## 2020-05-16 PROCEDURE — 99239 HOSP IP/OBS DSCHRG MGMT >30: CPT | Mod: 95 | Performed by: NURSE PRACTITIONER

## 2020-05-16 PROCEDURE — 25000132 ZZH RX MED GY IP 250 OP 250 PS 637: Performed by: NURSE PRACTITIONER

## 2020-05-16 RX ORDER — OLANZAPINE 10 MG/1
10 TABLET ORAL DAILY PRN
Qty: 30 TABLET | Refills: 1 | Status: SHIPPED | OUTPATIENT
Start: 2020-05-16 | End: 2020-12-01

## 2020-05-16 RX ORDER — HYDROXYZINE HYDROCHLORIDE 25 MG/1
25-50 TABLET, FILM COATED ORAL EVERY 4 HOURS PRN
Qty: 120 TABLET | Refills: 1 | Status: SHIPPED | OUTPATIENT
Start: 2020-05-16 | End: 2020-12-01

## 2020-05-16 RX ORDER — FLUTICASONE PROPIONATE 50 MCG
1 SPRAY, SUSPENSION (ML) NASAL DAILY
Qty: 18.2 ML | Refills: 1 | Status: SHIPPED | OUTPATIENT
Start: 2020-05-17 | End: 2020-12-01

## 2020-05-16 RX ORDER — NICOTINE 21 MG/24HR
1 PATCH, TRANSDERMAL 24 HOURS TRANSDERMAL DAILY
Qty: 28 PATCH | Refills: 1 | Status: SHIPPED | OUTPATIENT
Start: 2020-05-17 | End: 2020-12-01

## 2020-05-16 RX ORDER — TRAZODONE HYDROCHLORIDE 100 MG/1
200 TABLET ORAL
Qty: 60 TABLET | Refills: 1 | Status: SHIPPED | OUTPATIENT
Start: 2020-05-16 | End: 2020-12-01

## 2020-05-16 RX ORDER — PRAZOSIN HYDROCHLORIDE 1 MG/1
3 CAPSULE ORAL AT BEDTIME
Qty: 90 CAPSULE | Refills: 1 | Status: SHIPPED | OUTPATIENT
Start: 2020-05-16 | End: 2020-12-01

## 2020-05-16 RX ORDER — MULTIPLE VITAMINS W/ MINERALS TAB 9MG-400MCG
1 TAB ORAL DAILY
Qty: 30 EACH | Refills: 1 | Status: SHIPPED | OUTPATIENT
Start: 2020-05-17 | End: 2020-07-09

## 2020-05-16 RX ORDER — TRIAMCINOLONE ACETONIDE 1 MG/G
CREAM TOPICAL 2 TIMES DAILY
Qty: 30 G | Refills: 1 | Status: SHIPPED | OUTPATIENT
Start: 2020-05-16 | End: 2020-12-01

## 2020-05-16 RX ORDER — FOLIC ACID 1 MG/1
1 TABLET ORAL DAILY
Qty: 30 TABLET | Refills: 1 | Status: SHIPPED | OUTPATIENT
Start: 2020-05-17 | End: 2020-12-01

## 2020-05-16 RX ORDER — RISPERIDONE 1 MG/1
1 TABLET ORAL AT BEDTIME
Qty: 30 TABLET | Refills: 1 | Status: SHIPPED | OUTPATIENT
Start: 2020-05-16 | End: 2020-12-01

## 2020-05-16 RX ORDER — CETIRIZINE HYDROCHLORIDE 10 MG/1
10 TABLET ORAL DAILY
Qty: 30 TABLET | Refills: 1 | Status: SHIPPED | OUTPATIENT
Start: 2020-05-17 | End: 2020-12-01

## 2020-05-16 RX ORDER — NITROFURANTOIN 25; 75 MG/1; MG/1
100 CAPSULE ORAL 2 TIMES DAILY
Qty: 8 CAPSULE | Refills: 0 | Status: SHIPPED | OUTPATIENT
Start: 2020-05-16 | End: 2020-12-21

## 2020-05-16 RX ADMIN — FOLIC ACID 1 MG: 1 TABLET ORAL at 07:55

## 2020-05-16 RX ADMIN — MULTIPLE VITAMINS W/ MINERALS TAB 1 TABLET: TAB at 07:55

## 2020-05-16 RX ADMIN — NITROFURANTOIN (MONOHYDRATE/MACROCRYSTALS) 100 MG: 75; 25 CAPSULE ORAL at 07:55

## 2020-05-16 RX ADMIN — NICOTINE 1 PATCH: 21 PATCH, EXTENDED RELEASE TRANSDERMAL at 07:57

## 2020-05-16 RX ADMIN — THIAMINE HCL TAB 100 MG 100 MG: 100 TAB at 07:55

## 2020-05-16 RX ADMIN — CETIRIZINE HYDROCHLORIDE 10 MG: 10 TABLET, FILM COATED ORAL at 07:55

## 2020-05-16 ASSESSMENT — ACTIVITIES OF DAILY LIVING (ADL)
HYGIENE/GROOMING: INDEPENDENT
ORAL_HYGIENE: INDEPENDENT
DRESS: SCRUBS (BEHAVIORAL HEALTH)

## 2020-05-16 NOTE — PLAN OF CARE
"Discharge  D: Patient requested to leave and signed a 12 hour notice at 1230. On-call provider Juan Diego sharmad. Patient verbalized readiness to discharge. Discharge order in and AVS complete.     A: Writer went over the AVS with patient. Discharge teaching and medication education provided, including importance of taking antibiotics as prescribed. Patient denies suicidal ideation, self-harm thoughts, and homicidal ideation. Reports feeling \"alright.\" Denies feeling anxious, endorses feeling \"sad\" related to being unable to reach \"someone.\" Patient did not share who this someone is, but she is thinking she is dialing the wrong number and is hopeful to get a hold of them once she has her phone at discharge. She lives with a roommate and denies having any guns available to her. AVS signed.    R: Patient was cooperative with discharge process. Verbalized understanding of AVS including medications and upcoming appointments. Patient left the unit at 1430 with discharge medications and belongings including Aleve (home medication), ring, credit cards (2), and purse. Transported by taxi service to home.   "

## 2020-05-16 NOTE — PROGRESS NOTES
"   05/15/20 2000   Group Therapy Session   Group Attendance attended group session   Total Time (minutes) 50   Group Type psychotherapeutic   Patient Participation/Contribution cooperative with task;discussed personal experience with topic;listened actively;offered helpful suggestions to peers   Group Goal:   Patients will gain insight into thoughts, feelings, topics, or situations through the use of art techniques, then process as a group.     Rianna reports feeling \"drained.\" She presents as tired though pleasant and engaged during group. She participated in group and openly processed her thoughts and feelings. She expressed feeling sad thinking about missing her son. She reports that her proudest moment was giving birth to him and her greatest worry is that she will die before he really gets to know her. Rianna actively listened and offered supportive feedback to others in the group.  "

## 2020-05-16 NOTE — PROGRESS NOTES
"Patient has been visible in the lounge watching TV and/or coloring, attended group, medication compliant and cooperative.  MSSA was discontinued.  After group, another patient (N.B.) started yelling at [facility] as she assumed she was laughing at her.  \"That little girl doesn't know I have a huge anger problem due to my schizoaffective disorder.\"  Patient handled the situation well. Denies SI/SIB/HI AH/VH.     Denies physical symptoms.  "

## 2020-05-16 NOTE — DISCHARGE INSTRUCTIONS
Behavioral Discharge Planning and Instructions      Summary:  You were admitted on 5/13/2020  due to Depression, Anxiety, Suicidal Ideations and Chemical Use Issues.  You were treated by Julianna Fonseca NP and discharged on 5/16/2020 from Station 32 to Home      Principal Diagnosis:     Schizoaffective disorder, bipolar type  PTSD  Generalized anxiety disorder  Stimulant (cocaine, methamphetamine) use disorder, moderate  Cannabis use disorder, moderate  Alcohol use disorder, severe  Seasonal allergies  Eczema  UTI      Health Care Follow-up Appointments:     While on the unit you were evaluated for MI / CD day treatment programming:  You will be in the 11-2 pm track, Monday-Friday, for 6-8 weeks.   [facility] Day Treatment   Located in [facility] Floor [address] ; [address] [place] [address]   Phone: [phone]  Please call on Monday 5/18/2020 to determine your start date.    PCP - Dr. Hilary Edouard - [facility]      Psychiatry - Emilia Briceno, PhD, APRN, PMHCNS-BC - [facility] [address] [address], [place] [address]   [phone]  Fax: [phone]  Please call to schedule at appointment.      - [facility]     Attend all scheduled appointments with your outpatient providers. Call at least 24 hours in advance if you need to reschedule an appointment to ensure continued access to your outpatient providers.   Major Treatments, Procedures and Findings:  You were provided with: a psychiatric assessment, medication evaluation and/or management, milieu management and Dual Diagnosis Day treatment intake    Symptoms to Report: mood getting worse, thoughts of suicide or substance abuse    Early warning signs can include: increased depression or anxiety sleep disturbances increased thoughts or behaviors of suicide or self-harm  increased unusual thinking, such as paranoia or hearing  "voices    Safety and Wellness:  Take all medicines as directed.  Make no changes unless your doctor suggests them.   Follow treatment recommendations.  Refrain from alcohol and non-prescribed drugs.  If there is a concern for safety, call 911.    Resources:   Crisis Intervention: 964.394.4262 or 724-344-3439 (TTY: 477.737.4794).  Call anytime for help.  National [place] on Mental Illness (www.[place].hailey.org): 797.217.1638 or 582-085-6584.  Alcoholics Anonymous (www.alcoholics-anonymous.org)  Suicide Awareness Voices of Education (SAVE) (www.save.org): 919-378-SAVE (3347)  [facility] Crisis Response - Adult 878 740-5288  Text 4 Life: txt \"LIFE\" to 19160 for immediate support and crisis intervention  Crisis text line: Text \"[place]\" to 422430. Free, confidential, 24/7.  Crisis Intervention: 895.914.3940 or 989-708-0175. Call anytime for help.       The treatment team has appreciated the opportunity to work with you.     ___________________,  please take care and make your recovery a daily recovery.   If you have any questions or concerns our unit number is [phone]_  You may be receiving a follow-up phone call within the next three days from a representative from behavioral health.    You have identified the best phone number to reach you as _______________________        "

## 2020-05-18 ENCOUNTER — TELEPHONE (OUTPATIENT)
Dept: BEHAVIORAL HEALTH | Facility: CLINIC | Age: 30
End: 2020-05-18

## 2020-05-18 NOTE — TELEPHONE ENCOUNTER
Writer contacted patient to follow up on referral to  Dual Disorder outpatient program and inquire about whether she would like to participate. Left message requesting return phone call.       Charmaine Kahn, [facility] , [facility]  5/18/2020

## 2020-05-26 ENCOUNTER — TELEPHONE (OUTPATIENT)
Dept: FAMILY MEDICINE | Facility: CLINIC | Age: 30
End: 2020-05-26

## 2020-05-26 NOTE — TELEPHONE ENCOUNTER
Called pt, who said she completed forgot about today's procedure. Did not want to reschedule the procedure, but wanted to see Dr. Edouard for an STI check. Schedule f/u appt on 6/1 @ 2:20 PM.

## 2020-06-11 ENCOUNTER — AMBULATORY - HEALTHEAST (OUTPATIENT)
Dept: BEHAVIORAL HEALTH | Facility: CLINIC | Age: 30
End: 2020-06-11

## 2020-06-11 ENCOUNTER — TELEPHONE (OUTPATIENT)
Dept: BEHAVIORAL HEALTH | Facility: CLINIC | Age: 30
End: 2020-06-11

## 2020-06-11 ENCOUNTER — HOSPITAL ENCOUNTER (EMERGENCY)
Facility: CLINIC | Age: 30
Discharge: PSYCHIATRIC OR SUBSTANCE USE FACILITY | End: 2020-06-11
Attending: EMERGENCY MEDICINE | Admitting: EMERGENCY MEDICINE
Payer: COMMERCIAL

## 2020-06-11 VITALS
HEART RATE: 78 BPM | TEMPERATURE: 98.3 F | DIASTOLIC BLOOD PRESSURE: 81 MMHG | RESPIRATION RATE: 18 BRPM | SYSTOLIC BLOOD PRESSURE: 116 MMHG | OXYGEN SATURATION: 97 %
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
## 2020-06-11 DIAGNOSIS — R45.851 SUICIDAL IDEATION: ICD-10-CM

## 2020-06-11 LAB
AMPHETAMINES UR QL SCN: NEGATIVE
ANION GAP SERPL CALCULATED.3IONS-SCNC: 9 MMOL/L (ref 3–14)
BARBITURATES UR QL: NEGATIVE
BASOPHILS # BLD AUTO: 0 10E9/L (ref 0–0.2)
BASOPHILS NFR BLD AUTO: 0.3 %
BENZODIAZ UR QL: NEGATIVE
BUN SERPL-MCNC: 5 MG/DL (ref 7–30)
CALCIUM SERPL-MCNC: 8.6 MG/DL (ref 8.5–10.1)
CANNABINOIDS UR QL SCN: POSITIVE
CHLORIDE SERPL-SCNC: 109 MMOL/L (ref 94–109)
CO2 SERPL-SCNC: 22 MMOL/L (ref 20–32)
COCAINE UR QL: NEGATIVE
CREAT SERPL-MCNC: 0.88 MG/DL (ref 0.52–1.04)
DIFFERENTIAL METHOD BLD: NORMAL
EOSINOPHIL # BLD AUTO: 0 10E9/L (ref 0–0.7)
EOSINOPHIL NFR BLD AUTO: 0.4 %
ERYTHROCYTE [DISTWIDTH] IN BLOOD BY AUTOMATED COUNT: 13.1 % (ref 10–15)
ETHANOL SERPL-MCNC: 0.14 G/DL
GFR SERPL CREATININE-BSD FRML MDRD: 89 ML/MIN/{1.73_M2}
GLUCOSE SERPL-MCNC: 106 MG/DL (ref 70–99)
HCG UR QL: NEGATIVE
HCT VFR BLD AUTO: 43.3 % (ref 35–47)
HGB BLD-MCNC: 14.3 G/DL (ref 11.7–15.7)
HIV 1+2 AB+HIV1 P24 AG SERPL QL IA: NONREACTIVE
IMM GRANULOCYTES # BLD: 0 10E9/L (ref 0–0.4)
IMM GRANULOCYTES NFR BLD: 0.4 %
LYMPHOCYTES # BLD AUTO: 2.8 10E9/L (ref 0.8–5.3)
LYMPHOCYTES NFR BLD AUTO: 39.3 %
MCH RBC QN AUTO: 30.6 PG (ref 26.5–33)
MCHC RBC AUTO-ENTMCNC: 33 G/DL (ref 31.5–36.5)
MCV RBC AUTO: 93 FL (ref 78–100)
MONOCYTES # BLD AUTO: 0.5 10E9/L (ref 0–1.3)
MONOCYTES NFR BLD AUTO: 7.7 %
NEUTROPHILS # BLD AUTO: 3.6 10E9/L (ref 1.6–8.3)
NEUTROPHILS NFR BLD AUTO: 51.9 %
NRBC # BLD AUTO: 0 10*3/UL
NRBC BLD AUTO-RTO: 0 /100
OPIATES UR QL SCN: NEGATIVE
PCP UR QL SCN: NEGATIVE
PLATELET # BLD AUTO: 287 10E9/L (ref 150–450)
POTASSIUM SERPL-SCNC: 3.3 MMOL/L (ref 3.4–5.3)
RBC # BLD AUTO: 4.67 10E12/L (ref 3.8–5.2)
SARS-COV-2 PCR COMMENT: NORMAL
SARS-COV-2 RNA SPEC QL NAA+PROBE: NEGATIVE
SARS-COV-2 RNA SPEC QL NAA+PROBE: NORMAL
SODIUM SERPL-SCNC: 140 MMOL/L (ref 133–144)
SPECIMEN SOURCE: NORMAL
SPECIMEN SOURCE: NORMAL
WBC # BLD AUTO: 7 10E9/L (ref 4–11)

## 2020-06-11 PROCEDURE — 80320 DRUG SCREEN QUANTALCOHOLS: CPT | Performed by: EMERGENCY MEDICINE

## 2020-06-11 PROCEDURE — 87591 N.GONORRHOEAE DNA AMP PROB: CPT | Performed by: EMERGENCY MEDICINE

## 2020-06-11 PROCEDURE — 85025 COMPLETE CBC W/AUTO DIFF WBC: CPT | Performed by: EMERGENCY MEDICINE

## 2020-06-11 PROCEDURE — 25000132 ZZH RX MED GY IP 250 OP 250 PS 637: Performed by: EMERGENCY MEDICINE

## 2020-06-11 PROCEDURE — 80307 DRUG TEST PRSMV CHEM ANLYZR: CPT | Performed by: EMERGENCY MEDICINE

## 2020-06-11 PROCEDURE — 87389 HIV-1 AG W/HIV-1&-2 AB AG IA: CPT | Performed by: EMERGENCY MEDICINE

## 2020-06-11 PROCEDURE — 90791 PSYCH DIAGNOSTIC EVALUATION: CPT

## 2020-06-11 PROCEDURE — 99285 EMERGENCY DEPT VISIT HI MDM: CPT | Mod: 25

## 2020-06-11 PROCEDURE — C9803 HOPD COVID-19 SPEC COLLECT: HCPCS

## 2020-06-11 PROCEDURE — U0003 INFECTIOUS AGENT DETECTION BY NUCLEIC ACID (DNA OR RNA); SEVERE ACUTE RESPIRATORY SYNDROME CORONAVIRUS 2 (SARS-COV-2) (CORONAVIRUS DISEASE [COVID-19]), AMPLIFIED PROBE TECHNIQUE, MAKING USE OF HIGH THROUGHPUT TECHNOLOGIES AS DESCRIBED BY CMS-2020-01-R: HCPCS | Performed by: EMERGENCY MEDICINE

## 2020-06-11 PROCEDURE — 96360 HYDRATION IV INFUSION INIT: CPT

## 2020-06-11 PROCEDURE — 81025 URINE PREGNANCY TEST: CPT | Performed by: EMERGENCY MEDICINE

## 2020-06-11 PROCEDURE — 25800030 ZZH RX IP 258 OP 636: Performed by: EMERGENCY MEDICINE

## 2020-06-11 PROCEDURE — 80048 BASIC METABOLIC PNL TOTAL CA: CPT | Performed by: EMERGENCY MEDICINE

## 2020-06-11 PROCEDURE — 87491 CHLMYD TRACH DNA AMP PROBE: CPT | Performed by: EMERGENCY MEDICINE

## 2020-06-11 PROCEDURE — 96361 HYDRATE IV INFUSION ADD-ON: CPT

## 2020-06-11 RX ORDER — SUMATRIPTAN 25 MG/1
25 TABLET, FILM COATED ORAL ONCE
Status: COMPLETED | OUTPATIENT
Start: 2020-06-11 | End: 2020-06-11

## 2020-06-11 RX ORDER — ACETAMINOPHEN 500 MG
1000 TABLET ORAL ONCE
Status: COMPLETED | OUTPATIENT
Start: 2020-06-11 | End: 2020-06-11

## 2020-06-11 RX ADMIN — SUMATRIPTAN SUCCINATE 25 MG: 25 TABLET ORAL at 04:43

## 2020-06-11 RX ADMIN — ACETAMINOPHEN 1000 MG: 500 TABLET, FILM COATED ORAL at 09:03

## 2020-06-11 RX ADMIN — SODIUM CHLORIDE 1000 ML: 9 INJECTION, SOLUTION INTRAVENOUS at 03:51

## 2020-06-11 ASSESSMENT — ENCOUNTER SYMPTOMS
HEADACHES: 1
WOUND: 1
NECK PAIN: 1
MYALGIAS: 1
COLOR CHANGE: 1
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
## 2020-06-11 NOTE — ED NOTES
At this time, report given to [facility]. Transport called. Patient remains in the ER until transport arrival.

## 2020-06-11 NOTE — ED NOTES
"Breakfast tray ordered. Pt awake, vitals taken. Pt crying, states \"It's been a rough year\". Pt remains cooperative.   "
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
## 2020-06-11 NOTE — ED NOTES
Bed: ED29  Expected date: 6/11/20  Expected time: 2:46 AM  Means of arrival:   Comments:  [phone]
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
## 2020-06-14 ENCOUNTER — AMBULATORY - HEALTHEAST (OUTPATIENT)
Dept: BEHAVIORAL HEALTH | Facility: CLINIC | Age: 30
End: 2020-06-14

## 2020-07-09 ENCOUNTER — OFFICE VISIT (OUTPATIENT)
Dept: FAMILY MEDICINE | Facility: CLINIC | Age: 30
End: 2020-07-09
Payer: COMMERCIAL

## 2020-07-09 VITALS
OXYGEN SATURATION: 99 % | DIASTOLIC BLOOD PRESSURE: 81 MMHG | HEIGHT: 68 IN | HEART RATE: 92 BPM | TEMPERATURE: 98.5 F | BODY MASS INDEX: 32.43 KG/M2 | RESPIRATION RATE: 22 BRPM | WEIGHT: 214 LBS | SYSTOLIC BLOOD PRESSURE: 125 MMHG

## 2020-07-09 DIAGNOSIS — F19.10 POLYSUBSTANCE ABUSE (H): ICD-10-CM

## 2020-07-09 DIAGNOSIS — R07.9 CHEST PAIN, UNSPECIFIED TYPE: ICD-10-CM

## 2020-07-09 DIAGNOSIS — R30.0 DYSURIA: ICD-10-CM

## 2020-07-09 DIAGNOSIS — Z72.51 UNPROTECTED SEXUAL INTERCOURSE: Primary | ICD-10-CM

## 2020-07-09 LAB
BACTERIA: NORMAL
BILIRUBIN UR: NEGATIVE MG/DL
BLOOD UR: NEGATIVE MG/DL
CLARITY, URINE: CLEAR
CLUE CELLS: NORMAL
COLOR UR: YELLOW
GLUCOSE URINE: NEGATIVE
HCG UR QL: NEGATIVE
KETONES UR QL: NEGATIVE MG/DL
LEUKOCYTE ESTERASE UR: NEGATIVE
MOTILE TRICHOMONAS: NEGATIVE
NITRITE UR QL STRIP: NEGATIVE MG/DL
ODOR: NORMAL
PH UR STRIP: 5.5 [PH] (ref 4.5–8)
PH WET PREP: NORMAL (ref 3.8–4.5)
PROTEIN UR: NEGATIVE MG/DL
SP GR UR STRIP: 1.01 (ref 1–1.03)
UROBILINOGEN UR STRIP-ACNC: NORMAL E.U./DL
WBC WET PREP: <2 (ref 2–5)
YEAST: NORMAL

## 2020-07-09 RX ORDER — MULTIPLE VITAMINS W/ MINERALS TAB 9MG-400MCG
1 TAB ORAL DAILY
Qty: 30 EACH | Refills: 11 | Status: SHIPPED | OUTPATIENT
Start: 2020-07-09 | End: 2020-12-01

## 2020-07-09 ASSESSMENT — MIFFLIN-ST. JEOR: SCORE: 1736.58

## 2020-07-09 NOTE — PROGRESS NOTES
CHIEF COMPLAINT                                                      Chief Complaint   Patient presents with     STD     Pregnancy Test     Heart Problem     SUBJECTIVE:                                                    Rianna Man is a 30 year old year old female who presents to clinic today for the following health issues:    {Superlists:835234}    Positive two weeks ago for a follow up.      {additional problems for provider to add:024186}    Patient is {New or Established:882580} patient of this clinic.    {:942950}  ----------------------------------------------------------------------------------------------------------------------  Patient Active Problem List   Diagnosis     Trouble in sleeping     Contact dermatitis and other eczema, due to unspecified cause     Schizoaffective disorder (H)     Striae distensae     Nexplanon in place     Uncomplicated alcohol dependence (H)     Pap smear for cervical cancer screening     Iron deficiency anemia     Encounter for initial prescription of implantable subdermal contraceptive     OCD (obsessive compulsive disorder)     Generalized anxiety disorder     Lactase deficiency     Tobacco use     Intractable chronic migraine without aura     Burn     Suicidal ideation     Seasonal allergic rhinitis     Cannabis abuse     Polysubstance abuse (H)     Past Surgical History:   Procedure Laterality Date     NO HISTORY OF SURGERY         Social History     Tobacco Use     Smoking status: Current Every Day Smoker     Packs/day: 0.50     Types: Cigarettes     Smokeless tobacco: Never Used     Tobacco comment: less than 1/2 pack   Substance Use Topics     Alcohol use: Yes     Alcohol/week: 0.0 standard drinks     Comment: L of vodka daily     Family History   Problem Relation Age of Onset     Diabetes Father      Substance Abuse Father      Diabetes Paternal Aunt      Thyroid Disease Mother         grave's disease     Bipolar Disorder Mother      Rheumatoid  "Arthritis Sister      Crohn's Disease Maternal Aunt      Schizophrenia Maternal Grandmother          Problem list and past medical, surgical, social, and family histories reviewed & adjusted, as indicated.    Current Outpatient Medications   Medication Sig Dispense Refill     cetirizine (ZYRTEC) 10 MG tablet Take 1 tablet (10 mg) by mouth daily 30 tablet 1     fluticasone (FLONASE) 50 MCG/ACT nasal spray Spray 1 spray into both nostrils daily 18.2 mL 1     folic acid (FOLVITE) 1 MG tablet Take 1 tablet (1 mg) by mouth daily 30 tablet 1     hydrOXYzine (ATARAX) 25 MG tablet Take 1-2 tablets (25-50 mg) by mouth every 4 hours as needed for anxiety 120 tablet 1     multivitamin w/minerals (THERA-VIT-M) tablet Take 1 tablet by mouth daily 30 each 1     nicotine (NICODERM CQ) 21 MG/24HR 24 hr patch Place 1 patch onto the skin daily 28 patch 1     nitroFURantoin macrocrystal-monohydrate (MACROBID) 100 MG capsule Take 1 capsule (100 mg) by mouth 2 times daily 8 capsule 0     OLANZapine (ZYPREXA) 10 MG tablet Take 1 tablet (10 mg) by mouth daily as needed (agitation) 30 tablet 1     prazosin (MINIPRESS) 1 MG capsule Take 3 capsules (3 mg) by mouth At Bedtime 90 capsule 1     risperiDONE (RISPERDAL) 1 MG tablet Take 1 tablet (1 mg) by mouth At Bedtime 30 tablet 1     traZODone (DESYREL) 100 MG tablet Take 2 tablets (200 mg) by mouth nightly as needed for sleep 60 tablet 1     triamcinolone (KENALOG) 0.1 % external cream Apply topically 2 times daily 30 g 1     Medication list reviewed and updated as indicated.    Allergies   Allergen Reactions     No Known Allergies      Allergies reviewed and updated as indicated.  ----------------------------------------------------------------------------------------------------------------------  ROS:  {ROS COMP:095674::\"Constitutional, HEENT, cardiovascular, pulmonary, GI, musculoskeletal, neuro, skin, and psych systems are negative, except as otherwise noted.\"}    OBJECTIVE:     BP " "125/81   Pulse 92   Temp 98.5  F (36.9  C)   Resp 22   Ht 1.715 m (5' 7.52\")   Wt 97.1 kg (214 lb)   LMP 06/19/2020   SpO2 99%   BMI 33.00 kg/m    Body mass index is 33 kg/m .  [unfilled]    {Diagnostic Test Results:154799::\"Diagnostic Test Results:\",\"none \"}    ASSESSMENT/PLAN:     ***    There are no discontinued medications.    [unfilled]    [unfilled]  Options for treatment and follow-up care were reviewed with the patient and/or guardian. Rianna Man and/or guardian engaged in the decision making process and verbalized understanding of the options discussed and agreed with the final plan    [unfilled]    [unfilled]    Sandro Rosenthal MD on 7/9/2020 at 2:48 PM    "

## 2020-07-09 NOTE — LETTER
July 10, 2020      Rianna Man  [address] [place] [address]        Dear ,  Your gonorrhea, chlamydia, wet prep, and urinalysis were all negative.  As we had discussed your pregnancy test was negative as well.  We typically do not treat asymptomatic dysuria with a negative workup.  If you continues to have symptoms please return for further evaluation if it persists or worsens.  Please schedule an appointment with Dr. Edouard to touch base. Thank you          Resulted Orders   HCG Qualitative Urine (UPT)  ([facility])   Result Value Ref Range    HCG Qual Urine Negative Negative   Urinalysis, Micro If ([facility])   Result Value Ref Range    Specific Gravity Urine 1.015 1.005 - 1.030    pH Urine 5.5 4.5 - 8.0    Leukocyte Esterase UR NEGATIVE NEGATIVE    Nitrite Urine NEGATIVE NEGATIVE mg/dL    Protein UR NEGATIVE NEGATIVE mg/dL    Glucose Urine NEGATIVE NEGATIVE    Ketones Urine NEGATIVE NEGATIVE mg/dL    Urobilinogen mg/dL 0.2 e.u./dl 0.2 E.U./dL E.U./dL    Bilirubin UR NEGATIVE NEGATIVE mg/dL    Blood UR NEGATIVE NEGATIVE mg/dL    Color Urine Yellow     Clarity, urine Clear    Chlamydia/Gono Amplified ([facility])   Result Value Ref Range    Chlamydia trac,Amplified Prb Negative Negative    N gonorrhoeae,Amplified Prb Negative Negative    Narrative    Test performed by:  [facility]  [address] [place] [address]   Wet Prep ([facility])   Result Value Ref Range    Yeast Wet Prep None none    Motile Trichomonas Wet Prep Negative Negative    Clue Cells Wet Prep Present <20% NONE    WBC WET PREP <2 2 - 5    Bacteria Wet Prep Few None    pH Wet Prep Not performed 3.8 - 4.5    Odor Wet Prep None NONE       If you have any questions or concerns, please call the clinic at the number listed above.       Sincerely,        Sandro Rosenthal MD

## 2020-07-10 LAB
C TRACH RRNA CVX QL NAA+PROBE: NEGATIVE
N GONORRHOEA RRNA SPEC QL NAA+PROBE: NEGATIVE
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
## 2020-07-13 NOTE — PROGRESS NOTES
Preceptor Attestation:  Patient's case reviewed and discussed with the resident, Sandro Rosenthal MD, and I personally evaluated the patient. I agree with written assessment and plan of care.    Supervising Physician:  Arleth Coley MD   [facility]

## 2020-07-14 ENCOUNTER — TELEPHONE (OUTPATIENT)
Dept: FAMILY MEDICINE | Facility: CLINIC | Age: 30
End: 2020-07-14

## 2020-07-14 NOTE — TELEPHONE ENCOUNTER
[facility] phone call message- general phone call:    Reason for call: Patient had a missed call from HONORIO Sanchez regarding her results. Patient would like for someone to return call as she would like to know what her results are.     Return call needed: Yes    OK to leave a message on voice mail? Yes    Primary language: English      needed? No    Call taken on July 14, 2020 at 12:10 PM by Sushil Gaston

## 2020-08-03 NOTE — PROGRESS NOTES
05/22/18 1445   Behavioral Health   Hallucinations denies / not responding to hallucinations   Thinking poor concentration;distractable   Orientation place: oriented;person: oriented;date: oriented;time: oriented   Memory baseline memory   Insight poor   Judgement impaired   Eye Contact at examiner   Affect blunted, flat   Mood depressed;anxious   Physical Appearance/Attire appears stated age   Hygiene other (see comment)  (fair)   Suicidality thoughts only   1. Wish to be Dead No   2. Non-Specific Active Suicidal Thoughts  No   Self Injury other (see comment)  (denies currently)   Elopement (no concerns)   Activity withdrawn;isolative   Speech coherent;clear   Medication Sensitivity no stated side effects   Psychomotor / Gait steady;balanced     Pt had a fair shift. Mood and affect appeared depressed. Pt denied SI/SIB, however Pt noted she doesn't know if she would be able to keep herself safe when she is discharged. Pt noted that a significant stressor in her life right now is that she is homeless and she has expressed some helplessness regarding the situation. No psychotic symptoms/hallucinations/paranoia. No behavioral issues.    Closure 2 Information: This tab is for additional flaps and grafts, including complex repair and grafts and complex repair and flaps. You can also specify a different location for the additional defect, if the location is the same you do not need to select a new one. We will insert the automated text for the repair you select below just as we do for solitary flaps and grafts. Please note that at this time if you select a location with a different insurance zone you will need to override the ICD10 and CPT if appropriate.

## 2020-09-09 ENCOUNTER — TELEPHONE (OUTPATIENT)
Dept: BEHAVIORAL HEALTH | Facility: CLINIC | Age: 30
End: 2020-09-09

## 2020-09-09 NOTE — TELEPHONE ENCOUNTER
Per VERONICA Enamorado and [employer], Intake is documenting on behalf of the [facility].   Pt has been repeatedly calling former unit she was a pt on ([facility]) as a crisis line.  Pt called reporting she was feeling suicidal and wanted to kill herself.  Pt could not contract for safety.  Pt was directed to call 911 and go to ED.  Pt was adamant to get transferred to [facility] that she was a former pt on.  Staff of  stated pt needed to go to ED and could not come directly to the unit.   After repeated calls, unit called 911 and gave the pts home address/phone to .  Ems went to pt home and pt repeatedly called back while EMS was present.  During this time Pts behavior escalated with EMS.  EMS did talk with [facility] staff and [place] [id number] staff explained pt keeps calling.  Pt did continue to call 3-4 MORE times after.   [facility] was also notified and directed staff to communicate to pt that EMS was there to help her and left it to EMS to deal with pt.

## 2020-09-11 ENCOUNTER — TELEPHONE (OUTPATIENT)
Dept: FAMILY MEDICINE | Facility: CLINIC | Age: 30
End: 2020-09-11

## 2020-09-11 NOTE — TELEPHONE ENCOUNTER
I got the pt ED discharge paperwork, I called to check up on the pt and help setup a ED follow up. I called the pt on 09/09/2020, 09/10/2020, and today. I have left a vm for the pt to give me a call back. I have not heard from the pt or gotten a hold of the pt.

## 2020-09-15 ENCOUNTER — TRANSFERRED RECORDS (OUTPATIENT)
Dept: HEALTH INFORMATION MANAGEMENT | Facility: CLINIC | Age: 30
End: 2020-09-15

## 2020-09-21 ENCOUNTER — TELEPHONE (OUTPATIENT)
Dept: FAMILY MEDICINE | Facility: CLINIC | Age: 30
End: 2020-09-21

## 2020-10-09 ENCOUNTER — OFFICE VISIT (OUTPATIENT)
Dept: FAMILY MEDICINE | Facility: CLINIC | Age: 30
End: 2020-10-09
Payer: COMMERCIAL

## 2020-10-09 VITALS
BODY MASS INDEX: 37.01 KG/M2 | TEMPERATURE: 98.4 F | RESPIRATION RATE: 20 BRPM | HEART RATE: 84 BPM | SYSTOLIC BLOOD PRESSURE: 120 MMHG | WEIGHT: 244.2 LBS | OXYGEN SATURATION: 97 % | HEIGHT: 68 IN | DIASTOLIC BLOOD PRESSURE: 82 MMHG

## 2020-10-09 DIAGNOSIS — R35.0 FREQUENT URINATION: Primary | ICD-10-CM

## 2020-10-09 LAB
BACTERIA: NORMAL
BILIRUBIN UR: NEGATIVE MG/DL
BLOOD UR: NEGATIVE MG/DL
CLARITY, URINE: CLEAR
CLUE CELLS: NORMAL
COLOR UR: YELLOW
GLUCOSE URINE: NEGATIVE
HCG UR QL: NEGATIVE
KETONES UR QL: NEGATIVE MG/DL
LEUKOCYTE ESTERASE UR: NEGATIVE
MOTILE TRICHOMONAS: NEGATIVE
NITRITE UR QL STRIP: NEGATIVE MG/DL
ODOR: NORMAL
PH UR STRIP: 5.5 [PH] (ref 4.5–8)
PH WET PREP: NORMAL (ref 3.8–4.5)
PROTEIN UR: NEGATIVE MG/DL
SP GR UR STRIP: 1.02 (ref 1–1.03)
UROBILINOGEN UR STRIP-ACNC: NORMAL E.U./DL
WBC WET PREP: <2 (ref 2–5)
YEAST: NORMAL

## 2020-10-09 PROCEDURE — 81025 URINE PREGNANCY TEST: CPT | Performed by: STUDENT IN AN ORGANIZED HEALTH CARE EDUCATION/TRAINING PROGRAM

## 2020-10-09 PROCEDURE — 87210 SMEAR WET MOUNT SALINE/INK: CPT | Performed by: STUDENT IN AN ORGANIZED HEALTH CARE EDUCATION/TRAINING PROGRAM

## 2020-10-09 PROCEDURE — 99213 OFFICE O/P EST LOW 20 MIN: CPT | Mod: GC | Performed by: STUDENT IN AN ORGANIZED HEALTH CARE EDUCATION/TRAINING PROGRAM

## 2020-10-09 PROCEDURE — 81003 URINALYSIS AUTO W/O SCOPE: CPT | Performed by: STUDENT IN AN ORGANIZED HEALTH CARE EDUCATION/TRAINING PROGRAM

## 2020-10-09 ASSESSMENT — MIFFLIN-ST. JEOR: SCORE: 1881.18
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
## 2020-10-09 NOTE — PROGRESS NOTES
I have personally reviewed the history and examination as documented by Dr. Leone.  I was present during key portions of the visit and agree with the assessment and plan as documented for 30 yr old female here for STI testing, concern for infertility. Wet prep and U/A neg. GC sent. Counseled regarding infertility concerns. Precautions given. Anticipatory guidance given.     Jaycob Rouse MD  October 9, 2020  2:16 PM

## 2020-10-13 ENCOUNTER — TELEPHONE (OUTPATIENT)
Dept: FAMILY MEDICINE | Facility: CLINIC | Age: 30
End: 2020-10-13

## 2020-10-13 LAB
C TRACH RRNA CVX QL NAA+PROBE: NEGATIVE
N GONORRHOEA RRNA SPEC QL NAA+PROBE: NEGATIVE

## 2020-10-13 NOTE — TELEPHONE ENCOUNTER
Called home phone, number is not in service. Called mobile phone and left voicemail to return call. Patient needs a 1-2 week f/u from 10/9/20 for plantar fascitis.

## 2020-11-19 ENCOUNTER — TELEPHONE (OUTPATIENT)
Dept: FAMILY MEDICINE | Facility: CLINIC | Age: 30
End: 2020-11-19

## 2020-11-19 NOTE — TELEPHONE ENCOUNTER
[facility] Family Medicine phone call message- general phone call:    Reason for call: Patient states she had her pregnancy [facility] at [facility] yesterday. She states she was told she is 4 weeks. Please call and advise.     Return call needed: Yes    OK to leave a message on voice mail? Yes    Primary language: English      needed? No    Call taken on November 19, 2020 at 10:14 AM by Rachael Riojas

## 2020-11-22 ENCOUNTER — HEALTH MAINTENANCE LETTER (OUTPATIENT)
Age: 30
End: 2020-11-22
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
## 2020-11-25 ENCOUNTER — TELEPHONE (OUTPATIENT)
Dept: FAMILY MEDICINE | Facility: CLINIC | Age: 30
End: 2020-11-25

## 2020-11-25 NOTE — TELEPHONE ENCOUNTER
Called listed return phone number [phone], male who had answered call informs me I have the wrong number. Please verify return number. Thank you. Afua LLOYD

## 2020-11-25 NOTE — TELEPHONE ENCOUNTER
[facility] Family Medicine phone call message- general phone call:    Reason for call: Patient states she needs a letter stating she is currently pregnant and needs to follow a healthier diet. She states she needs this letter in order to get food stamps. The facility where she is currently staying makes the food for her but the food is not healthy, and she wants to eat more fruits and vegetables since she's expecting a child. Please call and advise.     Return call needed: Yes    OK to leave a message on voice mail? Yes    Primary language: English      needed? No    Call taken on November 25, 2020 at 9:59 AM by Rachael Riojas

## 2020-11-27 NOTE — TELEPHONE ENCOUNTER
Unable to get a hold of patient, number patient provided is incorrect and left voicemail on mobile number.

## 2020-12-01 ENCOUNTER — OFFICE VISIT (OUTPATIENT)
Dept: FAMILY MEDICINE | Facility: CLINIC | Age: 30
End: 2020-12-01
Payer: COMMERCIAL

## 2020-12-01 VITALS
HEART RATE: 87 BPM | OXYGEN SATURATION: 98 % | RESPIRATION RATE: 20 BRPM | TEMPERATURE: 98.6 F | WEIGHT: 257 LBS | DIASTOLIC BLOOD PRESSURE: 75 MMHG | HEIGHT: 69 IN | SYSTOLIC BLOOD PRESSURE: 112 MMHG | BODY MASS INDEX: 38.06 KG/M2

## 2020-12-01 DIAGNOSIS — G43.719 INTRACTABLE CHRONIC MIGRAINE WITHOUT AURA AND WITHOUT STATUS MIGRAINOSUS: ICD-10-CM

## 2020-12-01 DIAGNOSIS — R20.2 PARESTHESIA OF BOTH HANDS: ICD-10-CM

## 2020-12-01 DIAGNOSIS — L22 DIAPER RASH: Primary | ICD-10-CM

## 2020-12-01 PROCEDURE — 99214 OFFICE O/P EST MOD 30 MIN: CPT | Mod: GC | Performed by: STUDENT IN AN ORGANIZED HEALTH CARE EDUCATION/TRAINING PROGRAM

## 2020-12-01 RX ORDER — PRENATAL VIT/IRON FUM/FOLIC AC 27MG-0.8MG
1 TABLET ORAL DAILY
Qty: 90 TABLET | Refills: 3 | Status: SHIPPED | OUTPATIENT
Start: 2020-12-01 | End: 2022-07-12

## 2020-12-01 RX ORDER — METOCLOPRAMIDE 5 MG/1
TABLET ORAL
Qty: 30 TABLET | Refills: 1 | Status: SHIPPED | OUTPATIENT
Start: 2020-12-01 | End: 2021-07-29

## 2020-12-01 RX ORDER — FOLIC ACID 1 MG/1
1 TABLET ORAL DAILY
Qty: 90 TABLET | Refills: 1 | Status: SHIPPED | OUTPATIENT
Start: 2020-12-01 | End: 2022-07-12

## 2020-12-01 RX ORDER — ZINC OXIDE 20 %
OINTMENT (GRAM) TOPICAL
Qty: 425 G | Refills: 1 | Status: SHIPPED | OUTPATIENT
Start: 2020-12-01 | End: 2020-12-21

## 2020-12-01 ASSESSMENT — MIFFLIN-ST. JEOR: SCORE: 1955.12
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
## 2020-12-01 NOTE — PROGRESS NOTES
Preceptor Attestation:   Patient seen, evaluated and discussed with the resident. I have verified the content of the note, which accurately reflects my assessment of the patient and the plan of care.  Supervising Physician:Charmaine Ellison MD  [facility]

## 2020-12-03 ENCOUNTER — TELEPHONE (OUTPATIENT)
Dept: FAMILY MEDICINE | Facility: CLINIC | Age: 30
End: 2020-12-03

## 2020-12-03 NOTE — TELEPHONE ENCOUNTER
[facility] phone call message-patient reporting a symptom:     Symptom: Chest pain, rash    Same Day Visit Offered: Wants to speak with a nurse first    Additional comments: Patient states last night she started having chest pain that felt like a tightness in her chest, she states she still has some aches near her heart with some tight ness. Patient states she would like to speak with a nurse to see what she should do. Patient also mentioned she has a rash she would like to discuss. Notified GABINO Fregoso and call was warm transferred.     OK to leave message on voice mail? Yes    Primary language: English      needed? No    Call taken on December 3, 2020 at 9:45 AM by Rachael Riojas

## 2020-12-03 NOTE — TELEPHONE ENCOUNTER
Unable to locate protocol appropriate for triage call. Rianna is early first trimester in pregnancy. Since last night has had intermittent chest tightness, feels breathing has been interrupted related to tightness. Denies chest pain, no cardiac history or abnormality. No close contact with known or confirmed positive COVID contact. Denies cough, no fever. Hx heartburn, recent episode was beginning of this week. Recommend Rianna be seen in clinic today. An appointment has been scheduled. Afua LLOYD

## 2020-12-16 ENCOUNTER — OFFICE VISIT (OUTPATIENT)
Dept: FAMILY MEDICINE | Facility: CLINIC | Age: 30
End: 2020-12-16
Payer: COMMERCIAL

## 2020-12-16 VITALS
DIASTOLIC BLOOD PRESSURE: 70 MMHG | TEMPERATURE: 97.8 F | RESPIRATION RATE: 16 BRPM | WEIGHT: 263 LBS | SYSTOLIC BLOOD PRESSURE: 106 MMHG | BODY MASS INDEX: 38.95 KG/M2 | OXYGEN SATURATION: 99 % | HEIGHT: 69 IN | HEART RATE: 82 BPM

## 2020-12-16 DIAGNOSIS — Z34.81 ENCOUNTER FOR SUPERVISION OF OTHER NORMAL PREGNANCY IN FIRST TRIMESTER: Primary | ICD-10-CM

## 2020-12-16 LAB
BILIRUBIN UR: NEGATIVE MG/DL
BLOOD UR: NEGATIVE MG/DL
GLUCOSE URINE: NEGATIVE
HCT VFR BLD AUTO: 38 % (ref 35–47)
HEMOGLOBIN: 12 G/DL (ref 11.7–15.7)
HIV 1+2 AB+HIV1 P24 AG SERPL QL IA: NEGATIVE
KETONES UR QL: NEGATIVE MG/DL
LEUKOCYTE ESTERASE UR: NEGATIVE
MCH RBC QN AUTO: 29.9 PG (ref 26.5–35)
MCHC RBC AUTO-ENTMCNC: 31.6 G/DL (ref 32–36)
MCV RBC AUTO: 94.7 FL (ref 78–100)
NITRITE UR QL STRIP: NEGATIVE MG/DL
PH UR STRIP: 6 [PH] (ref 4.5–8)
PLATELET # BLD AUTO: 286 K/UL (ref 150–450)
PROTEIN UR: NEGATIVE MG/DL
RBC # BLD AUTO: 4.01 M/UL (ref 3.8–5.2)
SP GR UR STRIP: 1.02 (ref 1–1.03)
UROBILINOGEN UR STRIP-ACNC: NORMAL E.U./DL
WBC # BLD AUTO: 8 K/UL (ref 4–11)

## 2020-12-16 PROCEDURE — 85027 COMPLETE CBC AUTOMATED: CPT | Performed by: STUDENT IN AN ORGANIZED HEALTH CARE EDUCATION/TRAINING PROGRAM

## 2020-12-16 PROCEDURE — 99207 PR PRENATAL VISIT: CPT | Mod: GC | Performed by: STUDENT IN AN ORGANIZED HEALTH CARE EDUCATION/TRAINING PROGRAM

## 2020-12-16 PROCEDURE — 81003 URINALYSIS AUTO W/O SCOPE: CPT | Performed by: STUDENT IN AN ORGANIZED HEALTH CARE EDUCATION/TRAINING PROGRAM

## 2020-12-16 PROCEDURE — 36415 COLL VENOUS BLD VENIPUNCTURE: CPT | Performed by: STUDENT IN AN ORGANIZED HEALTH CARE EDUCATION/TRAINING PROGRAM

## 2020-12-16 RX ORDER — ESCITALOPRAM OXALATE 10 MG/1
TABLET ORAL
COMMUNITY
Start: 2019-12-29 | End: 2020-12-21

## 2020-12-16 RX ORDER — PYRIDOXINE HCL (VITAMIN B6) 25 MG
25 TABLET ORAL 3 TIMES DAILY PRN
Qty: 90 TABLET | Refills: 3 | Status: SHIPPED | OUTPATIENT
Start: 2020-12-16 | End: 2021-07-29

## 2020-12-16 ASSESSMENT — MIFFLIN-ST. JEOR: SCORE: 1974.46

## 2020-12-16 NOTE — LETTER
RETURN TO WORK/SCHOOL FORM    12/16/2020    Re: Rianna Driscoll Donovan  1990      To Whom It May Concern:     Rianna Yamila Man was here in clinic with significant other Hola Perez.  Please excuse him from work today as he was accompanying her          Sandro Rosenthal MD  12/16/2020 4:57 PM

## 2020-12-16 NOTE — PROGRESS NOTES
SUBJECTIVE:     HPI:  This is a 30 year old female patient,  who presents for her first obstetrical visit.     KWAKU: 2021.  She is 9 weeks 0 days per US on 12/15/2020.  Her cycles are regular.  Her last menstrual period was normal and 10/17/2020.   Since her LMP, she has experienced  nausea, emesis, headache and chest discomfort).   She denies abdominal pain, dysuria, pelvic pain, urinary urgency, lightheadedness and vaginal bleeding.    Additional History:   Patient was just seen in the ED on 12/15/2020 for fatigue and emesis  -US performed at that time showed single IUP at 8 weeks 6 days with EDC of 2021  -Was seen for body aches with chills, fatigue and nausea.    -COVID swab obtained, we cannot see the results in care everywhere    Have you travelled during the pregnancy? No  Have your sexual partner(s) travelled during the pregnancy?No    HISTORY:   Planned Pregnancy: Yes  Marital Status: Single  Occupation: Sex worker per chart review  Living in Household: Significant Other and Children    Past History:  Her past medical history   Past Medical History:   Diagnosis Date     Anxiety      Depressive disorder      Schizoaffective disorder (H)      Varicella     had disease   .      She has a history of  Prior normal delivery, son is around 9 years old.      Since her last LMP she denies use of alcohol, tobacco and street drugs.    Past medical, surgical, social and family history were reviewed and updated in [facility].      Current Outpatient Medications   Medication     escitalopram (LEXAPRO) 10 MG tablet     folic acid (FOLVITE) 1 MG tablet     metoclopramide (REGLAN) 5 MG tablet     Prenatal Vit-Fe Fumarate-FA (PRENATAL MULTIVITAMIN W/IRON) 27-0.8 MG tablet     nitroFURantoin macrocrystal-monohydrate (MACROBID) 100 MG capsule     zinc oxide (DESITIN) 20 % external ointment     No current facility-administered medications for this visit.        ROS:   12 point review of systems negative other than  "symptoms noted below or in the HPI.      OBJECTIVE:     EXAM:  /70   Pulse 82   Temp 97.8  F (36.6  C) (Oral)   Resp 16   Ht 1.74 m (5' 8.5\")   Wt 119.3 kg (263 lb)   LMP 2020   SpO2 99%   BMI 39.40 kg/m   Body mass index is 39.4 kg/m .    GENERAL: healthy, alert and no distress  EYES: Eyes grossly normal to inspection, PERRL and conjunctivae and sclerae normal  HENT: ear canals and TM's normal, nose and mouth without ulcers or lesions  NECK: no adenopathy, no asymmetry, masses, or scars and thyroid normal to palpation  RESP: lungs clear to auscultation - no rales, rhonchi or wheezes  BREAST: normal without masses, tenderness or nipple discharge and no palpable axillary masses or adenopathy  CV: regular rate and rhythm, normal S1 S2, no S3 or S4, no murmur, click or rub, no peripheral edema and peripheral pulses strong  ABDOMEN: soft, nontender, no hepatosplenomegaly, no masses and bowel sounds normal   (female): normal female external genitalia, normal urethral meatus, vaginal mucosa pink, moist, well rugated, and normal cervix/adnexa/uterus without masses or discharge  MS: no gross musculoskeletal defects noted, no edema  SKIN: no suspicious lesions or rashes  NEURO: Normal strength and tone, mentation intact and speech normal  PSYCH: mentation appears normal, affect normal/bright    ASSESSMENT/PLAN:       ICD-10-CM    1. Encounter for supervision of other normal pregnancy in first trimester  Z34.81 ABO/Rh Type-HML ([facility])     Antibody Screen ([facility])     Chlamydia/Gono Amplified ([facility])     CBC with Plt ([facility])     Culture Urine ([facility])     Hepatitis B Surface Ag ([facility])     HIV Ag/Ab Screen Cylinder ([facility])     Lead, Blood ([facility])     Rubella  IgG ([facility])     Syphilis Screen Cylinder ([facility])     Urinalysis([facility])     GYN Cytology ([facility])       30 year old , Unknown weeks of pregnancy with KWAKU of Not found.    Counseling given: "   -Discussed continuing prenatal vitamin  -COVID 19 recently found to be negative  -Started doxylamine and pyridoxine to help with the nausea she has been experiencing  -Can continue to use Zofran as needed for breakthrough nausea  -Discussed pushing fluids and seeking care when unable to tolerate PO  -Discussed expected weight gain 11-20 ibs  -Patient requested OB us, though upon chart review patient   -Patient has discontinued her lexapro previously and is not on any medications currently she needs to stop  -Will follow up in 4-6 weeks for reevaluation     Precepted with Dr. Baudilio Rosenthal MD

## 2020-12-17 ENCOUNTER — COMMUNICATION - HEALTHEAST (OUTPATIENT)
Dept: SCHEDULING | Facility: CLINIC | Age: 30
End: 2020-12-17

## 2020-12-17 LAB
ABO + RH BLD: NORMAL
BLD GP AB SCN SERPL QL: NEGATIVE
CULTURE: NORMAL
HBV SURFACE AG SERPL QL IA: NEGATIVE
REPEAT ABO/RH TYPING (HML): NORMAL
RUBV IGG SERPL QL IA: POSITIVE
TREPONEMA ANTIBODY (SYPHILIS): NEGATIVE

## 2020-12-18 ENCOUNTER — RECORDS - HEALTHEAST (OUTPATIENT)
Dept: ADMINISTRATIVE | Facility: OTHER | Age: 30
End: 2020-12-18

## 2020-12-18 LAB
C TRACH RRNA SPEC QL NAA+PROBE: NEGATIVE
CYTOLOGY CVX/VAG DOC THIN PREP: NORMAL
HIGH RISK?: NO
HPV REFLEX?: NORMAL
LAB AP ABNORMAL BLEEDING: NO
LAB AP BIRTH CONTROL/HORMONES: NORMAL
LAB AP CERVICAL APPEARANCE: NORMAL
LAB AP PATIENT STATUS: NORMAL
LAB AP PREVIOUS ABNL: NO
LAB AP PREVIOUS NORMAL: 2017
LAST MENS PERIOD: NORMAL
N GONORRHOEA RRNA SPEC QL NAA+PROBE: NEGATIVE
PATH REPORT.COMMENTS IMP SPEC: NORMAL
PATH REPORT.COMMENTS IMP SPEC: NORMAL
SPECIMEN ADEQUACY:: NORMAL

## 2021-01-14 ENCOUNTER — HOSPITAL ENCOUNTER (OUTPATIENT)
Dept: ULTRASOUND IMAGING | Facility: HOSPITAL | Age: 31
Discharge: HOME | End: 2021-01-14
Attending: FAMILY MEDICINE

## 2021-01-14 ENCOUNTER — RECORDS - HEALTHEAST (OUTPATIENT)
Dept: ADMINISTRATIVE | Facility: OTHER | Age: 31
End: 2021-01-14

## 2021-01-14 ENCOUNTER — TELEPHONE (OUTPATIENT)
Dept: FAMILY MEDICINE | Facility: CLINIC | Age: 31
End: 2021-01-14

## 2021-01-14 DIAGNOSIS — Z34.81 SUPERVISION OF NORMAL INTRAUTERINE PREGNANCY IN MULTIGRAVIDA IN FIRST TRIMESTER: ICD-10-CM

## 2021-01-14 NOTE — TELEPHONE ENCOUNTER
Patient called and stated someone just called her to cancel her US today and this had been the 4th time since she rescheduled.Patient would like to get scheduled at [place] for Today as she state she is 13 weeks and does not want to keep rescheduling. Patient was inform a message will be taken and a nurse can call back but patient declined to send a message to the nurse and indicate she only wants to speak to a nurse and does not want to have any messages taken and declined to have a nurse call back to schedule appointment. Patient request that I schedule the appointment for her but patient was informed I am unable to schedule outside the clinic only the nurse can schedule for her. I try to reach the nurse but unavailable and informed patient I would have to take a message but patient got mad that I was not able to schedule for her and disconnected her line.

## 2021-02-23 ENCOUNTER — HOSPITAL ENCOUNTER (EMERGENCY)
Facility: CLINIC | Age: 31
Discharge: HOME | End: 2021-02-24
Attending: EMERGENCY MEDICINE | Admitting: EMERGENCY MEDICINE
Payer: COMMERCIAL

## 2021-02-23 DIAGNOSIS — Z11.52 ENCOUNTER FOR SCREENING LABORATORY TESTING FOR SEVERE ACUTE RESPIRATORY SYNDROME CORONAVIRUS 2 (SARS-COV-2): ICD-10-CM

## 2021-02-23 DIAGNOSIS — F15.10 NONDEPENDENT AMPHETAMINE OR RELATED ACTING SYMPATHOMIMETIC ABUSE, CONTINUOUS (H): ICD-10-CM

## 2021-02-23 DIAGNOSIS — F12.10 CANNABIS ABUSE, CONTINUOUS: ICD-10-CM

## 2021-02-23 DIAGNOSIS — F25.1 SCHIZOAFFECTIVE DISORDER, DEPRESSIVE TYPE (H): ICD-10-CM

## 2021-02-23 DIAGNOSIS — R45.851 SUICIDAL IDEATION: ICD-10-CM

## 2021-02-23 DIAGNOSIS — F10.10 ALCOHOL ABUSE, CONTINUOUS: ICD-10-CM

## 2021-02-23 DIAGNOSIS — F43.12 PROLONGED POSTTRAUMATIC STRESS DISORDER: ICD-10-CM

## 2021-02-23 PROCEDURE — 90791 PSYCH DIAGNOSTIC EVALUATION: CPT

## 2021-02-23 PROCEDURE — 80307 DRUG TEST PRSMV CHEM ANLYZR: CPT | Performed by: EMERGENCY MEDICINE

## 2021-02-23 PROCEDURE — 99284 EMERGENCY DEPT VISIT MOD MDM: CPT | Mod: 25 | Performed by: EMERGENCY MEDICINE

## 2021-02-23 PROCEDURE — 93005 ELECTROCARDIOGRAM TRACING: CPT

## 2021-02-23 PROCEDURE — 250N000013 HC RX MED GY IP 250 OP 250 PS 637: Performed by: EMERGENCY MEDICINE

## 2021-02-23 PROCEDURE — 99285 EMERGENCY DEPT VISIT HI MDM: CPT | Mod: 25

## 2021-02-23 PROCEDURE — 80320 DRUG SCREEN QUANTALCOHOLS: CPT | Performed by: EMERGENCY MEDICINE

## 2021-02-23 PROCEDURE — 36415 COLL VENOUS BLD VENIPUNCTURE: CPT

## 2021-02-23 PROCEDURE — 93010 ELECTROCARDIOGRAM REPORT: CPT | Performed by: EMERGENCY MEDICINE

## 2021-02-23 RX ORDER — HYDROXYZINE HYDROCHLORIDE 25 MG/1
25 TABLET, FILM COATED ORAL ONCE
Status: COMPLETED | OUTPATIENT
Start: 2021-02-23 | End: 2021-02-23

## 2021-02-23 RX ADMIN — HYDROXYZINE HYDROCHLORIDE 25 MG: 25 TABLET, FILM COATED ORAL at 23:05

## 2021-02-24 ENCOUNTER — TELEPHONE (OUTPATIENT)
Dept: BEHAVIORAL HEALTH | Facility: CLINIC | Age: 31
End: 2021-02-24

## 2021-02-24 ENCOUNTER — AMBULATORY - HEALTHEAST (OUTPATIENT)
Dept: CARE COORDINATION | Facility: HOSPITAL | Age: 31
End: 2021-02-24

## 2021-02-24 VITALS
DIASTOLIC BLOOD PRESSURE: 72 MMHG | OXYGEN SATURATION: 97 % | HEART RATE: 91 BPM | TEMPERATURE: 97.4 F | SYSTOLIC BLOOD PRESSURE: 115 MMHG | RESPIRATION RATE: 16 BRPM

## 2021-02-24 LAB
ALBUMIN SERPL-MCNC: 3.6 G/DL (ref 3.4–5)
ALP SERPL-CCNC: 68 U/L (ref 40–150)
ALT SERPL W P-5'-P-CCNC: 18 U/L (ref 0–50)
ANION GAP SERPL CALCULATED.3IONS-SCNC: 6 MMOL/L (ref 3–14)
APAP SERPL-MCNC: <2 MG/L (ref 10–20)
AST SERPL W P-5'-P-CCNC: 8 U/L (ref 0–45)
BASOPHILS # BLD AUTO: 0 10E9/L (ref 0–0.2)
BASOPHILS NFR BLD AUTO: 0.6 %
BILIRUB SERPL-MCNC: 0.3 MG/DL (ref 0.2–1.3)
BUN SERPL-MCNC: 8 MG/DL (ref 7–30)
CALCIUM SERPL-MCNC: 8.8 MG/DL (ref 8.5–10.1)
CHLORIDE SERPL-SCNC: 107 MMOL/L (ref 94–109)
CO2 SERPL-SCNC: 28 MMOL/L (ref 20–32)
CREAT SERPL-MCNC: 0.82 MG/DL (ref 0.52–1.04)
DIFFERENTIAL METHOD BLD: NORMAL
EOSINOPHIL # BLD AUTO: 0.2 10E9/L (ref 0–0.7)
EOSINOPHIL NFR BLD AUTO: 2.3 %
ERYTHROCYTE [DISTWIDTH] IN BLOOD BY AUTOMATED COUNT: 12.2 % (ref 10–15)
GFR SERPL CREATININE-BSD FRML MDRD: >90 ML/MIN/{1.73_M2}
GLUCOSE SERPL-MCNC: 96 MG/DL (ref 70–99)
HCT VFR BLD AUTO: 36.6 % (ref 35–47)
HGB BLD-MCNC: 11.7 G/DL (ref 11.7–15.7)
IMM GRANULOCYTES # BLD: 0 10E9/L (ref 0–0.4)
IMM GRANULOCYTES NFR BLD: 0.2 %
INTERPRETATION ECG - MUSE: NORMAL
LABORATORY COMMENT REPORT: NORMAL
LYMPHOCYTES # BLD AUTO: 3 10E9/L (ref 0.8–5.3)
LYMPHOCYTES NFR BLD AUTO: 46 %
MCH RBC QN AUTO: 29 PG (ref 26.5–33)
MCHC RBC AUTO-ENTMCNC: 32 G/DL (ref 31.5–36.5)
MCV RBC AUTO: 91 FL (ref 78–100)
MONOCYTES # BLD AUTO: 0.6 10E9/L (ref 0–1.3)
MONOCYTES NFR BLD AUTO: 9.2 %
NEUTROPHILS # BLD AUTO: 2.7 10E9/L (ref 1.6–8.3)
NEUTROPHILS NFR BLD AUTO: 41.7 %
NRBC # BLD AUTO: 0 10*3/UL
NRBC BLD AUTO-RTO: 0 /100
PLATELET # BLD AUTO: 307 10E9/L (ref 150–450)
POTASSIUM SERPL-SCNC: 3.6 MMOL/L (ref 3.4–5.3)
PROT SERPL-MCNC: 7 G/DL (ref 6.8–8.8)
RBC # BLD AUTO: 4.03 10E12/L (ref 3.8–5.2)
SALICYLATES SERPL-MCNC: 4 MG/DL
SARS-COV-2 RNA RESP QL NAA+PROBE: NEGATIVE
SODIUM SERPL-SCNC: 141 MMOL/L (ref 133–144)
SPECIMEN SOURCE: NORMAL
WBC # BLD AUTO: 6.4 10E9/L (ref 4–11)

## 2021-02-24 PROCEDURE — 85025 COMPLETE CBC W/AUTO DIFF WBC: CPT | Performed by: EMERGENCY MEDICINE

## 2021-02-24 PROCEDURE — 80053 COMPREHEN METABOLIC PANEL: CPT | Performed by: EMERGENCY MEDICINE

## 2021-02-24 PROCEDURE — 80179 DRUG ASSAY SALICYLATE: CPT | Performed by: EMERGENCY MEDICINE

## 2021-02-24 PROCEDURE — 87635 SARS-COV-2 COVID-19 AMP PRB: CPT | Performed by: EMERGENCY MEDICINE

## 2021-02-24 PROCEDURE — 80143 DRUG ASSAY ACETAMINOPHEN: CPT | Performed by: EMERGENCY MEDICINE

## 2021-02-24 NOTE — TELEPHONE ENCOUNTER
9:29am - [place] [facility] notified 5500 is expecting report still. ER given [phone] number to call report.

## 2021-02-24 NOTE — TELEPHONE ENCOUNTER
Patient cleared and ready for behavioral bed placement: Yes   S: 30/F, [place] [facility], SI. Collateral from chart review and DEC at 0405.    B: Hx of depression, schizoaffective disorder and FITZ. Pt reports within the last 2 days pt ingested 8 of her trazodone pills in a suicide attempt. Pt has been medically cleared. Pt reports she has not been eating or sleeping lately and has not been med-compliant for the past few months. Pt also reporting VH of people and that chairs appear to be moving. Pt reports she recently had a miscarriage and that she has had an increase in alcohol and drug use. Pt reports using THC, cocaine and meth 2 days ago. Hx of IP MH admission, most recently July last year. Pt does not have any OP services.    A: Voluntary  No medical concerns. Ambulates / eats / drinks ok  Asymptomatic for Covid - test processing   Drug screen: Amphetamines and marijuana  CBC and CMP WNL     R: 5500 AB / [place]  0455 - Paged on call. 0456 - On call accepts for himself on 5500AB. 0500 - Pt placed in queue and unit notified - unit would like to review clinical and then will call ED for report. 0505 - Faxed clinical to [phone]. 0506 - ED notified.

## 2021-02-24 NOTE — ED NOTES
Sign out Provider: Silvio      Sign out Plan: 30-year-old female who was evaluated for mental health crisis including depression, suicidal ideation, and recent drug overdose in context of multiple stressors including recent pregnancy loss.  She was cleared medically and deemed in need of voluntary psychiatric admission.      Reassessment: No new events on the shift to date.  Patient has been medically cleared.      Disposition: Continue to await bed placement for inpatient mental health admission.       Ralph Murray MD  02/24/21 7066  Addendum:  Inpatient bed was located at [facility], unfortunately no beds available here.  Patient initially agreeable to the transfer.  Just prior to transfer the patient got upset because her lunch tray was not ordered appropriately.  She states she is very frustrated and wanted to go home.  We discussed the reasoning behind her inpatient admission, and ultimately she was able to calm herself and agreed to continue with our plan for voluntary admission and transfer to [facility].  She is stable for transfer.     Ralph Murray MD  02/24/21 0724

## 2021-02-24 NOTE — ED PROVIDER NOTES
[facility] EMERGENCY DEPARTMENT ([facility])    2/23/21        History     Chief Complaint   Patient presents with     Suicidal     Patient BIBA with c/o SI. Patient had plan to OD on Trazedone. Patient reports recent miscarriage this month. Patient has been of antidepressants for approximately 3 months. Patient reports increased ETOH use.      The history is provided by the patient.     Rianna Man is a 30 year old female with a medical history significant for depressive disorder, schizo affective disorder, FITZ, and suicidal ideation who presents to the ED via ambulance for evaluation of suicidal ideation. Patient reports that she has a plan of overdosing. She did not commit to the plan today.  Today, she notes that she has not been eating or sleeping right. Patient notes hallucinations that are similar to her previous episodes. Patient reports drinking alcohol for the past two days and using methamphetamine approximately 3 days ago. She has had previous suicidal plans of cutting and/or burning herself. Patient has not been taking her medications due to a recent pregnancy.    I have reviewed the Medications, Allergies, Past Medical and Surgical History, and Social History in the [employer] system.  PAST MEDICAL HISTORY:   Past Medical History:   Diagnosis Date     Anxiety      Depressive disorder      Schizoaffective disorder (H)      Varicella     had disease       PAST SURGICAL HISTORY:   Past Surgical History:   Procedure Laterality Date     NO HISTORY OF SURGERY         Past medical history, past surgical history, medications, and allergies were reviewed with the patient. Additional pertinent items: None    FAMILY HISTORY:   Family History   Problem Relation Age of Onset     Diabetes Father      Substance Abuse Father      Diabetes Paternal Aunt      Thyroid Disease Mother         grave's disease     Bipolar Disorder Mother      Rheumatoid Arthritis Sister      Crohn's Disease Maternal Aunt       Schizophrenia Maternal Grandmother      Hypertension Other        SOCIAL HISTORY:   Social History     Tobacco Use     Smoking status: Former Smoker     Packs/day: 0.50     Types: Cigarettes     Quit date: 2020     Years since quittin.2     Smokeless tobacco: Never Used     Tobacco comment: 3-4 cig/day   Substance Use Topics     Alcohol use: Not Currently     Alcohol/week: 0.0 standard drinks     Comment: L of vodka daily     Social history was reviewed with the patient. Additional pertinent items: None      Patient's Medications   New Prescriptions    No medications on file   Previous Medications    DOXYLAMINE (UNISOM) 25 MG TABS TABLET    Take 0.5 tablets (12.5 mg) by mouth nightly as needed (Nausea)    FOLIC ACID (FOLVITE) 1 MG TABLET    Take 1 tablet (1 mg) by mouth daily    METOCLOPRAMIDE (REGLAN) 5 MG TABLET    Take 1 tablet (5mg) every 6 hours as needed for onset of migraines    PRENATAL VIT-FE FUMARATE-FA (PRENATAL MULTIVITAMIN W/IRON) 27-0.8 MG TABLET    Take 1 tablet by mouth daily    PYRIDOXINE (VITAMIN B6) 25 MG TABLET    Take 1 tablet (25 mg) by mouth 3 times daily as needed (Nausea)   Modified Medications    No medications on file   Discontinued Medications    No medications on file          Allergies   Allergen Reactions     No Known Allergies         Review of Systems  A complete review of systems was performed with pertinent positives and negatives noted in the HPI, and all other systems negative.    Physical Exam   BP: (!) 151/75  Pulse: 94  Temp: 97.4  F (36.3  C)  Resp: 16  SpO2: 99 %      Physical Exam  Physical Exam   Constitutional: oriented to person, place, and time. appears well-developed and well-nourished.   HENT:   Head: Normocephalic and atraumatic.   Neck: Normal range of motion.   Pulmonary/Chest: Effort normal. No respiratory distress.   Cardiac: No murmurs, rubs, gallops. RRR.  Abdominal: Abdomen soft, nontender, nondistended. No rebound tenderness.  MSK: Long bones  without deformity or evidence of trauma  Neurological: alert and oriented to person, place, and time.   Skin: Skin is warm and dry.   Psychiatric:  normal mood and affect.  behavior is normal. Thought content normal.     ED Course        Procedures         12:23 AM  The patient was seen and examined by Sarthak Anguiano MD in Room ED16B.              EKG Interpretation:      Interpreted by Sarthak Anguiano MD  Time reviewed:3932   Symptoms at time of EKG: ingestion   Rhythm: normal sinus   Rate: normal  Axis: NORMAL  Ectopy: none  Conduction: normal  ST Segments/ T Waves: No ST-T wave changes  Q Waves: none  Comparison to prior: Unchanged    Clinical Impression: normal EKG      Results for orders placed or performed during the hospital encounter of 02/23/21 (from the past 24 hour(s))   Drug abuse screen 6 urine (tox)   Result Value Ref Range    Amphetamine Qual Urine Positive (A) NEG^Negative    Barbiturates Qual Urine Negative NEG^Negative    Benzodiazepine Qual Urine Negative NEG^Negative    Cannabinoids Qual Urine Positive (A) NEG^Negative    Cocaine Qual Urine Negative NEG^Negative    Ethanol Qual Urine Negative NEG^Negative    Opiates Qualitative Urine Negative NEG^Negative     Medications   hydrOXYzine (ATARAX) tablet 25 mg (25 mg Oral Given 2/23/21 4465)             Assessments & Plan (with Medical Decision Making)   MDM  Patient suicidal. Recent overdose attempt. Likely from recent miscarriage and other stressors.  She said she had a recent attempt 2 days ago where she took 8 pills of trazodone.  Here she is alert, oriented mildly stable.  She does not appear to be toxic from his ingestion however will do a set of labs.  Patient agrees with plan for admission.  She would be placed on a hold if she tries to leave.    I have reviewed the nursing notes.    I have reviewed the findings, diagnosis, plan and need for follow up with the patient.    New Prescriptions    No medications on file       Final diagnoses:    Suicidal ideation     I, Lupe Correia, am serving as a trained medical scribe to document services personally performed by Sarthak Anguiano MD, based on the provider's statements to me.     I, Sarthak Anguiano MD, was physically present and have reviewed and verified the accuracy of this note documented by Lupe Correia.    Sarthak Anguiano MD  2/23/2021   [facility] EMERGENCY DEPARTMENT     Sarthak Anguiano MD  02/24/21 0400       Sarthak Anguiano MD  02/24/21 0426

## 2021-02-24 NOTE — ED NOTES
Patient appears anxious, wanted to walk around the hallway, writer explained to her that she cannot walk due to other patients in the hallway.

## 2021-02-25 ENCOUNTER — COMMUNICATION - HEALTHEAST (OUTPATIENT)
Dept: SCHEDULING | Facility: CLINIC | Age: 31
End: 2021-02-25

## 2021-03-08 ENCOUNTER — TELEPHONE (OUTPATIENT)
Dept: FAMILY MEDICINE | Facility: CLINIC | Age: 31
End: 2021-03-08

## 2021-04-10 ENCOUNTER — HEALTH MAINTENANCE LETTER (OUTPATIENT)
Age: 31
End: 2021-04-10

## 2021-05-20 ENCOUNTER — TELEPHONE (OUTPATIENT)
Dept: FAMILY MEDICINE | Facility: CLINIC | Age: 31
End: 2021-05-20

## 2021-05-20 NOTE — TELEPHONE ENCOUNTER
I got the pt ED discharge paperwork, I called to check up on the pt and help the pt setup a ED follow up. The pt was at [facility] for suicidal ideation. I talked to the pt, she stated that she is doing a little better. I was able to help the pt setup a follow up for 05/24/2021 at 8:20am with .
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
## 2021-06-08 NOTE — PROGRESS NOTES
S: 31 y/o female presented to the [facility] ED with SI.    B: Hx schizoaffective d/o, depression, PTSD, OCD traits.  Pt discharged from 5500 AMA on 6/13 and went to [facility] but witnessed another resident overdosing which triggered her anxiety and PTSD.  Hx of suicide attempt via overdose.  Denied HI or using substances.  Pt reporting she is unable to contract for safety if discharged.    A: Voluntary  No medical concerns.  COVID has been ordered.  Drug screen positive for amphetamines, THC    R: 0454 Dr. Delgadillo accepts.  4500/Joselyn.  Placed in queue at 0456.  ED notified at 0459.  Unit notified at 0502.

## 2021-06-15 NOTE — PROGRESS NOTES
Patient cleared and ready for behavioral bed placement: Yes   S: 30/F, [facility] ED, SI. Collateral from chart review and DEC at 0405.    B: Hx of depression, schizoaffective disorder and FITZ. SI w/ a plan to overdose. Pt reports within the last 2 days pt ingested 8 of her trazodone pills in a suicide attempt. Pt has been medically cleared. Pt reports she has not been eating or sleeping lately and has not been med-compliant for the past few months. Pt also reporting VH of people and that chairs appear to be moving. Pt reports she recently had a miscarriage and that she has had an increase in alcohol and drug use. Pt reports using THC, cocaine and meth 2 days ago. Hx of IP MH admission, most recently July last year. Pt does not have any OP services.    A: Voluntary  No medical concerns. Ambulates / eats / drinks ok  Asymptomatic for Covid - test processing   Drug screen: Amphetamines and marijuana  CBC and CMP WNL     R: 5500 AB / [place]  0455 - Paged on call. 0456 - On call accepts for himself on 5500AB. 0500 - Pt placed in queue and unit notified - unit would like to review clinical and then will call ED for report. 0505 - Faxed clinical to 5500. 0506 - ED notified.

## 2021-07-23 ENCOUNTER — TELEPHONE (OUTPATIENT)
Dept: FAMILY MEDICINE | Facility: CLINIC | Age: 31
End: 2021-07-23
Payer: COMMERCIAL

## 2021-07-23 DIAGNOSIS — Z53.9 ERRONEOUS ENCOUNTER--DISREGARD: Primary | ICD-10-CM

## 2021-07-29 ENCOUNTER — OFFICE VISIT (OUTPATIENT)
Dept: FAMILY MEDICINE | Facility: CLINIC | Age: 31
End: 2021-07-29
Payer: COMMERCIAL

## 2021-07-29 VITALS
DIASTOLIC BLOOD PRESSURE: 71 MMHG | RESPIRATION RATE: 20 BRPM | WEIGHT: 258 LBS | BODY MASS INDEX: 38.21 KG/M2 | SYSTOLIC BLOOD PRESSURE: 115 MMHG | TEMPERATURE: 98.4 F | OXYGEN SATURATION: 99 % | HEART RATE: 77 BPM | HEIGHT: 69 IN

## 2021-07-29 DIAGNOSIS — N91.2 AMENORRHEA: Primary | ICD-10-CM

## 2021-07-29 DIAGNOSIS — F41.1 GENERALIZED ANXIETY DISORDER: ICD-10-CM

## 2021-07-29 DIAGNOSIS — F33.1 MAJOR DEPRESSIVE DISORDER, RECURRENT EPISODE, MODERATE (H): ICD-10-CM

## 2021-07-29 DIAGNOSIS — Z30.015 ENCOUNTER FOR INITIAL PRESCRIPTION OF VAGINAL RING HORMONAL CONTRACEPTIVE: ICD-10-CM

## 2021-07-29 DIAGNOSIS — F25.9 SCHIZOAFFECTIVE DISORDER, UNSPECIFIED TYPE (H): ICD-10-CM

## 2021-07-29 PROBLEM — O03.9 SPONTANEOUS ABORTION: Status: ACTIVE | Noted: 2021-02-07

## 2021-07-29 PROBLEM — R00.0 SINUS TACHYCARDIA: Status: ACTIVE | Noted: 2021-07-29

## 2021-07-29 PROBLEM — F15.20 SEVERE AMPHETAMINE SUBSTANCE USE DISORDER (H): Status: ACTIVE | Noted: 2021-02-24

## 2021-07-29 PROBLEM — Z11.3 SCREEN FOR STD (SEXUALLY TRANSMITTED DISEASE): Status: ACTIVE | Noted: 2021-07-29

## 2021-07-29 PROBLEM — F12.20 SEVERE CANNABIS USE DISORDER (H): Status: ACTIVE | Noted: 2020-05-14

## 2021-07-29 LAB — HCG UR QL: NEGATIVE

## 2021-07-29 PROCEDURE — 99214 OFFICE O/P EST MOD 30 MIN: CPT | Mod: GC | Performed by: STUDENT IN AN ORGANIZED HEALTH CARE EDUCATION/TRAINING PROGRAM

## 2021-07-29 PROCEDURE — 81025 URINE PREGNANCY TEST: CPT | Performed by: STUDENT IN AN ORGANIZED HEALTH CARE EDUCATION/TRAINING PROGRAM

## 2021-07-29 RX ORDER — ETONOGESTREL AND ETHINYL ESTRADIOL VAGINAL RING .015; .12 MG/D; MG/D
RING VAGINAL
Qty: 12 EACH | Refills: 0 | Status: SHIPPED | OUTPATIENT
Start: 2021-07-29 | End: 2022-07-15

## 2021-07-29 RX ORDER — NORELGESTROMIN AND ETHINYL ESTRADIOL 35; 150 UG/MG; UG/MG
PATCH TRANSDERMAL
Qty: 9 PATCH | Refills: 3 | Status: SHIPPED | OUTPATIENT
Start: 2021-07-29 | End: 2021-07-29

## 2021-07-29 ASSESSMENT — ANXIETY QUESTIONNAIRES
2. NOT BEING ABLE TO STOP OR CONTROL WORRYING: NEARLY EVERY DAY
1. FEELING NERVOUS, ANXIOUS, OR ON EDGE: NEARLY EVERY DAY
6. BECOMING EASILY ANNOYED OR IRRITABLE: NEARLY EVERY DAY
IF YOU CHECKED OFF ANY PROBLEMS ON THIS QUESTIONNAIRE, HOW DIFFICULT HAVE THESE PROBLEMS MADE IT FOR YOU TO DO YOUR WORK, TAKE CARE OF THINGS AT HOME, OR GET ALONG WITH OTHER PEOPLE: VERY DIFFICULT
3. WORRYING TOO MUCH ABOUT DIFFERENT THINGS: NEARLY EVERY DAY
7. FEELING AFRAID AS IF SOMETHING AWFUL MIGHT HAPPEN: NEARLY EVERY DAY
5. BEING SO RESTLESS THAT IT IS HARD TO SIT STILL: NEARLY EVERY DAY
GAD7 TOTAL SCORE: 21

## 2021-07-29 ASSESSMENT — PATIENT HEALTH QUESTIONNAIRE - PHQ9
5. POOR APPETITE OR OVEREATING: NEARLY EVERY DAY
SUM OF ALL RESPONSES TO PHQ QUESTIONS 1-9: 21

## 2021-07-29 ASSESSMENT — MIFFLIN-ST. JEOR: SCORE: 1954.66

## 2021-07-29 NOTE — PROGRESS NOTES
Preceptor Attestation:   Patient seen, evaluated and discussed with the resident. I have verified the content of the note, which accurately reflects my assessment of the patient and the plan of care.  Supervising Physician:Sandro Henderson MD  [facility]
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
## 2021-07-29 NOTE — LETTER
[employer] Support Animal    7/29/2021    Re: Rianna Man   1990      To Whom It May Concern:    Patient is deemed appropriate for an emotional support animal.  The patient has owned the dog sine 4/2021 and he has been supportive.  The pet has never caused any property damage at this point and has never been the reason the patient needed to leave a home.  The dog is currently up to date with his vaccinations and is otherwise healthy.  The patient is needed to help the patient and provide emotional support.  Please consider this letter permission to have an emotional support animal at her property.      Sincerely,   Sandro Rosenthal MD  7/29/2021 2:43 PM

## 2021-07-30 ASSESSMENT — ANXIETY QUESTIONNAIRES: GAD7 TOTAL SCORE: 21

## 2021-08-15 ENCOUNTER — TRANSFERRED RECORDS (OUTPATIENT)
Dept: HEALTH INFORMATION MANAGEMENT | Facility: CLINIC | Age: 31
End: 2021-08-15

## 2021-08-18 ENCOUNTER — TELEPHONE (OUTPATIENT)
Dept: FAMILY MEDICINE | Facility: CLINIC | Age: 31
End: 2021-08-18

## 2021-08-18 NOTE — TELEPHONE ENCOUNTER
I got the pt ED discharge paperwork, I called to check up on the pt and help the pt setup a ED follow up. The pt was at [place] for a lump. I called the pt but she did not answer her phone, so I left a vm for her to give me a call back. I called the pt on 08/16/2021, 08/17/2021, and today. I have not gotten a hold of the pt or heard from her.

## 2021-08-19 ENCOUNTER — TELEPHONE (OUTPATIENT)
Dept: FAMILY MEDICINE | Facility: CLINIC | Age: 31
End: 2021-08-19

## 2021-08-19 NOTE — TELEPHONE ENCOUNTER
Called patient no answer, [email or web address]. Patient needing to schedule and appointment in order to get forms filled out for [facility] as  has not seen patient before. Please schedule appointment for patient, forms will be placed at the  draw.

## 2021-08-24 NOTE — TELEPHONE ENCOUNTER
Dr Rosenthal has seen patient on 7/29/2021. Will reroute this encounter to Dr Rosenthal to see if he has forms needing completion. Afua LLOYD

## 2021-08-24 NOTE — TELEPHONE ENCOUNTER
[facility] phone call message- general phone call:    Reason for call: PT called in requesting an update on why the forms we received from [employer] Goetz Michael has not been completed by Dr. Rosenthal or Dr. Edouard yet. I explained to her that we left her a detailed voicemail last week informing her Dr. Rosenthal would like an appt with her first prior to completing the animal support forms. Pt is extremely upset because she needs these forms completed asap as it's preventing her from moving into her new apt. I advised her I will forward the forms to  to see if  would be willing to complete them without an appt.     Return call needed: Yes    OK to leave a message on voice mail? Yes    Primary language: English      needed? No    Call taken on August 24, 2021 at 3:37 PM by Susana Breen

## 2021-08-25 ENCOUNTER — VIRTUAL VISIT (OUTPATIENT)
Dept: FAMILY MEDICINE | Facility: CLINIC | Age: 31
End: 2021-08-25
Payer: COMMERCIAL

## 2021-08-25 DIAGNOSIS — F41.1 GENERALIZED ANXIETY DISORDER: ICD-10-CM

## 2021-08-25 DIAGNOSIS — F25.9 SCHIZOAFFECTIVE DISORDER, UNSPECIFIED TYPE (H): Primary | ICD-10-CM

## 2021-08-25 PROCEDURE — 99213 OFFICE O/P EST LOW 20 MIN: CPT | Mod: 95 | Performed by: STUDENT IN AN ORGANIZED HEALTH CARE EDUCATION/TRAINING PROGRAM

## 2021-08-25 NOTE — PROGRESS NOTES
Preceptor Attestation:  Patient's case reviewed and discussed with Sandro Rosenthal MD resident. I agree with written assessment and plan of care.  Supervising Physician:  Dao Dewitt MD, MD [facility]

## 2021-08-25 NOTE — PROGRESS NOTES
"Family Medicine Video Visit Note  Rianna is being evaluated via a billable video visit.           Video Visit Consent     Patient was verbally read the following and verbal consent was obtained.  \"Video visits are billed at different rates depending on your insurance coverage. During this emergency period, for some insurers they may be billed the same as an in-person visit.  Please reach out to your insurance provider with any questions.  If during the course of the call the physician/provider feels a video visit is not appropriate, you will not be charged for this service.\"     (Name person giving consent:  Patient   Date verbal consent given:  8/25/2021  Time verbal consent given:  10:34 AM)    Patient would like the video invitation sent by: Text to cell phone: [phone]    Chief Complaint   Patient presents with     Forms     ED forms needed     Medication Reconciliation            HPI     Video Start Time: 10:45 AM    Rianna presents to clinic today for the following health issues:    Concern - Forms need filling out for emotional support animal  -Patient requiring emotional support animal for anxiety and schizoaffective disorder and having multiple stressors  -At this time moving to a new apartment  -Needs forms filled out at this time     Current Outpatient Medications   Medication Sig Dispense Refill     etonogestrel-ethinyl estradiol (NUVARING) 0.12-0.015 MG/24HR vaginal ring Place ring for 21 days (3 weeks total).  Remove ring for one week (7 days) before replacing.  When initiating the first ring alternative form for contraception the first 7 days is recommended. 12 each 0     folic acid (FOLVITE) 1 MG tablet Take 1 tablet (1 mg) by mouth daily 90 tablet 1     Prenatal Vit-Fe Fumarate-FA (PRENATAL MULTIVITAMIN W/IRON) 27-0.8 MG tablet Take 1 tablet by mouth daily 90 tablet 3     Allergies   Allergen Reactions     No Known Allergies               Review of Systems:     Constitutional, HEENT, " "cardiovascular, pulmonary, gi and gu systems are negative, except as otherwise noted.         Physical Exam:     LMP 06/25/2021   Estimated body mass index is 38.1 kg/m  as calculated from the following:    Height as of 7/29/21: 1.753 m (5' 9\").    Weight as of 7/29/21: 117 kg (258 lb).    GENERAL: Healthy, alert and no distress  EYES: Eyes grossly normal to inspection.  No discharge or erythema, or obvious scleral/conjunctival abnormalities.  RESP: No audible wheeze, cough, or visible cyanosis.  No visible retractions or increased work of breathing.    SKIN: Visible skin clear. No significant rash, abnormal pigmentation or lesions.  NEURO: Cranial nerves grossly intact.  Mentation and speech appropriate for age.  PSYCH: Mentation appears normal, affect normal/bright, judgement and insight intact, normal speech and appearance well-groomed.        Assessment and Plan   Rianna was seen today for forms and medication reconciliation.    Diagnoses and all orders for this visit:    Schizoaffective disorder, unspecified type (H)    Generalized anxiety disorder    -Patient requiring documentation for emotional support animal  -Apartment does not allow pets  -Given appropriate clearance, patient will work on getting obedience class in the next 12 months (possibly delayed further if COVID spike delays) to ensure patient is trained to live in this new location  -Patient understands that she will need to take to the dog to a obedience class in the next year in order to retain this permission  -Will follow up as needed in person    Refilled medications that would be required in the next 3 months.     After Visit Information:  Patient declined AVS     No follow-ups on file.      Video-Visit Details    Type of service:  Video Visit    Video End Time (time video stopped): 10:57 AM    Originating Location (pt. Location): Home    Distant Location (provider location):  [facility]     Platform used for " Video Visit: Silvia Rosenthal MD  I precepted today with Dr. Dewitt

## 2021-09-09 ENCOUNTER — TELEPHONE (OUTPATIENT)
Dept: FAMILY MEDICINE | Facility: CLINIC | Age: 31
End: 2021-09-09

## 2021-09-09 NOTE — TELEPHONE ENCOUNTER
Called and spoke w/ pt about forums needing to be filled out and will be needing an appt for it. She advised she will give us a call back tomorrow 09/09/2021 to see if she still need to fill out forum or not. Please disgraud if not need. Forms are placed at , thanks

## 2021-09-19 ENCOUNTER — HEALTH MAINTENANCE LETTER (OUTPATIENT)
Age: 31
End: 2021-09-19

## 2021-09-29 ENCOUNTER — TELEPHONE (OUTPATIENT)
Dept: FAMILY MEDICINE | Facility: CLINIC | Age: 31
End: 2021-09-29

## 2021-09-29 NOTE — TELEPHONE ENCOUNTER
I got the pt ED discharge paperwork, I called to check up on the pt and help the pt setup a ED follow up. The pt was at [facility] for abscess, and toe pain. I called and talked to the pt, pt stated that she is doing better. Pt wanted to follow up, so I was able to setup for the pt to come in on 10/05/2021 at 9:40am with .
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
## 2022-05-01 ENCOUNTER — HEALTH MAINTENANCE LETTER (OUTPATIENT)
Age: 32
End: 2022-05-01

## 2022-06-29 ENCOUNTER — PATIENT OUTREACH (OUTPATIENT)
Dept: FAMILY MEDICINE | Facility: CLINIC | Age: 32
End: 2022-06-29

## 2022-06-29 NOTE — PROGRESS NOTES
Clinic Care Coordination Contact    Follow Up Progress Note      Assessment: The pt was recently in the ED, I called to check up on the pt, and help the pt setup a ED follow up. The pt was at [facility] for a laceration of right little finger. I called the pt, but got her vm, so I left a vm for the pt to give me a call back.     Care Gaps:    Health Maintenance Due   Topic Date Due     COVID-19 Vaccine (1) Never done     Pneumococcal Vaccine: Pediatrics (0 to 5 Years) and At-Risk Patients (6 to 64 Years) (1 - PCV) 03/16/1997     PREVENTIVE CARE VISIT  01/13/2018           Goals addressed this encounter:    Goals Addressed    None         Intervention/Education provided during outreach:               Plan:     Care Coordinator will follow up in month

## 2022-06-30 ENCOUNTER — TELEPHONE (OUTPATIENT)
Dept: FAMILY MEDICINE | Facility: CLINIC | Age: 32
End: 2022-06-30

## 2022-06-30 ENCOUNTER — PATIENT OUTREACH (OUTPATIENT)
Dept: FAMILY MEDICINE | Facility: CLINIC | Age: 32
End: 2022-06-30

## 2022-06-30 NOTE — TELEPHONE ENCOUNTER
Called pt to remind her of her upcoming appt due to high no show percentage. VF left a voicemail relaying the appt time and provider

## 2022-06-30 NOTE — PROGRESS NOTES
Clinic Care Coordination Contact    Follow Up Progress Note      Assessment:The pt was recently in the ED, I called to check up on the pt, and help the pt setup a ED follow up. The pt was at [facility] for a laceration of right little finger. I called the pt, but got her vm, so I left a vm for the pt to give me a call back.         Care Gaps:    Health Maintenance Due   Topic Date Due     COVID-19 Vaccine (1) Never done     Pneumococcal Vaccine: Pediatrics (0 to 5 Years) and At-Risk Patients (6 to 64 Years) (1 - PCV) 03/16/1997     PREVENTIVE CARE VISIT  01/13/2018           Goals addressed this encounter:    Goals Addressed    None         Intervention/Education provided during outreach:               Plan:     Care Coordinator will follow up in month

## 2022-07-01 ENCOUNTER — PATIENT OUTREACH (OUTPATIENT)
Dept: FAMILY MEDICINE | Facility: CLINIC | Age: 32
End: 2022-07-01

## 2022-07-01 NOTE — PROGRESS NOTES
Clinic Care Coordination Contact    Follow Up Progress Note      Assessment: :The pt was recently in the ED, I called to check up on the pt, and help the pt setup a ED follow up. The pt was at [facility] for a laceration of right little finger. I called the pt, but got her vm, so I left a vm for the pt to give me a call back.     Care Gaps:    Health Maintenance Due   Topic Date Due     COVID-19 Vaccine (1) Never done     Pneumococcal Vaccine: Pediatrics (0 to 5 Years) and At-Risk Patients (6 to 64 Years) (1 - PCV) 03/16/1997     PREVENTIVE CARE VISIT  01/13/2018           Goals addressed this encounter:    Goals Addressed    None         Intervention/Education provided during outreach:               Plan:     Care Coordinator will follow up in month

## 2022-07-12 ENCOUNTER — HOSPITAL ENCOUNTER (INPATIENT)
Facility: CLINIC | Age: 32
LOS: 3 days | Discharge: HOME | End: 2022-07-15
Attending: PSYCHIATRY & NEUROLOGY | Admitting: PSYCHIATRY & NEUROLOGY
Payer: COMMERCIAL

## 2022-07-12 ENCOUNTER — TELEPHONE (OUTPATIENT)
Dept: BEHAVIORAL HEALTH | Facility: CLINIC | Age: 32
End: 2022-07-12

## 2022-07-12 ENCOUNTER — HOSPITAL ENCOUNTER (EMERGENCY)
Facility: HOSPITAL | Age: 32
Discharge: PSYCHIATRIC OR SUBSTANCE USE FACILITY | End: 2022-07-12
Attending: EMERGENCY MEDICINE | Admitting: EMERGENCY MEDICINE
Payer: COMMERCIAL

## 2022-07-12 ENCOUNTER — PATIENT OUTREACH (OUTPATIENT)
Dept: FAMILY MEDICINE | Facility: CLINIC | Age: 32
End: 2022-07-12

## 2022-07-12 VITALS
DIASTOLIC BLOOD PRESSURE: 59 MMHG | SYSTOLIC BLOOD PRESSURE: 116 MMHG | HEART RATE: 94 BPM | TEMPERATURE: 98.5 F | OXYGEN SATURATION: 99 % | WEIGHT: 230 LBS | HEIGHT: 68 IN | BODY MASS INDEX: 34.86 KG/M2 | RESPIRATION RATE: 18 BRPM

## 2022-07-12 DIAGNOSIS — Z11.3 SCREEN FOR STD (SEXUALLY TRANSMITTED DISEASE): Primary | ICD-10-CM

## 2022-07-12 DIAGNOSIS — F51.01 PRIMARY INSOMNIA: ICD-10-CM

## 2022-07-12 DIAGNOSIS — F25.9 SCHIZOAFFECTIVE DISORDER, UNSPECIFIED TYPE (H): ICD-10-CM

## 2022-07-12 DIAGNOSIS — A74.9 CHLAMYDIA: ICD-10-CM

## 2022-07-12 DIAGNOSIS — F25.1 SCHIZOAFFECTIVE DISORDER, DEPRESSIVE TYPE (H): ICD-10-CM

## 2022-07-12 DIAGNOSIS — R45.851 SUICIDAL IDEATION: ICD-10-CM

## 2022-07-12 DIAGNOSIS — R45.851 SUICIDAL IDEATION: Primary | ICD-10-CM

## 2022-07-12 DIAGNOSIS — F51.5 NIGHTMARES: ICD-10-CM

## 2022-07-12 DIAGNOSIS — F41.1 GENERALIZED ANXIETY DISORDER: ICD-10-CM

## 2022-07-12 LAB
AMPHETAMINES UR QL SCN: ABNORMAL
BARBITURATES UR QL: ABNORMAL
BENZODIAZ UR QL: ABNORMAL
CANNABINOIDS UR QL SCN: ABNORMAL
COCAINE UR QL: ABNORMAL
CREAT UR-MCNC: 227 MG/DL
HCG UR QL: NEGATIVE
OPIATES UR QL SCN: ABNORMAL
OXYCODONE UR QL: ABNORMAL
PCP UR QL SCN: ABNORMAL
SARS-COV-2 RNA RESP QL NAA+PROBE: NEGATIVE

## 2022-07-12 PROCEDURE — 87635 SARS-COV-2 COVID-19 AMP PRB: CPT | Performed by: EMERGENCY MEDICINE

## 2022-07-12 PROCEDURE — 81025 URINE PREGNANCY TEST: CPT | Performed by: EMERGENCY MEDICINE

## 2022-07-12 PROCEDURE — 250N000013 HC RX MED GY IP 250 OP 250 PS 637: Performed by: EMERGENCY MEDICINE

## 2022-07-12 PROCEDURE — 250N000013 HC RX MED GY IP 250 OP 250 PS 637

## 2022-07-12 PROCEDURE — 99285 EMERGENCY DEPT VISIT HI MDM: CPT | Mod: 25

## 2022-07-12 PROCEDURE — 96374 THER/PROPH/DIAG INJ IV PUSH: CPT

## 2022-07-12 PROCEDURE — 250N000011 HC RX IP 250 OP 636: Performed by: EMERGENCY MEDICINE

## 2022-07-12 PROCEDURE — 99207 PR NO CHARGE NURSE ONLY: CPT

## 2022-07-12 PROCEDURE — C9803 HOPD COVID-19 SPEC COLLECT: HCPCS

## 2022-07-12 PROCEDURE — 124N000002 HC R&B MH UMMC

## 2022-07-12 PROCEDURE — 90791 PSYCH DIAGNOSTIC EVALUATION: CPT

## 2022-07-12 PROCEDURE — 80307 DRUG TEST PRSMV CHEM ANLYZR: CPT | Performed by: EMERGENCY MEDICINE

## 2022-07-12 RX ORDER — ACETAMINOPHEN 325 MG/1
650 TABLET ORAL EVERY 4 HOURS PRN
Status: DISCONTINUED | OUTPATIENT
Start: 2022-07-12 | End: 2022-07-15 | Stop reason: HOSPADM

## 2022-07-12 RX ORDER — MAGNESIUM HYDROXIDE/ALUMINUM HYDROXICE/SIMETHICONE 120; 1200; 1200 MG/30ML; MG/30ML; MG/30ML
30 SUSPENSION ORAL EVERY 4 HOURS PRN
Status: DISCONTINUED | OUTPATIENT
Start: 2022-07-12 | End: 2022-07-15

## 2022-07-12 RX ORDER — OLANZAPINE 10 MG/1
10 TABLET ORAL 3 TIMES DAILY PRN
Status: DISCONTINUED | OUTPATIENT
Start: 2022-07-12 | End: 2022-07-15 | Stop reason: HOSPADM

## 2022-07-12 RX ORDER — OLANZAPINE 10 MG/2ML
10 INJECTION, POWDER, FOR SOLUTION INTRAMUSCULAR 3 TIMES DAILY PRN
Status: DISCONTINUED | OUTPATIENT
Start: 2022-07-12 | End: 2022-07-15 | Stop reason: HOSPADM

## 2022-07-12 RX ORDER — PRAZOSIN HYDROCHLORIDE 5 MG/1
5 CAPSULE ORAL AT BEDTIME
Status: ON HOLD | COMMUNITY
Start: 2022-06-28 | End: 2022-07-15

## 2022-07-12 RX ORDER — OLANZAPINE 15 MG/1
15 TABLET ORAL DAILY
Status: ON HOLD | COMMUNITY
Start: 2022-05-31 | End: 2022-07-15

## 2022-07-12 RX ORDER — ONDANSETRON 4 MG/1
4 TABLET, ORALLY DISINTEGRATING ORAL ONCE
Status: COMPLETED | OUTPATIENT
Start: 2022-07-12 | End: 2022-07-12

## 2022-07-12 RX ORDER — LANOLIN ALCOHOL/MO/W.PET/CERES
3 CREAM (GRAM) TOPICAL
Status: DISCONTINUED | OUTPATIENT
Start: 2022-07-12 | End: 2022-07-15 | Stop reason: HOSPADM

## 2022-07-12 RX ORDER — OLANZAPINE 5 MG/1
15 TABLET ORAL ONCE
Status: COMPLETED | OUTPATIENT
Start: 2022-07-12 | End: 2022-07-12

## 2022-07-12 RX ORDER — HYDROXYZINE PAMOATE 50 MG/1
50 CAPSULE ORAL EVERY 6 HOURS PRN
COMMUNITY
End: 2024-02-13

## 2022-07-12 RX ORDER — TRAZODONE HYDROCHLORIDE 100 MG/1
100-200 TABLET ORAL
Status: DISCONTINUED | OUTPATIENT
Start: 2022-07-13 | End: 2022-07-14 | Stop reason: DRUGHIGH

## 2022-07-12 RX ORDER — RISPERIDONE 2 MG/1
2 TABLET ORAL DAILY
Status: ON HOLD | COMMUNITY
Start: 2022-06-28 | End: 2022-07-15

## 2022-07-12 RX ORDER — HYDROXYZINE HYDROCHLORIDE 50 MG/1
50 TABLET, FILM COATED ORAL EVERY 6 HOURS PRN
Status: DISCONTINUED | OUTPATIENT
Start: 2022-07-12 | End: 2022-07-15 | Stop reason: HOSPADM

## 2022-07-12 RX ORDER — VENLAFAXINE HYDROCHLORIDE 75 MG/1
75 CAPSULE, EXTENDED RELEASE ORAL DAILY
COMMUNITY
Start: 2022-05-31 | End: 2022-07-15

## 2022-07-12 RX ORDER — TRAZODONE HYDROCHLORIDE 100 MG/1
100-200 TABLET ORAL AT BEDTIME
Status: DISCONTINUED | OUTPATIENT
Start: 2022-07-12 | End: 2022-07-12

## 2022-07-12 RX ORDER — TRAZODONE HYDROCHLORIDE 100 MG/1
100-200 TABLET ORAL AT BEDTIME
Status: ON HOLD | COMMUNITY
Start: 2022-05-31 | End: 2022-07-15

## 2022-07-12 RX ORDER — POLYETHYLENE GLYCOL 3350 17 G/17G
17 POWDER, FOR SOLUTION ORAL DAILY PRN
Status: DISCONTINUED | OUTPATIENT
Start: 2022-07-12 | End: 2022-07-15 | Stop reason: HOSPADM

## 2022-07-12 RX ORDER — ONDANSETRON 2 MG/ML
4 INJECTION INTRAMUSCULAR; INTRAVENOUS ONCE
Status: COMPLETED | OUTPATIENT
Start: 2022-07-12 | End: 2022-07-12

## 2022-07-12 RX ADMIN — OLANZAPINE 15 MG: 5 TABLET, FILM COATED ORAL at 15:26

## 2022-07-12 RX ADMIN — TRAZODONE HYDROCHLORIDE 200 MG: 100 TABLET ORAL at 22:54

## 2022-07-12 RX ADMIN — ONDANSETRON 4 MG: 4 TABLET, ORALLY DISINTEGRATING ORAL at 13:05

## 2022-07-12 RX ADMIN — ONDANSETRON 4 MG: 2 INJECTION INTRAMUSCULAR; INTRAVENOUS at 15:33

## 2022-07-12 ASSESSMENT — ENCOUNTER SYMPTOMS
VOMITING: 0
HALLUCINATIONS: 1
FEVER: 0
COUGH: 0
NAUSEA: 1

## 2022-07-12 ASSESSMENT — ACTIVITIES OF DAILY LIVING (ADL)
DRESS: SCRUBS (BEHAVIORAL HEALTH)
FALL_HISTORY_WITHIN_LAST_SIX_MONTHS: NO
ADLS_ACUITY_SCORE: 41
TOILETING_ISSUES: NO
ORAL_HYGIENE: INDEPENDENT
HYGIENE/GROOMING: INDEPENDENT
CHANGE_IN_FUNCTIONAL_STATUS_SINCE_ONSET_OF_CURRENT_ILLNESS/INJURY: NO
DEPENDENT_IADLS:: INDEPENDENT
DOING_ERRANDS_INDEPENDENTLY_DIFFICULTY: NO
DRESSING/BATHING_DIFFICULTY: NO
DIFFICULTY_EATING/SWALLOWING: NO
WEAR_GLASSES_OR_BLIND: NO
WALKING_OR_CLIMBING_STAIRS_DIFFICULTY: NO
CONCENTRATING,_REMEMBERING_OR_MAKING_DECISIONS_DIFFICULTY: NO

## 2022-07-12 NOTE — PHARMACY-ADMISSION MEDICATION HISTORY
"Pharmacy Note - Admission Medication History    Pertinent Provider Information:   - Patient has inconsistent pharmacy fill history and patient reports that it has been \"a week or so\" since taking her medications     ______________________________________________________________________    Prior To Admission (PTA) med list completed and updated in EMR.       PTA Med List   Medication Sig Last Dose     hydrOXYzine (VISTARIL) 50 MG capsule Take 50 mg by mouth every 6 hours as needed for anxiety Past Month at prn     OLANZapine (ZYPREXA) 15 MG tablet Take 15 mg by mouth daily Past Month at Unknown time     prazosin (MINIPRESS) 5 MG capsule Take 5 mg by mouth At Bedtime Past Month at Unknown time     risperiDONE (RISPERDAL) 2 MG tablet Take 2 mg by mouth daily Past Month at Unknown time     traZODone (DESYREL) 100 MG tablet Take 100-200 mg by mouth At Bedtime Past Month at Unknown time     venlafaxine (EFFEXOR XR) 75 MG 24 hr capsule Take 75 mg by mouth daily Past Month at Unknown time       Information source(s): Patient and CareEverywhere/[facility]  Method of interview communication: in-person    Summary of Changes to PTA Med List  New: olanzapine, hydroxyzine, prazosin, risperidone, trazodone, venlafaxine   Discontinued: folic acid, prenatal vitamin, nuvaring  Changed:     Patient was asked about OTC/herbal products specifically.  PTA med list reflects this.    In the past week, patient estimated taking medication this percent of the time:  less than 50% due to other.    Allergies were reviewed, assessed, and updated with the patient.      Patient does not use any multi-dose medications prior to admission.    The information provided in this note is only as accurate as the sources available at the time of the update(s).    Thank you for the opportunity to participate in the care of this patient.    Delmy Howell [facility]  7/12/2022 4:00 PM    "

## 2022-07-12 NOTE — PROGRESS NOTES
Clinic Care Coordination Contact    Follow Up Progress Note      Assessment:     Patient presented to clinic this date and reported to  that patient was experiencing suicidal ideation this date.  staff notified SW who met with patient this date.    Patient presented to clinic this date oriented x3. Patient reported having suicidal ideation. When SW inquired if patient had intention of acting on suicidal thoughts, patient stated patient had composed a suicide note. Patient did not elaborate on location or content of note, but patient did not her head to indicate in the affirmative when SW asked again if that meant patient had the intent to act on the suicidal thoughts. Patient vocalized wanting to look into referral to crisis residence like [facility], but SW explained that due to suicidal ideation with intent, patient first must be assessed by ED. Patient reported this information triggered anxiety for her. SW explained process of ensuring this process is handled by clinic to mitigate embarrassment and attracting attention to patient. Patient vocalized agreement to be seen by providers in ED and vocalized wanting to be seen at [facility]. Patient and SW discussed biopsychosocial model of mental health and trying not to self- one self for mental health symptoms. Patient reported having therapist and psychiatrist in the community but still trying to find combination of medication and therapy modality that work best for patient. Patient reported having no weapons on her person at this time and reported last use of substances being alcohol at approximately 12 am this date. Patient gait was normal. Patient speech was normal, no slurring. Patient attention goal-oriented. Patient appearance groomed appropriate for weather and not disheveled.     SW completed warm hand-off of patient to EMS this date as patient was walked to ambulance for transport to [facility] and provided  patient facesheet and medication list.    Care Gaps:    Health Maintenance Due   Topic Date Due     COVID-19 Vaccine (1) Never done     Pneumococcal Vaccine: Pediatrics (0 to 5 Years) and At-Risk Patients (6 to 64 Years) (1 - PCV) 03/16/1997     PREVENTIVE CARE VISIT  01/13/2018       Goals addressed this encounter:    Goals Addressed    None         Intervention/Education provided during outreach:        Plan:   Patient to be evaluated by [facility] ED and to work with ED SW to coordinate admit to crisis residence.    Patient to follow-up with psychiatry and therapist.    Care Coordinator will follow up in TBD.    RAIMUNDO BARBOSA, MEGHA, LADC
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
## 2022-07-12 NOTE — ED NOTES
Pt belongings removed and sent with security, 1:1 in room. Pt was given water and went to the bathroom. Will continue to monitor.

## 2022-07-12 NOTE — ED PROVIDER NOTES
ED Behavioral Health Patient Transition of Care Note    Assuming care from:  Dwain Jackson MD    Brief history:  Rianna Man is a 32 year old female with a pertinent history of schizoaffective disorder, polysubstance abuse, OCD, and suicidal ideation with prior suicide attempts who presents to this ED by EMS for evaluation of suicidal ideation with plan. Patient reports she has struggled with suicidal ideation since she was 13 years old. Recently her emotions have been building up and her suicidal ideation has worsened. Her suicidal ideation is chronic and intermittent. Last night the patient was drinking alcohol. She denies any triggers to cause her suicidal ideation today. She walked to her clinic because of her suicidal ideation and her clinic called EMS to bring her into the ED. Patient sees a counselor. She has not been taking her medications for the past month, she reports she has never consistently taken them. Patient has plan to cut herself and sit in her bathtub with her clothes on in order to end her life. Patient's last suicide attempt was about 2 years ago when she tried taking too much medication. Patient denies any suicidal attempt today. She reports chronic auditory hallucinations secondary to schizoaffective disorder. Patient reports nausea. LMP was at the end of last month. She was last admitted for mental health about a month ago. Denies COVID-19 exposure. Denies homicidal ideation, fever, cough, vomiting, or any other complaints at this time.        Any outstanding medical concerns:  No, medically stable for  admission    Has SW seen the patient:  no    Is the patient on a hold:  Voluntary but holdable    Current plan for disposition:  Awaiting  evaluation    Safety concerns:  No, pt has been cooperative    Dietary restrictions:  None, pt can eat and drink ad rito    Have daily medications been ordered:  no daily meds needed    Significant events during shift:    2:08 PM Patient signed  out to me by Dr. Jackson.   2:56 PM Met with patient for reevaluation.   3:09 PM the patient was complaining of anxiety and stated that olanzapine had worked well for her in the past.  Reviewing her medical records it appears that she has been on 15 mg of p.o. olanzapine per her medical record in June 2021.  She was ordered her typical 15 mg p.o. dose of olanzapine.  We are currently awaiting a mental health bed for her.    ED Course as of 07/12/22 1439   Tue Jul 12, 2022   1410 Pt will be signed out to Dr. Stroud pending DEC assessment.  Pt is voluntary/holdable and medically cleared for  admission.       1. Suicidal ideation    2. Schizoaffective disorder, unspecified type (H)        Toni Stroud MD  Emergency Medicine  [facility] EMERGENCY DEPARTMENT  7/12/2022       Toni Stroud MD  07/12/22 0055

## 2022-07-12 NOTE — ED PROVIDER NOTES
Emergency Department Encounter     Evaluation Date & Time:   7/12/2022 12:45 PM    CHIEF COMPLAINT:  Suicidal      Triage Note:     Mental Health Risk Assessment      PSS-3    Date and Time Over the past 2 weeks have you felt down, depressed, or hopeless? Over the past 2 weeks have you had thoughts of killing yourself? Have you ever attempted to kill yourself? When did this last happen? User   07/12/22 1253 yes yes yes -- STEVO      C-SSRS ([place])    Date and Time Q1 Wished to be Dead (Past Month) Q2 Suicidal Thoughts (Past Month) Q3 Suicidal Thought Method Q4 Suicidal Intent without Specific Plan Q5 Suicide Intent with Specific Plan Q6 Suicide Behavior (Lifetime) Within the Past 3 Months? RETIRED: Level of Risk per Screen Screening Not Complete User   07/12/22 1325 yes yes yes no yes yes -- -- -- STEVO   07/12/22 1253 yes yes yes no yes yes -- -- -- STEVO                    ED COURSE & MEDICAL DECISION MAKING:     ED Course as of 07/12/22 1421   Tue Jul 12, 2022   1410 Pt will be signed out to Dr. Stroud pending DEC assessment.  Pt is voluntary/holdable and medically cleared for  admission.       Pt presenting with suicidal ideations from clinic by EMS.  Pt has a long history of schizoaffective depression with suicidal thoughts on a nearly daily basis, but was feeling more unsafe, so she went to clinic today.  Pt is thinking of getting into a bathtub and slitting her wrists. Reports attempting overdose 2 years ago and last hospitalized for mental health approx 1 month ago.  Pt has some nausea, denies known pregnancy and otherwise well.  Admits to etoh yesterday, but none today and no illicit drugs.  Will get urine drug screen, hcg and have DEC assess.      12:48 PM I met with the patient to gather history and to perform my initial exam. I discussed the plan for care while in the Emergency Department. PPE (gloves, N95 mask) was worn during patient encounters.   1:22 PM I spoke with Ele SCHULTZ, who will assess pt.     2:20 PM DEC called back, will look for  bed.  Pt voluntary/holdable.      At the conclusion of the encounter I discussed the results of all the tests and the disposition. The questions were answered. The patient or family acknowledged understanding and was agreeable with the care plan.      MEDICATIONS GIVEN IN THE EMERGENCY DEPARTMENT:  Medications   ondansetron (ZOFRAN ODT) ODT tab 4 mg (4 mg Oral Given 7/12/22 1305)       NEW PRESCRIPTIONS STARTED AT TODAY'S ED VISIT:  New Prescriptions    No medications on file       HPI   HPI     Rianna Man is a 32 year old female with a pertinent history of schizoaffective disorder, polysubstance abuse, OCD, and suicidal ideation with prior suicide attempts who presents to this ED by EMS for evaluation of suicidal ideation with plan.    Patient reports she has struggled with suicidal ideation since she was 13 years old. Recently her emotions have been building up and her suicidal ideation has worsened. Her suicidal ideation is chronic and intermittent. Last night the patient was drinking alcohol. She denies any triggers to cause her suicidal ideation today. She walked to her clinic because of her suicidal ideation and her clinic called EMS to bring her into the ED. Patient sees a counselor. She has not been taking her medications for the past month, she reports she has never consistently taken them. Patient has plan to cut herself and sit in her bathtub with her clothes on in order to end her life. Patient's last suicide attempt was about 2 years ago when she tried taking too much medication. Patient denies any suicidal attempt today. She reports chronic auditory hallucinations secondary to schizoaffective disorder. Patient reports nausea. LMP was at the end of last month. She was last admitted for mental health about a month ago. Denies COVID-19 exposure. Denies homicidal ideation, fever, cough, vomiting, or any other complaints at this time.     REVIEW OF  "SYSTEMS:  Review of Systems   Constitutional: Negative for fever.   Respiratory: Negative for cough.    Gastrointestinal: Positive for nausea. Negative for vomiting.   Psychiatric/Behavioral: Positive for hallucinations (auditory, chronic) and suicidal ideas. Negative for self-injury.   All other systems reviewed and are negative.      Medical History     Past Medical History:   Diagnosis Date     Anxiety      Depressive disorder      Schizoaffective disorder (H)      Screen for STD (sexually transmitted disease)      Severe amphetamine substance use disorder (H)      Sinus tachycardia      Spontaneous  2021     Varicella        Past Surgical History:   Procedure Laterality Date     DILATION AND CURETTAGE  2021     NO HISTORY OF SURGERY         Family History   Problem Relation Age of Onset     Diabetes Father      Substance Abuse Father      Diabetes Paternal Aunt      Thyroid Disease Mother         grave's disease     Bipolar Disorder Mother      Rheumatoid Arthritis Sister      Crohn's Disease Maternal Aunt      Schizophrenia Maternal Grandmother      Hypertension Other        Social History     Tobacco Use     Smoking status: Current Every Day Smoker     Packs/day: 0.25     Years: 15.00     Pack years: 3.75     Types: Cigarettes     Last attempt to quit: 2020     Years since quittin.6     Smokeless tobacco: Current User     Tobacco comment: 3-4 cig/day   Substance Use Topics     Alcohol use: Yes     Alcohol/week: 0.0 standard drinks     Comment: Alcoholic Drinks/day: Pt reports \"I'm not sure I drink a lot\", but she has been cutting down in the last month and doing well.      Drug use: Yes     Types: Marijuana, Methamphetamines, Heroin, Cocaine     Comment: Drug use: Pt reports daily use of marijuana       etonogestrel-ethinyl estradiol (NUVARING) 0.12-0.015 MG/24HR vaginal ring  folic acid (FOLVITE) 1 MG tablet  Prenatal Vit-Fe Fumarate-FA (PRENATAL MULTIVITAMIN W/IRON) 27-0.8 MG " tablet        Physical Exam     Vitals:  BP (!) 173/109   Pulse 103   Temp 98.9  F (37.2  C) (Oral)   Resp 18   LMP 06/22/2022   SpO2 100%     PHYSICAL EXAM:   Physical Exam  Vitals and nursing note reviewed.   Constitutional:       General: She is not in acute distress.     Appearance: Normal appearance.   HENT:      Head: Normocephalic and atraumatic.      Nose: Nose normal.      Mouth/Throat:      Mouth: Mucous membranes are moist.   Eyes:      Extraocular Movements: Extraocular movements intact.   Cardiovascular:      Rate and Rhythm: Normal rate and regular rhythm.      Pulses: Normal pulses.           Radial pulses are 2+ on the right side and 2+ on the left side.        Dorsalis pedis pulses are 2+ on the right side and 2+ on the left side.   Pulmonary:      Effort: Pulmonary effort is normal.   Skin:     Findings: No rash.   Neurological:      General: No focal deficit present.      Mental Status: She is alert. Mental status is at baseline.      Comments: Fluent speech   Psychiatric:         Mood and Affect: Affect is flat.         Behavior: Behavior normal.         Thought Content: Thought content includes suicidal ideation. Thought content does not include homicidal ideation. Thought content includes suicidal plan.       Results     LAB:  All pertinent labs reviewed and interpreted  Labs Ordered and Resulted from Time of ED Arrival to Time of ED Departure   DRUGS OF ABUSE 1 PANEL, URINE ([facility] ONLY) - Abnormal       Result Value    Amphetamines Urine Screen Positive (*)     Benzodiazepines Urine Screen Negative      Opiates Urine Screen Negative      PCP Urine Screen Negative      Cannabinoids Urine Screen Positive (*)     Barbiturates Urine Screen Negative      Cocaine Urine Screen Positive (*)     Oxycodone Urine Screen Negative      Creatinine Urine mg/dL 227     HCG QUALITATIVE URINE - Normal    hCG Urine Qualitative Negative     COVID-19 VIRUS (CORONAVIRUS) BY PCR       RADIOLOGY:  No orders to  display                  FINAL IMPRESSION:    ICD-10-CM    1. Suicidal ideation  R45.851    2. Schizoaffective disorder, unspecified type (H)  F25.9        0 minutes of critical care time      I, Gaurang Neff, am serving as a scribe to document services personally performed by Dr. Dwain Jackson, based on my observations and the provider's statements to me. I, Dwain Jackson, DO attest that Gaurang Neff is acting in a scribe capacity, has observed my performance of the services and has documented them in accordance with my direction.      Dwain Jackson, DO  Emergency Medicine  [facility] EMERGENCY DEPARTMENT  7/12/2022  12:47 PM          Dwain Jackson MD  07/12/22 3914       Dwain Jackson MD  07/12/22 8434

## 2022-07-12 NOTE — ED TRIAGE NOTES
The pt arrives via EMS from clinic with SI. Pt states she feels like this often but it has gotten worse over the last several days. Denies acting on plans.
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
## 2022-07-13 LAB
ALBUMIN SERPL-MCNC: 3.2 G/DL (ref 3.4–5)
ALP SERPL-CCNC: 77 U/L (ref 40–150)
ALT SERPL W P-5'-P-CCNC: 39 U/L (ref 0–50)
ANION GAP SERPL CALCULATED.3IONS-SCNC: 5 MMOL/L (ref 3–14)
AST SERPL W P-5'-P-CCNC: 23 U/L (ref 0–45)
BASOPHILS # BLD AUTO: 0 10E3/UL (ref 0–0.2)
BASOPHILS NFR BLD AUTO: 0 %
BILIRUB SERPL-MCNC: 0.3 MG/DL (ref 0.2–1.3)
BUN SERPL-MCNC: 14 MG/DL (ref 7–30)
CALCIUM SERPL-MCNC: 8.8 MG/DL (ref 8.5–10.1)
CHLORIDE BLD-SCNC: 105 MMOL/L (ref 94–109)
CHOLEST SERPL-MCNC: 180 MG/DL
CO2 SERPL-SCNC: 26 MMOL/L (ref 20–32)
CREAT SERPL-MCNC: 0.96 MG/DL (ref 0.52–1.04)
EOSINOPHIL # BLD AUTO: 0.1 10E3/UL (ref 0–0.7)
EOSINOPHIL NFR BLD AUTO: 1 %
ERYTHROCYTE [DISTWIDTH] IN BLOOD BY AUTOMATED COUNT: 13.1 % (ref 10–15)
FOLATE SERPL-MCNC: 18.1 NG/ML (ref 4.6–34.8)
GFR SERPL CREATININE-BSD FRML MDRD: 81 ML/MIN/1.73M2
GLUCOSE BLD-MCNC: 83 MG/DL (ref 70–99)
HBA1C MFR BLD: 5.6 % (ref 0–5.6)
HBV SURFACE AG SERPL QL IA: NONREACTIVE
HCT VFR BLD AUTO: 39.1 % (ref 35–47)
HCV AB SERPL QL IA: NONREACTIVE
HDLC SERPL-MCNC: 105 MG/DL
HGB BLD-MCNC: 12.8 G/DL (ref 11.7–15.7)
HIV 1+2 AB+HIV1 P24 AG SERPL QL IA: NONREACTIVE
IMM GRANULOCYTES # BLD: 0 10E3/UL
IMM GRANULOCYTES NFR BLD: 0 %
LDLC SERPL CALC-MCNC: 60 MG/DL
LYMPHOCYTES # BLD AUTO: 2 10E3/UL (ref 0.8–5.3)
LYMPHOCYTES NFR BLD AUTO: 39 %
MCH RBC QN AUTO: 29 PG (ref 26.5–33)
MCHC RBC AUTO-ENTMCNC: 32.7 G/DL (ref 31.5–36.5)
MCV RBC AUTO: 89 FL (ref 78–100)
MONOCYTES # BLD AUTO: 0.5 10E3/UL (ref 0–1.3)
MONOCYTES NFR BLD AUTO: 10 %
NEUTROPHILS # BLD AUTO: 2.6 10E3/UL (ref 1.6–8.3)
NEUTROPHILS NFR BLD AUTO: 50 %
NONHDLC SERPL-MCNC: 75 MG/DL
NRBC # BLD AUTO: 0 10E3/UL
NRBC BLD AUTO-RTO: 0 /100
PLATELET # BLD AUTO: 195 10E3/UL (ref 150–450)
POTASSIUM BLD-SCNC: 4.3 MMOL/L (ref 3.4–5.3)
PROT SERPL-MCNC: 6.7 G/DL (ref 6.8–8.8)
RBC # BLD AUTO: 4.42 10E6/UL (ref 3.8–5.2)
SODIUM SERPL-SCNC: 136 MMOL/L (ref 133–144)
T PALLIDUM AB SER QL: NONREACTIVE
TRIGL SERPL-MCNC: 73 MG/DL
TSH SERPL DL<=0.005 MIU/L-ACNC: 1.79 MU/L (ref 0.4–4)
VIT B12 SERPL-MCNC: 359 PG/ML (ref 232–1245)
WBC # BLD AUTO: 5.2 10E3/UL (ref 4–11)

## 2022-07-13 PROCEDURE — 87340 HEPATITIS B SURFACE AG IA: CPT

## 2022-07-13 PROCEDURE — 86706 HEP B SURFACE ANTIBODY: CPT

## 2022-07-13 PROCEDURE — 93010 ELECTROCARDIOGRAM REPORT: CPT | Performed by: INTERNAL MEDICINE

## 2022-07-13 PROCEDURE — 86803 HEPATITIS C AB TEST: CPT

## 2022-07-13 PROCEDURE — 124N000002 HC R&B MH UMMC

## 2022-07-13 PROCEDURE — 93005 ELECTROCARDIOGRAM TRACING: CPT

## 2022-07-13 PROCEDURE — 84443 ASSAY THYROID STIM HORMONE: CPT

## 2022-07-13 PROCEDURE — 86780 TREPONEMA PALLIDUM: CPT

## 2022-07-13 PROCEDURE — 250N000011 HC RX IP 250 OP 636

## 2022-07-13 PROCEDURE — 80061 LIPID PANEL: CPT

## 2022-07-13 PROCEDURE — 99223 1ST HOSP IP/OBS HIGH 75: CPT | Mod: AI | Performed by: PSYCHIATRY & NEUROLOGY

## 2022-07-13 PROCEDURE — 87389 HIV-1 AG W/HIV-1&-2 AB AG IA: CPT

## 2022-07-13 PROCEDURE — 83036 HEMOGLOBIN GLYCOSYLATED A1C: CPT

## 2022-07-13 PROCEDURE — 36415 COLL VENOUS BLD VENIPUNCTURE: CPT

## 2022-07-13 PROCEDURE — 82607 VITAMIN B-12: CPT

## 2022-07-13 PROCEDURE — 250N000013 HC RX MED GY IP 250 OP 250 PS 637

## 2022-07-13 PROCEDURE — 250N000013 HC RX MED GY IP 250 OP 250 PS 637: Performed by: PSYCHIATRY & NEUROLOGY

## 2022-07-13 PROCEDURE — 80053 COMPREHEN METABOLIC PANEL: CPT

## 2022-07-13 PROCEDURE — 82746 ASSAY OF FOLIC ACID SERUM: CPT

## 2022-07-13 PROCEDURE — 85025 COMPLETE CBC W/AUTO DIFF WBC: CPT

## 2022-07-13 RX ORDER — PRAZOSIN HYDROCHLORIDE 1 MG/1
2 CAPSULE ORAL AT BEDTIME
Status: DISCONTINUED | OUTPATIENT
Start: 2022-07-13 | End: 2022-07-15 | Stop reason: HOSPADM

## 2022-07-13 RX ORDER — IBUPROFEN 200 MG
200 TABLET ORAL EVERY 6 HOURS PRN
Status: DISCONTINUED | OUTPATIENT
Start: 2022-07-13 | End: 2022-07-15 | Stop reason: HOSPADM

## 2022-07-13 RX ORDER — NICOTINE 21 MG/24HR
1 PATCH, TRANSDERMAL 24 HOURS TRANSDERMAL DAILY
Status: DISCONTINUED | OUTPATIENT
Start: 2022-07-13 | End: 2022-07-15 | Stop reason: HOSPADM

## 2022-07-13 RX ORDER — LURASIDONE HYDROCHLORIDE 20 MG/1
20 TABLET, FILM COATED ORAL
Status: DISCONTINUED | OUTPATIENT
Start: 2022-07-13 | End: 2022-07-15 | Stop reason: HOSPADM

## 2022-07-13 RX ORDER — ONDANSETRON 4 MG/1
4 TABLET, FILM COATED ORAL EVERY 6 HOURS PRN
Status: DISCONTINUED | OUTPATIENT
Start: 2022-07-13 | End: 2022-07-15 | Stop reason: HOSPADM

## 2022-07-13 RX ORDER — POLYETHYLENE GLYCOL 3350 17 G
2 POWDER IN PACKET (EA) ORAL
Status: DISCONTINUED | OUTPATIENT
Start: 2022-07-13 | End: 2022-07-15 | Stop reason: HOSPADM

## 2022-07-13 RX ORDER — LORAZEPAM 1 MG/1
1-4 TABLET ORAL EVERY 30 MIN PRN
Status: DISCONTINUED | OUTPATIENT
Start: 2022-07-13 | End: 2022-07-15

## 2022-07-13 RX ORDER — FOLIC ACID 1 MG/1
1 TABLET ORAL DAILY
Status: DISCONTINUED | OUTPATIENT
Start: 2022-07-13 | End: 2022-07-15 | Stop reason: HOSPADM

## 2022-07-13 RX ADMIN — MELATONIN TAB 3 MG 3 MG: 3 TAB at 23:50

## 2022-07-13 RX ADMIN — NICOTINE 1 PATCH: 14 PATCH, EXTENDED RELEASE TRANSDERMAL at 12:57

## 2022-07-13 RX ADMIN — Medication 100 MG: at 08:56

## 2022-07-13 RX ADMIN — TRAZODONE HYDROCHLORIDE 200 MG: 100 TABLET ORAL at 22:06

## 2022-07-13 RX ADMIN — HYDROXYZINE HYDROCHLORIDE 50 MG: 50 TABLET, FILM COATED ORAL at 18:53

## 2022-07-13 RX ADMIN — PRAZOSIN HYDROCHLORIDE 2 MG: 1 CAPSULE ORAL at 22:14

## 2022-07-13 RX ADMIN — HYDROXYZINE HYDROCHLORIDE 50 MG: 50 TABLET, FILM COATED ORAL at 05:26

## 2022-07-13 RX ADMIN — ONDANSETRON HYDROCHLORIDE 4 MG: 4 TABLET, FILM COATED ORAL at 18:53

## 2022-07-13 RX ADMIN — OLANZAPINE 10 MG: 10 TABLET, FILM COATED ORAL at 08:54

## 2022-07-13 RX ADMIN — FOLIC ACID 1 MG: 1 TABLET ORAL at 08:55

## 2022-07-13 RX ADMIN — LURASIDONE HYDROCHLORIDE 20 MG: 20 TABLET, FILM COATED ORAL at 18:01

## 2022-07-13 ASSESSMENT — ACTIVITIES OF DAILY LIVING (ADL)
LAUNDRY: WITH SUPERVISION
ADLS_ACUITY_SCORE: 28
ORAL_HYGIENE: INDEPENDENT
HYGIENE/GROOMING: INDEPENDENT
LAUNDRY: WITH SUPERVISION
ORAL_HYGIENE: INDEPENDENT
ADLS_ACUITY_SCORE: 28
ADLS_ACUITY_SCORE: 28
DRESS: INDEPENDENT
ADLS_ACUITY_SCORE: 28
DRESS: INDEPENDENT
ADLS_ACUITY_SCORE: 28
HYGIENE/GROOMING: INDEPENDENT
ADLS_ACUITY_SCORE: 28

## 2022-07-13 NOTE — TELEPHONE ENCOUNTER
R: Pt in [facility] awaiting placement.     7pm: Intake paged resident to review for Padmaja/Brian  7:20pm: Resident accepts pt for Padmaja/Brian/Edward team  7:30pm: Intake called unit charge to go over pt placement. Charge unavailable, will call back later.   8:15pm: Charge called back, gave number for ER to call for report. Intake informed ER of pt placement.
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
## 2022-07-13 NOTE — PLAN OF CARE
07/12/22 2241   Patient Belongings   Did you bring any home meds/supplements to the hospital?  No   Patient Belongings locker;remains with patient;sent to security per site process   Belongings Search Yes   Clothing Search Yes   Second Staff Olivia CASTAÑEDA     Remains with pt:  Bra    In locker:  Jacket  Shirt  Leggings   Catalina pack  Cellphone   x2  Keys/Keychain  Bra  Socks  Sandals  Scarf  Sunglasses    Sent to security:  Mastercard [phone]  [employer] [phone]  Visa [phone]  [place] [phone]  Visa [phone]  Paypal [phone]    Envelope #[phone]    A               Admission:  I am responsible for any personal items that are not sent to the safe or pharmacy.  [facility] is not responsible for loss, theft or damage of any property in my possession.    Signature:  _________________________________ Date: _______  Time: _____                                              Staff Signature:  ____________________________ Date: ________  Time: _____      2nd Staff person, if patient is unable/unwilling to sign:    Signature: ________________________________ Date: ________  Time: _____     Discharge:  [facility] has returned all of my personal belongings:    Signature: _________________________________ Date: ________  Time: _____                                          Staff Signature:  ____________________________ Date: ________  Time: _____

## 2022-07-13 NOTE — PLAN OF CARE
Problem: Mood Impairment (Anxiety Signs/Symptoms)  Goal: Improved Mood Symptoms (Anxiety Signs/Symptoms)  Outcome: Ongoing, Progressing  Flowsheets (Taken 7/13/2022 1128)  Mutually Determined Action Steps (Improved Mood Symptoms): shares insight re: need for meds   Goal Outcome Evaluation:    MSSA: 4    Pt currently denies SI/SIB but does endorse continued anxiety. Reports that she takes olanzapine prn at home to manage this. Given olanzapine 10 mg prn. Pt intermittently visible in the milieu. She is socially withdrawn but pleasant upon approach. Ate both breakfast and lunch meals. No safety concerns at this time.

## 2022-07-13 NOTE — PLAN OF CARE
Problem: Cognitive Impairment (Anxiety Signs/Symptoms)  Goal: Optimized Cognitive Function (Anxiety Signs/Symptoms)  Outcome: Ongoing, Progressing  Flowsheets (Taken 7/12/2022 2242)  Mutually Determined Action Steps (Optimized Cognitive Function): participates in one-to-one sessions     Problem: Mood Impairment (Anxiety Signs/Symptoms)  Goal: Improved Mood Symptoms (Anxiety Signs/Symptoms)  Outcome: Ongoing, Progressing     Problem: Suicide Risk  Goal: Absence of Self-Harm  Outcome: Ongoing, Progressing   Goal Outcome Evaluation:

## 2022-07-13 NOTE — PHARMACY-ADMISSION MEDICATION HISTORY
Admission medication history completed at [facility]. Please see Pharmacy - Admission Medication History note from 7/12/2022.    Elizabeth Currie, PharmD, BCPS
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
## 2022-07-13 NOTE — DISCHARGE INSTRUCTIONS
"Behavioral Discharge Planning and Instructions    Summary: You were admitted on 7/12/2022 due to Suicidal Ideations.  You were treated by Dr. Terrell and discharged on  7/15/2022 from  to Home    Main Diagnosis:   Schizoaffective Disorder    Health Care Follow-up:   [facility]  [phone]  Fax: [phone]  These will be virtual appointments.  Psychiatrist: Bren Briceno ACP  Wednesday July 27th @ 4:10  Therapist: Jerry Man ACP Wednesday July 20th @ 12:00      [facility]-[phone]  someone will be reaching out to you to coordinate intake for the Co-Occurring Program.        If no appointments scheduled, explain    Attend all scheduled appointments with your outpatient providers. Call at least 24 hours in advance if you need to reschedule an appointment to ensure continued access to your outpatient providers.     Major Treatments, Procedures and Findings:  You were provided with: a psychiatric assessment, assessed for medical stability, medication evaluation and/or management, and milieu management    Symptoms to Report: feeling more aggressive, increased confusion, losing more sleep, mood getting worse, or thoughts of suicide    Early warning signs can include: increased depression or anxiety sleep disturbances increased thoughts or behaviors of suicide or self-harm  increased unusual thinking, such as paranoia or hearing voices    Safety and Wellness:  Take all medicines as directed.  Make no changes unless your doctor suggests them.      Follow treatment recommendations.  Refrain from alcohol and non-prescribed drugs.  If there is a concern for safety, call 911.    Resources:   Crisis Intervention: 178.986.8044 or 193-762-5059 (TTY: 310.765.2684).  Call anytime for help.  [employer] on Mental Illness (www.[place].hailey.org): 590.223.9960 or 355-349-7483.  [facility] Crisis (COPE) Response - Adult 820 337-4963  Text 4 Life: txt \"LIFE\" to 23894 for immediate " "support and crisis intervention  Crisis text line: Text \"[place]\" to 264343. Free, confidential, 24/7.    General Medication Instructions:   See your medication sheet(s) for instructions.   Take all medicines as directed.  Make no changes unless your doctor suggests them.   Go to all your doctor visits.  Be sure to have all your required lab tests. This way, your medicines can be refilled on time.  Do not use any drugs not prescribed by your doctor.  Avoid alcohol.    Advance Directives:   Scanned document on file with [facility]? No scanned doc  Is document scanned? Pt states no documents  Honoring Choices Your Rights Handout: Minor - N/A  Was more information offered? Pt declined    The Treatment team has appreciated the opportunity to work with you. If you have any questions or concerns about your recent admission, you can contact the unit which can receive your call 24 hours a day, 7 days a week. They will be able to get in touch with a Provider if needed. The unit number is [phone].     "

## 2022-07-13 NOTE — PHARMACY-RX INSURANCE COVERAGE
Consulted to run a test claim for Latuda tablets.    Patient has pharmacy benefits through [employer] (pre-paid medical assistance plan). Per insurance, this medication is covered at a $0.00 copay.       Please feel free to contact me with any other test claims, prior authorizations, or insurance questions regarding outpatient medications.     Thanks!      Sunshine Li CPhT  [place] [facility]charge Pharmacy Liaison  Pronouns: She/Her/Hers    [facility]  [address] [place] [address]   [email or web address]  [email or web address]   Phone: [phone]  Pager: [phone]  Fax: [phone]

## 2022-07-13 NOTE — PLAN OF CARE
"   07/13/22 1241   Individualization/Patient Specific Goals   Patient Personal Strengths expressive of emotions;expressive of needs   Patient Vulnerabilities substance abuse/addiction   Patient-Specific Goals (Include Timeframe) complete CD eval, take medications daily   Interprofessional Rounds   Participants psychiatrist;CTC;nursing   Team Discussion   Participants Dr. Saravia, resident Dr. Zarate, RN Kari Israel, CTC Lacey Kirkpatrick, two medical students Charissa and Jose   Progress new admission   Anticipated length of stay 2-3 days   Continued Stay Criteria/Rationale pt is reporting suicidal ideation   Medical/Physical none mentioned in team   Precautions 15 minute checks   Plan provide a psychological assessment and manage medications per psychiatry, staff to provide a safe and therapeutic milieu, CTC to ensure proper follow up   Rationale for change in precautions or plan no change   Anticipated Discharge Disposition home with family   .mha    "

## 2022-07-13 NOTE — CONSULTS
7/13/22    Per chart review, patient is interested in seeking CD treatment, has been using multiple substances, and has mental health providers at [facility]. CD consult placed. The writer spoke with unit  regarding consult. Will close  DA IP consult at this time as CD consult placed.    Nuha Dunbar, LPCC, LADC

## 2022-07-13 NOTE — PLAN OF CARE
07/13/22 1500   General Information   Has Not Attended OT as of: 07/13/22     Plan to meet with pt as is appropriate, to identify the role of occupational therapy within the mental health setting and encourage participation in OT groups. As attendance is established, will plan to provide pt with self-assessment to inform individualized treatment and goal planning. Care plan initiated - Current goal is to establish engagement in OT groups that support recovery.       Julianna Shankar, OTR/SHERINE  7/13/22

## 2022-07-13 NOTE — PLAN OF CARE
Problem: Behavioral Health Plan of Care  Goal: Plan of Care Review  Outcome: Ongoing, Progressing     Problem: Suicide Risk  Goal: Absence of Self-Harm  Outcome: Ongoing, Progressing     Problem: Suicidal Behavior  Goal: Suicidal Behavior is Absent or Managed  Outcome: Ongoing, Progressing     Problem: Sleep Disturbance  Goal: Adequate Sleep/Rest  Outcome: Ongoing, Progressing   Goal Outcome Evaluation:    ~0100, MSSA score was 2. Pt was given snack per request after the assessment.  ~0451, MSSA score was 3. Pt was cooperative with assessment  ~0525, Pt endorsed anxiety 6/10, PRN Atarax administered and was effective.    Pt was sleeping at the start of shift and she appeared sleeping  for 5.5 hours this shift  Respirations are even and unlabored. 15 mins safety checks completed throughout the night per protocol.   Pt denies pain, SI/HI, delusions, or hallucination noted or reported this shift  Pt contracted for safety.  No precautionary behavior noted or reported  Pt is on suicide and SIB precautions. No harm to self and others noted or reported this shift.   Will continue to monitor and assist as needed.

## 2022-07-14 ENCOUNTER — TELEPHONE (OUTPATIENT)
Dept: BEHAVIORAL HEALTH | Facility: CLINIC | Age: 32
End: 2022-07-14

## 2022-07-14 LAB
ALBUMIN UR-MCNC: NEGATIVE MG/DL
AMORPH CRY #/AREA URNS HPF: ABNORMAL /HPF
APPEARANCE UR: CLEAR
ATRIAL RATE - MUSE: 76 BPM
BACTERIA #/AREA URNS HPF: ABNORMAL /HPF
BILIRUB UR QL STRIP: NEGATIVE
COLOR UR AUTO: ABNORMAL
DIASTOLIC BLOOD PRESSURE - MUSE: NORMAL MMHG
GLUCOSE UR STRIP-MCNC: NEGATIVE MG/DL
HBV SURFACE AB SERPL IA-ACNC: 10.84 M[IU]/ML
HCG UR QL: NEGATIVE
HGB UR QL STRIP: NEGATIVE
INTERPRETATION ECG - MUSE: NORMAL
KETONES UR STRIP-MCNC: NEGATIVE MG/DL
LEUKOCYTE ESTERASE UR QL STRIP: ABNORMAL
MUCOUS THREADS #/AREA URNS LPF: PRESENT /LPF
NITRATE UR QL: POSITIVE
P AXIS - MUSE: 40 DEGREES
PH UR STRIP: 6 [PH] (ref 5–7)
PR INTERVAL - MUSE: 134 MS
QRS DURATION - MUSE: 86 MS
QT - MUSE: 402 MS
QTC - MUSE: 452 MS
R AXIS - MUSE: 80 DEGREES
RBC URINE: 1 /HPF
SP GR UR STRIP: 1.02 (ref 1–1.03)
SQUAMOUS EPITHELIAL: 4 /HPF
SYSTOLIC BLOOD PRESSURE - MUSE: NORMAL MMHG
T AXIS - MUSE: 72 DEGREES
UROBILINOGEN UR STRIP-MCNC: NORMAL MG/DL
VENTRICULAR RATE- MUSE: 76 BPM
WBC URINE: 3 /HPF

## 2022-07-14 PROCEDURE — 87591 N.GONORRHOEAE DNA AMP PROB: CPT

## 2022-07-14 PROCEDURE — 81025 URINE PREGNANCY TEST: CPT

## 2022-07-14 PROCEDURE — 124N000002 HC R&B MH UMMC

## 2022-07-14 PROCEDURE — 250N000013 HC RX MED GY IP 250 OP 250 PS 637: Performed by: PSYCHIATRY & NEUROLOGY

## 2022-07-14 PROCEDURE — 250N000013 HC RX MED GY IP 250 OP 250 PS 637

## 2022-07-14 PROCEDURE — 99233 SBSQ HOSP IP/OBS HIGH 50: CPT | Mod: GC | Performed by: PSYCHIATRY & NEUROLOGY

## 2022-07-14 PROCEDURE — H0001 ALCOHOL AND/OR DRUG ASSESS: HCPCS

## 2022-07-14 PROCEDURE — 81001 URINALYSIS AUTO W/SCOPE: CPT

## 2022-07-14 PROCEDURE — 250N000011 HC RX IP 250 OP 636

## 2022-07-14 PROCEDURE — 87491 CHLMYD TRACH DNA AMP PROBE: CPT

## 2022-07-14 RX ORDER — MULTIPLE VITAMINS W/ MINERALS TAB 9MG-400MCG
1 TAB ORAL DAILY
Status: DISCONTINUED | OUTPATIENT
Start: 2022-07-14 | End: 2022-07-15

## 2022-07-14 RX ORDER — VITAMIN B COMPLEX
25 TABLET ORAL DAILY
Status: DISCONTINUED | OUTPATIENT
Start: 2022-07-14 | End: 2022-07-15 | Stop reason: HOSPADM

## 2022-07-14 RX ADMIN — MULTIPLE VITAMINS W/ MINERALS TAB 1 TABLET: TAB at 17:16

## 2022-07-14 RX ADMIN — MELATONIN TAB 3 MG 3 MG: 3 TAB at 20:13

## 2022-07-14 RX ADMIN — HYDROXYZINE HYDROCHLORIDE 50 MG: 50 TABLET, FILM COATED ORAL at 13:33

## 2022-07-14 RX ADMIN — Medication 25 MCG: at 17:16

## 2022-07-14 RX ADMIN — PRAZOSIN HYDROCHLORIDE 2 MG: 1 CAPSULE ORAL at 20:13

## 2022-07-14 RX ADMIN — IBUPROFEN 200 MG: 200 TABLET, FILM COATED ORAL at 10:03

## 2022-07-14 RX ADMIN — FOLIC ACID 1 MG: 1 TABLET ORAL at 10:03

## 2022-07-14 RX ADMIN — ONDANSETRON HYDROCHLORIDE 4 MG: 4 TABLET, FILM COATED ORAL at 13:33

## 2022-07-14 RX ADMIN — Medication 150 MG: at 21:23

## 2022-07-14 RX ADMIN — NICOTINE 1 PATCH: 14 PATCH, EXTENDED RELEASE TRANSDERMAL at 10:06

## 2022-07-14 RX ADMIN — HYDROXYZINE HYDROCHLORIDE 50 MG: 50 TABLET, FILM COATED ORAL at 20:13

## 2022-07-14 RX ADMIN — Medication 100 MG: at 10:03

## 2022-07-14 RX ADMIN — LURASIDONE HYDROCHLORIDE 20 MG: 20 TABLET, FILM COATED ORAL at 16:08

## 2022-07-14 ASSESSMENT — ANXIETY QUESTIONNAIRES
2. NOT BEING ABLE TO STOP OR CONTROL WORRYING: NEARLY EVERY DAY
GAD7 TOTAL SCORE: 21
1. FEELING NERVOUS, ANXIOUS, OR ON EDGE: NEARLY EVERY DAY
7. FEELING AFRAID AS IF SOMETHING AWFUL MIGHT HAPPEN: NEARLY EVERY DAY
3. WORRYING TOO MUCH ABOUT DIFFERENT THINGS: NEARLY EVERY DAY
GAD7 TOTAL SCORE: 21
6. BECOMING EASILY ANNOYED OR IRRITABLE: NEARLY EVERY DAY
4. TROUBLE RELAXING: NEARLY EVERY DAY
5. BEING SO RESTLESS THAT IT IS HARD TO SIT STILL: NEARLY EVERY DAY

## 2022-07-14 ASSESSMENT — ACTIVITIES OF DAILY LIVING (ADL)
ADLS_ACUITY_SCORE: 28
ADLS_ACUITY_SCORE: 28
ORAL_HYGIENE: INDEPENDENT
ADLS_ACUITY_SCORE: 28
HYGIENE/GROOMING: INDEPENDENT
ADLS_ACUITY_SCORE: 28
DRESS: INDEPENDENT
HYGIENE/GROOMING: INDEPENDENT
ADLS_ACUITY_SCORE: 28
LAUNDRY: WITH SUPERVISION
ADLS_ACUITY_SCORE: 28
ORAL_HYGIENE: INDEPENDENT
DRESS: INDEPENDENT
ADLS_ACUITY_SCORE: 28
ADLS_ACUITY_SCORE: 28

## 2022-07-14 ASSESSMENT — PATIENT HEALTH QUESTIONNAIRE - PHQ9: SUM OF ALL RESPONSES TO PHQ QUESTIONS 1-9: 26

## 2022-07-14 NOTE — PLAN OF CARE
Problem: Sleep Disturbance  Goal: Adequate Sleep/Rest  Outcome: Ongoing, Progressing   Goal Outcome Evaluation:     Patient appeared sleeping for 6 hours this shift, even and unlabored respirations noted. 15 Minutes safety rounds completed. No PRN given or requested.

## 2022-07-14 NOTE — PROGRESS NOTES
This counselor called [facility] on 7/14/2022 at 9:30 am in an attempt to complete a substance use disorder assessment with this patient, but the [facility] staff were unable to wake the patient up and the patient was nonresponsive to the further attempts to get her to wake up, so the substance use disorder assessment was unable to be completed at this time.  A further attempt will be made to complete the substance use disorder assessment with this patient later today or tomorrow by another substance use disorder assessment counselor as this counselor is booked out for the rest of the day with outpatient substance use disorder assessments.      Vincent Alcocer [facility].  Phone Number: [phone]  E-mail Address: [email or web address] Evaluation Department  [address] [place] [address]

## 2022-07-14 NOTE — TELEPHONE ENCOUNTER
This patient's ALEKSANDRA Assessment DAANES information was entered directly into the [place] Department of Human Services DAANES website on 7/14/2022.  Assessment ID: [id number]

## 2022-07-14 NOTE — PROGRESS NOTES
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 discomfort. The client displays mild to moderate intoxication or signs and symptoms interfering with daily functioning but does not immediately endanger self or others. Client poses minimal risk of severe withdrawal.  Dimension 2: 1 Client tolerates and tom with physical discomfort and is able to get the services that the client needs.  Dimension 3: 2 Client has difficulty with impulse control and lacks coping skills. Client has thoughts of suicide or harm to others without means; however, the thoughts may interfere with participation in some treatment activities. Client has difficulty functioning in significant life areas. Client has moderate symptoms of emotional, behavioral, or cognitive problems. Client is able to participate in most treatment activities.  Dimension 4: 2 Client displays verbal compliance, but lacks consistent behaviors; has low motivation for change; and is passively involved in treatment.  Dimension 5: 4 No awareness of the negative impact of mental health problems or  substance abuse. No coping skills to arrest mental health or addiction illnesses, or prevent relapse.  Dimension 6: 4 Client has (A) Chronically antagonistic significant other, living environment, family, peer group or long-term criminal justice involvement that is harmful to recovery or treatment progress, or (B) Client has an actively antagonistic significant other, family, work, or living environment with immediate threat to the client's safety and well-being.    As evidenced by self-report and criteria, the patient meets the following DSM-5 Diagnoses: (Sustained by DSM-5 Criteria Listed Below)      1.)  Alcohol Use Disorder Severe - 303.90 (F10.20)  2.)  Cannabis Use Disorder Severe - 304.30 (F12.20)  3.)  Tobacco Use Disorder Moderate - 305.10 (F17.200)  4.)  Schizoaffective disorder NOS, per patient self-report  4.)  Depression NOS, per patient self-report  5.)  Anxiety disorder NOS, per patient self-report  6.)  PTSD, per patient self-report  7.)  OCD, per patient self-report    A problematic pattern of alcohol/drug use leading to clinically significant impairment or distress, as manifested by at least two of the following, occurring within a 12-month period:    1.) Alcohol/drug is often taken in larger amounts or over a longer period than was intended.  2.) There is a persistent desire or unsuccessful efforts to cut down or control alcohol/drug use  3.) A great deal of time is spent in activities necessary to obtain alcohol, use alcohol, or recover from its effects.  4.) Craving, or a strong desire or urge to use alcohol/drug  5.) Recurrent alcohol/drug use resulting in a failure to fulfill major role obligations at work, school or home.  6.) Continued alcohol use despite having persistent or recurrent social or interpersonal problems caused or exacerbated by the effects of alcohol/drug.  7.) Important social, occupational, or recreational activities are given up or reduced because of alcohol/drug use.  8.)  Recurrent alcohol/drug use in situations in which it is physically hazardous.  9.) Alcohol/drug use is continued despite knowledge of having a persistent or recurrent physical or psychological problem that is likely to have been caused or exacerbated by alcohol.  10.) Tolerance, as defined by either of the following: A need for markedly increased amounts of alcohol/drug to achieve intoxication or desired effect.  11.) Withdrawal, as manifested by either of the following: The characteristic withdrawal syndrome for alcohol/drug (refer to Criteria A and B of the criteria set for alcohol/drug withdrawal).    Specify if: In early remission:  After full criteria for alcohol/drug use disorder were previously met, none of the criteria for alcohol/drug use disorder have been met for at least 3 months but for less than 12 months (with the exception that Criterion A4,  Craving or a strong desire or urge to use alcohol/drug  may be met).     In sustained remission:   After full criteria for alcohol use disorder were previously met, non of the criteria for alcohol/drug use disorder have been met at any time during a period of 12 months or longer (with the exception that Criterion A4,  Craving or strong desire or urge to use alcohol/drug  may be met).     Specify if:   This additional specifier is used if the individual is in an environment where access to alcohol is restricted.    Mild: Presence of 2-3 symptoms  Moderate: Presence of 4-5 symptoms  Severe: Presence of 6 or more symptoms    Collateral information: ALEKSANDRA Collateral Info: Sufficient information is obtained from the patient to support diagnosis and recommendations. Contact with a collateral sources is not required.    Recommendations:   1.)  It was recommended the patient abstain from alcohol and from all other non-prescribed mood altering chemicals.   2.)  Have a mental health evaluation to address her current clinical mental health issues.  3.)  Follow all of the  recommendations of her medical and mental health providers.  4.)  Enter [facility] in [place], [place], [facility] in [place], [place], [facility] in [place], [place], [facility] in [place], [place] or a similar residential or board and lodging treatment program for co-occurring substance abuse and mental health treatment.  5.)  Follow all of the recommendations of her substance use disorder treatment providers including entering an extended care program as needed.    The patient is not willing to follow the recommendation to enter [facility] in [place], [place], [facility] in [place], [place], [facility] in [place], [place], [facility] in [place], [place] or a similar residential or board and lodging treatment program for co-occurring substance abuse and mental health treatment due to have a pet dog who needs care taking.  Instead, the patient is requesting a referral to enter a virtual or hybrid Day Outpatient program at one of the [facility] locations for co-occurring mental health and substance use disorder treatment at this time.    Rational for recommended level of care: The patient has been unable to maintain abstinence from alcohol while living at her current home environment, lacks long-term sober maintenance skills, lacks sober coping skills, lacks a sober peer support network, has dual issues of mental health and substance abuse, has medical issues which are exacerbated by substance abuse and has mental health issues which are exacerbated by substance abuse.    Resources:   ALEKSANDRA Resources: [facility] Mental Health [facility] Addiction Services Evaluation [facility], [address] [place] [id number] / Appointment Phone:  [phone]    ALEKSANDRA consult completed by: RAE Peter.  Phone Number: [phone]  E-mail Address: [email or web address] Mental [email or web address] Addiction [email or web address] Evaluation Department  [address] [place] [address]  [id number]    *Due to regulation of Title 42 of the Code of Federal Regulations (CFR) Part 2: Confidentiality laws apply to this note and the information wherein.  Thus, this note cannot be copy and pasted into any other health care staff's note nor can it be included in general medical records sent to ANY outside agency without the patient's written consent.

## 2022-07-14 NOTE — PLAN OF CARE
Goal Outcome Evaluation:    Plan of Care Reviewed With: patient     Problem: Mood Impairment (Anxiety Signs/Symptoms)  Goal: Improved Mood Symptoms (Anxiety Signs/Symptoms)  Outcome: Ongoing, Progressing     Pt is pleasant and calm on approach. Denies all psych symptoms this morning. Later reported anxiety and nausea. Received hydroxyzine and zofran per request. Pt presents with a flat, quiet affect. Socially withdrawn. Stated she was tired this morning. Rested in room and completed CD evaluation during the shift. MSSA- 4. Will continue to monitor and assist as needed.

## 2022-07-14 NOTE — PLAN OF CARE
Problem: Social, Occupational or Functional Impairment (Anxiety Signs/Symptoms)  Goal: Enhanced Social, Occupational or Functional Skills (Anxiety Signs/Symptoms)  Outcome: Ongoing, Progressing  Intervention: Promote Social, Occupational and Functional Ability  Recent Flowsheet Documentation  Taken 7/13/2022 1149 by Aleksandra Israel RN  Social Functional Ability Promotion:    autonomy promoted    opportunity for activity provided    self-expression encouraged    social interaction promoted     Pt visible in the milieu off and on throughout the day. She remains socially withdrawn but pleasant upon approach. Pt appropriately communicates her needs. Pt denies SI/SIB, but endorses anxiety. Per request for anxiety, pt given hydroxyzine 50 mg prn at 1853. Pt reported mild nausea, so Zofran 4 mg prn given at 1853.     Placed urine specimen cup in patient bathroom and requested pt inform this writer when she had provided sample for labs. As of this note, pt has not supplied urine specimen.     Per pt request for sleep, given Trazodone 200 mg prn. In addition, requested minipress for night terrors. Given 2 mg prn, per provider order.

## 2022-07-14 NOTE — CONSULTS
This patient participated in a substance use disorder assessment with this counselor on 7/14/2022 while on [facility] at [facility], resulting in the below recommendations.    Recommendations:   1.)  It was recommended the patient abstain from alcohol and from all other non-prescribed mood altering chemicals.   2.)  Have a mental health evaluation to address her current clinical mental health issues.  3.)  Follow all of the recommendations of her medical and mental health providers.  4.)  Enter [facility] in [place], [place], [facility] in [place], [place], [facility] in [place], [place], [facility] in [place], [place] or a similar residential or board and lodging treatment program for co-occurring substance abuse and mental health treatment.  5.)  Follow all of the recommendations of her substance use disorder treatment providers including entering an extended care program as needed.    The patient is not willing to follow the recommendation to enter [facility] in [place], [place], [facility] in [place], [place], [facility] in [place], [place], [facility] in [place], [place] or a similar residential or board and lodging treatment program for co-occurring substance abuse and mental health treatment due to have a pet dog who needs care taking.  Instead, the patient is requesting a referral to enter a virtual or hybrid Day Outpatient program at one of the [facility] locations for co-occurring mental health and substance use disorder treatment at this time.    Rational for recommended level of care: The patient has been unable to maintain abstinence from alcohol while living at her current home environment, lacks long-term sober maintenance skills, lacks sober coping skills, lacks a sober peer support network, has dual issues of mental health and substance abuse, has medical issues which are exacerbated by substance abuse and has mental health issues which are exacerbated by substance abuse.    Resources:    ALEKSANDRA Resources: [facility] Mental Health and Addiction Services Evaluation Center, [address] [place] [address] / Appointment Phone:  [phone]    ALEKSANDRA consult completed by: RAE Peter.  Phone Number: [phone]  E-mail Address: [email or web address] Mental [email or web address] Addiction [email or web address] Evaluation Department  [address] [place] [address]

## 2022-07-14 NOTE — TELEPHONE ENCOUNTER
Rianna Man Release of Information requests were e-mailed to the [place] [id number] OB's at [email or web address] on 7/14/2022 with requests for the following releases:    Patient's e-mail address: [email or web address]    1.) ALEKSANDRA - [id number] - Collateral Contact & Emergency Contact   May Clancy (mother)  Tel #: [phone]

## 2022-07-14 NOTE — PROGRESS NOTES
"  ----------------------------------------------------------------------------------------------------------  [facility], [place]   Psychiatric Progress Note  Hospital Day #2     Interim History:   The patient's care was discussed with the treatment team and chart notes were reviewed.    Identifier: Rianna Man is a 32 year old female with a past psychiatric history of schizoaffective disorder and anxiety admitted from the  ER on 07/13/2022 due to concern for SI in the context of medication non-adherence and substance use with psychosocial stressors including trauma and chronic mental health issues.    Sleep 6 hours (07/14/22 0600)  Scheduled Medications: took all scheduled medications as prescribed   PRN medications: prazosin 2mg x1, hydroxyzine 50mg x1, trazodone 200mg x1, melatonin 3mg x1, zofran 4mg x1    Staff Report:   \"Pt visible in the milieu off and on throughout the day. She remains socially withdrawn but pleasant upon approach. Pt appropriately communicates her needs. Pt denies SI/SIB, but endorses anxiety. Per request for anxiety, pt given hydroxyzine 50 mg prn at 1853. Pt reported mild nausea, so Zofran 4 mg prn given at 1853.    Placed urine specimen cup in patient bathroom and requested pt inform this writer when she had provided sample for labs. As of this note, pt has not supplied urine specimen.   Per pt request for sleep, given Trazodone 200 mg prn. In addition, requested minipress for night terrors. Given 2 mg prn, per provider order.\"    Patient Interview:   Rianna Man was interviewed in the conference room.  She describes her mood as \"tired\" today. She slept \"good\" last night, however she endorsed anxiety that is typical for her during the nighttime. She finds it hard to sleep, especially with her past attack that occurred during the nighttime, so she usually sleeps during the day. She explain her mind races and that she hears constant voices inside of " "her head that tell her things like she is a \"bad person and bad mom\". She reports the voices are quieter since starting Latuda. She would like to increase Trazodone prn dose to 150mg. She states she last had visual hallucinations of \"tiny people\" a couple months ago. She is worried about living with this anxiety forever. Her appetite is \"improved\" and says nausea is improving compared to yesterday. Encouraged to request Zofran as needed. She has mostly kept to herself on the unit and has not attended group yet due to low energy. She shared that her evaluation with chemical dependency today went good and is planning to do intensive outpatient treatment upon discharge.       Review of systems:     ROS was negative unless noted above.          Allergies:     Allergies   Allergen Reactions     No Known Allergies             Psychiatric Examination:   /86 (BP Location: Left arm, Patient Position: Sitting, Cuff Size: Adult Regular)   Pulse 86   Temp 98.4  F (36.9  C) (Tympanic)   Resp 17   Ht 0.788 m (2' 7.02\")   Wt 122.8 kg (270 lb 11.2 oz)   LMP 06/22/2022   SpO2 99%   .74 kg/m    Weight is 270 lbs 11.2 oz  Body mass index is 197.74 kg/m .    Mental Status Examination:  Oriented to: Grossly Oriented, Person/Self and Situation  General: Awake and Alert  Appearance: appears stated age  Behavior: calm, cooperative and engaged  Eye Contact: good  Psychomotor: no abnormal motor symptoms appreciated; no catatonia present  Speech: appropriate volume/tone  Language: Fluent in English with appropriate syntax and vocabulary.  Mood: \"Tired\"  Affect: appropriate, congruent with mood, stable and sad  Thought Process: coherent, linear, logical and goal directed  Thought Content: suicidal ideation (passive), No HI, No VH and No AH; No apparent delusions  Associations: intact  Insight: impaired due to SI  Judgment: impaired due to SI  Attention Span: grossly intact  Concentration: grossly intact  Recent and Remote " Memory: not formally assessed  Fund of Knowledge: average         Labs:     Results for orders placed or performed during the hospital encounter of 07/12/22 (from the past 24 hour(s))   EKG 12-lead, complete   Result Value Ref Range    Systolic Blood Pressure  mmHg    Diastolic Blood Pressure  mmHg    Ventricular Rate 76 BPM    Atrial Rate 76 BPM    AK Interval 134 ms    QRS Duration 86 ms     ms    QTc 452 ms    P Axis 40 degrees    R AXIS 80 degrees    T Axis 72 degrees    Interpretation ECG       Sinus rhythm  Normal ECG  When compared with ECG of 24-FEB-2021 04:13,  No significant change was found     CBC with platelets differential    Narrative    The following orders were created for panel order CBC with platelets differential.  Procedure                               Abnormality         Status                     ---------                               -----------         ------                     CBC with platelets and d...[[id number]]                      Final result                 Please view results for these tests on the individual orders.   Comprehensive metabolic panel   Result Value Ref Range    Sodium 136 133 - 144 mmol/L    Potassium 4.3 3.4 - 5.3 mmol/L    Chloride 105 94 - 109 mmol/L    Carbon Dioxide (CO2) 26 20 - 32 mmol/L    Anion Gap 5 3 - 14 mmol/L    Urea Nitrogen 14 7 - 30 mg/dL    Creatinine 0.96 0.52 - 1.04 mg/dL    Calcium 8.8 8.5 - 10.1 mg/dL    Glucose 83 70 - 99 mg/dL    Alkaline Phosphatase 77 40 - 150 U/L    AST 23 0 - 45 U/L    ALT 39 0 - 50 U/L    Protein Total 6.7 (L) 6.8 - 8.8 g/dL    Albumin 3.2 (L) 3.4 - 5.0 g/dL    Bilirubin Total 0.3 0.2 - 1.3 mg/dL    GFR Estimate 81 >60 mL/min/1.73m2   Lipid panel   Result Value Ref Range    Cholesterol 180 <200 mg/dL    Triglycerides 73 <150 mg/dL    Direct Measure  >=50 mg/dL    LDL Cholesterol Calculated 60 <=100 mg/dL    Non HDL Cholesterol 75 <130 mg/dL    Narrative    Cholesterol  Desirable:  <200  mg/dL    Triglycerides  Normal:  Less than 150 mg/dL  Borderline High:  150-199 mg/dL  High:  200-499 mg/dL  Very High:  Greater than or equal to 500 mg/dL    Direct Measure HDL  Female:  Greater than or equal to 50 mg/dL   Male:  Greater than or equal to 40 mg/dL    LDL Cholesterol  Desirable:  <100mg/dL  Above Desirable:  100-129 mg/dL   Borderline High:  130-159 mg/dL   High:  160-189 mg/dL   Very High:  >= 190 mg/dL    Non HDL Cholesterol  Desirable:  130 mg/dL  Above Desirable:  130-159 mg/dL  Borderline High:  160-189 mg/dL  High:  190-219 mg/dL  Very High:  Greater than or equal to 220 mg/dL   TSH with free T4 reflex and/or T3 as indicated   Result Value Ref Range    TSH 1.79 0.40 - 4.00 mU/L   Vitamin B12   Result Value Ref Range    Vitamin B12 359 232 - 1,245 pg/mL   Folate   Result Value Ref Range    Folic Acid 18.1 4.6 - 34.8 ng/mL   Hemoglobin A1c   Result Value Ref Range    Hemoglobin A1C 5.6 0.0 - 5.6 %   Treponema Abs w Reflex to RPR and Titer   Result Value Ref Range    Treponema Antibody Total Nonreactive Nonreactive   HIV Antigen Antibody Combo   Result Value Ref Range    HIV Antigen Antibody Combo Nonreactive Nonreactive   Hepatitis C antibody   Result Value Ref Range    Hepatitis C Antibody Nonreactive Nonreactive    Narrative    Assay performance characteristics have not been established for newborns, infants, and children.   Hepatitis B surface antigen   Result Value Ref Range    Hepatitis B Surface Antigen Nonreactive Nonreactive   CBC with platelets and differential   Result Value Ref Range    WBC Count 5.2 4.0 - 11.0 10e3/uL    RBC Count 4.42 3.80 - 5.20 10e6/uL    Hemoglobin 12.8 11.7 - 15.7 g/dL    Hematocrit 39.1 35.0 - 47.0 %    MCV 89 78 - 100 fL    MCH 29.0 26.5 - 33.0 pg    MCHC 32.7 31.5 - 36.5 g/dL    RDW 13.1 10.0 - 15.0 %    Platelet Count 195 150 - 450 10e3/uL    % Neutrophils 50 %    % Lymphocytes 39 %    % Monocytes 10 %    % Eosinophils 1 %    % Basophils 0 %    % Immature  Granulocytes 0 %    NRBCs per 100 WBC 0 <1 /100    Absolute Neutrophils 2.6 1.6 - 8.3 10e3/uL    Absolute Lymphocytes 2.0 0.8 - 5.3 10e3/uL    Absolute Monocytes 0.5 0.0 - 1.3 10e3/uL    Absolute Eosinophils 0.1 0.0 - 0.7 10e3/uL    Absolute Basophils 0.0 0.0 - 0.2 10e3/uL    Absolute Immature Granulocytes 0.0 <=0.4 10e3/uL    Absolute NRBCs 0.0 10e3/uL     Trending labs:    None     Assessment    Principal Diagnosis:     Schizoaffective Disorder, maria isabel type currently depressed    Substance Abuse via Cocaine and Amphetamines     Secondary psychiatric diagnoses of concern this admission:     PTSD    FITZ    Diagnostic Impression:   Rianna Man is a 32 year old female with a past psychiatric history of Schizoaffective disorder, maria isabel type, SI, FITZ, and PTSD who presented to the ED with SI and out of control behaviors in the context of worsening depression. Significant symptoms include SI, depressed, substance use and impulsive. Her last psychiatric hospitalization was in May.  She is currently followed by Bren Briceno at [facility]. Current psychosocial stressors include loss, trauma and chronic mental health issues which she has been coping with by using substances.  Patient's support system includes outpatient team.  Substance use does appear to be playing a contributing role in the patient's presentation.  There is genetic loading for mood and CD. Medical history does appear to be significant for knee pain. The MSE is notable for depressed mood.      Her reported symptoms of SI, depressed mood and risk taking behaviors are consistent with her historic diagnosis of schizoaffective disorder, maria isabel type currently depressed; differential includes schizoaffective disorder, maria isabel type currently depressed vs. Substance induced mood disorder.   Optimization of medications to target these symptom clusters in addition to evaluation of adquate outpatient supports will be targets for treatment during this  admission.      Given that she currently has SI, patient warrants inpatient psychiatric hospitalization to maintain her safety.     Psychiatric Hospital course:   Rianna Man was admitted to [facility] with attending physician Dao Torres as a voluntary patient. PTA medications hydroxyzine and trazodone were continued.  PTA olanzapine was held due to poor clinical efficacy in this patient and weight gain. New psychiatric medication started at admission included Latuda 20mg.    Medical course   Rianna Man was physically examined by the ED prior to being transferred to the unit and was found to be medically stable and appropriate for admission.    Plan     Today's Changes:    Increase trazodone to 150mg at bedtime, may repeat 1x    Keep olanzapine 10 mg for now     Monitor vitals after starting prazosin     Obtain urine sample for Neisseria and Chlamydia testing    ____________________________________________________________  Psychiatric diagnoses and management:   Primary Psychiatric Diagnosis:     Schizoaffective Disorder, maria isabel type currently depressed    Latuda 20 mg daily    Substance Abuse via Cocaine and Amphetamines     Chemical dependency consultation     Secondary Psychiatric Diagnoses:    PTSD    Prazosin 2mg prn for nightmares    FITZ    Hydroxyzine 50mg prn for anxiety     Additional planning:    Patient will be treated in therapeutic milieu with appropriate individual and group therapies as described.    Psychotropic Medications:  Scheduled Psych Medications:  Medication Ordered Dose/Rate, Route, Frequency   folic acid (FOLVITE) tablet 1 mg 1 mg, PO, Daily   lurasidone (LATUDA) tablet 20 mg 20 mg, PO, Daily with supper   nicotine (NICODERM CQ) 14 MG/24HR 24 hr patch 1 patch 1 patch, TD, Daily   nicotine Patch in Place No Dose/Rate, TD, Q8H   prazosin (MINIPRESS) capsule 2 mg 2 mg, PO, At Bedtime   thiamine tablet 100 mg 100 mg, PO, Daily     PRN Psych Medications  Medication  Ordered Dose/Rate, Route, Frequency   acetaminophen (TYLENOL) tablet 650 mg 650 mg, PO, Q4H PRN   alum & mag hydroxide-simethicone (MAALOX) suspension 30 mL 30 mL, PO, Q4H PRN   hydrOXYzine (ATARAX) tablet 50 mg 50 mg, PO, Q6H PRN   ibuprofen (ADVIL/MOTRIN) tablet 200 mg 200 mg, PO, Q6H PRN   LORazepam (ATIVAN) tablet 1-4 mg 1-4 mg, PO, Q30 Min PRN   melatonin tablet 3 mg 3 mg, PO, At Bedtime PRN   nicotine (COMMIT) lozenge 2 mg 2 mg, BU, Q1H PRN   nicotine (NICORETTE) gum 2 mg 2 mg, BU, Q1H PRN   OLANZapine (zyPREXA) injection 10 mg (Or Linked Group #1) 10 mg, IM, TID PRN   OLANZapine (zyPREXA) tablet 10 mg (Or Linked Group #1) 10 mg, PO, TID PRN   ondansetron (ZOFRAN) tablet 4 mg 4 mg, PO, Q6H PRN   polyethylene glycol (MIRALAX) Packet 17 g 17 g, PO, Daily PRN   traZODone (DESYREL) tablet 100-200 mg 100-200 mg, PO, At Bedtime PRN     Consults:     MH/DA     Chemical dependency     Registered dietitian    Legal Status:   Orders Placed This Encounter      Voluntary    Disposition: Pending stabilization, medication optimization, and development of a safe discharge plan.     Safety Assessment:   Behavioral Orders   Procedures     Code 1 - Restrict to Unit     Routine Programming     As clinically indicated     Self Injury Precaution     Status 15     Every 15 minutes.     Suicide precautions     Patients on Suicide Precautions should have a Combination Diet ordered that includes a Diet selection(s) AND a Behavioral Tray selection for Safe Tray - with utensils, or Safe Tray - NO utensils       Medical diagnoses to be addressed this admission:    None        The patient was seen and the plan was discussed with the attending physician.     This patient was seen and discussed with my attending physician, Brian Ball.     Katarina Horn, MS  MS3    Psychiatry Attending Attestation:     This patient has been seen and evaluated by me, Dao Terrell.  I have discussed this patient with the psychiatry  resident and seen the patient with the medical student.  I agree with the findings and plan in this note.    I have reviewed today's vital signs, medications, labs and imaging.     Dao Torres MD on 7/14/2022 at 9:03 PM     No

## 2022-07-14 NOTE — TELEPHONE ENCOUNTER
The below telephone note was routed to the [facility] UR department at: [facility] [[id number]] and to the [facility] IOP Admissions Department at: [facility] ADULT IOP INTAKE POOL [[id number]] on 7/14/2022 as a referral for IOP treatment at [facility].    A.)  Name: Rianna Man Tel #: [phone]  B.)  MRN: [id number]  C.)  Referral is for: a virtual or hybrid Day Outpatient program at one of the [facility] for co-occurring mental health and substance use disorder treatment.  D.)  Notes: If the current wait list is to long for the patient to enter a virtual or hybrid Day Outpatient program at one of the [facility], please direct the patient to contact this counselor at [phone] for referrals outside of the [facility] system.     Thanks,    RAE Peter

## 2022-07-14 NOTE — PROGRESS NOTES
"CLINICAL NUTRITION SERVICES - EDUCATION BRIEF NOTE     Nutrition Prescription    RECOMMENDATIONS FOR MDs/PROVIDERS TO ORDER:  1. Please refer patient to outpatient weight management clinic.   2. Consult if further nutrition needs arise.    Recommendations already ordered by Registered Dietitian (RD):  1. Start Vitamin D3 25mcg (15mcg per RDA).  2. Start Thera-vit-M.  2. Allow double portions. Patient will paz \"x2\" next to options on menu.    Future/Additional Recommendations:  Will follow up if consulted.     REASON FOR ASSESSMENT  Rianna Man is a/an 32 year old female assessed by the dietitian for Patient Request - Nutrition Education - Pt wants to discuss weight loss strategies.    NUTRITION HISTORY   Diet: Regular  Intake: 100%     NEW FINDINGS   Rianna was extremely pleasant at today's visit. She described gaining weight after becoming pregnant and losing her baby. Since then, she has continued to gain weight and decided she would like to lose weight now to get back to her baseline (closer to 220#). She tried eating healthier at home (cutting out some carb foods and fried foods). Her typical daily intake includes 2 meals and snacks such as chips, candy etc. Meals include leftovers (tacos, pizza etc) or sometimes just meat such as a pork chop or steak. Discussed healthy methods for weight loss. Rianna reports a liter of alcohol daily -- discussed starting MVT supplemetnation at home to replenish B vitamins. Rianna also reported history of iron and vitamin D deficiency.    Weights:  Wt Readings from Last 5 Encounters:   07/12/22 122.8 kg (270 lb 11.2 oz)   07/12/22 104.3 kg (230 lb)   07/29/21 117 kg (258 lb)   12/16/20 119.3 kg (263 lb)   12/01/20 116.6 kg (257 lb)     Pertinent Labs  Glucose 83WNL  A1c 5.6WNL  TG 73WNL    Medications/Vitamins/Minerals  Folic acid 1mg  Thiamine 100mg  Zyprexa -- can contribute to wt gain   Zofran -- can contribute to wt gain    Nutrition Diagnosis  Food- and " nutrition-related knowledge deficit related to weight management as evidenced by limited previous education on weight management and weight gain of 5% x1 year.    Intervention  Nutrition Education: Provided education on MyPlate method, healthy snacking, increasing exercise.    Goals  1. Try to have 3 small meals at home (snacks between meals okay).  2. Use MyPlate method to mimic portion sizes.  3. Increase fruits and veggies at meal times.  3. Increase exercise.    Monitoring/Evaluation  Will continue to monitor progress towards goals and provide nutrition education as needed.    Chelo Padgett, MPH, RDN, LD  Behavioral Clinical Dietitian  Mental Health Pager (M-F): [phone]  On Call Pager (weekends only): [phone]

## 2022-07-14 NOTE — PLAN OF CARE
Provider was called by nursing with patient's request to restart prazosin for night terrors. BP was appropriate. Started at 2 mg dose instead of prior dose of 5 mg due to not taking it for a while.

## 2022-07-15 ENCOUNTER — TELEPHONE (OUTPATIENT)
Dept: BEHAVIORAL HEALTH | Facility: CLINIC | Age: 32
End: 2022-07-15

## 2022-07-15 VITALS
TEMPERATURE: 97.4 F | SYSTOLIC BLOOD PRESSURE: 131 MMHG | HEART RATE: 97 BPM | BODY MASS INDEX: 41.03 KG/M2 | DIASTOLIC BLOOD PRESSURE: 83 MMHG | WEIGHT: 270.7 LBS | OXYGEN SATURATION: 98 % | HEIGHT: 68 IN | RESPIRATION RATE: 16 BRPM

## 2022-07-15 LAB
C TRACH DNA SPEC QL NAA+PROBE: POSITIVE
N GONORRHOEA DNA SPEC QL NAA+PROBE: NEGATIVE

## 2022-07-15 PROCEDURE — 250N000013 HC RX MED GY IP 250 OP 250 PS 637

## 2022-07-15 PROCEDURE — 250N000013 HC RX MED GY IP 250 OP 250 PS 637: Performed by: PSYCHIATRY & NEUROLOGY

## 2022-07-15 PROCEDURE — 99239 HOSP IP/OBS DSCHRG MGMT >30: CPT | Mod: GC | Performed by: PSYCHIATRY & NEUROLOGY

## 2022-07-15 RX ORDER — SULFAMETHOXAZOLE/TRIMETHOPRIM 800-160 MG
1 TABLET ORAL 2 TIMES DAILY
Qty: 5 TABLET | Refills: 0 | Status: SHIPPED | OUTPATIENT
Start: 2022-07-15 | End: 2022-07-15

## 2022-07-15 RX ORDER — LURASIDONE HYDROCHLORIDE 20 MG/1
20 TABLET, FILM COATED ORAL
Qty: 30 TABLET | Refills: 0 | Status: SHIPPED | OUTPATIENT
Start: 2022-07-15 | End: 2022-12-20

## 2022-07-15 RX ORDER — DOXYCYCLINE 100 MG/1
100 CAPSULE ORAL 2 TIMES DAILY
Status: DISCONTINUED | OUTPATIENT
Start: 2022-07-15 | End: 2022-07-15 | Stop reason: HOSPADM

## 2022-07-15 RX ORDER — SULFAMETHOXAZOLE/TRIMETHOPRIM 800-160 MG
1 TABLET ORAL 2 TIMES DAILY
Status: DISCONTINUED | OUTPATIENT
Start: 2022-07-15 | End: 2022-07-15 | Stop reason: ALTCHOICE

## 2022-07-15 RX ORDER — DOXYCYCLINE 100 MG/1
100 CAPSULE ORAL DAILY
Qty: 6 CAPSULE | Refills: 0 | Status: SHIPPED | OUTPATIENT
Start: 2022-07-16 | End: 2022-07-15

## 2022-07-15 RX ORDER — PRAZOSIN HYDROCHLORIDE 2 MG/1
2 CAPSULE ORAL AT BEDTIME
Qty: 30 CAPSULE | Refills: 0 | Status: ON HOLD | OUTPATIENT
Start: 2022-07-15 | End: 2022-11-23

## 2022-07-15 RX ORDER — TRAZODONE HYDROCHLORIDE 150 MG/1
150 TABLET ORAL
Qty: 60 TABLET | Refills: 0 | Status: ON HOLD | OUTPATIENT
Start: 2022-07-15 | End: 2022-11-23

## 2022-07-15 RX ORDER — DOXYCYCLINE 100 MG/1
100 CAPSULE ORAL 2 TIMES DAILY
Qty: 13 CAPSULE | Refills: 0 | Status: ON HOLD | OUTPATIENT
Start: 2022-07-15 | End: 2022-11-23

## 2022-07-15 RX ORDER — DOXYCYCLINE 100 MG/1
100 CAPSULE ORAL DAILY
Status: DISCONTINUED | OUTPATIENT
Start: 2022-07-15 | End: 2022-07-15

## 2022-07-15 RX ADMIN — FOLIC ACID 1 MG: 1 TABLET ORAL at 08:18

## 2022-07-15 RX ADMIN — LURASIDONE HYDROCHLORIDE 20 MG: 20 TABLET, FILM COATED ORAL at 16:38

## 2022-07-15 RX ADMIN — MULTIPLE VITAMINS W/ MINERALS TAB 1 TABLET: TAB at 08:18

## 2022-07-15 RX ADMIN — Medication 25 MCG: at 08:18

## 2022-07-15 RX ADMIN — Medication 100 MG: at 08:18

## 2022-07-15 RX ADMIN — DOXYCYCLINE 100 MG: 100 CAPSULE ORAL at 14:42

## 2022-07-15 RX ADMIN — NICOTINE 1 PATCH: 14 PATCH, EXTENDED RELEASE TRANSDERMAL at 08:22

## 2022-07-15 ASSESSMENT — ACTIVITIES OF DAILY LIVING (ADL)
LAUNDRY: WITH SUPERVISION
DRESS: INDEPENDENT
ADLS_ACUITY_SCORE: 28
ADLS_ACUITY_SCORE: 28
ORAL_HYGIENE: INDEPENDENT
ADLS_ACUITY_SCORE: 28
HYGIENE/GROOMING: INDEPENDENT
DRESS: INDEPENDENT
ADLS_ACUITY_SCORE: 28
HYGIENE/GROOMING: INDEPENDENT
ADLS_ACUITY_SCORE: 28
ADLS_ACUITY_SCORE: 28
LAUNDRY: WITH SUPERVISION
ORAL_HYGIENE: INDEPENDENT

## 2022-07-15 NOTE — PLAN OF CARE
Problem: Suicidal Behavior  Goal: Suicidal Behavior is Absent or Managed  Outcome: Ongoing, Progressing  Flowsheets (Taken 7/14/2022 2118)  Mutually Determined Action Steps (Suicidal Behavior Absent/Managed): verbalizes safety check rationale  Intervention: Provide Immediate and Ongoing Protective Physical Environment  Recent Flowsheet Documentation  Taken 7/14/2022 1800 by Olivia Ferguson RN  Safe Transition Promotion: protective factors promoted     Problem: Suicide Risk  Goal: Absence of Self-Harm  Intervention: Promote Psychosocial Wellbeing  Recent Flowsheet Documentation  Taken 7/14/2022 1800 by Olivia Ferguson RN  Supportive Measures: active listening utilized   Goal Outcome Evaluation:    Plan of Care Reviewed With: patient     Pt was met in her room resting. Pt has been pleasant, cooperative and calm. Pt spent most of the evening isolated and withdrawn in room. Pt came out only for meals and medications. Pt had adequate appetite and fluid intake. Pt requested for sleep aid and reported anxiety rated at 7/10. Prn atarax 50 mg and melatonin 3 mg given at 2013 and trazodone 150 mg at 2130. Pt denies depression, SI/HI and hallucination. No out burst behavior or any other concerns noted this shift. Urine sample collected and sent to lab for UA/HCG, G & C. MSSA score is 2.

## 2022-07-15 NOTE — PLAN OF CARE
Problem: Mood Impairment (Anxiety Signs/Symptoms)  Goal: Improved Mood Symptoms (Anxiety Signs/Symptoms)  Outcome: Ongoing, Progressing   Goal Outcome Evaluation:        Pt is pleasant and calm on approach. Isolative to room for majority of the shift. Rested in bed. Out for meals. Pt reports she is doing fine, looking forward to discharging this evening. Denies SI. MSSA score of a 2. Will continue to monitor and assist as needed.

## 2022-07-15 NOTE — PROGRESS NOTES
Pt discharged at 1800. Pt's belongings were returned. Medications sent with pt from [facility]. Writer reviewed AVS with pt and pt verbalized understanding. Pt denies SI/HI, agrees to contract for safety out in the community. Writer escorted pt out of the hospital. Pt discharged home via cab.

## 2022-07-15 NOTE — PLAN OF CARE
Problem: Activity and Energy Impairment (Anxiety Signs/Symptoms)  Goal: Optimized Energy Level (Anxiety Signs/Symptoms)  Outcome: Ongoing, Progressing     Problem: Suicidal Behavior  Goal: Suicidal Behavior is Absent or Managed  Outcome: Ongoing, Progressing     Problem: Sleep Disturbance  Goal: Adequate Sleep/Rest  Outcome: Ongoing, Progressing   Goal Outcome Evaluation:    0215~ MSSA score was 4. Pt denies pain/discomfort.  Pt appeared sleeping for approximately 6.25 hours this shift. 15 minute safety checks completed  throughout the shift  per protocol  No SI/HI, delusions, or hallucination noted or reported this shift  No pain or discomfort noted or verbalized  No precautionary behaviors observed or reported  Will continue to monitor and assist as needed.
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
## 2022-07-16 DIAGNOSIS — Z71.89 OTHER SPECIFIED COUNSELING: ICD-10-CM

## 2022-07-18 ENCOUNTER — PATIENT OUTREACH (OUTPATIENT)
Dept: CARE COORDINATION | Facility: CLINIC | Age: 32
End: 2022-07-18

## 2022-07-18 ENCOUNTER — PATIENT OUTREACH (OUTPATIENT)
Dept: FAMILY MEDICINE | Facility: CLINIC | Age: 32
End: 2022-07-18

## 2022-07-18 NOTE — PROGRESS NOTES
Clinic Care Coordination Contact  [facility]/Voicemail       Clinical Data: Care Coordinator Outreach  Outreach attempted x 1. Unable to leave message on patient's voicemail with call back information.  Plan: Care Coordinator will try to reach patient again in 1-2 business days.    RAHEL Ayala  Connected Care Resource Center  [facility]     *Connected Care Resource Team does NOT follow patient ongoing. Referrals are identified based on internal discharge reports and the outreach is to ensure patient has an understanding of their discharge instructions.

## 2022-07-18 NOTE — PROGRESS NOTES
Clinic Care Coordination Contact    Clinic Care Coordination Contact  OUTREACH    Referral Information:            Chief Complaint   Patient presents with     Clinic Care Coordination - Post Hospital     TCM-Hospital Follow up        Universal Utilization:      Utilization    Hospital Admissions  1             ED Visits  1             No Show Count (past year)  4                Current as of: 7/18/2022  7:34 AM              Clinical Concerns:  Current Medical Concerns:  NA    Current Behavioral Concerns: NA    Education Provided to patient: NA      Health Maintenance Reviewed:    Clinical Pathway:     Medication Management:  Medication review status:        Functional Status:       Living Situation:       Lifestyle & Psychosocial Needs:    Social Determinants of Health     Tobacco Use: High Risk     Smoking Tobacco Use: Current Every Day Smoker     Smokeless Tobacco Use: Current User   Alcohol Use: Not on file   Financial Resource Strain: Not on file   Food Insecurity: Not on file   Transportation Needs: Not on file   Physical Activity: Not on file   Stress: Not on file   Social Connections: Not on file   Intimate Partner Violence: Not on file   Depression: At risk     PHQ-2 Score: 5   Housing Stability: Not on file              The pt was recently in the hospital, I called to check up on the pt and help the pt setup a hospital Follow up. The pt was at  [facility] for suicidal ideation. I called the pt, it rang, then got a busy signal.         Resources and Interventions:  Current Resources:                                        Goals:       Patient/Caregiver understanding:            Plan:

## 2022-07-19 ENCOUNTER — PATIENT OUTREACH (OUTPATIENT)
Dept: FAMILY MEDICINE | Facility: CLINIC | Age: 32
End: 2022-07-19

## 2022-07-19 ENCOUNTER — TELEPHONE (OUTPATIENT)
Dept: BEHAVIORAL HEALTH | Facility: CLINIC | Age: 32
End: 2022-07-19

## 2022-07-19 NOTE — TELEPHONE ENCOUNTER
Attempted to call to schedule CD IOP, unable to leave a VM due to busy or possible out of service tone. Will continue to reach client to schedule

## 2022-07-19 NOTE — PROGRESS NOTES
Clinic Care Coordination Contact  [facility]/Voicemail       Clinical Data: Care Coordinator Outreach  Outreach attempted x 2. Unable to leave  message on patient's voicemail with call back information and requested return call.    Plan: Care Coordinator will do no further outreaches at this time.    RAHEL Ayala  Connected Care Resource Center  [facility]     *Connected Care Resource Team does NOT follow patient ongoing. Referrals are identified based on internal discharge reports and the outreach is to ensure patient has an understanding of their discharge instructions.

## 2022-07-20 ENCOUNTER — PATIENT OUTREACH (OUTPATIENT)
Dept: FAMILY MEDICINE | Facility: CLINIC | Age: 32
End: 2022-07-20

## 2022-07-20 NOTE — PROGRESS NOTES
Clinic Care Coordination Contact    Clinic Care Coordination Contact  OUTREACH    Referral Information:            Chief Complaint   Patient presents with     Clinic Care Coordination - Post Hospital     TCM-Hospital Follow up        Universal Utilization:      Utilization    Hospital Admissions  1             ED Visits  1             No Show Count (past year)  4                Current as of: 7/19/2022  8:16 PM              Clinical Concerns:  Current Medical Concerns:  NA    Current Behavioral Concerns: NA    Education Provided to patient: NA      Health Maintenance Reviewed:    Clinical Pathway:     Medication Management:  Medication review status:        Functional Status:       Living Situation:       Lifestyle & Psychosocial Needs:    Social Determinants of Health     Tobacco Use: High Risk     Smoking Tobacco Use: Current Every Day Smoker     Smokeless Tobacco Use: Current User   Alcohol Use: Not on file   Financial Resource Strain: Not on file   Food Insecurity: Not on file   Transportation Needs: Not on file   Physical Activity: Not on file   Stress: Not on file   Social Connections: Not on file   Intimate Partner Violence: Not on file   Depression: At risk     PHQ-2 Score: 5   Housing Stability: Not on file           The pt was recently in the hospital, I called to check up on the pt and help the pt setup a hospital follow up. The pt was at [facility] for suicidal ideation. I called the pt, it rang, then it got a busy signal.                  Resources and Interventions:  Current Resources:                                        Goals:       Patient/Caregiver understanding:            Plan:

## 2022-09-08 ENCOUNTER — TELEPHONE (OUTPATIENT)
Dept: BEHAVIORAL HEALTH | Facility: CLINIC | Age: 32
End: 2022-09-08

## 2022-10-04 PROBLEM — E73.9 LACTOSE INTOLERANCE, UNSPECIFIED: Status: ACTIVE | Noted: 2018-04-24

## 2022-11-18 ENCOUNTER — PATIENT OUTREACH (OUTPATIENT)
Dept: CARE COORDINATION | Facility: CLINIC | Age: 32
End: 2022-11-18

## 2022-11-18 NOTE — PROGRESS NOTES
Clinic Care Coordination Contact    Follow Up Progress Note      Assessment: The pt was recently in the ED, I called to check up on the pt and help the pt setup a ED follow up. I called the pt,but got her vm, so I left a vm for the pt to give me a call back.     Care Gaps:    Health Maintenance Due   Topic Date Due     NICOTINE/TOBACCO CESSATION COUNSELING Q 1 YR  Never done     COVID-19 Vaccine (1) Never done     Pneumococcal Vaccine: Pediatrics (0 to 5 Years) and At-Risk Patients (6 to 64 Years) (1 - PCV) 03/16/1997     YEARLY PREVENTIVE VISIT  01/13/2018     INFLUENZA VACCINE (1) 09/01/2022           Care Plans      Intervention/Education provided during outreach:               Plan:     Care Coordinator will follow up in

## 2022-11-20 ENCOUNTER — HOSPITAL ENCOUNTER (INPATIENT)
Facility: CLINIC | Age: 32
LOS: 2 days | Discharge: HOME | End: 2022-11-23
Attending: FAMILY MEDICINE | Admitting: INTERNAL MEDICINE
Payer: COMMERCIAL

## 2022-11-20 DIAGNOSIS — F32.A DEPRESSION, UNSPECIFIED DEPRESSION TYPE: ICD-10-CM

## 2022-11-20 DIAGNOSIS — F10.229 ALCOHOL DEPENDENCE WITH INTOXICATION WITH COMPLICATION (H): ICD-10-CM

## 2022-11-20 DIAGNOSIS — B33.8 RSV INFECTION: ICD-10-CM

## 2022-11-20 DIAGNOSIS — Z11.52 ENCOUNTER FOR SCREENING LABORATORY TESTING FOR SEVERE ACUTE RESPIRATORY SYNDROME CORONAVIRUS 2 (SARS-COV-2): ICD-10-CM

## 2022-11-20 DIAGNOSIS — F51.5 NIGHTMARES: ICD-10-CM

## 2022-11-20 DIAGNOSIS — R45.851 SUICIDAL IDEATION: ICD-10-CM

## 2022-11-20 LAB
ALCOHOL BREATH TEST: 0.17 (ref 0–0.01)
BASOPHILS # BLD AUTO: 0.1 10E3/UL (ref 0–0.2)
BASOPHILS NFR BLD AUTO: 1 %
EOSINOPHIL # BLD AUTO: 0.1 10E3/UL (ref 0–0.7)
EOSINOPHIL NFR BLD AUTO: 1 %
ERYTHROCYTE [DISTWIDTH] IN BLOOD BY AUTOMATED COUNT: 12.9 % (ref 10–15)
HCG UR QL: NEGATIVE
HCT VFR BLD AUTO: 43.6 % (ref 35–47)
HGB BLD-MCNC: 14.6 G/DL (ref 11.7–15.7)
IMM GRANULOCYTES # BLD: 0 10E3/UL
IMM GRANULOCYTES NFR BLD: 0 %
LYMPHOCYTES # BLD AUTO: 3.9 10E3/UL (ref 0.8–5.3)
LYMPHOCYTES NFR BLD AUTO: 51 %
MCH RBC QN AUTO: 30.5 PG (ref 26.5–33)
MCHC RBC AUTO-ENTMCNC: 33.5 G/DL (ref 31.5–36.5)
MCV RBC AUTO: 91 FL (ref 78–100)
MONOCYTES # BLD AUTO: 0.7 10E3/UL (ref 0–1.3)
MONOCYTES NFR BLD AUTO: 9 %
NEUTROPHILS # BLD AUTO: 2.9 10E3/UL (ref 1.6–8.3)
NEUTROPHILS NFR BLD AUTO: 38 %
NRBC # BLD AUTO: 0 10E3/UL
NRBC BLD AUTO-RTO: 0 /100
PLATELET # BLD AUTO: 360 10E3/UL (ref 150–450)
RBC # BLD AUTO: 4.78 10E6/UL (ref 3.8–5.2)
WBC # BLD AUTO: 7.6 10E3/UL (ref 4–11)

## 2022-11-20 PROCEDURE — 81025 URINE PREGNANCY TEST: CPT | Performed by: FAMILY MEDICINE

## 2022-11-20 PROCEDURE — U0003 INFECTIOUS AGENT DETECTION BY NUCLEIC ACID (DNA OR RNA); SEVERE ACUTE RESPIRATORY SYNDROME CORONAVIRUS 2 (SARS-COV-2) (CORONAVIRUS DISEASE [COVID-19]), AMPLIFIED PROBE TECHNIQUE, MAKING USE OF HIGH THROUGHPUT TECHNOLOGIES AS DESCRIBED BY CMS-2020-01-R: HCPCS | Performed by: FAMILY MEDICINE

## 2022-11-20 PROCEDURE — 36415 COLL VENOUS BLD VENIPUNCTURE: CPT | Performed by: FAMILY MEDICINE

## 2022-11-20 PROCEDURE — C9803 HOPD COVID-19 SPEC COLLECT: HCPCS

## 2022-11-20 PROCEDURE — 99285 EMERGENCY DEPT VISIT HI MDM: CPT | Mod: 25

## 2022-11-20 PROCEDURE — 80053 COMPREHEN METABOLIC PANEL: CPT | Performed by: FAMILY MEDICINE

## 2022-11-20 PROCEDURE — 99285 EMERGENCY DEPT VISIT HI MDM: CPT | Performed by: FAMILY MEDICINE

## 2022-11-20 PROCEDURE — 87637 SARSCOV2&INF A&B&RSV AMP PRB: CPT | Performed by: FAMILY MEDICINE

## 2022-11-20 PROCEDURE — 81001 URINALYSIS AUTO W/SCOPE: CPT | Performed by: INTERNAL MEDICINE

## 2022-11-20 PROCEDURE — 85025 COMPLETE CBC W/AUTO DIFF WBC: CPT | Performed by: FAMILY MEDICINE

## 2022-11-20 PROCEDURE — 82075 ASSAY OF BREATH ETHANOL: CPT

## 2022-11-20 PROCEDURE — 90791 PSYCH DIAGNOSTIC EVALUATION: CPT

## 2022-11-20 PROCEDURE — 80307 DRUG TEST PRSMV CHEM ANLYZR: CPT | Performed by: FAMILY MEDICINE

## 2022-11-20 PROCEDURE — 87389 HIV-1 AG W/HIV-1&-2 AB AG IA: CPT | Performed by: INTERNAL MEDICINE

## 2022-11-20 ASSESSMENT — ACTIVITIES OF DAILY LIVING (ADL): ADLS_ACUITY_SCORE: 33

## 2022-11-21 ENCOUNTER — PATIENT OUTREACH (OUTPATIENT)
Dept: CARE COORDINATION | Facility: CLINIC | Age: 32
End: 2022-11-21

## 2022-11-21 ENCOUNTER — HEALTH MAINTENANCE LETTER (OUTPATIENT)
Age: 32
End: 2022-11-21

## 2022-11-21 PROBLEM — B33.8 RSV INFECTION: Status: ACTIVE | Noted: 2022-11-21

## 2022-11-21 PROBLEM — F10.229 ALCOHOL DEPENDENCE WITH INTOXICATION WITH COMPLICATION (H): Status: ACTIVE | Noted: 2022-11-21

## 2022-11-21 LAB
ALBUMIN SERPL-MCNC: 4.1 G/DL (ref 3.4–5)
ALBUMIN UR-MCNC: 30 MG/DL
ALCOHOL BREATH TEST: 0.17 (ref 0–0.01)
ALP SERPL-CCNC: 85 U/L (ref 40–150)
ALT SERPL W P-5'-P-CCNC: 35 U/L (ref 0–50)
AMORPH CRY #/AREA URNS HPF: ABNORMAL /HPF
AMPHETAMINES UR QL SCN: ABNORMAL
ANION GAP SERPL CALCULATED.3IONS-SCNC: 19 MMOL/L (ref 3–14)
APPEARANCE UR: ABNORMAL
AST SERPL W P-5'-P-CCNC: 29 U/L (ref 0–45)
BARBITURATES UR QL: ABNORMAL
BENZODIAZ UR QL: ABNORMAL
BILIRUB SERPL-MCNC: 0.3 MG/DL (ref 0.2–1.3)
BILIRUB UR QL STRIP: NEGATIVE
BUN SERPL-MCNC: 10 MG/DL (ref 7–30)
CALCIUM SERPL-MCNC: 8.8 MG/DL (ref 8.5–10.1)
CANNABINOIDS UR QL SCN: ABNORMAL
CHLORIDE BLD-SCNC: 104 MMOL/L (ref 94–109)
CO2 SERPL-SCNC: 19 MMOL/L (ref 20–32)
COCAINE UR QL: ABNORMAL
COLOR UR AUTO: YELLOW
CREAT SERPL-MCNC: 0.8 MG/DL (ref 0.52–1.04)
FLUAV RNA SPEC QL NAA+PROBE: NEGATIVE
FLUBV RNA RESP QL NAA+PROBE: NEGATIVE
GFR SERPL CREATININE-BSD FRML MDRD: >90 ML/MIN/1.73M2
GLUCOSE BLD-MCNC: 95 MG/DL (ref 70–99)
GLUCOSE UR STRIP-MCNC: NEGATIVE MG/DL
GROUP A STREP BY PCR: NOT DETECTED
HGB UR QL STRIP: NEGATIVE
HIV 1+2 AB+HIV1 P24 AG SERPL QL IA: NONREACTIVE
HOLD SPECIMEN: NORMAL
HOLD SPECIMEN: NORMAL
HYALINE CASTS: 15 /LPF
KETONES UR STRIP-MCNC: ABNORMAL MG/DL
LEUKOCYTE ESTERASE UR QL STRIP: NEGATIVE
MUCOUS THREADS #/AREA URNS LPF: PRESENT /LPF
NITRATE UR QL: NEGATIVE
OPIATES UR QL SCN: ABNORMAL
PH UR STRIP: 5.5 [PH] (ref 5–7)
POTASSIUM BLD-SCNC: 3.5 MMOL/L (ref 3.4–5.3)
PROT SERPL-MCNC: 7.8 G/DL (ref 6.8–8.8)
RBC URINE: 1 /HPF
RSV RNA SPEC NAA+PROBE: POSITIVE
SARS-COV-2 RNA RESP QL NAA+PROBE: NEGATIVE
SARS-COV-2 RNA RESP QL NAA+PROBE: NEGATIVE
SODIUM SERPL-SCNC: 142 MMOL/L (ref 133–144)
SP GR UR STRIP: 1.03 (ref 1–1.03)
SQUAMOUS EPITHELIAL: 13 /HPF
T PALLIDUM AB SER QL: NONREACTIVE
UROBILINOGEN UR STRIP-MCNC: NORMAL MG/DL
WBC URINE: 5 /HPF

## 2022-11-21 PROCEDURE — 86780 TREPONEMA PALLIDUM: CPT | Performed by: INTERNAL MEDICINE

## 2022-11-21 PROCEDURE — 87491 CHLMYD TRACH DNA AMP PROBE: CPT | Performed by: INTERNAL MEDICINE

## 2022-11-21 PROCEDURE — 250N000013 HC RX MED GY IP 250 OP 250 PS 637

## 2022-11-21 PROCEDURE — 36415 COLL VENOUS BLD VENIPUNCTURE: CPT | Performed by: INTERNAL MEDICINE

## 2022-11-21 PROCEDURE — 120N000002 HC R&B MED SURG/OB UMMC

## 2022-11-21 PROCEDURE — 250N000013 HC RX MED GY IP 250 OP 250 PS 637: Performed by: EMERGENCY MEDICINE

## 2022-11-21 PROCEDURE — 87651 STREP A DNA AMP PROBE: CPT | Performed by: INTERNAL MEDICINE

## 2022-11-21 PROCEDURE — 250N000013 HC RX MED GY IP 250 OP 250 PS 637: Performed by: INTERNAL MEDICINE

## 2022-11-21 PROCEDURE — 250N000009 HC RX 250: Performed by: EMERGENCY MEDICINE

## 2022-11-21 PROCEDURE — 99223 1ST HOSP IP/OBS HIGH 75: CPT | Mod: AI | Performed by: INTERNAL MEDICINE

## 2022-11-21 RX ORDER — ONDANSETRON 4 MG/1
4 TABLET, ORALLY DISINTEGRATING ORAL EVERY 6 HOURS PRN
Status: DISCONTINUED | OUTPATIENT
Start: 2022-11-21 | End: 2022-11-23 | Stop reason: HOSPADM

## 2022-11-21 RX ORDER — ONDANSETRON 2 MG/ML
4 INJECTION INTRAMUSCULAR; INTRAVENOUS EVERY 6 HOURS PRN
Status: DISCONTINUED | OUTPATIENT
Start: 2022-11-21 | End: 2022-11-23 | Stop reason: HOSPADM

## 2022-11-21 RX ORDER — ACETAMINOPHEN 500 MG
1000 TABLET ORAL ONCE
Status: COMPLETED | OUTPATIENT
Start: 2022-11-21 | End: 2022-11-21

## 2022-11-21 RX ORDER — TRAZODONE HYDROCHLORIDE 50 MG/1
50 TABLET, FILM COATED ORAL AT BEDTIME
Status: DISCONTINUED | OUTPATIENT
Start: 2022-11-21 | End: 2022-11-23 | Stop reason: HOSPADM

## 2022-11-21 RX ORDER — ACETAMINOPHEN 325 MG/1
975 TABLET ORAL EVERY 8 HOURS PRN
Status: DISCONTINUED | OUTPATIENT
Start: 2022-11-21 | End: 2022-11-23 | Stop reason: HOSPADM

## 2022-11-21 RX ORDER — OLANZAPINE 5 MG/1
5 TABLET, ORALLY DISINTEGRATING ORAL 2 TIMES DAILY PRN
Status: DISCONTINUED | OUTPATIENT
Start: 2022-11-21 | End: 2022-11-23 | Stop reason: HOSPADM

## 2022-11-21 RX ORDER — HYDROXYZINE HYDROCHLORIDE 25 MG/1
25 TABLET, FILM COATED ORAL 3 TIMES DAILY PRN
Status: DISCONTINUED | OUTPATIENT
Start: 2022-11-21 | End: 2022-11-23 | Stop reason: HOSPADM

## 2022-11-21 RX ORDER — IBUPROFEN 200 MG
400 TABLET ORAL EVERY 6 HOURS PRN
Status: DISCONTINUED | OUTPATIENT
Start: 2022-11-21 | End: 2022-11-23 | Stop reason: HOSPADM

## 2022-11-21 RX ORDER — OLANZAPINE 10 MG/2ML
5 INJECTION, POWDER, FOR SOLUTION INTRAMUSCULAR 2 TIMES DAILY PRN
Status: DISCONTINUED | OUTPATIENT
Start: 2022-11-21 | End: 2022-11-23 | Stop reason: HOSPADM

## 2022-11-21 RX ORDER — LURASIDONE HYDROCHLORIDE 20 MG/1
20 TABLET, FILM COATED ORAL
Status: DISCONTINUED | OUTPATIENT
Start: 2022-11-22 | End: 2022-11-23 | Stop reason: HOSPADM

## 2022-11-21 RX ORDER — LIDOCAINE HYDROCHLORIDE 20 MG/ML
10 SOLUTION OROPHARYNGEAL ONCE
Status: COMPLETED | OUTPATIENT
Start: 2022-11-21 | End: 2022-11-21

## 2022-11-21 RX ORDER — PRAZOSIN HYDROCHLORIDE 1 MG/1
1 CAPSULE ORAL AT BEDTIME
Status: DISCONTINUED | OUTPATIENT
Start: 2022-11-21 | End: 2022-11-23 | Stop reason: HOSPADM

## 2022-11-21 RX ORDER — LIDOCAINE 40 MG/G
CREAM TOPICAL
Status: DISCONTINUED | OUTPATIENT
Start: 2022-11-21 | End: 2022-11-23 | Stop reason: HOSPADM

## 2022-11-21 RX ORDER — NICOTINE 21 MG/24HR
1 PATCH, TRANSDERMAL 24 HOURS TRANSDERMAL DAILY
Status: DISCONTINUED | OUTPATIENT
Start: 2022-11-21 | End: 2022-11-23 | Stop reason: HOSPADM

## 2022-11-21 RX ADMIN — ACETAMINOPHEN 1000 MG: 500 TABLET ORAL at 04:05

## 2022-11-21 RX ADMIN — Medication 1 LOZENGE: at 18:50

## 2022-11-21 RX ADMIN — Medication 1 LOZENGE: at 13:23

## 2022-11-21 RX ADMIN — NICOTINE 1 PATCH: 14 PATCH, EXTENDED RELEASE TRANSDERMAL at 16:08

## 2022-11-21 RX ADMIN — ACETAMINOPHEN 975 MG: 325 TABLET, FILM COATED ORAL at 11:55

## 2022-11-21 RX ADMIN — TRAZODONE HYDROCHLORIDE 50 MG: 50 TABLET ORAL at 22:12

## 2022-11-21 RX ADMIN — ACETAMINOPHEN 975 MG: 325 TABLET, FILM COATED ORAL at 20:41

## 2022-11-21 RX ADMIN — Medication 1 LOZENGE: at 14:45

## 2022-11-21 RX ADMIN — PRAZOSIN HYDROCHLORIDE 1 MG: 1 CAPSULE ORAL at 22:12

## 2022-11-21 RX ADMIN — PHENOL 1 ML: 1.5 LIQUID ORAL at 22:12

## 2022-11-21 RX ADMIN — IBUPROFEN 400 MG: 200 TABLET, FILM COATED ORAL at 18:50

## 2022-11-21 RX ADMIN — PHENOL 1 ML: 1.5 LIQUID ORAL at 13:24

## 2022-11-21 RX ADMIN — IBUPROFEN 400 MG: 200 TABLET, FILM COATED ORAL at 11:55

## 2022-11-21 RX ADMIN — LIDOCAINE HYDROCHLORIDE 10 ML: 20 SOLUTION ORAL; TOPICAL at 04:04

## 2022-11-21 ASSESSMENT — COLUMBIA-SUICIDE SEVERITY RATING SCALE - C-SSRS
2. HAVE YOU ACTUALLY HAD ANY THOUGHTS OF KILLING YOURSELF?: NO
TOTAL  NUMBER OF INTERRUPTED ATTEMPTS PAST 3 MONTHS: YES
MOST LETHAL DATE: 66433
1. HAVE YOU WISHED YOU WERE DEAD OR WISHED YOU COULD GO TO SLEEP AND NOT WAKE UP?: YES
FIRST ATTEMPT DATE: 65379
ATTEMPT PAST THREE MONTHS: NO
LETHALITY/MEDICAL DAMAGE CODE MOST LETHAL ACTUAL ATTEMPT: NO PHYSICAL DAMAGE OR VERY MINOR PHYSICAL DAMAGE
REASONS FOR IDEATION PAST MONTH: MOSTLY TO END OR STOP THE PAIN (YOU COULDN'T GO ON LIVING WITH THE PAIN OR HOW YOU WERE FEELING)
TOTAL  NUMBER OF INTERRUPTED ATTEMPTS LIFETIME: 2
1. IN THE PAST MONTH, HAVE YOU WISHED YOU WERE DEAD OR WISHED YOU COULD GO TO SLEEP AND NOT WAKE UP?: YES
LETHALITY/MEDICAL DAMAGE CODE MOST RECENT ACTUAL ATTEMPT: NO PHYSICAL DAMAGE OR VERY MINOR PHYSICAL DAMAGE
TOTAL  NUMBER OF ABORTED OR SELF INTERRUPTED ATTEMPTS LIFETIME: NO
LETHALITY/MEDICAL DAMAGE CODE FIRST ACTUAL ATTEMPT: MODERATE PHYSICAL DAMAGE, MEDICAL ATTENTION NEEDED
ATTEMPT LIFETIME: YES
REASONS FOR IDEATION LIFETIME: MOSTLY TO END OR STOP THE PAIN (YOU COULDN'T GO ON LIVING WITH THE PAIN OR HOW YOU WERE FEELING)
TOTAL  NUMBER OF INTERRUPTED ATTEMPTS LIFETIME: YES
LETHALITY/MEDICAL DAMAGE CODE MOST LETHAL POTENTIAL ATTEMPT: BEHAVIOR LIKELY TO RESULT IN DEATH DESPITE AVAILABLE MEDICAL CARE
LETHALITY/MEDICAL DAMAGE CODE MOST RECENT POTENTIAL ATTEMPT: BEHAVIOR LIKELY TO RESULT IN DEATH DESPITE AVAILABLE MEDICAL CARE
LETHALITY/MEDICAL DAMAGE CODE FIRST POTENTIAL ATTEMPT: BEHAVIOR LIKELY TO RESULT IN DEATH DESPITE AVAILABLE MEDICAL CARE
TOTAL  NUMBER OF INTERRUPTED ATTEMPTS PAST 3 MONTHS: 1
MOST RECENT DATE: 66433
6. HAVE YOU EVER DONE ANYTHING, STARTED TO DO ANYTHING, OR PREPARED TO DO ANYTHING TO END YOUR LIFE?: NO
TOTAL  NUMBER OF ACTUAL ATTEMPTS LIFETIME: 2

## 2022-11-21 ASSESSMENT — ACTIVITIES OF DAILY LIVING (ADL)
ADLS_ACUITY_SCORE: 35
ADLS_ACUITY_SCORE: 18
ADLS_ACUITY_SCORE: 35
ADLS_ACUITY_SCORE: 18

## 2022-11-21 NOTE — ED NOTES
Patient received in signout from Dr. Maynard.  See his note for full documentation.  In short, patient presents to the ED with active suicidality.  Had a plan to jump in the river.  Presented with alcohol intoxication as well.  Plan on signout is to have patient undergo mental health assessment when clinically sober and disposition accordingly.    Patient was evaluated by the mental health team.  See their note for supporting documentation.  Patient has a history of schizoaffective disorder.  Has been off meds for 2 months.  She is hearing voices to kill her self.  She has a prior attempt of overdose in 2020.  She has increased stressors including recent homelessness.  Recommend inpatient admission for psychiatric stabilization.    Update: Patient incidentally is positive for RSV.  She is asymptomatic.  Discussed with infection prevention, and patient will need to be on droplet precautions.  This will preclude her from the mental health floor.  Will need to be admitted to the medicine service.  Discussed with the triage hospitalist.       Eliseo Stark,   11/21/22 0518

## 2022-11-21 NOTE — PROGRESS NOTES
Rianna Man  November 21, 2022  Plan of Care Hand-off Note     Patient Care Path: Inpatient Mental Health    Plan for Care:     Patient with Schizoaffective Disorder, FIZT, PTSD, Alcohol Use Disorder and Cannabis Use Disorder has been off medication for two months. She hears voices telling her to kill herself. Tonight, she was walking to the river with a plan to jump in when a friend interrupted her and brought her to the ED. Patient is newly homeless and still grieving the loss of a pregnancy at 5 months. She would benefit from inpatient admission to restart medication and stabilize. Dr. Stark agrees with this plan.    Critical Safety Issues: Suicide Risk, which is high when patient is intoxicated.     Overview:  This patient is a child/adolescent: No    This patient has additional special visitor precautions: No    Legal Status: \[facility]\""    Contacts:   Psychiatry: Emilia Briceno, PhD, APRN, [phone].  : Saeid Ventura, [phone].    Psychiatry Consult:  Psychiatry Consult not requested because while the plan is to restart patient on her prior to admission medications, a recent medication list is available from her 11/17/2022 ED visit at [facility], per Care Everywhere.     Updated Attending Provider regarding plan of care.    Valarie Golden, LP

## 2022-11-21 NOTE — ED NOTES
Pt stated she drank 1L and 1.5L of tequilla today and last drink before arriving to the ED.  Pt admits to smoking THC also.

## 2022-11-21 NOTE — ED PROVIDER NOTES
"    [facility] [place] ([facility])    22          History     Chief Complaint   Patient presents with     Suicide Attempt     Per pt she was \"walking towards the river\" to drown self but friend stopped patient and friend drove pt to the ED. Due to depression pt has been drinking.Uses THC.      Addiction Problem     Has been drinking heavily due to depression, drinking heavily since      HPI  Rianna Man is a 32 year old female who has a past medical history of schizoaffective disorder, MDD w SI, PTSD, anxiety, and polysubstance abuse (alcohol, amphetamines, cannabis) who was escorted to the ED via her friend. Patient was found in an altered state \"walking towards the river\" to drown herself. Patient's friend stopped the patient and escorted here. Patient has been drinking due to depression and has been drinking heavily since .     Past Medical History  Past Medical History:   Diagnosis Date     Anxiety      Depressive disorder      Schizoaffective disorder (H)      Screen for STD (sexually transmitted disease)      Severe amphetamine substance use disorder (H)      Sinus tachycardia      Spontaneous  2021     Varicella     had disease     Past Surgical History:   Procedure Laterality Date     DILATION AND CURETTAGE  2021     NO HISTORY OF SURGERY       doxycycline hyclate (VIBRAMYCIN) 100 MG capsule  hydrOXYzine (VISTARIL) 50 MG capsule  lurasidone (LATUDA) 20 MG TABS tablet  prazosin (MINIPRESS) 2 MG capsule  traZODone (DESYREL) 150 MG tablet      Allergies   Allergen Reactions     No Known Allergies      Family History  Family History   Problem Relation Age of Onset     Diabetes Father      Substance Abuse Father      Diabetes Paternal Aunt      Thyroid Disease Mother         grave's disease     Bipolar Disorder Mother      Rheumatoid Arthritis Sister      Crohn's Disease Maternal Aunt      Schizophrenia Maternal Grandmother      Hypertension Other  " "    Social History   Social History     Tobacco Use     Smoking status: Every Day     Packs/day: 0.25     Years: 15.00     Pack years: 3.75     Types: Cigarettes     Last attempt to quit: 2020     Years since quittin.0     Smokeless tobacco: Current     Tobacco comments:     3-4 cig/day   Substance Use Topics     Alcohol use: Yes     Alcohol/week: 0.0 standard drinks     Comment: Alcoholic Drinks/day: Pt reports \"I'm not sure I drink a lot\", but she has been cutting down in the last month and doing well.      Drug use: Yes     Types: Marijuana, Methamphetamines, Heroin, Cocaine     Comment: Drug use: Pt reports daily use of marijuana      Past medical history, past surgical history, medications, allergies, family history, and social history were reviewed with the patient. No additional pertinent items.       Review of Systems  A complete review of systems was performed with pertinent positives and negatives noted in the HPI, and all other systems negative.    Physical Exam   BP: (!) 136/92  Pulse: 84  Temp: 98.4  F (36.9  C)  Resp: 16  Height: 172.7 cm (5' 8\")  Weight: 116.6 kg (257 lb)  SpO2: 96 %  Physical Exam  Constitutional:       General: She is not in acute distress.     Appearance: She is not diaphoretic.   HENT:      Head: Atraumatic.      Mouth/Throat:      Pharynx: No oropharyngeal exudate.   Eyes:      General: No scleral icterus.     Pupils: Pupils are equal, round, and reactive to light.   Cardiovascular:      Heart sounds: Normal heart sounds.   Pulmonary:      Effort: No respiratory distress.      Breath sounds: Normal breath sounds.   Abdominal:      General: Bowel sounds are normal.      Palpations: Abdomen is soft.      Tenderness: There is no abdominal tenderness.   Musculoskeletal:         General: No tenderness.   Skin:     General: Skin is warm.      Findings: No rash.   Neurological:      General: No focal deficit present.      Mental Status: She is oriented to person, place, and " time.      Sensory: No sensory deficit.      Motor: No weakness.      Coordination: Coordination normal.   Psychiatric:         Mood and Affect: Mood is anxious and depressed. Affect is labile.         Speech: Speech is delayed.         Behavior: Behavior is cooperative.         Thought Content: Thought content includes suicidal ideation. Thought content includes suicidal plan.         ED Course      Procedures    The medical record was reviewed and interpreted.  Current labs reviewed and interpreted.      Suicide assessment completed by mental health (D.E.C., LCSW, etc.)        Results for orders placed or performed during the hospital encounter of 11/20/22   Asymptomatic COVID-19 Virus (Coronavirus) by PCR Nose     Status: Normal    Specimen: Nose; Swab   Result Value Ref Range    SARS CoV2 PCR Negative Negative    Narrative    Testing was performed using the [employer] Xpress SARS-CoV-2 Assay on the Cepheid Gene-Xpert Instrument Systems. Additional information about this Emergency Use Authorization (EUA) assay can be found via the Lab Guide. This test should be ordered for the detection of SARS-CoV-2 in individuals who meet SARS-CoV-2 clinical and/or epidemiological criteria as well as from individuals without symptoms or other reasons to suspect COVID-19. Test performance for asymptomatic patients has only been established in anterior nasal swab specimens. This test is for in vitro diagnostic use under the FDA EUA for laboratories certified under CLIA to perform high complexity testing. This test has not been FDA cleared or approved. A negative result does not rule out the presence of PCR inhibitors in the specimen or target RNA concentration below the limit of detection for the assay. The possibility of a false negative should be considered if the patient's recent exposure or clinical presentation suggests COVID-19. This test was validated by the [facility]. This laboratory is  certified under the Clinical Laboratory Improvement Amendments (CLIA) as qualified to perform high complexity laboratory testing.     HCG qualitative urine     Status: Normal   Result Value Ref Range    hCG Urine Qualitative Negative Negative   Comprehensive metabolic panel     Status: Abnormal   Result Value Ref Range    Sodium 142 133 - 144 mmol/L    Potassium 3.5 3.4 - 5.3 mmol/L    Chloride 104 94 - 109 mmol/L    Carbon Dioxide (CO2) 19 (L) 20 - 32 mmol/L    Anion Gap 19 (H) 3 - 14 mmol/L    Urea Nitrogen 10 7 - 30 mg/dL    Creatinine 0.80 0.52 - 1.04 mg/dL    Calcium 8.8 8.5 - 10.1 mg/dL    Glucose 95 70 - 99 mg/dL    Alkaline Phosphatase 85 40 - 150 U/L    AST 29 0 - 45 U/L    ALT 35 0 - 50 U/L    Protein Total 7.8 6.8 - 8.8 g/dL    Albumin 4.1 3.4 - 5.0 g/dL    Bilirubin Total 0.3 0.2 - 1.3 mg/dL    GFR Estimate >90 >60 mL/min/1.73m2   CBC with platelets and differential     Status: None   Result Value Ref Range    WBC Count 7.6 4.0 - 11.0 10e3/uL    RBC Count 4.78 3.80 - 5.20 10e6/uL    Hemoglobin 14.6 11.7 - 15.7 g/dL    Hematocrit 43.6 35.0 - 47.0 %    MCV 91 78 - 100 fL    MCH 30.5 26.5 - 33.0 pg    MCHC 33.5 31.5 - 36.5 g/dL    RDW 12.9 10.0 - 15.0 %    Platelet Count 360 150 - 450 10e3/uL    % Neutrophils 38 %    % Lymphocytes 51 %    % Monocytes 9 %    % Eosinophils 1 %    % Basophils 1 %    % Immature Granulocytes 0 %    NRBCs per 100 WBC 0 <1 /100    Absolute Neutrophils 2.9 1.6 - 8.3 10e3/uL    Absolute Lymphocytes 3.9 0.8 - 5.3 10e3/uL    Absolute Monocytes 0.7 0.0 - 1.3 10e3/uL    Absolute Eosinophils 0.1 0.0 - 0.7 10e3/uL    Absolute Basophils 0.1 0.0 - 0.2 10e3/uL    Absolute Immature Granulocytes 0.0 <=0.4 10e3/uL    Absolute NRBCs 0.0 10e3/uL   Drug abuse screen 1 urine (ED)     Status: Abnormal   Result Value Ref Range    Amphetamines Urine Screen Negative Screen Negative    Barbiturates Urine Screen Negative Screen Negative    Benzodiazepines Urine Screen Negative Screen Negative     Cannabinoids Urine Screen Positive (A) Screen Negative    Cocaine Urine Screen Negative Screen Negative    Opiates Urine Screen Negative Screen Negative   Alcohol breath test POCT     Status: Abnormal   Result Value Ref Range    Alcohol Breath Test 0.170 (A) 0.00 - 0.01   Urine Drugs of Abuse Screen     Status: Abnormal    Narrative    The following orders were created for panel order Urine Drugs of Abuse Screen.  Procedure                               Abnormality         Status                     ---------                               -----------         ------                     Drug abuse screen 1 urin...[[id number]]  Abnormal            Final result                 Please view results for these tests on the individual orders.   CBC with platelets differential     Status: None    Narrative    The following orders were created for panel order CBC with platelets differential.  Procedure                               Abnormality         Status                     ---------                               -----------         ------                     CBC with platelets and d...[[id number]]                      Final result                 Please view results for these tests on the individual orders.       Medications - No data to display     Assessments & Plan (with Medical Decision Making)       I have reviewed the nursing notes. I have reviewed the findings, diagnosis, plan and need for follow up with the patient.    Patient with ongoing depression anxiety at this time presenting with suicidal ideation with acute intoxication patient will be in the emergency room while she becomes sober and will have an assessment done as soon as can be possible to further evaluate her suicide risk.    Final diagnoses:   Depression, unspecified depression type   Suicidal ideation   Alcohol dependence with intoxication with complication (H)       --  [facility] Coronado [facility] EMERGENCY  DEPARTMENT  11/20/2022     Garcia Ann MD  11/21/22 0015

## 2022-11-21 NOTE — CONSULTS
Diagnostic Evaluation Consultation  Crisis Assessment    Patient was assessed: I-Pad  Patient location: [facility] ED  Was a release of information signed: Yes. Providers included on the release:  and Psychiatry Provider.       Referral Data and Chief Complaint  Rianna Man is a 32 year old, who uses she/her pronouns, and presents to the ED with family/friends. Patient is referred to the ED by family/friends. Patient is presenting to the ED for the following concerns: suicidal ideation with attempt to walk to the river and drown.      Informed Consent and Assessment Methods     Patient is her own decision maker.   Writer met with patient and explained the crisis assessment process, including applicable information disclosures and limits to confidentiality, assessed understanding of the process, and obtained consent to proceed with the assessment. Patient was observed to be able to participate in the assessment as evidenced by alert, active engagement. Assessment methods included conducting a formal interview with patient, review of medical records, collaboration with medical staff, and obtaining relevant collateral information from family and community providers when available..     Over the course of this crisis assessment provided reassurance, offered validation, engaged patient in problem solving and disposition planning and worked with patient on safety and aftercare planning. Patient's response to interventions was calm, polite, active participation.      Summary of Patient Situation  Patient was brought to the ED by a friend who interrupted patient when she was walking to the river with a plan to jump in and drown. Patient has been off psychiatric medication for two months.  She is hearing voices telling her to kill herself.  Patient drank 1.5 liter tequila and smoked marijuana tonight.  She identified triggers to suicide as being newly homeless and grief over the loss of a pregnancy at 5  "months last year. Patient asked the friend to drive her to the river and they agreed so she would get in the car. Instead the friend drove patient to the ED for evaluation.  On admission, patient's blood alcohol level was 0.17.  Her urine drug screen was positive for THC, which she reports using daily. Patient stated that she has been trying to cut back on drinking, however she has been drinking \"heavily due to depression\" since 11/16/22.  Patient would like to restart medication.     Brief Psychosocial History  Patient grew up in [place] with a \"bunch of siblings\", where she was raised by her mother. Patient's mother \"kicked out\" her father when patient was 11 due to his drug use. Patient attended [employer] until grade 11 when she dropped out.  Patient has a daughter who lives with the father and patient hasn't seen her for many years.     Significant Clinical History  Previous diagnoses include Schizoaffective Disorder, FITZ, OCD, PTSD, Alcohol Dependence, and Cannabis Use Disorder.  She has had numerous psychiatric hospitalizations over several years. Patient has a history of poor treatment adherence. She has established primary care, psychiatry, therapy, and case management. Patient has lived in a group home in the past. Patient has substance use disorders.      Collateral Information  [facility], Care Everywhere, and [facility] public court records were reviewed.  Patient has no history of civil commitment.        Risk Assessment    [place] Suicide Severity Rating Scale Full Clinical Version:    Suicidal Ideation  1. Wish to be Dead (Lifetime): Yes  Wish to be Dead Description (Lifetime): Patient was walking to the river [facility] when a friend interrupted her.  1. Wish to be Dead (Past 1 Month): Yes  Wish to be Dead Description (Past 1 Month): Walking to the river [facility].  2. Non-Specific Active Suicidal Thoughts (Lifetime): No  Intensity of Ideation  Most Severe Ideation Rating (Lifetime): 5  Description of " Most Severe Ideation (Lifetime): [facility]  Most Severe Ideation Rating (Past 1 Month): 5  Description of Most Severe Ideation (Past 1 Month): [facility]  Frequency (Lifetime): Less than once a week  Frequency (Past 1 Month): Less than once a week  Duration (Lifetime): 1-4 hours/a lot of time  Duration (Past 1 Month): 1-4 hours/a lot of time  Controllability (Lifetime): Can control thoughts with some difficulty  Controllability (Past 1 Month): Unable to control thoughts  Deterrents (Lifetime): Deterrents probably stopped you  Deterrents (Past 1 Month): Deterrents definitely stopped you from attempting suicide  Reasons for Ideation (Lifetime): Mostly to end or stop the pain (You couldn't go on living with the pain or how you were feeling)  Reasons for Ideation (Past 1 Month): Mostly to end or stop the pain (You couldn't go on living with the pain or how you were feeling)  Suicidal Behavior  Actual Attempt (Lifetime): Yes  Total Number of Actual Attempts (Lifetime): 2  Actual Attempt Description (Lifetime): Tonight  Actual Attempt (Past 3 Months): No  Has subject engaged in non-suicidal self-injurious behavior? (Lifetime): No  Interrupted Attempts (Lifetime): Yes  Total Number of Interrupted Attempts (Lifetime): 2  Interrupted Attempt Description (Lifetime): A friend stopped her from walking to the river [facility] and drove her to the ED instead.  Interrupted Attempts (Past 3 Months): Yes  Total Number of Interrupted Attempts (Past 3 Months): 1  Interrupted Attempt Description (Past 3 Months): Tonight  Aborted or Self-Interrupted Attempt (Lifetime): No  Preparatory Acts or Behavior (Lifetime): No  C-SSRS Risk (Lifetime/Recent)  Calculated C-SSRS Risk Score (Lifetime/Recent): High Risk    [place] Suicide Severity Rating Scale Since Last Contact:      Actual/Potential Lethality (Most Lethal Attempt)  Most Lethal Attempt Date: 11/20/22  Actual Lethality/Medical  Damage Code (Most Lethal Attempt): No physical damage or very minor physical damage  Potential Lethality Code (Most Lethal Attempt): Behavior likely to result in death despite available medical care       Validity of evaluation is not impacted by presenting factors during interview.   Comments regarding subjective versus objective responses to [place] tool: N/A  Environmental or Psychosocial Events: loss of a loved one, unstable housing, homelessness, other life stressors, ongoing abuse of substances and social isolation  Chronic Risk Factors: history of psychiatric hospitalization, history of abuse or neglect and serious, persistent mental illness   Warning Signs: hopelessness, increasing substance use or abuse and withdrawing from friends, family, and society  Protective Factors: supportive ongoing medical and mental health care relationships  Interpretation of Risk Scoring, Risk Mitigation Interventions and Safety Plan:  Patient is at high risk of suicide at this time.  She is off psychiatric medication for two months and hearing voices telling her to kill herself.      Does the patient engage in non-suicidal self-injurious behavior (NSSI/SIB)? no     Does the patient have thoughts of harming others? No     Is the patient engaging in sexually inappropriate behavior?  no        Current Substance Abuse     Is there recent substance abuse? Patient uses marijuana daily.  She drinks frequently.     Was a urine drug screen or blood alcohol level obtained: Yes BAL = 0.170. UDS = positive for THC.        Mental Status Exam     Affect: Blunted   Appearance: Disheveled    Attention Span/Concentration: Attentive  Eye Contact: Variable   Fund of Knowledge: Appropriate    Language /Speech Content: Fluent   Language /Speech Volume: Normal    Language /Speech Rate/Productions: Normal    Recent Memory: Intact   Remote Memory: Intact   Mood: Depressed    Orientation to Person: Yes    Orientation to Place: Yes   Orientation to  Time of Day: Yes    Orientation to Date: Yes    Situation (Do they understand why they are here?): Yes    Psychomotor Behavior: Normal    Thought Content: Suicidal   Thought Form: Intact      History of commitment: No       Medication    Psychotropic medications:   No current facility-administered medications for this encounter.     Current Outpatient Medications   Medication     doxycycline hyclate (VIBRAMYCIN) 100 MG capsule     hydrOXYzine (VISTARIL) 50 MG capsule     lurasidone (LATUDA) 20 MG TABS tablet     prazosin (MINIPRESS) 2 MG capsule     traZODone (DESYREL) 150 MG tablet   Per [facility] ED visit on 11/17/2022 in [facility] Everywhere, patient is also prescribed Risperdal and Effexor.    Medication changes made in the last two weeks: No       Current Care Team    Primary Care Provider: Hilray Edouard MD, [phone].   Psychiatrist: Emilia Briceno, PhD, APRN, [facility]  Therapist: Dao Man MA, LP, [facility], [phone].  : Saeid Ventura, [phone].     CTSS or ARMHS: No  ACT Team: No  Other: No      Diagnosis    Schizoaffective disorder, Depressive type F25.1      Posttraumatic stress disorder F43.10      Generalized anxiety disorder F41.1      Cannabis use disorder, Severe F12.20      Alcohol use disorder, Moderate F10.20      Clinical Summary and Substantiation of Recommendations    Patient with Schizoaffective Disorder, FITZ, PTSD, Alcohol Use Disorder and Cannabis Use Disorder has been off medication for two months. She hears voices telling her to kill herself. Tonight, she was walking to the river with a plan to jump in when a friend interrupted her and brought her to the ED. Patient is newly homeless and still grieving the loss of a pregnancy at 5 months. She would benefit from inpatient admission to restart medication and stabilize. Dr. Stark agrees with this plan.  Admission to Inpatient Level of Care is indicated due to:    1. Patient  risk of severity of behavioral health disorder is appropriate to proposed level of care as indicated by:    Imminent Risk of Harm: Command auditory hallucinations or paranoid delusions contributing to risk of suicide or self harm and Current plan for suicide or serious harm to self is present  And/or:  Behavioral health disorder is present and appropriate for inpatient care with both of the following:     Severe psychiatric, behavioral or other comorbid conditions are appropriate for management at inpatient mental health as indicated by at least one of the following:   o Hallucinations; delusions; positive symptoms and Comorbid substance use disorder    Severe dysfunction in daily living is present as indicated by at least one of the following:   o Other evidence of severe dysfunction    2. Inpatient mental health services are necessary to meet patient needs and at least one of the following:  Specific condition related to admission diagnosis is present and judged likely to deteriorate in absence of treatment at proposed level of care    3. Situation and expectations are appropriate for inpatient care, as indicated by one of the following:   Patient management/treatment at lower level of care is not feasible or is inappropriate    Disposition    Recommended disposition: Inpatient Mental Health       Reviewed case and recommendations with attending provider. Attending Name: Dr. Stark       Attending concurs with disposition: Yes       Patient concurs with disposition: Yes       Guardian concurs with disposition: NA      Final disposition: Inpatient mental health .     Inpatient Details (if applicable):   Is patient admitted voluntarily:Yes      Patient aware of potential for transfer if there is not appropriate placement? Yes       Patient is willing to travel outside of the [facility] for placement? No      Behavioral Intake Notified? Yes: Date: 11/21/2022 Time: 0410.       Assessment Details    Patient interview started  at: 0308 and completed at: 0324.     Total duration spent on the patient case in minutes: .25 hrs      CPT code(s) utilized: 49424 - Psychotherapy for Crisis - 60 (30-74*) min       Valarie Golden MS, LP, Psychotherapist  DEC - Triage & Transition Services  Callback: [phone]

## 2022-11-21 NOTE — ED TRIAGE NOTES
Per pt she was walking towards the river to drown self but friend stopped patient and friend drove pt to the ED. Due to depression pt has been drinking.Uses THC.     Has been drinking heavily due to depression, drinking heavily since Nov 16     Triage Assessment     Row Name 11/20/22 1213       Triage Assessment (Adult)    Airway WDL WDL       Respiratory WDL    Respiratory WDL WDL       Skin Circulation/Temperature WDL    Skin Circulation/Temperature WDL WDL       Cardiac WDL    Cardiac WDL WDL       Peripheral/Neurovascular WDL    Peripheral Neurovascular WDL WDL       Cognitive/Neuro/Behavioral WDL    Cognitive/Neuro/Behavioral WDL WDL
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
## 2022-11-21 NOTE — PROGRESS NOTES
"See H and P by Neida Prieto MD for the details.   Patient seen and evaluated. Says overall doing well. Still feels very depressed.   No new medical concern. VSS.   Ct contact, droplet for RSV.   Further per psychiatry.     Vitals:    11/20/22 2219 11/21/22 1135 11/21/22 1849   BP: (!) 136/92 (!) 144/82 (!) 161/100   Pulse: 84 74 85   Resp: 16  16   Temp: 98.4  F (36.9  C)     TempSrc: Oral     SpO2: 96% 98% 97%   Weight: 116.6 kg (257 lb)     Height: 1.727 m (5' 8\")         Eleuterio Iniguez MD  [facility]  Contact information available via [facility] Paging/Directory    "

## 2022-11-21 NOTE — CONSULTS
"      Initial Psychiatric Consult   Consult date: November 21, 2022         Reason for Consult, requesting source:    \"Significant mental health concerns and SI\"     Requesting source: Neida Prieto    Labs and imaging reviewed. Patient seen and evaluated by ARLENE Amor CNP          HPI:   Per ED provider note 11/20: Rianna Man is a 32 year old female who has a past medical history of schizoaffective disorder, MDD w SI, PTSD, anxiety, and polysubstance abuse (alcohol, amphetamines, cannabis) who was escorted to the ED via her friend. Patient was found in an altered state \"walking towards the river\" to drown herself. Patient's friend stopped the patient and escorted here. Patient has been drinking due to depression and has been drinking heavily since Nov 16.     Per DEC assessment completed 11/21 at 0330, inpatient psychiatry recommended to restart medication and stabilize as she hears voices telling her to kill herself, walked to river with a plan to jump, homeless and still grieving loss of pregnancy at 5 months.     Per records, it seems like patient was last on:   Current Outpatient Medications   Medication     doxycycline hyclate (VIBRAMYCIN) 100 MG capsule     hydrOXYzine (VISTARIL) 50 MG capsule     lurasidone (LATUDA) 20 MG TABS tablet     prazosin (MINIPRESS) 2 MG capsule     traZODone (DESYREL) 150 MG tablet   Per [facility] ED visit on 11/17/2022 in Care Everywhere, patient is also prescribed Risperdal and Effexor.      Upon assessment, patient was lying in bed stating she does not feel good. She reports feeling hopeless/helpless. Agreeable to restarting prior medications, states Latuda was helpful as well as prazosin for night terrors. She also reports racing thoughts, said when she drinks the hallucinations go away. Denies sxs of withdrawal. States she has had passive suicidal thoughts today. Able to be safe in the hospital.         Past Psychiatric History:   Previous diagnoses include " Schizoaffective Disorder, FITZ, OCD, PTSD, Alcohol Dependence, and Cannabis Use Disorder.  She has had numerous psychiatric hospitalizations over several years. Patient has a history of poor treatment adherence. She has established primary care, psychiatry, therapy, and case management. Patient has lived in a group home in the past. Patient has substance use disorders.     No history of commitments.     Total of 2 lifetime suicide attempts.         Substance Use and History:   BAL = 0.170. UDS = positive for THC upon admission to ED    Reports using THC daily.         Past Medical History:   PAST MEDICAL HISTORY:   Past Medical History:   Diagnosis Date     Anxiety      Depressive disorder      Schizoaffective disorder (H)      Screen for STD (sexually transmitted disease)      Severe amphetamine substance use disorder (H)      Sinus tachycardia      Spontaneous  2021     Varicella     had disease       PAST SURGICAL HISTORY:   Past Surgical History:   Procedure Laterality Date     DILATION AND CURETTAGE  2021     NO HISTORY OF SURGERY               Family History:   FAMILY HISTORY:   Family History   Problem Relation Age of Onset     Diabetes Father      Substance Abuse Father      Diabetes Paternal Aunt      Thyroid Disease Mother         grave's disease     Bipolar Disorder Mother      Rheumatoid Arthritis Sister      Crohn's Disease Maternal Aunt      Schizophrenia Maternal Grandmother      Hypertension Other        Family Psychiatric History: see above         Social History:   SOCIAL HISTORY:   Social History     Tobacco Use     Smoking status: Every Day     Packs/day: 0.25     Years: 15.00     Pack years: 3.75     Types: Cigarettes     Last attempt to quit: 2020     Years since quittin.0     Smokeless tobacco: Current     Tobacco comments:     3-4 cig/day   Substance Use Topics     Alcohol use: Yes     Alcohol/week: 0.0 standard drinks     Comment: Alcoholic Drinks/day: Pt reports  "\"I'm not sure I drink a lot\", but she has been cutting down in the last month and doing well.      Grew up in a big family in [place]. 11th grade education. Pt currently homeless.          Physical ROS:   The 10 point Review of Systems is negative other than noted in the HPI or here.           Medications:       nicotine  1 patch Transdermal Daily     nicotine   Transdermal Q8H     sodium chloride (PF)  3 mL Intracatheter Q8H              Allergies:     Allergies   Allergen Reactions     No Known Allergies           Labs:     Recent Results (from the past 48 hour(s))   Asymptomatic COVID-19 Virus (Coronavirus) by PCR Nose    Collection Time: 11/20/22 10:35 PM    Specimen: Nose; Swab   Result Value Ref Range    SARS CoV2 PCR Negative Negative   Asymptomatic Influenza A/B & SARS-CoV2 (COVID-19) Virus PCR Multiplex Nose    Collection Time: 11/20/22 10:35 PM    Specimen: Nose; Swab   Result Value Ref Range    Influenza A PCR Negative Negative    Influenza B PCR Negative Negative    RSV PCR Positive (A) Negative    SARS CoV2 PCR Negative Negative   CBC with platelets and differential    Collection Time: 11/20/22 11:22 PM   Result Value Ref Range    WBC Count 7.6 4.0 - 11.0 10e3/uL    RBC Count 4.78 3.80 - 5.20 10e6/uL    Hemoglobin 14.6 11.7 - 15.7 g/dL    Hematocrit 43.6 35.0 - 47.0 %    MCV 91 78 - 100 fL    MCH 30.5 26.5 - 33.0 pg    MCHC 33.5 31.5 - 36.5 g/dL    RDW 12.9 10.0 - 15.0 %    Platelet Count 360 150 - 450 10e3/uL    % Neutrophils 38 %    % Lymphocytes 51 %    % Monocytes 9 %    % Eosinophils 1 %    % Basophils 1 %    % Immature Granulocytes 0 %    NRBCs per 100 WBC 0 <1 /100    Absolute Neutrophils 2.9 1.6 - 8.3 10e3/uL    Absolute Lymphocytes 3.9 0.8 - 5.3 10e3/uL    Absolute Monocytes 0.7 0.0 - 1.3 10e3/uL    Absolute Eosinophils 0.1 0.0 - 0.7 10e3/uL    Absolute Basophils 0.1 0.0 - 0.2 10e3/uL    Absolute Immature Granulocytes 0.0 <=0.4 10e3/uL    Absolute NRBCs 0.0 10e3/uL   HCG qualitative urine    " Collection Time: 11/20/22 11:25 PM   Result Value Ref Range    hCG Urine Qualitative Negative Negative   Drug abuse screen 1 urine (ED)    Collection Time: 11/20/22 11:25 PM   Result Value Ref Range    Amphetamines Urine Screen Negative Screen Negative    Barbiturates Urine Screen Negative Screen Negative    Benzodiazepines Urine Screen Negative Screen Negative    Cannabinoids Urine Screen Positive (A) Screen Negative    Cocaine Urine Screen Negative Screen Negative    Opiates Urine Screen Negative Screen Negative   UA with Microscopic reflex to Culture    Collection Time: 11/20/22 11:25 PM    Specimen: Urine, Midstream   Result Value Ref Range    Color Urine Yellow Colorless, Straw, Light Yellow, Yellow    Appearance Urine Cloudy (A) Clear    Glucose Urine Negative Negative mg/dL    Bilirubin Urine Negative Negative    Ketones Urine Trace (A) Negative mg/dL    Specific Gravity Urine 1.027 1.003 - 1.035    Blood Urine Negative Negative    pH Urine 5.5 5.0 - 7.0    Protein Albumin Urine 30 (A) Negative mg/dL    Urobilinogen Urine Normal Normal, 2.0 mg/dL    Nitrite Urine Negative Negative    Leukocyte Esterase Urine Negative Negative    Mucus Urine Present (A) None Seen /LPF    Amorphous Crystals Urine Few (A) None Seen /HPF    RBC Urine 1 <=2 /HPF    WBC Urine 5 <=5 /HPF    Squamous Epithelials Urine 13 (H) <=1 /HPF    Hyaline Casts Urine 15 (H) <=2 /LPF   Alcohol breath test POCT    Collection Time: 11/20/22 11:26 PM   Result Value Ref Range    Alcohol Breath Test 0.170 (A) 0.00 - 0.01   Comprehensive metabolic panel    Collection Time: 11/20/22 11:34 PM   Result Value Ref Range    Sodium 142 133 - 144 mmol/L    Potassium 3.5 3.4 - 5.3 mmol/L    Chloride 104 94 - 109 mmol/L    Carbon Dioxide (CO2) 19 (L) 20 - 32 mmol/L    Anion Gap 19 (H) 3 - 14 mmol/L    Urea Nitrogen 10 7 - 30 mg/dL    Creatinine 0.80 0.52 - 1.04 mg/dL    Calcium 8.8 8.5 - 10.1 mg/dL    Glucose 95 70 - 99 mg/dL    Alkaline Phosphatase 85 40 - 150  "U/L    AST 29 0 - 45 U/L    ALT 35 0 - 50 U/L    Protein Total 7.8 6.8 - 8.8 g/dL    Albumin 4.1 3.4 - 5.0 g/dL    Bilirubin Total 0.3 0.2 - 1.3 mg/dL    GFR Estimate >90 >60 mL/min/1.73m2   HIV Antigen Antibody Combo    Collection Time: 11/20/22 11:34 PM   Result Value Ref Range    HIV Antigen Antibody Combo Nonreactive Nonreactive   Alcohol breath test POCT    Collection Time: 11/21/22 12:15 AM   Result Value Ref Range    Alcohol Breath Test 0.170 (A) 0.00 - 0.01   Group A Streptococcus PCR Throat Swab    Collection Time: 11/21/22 11:54 AM    Specimen: Throat; Swab   Result Value Ref Range    Group A strep by PCR Not Detected Not Detected          Physical and Psychiatric Examination:     BP (!) 144/82   Pulse 74   Temp 98.4  F (36.9  C) (Oral)   Resp 16   Ht 1.727 m (5' 8\")   Wt 116.6 kg (257 lb)   SpO2 98%   BMI 39.08 kg/m    Weight is 257 lbs 0 oz  Body mass index is 39.08 kg/m .    Physical Exam:  I have reviewed the physical exam as documented by by the medical team and agree with findings and assessment and have no additional findings to add at this time.    Mental Status Exam:    Appearance: awake, alert, adequately groomed and dressed in hospital scrubs  Attitude:  cooperative  Eye Contact:  good  Mood:  depressed  Affect:  intensity is blunted  Speech:  clear, coherent  Language: Fluent in english   Psychomotor Behavior:  no evidence of tardive dyskinesia, dystonia, or tics  Thought Process:  logical, linear and goal oriented  Associations:  no loose associations  Thought Content:  passive suicidal ideation present  Insight:  fair  Judgement:  fair  Oriented to:  time, person, and place  Attention Span and Concentration:  fair  Recent and Remote Memory:  intact  Fund of Knowledge: Appropriate   Gait and Station: baseline                DSM-5 Diagnosis:   Schizoaffective disorder, Depressive type     F25.1                             Posttraumatic stress disorder F43.10                         "   Generalized anxiety disorder  F41.1                             Cannabis use disorder, Severe          F12.20                           Alcohol use disorder, Moderate          F10.20          Assessment:   Rianna is a 32 year old female with a history of the above diagnoses who presented intoxicated and suicidal with a plan to jump off bridge. Pt decompensated mental health likely due to being off medications for 2 months, recent stressors including becoming homeless, recent loss of baby. [facility] however needs medical admission due to RSV. Will restart meds and reassess.           Summary of Recommendations:     --Restarted prior medications:   Latuda 20mg daily -- take with food   Prazosin 1mg at bedtime   Trazodone 50mg at bedtime   Atarax 25mg TID PRN anxiety   Zyprexa 5mg PO or IM PRN BID for aggression/agitation     --Plan to be admitted medically for RSV, will reassess in the coming days for need for IP psych. Patient voluntary status.         Zonia James, GREG-BC  Consult/Liaison Psychiatry   [facility]

## 2022-11-21 NOTE — PROGRESS NOTES
Clinic Care Coordination Contact  [facility]/Voicemail       Clinical Data: Care Coordinator Outreach  Outreach attempted x 1.  Left message on patient's voicemail with call back information and requested return call.  Plan: Care Coordinator  via . Care Coordinator will try to reach patient again in 1-2 business days.

## 2022-11-21 NOTE — ED TRIAGE NOTES
Pt feels she does not want to live because everyone is against her.     Triage Assessment     Row Name 11/20/22 0493       Triage Assessment (Adult)    Airway WDL WDL       Respiratory WDL    Respiratory WDL WDL       Skin Circulation/Temperature WDL    Skin Circulation/Temperature WDL WDL       Cardiac WDL    Cardiac WDL WDL       Peripheral/Neurovascular WDL    Peripheral Neurovascular WDL WDL       Cognitive/Neuro/Behavioral WDL    Cognitive/Neuro/Behavioral WDL WDL

## 2022-11-22 ENCOUNTER — PATIENT OUTREACH (OUTPATIENT)
Dept: CARE COORDINATION | Facility: CLINIC | Age: 32
End: 2022-11-22

## 2022-11-22 LAB
C TRACH DNA SPEC QL PROBE+SIG AMP: POSITIVE
N GONORRHOEA DNA SPEC QL NAA+PROBE: NEGATIVE

## 2022-11-22 PROCEDURE — 250N000013 HC RX MED GY IP 250 OP 250 PS 637: Performed by: INTERNAL MEDICINE

## 2022-11-22 PROCEDURE — 250N000013 HC RX MED GY IP 250 OP 250 PS 637

## 2022-11-22 PROCEDURE — 250N000013 HC RX MED GY IP 250 OP 250 PS 637: Performed by: PHYSICIAN ASSISTANT

## 2022-11-22 PROCEDURE — 99233 SBSQ HOSP IP/OBS HIGH 50: CPT | Performed by: INTERNAL MEDICINE

## 2022-11-22 PROCEDURE — 120N000002 HC R&B MED SURG/OB UMMC

## 2022-11-22 RX ORDER — HALOPERIDOL 5 MG/ML
2.5-5 INJECTION INTRAMUSCULAR EVERY 6 HOURS PRN
Status: DISCONTINUED | OUTPATIENT
Start: 2022-11-22 | End: 2022-11-23 | Stop reason: HOSPADM

## 2022-11-22 RX ORDER — FLUMAZENIL 0.1 MG/ML
0.2 INJECTION, SOLUTION INTRAVENOUS
Status: DISCONTINUED | OUTPATIENT
Start: 2022-11-22 | End: 2022-11-23 | Stop reason: HOSPADM

## 2022-11-22 RX ORDER — FOLIC ACID 1 MG/1
1 TABLET ORAL DAILY
Status: DISCONTINUED | OUTPATIENT
Start: 2022-11-22 | End: 2022-11-23 | Stop reason: HOSPADM

## 2022-11-22 RX ORDER — MULTIPLE VITAMINS W/ MINERALS TAB 9MG-400MCG
1 TAB ORAL DAILY
Status: DISCONTINUED | OUTPATIENT
Start: 2022-11-22 | End: 2022-11-23 | Stop reason: HOSPADM

## 2022-11-22 RX ORDER — LORAZEPAM 2 MG/ML
1-2 INJECTION INTRAMUSCULAR EVERY 30 MIN PRN
Status: DISCONTINUED | OUTPATIENT
Start: 2022-11-22 | End: 2022-11-23 | Stop reason: HOSPADM

## 2022-11-22 RX ORDER — LORAZEPAM 1 MG/1
1-2 TABLET ORAL EVERY 30 MIN PRN
Status: DISCONTINUED | OUTPATIENT
Start: 2022-11-22 | End: 2022-11-23 | Stop reason: HOSPADM

## 2022-11-22 RX ORDER — DOXYCYCLINE 100 MG/1
100 CAPSULE ORAL EVERY 12 HOURS SCHEDULED
Status: DISCONTINUED | OUTPATIENT
Start: 2022-11-22 | End: 2022-11-23 | Stop reason: HOSPADM

## 2022-11-22 RX ORDER — OLANZAPINE 5 MG/1
5-10 TABLET, ORALLY DISINTEGRATING ORAL EVERY 6 HOURS PRN
Status: DISCONTINUED | OUTPATIENT
Start: 2022-11-22 | End: 2022-11-23 | Stop reason: HOSPADM

## 2022-11-22 RX ADMIN — Medication 1 LOZENGE: at 13:14

## 2022-11-22 RX ADMIN — PHENOL 1 ML: 1.5 LIQUID ORAL at 18:20

## 2022-11-22 RX ADMIN — DOXYCYCLINE 100 MG: 100 CAPSULE ORAL at 19:53

## 2022-11-22 RX ADMIN — TRAZODONE HYDROCHLORIDE 50 MG: 50 TABLET ORAL at 21:38

## 2022-11-22 RX ADMIN — Medication 1 TABLET: at 21:38

## 2022-11-22 RX ADMIN — ACETAMINOPHEN 975 MG: 325 TABLET, FILM COATED ORAL at 07:15

## 2022-11-22 RX ADMIN — Medication 1 LOZENGE: at 17:15

## 2022-11-22 RX ADMIN — PRAZOSIN HYDROCHLORIDE 1 MG: 1 CAPSULE ORAL at 21:38

## 2022-11-22 RX ADMIN — Medication 1 LOZENGE: at 18:21

## 2022-11-22 RX ADMIN — NICOTINE 1 PATCH: 14 PATCH, EXTENDED RELEASE TRANSDERMAL at 09:07

## 2022-11-22 RX ADMIN — Medication 1 LOZENGE: at 07:15

## 2022-11-22 RX ADMIN — PHENOL 1 ML: 1.5 LIQUID ORAL at 09:59

## 2022-11-22 RX ADMIN — THIAMINE HCL TAB 100 MG 100 MG: 100 TAB at 19:53

## 2022-11-22 RX ADMIN — IBUPROFEN 400 MG: 200 TABLET, FILM COATED ORAL at 13:22

## 2022-11-22 RX ADMIN — ACETAMINOPHEN 975 MG: 325 TABLET, FILM COATED ORAL at 18:19

## 2022-11-22 RX ADMIN — PHENOL 1 ML: 1.5 LIQUID ORAL at 17:16

## 2022-11-22 RX ADMIN — PHENOL 1 ML: 1.5 LIQUID ORAL at 13:23

## 2022-11-22 RX ADMIN — Medication 1 LOZENGE: at 09:19

## 2022-11-22 RX ADMIN — HYDROXYZINE HYDROCHLORIDE 25 MG: 25 TABLET, FILM COATED ORAL at 18:20

## 2022-11-22 RX ADMIN — FOLIC ACID 1 MG: 1 TABLET ORAL at 19:53

## 2022-11-22 RX ADMIN — HYDROXYZINE HYDROCHLORIDE 25 MG: 25 TABLET, FILM COATED ORAL at 13:14

## 2022-11-22 RX ADMIN — LURASIDONE HYDROCHLORIDE 20 MG: 20 TABLET, FILM COATED ORAL at 17:20

## 2022-11-22 ASSESSMENT — ACTIVITIES OF DAILY LIVING (ADL)
ADLS_ACUITY_SCORE: 18

## 2022-11-22 NOTE — ED NOTES
Handoff report to  GABINO Pena of [facility].  Informed of course of ED stay and plan of care and verbalized understanding.
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
## 2022-11-22 NOTE — PLAN OF CARE
"    BP (!) 155/103 (BP Location: Right arm)   Pulse 91   Temp 97.4  F (36.3  C) (Oral)   Resp 16   Ht 1.727 m (5' 8\")   Wt 116.6 kg (257 lb)   SpO2 98%   BMI 39.08 kg/m     98% RA, denies SOB    Endorses scratchy/sore throat and frequent cough    Voids spontaneously w/o difficulty    Up ad rito. Ambulated in room and to bathroom  In bed for meal this shift    Visible skin WDL but pt declined skin assessment due to wanting to sleep    Reported pain 0700 headache 7/10 woke up with it. Requested tylenol and lozenge     AOx4, cooperative with flat affect, mobility and sensation intact    Regular adult combination  "

## 2022-11-22 NOTE — PLAN OF CARE
"Patient is alert and oriented x 4, calm and cooperative. Patient started the shift with a bedside attendant due an active order for suicide precautions. Patient endorsed a sense of hopelessness but no active suicidal ideation or plans to harm herself. She said \"I'm too sick to thinking of harming myself.\" Psychiatric provider met with patient this shift and discontinued suicide precautions, bedside attendant discontinued.  Patient endorsed pain ranging from 4-5 for head and throat pain. Patient had received PRN Tylenol at 0715, this brought her pain down to a 3/10. She received PRN Iburprofen at 1322, which brought the pain down to a 3/10. Received PRN Atarax at 1322 for anxiety 2/10. Patient endorsed auditory hallucinations, but she said \"I know how to keep them quiet. I've had them for a long time.\"   Patient has an infrequent cough and alternates between feeling hot and cold. She said \"I haven't been this sick in awhile.\"   Patient is on CIWA protocol, scored a 2 and 3 this shift.   At 1821 patient received PRN Tylenol for head and throat pain rated 5/10. Also received PRN Atarax for \"high\" anxiety. Received PRN throat lozenge and spray. Patient continues to contract for safety with no plans to hurt self.     Problem: Plan of Care - These are the overarching goals to be used throughout the patient stay.    Goal: Optimal Comfort and Wellbeing  Outcome: Progressing     Problem: Suicide Risk  Goal: Absence of Self-Harm  Outcome: Progressing     Problem: Plan of Care - These are the overarching goals to be used throughout the patient stay.    Goal: Absence of Hospital-Acquired Illness or Injury  Intervention: Identify and Manage Fall Risk  Recent Flowsheet Documentation  Taken 11/22/2022 1000 by Daylin Barakat, RN  Safety Promotion/Fall Prevention: nonskid shoes/slippers when out of bed  Intervention: Prevent Skin Injury  Recent Flowsheet Documentation  Taken 11/22/2022 0900 by Daylin Barakat, RN  Body Position: " position changed independently   Goal Outcome Evaluation:

## 2022-11-22 NOTE — PROGRESS NOTES
[facility]    Medicine Progress Note - Hospitalist Service, GOLD TEAM 18    Date of Admission:  11/20/2022    Assessment & Plan     32 year old female with PMHx of schizoaffective disorder, MDD with SI, PTSD, anxiety, and polysubstance abuse (alcohol, amphetamines, cannabis) who presents due to suicidal ideation.      Suicidal ideation  Schizoaffective disorder, PTSD, MDD  Anxiety  - New Psychiatry consult placed.   - Will defer initiation of psych medications pending psych recs since she has not been on any medications in about 2 months  - Suicide precautions     RSV infection  Sore throat  - GAS PCR negative.  - Cepacol lozenges, chloraseptic spray PRN  - Tylenol, ibuprofen PRN     At risk for sexually transmitted infection  - + positive Chlamydia T.   - Start Doxycycline.      Alcohol use disorder  - CIWA scoring ordered, but no PRN medications for now unless she develops withdrawal symptoms     Tobacco use disorder  - Nicotine patch ordered       Diet: Combination Diet Regular Diet; Safe Tray - with utensils    DVT Prophylaxis: Pneumatic Compression Devices and Anti-embolisim stockings (TEDs)  Gregg Catheter: Not present  Central Lines: None  Cardiac Monitoring: None  Code Status: Full Code      Disposition Plan     Expected Discharge Date: 11/23/2022                The patient's care was discussed with the Patient.    Lopez Francis MD  Hospitalist Service, GOLD TEAM 18  [facility]  Securely message with the Vocera Web Console (learn more here)  Text page via [facility] Paging/Directory   Please see signed in provider for up to date coverage information      Clinically Significant Risk Factors             # Anion Gap Metabolic Acidosis: Highest Anion Gap = 19 mmol/L (Ref range: 7-15) in last 2 days, will monitor and treat as appropriate           # Obesity: Estimated body mass index is 39.08 kg/m  as calculated from the  "following:    Height as of this encounter: 1.727 m (5' 8\").    Weight as of this encounter: 116.6 kg (257 lb)., PRESENT ON ADMISSION         ______________________________________________________________________    Interval History     No acute events overnight.   Flat affect.   New Psych consult placed.     Data reviewed today: I reviewed all medications, new labs and imaging results over the last 24 hours. I personally reviewed no images or EKG's today.    Physical Exam   Vital Signs: Temp: 97.8  F (36.6  C) Temp src: Oral BP: (!) 135/93 Pulse: 91   Resp: 17 SpO2: 97 % O2 Device: None (Room air)    Weight: 257 lbs 0 oz  General Appearance: Alert, room air, no acute distress.   Respiratory: Normal respiratory effort, clear lungs. No crackles or wheezing.   Cardiovascular: RRR, no murmur.   GI: Abdomen soft, + BS.   Skin: No rash.   Other: Alert, oriented x 3, no focal deficits.     Data   Recent Labs   Lab 11/20/22  2334 11/20/22  2322   WBC  --  7.6   HGB  --  14.6   MCV  --  91   PLT  --  360     --    POTASSIUM 3.5  --    CHLORIDE 104  --    CO2 19*  --    BUN 10  --    CR 0.80  --    ANIONGAP 19*  --    GISELLA 8.8  --    GLC 95  --    ALBUMIN 4.1  --    PROTTOTAL 7.8  --    BILITOTAL 0.3  --    ALKPHOS 85  --    ALT 35  --    AST 29  --      No results found for this or any previous visit (from the past 24 hour(s)).  Medications       [START ON 11/23/2022] influenza quadrivalent (PF) vacc  0.5 mL Intramuscular Prior to discharge     lurasidone  20 mg Oral Daily with supper     nicotine  1 patch Transdermal Daily     nicotine   Transdermal Q8H     prazosin  1 mg Oral At Bedtime     sodium chloride (PF)  3 mL Intracatheter Q8H     traZODone  50 mg Oral At Bedtime     "

## 2022-11-22 NOTE — PROGRESS NOTES
Clinic Care Coordination Contact  [facility]/Voicemail       Clinical Data: Care Coordinator Outreach  Outreach attempted x 2.  Left message on patient's voicemail with call back information and requested return call.  Plan: Care Coordinator  via . Care Coordinator will try to reach patient again in 1-2 business days.

## 2022-11-23 VITALS
BODY MASS INDEX: 38.95 KG/M2 | SYSTOLIC BLOOD PRESSURE: 128 MMHG | RESPIRATION RATE: 16 BRPM | DIASTOLIC BLOOD PRESSURE: 87 MMHG | WEIGHT: 257 LBS | OXYGEN SATURATION: 98 % | TEMPERATURE: 98.3 F | HEIGHT: 68 IN | HEART RATE: 73 BPM

## 2022-11-23 PROCEDURE — 99239 HOSP IP/OBS DSCHRG MGMT >30: CPT | Performed by: INTERNAL MEDICINE

## 2022-11-23 PROCEDURE — 250N000013 HC RX MED GY IP 250 OP 250 PS 637: Performed by: INTERNAL MEDICINE

## 2022-11-23 PROCEDURE — 250N000013 HC RX MED GY IP 250 OP 250 PS 637: Performed by: PHYSICIAN ASSISTANT

## 2022-11-23 RX ORDER — MULTIPLE VITAMINS W/ MINERALS TAB 9MG-400MCG
1 TAB ORAL DAILY
Qty: 30 TABLET | Refills: 0 | Status: SHIPPED | OUTPATIENT
Start: 2022-11-23 | End: 2022-11-23

## 2022-11-23 RX ORDER — GUAIFENESIN 200 MG/10ML
10 LIQUID ORAL EVERY 4 HOURS PRN
Qty: 355 ML | Refills: 0 | Status: SHIPPED | OUTPATIENT
Start: 2022-11-23 | End: 2022-12-12

## 2022-11-23 RX ORDER — BENZONATATE 100 MG/1
100 CAPSULE ORAL 3 TIMES DAILY PRN
Qty: 60 CAPSULE | Refills: 0 | Status: SHIPPED | OUTPATIENT
Start: 2022-11-23 | End: 2022-11-23

## 2022-11-23 RX ORDER — LANOLIN ALCOHOL/MO/W.PET/CERES
100 CREAM (GRAM) TOPICAL DAILY
Qty: 30 TABLET | Refills: 0 | Status: SHIPPED | OUTPATIENT
Start: 2022-11-23 | End: 2023-01-26

## 2022-11-23 RX ORDER — NICOTINE 21 MG/24HR
1 PATCH, TRANSDERMAL 24 HOURS TRANSDERMAL DAILY
Qty: 28 PATCH | Refills: 0 | Status: SHIPPED | OUTPATIENT
Start: 2022-11-23 | End: 2022-12-20

## 2022-11-23 RX ORDER — PRAZOSIN HYDROCHLORIDE 1 MG/1
1 CAPSULE ORAL AT BEDTIME
Qty: 30 CAPSULE | Refills: 0 | Status: SHIPPED | OUTPATIENT
Start: 2022-11-23 | End: 2024-04-08

## 2022-11-23 RX ORDER — NICOTINE 21 MG/24HR
1 PATCH, TRANSDERMAL 24 HOURS TRANSDERMAL DAILY
Qty: 28 PATCH | Refills: 0 | Status: SHIPPED | OUTPATIENT
Start: 2022-11-23 | End: 2022-11-23

## 2022-11-23 RX ORDER — TRAZODONE HYDROCHLORIDE 50 MG/1
50 TABLET, FILM COATED ORAL
Qty: 30 TABLET | Refills: 0 | Status: SHIPPED | OUTPATIENT
Start: 2022-11-23 | End: 2022-12-20

## 2022-11-23 RX ORDER — FOLIC ACID 1 MG/1
1 TABLET ORAL DAILY
Qty: 30 TABLET | Refills: 0 | Status: SHIPPED | OUTPATIENT
Start: 2022-11-23 | End: 2022-11-23

## 2022-11-23 RX ORDER — BENZONATATE 100 MG/1
100 CAPSULE ORAL 3 TIMES DAILY PRN
Qty: 60 CAPSULE | Refills: 0 | Status: SHIPPED | OUTPATIENT
Start: 2022-11-23 | End: 2022-12-12

## 2022-11-23 RX ORDER — DOXYCYCLINE 100 MG/1
100 CAPSULE ORAL EVERY 12 HOURS
Qty: 12 CAPSULE | Refills: 0 | Status: SHIPPED | OUTPATIENT
Start: 2022-11-23 | End: 2022-12-12

## 2022-11-23 RX ORDER — PRAZOSIN HYDROCHLORIDE 1 MG/1
1 CAPSULE ORAL AT BEDTIME
Qty: 30 CAPSULE | Refills: 0 | Status: SHIPPED | OUTPATIENT
Start: 2022-11-23 | End: 2022-11-23

## 2022-11-23 RX ORDER — DOXYCYCLINE 100 MG/1
100 CAPSULE ORAL EVERY 12 HOURS
Qty: 12 CAPSULE | Refills: 0 | Status: SHIPPED | OUTPATIENT
Start: 2022-11-23 | End: 2022-11-23

## 2022-11-23 RX ORDER — TRAZODONE HYDROCHLORIDE 50 MG/1
50 TABLET, FILM COATED ORAL
Qty: 30 TABLET | Refills: 0 | Status: SHIPPED | OUTPATIENT
Start: 2022-11-23 | End: 2022-11-23

## 2022-11-23 RX ORDER — GUAIFENESIN 200 MG/10ML
10 LIQUID ORAL EVERY 4 HOURS PRN
Qty: 355 ML | Refills: 0 | Status: SHIPPED | OUTPATIENT
Start: 2022-11-23 | End: 2022-11-23

## 2022-11-23 RX ORDER — GUAIFENESIN 200 MG/10ML
10 LIQUID ORAL EVERY 4 HOURS PRN
Status: DISCONTINUED | OUTPATIENT
Start: 2022-11-23 | End: 2022-11-23 | Stop reason: HOSPADM

## 2022-11-23 RX ORDER — LANOLIN ALCOHOL/MO/W.PET/CERES
100 CREAM (GRAM) TOPICAL DAILY
Qty: 30 TABLET | Refills: 0 | Status: SHIPPED | OUTPATIENT
Start: 2022-11-23 | End: 2022-11-23

## 2022-11-23 RX ORDER — BENZONATATE 100 MG/1
100 CAPSULE ORAL 3 TIMES DAILY PRN
Status: DISCONTINUED | OUTPATIENT
Start: 2022-11-23 | End: 2022-11-23 | Stop reason: HOSPADM

## 2022-11-23 RX ORDER — FOLIC ACID 1 MG/1
1 TABLET ORAL DAILY
Qty: 30 TABLET | Refills: 0 | Status: SHIPPED | OUTPATIENT
Start: 2022-11-23 | End: 2023-01-26

## 2022-11-23 RX ADMIN — Medication 1 LOZENGE: at 06:52

## 2022-11-23 RX ADMIN — PHENOL 1 ML: 1.5 LIQUID ORAL at 05:32

## 2022-11-23 RX ADMIN — Medication 50 MG: at 10:57

## 2022-11-23 RX ADMIN — IBUPROFEN 400 MG: 200 TABLET, FILM COATED ORAL at 06:52

## 2022-11-23 RX ADMIN — THIAMINE HCL TAB 100 MG 100 MG: 100 TAB at 09:36

## 2022-11-23 RX ADMIN — PHENOL 1 ML: 1.5 LIQUID ORAL at 06:52

## 2022-11-23 RX ADMIN — FOLIC ACID 1 MG: 1 TABLET ORAL at 09:36

## 2022-11-23 RX ADMIN — Medication 1 TABLET: at 09:36

## 2022-11-23 RX ADMIN — ACETAMINOPHEN 975 MG: 325 TABLET, FILM COATED ORAL at 05:31

## 2022-11-23 RX ADMIN — Medication 1 LOZENGE: at 05:32

## 2022-11-23 RX ADMIN — NICOTINE 1 PATCH: 14 PATCH, EXTENDED RELEASE TRANSDERMAL at 09:36

## 2022-11-23 RX ADMIN — DOXYCYCLINE 100 MG: 100 CAPSULE ORAL at 09:36

## 2022-11-23 RX ADMIN — BENZONATATE 100 MG: 100 CAPSULE ORAL at 09:36

## 2022-11-23 RX ADMIN — Medication 1 LOZENGE: at 09:46

## 2022-11-23 ASSESSMENT — ACTIVITIES OF DAILY LIVING (ADL)
ADLS_ACUITY_SCORE: 18

## 2022-11-23 NOTE — PROGRESS NOTES
Patient has been educated on potential risks of choosing to leave the unit and that the responsibility for patient well-being will belong to the patient. Pt has been informed that admission to hospital is due to need for medical treatment. Education given to the patient on some of the potential risks included but are not limited to:      - lack of access to nursing intervention      - possible missed appointments with MD, therapies, tests      - possible missed medications, antibiotics, management of IV's    Patient Response: Patient agreed to wear mask and return to unit.     Patient notified staff prior to leaving unit: at 1905  No IV access present.
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
## 2022-11-23 NOTE — PLAN OF CARE
Patient discharged to home accompanied by significant other Kem.     Discharge prescriptions are for . All discharge medications and instructions reviewed with Patient. Patient instructed to seek care if experiencing worsening symptoms, such as shortness of breath increasing and not relieved with rest, coughing up blood or unexplained  Discomfort.  Other phone numbers to call with questions or concerns after discharge reviewed.  No PIV. Education completed. Advised to get a flu shot 2 to 3 days from now per MD recommendation to prevent worsening symptoms.    Patient verbalized understanding of discharge medications and instructions.   Belongings returned to patient.    Patient left unit at 10:58 AM

## 2022-11-23 NOTE — PLAN OF CARE
"A&O x4.  Calls  appropriately.  Able to make needs known.    Pt left unit \"for a walk and fresh air\" after confirming with writer it was OK, received education on importance of returning to unit for inpatient care.  Pt returned to unit as promised after approx 20 minutes.   Pt slept soundly from 23:00 to approx 05:0.    Pt woke at 05:30 with increased body aches and throat pain, restless and moaning, almost crying in pain.  Spray, lozenge, and tylenol administered per MAR.  Writer returned 20 min later, pt reported modest improvement with continued moaning.    Writer returned at 06:45 with ibuprofen.  Pt reported continued moderate effect.    CIWA at 22:00 was 3.  No Valium given per order.    No acute events overnight.  Continue POC.  "

## 2022-11-25 ENCOUNTER — PATIENT OUTREACH (OUTPATIENT)
Dept: CARE COORDINATION | Facility: CLINIC | Age: 32
End: 2022-11-25

## 2022-11-25 NOTE — PROGRESS NOTES
Clinic Care Coordination Contact  [facility]/Voicemail       Clinical Data: Care Coordinator Outreach  Outreach attempted x 3.  Left message on patient's voicemail with call back information and requested return call.  Plan: Care Coordinator  via . Care Coordinator will do no further outreaches at this time.

## 2022-12-12 ENCOUNTER — OFFICE VISIT (OUTPATIENT)
Dept: FAMILY MEDICINE | Facility: CLINIC | Age: 32
End: 2022-12-12
Payer: COMMERCIAL

## 2022-12-12 VITALS
OXYGEN SATURATION: 99 % | WEIGHT: 266.8 LBS | SYSTOLIC BLOOD PRESSURE: 140 MMHG | BODY MASS INDEX: 40.57 KG/M2 | HEART RATE: 96 BPM | RESPIRATION RATE: 16 BRPM | DIASTOLIC BLOOD PRESSURE: 78 MMHG

## 2022-12-12 DIAGNOSIS — L50.9 URTICARIA: ICD-10-CM

## 2022-12-12 DIAGNOSIS — L29.9 ITCHING: ICD-10-CM

## 2022-12-12 DIAGNOSIS — Z23 NEED FOR PROPHYLACTIC VACCINATION AND INOCULATION AGAINST INFLUENZA: ICD-10-CM

## 2022-12-12 DIAGNOSIS — Z09 HOSPITAL DISCHARGE FOLLOW-UP: Primary | ICD-10-CM

## 2022-12-12 DIAGNOSIS — N92.6 IRREGULAR PERIODS: ICD-10-CM

## 2022-12-12 LAB — HCG UR QL: NEGATIVE

## 2022-12-12 PROCEDURE — 90471 IMMUNIZATION ADMIN: CPT | Performed by: FAMILY MEDICINE

## 2022-12-12 PROCEDURE — 99213 OFFICE O/P EST LOW 20 MIN: CPT | Mod: 25 | Performed by: FAMILY MEDICINE

## 2022-12-12 PROCEDURE — 81025 URINE PREGNANCY TEST: CPT | Performed by: FAMILY MEDICINE

## 2022-12-12 PROCEDURE — 90686 IIV4 VACC NO PRSV 0.5 ML IM: CPT | Performed by: FAMILY MEDICINE

## 2022-12-12 RX ORDER — CETIRIZINE HYDROCHLORIDE 10 MG/1
10 TABLET ORAL DAILY
Qty: 30 TABLET | Refills: 2 | Status: SHIPPED | OUTPATIENT
Start: 2022-12-12 | End: 2023-04-19

## 2022-12-12 NOTE — PROGRESS NOTES
Assessment & Plan     Hospital discharge follow-up  Doing well from her RSV infection, has finished her medication. Plans for influenza vaccine today, but has not had COVID19 primary series and has again declined today. She is living in a group home residence and is feeling safe. She has been taking her medications. However, she does not think she has any Latuda, so she was going to call her regular outpatient psychiatrist today regarding medication. She is to reach out back to us if she meeting obstacles. Because the discharge summary does state to take Latuda.    Itching  Probably more eczematous in nature. Requesting allergy testing because she feels she has so many triggers and her sister recently had testing completed.  - cetirizine (ZYRTEC) 10 MG tablet  Dispense: 30 tablet; Refill: 2  - Adult Allergy/Asthma Referral    Urticaria  - cetirizine (ZYRTEC) 10 MG tablet  Dispense: 30 tablet; Refill: 2  - Adult Allergy/Asthma Referral    Need for prophylactic vaccination and inoculation against influenza  - INFLUENZA VACCINE IM > 6 MONTHS VALENT IIV4 (AFLURIA/FLUZONE)    Irregular periods  She is a few days late with her period, however she recently had an illness. Discussed if pregnancy is not wanted could use birth control options. She was not ready to make a decision today.  - HCG qualitative urine                  No follow-ups on file.    Hailey Davis DO  [facility]    Plan of care communicated with patient    Subjective   Rianna is a 32 year old, presenting for the following health issues:  Pregnancy Test (Pregnancy test), Derm Problem (Itching all over body/), and Imm/Inj (Flu Shot)      HPI       Hospital Follow-up Visit:    Hospital/Nursing Home/IP Rehab Facility: [facility]  Date of Admission: 11/20/2022  Date of Discharge: 11/23/2022  Reason(s) for Admission: Suicidal ideation in crisis and RSV infection    Was your  hospitalization related to COVID-19? No   Problems taking medications regularly:  Yes, unsure if she has the correct medications for her mental health  Medication changes since discharge: None  Problems adhering to non-medication therapy:  Yes, she has the prazosin, trazodone and hydroxyzine, she does not have Latuda. She plans to call her mental health provider thru [facility] today.      Post Medication Reconciliation Status: discharge medications reconciled, continue medications without change      Summary of hospitalization:  [facility] hospital discharge summary reviewed  Diagnostic Tests/Treatments reviewed.  Follow up needed: none  Other Healthcare Providers Involved in Patient s Care:         Psychiatry  Update since discharge: improved.          2. Late menstrual period: Typically comes the 10th of each month. Has had unprotected intercourse.    3. Itching all over:  Started living in new residence starting on 12-5-22. She has been allergic to fragrances. Has been using a Secret purchased at [employer]. Itchy all over. Soap is Dove Sensitive.   Takes oral benadryl and will work for short term. Night is worse. There is no one else at her residence that has itching.  Her sister had allergy testing done and allergic to many things. She would like allergy testing done. Unsure of how long before can have test. Would like something in meantime, but she understands needs to be off for testing          Review of Systems         Objective    BP (!) 140/78   Pulse 96   Resp 16   Wt 121 kg (266 lb 12.8 oz)   SpO2 99%   BMI 40.57 kg/m    Body mass index is 40.57 kg/m .  Physical Exam   GENERAL: healthy, alert and no distress  RESP: lungs clear to auscultation - no rales, rhonchi or wheezes  CV: regular rate and rhythm, normal S1 S2, no S3 or S4, no murmur, click or rub, no peripheral edema and peripheral pulses strong  MS: no gross musculoskeletal defects noted, no edema  SKIN: No bug bite marks, mild erythema  at breasts and hyperpigmentation of axilla bilateral

## 2022-12-12 NOTE — PATIENT INSTRUCTIONS
Dove Sensitive Soap    Dove deodorant    Moisturizing cream- Aveeno products    Tide detergent - sensitive

## 2022-12-19 ENCOUNTER — PATIENT OUTREACH (OUTPATIENT)
Dept: CARE COORDINATION | Facility: CLINIC | Age: 32
End: 2022-12-19

## 2022-12-19 ENCOUNTER — TELEPHONE (OUTPATIENT)
Dept: FAMILY MEDICINE | Facility: CLINIC | Age: 32
End: 2022-12-19

## 2022-12-19 NOTE — PROGRESS NOTES
Clinic Care Coordination Contact    Follow Up Progress Note      Assessment: The pt was recently in the ED, I called to check up on the pt and help the pt setup a ED follow up. I called the pt, but got her vm, so I left a vm for the pt to give me a call back.     Care Gaps:    Health Maintenance Due   Topic Date Due     COVID-19 Vaccine (1) Never done     Pneumococcal Vaccine: Pediatrics (0 to 5 Years) and At-Risk Patients (6 to 64 Years) (1 - PCV) 03/16/1997     YEARLY PREVENTIVE VISIT  01/13/2018           Care Plans      Intervention/Education provided during outreach:               Plan:     Care Coordinator will follow up in

## 2022-12-19 NOTE — TELEPHONE ENCOUNTER
[facility] phone call message- medication clarification/question:    Full Medication Name:     Prenatal     Dose: N/a    Question:     Patient advised found out she's pregrant on Friday 12/16. Demanding needing a note to be fax over that she can take prenatal.     Per Patient in a crisis home / care       Fax Number [phone] Attn Nurse.   If any question can call patient cell phone       OK to leave a message on voice mail? Yes    Primary language: English      needed? No    Call taken on December 19, 2022 at 9:50 AM by Susana Taveras

## 2022-12-20 ENCOUNTER — OFFICE VISIT (OUTPATIENT)
Dept: FAMILY MEDICINE | Facility: CLINIC | Age: 32
End: 2022-12-20
Payer: COMMERCIAL

## 2022-12-20 VITALS
DIASTOLIC BLOOD PRESSURE: 70 MMHG | HEIGHT: 68 IN | WEIGHT: 273 LBS | OXYGEN SATURATION: 100 % | BODY MASS INDEX: 41.37 KG/M2 | SYSTOLIC BLOOD PRESSURE: 105 MMHG | RESPIRATION RATE: 20 BRPM | HEART RATE: 100 BPM | TEMPERATURE: 97.7 F

## 2022-12-20 DIAGNOSIS — F41.1 GENERALIZED ANXIETY DISORDER: ICD-10-CM

## 2022-12-20 DIAGNOSIS — F33.1 MAJOR DEPRESSIVE DISORDER, RECURRENT EPISODE, MODERATE (H): ICD-10-CM

## 2022-12-20 DIAGNOSIS — E66.01 MORBID OBESITY (H): ICD-10-CM

## 2022-12-20 DIAGNOSIS — Z32.01 PREGNANCY EXAMINATION OR TEST, POSITIVE RESULT: Primary | ICD-10-CM

## 2022-12-20 DIAGNOSIS — F25.9 SCHIZOAFFECTIVE DISORDER, UNSPECIFIED TYPE (H): ICD-10-CM

## 2022-12-20 PROCEDURE — 99214 OFFICE O/P EST MOD 30 MIN: CPT | Mod: GC | Performed by: STUDENT IN AN ORGANIZED HEALTH CARE EDUCATION/TRAINING PROGRAM

## 2022-12-20 RX ORDER — PRENATAL VIT/IRON FUM/FOLIC AC 27MG-0.8MG
1 TABLET ORAL DAILY
Qty: 90 TABLET | Refills: 3 | Status: ON HOLD | OUTPATIENT
Start: 2022-12-20 | End: 2024-09-19

## 2022-12-20 RX ORDER — PYRIDOXINE HCL (VITAMIN B6) 25 MG
25 TABLET ORAL 3 TIMES DAILY PRN
Qty: 90 TABLET | Refills: 1 | Status: SHIPPED | OUTPATIENT
Start: 2022-12-20 | End: 2022-12-20

## 2022-12-20 RX ORDER — PRENATAL VIT/IRON FUM/FOLIC AC 27MG-0.8MG
1 TABLET ORAL DAILY
Qty: 90 TABLET | Refills: 3 | Status: SHIPPED | OUTPATIENT
Start: 2022-12-20 | End: 2022-12-20

## 2022-12-20 RX ORDER — HYDROXYZINE PAMOATE 25 MG/1
50 CAPSULE ORAL EVERY 6 HOURS PRN
COMMUNITY
Start: 2022-12-18 | End: 2024-03-10

## 2022-12-20 RX ORDER — PYRIDOXINE HCL (VITAMIN B6) 25 MG
25 TABLET ORAL 3 TIMES DAILY PRN
Qty: 90 TABLET | Refills: 1 | Status: SHIPPED | OUTPATIENT
Start: 2022-12-20

## 2022-12-20 RX ORDER — VENLAFAXINE HYDROCHLORIDE 37.5 MG/1
37.5 CAPSULE, EXTENDED RELEASE ORAL DAILY
COMMUNITY
Start: 2022-12-16 | End: 2024-04-08

## 2022-12-20 RX ORDER — LURASIDONE HYDROCHLORIDE 40 MG/1
40 TABLET, FILM COATED ORAL DAILY
COMMUNITY
Start: 2022-12-16 | End: 2022-12-22 | Stop reason: SINTOL

## 2022-12-20 RX ORDER — CETIRIZINE HYDROCHLORIDE 10 MG/1
10 TABLET ORAL DAILY PRN
COMMUNITY
End: 2023-05-17

## 2022-12-20 RX ORDER — FAMOTIDINE 20 MG/1
20 TABLET, FILM COATED ORAL 2 TIMES DAILY
Qty: 180 TABLET | Refills: 1 | Status: SHIPPED | OUTPATIENT
Start: 2022-12-20 | End: 2022-12-20

## 2022-12-20 RX ORDER — TRAZODONE HYDROCHLORIDE 100 MG/1
100 TABLET ORAL AT BEDTIME
COMMUNITY
Start: 2020-05-16 | End: 2024-04-08

## 2022-12-20 RX ORDER — HYDROXYZINE HYDROCHLORIDE 25 MG/1
25-50 TABLET, FILM COATED ORAL 3 TIMES DAILY PRN
COMMUNITY
Start: 2020-05-16 | End: 2024-03-10

## 2022-12-20 RX ORDER — ACETAMINOPHEN 325 MG/1
325-650 TABLET ORAL EVERY 6 HOURS PRN
Qty: 90 TABLET | Refills: 1 | Status: ON HOLD | OUTPATIENT
Start: 2022-12-20 | End: 2024-09-19

## 2022-12-20 RX ORDER — FAMOTIDINE 20 MG/1
20 TABLET, FILM COATED ORAL 2 TIMES DAILY
Qty: 180 TABLET | Refills: 1 | Status: SHIPPED | OUTPATIENT
Start: 2022-12-20 | End: 2024-04-08

## 2022-12-20 RX ORDER — ACETAMINOPHEN 325 MG/1
1-2 TABLET ORAL EVERY 6 HOURS PRN
COMMUNITY
Start: 2021-02-08 | End: 2024-03-10

## 2022-12-20 RX ORDER — OLANZAPINE 10 MG/1
10 TABLET ORAL DAILY PRN
COMMUNITY
Start: 2022-12-16 | End: 2024-04-08

## 2022-12-20 RX ORDER — ACETAMINOPHEN 325 MG/1
325-650 TABLET ORAL EVERY 6 HOURS PRN
Qty: 90 TABLET | Refills: 1 | Status: SHIPPED | OUTPATIENT
Start: 2022-12-20 | End: 2022-12-20

## 2022-12-20 RX ORDER — FLUTICASONE PROPIONATE 50 MCG
1 SPRAY, SUSPENSION (ML) NASAL DAILY
COMMUNITY
Start: 2021-02-28 | End: 2024-02-13

## 2022-12-20 NOTE — PROGRESS NOTES
Assessment & Plan     Pregnancy examination or test, positive result  Would like to deliver at [facility], due to psychiatry history patient is high risk and would like to follow OB. Will likely need MFM referral as well due to psychiatry medications in early pregnancy. Will follow up closely with psychiatry. Med rec updated with medications safe to use in pregnancy. Adding famotidine for GERD and B6 PRN for nausea. Tylenol safe for PRN pain. Resuming daily prenatal vitamin.  Plan for dating ultrasound in 2 weeks.  - Ob/Gyn Referral  - acetaminophen (TYLENOL) 325 MG tablet  Dispense: 90 tablet; Refill: 1  - famotidine (PEPCID) 20 MG tablet  Dispense: 180 tablet; Refill: 1  - Prenatal Vit-Fe Fumarate-FA (PRENATAL MULTIVITAMIN W/IRON) 27-0.8 MG tablet  Dispense: 90 tablet; Refill: 3  - pyridOXINE (VITAMIN B6) 25 MG tablet  Dispense: 90 tablet; Refill: 1    Morbid obesity (H)  Patient with BMI 41, contributing to high risk pregnancy.    Generalized anxiety disorder  Schizoaffective disorder, unspecified type (H)  Major depressive disorder, recurrent episode, moderate (H)  Encouraged follow up with psychiatry. Will continue on Effexor and Latuda, as well as hydroxyzine and trazodone.     Post Medication Reconciliation Status:  Discharge medications reconciled and changed, see notes/orders      Return in about 2 weeks (around 1/3/2023).     Precepted with Dr. Ellison.    Any Beyer MD  [facility] Subjective Lakeshia is a 32 year old presenting for the following health issues:  ER F/U and Medication Reconciliation (Needs attention)      HPI     Unplanned, desired pregnancy  Interested in delivering at [facility] and [facility]--mostly interested in going to [facility].    Residing in a group home setting for mental health.  Was on a prenatal vitamin, but has not been receiving any of her meds since her pregnancy diagnosis--her facility wanted physician approval.    Med rec  "completed today--many medications have been discontinued prior that are still on her med list.  Discussed reaching out to her psychiatrist in the setting of Effexor and Latuda with previous SI.  Currently feels mood is good--denies depressed mood, SI or HI.    12 year old child, son  Has had 1 .  Miscarriage about 1 year ago.  [id number]    LMP . Est due date of 2023, est 5w6.      Review of Systems   Constitutional, HEENT, cardiovascular, pulmonary, gi and gu systems are negative, except as otherwise noted.      Objective    /70   Pulse 100   Temp 97.7  F (36.5  C)   Resp 20   Ht 1.727 m (5' 8\")   Wt 123.8 kg (273 lb)   SpO2 100%   BMI 41.51 kg/m    Body mass index is 41.51 kg/m .  Physical Exam   GENERAL: healthy, alert and no distress  EYES: Eyes grossly normal to inspection, PERRL and conjunctivae and sclerae normal  RESP: lungs clear to auscultation - no rales, rhonchi or wheezes  CV: regular rate and rhythm, normal S1 S2, no S3 or S4, no murmur, click or rub, no peripheral edema and peripheral pulses strong  MS: no gross musculoskeletal defects noted, no edema  PSYCH: mentation appears normal, affect normal/bright    UPT positive in [place] ED on 2022.    ----- Service Performed and Documented by Resident or Fellow ------            "

## 2022-12-21 PROBLEM — B33.8 RSV INFECTION: Status: RESOLVED | Noted: 2022-11-21 | Resolved: 2022-12-21

## 2022-12-21 PROBLEM — F19.10 POLYSUBSTANCE ABUSE (H): Status: RESOLVED | Noted: 2020-05-14 | Resolved: 2022-12-21

## 2022-12-22 ENCOUNTER — HOSPITAL ENCOUNTER (EMERGENCY)
Facility: HOSPITAL | Age: 32
Discharge: HOME | End: 2022-12-22
Attending: EMERGENCY MEDICINE | Admitting: EMERGENCY MEDICINE
Payer: COMMERCIAL

## 2022-12-22 VITALS
SYSTOLIC BLOOD PRESSURE: 117 MMHG | HEIGHT: 68 IN | HEART RATE: 82 BPM | TEMPERATURE: 98.9 F | BODY MASS INDEX: 39.4 KG/M2 | RESPIRATION RATE: 20 BRPM | WEIGHT: 260 LBS | OXYGEN SATURATION: 98 % | DIASTOLIC BLOOD PRESSURE: 64 MMHG

## 2022-12-22 DIAGNOSIS — U07.1 INFECTION DUE TO 2019 NOVEL CORONAVIRUS: ICD-10-CM

## 2022-12-22 LAB
DEPRECATED S PYO AG THROAT QL EIA: NEGATIVE
FLUAV RNA SPEC QL NAA+PROBE: NEGATIVE
FLUBV RNA RESP QL NAA+PROBE: NEGATIVE
GROUP A STREP BY PCR: NOT DETECTED
RSV RNA SPEC NAA+PROBE: NEGATIVE
SARS-COV-2 RNA RESP QL NAA+PROBE: POSITIVE

## 2022-12-22 PROCEDURE — 250N000013 HC RX MED GY IP 250 OP 250 PS 637: Performed by: EMERGENCY MEDICINE

## 2022-12-22 PROCEDURE — 87637 SARSCOV2&INF A&B&RSV AMP PRB: CPT | Performed by: EMERGENCY MEDICINE

## 2022-12-22 PROCEDURE — 87651 STREP A DNA AMP PROBE: CPT | Performed by: EMERGENCY MEDICINE

## 2022-12-22 PROCEDURE — 99283 EMERGENCY DEPT VISIT LOW MDM: CPT | Mod: CS

## 2022-12-22 PROCEDURE — C9803 HOPD COVID-19 SPEC COLLECT: HCPCS

## 2022-12-22 RX ORDER — NIRMATRELVIR AND RITONAVIR 300-100 MG
3 KIT ORAL 2 TIMES DAILY
Qty: 30 EACH | Refills: 0 | Status: SHIPPED | OUTPATIENT
Start: 2022-12-22 | End: 2023-01-23

## 2022-12-22 RX ORDER — ACETAMINOPHEN 325 MG/1
975 TABLET ORAL ONCE
Status: COMPLETED | OUTPATIENT
Start: 2022-12-22 | End: 2022-12-22

## 2022-12-22 RX ADMIN — ACETAMINOPHEN 975 MG: 325 TABLET ORAL at 11:10

## 2022-12-22 ASSESSMENT — ENCOUNTER SYMPTOMS
HEADACHES: 1
FATIGUE: 0
SHORTNESS OF BREATH: 0
BACK PAIN: 1
WEAKNESS: 0
MYALGIAS: 1
FEVER: 1
DIARRHEA: 0
VOMITING: 0
CHILLS: 1
DYSURIA: 0
COUGH: 0

## 2022-12-22 ASSESSMENT — ACTIVITIES OF DAILY LIVING (ADL)
ADLS_ACUITY_SCORE: 33
ADLS_ACUITY_SCORE: 35
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
## 2022-12-22 NOTE — DISCHARGE INSTRUCTIONS
As discussed you have coronavirus  Paxlovid antiviral sent to your pharmacy  As discussed, you need to hold your Latuda, hydroxyzine and trazodone while on this medication due to significant drug interactions  You may restart your medications after you have finished this  Continue Tylenol, fluid hydration  Follow-up closely with your primary doctor  Self isolate for 5 days after symptom onset and improving symptoms and 24 hours without a fever

## 2022-12-22 NOTE — ED TRIAGE NOTES
Patient states she had RSV 3 weeks ago and was hospitalized at [facility]. Here today for chills, body aches, sore throat, headache. Is staying at [facility].  6 weeks gestation, no concerns with pregnancy

## 2022-12-23 ENCOUNTER — PATIENT OUTREACH (OUTPATIENT)
Dept: CARE COORDINATION | Facility: CLINIC | Age: 32
End: 2022-12-23

## 2022-12-23 ASSESSMENT — ENCOUNTER SYMPTOMS: ABDOMINAL PAIN: 0

## 2022-12-23 NOTE — PROGRESS NOTES
Clinic Care Coordination Contact    Follow Up Progress Note      Assessment: The pt was recently in the ED, I called to check up on the pt, and help the pt setup a ED follow up. The pt was at [facility] for a fever and pregnancy. I called and talked to the pt, pt stated that she got COVID. Pt stated that she is going to quarantine, then if she needs a follow up, she will call to schedule.     Care Gaps:    Health Maintenance Due   Topic Date Due     COVID-19 Vaccine (1) Never done     Pneumococcal Vaccine: Pediatrics (0 to 5 Years) and At-Risk Patients (6 to 64 Years) (1 - PCV) 03/16/1997     YEARLY PREVENTIVE VISIT  01/13/2018           Care Plans      Intervention/Education provided during outreach:               Plan:     Care Coordinator will follow up in

## 2023-01-10 ENCOUNTER — TRANSFERRED RECORDS (OUTPATIENT)
Dept: HEALTH INFORMATION MANAGEMENT | Facility: CLINIC | Age: 33
End: 2023-01-10

## 2023-01-10 ENCOUNTER — LAB REQUISITION (OUTPATIENT)
Dept: LAB | Facility: CLINIC | Age: 33
End: 2023-01-10

## 2023-01-10 DIAGNOSIS — Z34.81 ENCOUNTER FOR SUPERVISION OF OTHER NORMAL PREGNANCY, FIRST TRIMESTER: ICD-10-CM

## 2023-01-10 LAB
ABO/RH(D): NORMAL
ANTIBODY SCREEN: NEGATIVE
BASOPHILS # BLD AUTO: 0 10E3/UL (ref 0–0.2)
BASOPHILS NFR BLD AUTO: 1 %
EOSINOPHIL # BLD AUTO: 0.1 10E3/UL (ref 0–0.7)
EOSINOPHIL NFR BLD AUTO: 1 %
ERYTHROCYTE [DISTWIDTH] IN BLOOD BY AUTOMATED COUNT: 12.3 % (ref 10–15)
HCT VFR BLD AUTO: 35.3 % (ref 35–47)
HGB BLD-MCNC: 11.3 G/DL (ref 11.7–15.7)
IMM GRANULOCYTES # BLD: 0 10E3/UL
IMM GRANULOCYTES NFR BLD: 0 %
LYMPHOCYTES # BLD AUTO: 2.5 10E3/UL (ref 0.8–5.3)
LYMPHOCYTES NFR BLD AUTO: 33 %
MCH RBC QN AUTO: 29.4 PG (ref 26.5–33)
MCHC RBC AUTO-ENTMCNC: 32 G/DL (ref 31.5–36.5)
MCV RBC AUTO: 92 FL (ref 78–100)
MONOCYTES # BLD AUTO: 0.7 10E3/UL (ref 0–1.3)
MONOCYTES NFR BLD AUTO: 9 %
NEUTROPHILS # BLD AUTO: 4.2 10E3/UL (ref 1.6–8.3)
NEUTROPHILS NFR BLD AUTO: 56 %
NRBC # BLD AUTO: 0 10E3/UL
NRBC BLD AUTO-RTO: 0 /100
PLATELET # BLD AUTO: 276 10E3/UL (ref 150–450)
RBC # BLD AUTO: 3.84 10E6/UL (ref 3.8–5.2)
SPECIMEN EXPIRATION DATE: NORMAL
WBC # BLD AUTO: 7.5 10E3/UL (ref 4–11)

## 2023-01-10 PROCEDURE — 86762 RUBELLA ANTIBODY: CPT | Performed by: NURSE PRACTITIONER

## 2023-01-10 PROCEDURE — 80081 OBSTETRIC PANEL INC HIV TSTG: CPT | Performed by: NURSE PRACTITIONER

## 2023-01-10 PROCEDURE — 87086 URINE CULTURE/COLONY COUNT: CPT | Performed by: NURSE PRACTITIONER

## 2023-01-10 PROCEDURE — 87389 HIV-1 AG W/HIV-1&-2 AB AG IA: CPT | Performed by: NURSE PRACTITIONER

## 2023-01-10 PROCEDURE — 86780 TREPONEMA PALLIDUM: CPT | Performed by: NURSE PRACTITIONER

## 2023-01-10 PROCEDURE — 85025 COMPLETE CBC W/AUTO DIFF WBC: CPT | Performed by: NURSE PRACTITIONER

## 2023-01-10 PROCEDURE — 87340 HEPATITIS B SURFACE AG IA: CPT | Performed by: NURSE PRACTITIONER

## 2023-01-10 PROCEDURE — 86803 HEPATITIS C AB TEST: CPT | Performed by: NURSE PRACTITIONER

## 2023-01-11 LAB
HBV SURFACE AG SERPL QL IA: NONREACTIVE
HCV AB SERPL QL IA: NONREACTIVE
HIV 1+2 AB+HIV1 P24 AG SERPL QL IA: NONREACTIVE
RUBV IGG SERPL QL IA: 1.74 INDEX
RUBV IGG SERPL QL IA: POSITIVE

## 2023-01-12 LAB
BACTERIA UR CULT: NORMAL
RPR SER QL: NONREACTIVE

## 2023-01-24 DIAGNOSIS — F10.229 ALCOHOL DEPENDENCE WITH INTOXICATION WITH COMPLICATION (H): ICD-10-CM

## 2023-01-26 RX ORDER — FOLIC ACID 1 MG/1
TABLET ORAL
Qty: 30 TABLET | Refills: 0 | Status: SHIPPED | OUTPATIENT
Start: 2023-01-26

## 2023-01-26 RX ORDER — LANOLIN ALCOHOL/MO/W.PET/CERES
CREAM (GRAM) TOPICAL
Qty: 30 TABLET | Refills: 0 | Status: SHIPPED | OUTPATIENT
Start: 2023-01-26

## 2023-01-26 NOTE — TELEPHONE ENCOUNTER
Patient call and requesting for Certadite, unsure if I am spelling medication name correctly.     Please send it [place] [facility] on [address] Please and thank you.     Please call and advise if needed, thanks.     Tagging Dr. Edouard too due to she is Pcp.

## 2023-02-14 ENCOUNTER — LAB REQUISITION (OUTPATIENT)
Dept: LAB | Facility: CLINIC | Age: 33
End: 2023-02-14
Payer: COMMERCIAL

## 2023-02-14 DIAGNOSIS — Z12.4 ENCOUNTER FOR SCREENING FOR MALIGNANT NEOPLASM OF CERVIX: ICD-10-CM

## 2023-02-14 DIAGNOSIS — Z36.9 ENCOUNTER FOR ANTENATAL SCREENING, UNSPECIFIED: ICD-10-CM

## 2023-02-14 PROCEDURE — 87624 HPV HI-RISK TYP POOLED RSLT: CPT | Mod: ORL | Performed by: OBSTETRICS & GYNECOLOGY

## 2023-02-14 PROCEDURE — 87491 CHLMYD TRACH DNA AMP PROBE: CPT | Performed by: OBSTETRICS & GYNECOLOGY

## 2023-02-14 PROCEDURE — G0145 SCR C/V CYTO,THINLAYER,RESCR: HCPCS | Mod: ORL | Performed by: OBSTETRICS & GYNECOLOGY

## 2023-02-15 LAB
C TRACH DNA SPEC QL PROBE+SIG AMP: NEGATIVE
N GONORRHOEA DNA SPEC QL NAA+PROBE: NEGATIVE

## 2023-02-17 LAB
BKR LAB AP GYN ADEQUACY: NORMAL
BKR LAB AP GYN INTERPRETATION: NORMAL
BKR LAB AP HPV REFLEX: NORMAL
BKR LAB AP LMP: NORMAL
BKR LAB AP PREVIOUS ABNL DX: NORMAL
BKR LAB AP PREVIOUS ABNORMAL: NORMAL
PATH REPORT.COMMENTS IMP SPEC: NORMAL
PATH REPORT.COMMENTS IMP SPEC: NORMAL
PATH REPORT.RELEVANT HX SPEC: NORMAL

## 2023-02-20 LAB
HUMAN PAPILLOMA VIRUS 16 DNA: NEGATIVE
HUMAN PAPILLOMA VIRUS 18 DNA: NEGATIVE
HUMAN PAPILLOMA VIRUS FINAL DIAGNOSIS: NORMAL
HUMAN PAPILLOMA VIRUS OTHER HR: NEGATIVE

## 2023-03-27 ENCOUNTER — PATIENT OUTREACH (OUTPATIENT)
Dept: CARE COORDINATION | Facility: CLINIC | Age: 33
End: 2023-03-27
Payer: COMMERCIAL

## 2023-03-27 NOTE — PROGRESS NOTES
Clinic Care Coordination Contact    Follow Up Progress Note      Assessment: The pt was recently in the ED, I called to check up on the pt and help the pt setup a ED follow up. I called the pt, but her phone was not available.     Care Gaps:    Health Maintenance Due   Topic Date Due     DEPRESSION ACTION PLAN  Never done     COVID-19 Vaccine (1) Never done     Pneumococcal Vaccine: Pediatrics (0 to 5 Years) and At-Risk Patients (6 to 64 Years) (1 - PCV) 03/16/1997     YEARLY PREVENTIVE VISIT  01/13/2018     PHQ-9  01/12/2023           Care Plans      Intervention/Education provided during outreach:               Plan:     Care Coordinator will follow up in

## 2023-03-28 ENCOUNTER — PATIENT OUTREACH (OUTPATIENT)
Dept: CARE COORDINATION | Facility: CLINIC | Age: 33
End: 2023-03-28
Payer: COMMERCIAL

## 2023-03-29 ENCOUNTER — PATIENT OUTREACH (OUTPATIENT)
Dept: CARE COORDINATION | Facility: CLINIC | Age: 33
End: 2023-03-29
Payer: COMMERCIAL

## 2023-03-29 NOTE — PROGRESS NOTES
Clinic Care Coordination Contact    Follow Up Progress Note      Assessment: The pt was recently in the ED, I called to check up on the pt and help the pt setup a ED follow up. I called the pt, but her phone was not available    Care Gaps:    Health Maintenance Due   Topic Date Due     DEPRESSION ACTION PLAN  Never done     COVID-19 Vaccine (1) Never done     Pneumococcal Vaccine: Pediatrics (0 to 5 Years) and At-Risk Patients (6 to 64 Years) (1 - PCV) 03/16/1997     YEARLY PREVENTIVE VISIT  01/13/2018     PHQ-9  01/12/2023           Care Plans      Intervention/Education provided during outreach:               Plan:     Care Coordinator will follow up in

## 2023-04-11 ENCOUNTER — PATIENT OUTREACH (OUTPATIENT)
Dept: CARE COORDINATION | Facility: CLINIC | Age: 33
End: 2023-04-11
Payer: COMMERCIAL

## 2023-04-11 NOTE — PROGRESS NOTES
Clinic Care Coordination Contact  [facility]/Voicemail       Clinical Data: Care Coordinator Outreach  Outreach attempted x 1.  Left message on patient's voicemail with call back information and requested return call.  Plan: Care Coordinator  via . Care Coordinator will try to reach patient again in 1-2 business days.

## 2023-04-12 ENCOUNTER — PATIENT OUTREACH (OUTPATIENT)
Dept: CARE COORDINATION | Facility: CLINIC | Age: 33
End: 2023-04-12
Payer: COMMERCIAL

## 2023-04-12 NOTE — PROGRESS NOTES
Clinic Care Coordination Contact  [facility]/Voicemail       Clinical Data: Care Coordinator Outreach  Outreach attempted x 2.  Left message on patient's voicemail with call back information and requested return call.  Plan: Care Coordinator  via . Care Coordinator will try to reach patient again in 1-2 business days.

## 2023-04-13 ENCOUNTER — PATIENT OUTREACH (OUTPATIENT)
Dept: CARE COORDINATION | Facility: CLINIC | Age: 33
End: 2023-04-13
Payer: COMMERCIAL

## 2023-04-19 DIAGNOSIS — L50.9 URTICARIA: ICD-10-CM

## 2023-04-19 DIAGNOSIS — L29.9 ITCHING: ICD-10-CM

## 2023-04-21 RX ORDER — CETIRIZINE HYDROCHLORIDE 10 MG/1
10 TABLET ORAL DAILY
Qty: 30 TABLET | Refills: 0 | Status: SHIPPED | OUTPATIENT
Start: 2023-04-21 | End: 2023-05-17

## 2023-05-17 DIAGNOSIS — L29.9 ITCHING: ICD-10-CM

## 2023-05-17 DIAGNOSIS — L50.9 URTICARIA: ICD-10-CM

## 2023-05-22 RX ORDER — CETIRIZINE HYDROCHLORIDE 10 MG/1
10 TABLET ORAL DAILY
Qty: 90 TABLET | Refills: 3 | Status: SHIPPED | OUTPATIENT
Start: 2023-05-22 | End: 2024-02-13

## 2023-06-02 ENCOUNTER — HEALTH MAINTENANCE LETTER (OUTPATIENT)
Age: 33
End: 2023-06-02

## 2023-10-06 ENCOUNTER — HOSPITAL ENCOUNTER (EMERGENCY)
Facility: HOSPITAL | Age: 33
Discharge: HOME | End: 2023-10-07
Attending: EMERGENCY MEDICINE | Admitting: EMERGENCY MEDICINE
Payer: COMMERCIAL

## 2023-10-06 ENCOUNTER — ANCILLARY PROCEDURE (OUTPATIENT)
Dept: ULTRASOUND IMAGING | Facility: HOSPITAL | Age: 33
End: 2023-10-06
Attending: EMERGENCY MEDICINE
Payer: COMMERCIAL

## 2023-10-06 DIAGNOSIS — N30.01 ACUTE CYSTITIS WITH HEMATURIA: ICD-10-CM

## 2023-10-06 DIAGNOSIS — L72.0 CYST OF SKIN AND SUBCUTANEOUS TISSUE: ICD-10-CM

## 2023-10-06 PROCEDURE — 99284 EMERGENCY DEPT VISIT MOD MDM: CPT | Mod: 25

## 2023-10-06 PROCEDURE — 76882 US LMTD JT/FCL EVL NVASC XTR: CPT

## 2023-10-07 VITALS
HEART RATE: 90 BPM | BODY MASS INDEX: 37.89 KG/M2 | HEIGHT: 68 IN | WEIGHT: 250 LBS | OXYGEN SATURATION: 98 % | TEMPERATURE: 97.7 F | DIASTOLIC BLOOD PRESSURE: 67 MMHG | RESPIRATION RATE: 20 BRPM | SYSTOLIC BLOOD PRESSURE: 116 MMHG

## 2023-10-07 LAB
ALBUMIN UR-MCNC: 50 MG/DL
APPEARANCE UR: ABNORMAL
BACTERIA #/AREA URNS HPF: ABNORMAL /HPF
BILIRUB UR QL STRIP: NEGATIVE
COLOR UR AUTO: YELLOW
GLUCOSE UR STRIP-MCNC: NEGATIVE MG/DL
HCG UR QL: NEGATIVE
HGB UR QL STRIP: ABNORMAL
HYALINE CASTS: 15 /LPF
KETONES UR STRIP-MCNC: NEGATIVE MG/DL
LEUKOCYTE ESTERASE UR QL STRIP: ABNORMAL
MUCOUS THREADS #/AREA URNS LPF: PRESENT /LPF
NITRATE UR QL: NEGATIVE
PH UR STRIP: 5.5 [PH] (ref 5–7)
RBC URINE: 94 /HPF
SP GR UR STRIP: 1.02 (ref 1–1.03)
SQUAMOUS EPITHELIAL: 9 /HPF
UROBILINOGEN UR STRIP-MCNC: <2 MG/DL
WBC CLUMPS #/AREA URNS HPF: PRESENT /HPF
WBC URINE: >182 /HPF

## 2023-10-07 PROCEDURE — 81001 URINALYSIS AUTO W/SCOPE: CPT | Performed by: EMERGENCY MEDICINE

## 2023-10-07 PROCEDURE — 250N000013 HC RX MED GY IP 250 OP 250 PS 637: Performed by: EMERGENCY MEDICINE

## 2023-10-07 PROCEDURE — 81025 URINE PREGNANCY TEST: CPT | Performed by: EMERGENCY MEDICINE

## 2023-10-07 PROCEDURE — 87088 URINE BACTERIA CULTURE: CPT | Performed by: EMERGENCY MEDICINE

## 2023-10-07 RX ORDER — SULFAMETHOXAZOLE/TRIMETHOPRIM 800-160 MG
1 TABLET ORAL 2 TIMES DAILY
Qty: 20 TABLET | Refills: 0 | Status: SHIPPED | OUTPATIENT
Start: 2023-10-07 | End: 2023-10-17

## 2023-10-07 RX ORDER — PHENAZOPYRIDINE HYDROCHLORIDE 100 MG/1
100 TABLET, FILM COATED ORAL 3 TIMES DAILY PRN
Qty: 10 TABLET | Refills: 0 | Status: SHIPPED | OUTPATIENT
Start: 2023-10-07 | End: 2024-03-10

## 2023-10-07 RX ORDER — PHENAZOPYRIDINE HYDROCHLORIDE 100 MG/1
100 TABLET, FILM COATED ORAL ONCE
Status: COMPLETED | OUTPATIENT
Start: 2023-10-07 | End: 2023-10-07

## 2023-10-07 RX ORDER — SULFAMETHOXAZOLE/TRIMETHOPRIM 800-160 MG
1 TABLET ORAL ONCE
Status: COMPLETED | OUTPATIENT
Start: 2023-10-07 | End: 2023-10-07

## 2023-10-07 RX ADMIN — PHENAZOPYRIDINE HYDROCHLORIDE 100 MG: 100 TABLET ORAL at 01:18

## 2023-10-07 RX ADMIN — SULFAMETHOXAZOLE AND TRIMETHOPRIM 1 TABLET: 800; 160 TABLET ORAL at 01:17

## 2023-10-07 ASSESSMENT — ACTIVITIES OF DAILY LIVING (ADL): ADLS_ACUITY_SCORE: 35

## 2023-10-07 ASSESSMENT — ENCOUNTER SYMPTOMS: DIFFICULTY URINATING: 1

## 2023-10-07 NOTE — ED TRIAGE NOTES
"  Lump in right arm, \"about 1/2\" that has hurt for 1 day. It needs to be lanced\". No drainage.HX of these lumps. Pain a 7.   Triage Assessment       Row Name 10/06/23 2765       Triage Assessment (Adult)    Airway WDL WDL       Respiratory WDL    Respiratory WDL WDL       Skin Circulation/Temperature WDL    Skin Circulation/Temperature WDL X       Cardiac WDL    Cardiac WDL WDL       Peripheral/Neurovascular WDL    Peripheral Neurovascular WDL WDL       Cognitive/Neuro/Behavioral WDL    Cognitive/Neuro/Behavioral WDL WDL                    "
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
## 2023-10-08 LAB — BACTERIA UR CULT: ABNORMAL

## 2023-10-09 ENCOUNTER — PATIENT OUTREACH (OUTPATIENT)
Dept: CARE COORDINATION | Facility: CLINIC | Age: 33
End: 2023-10-09

## 2023-10-09 NOTE — PROGRESS NOTES
Clinic Care Coordination Contact  Follow Up Progress Note      Assessment:  The pt was recently in the ED, I called to check up on the pt, and help the pt setup a ED follow up. The pt was pt at [facility] for right armpit lump. I called the pt, but got her vm, so I left a vm for the pt to give me a call back.     Care Gaps:    Health Maintenance Due   Topic Date Due    DEPRESSION ACTION PLAN  Never done    COVID-19 Vaccine (1) Never done    Pneumococcal Vaccine: Pediatrics (0 to 5 Years) and At-Risk Patients (6 to 64 Years) (1 - PCV) 03/16/1997    YEARLY PREVENTIVE VISIT  01/13/2018    PHQ-9  01/12/2023    ADVANCE CARE PLANNING  08/23/2023    INFLUENZA VACCINE (1) 09/01/2023           Care Plans      Intervention/Education provided during outreach:               Plan:     Care Coordinator will follow up in

## 2023-10-10 ENCOUNTER — PATIENT OUTREACH (OUTPATIENT)
Dept: CARE COORDINATION | Facility: CLINIC | Age: 33
End: 2023-10-10

## 2023-10-10 NOTE — PROGRESS NOTES
Clinic Care Coordination Contact  Follow Up Progress Note      Assessment:   The pt was recently in the ED, I called to check up on the pt, and help the pt setup a ED follow up. The pt was pt at [facility] for right armpit lump. I called and talked to the pt, pt stated that she is doing ok. Pt stated that she did not need a ED follow up, but she did want to make a physical appointment. I was able to help setup for the pt to come in on 10/19/2023 at 1:20pm with  for a physical.     Care Gaps:    Health Maintenance Due   Topic Date Due    DEPRESSION ACTION PLAN  Never done    COVID-19 Vaccine (1) Never done    Pneumococcal Vaccine: Pediatrics (0 to 5 Years) and At-Risk Patients (6 to 64 Years) (1 - PCV) 03/16/1997    YEARLY PREVENTIVE VISIT  01/13/2018    PHQ-9  01/12/2023    ADVANCE CARE PLANNING  08/23/2023    INFLUENZA VACCINE (1) 09/01/2023           Care Plans      Intervention/Education provided during outreach:               Plan:     Care Coordinator will follow up in

## 2023-10-26 ENCOUNTER — PATIENT OUTREACH (OUTPATIENT)
Dept: CARE COORDINATION | Facility: CLINIC | Age: 33
End: 2023-10-26

## 2023-10-26 NOTE — PROGRESS NOTES
Clinic Care Coordination Contact  Follow Up Progress Note      Assessment:  The pt was recently in the ED, I called to check up on the pt, and help the pt setup a ED follow up. I called the pt,but got her vm, so I left  a vm for the pt to give me a call back.     Care Gaps:    Health Maintenance Due   Topic Date Due    DEPRESSION ACTION PLAN  Never done    COVID-19 Vaccine (1) Never done    Pneumococcal Vaccine: Pediatrics (0 to 5 Years) and At-Risk Patients (6 to 64 Years) (1 - PCV) 03/16/1997    YEARLY PREVENTIVE VISIT  01/13/2018    PHQ-9  01/12/2023    ADVANCE CARE PLANNING  08/23/2023    INFLUENZA VACCINE (1) 09/01/2023           Care Plans      Intervention/Education provided during outreach:               Plan:     Care Coordinator will follow up in

## 2023-10-27 ENCOUNTER — PATIENT OUTREACH (OUTPATIENT)
Dept: CARE COORDINATION | Facility: CLINIC | Age: 33
End: 2023-10-27

## 2023-10-27 NOTE — PROGRESS NOTES
Clinic Care Coordination Contact  Follow Up Progress Note      Assessment: The pt was recently in the ED, I called to check up on the pt, and help the pt setup a ED follow up. I called and talked to the pt, pt stated that she is doing better. She would like a follow up, so I was able to setup for the pt to come in on 11/07/2023 at 11:40am with .    Care Gaps:    Health Maintenance Due   Topic Date Due    DEPRESSION ACTION PLAN  Never done    COVID-19 Vaccine (1) Never done    Pneumococcal Vaccine: Pediatrics (0 to 5 Years) and At-Risk Patients (6 to 64 Years) (1 - PCV) 03/16/1997    YEARLY PREVENTIVE VISIT  01/13/2018    PHQ-9  01/12/2023    ADVANCE CARE PLANNING  08/23/2023    INFLUENZA VACCINE (1) 09/01/2023           Care Plans      Intervention/Education provided during outreach:               Plan:     Care Coordinator will follow up in

## 2023-11-01 ENCOUNTER — PATIENT OUTREACH (OUTPATIENT)
Dept: CARE COORDINATION | Facility: CLINIC | Age: 33
End: 2023-11-01

## 2023-11-06 ENCOUNTER — PATIENT OUTREACH (OUTPATIENT)
Dept: CARE COORDINATION | Facility: CLINIC | Age: 33
End: 2023-11-06

## 2023-11-08 ENCOUNTER — PATIENT OUTREACH (OUTPATIENT)
Dept: CARE COORDINATION | Facility: CLINIC | Age: 33
End: 2023-11-08

## 2023-11-28 ENCOUNTER — PATIENT OUTREACH (OUTPATIENT)
Dept: CARE COORDINATION | Facility: CLINIC | Age: 33
End: 2023-11-28

## 2023-11-28 NOTE — PROGRESS NOTES
Clinic Care Coordination Contact  Follow Up Progress Note      Assessment: The pt was recently in the ED, I called to check up on the pt and help the pt setup a ED follow up. I called and talked to the pt, pt stated that she has a cold, she is going to wait a couple of days, and see. If she does not feel better, she will call to schedule a follow up.    Care Gaps:    Health Maintenance Due   Topic Date Due    DEPRESSION ACTION PLAN  Never done    COVID-19 Vaccine (1) Never done    Pneumococcal Vaccine: Pediatrics (0 to 5 Years) and At-Risk Patients (6 to 64 Years) (1 - PCV) 03/16/1997    YEARLY PREVENTIVE VISIT  01/13/2018    PHQ-9  01/12/2023    ADVANCE CARE PLANNING  08/23/2023    INFLUENZA VACCINE (1) 09/01/2023    NICOTINE/TOBACCO CESSATION COUNSELING Q 1 YR  12/12/2023           Care Plans      Intervention/Education provided during outreach:               Plan:     Care Coordinator will follow up in

## 2023-12-12 ENCOUNTER — PATIENT OUTREACH (OUTPATIENT)
Dept: CARE COORDINATION | Facility: CLINIC | Age: 33
End: 2023-12-12

## 2023-12-12 NOTE — PROGRESS NOTES
Clinic Care Coordination Contact  Follow Up Progress Note      Assessment:  The pt was recently in the ED, I called to check up on the pt and help the pt setup a ED follow up. I called the pt, but the call could not be completed.     Care Gaps:    Health Maintenance Due   Topic Date Due    DEPRESSION ACTION PLAN  Never done    COVID-19 Vaccine (1) Never done    Pneumococcal Vaccine: Pediatrics (0 to 5 Years) and At-Risk Patients (6 to 64 Years) (1 - PCV) 03/16/1997    YEARLY PREVENTIVE VISIT  01/13/2018    PHQ-9  01/12/2023    ADVANCE CARE PLANNING  08/23/2023    INFLUENZA VACCINE (1) 09/01/2023    NICOTINE/TOBACCO CESSATION COUNSELING Q 1 YR  12/12/2023           Care Plans      Intervention/Education provided during outreach:               Plan:     Care Coordinator will follow up in

## 2023-12-13 ENCOUNTER — PATIENT OUTREACH (OUTPATIENT)
Dept: CARE COORDINATION | Facility: CLINIC | Age: 33
End: 2023-12-13

## 2023-12-14 ENCOUNTER — PATIENT OUTREACH (OUTPATIENT)
Dept: CARE COORDINATION | Facility: CLINIC | Age: 33
End: 2023-12-14

## 2023-12-18 ENCOUNTER — HOSPITAL ENCOUNTER (EMERGENCY)
Facility: CLINIC | Age: 33
Discharge: HOME | End: 2023-12-18
Admitting: PHYSICIAN ASSISTANT
Payer: COMMERCIAL

## 2023-12-18 VITALS
HEART RATE: 95 BPM | DIASTOLIC BLOOD PRESSURE: 82 MMHG | TEMPERATURE: 97.5 F | OXYGEN SATURATION: 100 % | SYSTOLIC BLOOD PRESSURE: 128 MMHG | RESPIRATION RATE: 16 BRPM

## 2023-12-18 DIAGNOSIS — L02.91 ABSCESS: ICD-10-CM

## 2023-12-18 PROCEDURE — 99284 EMERGENCY DEPT VISIT MOD MDM: CPT | Mod: 25 | Performed by: PHYSICIAN ASSISTANT

## 2023-12-18 PROCEDURE — 10060 I&D ABSCESS SIMPLE/SINGLE: CPT | Performed by: PHYSICIAN ASSISTANT

## 2023-12-18 PROCEDURE — 250N000013 HC RX MED GY IP 250 OP 250 PS 637: Performed by: PHYSICIAN ASSISTANT

## 2023-12-18 PROCEDURE — 99283 EMERGENCY DEPT VISIT LOW MDM: CPT | Mod: 25 | Performed by: PHYSICIAN ASSISTANT

## 2023-12-18 RX ORDER — OXYCODONE HYDROCHLORIDE 5 MG/1
5 TABLET ORAL ONCE
Status: COMPLETED | OUTPATIENT
Start: 2023-12-18 | End: 2023-12-18

## 2023-12-18 RX ORDER — DOXYCYCLINE 100 MG/1
100 CAPSULE ORAL 2 TIMES DAILY
Qty: 10 CAPSULE | Refills: 0 | Status: SHIPPED | OUTPATIENT
Start: 2023-12-18 | End: 2023-12-23

## 2023-12-18 RX ORDER — IBUPROFEN 200 MG
600 TABLET ORAL EVERY 8 HOURS PRN
Qty: 20 TABLET | Refills: 0 | Status: ON HOLD | OUTPATIENT
Start: 2023-12-18 | End: 2024-04-12

## 2023-12-18 RX ORDER — CEFADROXIL 500 MG/1
500 CAPSULE ORAL 2 TIMES DAILY
Qty: 5 CAPSULE | Refills: 0 | Status: SHIPPED | OUTPATIENT
Start: 2023-12-18 | End: 2024-03-10

## 2023-12-18 RX ADMIN — OXYCODONE HYDROCHLORIDE 5 MG: 5 TABLET ORAL at 18:53

## 2023-12-19 ENCOUNTER — PATIENT OUTREACH (OUTPATIENT)
Dept: CARE COORDINATION | Facility: CLINIC | Age: 33
End: 2023-12-19

## 2023-12-19 NOTE — PROGRESS NOTES
Clinic Care Coordination Contact  Follow Up Progress Note      Assessment: The pt was recently in the ED, I called to check up on the pt and help the pt setup a ED follow up. The pt was at [facility] for wound infection. I called the pt, it seems like a vm, but not a vm,    Care Gaps:    Health Maintenance Due   Topic Date Due    DEPRESSION ACTION PLAN  Never done    COVID-19 Vaccine (1) Never done    Pneumococcal Vaccine: Pediatrics (0 to 5 Years) and At-Risk Patients (6 to 64 Years) (1 - PCV) 03/16/1997    YEARLY PREVENTIVE VISIT  01/13/2018    PHQ-9  01/12/2023    ADVANCE CARE PLANNING  08/23/2023    INFLUENZA VACCINE (1) 09/01/2023    NICOTINE/TOBACCO CESSATION COUNSELING Q 1 YR  12/12/2023           Care Plans      Intervention/Education provided during outreach:               Plan:     Care Coordinator will follow up in

## 2023-12-19 NOTE — ED PROVIDER NOTES
"ED Provider Note  [facility]      History     Chief Complaint   Patient presents with    Wound Infection     \"I have a huge boil on my upper thigh and I need it lanced,\" pain and redness on left upper thigh.     HPI  32yo F p/w abscess to left proximal thigh x the past few days. Reports history of prior axillary abscesses. No f/c. ROS otherwise negative.     Past Medical History  Past Medical History:   Diagnosis Date    Anxiety     Depressive disorder     Schizoaffective disorder (H)     Screen for STD (sexually transmitted disease)     Severe amphetamine substance use disorder (H)     Sinus tachycardia     Spontaneous  2021    Varicella     had disease     Past Surgical History:   Procedure Laterality Date    DILATION AND CURETTAGE  2021    NO HISTORY OF SURGERY       cefadroxil (DURICEF) 500 MG capsule  doxycycline hyclate (VIBRAMYCIN) 100 MG capsule  ibuprofen (ADVIL/MOTRIN) 200 MG tablet  acetaminophen (TYLENOL) 325 MG tablet  acetaminophen (TYLENOL) 325 MG tablet  cetirizine (ZYRTEC) 10 MG tablet  famotidine (PEPCID) 20 MG tablet  fluticasone (FLONASE) 50 MCG/ACT nasal spray  folic acid (FOLVITE) 1 MG tablet  hydrOXYzine (ATARAX) 25 MG tablet  hydrOXYzine (VISTARIL) 25 MG capsule  hydrOXYzine (VISTARIL) 50 MG capsule  OLANZapine (ZYPREXA) 10 MG tablet  phenazopyridine (PYRIDIUM) 100 MG tablet  prazosin (MINIPRESS) 1 MG capsule  Prenatal Vit-Fe Fumarate-FA (PRENATAL MULTIVITAMIN W/IRON) 27-0.8 MG tablet  pyridOXINE (VITAMIN B6) 25 MG tablet  thiamine (B-1) 100 MG tablet  traZODone (DESYREL) 100 MG tablet  venlafaxine (EFFEXOR XR) 37.5 MG 24 hr capsule      Allergies   Allergen Reactions    No Known Allergies      Family History  Family History   Problem Relation Age of Onset    Diabetes Father     Substance Abuse Father     Diabetes Paternal Aunt     Thyroid Disease Mother         grave's disease    Bipolar Disorder Mother     Rheumatoid Arthritis Sister     " "Crohn's Disease Maternal Aunt     Schizophrenia Maternal Grandmother     Hypertension Other      Social History   Social History     Tobacco Use    Smoking status: Every Day     Packs/day: 0.25     Years: 15.00     Additional pack years: 0.00     Total pack years: 3.75     Types: Cigarettes     Last attempt to quit: 11/21/2020     Years since quitting: 3.0    Smokeless tobacco: Current    Tobacco comments:     3-4 cig/day   Substance Use Topics    Alcohol use: Yes     Alcohol/week: 0.0 standard drinks of alcohol     Comment: Alcoholic Drinks/day: Pt reports \"I'm not sure I drink a lot\", but she has been cutting down in the last month and doing well.     Drug use: Yes     Types: Marijuana, Methamphetamines, Heroin, Cocaine     Comment: Drug use: Pt reports daily use of marijuana         A medically appropriate review of systems was performed with pertinent positives and negatives noted in the HPI, and all other systems negative.    Physical Exam   BP: 128/82  Pulse: 95  Temp: 97.5  F (36.4  C)  Resp: 16  SpO2: 100 %  Physical Exam  Constitutional:       General: She is not in acute distress.     Appearance: Normal appearance. She is well-developed.   HENT:      Head: Normocephalic and atraumatic.   Eyes:      Conjunctiva/sclera: Conjunctivae normal.   Cardiovascular:      Rate and Rhythm: Normal rate.   Pulmonary:      Effort: Pulmonary effort is normal.   Musculoskeletal:      Cervical back: Normal range of motion and neck supple.   Skin:     General: Skin is warm and dry.      Findings: No rash.          Neurological:      Mental Status: She is alert and oriented to person, place, and time.           ED Course, Procedures, & Data      [facility]    PROCEDURE: -Incision/Drainage    Date/Time: 12/18/2023 6:55 PM    Performed by: Quinn Crouch PA-C  Authorized by: Quinn Crouch PA-C    Risks, benefits and alternatives discussed.      LOCATION:      Type:  " Abscess    Location:  Lower extremity    Lower extremity location: proximal thigh.    PRE-PROCEDURE DETAILS:     Skin preparation:  Chloraprep    PROCEDURE TYPE:     Complexity:  Simple    ANESTHESIA (see MAR for exact dosages):     Anesthesia method:  Local infiltration    Local anesthetic:  Lidocaine 1% WITH epi    PROCEDURE DETAILS:     Incision types:  Stab incision    Scalpel blade:  11    Wound management:  Probed and deloculated, irrigated with saline and extensive cleaning    Drainage:  Purulent and bloody    Drainage amount:  Moderate    Wound treatment:  Wound left open and drain placed    Packing materials:  1/4 in gauze    PROCEDURE    Patient Tolerance:  Patient tolerated the procedure well with no immediate complications                        No results found for any visits on 12/18/23.  Medications   oxyCODONE (ROXICODONE) tablet 5 mg (5 mg Oral $Given 12/18/23 4783)     Labs Ordered and Resulted from Time of ED Arrival to Time of ED Departure - No data to display  No orders to display          Critical care was not performed.     Medical Decision Making  The patient's presentation was of low complexity (an acute and uncomplicated illness or injury).    The patient's evaluation involved:  history and exam without other MDM data elements    The patient's management necessitated moderate risk (a decision regarding minor procedure (incision & drainage) with risk factors of none).    Assessment & Plan    32yo F p/w abscess to left proximal thigh x the past few days.  In ED HDS/AF.  Left thigh with area of fluctuance, and induration consistent with abscess.  Associate with mild surrounding erythema and warmth cellulitis.  Status post successful I&D in the ED (see procedure note above).  Given concern for associated cellulitis, discharged with prescription for cefadroxil in addition to Doxy for abscess.  Advised wound check in 2 days for packing removal, earlier for worsening symptoms.    I have reviewed  the nursing notes. I have reviewed the findings, diagnosis, plan and need for follow up with the patient.    New Prescriptions    CEFADROXIL (DURICEF) 500 MG CAPSULE    Take 1 capsule (500 mg) by mouth 2 times daily    DOXYCYCLINE HYCLATE (VIBRAMYCIN) 100 MG CAPSULE    Take 1 capsule (100 mg) by mouth 2 times daily for 5 days    IBUPROFEN (ADVIL/MOTRIN) 200 MG TABLET    Take 3 tablets (600 mg) by mouth every 8 hours as needed for pain       Final diagnoses:   Abscess         Quinn Crouch PA-C  [facility] EMERGENCY DEPARTMENT  12/18/2023     Quinn Crouch PA-C  12/18/23 3751

## 2023-12-20 ENCOUNTER — PATIENT OUTREACH (OUTPATIENT)
Dept: CARE COORDINATION | Facility: CLINIC | Age: 33
End: 2023-12-20

## 2023-12-20 NOTE — PROGRESS NOTES
Clinic Care Coordination Contact  Follow Up Progress Note      Assessment: The pt was recently in the ED, I called to check up on the pt and help the pt setup a ED follow up. The pt was at [facility] for wound infection. I called the pt, it seems like a vm, but not a vm,       Care Gaps:    Health Maintenance Due   Topic Date Due    DEPRESSION ACTION PLAN  Never done    COVID-19 Vaccine (1) Never done    Pneumococcal Vaccine: Pediatrics (0 to 5 Years) and At-Risk Patients (6 to 64 Years) (1 - PCV) 03/16/1997    YEARLY PREVENTIVE VISIT  01/13/2018    PHQ-9  01/12/2023    ADVANCE CARE PLANNING  08/23/2023    INFLUENZA VACCINE (1) 09/01/2023    NICOTINE/TOBACCO CESSATION COUNSELING Q 1 YR  12/12/2023           Care Plans      Intervention/Education provided during outreach:               Plan:     Care Coordinator will follow up in

## 2023-12-21 ENCOUNTER — PATIENT OUTREACH (OUTPATIENT)
Dept: CARE COORDINATION | Facility: CLINIC | Age: 33
End: 2023-12-21

## 2023-12-21 NOTE — PROGRESS NOTES
Clinic Care Coordination Contact  Follow Up Progress Note      Assessment: The pt was recently in the ED, I called to check up on the pt and help the pt setup a ED follow up. The pt was at [facility] for wound infection. I called the pt, it seems like a vm, but not a vm,     Care Gaps:    Health Maintenance Due   Topic Date Due    DEPRESSION ACTION PLAN  Never done    COVID-19 Vaccine (1) Never done    Pneumococcal Vaccine: Pediatrics (0 to 5 Years) and At-Risk Patients (6 to 64 Years) (1 of 2 - PCV) 03/16/1997    YEARLY PREVENTIVE VISIT  01/13/2018    PHQ-9  01/12/2023    ADVANCE CARE PLANNING  08/23/2023    INFLUENZA VACCINE (1) 09/01/2023    NICOTINE/TOBACCO CESSATION COUNSELING Q 1 YR  12/12/2023           Care Plans      Intervention/Education provided during outreach:               Plan:     Care Coordinator will follow up in

## 2023-12-27 ENCOUNTER — PATIENT OUTREACH (OUTPATIENT)
Dept: CARE COORDINATION | Facility: CLINIC | Age: 33
End: 2023-12-27

## 2023-12-27 NOTE — PROGRESS NOTES
Clinic Care Coordination Contact  Follow Up Progress Note      Assessment: The pt was recently in the ED, I called to check up on the pt and help the pt setup a ED follow up. I called the pt, but got her vm, so I left a vm for the pt to give me a call back.     Care Gaps:    Health Maintenance Due   Topic Date Due    DEPRESSION ACTION PLAN  Never done    COVID-19 Vaccine (1) Never done    Pneumococcal Vaccine: Pediatrics (0 to 5 Years) and At-Risk Patients (6 to 64 Years) (1 of 2 - PCV) 03/16/1997    YEARLY PREVENTIVE VISIT  01/13/2018    PHQ-9  01/12/2023    ADVANCE CARE PLANNING  08/23/2023    INFLUENZA VACCINE (1) 09/01/2023    NICOTINE/TOBACCO CESSATION COUNSELING Q 1 YR  12/12/2023           Care Plans      Intervention/Education provided during outreach:               Plan:     Care Coordinator will follow up in

## 2023-12-28 ENCOUNTER — PATIENT OUTREACH (OUTPATIENT)
Dept: CARE COORDINATION | Facility: CLINIC | Age: 33
End: 2023-12-28

## 2023-12-29 ENCOUNTER — PATIENT OUTREACH (OUTPATIENT)
Dept: CARE COORDINATION | Facility: CLINIC | Age: 33
End: 2023-12-29

## 2024-02-02 ENCOUNTER — TRANSFERRED RECORDS (OUTPATIENT)
Dept: HEALTH INFORMATION MANAGEMENT | Facility: CLINIC | Age: 34
End: 2024-02-02

## 2024-02-13 DIAGNOSIS — F10.229 ALCOHOL DEPENDENCE WITH INTOXICATION WITH COMPLICATION (H): ICD-10-CM

## 2024-02-13 DIAGNOSIS — J30.89 SEASONAL ALLERGIC RHINITIS DUE TO OTHER ALLERGIC TRIGGER: Primary | ICD-10-CM

## 2024-02-13 DIAGNOSIS — L29.9 ITCHING: ICD-10-CM

## 2024-02-13 DIAGNOSIS — L50.9 URTICARIA: ICD-10-CM

## 2024-02-13 RX ORDER — HYDROXYZINE HYDROCHLORIDE 25 MG/1
25-50 TABLET, FILM COATED ORAL 3 TIMES DAILY PRN
Qty: 30 TABLET | Refills: 0 | OUTPATIENT
Start: 2024-02-13

## 2024-02-13 RX ORDER — HYDROXYZINE PAMOATE 50 MG/1
50 CAPSULE ORAL EVERY 6 HOURS PRN
Qty: 30 CAPSULE | Refills: 0 | Status: SHIPPED | OUTPATIENT
Start: 2024-02-13 | End: 2024-04-08

## 2024-02-13 RX ORDER — CETIRIZINE HYDROCHLORIDE 10 MG/1
10 TABLET ORAL DAILY
Qty: 30 TABLET | Refills: 0 | Status: SHIPPED | OUTPATIENT
Start: 2024-02-13

## 2024-02-13 RX ORDER — HYDROXYZINE PAMOATE 25 MG/1
50 CAPSULE ORAL EVERY 6 HOURS PRN
OUTPATIENT
Start: 2024-02-13

## 2024-02-13 RX ORDER — FLUTICASONE PROPIONATE 50 MCG
1 SPRAY, SUSPENSION (ML) NASAL DAILY
Qty: 18.2 ML | Refills: 0 | Status: SHIPPED | OUTPATIENT
Start: 2024-02-13 | End: 2024-04-08

## 2024-03-10 ENCOUNTER — HOSPITAL ENCOUNTER (EMERGENCY)
Facility: CLINIC | Age: 34
Discharge: HOME | End: 2024-03-11
Attending: FAMILY MEDICINE | Admitting: FAMILY MEDICINE
Payer: COMMERCIAL

## 2024-03-10 ENCOUNTER — TELEPHONE (OUTPATIENT)
Dept: BEHAVIORAL HEALTH | Facility: CLINIC | Age: 34
End: 2024-03-10
Payer: COMMERCIAL

## 2024-03-10 DIAGNOSIS — F19.10 POLYSUBSTANCE ABUSE (H): ICD-10-CM

## 2024-03-10 DIAGNOSIS — F33.2 MDD (MAJOR DEPRESSIVE DISORDER), RECURRENT SEVERE, WITHOUT PSYCHOSIS (H): ICD-10-CM

## 2024-03-10 DIAGNOSIS — Z32.01 PREGNANCY EXAMINATION OR TEST, POSITIVE RESULT: ICD-10-CM

## 2024-03-10 DIAGNOSIS — F25.9 SCHIZOAFFECTIVE DISORDER, UNSPECIFIED TYPE (H): ICD-10-CM

## 2024-03-10 DIAGNOSIS — R45.851 SUICIDAL IDEATION: ICD-10-CM

## 2024-03-10 PROBLEM — F43.12 CHRONIC POST-TRAUMATIC STRESS DISORDER (PTSD): Status: ACTIVE | Noted: 2021-02-24

## 2024-03-10 PROBLEM — F33.1 MAJOR DEPRESSIVE DISORDER, RECURRENT EPISODE, MODERATE (H): Status: ACTIVE | Noted: 2021-02-24

## 2024-03-10 LAB
ALCOHOL BREATH TEST: 0.1 (ref 0–0.01)
AMPHETAMINES UR QL SCN: ABNORMAL
BARBITURATES UR QL SCN: ABNORMAL
BENZODIAZ UR QL SCN: ABNORMAL
BZE UR QL SCN: ABNORMAL
CANNABINOIDS UR QL SCN: ABNORMAL
FENTANYL UR QL: ABNORMAL
HCG UR QL: NEGATIVE
OPIATES UR QL SCN: ABNORMAL
PCP QUAL URINE (ROCHE): ABNORMAL

## 2024-03-10 PROCEDURE — 99285 EMERGENCY DEPT VISIT HI MDM: CPT | Performed by: FAMILY MEDICINE

## 2024-03-10 PROCEDURE — 250N000011 HC RX IP 250 OP 636: Performed by: FAMILY MEDICINE

## 2024-03-10 PROCEDURE — 81025 URINE PREGNANCY TEST: CPT | Performed by: FAMILY MEDICINE

## 2024-03-10 PROCEDURE — 80307 DRUG TEST PRSMV CHEM ANLYZR: CPT | Performed by: FAMILY MEDICINE

## 2024-03-10 PROCEDURE — 87491 CHLMYD TRACH DNA AMP PROBE: CPT | Performed by: FAMILY MEDICINE

## 2024-03-10 PROCEDURE — 250N000013 HC RX MED GY IP 250 OP 250 PS 637: Performed by: FAMILY MEDICINE

## 2024-03-10 RX ORDER — FAMOTIDINE 20 MG/1
20 TABLET, FILM COATED ORAL 2 TIMES DAILY
Status: DISCONTINUED | OUTPATIENT
Start: 2024-03-10 | End: 2024-03-11 | Stop reason: HOSPADM

## 2024-03-10 RX ORDER — OLANZAPINE 10 MG/1
10 TABLET ORAL DAILY PRN
Status: DISCONTINUED | OUTPATIENT
Start: 2024-03-10 | End: 2024-03-11 | Stop reason: HOSPADM

## 2024-03-10 RX ORDER — ONDANSETRON 8 MG/1
8 TABLET, ORALLY DISINTEGRATING ORAL ONCE
Status: COMPLETED | OUTPATIENT
Start: 2024-03-10 | End: 2024-03-10

## 2024-03-10 RX ORDER — HYDROXYZINE HYDROCHLORIDE 50 MG/1
50 TABLET, FILM COATED ORAL EVERY 6 HOURS PRN
Status: DISCONTINUED | OUTPATIENT
Start: 2024-03-10 | End: 2024-03-11 | Stop reason: HOSPADM

## 2024-03-10 RX ORDER — CALCIUM CARBONATE 500 MG/1
500 TABLET, CHEWABLE ORAL DAILY PRN
Status: DISCONTINUED | OUTPATIENT
Start: 2024-03-10 | End: 2024-03-11 | Stop reason: HOSPADM

## 2024-03-10 RX ORDER — ACETAMINOPHEN 500 MG
1000 TABLET ORAL ONCE
Status: COMPLETED | OUTPATIENT
Start: 2024-03-10 | End: 2024-03-10

## 2024-03-10 RX ORDER — TRAZODONE HYDROCHLORIDE 50 MG/1
100 TABLET, FILM COATED ORAL AT BEDTIME
Status: DISCONTINUED | OUTPATIENT
Start: 2024-03-10 | End: 2024-03-11 | Stop reason: HOSPADM

## 2024-03-10 RX ORDER — PRAZOSIN HYDROCHLORIDE 1 MG/1
1 CAPSULE ORAL AT BEDTIME
Status: DISCONTINUED | OUTPATIENT
Start: 2024-03-10 | End: 2024-03-11 | Stop reason: HOSPADM

## 2024-03-10 RX ORDER — FLUTICASONE PROPIONATE 50 MCG
1 SPRAY, SUSPENSION (ML) NASAL DAILY
Status: DISCONTINUED | OUTPATIENT
Start: 2024-03-10 | End: 2024-03-11 | Stop reason: HOSPADM

## 2024-03-10 RX ORDER — CETIRIZINE HYDROCHLORIDE 10 MG/1
10 TABLET ORAL DAILY
Status: DISCONTINUED | OUTPATIENT
Start: 2024-03-10 | End: 2024-03-11 | Stop reason: HOSPADM

## 2024-03-10 RX ADMIN — ONDANSETRON 8 MG: 8 TABLET, ORALLY DISINTEGRATING ORAL at 12:52

## 2024-03-10 RX ADMIN — FLUTICASONE PROPIONATE 1 SPRAY: 50 SPRAY, METERED NASAL at 16:53

## 2024-03-10 RX ADMIN — OLANZAPINE 10 MG: 10 TABLET, FILM COATED ORAL at 16:55

## 2024-03-10 RX ADMIN — FAMOTIDINE 20 MG: 20 TABLET ORAL at 21:04

## 2024-03-10 RX ADMIN — HYDROXYZINE HYDROCHLORIDE 50 MG: 50 TABLET, FILM COATED ORAL at 16:36

## 2024-03-10 RX ADMIN — TRAZODONE HYDROCHLORIDE 100 MG: 50 TABLET ORAL at 21:03

## 2024-03-10 RX ADMIN — CALCIUM CARBONATE (ANTACID) CHEW TAB 500 MG 500 MG: 500 CHEW TAB at 14:35

## 2024-03-10 RX ADMIN — PRAZOSIN HYDROCHLORIDE 1 MG: 1 CAPSULE ORAL at 21:03

## 2024-03-10 RX ADMIN — ACETAMINOPHEN 1000 MG: 500 TABLET ORAL at 17:51

## 2024-03-10 ASSESSMENT — ACTIVITIES OF DAILY LIVING (ADL)
ADLS_ACUITY_SCORE: 35

## 2024-03-10 ASSESSMENT — COLUMBIA-SUICIDE SEVERITY RATING SCALE - C-SSRS
4. HAVE YOU HAD THESE THOUGHTS AND HAD SOME INTENTION OF ACTING ON THEM?: NO
5. HAVE YOU STARTED TO WORK OUT OR WORKED OUT THE DETAILS OF HOW TO KILL YOURSELF? DO YOU INTEND TO CARRY OUT THIS PLAN?: YES
3. HAVE YOU BEEN THINKING ABOUT HOW YOU MIGHT KILL YOURSELF?: YES
2. HAVE YOU ACTUALLY HAD ANY THOUGHTS OF KILLING YOURSELF IN THE PAST MONTH?: YES
6. HAVE YOU EVER DONE ANYTHING, STARTED TO DO ANYTHING, OR PREPARED TO DO ANYTHING TO END YOUR LIFE?: YES
1. IN THE PAST MONTH, HAVE YOU WISHED YOU WERE DEAD OR WISHED YOU COULD GO TO SLEEP AND NOT WAKE UP?: YES
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
## 2024-03-10 NOTE — TELEPHONE ENCOUNTER
R: [place]  Access Inpatient Bed Call Log 3/10/24  @3:35 PM     Intake has called facilities that have not updated the bed status within the last 12 hours.                                 [facility] is at capacity             [facility] is posting 0 beds. [phone]     [facility] is posting 2 beds. Negative covid required   [phone]    @ CAPACITY PER CALL @ 4:00 PM         [facility] is posting 0 beds. Neg covid. No high school/[facility]-psych. [phone]    Per Sue @ 7:06am  NO BEDS   [facility] is posting 0 beds. [phone]    [facility] is posting 0 beds. [phone]     [facility] is posting 2 bed for Young Adults. Negative covid. [phone] PT IS NOT AGE APPROPRIATE    [facility] is posting 0 beds            [facility] is posting 1 bed [phone]  @ CAPACITY PER CALL @ 4:00 PM        Pt remains on the work list pending appropriate bed availability.

## 2024-03-10 NOTE — PHARMACY-ADMISSION MEDICATION HISTORY
Pharmacy Intern Admission Medication History    Admission medication history is complete. The information provided in this note is only as accurate as the sources available at the time of the update.    Information Source(s): Patient, Hospital records, and dispense report  via in-person    Pertinent Information: Patient was knowledgeable of her past medications. The Patient reported that it had been about 4 months since she last took any medications (no reason was given).   All meds that are on the PTA med list were not being marked as taken, so they did not populate in this note.   Many were OTC, but medications of note were:   ---> venlafaxine 37.5 mg 24-hr capsule 1x every day, trazodone 100 mg tablet 1xHS, olanzapine 10 mg tablet 1x every day, hydroxyzine 50 mg capsule 1xQID PRN, and prazosin 1 mg capsule 1xHS.  Lurasidone was in the dispense report for 6/2/24, but the patient did not know if she had been taking that.    Changes made to PTA medication list:  Added: None  Deleted: duplicate acetaminophen 325 mg tablet  Cefadroxil 500 mg capsule, take 1 capsule PO BID (per pt)  Hydroxyzine 25 mg tablet, take 25-50 mg PO TID PRN (duplicate/older Rx)  Hydroxyzine 25 mg capsule, take 50 mg PO q6h PRN (duplicate/older Rx)  Phenazopyridine 100 mg  tablet, take 1 tablet PO TID PRN urinary tract discomfort (per pt)   Changed: None    Allergies reviewed with patient and updates made in EHR: yes    Medication History Completed By: Ralph Fisher 3/10/2024 3:24 PM    No outpatient medications have been marked as taking for the 3/10/24 encounter (Hospital Encounter).
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
## 2024-03-10 NOTE — ED NOTES
IP MH Referral Acuity Rating Score (RARS)    LMHP complete at referral to IP MH, with DEC; and, daily while awaiting IP MH placement. Call score to PPS.  CRITERIA SCORING   New 73 HH and Involuntary for IP MH (not adolescent) 0/1   Boarding over 24 hours 0/1   Vulnerable adult at least 55+ with multiple co morbidities; or, Patient age 11 or under 0/1   Suicide ideation without relief of precipitating factors 1/1   Current plan for suicide 1/1   Current plan for homicide 0/1   Imminent risk or actual attempt to seriously harm another without relief of factors precipitating the attempt 0/1   Severe dysfunction in daily living (ex: complete neglect for self care, extreme disruption in vegetative function, extreme deterioration in social interactions) 1/1   Recent (last 2 weeks) or current physical aggression in the ED 0/1   Restraints or seclusion episode in ED 0/1   Verbal aggression, agitation, yelling, etc., while in the ED 0/1   Active psychosis with psychomotor agitation or catatonia 1/1   Need for constant or near constant redirection (from leaving, from others, etc).  0/1   Intrusive or disruptive behaviors 0/1   TOTAL Acuity Total Score: 4

## 2024-03-11 ENCOUNTER — TELEPHONE (OUTPATIENT)
Dept: BEHAVIORAL HEALTH | Facility: CLINIC | Age: 34
End: 2024-03-11
Payer: COMMERCIAL

## 2024-03-11 VITALS
TEMPERATURE: 97.7 F | SYSTOLIC BLOOD PRESSURE: 132 MMHG | RESPIRATION RATE: 16 BRPM | DIASTOLIC BLOOD PRESSURE: 91 MMHG | HEART RATE: 91 BPM | OXYGEN SATURATION: 97 %

## 2024-03-11 LAB
C TRACH DNA SPEC QL PROBE+SIG AMP: NEGATIVE
N GONORRHOEA DNA SPEC QL NAA+PROBE: NEGATIVE

## 2024-03-11 PROCEDURE — 250N000013 HC RX MED GY IP 250 OP 250 PS 637: Performed by: FAMILY MEDICINE

## 2024-03-11 RX ORDER — PRAZOSIN HYDROCHLORIDE 1 MG/1
1 CAPSULE ORAL AT BEDTIME
Qty: 10 CAPSULE | Refills: 0 | Status: ON HOLD | OUTPATIENT
Start: 2024-03-11 | End: 2024-04-12

## 2024-03-11 RX ORDER — HYDROXYZINE PAMOATE 50 MG/1
50 CAPSULE ORAL EVERY 6 HOURS PRN
Qty: 30 CAPSULE | Refills: 0 | Status: SHIPPED | OUTPATIENT
Start: 2024-03-11

## 2024-03-11 RX ORDER — TRAZODONE HYDROCHLORIDE 100 MG/1
100 TABLET ORAL AT BEDTIME
Qty: 10 TABLET | Refills: 0 | Status: SHIPPED | OUTPATIENT
Start: 2024-03-11

## 2024-03-11 RX ORDER — OLANZAPINE 10 MG/1
10 TABLET ORAL DAILY PRN
Qty: 10 TABLET | Refills: 0 | Status: SHIPPED | OUTPATIENT
Start: 2024-03-11 | End: 2024-04-08

## 2024-03-11 RX ADMIN — FAMOTIDINE 20 MG: 20 TABLET ORAL at 08:13

## 2024-03-11 RX ADMIN — CETIRIZINE HYDROCHLORIDE 10 MG: 10 TABLET, FILM COATED ORAL at 08:13

## 2024-03-11 RX ADMIN — HYDROXYZINE HYDROCHLORIDE 50 MG: 50 TABLET, FILM COATED ORAL at 08:18

## 2024-03-11 RX ADMIN — FLUTICASONE PROPIONATE 1 SPRAY: 50 SPRAY, METERED NASAL at 08:13

## 2024-03-11 ASSESSMENT — COLUMBIA-SUICIDE SEVERITY RATING SCALE - C-SSRS
TOTAL  NUMBER OF ABORTED OR SELF INTERRUPTED ATTEMPTS SINCE LAST CONTACT: NO
6. HAVE YOU EVER DONE ANYTHING, STARTED TO DO ANYTHING, OR PREPARED TO DO ANYTHING TO END YOUR LIFE?: NO
1. SINCE LAST CONTACT, HAVE YOU WISHED YOU WERE DEAD OR WISHED YOU COULD GO TO SLEEP AND NOT WAKE UP?: NO
SUICIDE, SINCE LAST CONTACT: NO
TOTAL  NUMBER OF INTERRUPTED ATTEMPTS SINCE LAST CONTACT: NO
2. HAVE YOU ACTUALLY HAD ANY THOUGHTS OF KILLING YOURSELF?: NO
ATTEMPT SINCE LAST CONTACT: NO

## 2024-03-11 ASSESSMENT — ACTIVITIES OF DAILY LIVING (ADL)
ADLS_ACUITY_SCORE: 35

## 2024-03-11 NOTE — ED PROVIDER NOTES
[facility] ED Mental Health Handoff Note:       Brief HPI:  This is a 33 year old female signed out to me by Dr. Montenegro.  See initial ED Provider note for full details of the presentation. Interval history is pertinent for SI with auditory hallucinations in substance abuse with schizoaffective disorder.    Home meds reviewed and ordered/administered: Yes    Medically stable for inpatient mental health admission: Yes.    Evaluated by mental health: Yes. The recommendation is for inpatient mental health treatment. Bed search in process    Safety concerns: At the time I received sign out, there were no safety concerns.    Hold Status:  Active Orders   N/A            Exam:   Patient Vitals for the past 24 hrs:   BP Temp Temp src Pulse Resp SpO2   03/11/24 1400 (!) 132/91 97.7  F (36.5  C) Oral 91 16 97 %   03/11/24 0812 128/88 97.6  F (36.4  C) Oral 115 20 98 %       Patient cooperative feeling fine today.  No auditory hallucinations.  No SI  Would like to go home and will stay with family is plan.  Will discuss with extended care.  Seen by extended care who feel patient appropriate for discharge.  Reviewed medications and given 10 day supply.  Patient OK home feeling safe and follow up. To stay with mother.    ED Course:    Medications   ondansetron (ZOFRAN ODT) ODT tab 8 mg (8 mg Oral $Given 3/10/24 1252)   acetaminophen (TYLENOL) tablet 1,000 mg (1,000 mg Oral $Given 3/10/24 1751)                  Impression:    ICD-10-CM    1. MDD (major depressive disorder), recurrent severe, without psychosis (H)  F33.2       2. Suicidal ideation  R45.851     resolved      3. Polysubstance abuse (H)  F19.10       4. Schizoaffective disorder, unspecified type (H)  F25.9       5. Pregnancy examination or test, positive result  Z32.01           Plan:    discharged      RESULTS:   No results found for this visit on 03/10/24 (from the past 24 hour(s)).            Kenneth John MD    This note was created at least in part  by the use of dragon voice dictation system. Inadvertent typographical errors may still exist.  Kenneth John MD.  Patient evaluated in the emergency department during the COVID-19 pandemic period. Careful attention to patients safety was addressed throughout the evaluation. Evaluation and treatment management was initiated with disposition made efficiently and appropriate as possible to minimize any risk of potential exposure to patient during this evaluation.                         Kenneth John MD  03/11/24 4928

## 2024-03-11 NOTE — TELEPHONE ENCOUNTER
R: [place]  Access Inpatient Bed Call Log 3/11/24  @8:17 am:    Intake has called facilities that have not updated the bed status within the last 12 hours.                                 [facility] is at capacity.             [facility] is posting 0 beds. [phone] ; per call at 8:17 am to [facility], they are at cap.    [facility] is posting 0 beds. Negative covid required   [phone]            [facility] is posting 0 beds. Neg covid. No high school/[facility]-psych. [phone]; per call at 8:19 am to [facility], they can review low acuity only after 9 am. Specific number of beds unk.    [facility] is posting 0 beds. [phone]    [facility] is posting 0 beds. [phone]     [facility] is posting 2 bed for Young Adults. Ages 18-28. Negative covid. [phone]; per call at 8:21 am to [place], they have 3 young adult beds. No luis beds, 3 adol shared beds, no child beds.  Pt is not age appropriate  [facility] is posting 0 beds            [facility] is posting 1 bed [phone]  Bed is no longer available       Pt remains on the work list pending appropriate bed availability.

## 2024-03-11 NOTE — DISCHARGE SUMMARY
Patient discharged to home. Patient given belongings, phone, paper prescriptions and discharged paperwork. Writer went through discharge paperwork, medications and safety plan with patient. Patient stated no further questions and she is obtaining a ride from a friend to stay with her mom. Patient ambulated with a steady gait out of ER. Patient in no distress.

## 2024-03-11 NOTE — DISCHARGE INSTRUCTIONS
Home.  You were evaluated by our mental health team in the ER.  Today you feel much better and do not have any concerns of hurting self or hearing voices.  You are comfortable going home and will stay with Mom.  Take medications prescribed.  Return if any safety issues.  See MD for follow up.        Your Upcoming Appointments    Virtual Therapy  Date: Wednesday, 3/13/2024  Time: 9:00 am - 10:00 am  Provider: Britney Michel  Location: [facility], [address] [place] [id number]  Phone: [phone]  Type: Teletherapy    It is recommended you follow up with your established outpatient psychiatrist as soon as possible:  [facility]- [facility]  contact info: [phone]    Additional Resources:    [facility]:  [address] [place] [address] [phone]  Walk in anytime Monday- Friday 9am to 9pm (no appointments needed, no need to call ahead)  [facility] residents 18+, focusing on needs related to mental health and substance use disorder.  The interdisciplinary team at the walk-in center will take time to get to know you, address your immediate needs and connect you to long-term treatment and recovery support. We'll work alongside you until you are connected to meaningful resources and supports.     Crisis Residences:  You may self refer for most Crisis Residence services. This is a short-term service, typically 3-10 days, for stabilization when experiencing a mental health crisis. They provide housing and treatment services that integrate mental health, medical, and substance use care.     [facility].  Main phone: [phone]  Direct: [phone]  [address] [place] [address].  [place] [address]     [facility]  [phone]  [address] [place] [address]     Peg Morales [facility].  Main phone: [phone]  Direct:  [phone]  [address] [place] [address]     [facility] - [facility]  [phone]  [address] [place] [address]

## 2024-03-11 NOTE — ED NOTES
Patient came out of the room requesting to discharge. Extended care team called to come see patient for a reassessment.
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
## 2024-03-12 ENCOUNTER — PATIENT OUTREACH (OUTPATIENT)
Dept: CARE COORDINATION | Facility: CLINIC | Age: 34
End: 2024-03-12
Payer: COMMERCIAL

## 2024-03-12 NOTE — PROGRESS NOTES
Clinic Care Coordination Contact  Follow Up Progress Note      Assessment: The pt was recently in the ED, I called to check up on the pt and help the pt setup a ED follow up. The pt was at [facility] for suicidal. I called the pt, but got her vm, so I left a vm for the pt to give me a call back.     Care Gaps:    Health Maintenance Due   Topic Date Due    DEPRESSION ACTION PLAN  Never done    Pneumococcal Vaccine: Pediatrics (0 to 5 Years) and At-Risk Patients (6 to 64 Years) (1 of 2 - PCV) 03/16/1997    YEARLY PREVENTIVE VISIT  01/13/2018    PHQ-9  01/12/2023    ADVANCE CARE PLANNING  08/23/2023    INFLUENZA VACCINE (1) 09/01/2023    COVID-19 Vaccine (1 - 2023-24 season) Never done    NICOTINE/TOBACCO CESSATION COUNSELING Q 1 YR  12/12/2023           Care Plans      Intervention/Education provided during outreach:               Plan:     Care Coordinator will follow up in

## 2024-03-13 ENCOUNTER — PATIENT OUTREACH (OUTPATIENT)
Dept: CARE COORDINATION | Facility: CLINIC | Age: 34
End: 2024-03-13
Payer: COMMERCIAL

## 2024-03-13 NOTE — PROGRESS NOTES
Clinic Care Coordination Contact  Follow Up Progress Note      Assessment: The pt was recently in the ED, I called to check up on the pt and help the pt setup a ED follow up. The pt was at [facility] for suicidal. I called the pt, but got her vm, so I left a vm for the pt to give me a call back.        Care Gaps:    Health Maintenance Due   Topic Date Due    DEPRESSION ACTION PLAN  Never done    Pneumococcal Vaccine: Pediatrics (0 to 5 Years) and At-Risk Patients (6 to 64 Years) (1 of 2 - PCV) 03/16/1997    YEARLY PREVENTIVE VISIT  01/13/2018    PHQ-9  01/12/2023    ADVANCE CARE PLANNING  08/23/2023    INFLUENZA VACCINE (1) 09/01/2023    COVID-19 Vaccine (1 - 2023-24 season) Never done    NICOTINE/TOBACCO CESSATION COUNSELING Q 1 YR  12/12/2023           Care Plans      Intervention/Education provided during outreach:               Plan:     Care Coordinator will follow up in

## 2024-03-14 ENCOUNTER — PATIENT OUTREACH (OUTPATIENT)
Dept: CARE COORDINATION | Facility: CLINIC | Age: 34
End: 2024-03-14
Payer: COMMERCIAL

## 2024-03-14 NOTE — PROGRESS NOTES
Clinic Care Coordination Contact  Follow Up Progress Note      Assessment: The pt was recently in the ED, I called to check up on the pt and help the pt setup a ED follow up. The pt was at [facility] for suicidal. I called the pt, but got her vm, so I left a vm for the pt to give me a call back.      Care Gaps:    Health Maintenance Due   Topic Date Due    DEPRESSION ACTION PLAN  Never done    Pneumococcal Vaccine: Pediatrics (0 to 5 Years) and At-Risk Patients (6 to 64 Years) (1 of 2 - PCV) 03/16/1997    YEARLY PREVENTIVE VISIT  01/13/2018    PHQ-9  01/12/2023    ADVANCE CARE PLANNING  08/23/2023    INFLUENZA VACCINE (1) 09/01/2023    COVID-19 Vaccine (1 - 2023-24 season) Never done    NICOTINE/TOBACCO CESSATION COUNSELING Q 1 YR  12/12/2023           Care Plans      Intervention/Education provided during outreach:               Plan:     Care Coordinator will follow up in

## 2024-03-21 ENCOUNTER — PATIENT OUTREACH (OUTPATIENT)
Dept: CARE COORDINATION | Facility: CLINIC | Age: 34
End: 2024-03-21
Payer: COMMERCIAL

## 2024-03-21 NOTE — PROGRESS NOTES
Clinic Care Coordination Contact  Follow Up Progress Note      Assessment: The pt was recently in the ED, I called to check up on the pt and help the pt setup a ED follow up. I called the pt, but got her vm, so I left a vm for the pt to give me a call back.     Care Gaps:    Health Maintenance Due   Topic Date Due    DEPRESSION ACTION PLAN  Never done    Pneumococcal Vaccine: Pediatrics (0 to 5 Years) and At-Risk Patients (6 to 64 Years) (1 of 2 - PCV) 03/16/1997    YEARLY PREVENTIVE VISIT  01/13/2018    PHQ-9  01/12/2023    ADVANCE CARE PLANNING  08/23/2023    INFLUENZA VACCINE (1) 09/01/2023    COVID-19 Vaccine (1 - 2023-24 season) Never done    NICOTINE/TOBACCO CESSATION COUNSELING Q 1 YR  12/12/2023           Care Plans      Intervention/Education provided during outreach:               Plan:     Care Coordinator will follow up in

## 2024-03-22 ENCOUNTER — PATIENT OUTREACH (OUTPATIENT)
Dept: CARE COORDINATION | Facility: CLINIC | Age: 34
End: 2024-03-22
Payer: COMMERCIAL

## 2024-04-07 ENCOUNTER — HOSPITAL ENCOUNTER (OUTPATIENT)
Facility: CLINIC | Age: 34
Setting detail: OBSERVATION
Discharge: STILL ADMITTED OR ADMINISTRATIVE | End: 2024-04-08
Attending: FAMILY MEDICINE | Admitting: FAMILY MEDICINE
Payer: COMMERCIAL

## 2024-04-07 DIAGNOSIS — F25.9 SCHIZOAFFECTIVE DISORDER, UNSPECIFIED TYPE (H): ICD-10-CM

## 2024-04-07 DIAGNOSIS — R45.851 SUICIDAL IDEATION: ICD-10-CM

## 2024-04-07 DIAGNOSIS — F32.A DEPRESSION, UNSPECIFIED DEPRESSION TYPE: ICD-10-CM

## 2024-04-07 PROCEDURE — 250N000013 HC RX MED GY IP 250 OP 250 PS 637: Performed by: FAMILY MEDICINE

## 2024-04-07 PROCEDURE — G0378 HOSPITAL OBSERVATION PER HR: HCPCS

## 2024-04-07 PROCEDURE — 99285 EMERGENCY DEPT VISIT HI MDM: CPT | Performed by: FAMILY MEDICINE

## 2024-04-07 PROCEDURE — 99223 1ST HOSP IP/OBS HIGH 75: CPT | Performed by: FAMILY MEDICINE

## 2024-04-07 RX ORDER — OLANZAPINE 10 MG/1
10 TABLET, ORALLY DISINTEGRATING ORAL ONCE
Status: COMPLETED | OUTPATIENT
Start: 2024-04-07 | End: 2024-04-07

## 2024-04-07 RX ADMIN — OLANZAPINE 10 MG: 10 TABLET, ORALLY DISINTEGRATING ORAL at 20:33

## 2024-04-07 ASSESSMENT — COLUMBIA-SUICIDE SEVERITY RATING SCALE - C-SSRS
2. HAVE YOU ACTUALLY HAD ANY THOUGHTS OF KILLING YOURSELF IN THE PAST MONTH?: YES
1. IN THE PAST MONTH, HAVE YOU WISHED YOU WERE DEAD OR WISHED YOU COULD GO TO SLEEP AND NOT WAKE UP?: NO
6. HAVE YOU EVER DONE ANYTHING, STARTED TO DO ANYTHING, OR PREPARED TO DO ANYTHING TO END YOUR LIFE?: YES
4. HAVE YOU HAD THESE THOUGHTS AND HAD SOME INTENTION OF ACTING ON THEM?: NO
5. HAVE YOU STARTED TO WORK OUT OR WORKED OUT THE DETAILS OF HOW TO KILL YOURSELF? DO YOU INTEND TO CARRY OUT THIS PLAN?: YES
3. HAVE YOU BEEN THINKING ABOUT HOW YOU MIGHT KILL YOURSELF?: YES

## 2024-04-07 ASSESSMENT — ACTIVITIES OF DAILY LIVING (ADL)
ADLS_ACUITY_SCORE: 35
ADLS_ACUITY_SCORE: 33
ADLS_ACUITY_SCORE: 35
ADLS_ACUITY_SCORE: 35

## 2024-04-08 ENCOUNTER — TELEPHONE (OUTPATIENT)
Dept: BEHAVIORAL HEALTH | Facility: CLINIC | Age: 34
End: 2024-04-08
Payer: COMMERCIAL

## 2024-04-08 ENCOUNTER — HOSPITAL ENCOUNTER (INPATIENT)
Facility: CLINIC | Age: 34
LOS: 4 days | Discharge: HOME | End: 2024-04-12
Attending: STUDENT IN AN ORGANIZED HEALTH CARE EDUCATION/TRAINING PROGRAM | Admitting: STUDENT IN AN ORGANIZED HEALTH CARE EDUCATION/TRAINING PROGRAM
Payer: COMMERCIAL

## 2024-04-08 VITALS
OXYGEN SATURATION: 100 % | TEMPERATURE: 98.8 F | BODY MASS INDEX: 37.74 KG/M2 | HEART RATE: 109 BPM | HEIGHT: 68 IN | SYSTOLIC BLOOD PRESSURE: 124 MMHG | WEIGHT: 249 LBS | DIASTOLIC BLOOD PRESSURE: 69 MMHG | RESPIRATION RATE: 16 BRPM

## 2024-04-08 DIAGNOSIS — Z32.01 PREGNANCY EXAMINATION OR TEST, POSITIVE RESULT: ICD-10-CM

## 2024-04-08 DIAGNOSIS — F43.12 CHRONIC POST-TRAUMATIC STRESS DISORDER (PTSD): ICD-10-CM

## 2024-04-08 DIAGNOSIS — F41.1 GENERALIZED ANXIETY DISORDER: ICD-10-CM

## 2024-04-08 DIAGNOSIS — F10.229 ALCOHOL DEPENDENCE WITH INTOXICATION WITH COMPLICATION (H): ICD-10-CM

## 2024-04-08 DIAGNOSIS — F25.0 SCHIZOAFFECTIVE DISORDER, BIPOLAR TYPE (H): Primary | ICD-10-CM

## 2024-04-08 PROBLEM — F25.9 SCHIZOAFFECTIVE DISORDER, UNSPECIFIED TYPE (H): Status: ACTIVE | Noted: 2024-04-08

## 2024-04-08 LAB
AMPHETAMINES UR QL SCN: ABNORMAL
BARBITURATES UR QL SCN: ABNORMAL
BENZODIAZ UR QL SCN: ABNORMAL
BZE UR QL SCN: ABNORMAL
CANNABINOIDS UR QL SCN: ABNORMAL
FENTANYL UR QL: ABNORMAL
HCG UR QL: POSITIVE
OPIATES UR QL SCN: ABNORMAL
PCP QUAL URINE (ROCHE): ABNORMAL
SARS-COV-2 RNA RESP QL NAA+PROBE: NEGATIVE

## 2024-04-08 PROCEDURE — G0378 HOSPITAL OBSERVATION PER HR: HCPCS

## 2024-04-08 PROCEDURE — 99417 PROLNG OP E/M EACH 15 MIN: CPT | Performed by: NURSE PRACTITIONER

## 2024-04-08 PROCEDURE — 250N000013 HC RX MED GY IP 250 OP 250 PS 637: Performed by: EMERGENCY MEDICINE

## 2024-04-08 PROCEDURE — 124N000002 HC R&B MH UMMC

## 2024-04-08 PROCEDURE — 81025 URINE PREGNANCY TEST: CPT | Performed by: FAMILY MEDICINE

## 2024-04-08 PROCEDURE — 99215 OFFICE O/P EST HI 40 MIN: CPT | Performed by: NURSE PRACTITIONER

## 2024-04-08 PROCEDURE — 80307 DRUG TEST PRSMV CHEM ANLYZR: CPT | Performed by: FAMILY MEDICINE

## 2024-04-08 PROCEDURE — 87635 SARS-COV-2 COVID-19 AMP PRB: CPT

## 2024-04-08 RX ORDER — OLANZAPINE 15 MG/1
15 TABLET ORAL AT BEDTIME
Status: ON HOLD | COMMUNITY
Start: 2024-03-20 | End: 2024-04-12

## 2024-04-08 RX ORDER — PRENATAL VIT/IRON FUM/FOLIC AC 27MG-0.8MG
1 TABLET ORAL DAILY
Status: CANCELLED | OUTPATIENT
Start: 2024-04-09

## 2024-04-08 RX ORDER — OLANZAPINE 10 MG/2ML
10 INJECTION, POWDER, FOR SOLUTION INTRAMUSCULAR 3 TIMES DAILY PRN
Status: CANCELLED | OUTPATIENT
Start: 2024-04-08

## 2024-04-08 RX ORDER — CETIRIZINE HYDROCHLORIDE 10 MG/1
10 TABLET ORAL DAILY PRN
Status: DISCONTINUED | OUTPATIENT
Start: 2024-04-08 | End: 2024-04-08 | Stop reason: HOSPADM

## 2024-04-08 RX ORDER — OLANZAPINE 10 MG/2ML
10 INJECTION, POWDER, FOR SOLUTION INTRAMUSCULAR 3 TIMES DAILY PRN
Status: DISCONTINUED | OUTPATIENT
Start: 2024-04-08 | End: 2024-04-12 | Stop reason: HOSPADM

## 2024-04-08 RX ORDER — PRAZOSIN HYDROCHLORIDE 1 MG/1
1 CAPSULE ORAL AT BEDTIME
Status: DISCONTINUED | OUTPATIENT
Start: 2024-04-08 | End: 2024-04-08 | Stop reason: HOSPADM

## 2024-04-08 RX ORDER — HYDROXYZINE HYDROCHLORIDE 50 MG/1
50 TABLET, FILM COATED ORAL EVERY 6 HOURS PRN
Status: DISCONTINUED | OUTPATIENT
Start: 2024-04-08 | End: 2024-04-08 | Stop reason: HOSPADM

## 2024-04-08 RX ORDER — TRAZODONE HYDROCHLORIDE 50 MG/1
100 TABLET, FILM COATED ORAL AT BEDTIME
Status: DISCONTINUED | OUTPATIENT
Start: 2024-04-08 | End: 2024-04-08 | Stop reason: HOSPADM

## 2024-04-08 RX ORDER — MAGNESIUM HYDROXIDE/ALUMINUM HYDROXICE/SIMETHICONE 120; 1200; 1200 MG/30ML; MG/30ML; MG/30ML
30 SUSPENSION ORAL EVERY 4 HOURS PRN
Status: DISCONTINUED | OUTPATIENT
Start: 2024-04-08 | End: 2024-04-12 | Stop reason: HOSPADM

## 2024-04-08 RX ORDER — OLANZAPINE 10 MG/1
10 TABLET ORAL 3 TIMES DAILY PRN
Status: CANCELLED | OUTPATIENT
Start: 2024-04-08

## 2024-04-08 RX ORDER — ACETAMINOPHEN 325 MG/1
650 TABLET ORAL EVERY 4 HOURS PRN
Status: CANCELLED | OUTPATIENT
Start: 2024-04-08

## 2024-04-08 RX ORDER — OLANZAPINE 10 MG/1
10 TABLET ORAL 3 TIMES DAILY PRN
Status: DISCONTINUED | OUTPATIENT
Start: 2024-04-08 | End: 2024-04-12 | Stop reason: HOSPADM

## 2024-04-08 RX ORDER — VENLAFAXINE HYDROCHLORIDE 37.5 MG/1
37.5 CAPSULE, EXTENDED RELEASE ORAL DAILY
Status: DISCONTINUED | OUTPATIENT
Start: 2024-04-08 | End: 2024-04-08 | Stop reason: HOSPADM

## 2024-04-08 RX ORDER — ACETAMINOPHEN 325 MG/1
650 TABLET ORAL EVERY 4 HOURS PRN
Status: DISCONTINUED | OUTPATIENT
Start: 2024-04-08 | End: 2024-04-12 | Stop reason: HOSPADM

## 2024-04-08 RX ORDER — ACETAMINOPHEN 325 MG/1
325-650 TABLET ORAL EVERY 6 HOURS PRN
Status: DISCONTINUED | OUTPATIENT
Start: 2024-04-08 | End: 2024-04-08 | Stop reason: HOSPADM

## 2024-04-08 RX ORDER — FAMOTIDINE 20 MG/1
20 TABLET, FILM COATED ORAL 2 TIMES DAILY
Status: DISCONTINUED | OUTPATIENT
Start: 2024-04-08 | End: 2024-04-08 | Stop reason: HOSPADM

## 2024-04-08 RX ORDER — MAGNESIUM HYDROXIDE/ALUMINUM HYDROXICE/SIMETHICONE 120; 1200; 1200 MG/30ML; MG/30ML; MG/30ML
30 SUSPENSION ORAL EVERY 4 HOURS PRN
Status: CANCELLED | OUTPATIENT
Start: 2024-04-08

## 2024-04-08 RX ORDER — AMOXICILLIN 250 MG
1 CAPSULE ORAL 2 TIMES DAILY PRN
Status: DISCONTINUED | OUTPATIENT
Start: 2024-04-08 | End: 2024-04-12 | Stop reason: HOSPADM

## 2024-04-08 RX ORDER — OLANZAPINE 10 MG/1
10 TABLET ORAL DAILY PRN
Status: DISCONTINUED | OUTPATIENT
Start: 2024-04-08 | End: 2024-04-08 | Stop reason: HOSPADM

## 2024-04-08 RX ORDER — AMOXICILLIN 250 MG
1 CAPSULE ORAL 2 TIMES DAILY PRN
Status: CANCELLED | OUTPATIENT
Start: 2024-04-08

## 2024-04-08 RX ADMIN — ACETAMINOPHEN 325 MG: 325 TABLET ORAL at 10:41

## 2024-04-08 RX ADMIN — OLANZAPINE 10 MG: 10 TABLET, FILM COATED ORAL at 06:51

## 2024-04-08 RX ADMIN — ACETAMINOPHEN 650 MG: 325 TABLET ORAL at 14:28

## 2024-04-08 RX ADMIN — FAMOTIDINE 20 MG: 20 TABLET ORAL at 10:41

## 2024-04-08 ASSESSMENT — LIFESTYLE VARIABLES: SKIP TO QUESTIONS 9-10: 0

## 2024-04-08 ASSESSMENT — ACTIVITIES OF DAILY LIVING (ADL)
ADLS_ACUITY_SCORE: 35
DRESS: SCRUBS (BEHAVIORAL HEALTH);INDEPENDENT
ADLS_ACUITY_SCORE: 31
ADLS_ACUITY_SCORE: 35
HYGIENE/GROOMING: SHOWER
ADLS_ACUITY_SCORE: 35
ADLS_ACUITY_SCORE: 45
ADLS_ACUITY_SCORE: 35
LAUNDRY: UNABLE TO COMPLETE
ORAL_HYGIENE: INDEPENDENT
ADLS_ACUITY_SCORE: 35
ADLS_ACUITY_SCORE: 31
ADLS_ACUITY_SCORE: 31
ADLS_ACUITY_SCORE: 35

## 2024-04-08 NOTE — ED TRIAGE NOTES
Patient presents feeling down and depressed with panic attacks. Patient reports feeling as though she would be better off dead; hopeless. Patient reports plan to cut herself or go to sleep and not wake up. Patient just wants to stop over thinking.     Triage Assessment (Adult)       Row Name 04/07/24 1939          Triage Assessment    Airway WDL WDL        Respiratory WDL    Respiratory WDL WDL        Skin Circulation/Temperature WDL    Skin Circulation/Temperature WDL WDL        Cardiac WDL    Cardiac WDL WDL        Peripheral/Neurovascular WDL    Peripheral Neurovascular WDL WDL        Cognitive/Neuro/Behavioral WDL    Cognitive/Neuro/Behavioral WDL WDL

## 2024-04-08 NOTE — ED NOTES
Pt has been calm and cooperative while she has been in the ED. Around 5:45 this morning she informed the DEC  that she was hearing voices; pt had not told this writer about the voices prior.

## 2024-04-08 NOTE — MEDICATION SCRIBE - ADMISSION MEDICATION HISTORY
Medication Scribe Admission Medication History    Admission medication history is complete. The information provided in this note is only as accurate as the sources available at the time of the update.    Information Source(s): Patient and CareEverywhere/SureScripts via in-person    Pertinent Information: NA    Changes made to PTA medication list:  Added: None  Deleted:   Famotidine   Flonase   Hydroxyzine duplicate   Zyprexa duplicate   Prazosin duplicate   Trazodone duplicate   Effexor   Changed:   Zyprexa 10 mg to 15 mg    Allergies reviewed with patient and updates made in EHR: yes    Medication History Completed By: Agatha Noland 4/8/2024 11:56 AM    PTA Med List   Medication Sig Last Dose    acetaminophen (TYLENOL) 325 MG tablet Take 1-2 tablets (325-650 mg) by mouth every 6 hours as needed for mild pain Past Month    cetirizine (ZYRTEC) 10 MG tablet Take 1 tablet (10 mg) by mouth daily Past Month    folic acid (FOLVITE) 1 MG tablet TAKE 1 TABLET BY MOUTH ONCE DAILY (Patient taking differently: Take 1 mg by mouth daily) Past Month    hydrOXYzine (VISTARIL) 50 MG capsule Take 1 capsule (50 mg) by mouth every 6 hours as needed for itching or anxiety Past Month    ibuprofen (ADVIL/MOTRIN) 200 MG tablet Take 3 tablets (600 mg) by mouth every 8 hours as needed for pain Past Month    OLANZapine (ZYPREXA) 15 MG tablet Take 15 mg by mouth at bedtime Past Month    prazosin (MINIPRESS) 1 MG capsule Take 1 capsule (1 mg) by mouth at bedtime Past Month    Prenatal Vit-Fe Fumarate-FA (PRENATAL MULTIVITAMIN W/IRON) 27-0.8 MG tablet Take 1 tablet by mouth daily Past Month    pyridOXINE (VITAMIN B6) 25 MG tablet Take 1 tablet (25 mg) by mouth 3 times daily as needed (Nausea, vomiting) Past Month    thiamine (B-1) 100 MG tablet TAKE 1 TABLET BY MOUTH ONCE DAILY (Patient taking differently: Take 1 tablet by mouth daily) Past Month    traZODone (DESYREL) 100 MG tablet Take 1 tablet (100 mg) by mouth at bedtime Past Month

## 2024-04-08 NOTE — TELEPHONE ENCOUNTER
R: [facility] ONLY    [place]  Access Inpatient Bed Call Log 4/8/24  @ 7:06 am:    Intake has called facilities that have not updated the bed status within the last 12 hours.                                                         [facility] is at capacity.                Pt remains on the work list pending appropriate bed availability.      5:05p Paged Psychiatry Resident Noah, awaiting response.    5:28p Passed to evening staff to review with Resident.

## 2024-04-08 NOTE — ED PROVIDER NOTES
ED Provider Note  [facility]      History     Chief Complaint   Patient presents with    Suicidal     Patient presents feeling down and depressed with panic attacks. Patient reports feeling as though she would be better off dead; hopeless. Patient reports plan to cut herself or go to sleep and not wake up. Patient just wants to stop over thinking.     HPI  Rianna Man is a 34 year old female PMH of depression, OCD, obesity, schizoaffective disorder, polysubstance abuse, alcohol dependence who presents to the ER for SI.    Per chart review patient was seen on 3/24 for anxiety.  She was tachycardic.  Story and physical exam symptoms seem consistent with known mental health disorder.  Stated that she had not taken her medications for 2 days.  Given dose of hydroxyzine and seen by mental health crisis clinician who recommended discharge with outpatient follow-up.      At this time patient stated that she is now having ongoing increase in depression with thoughts of suicide she also admits that she is at times feeling increased paranoia hearing voices and feeling as though she would be better off dead.      Past Medical History  Past Medical History:   Diagnosis Date    Anxiety     Depressive disorder     Schizoaffective disorder (H)     Screen for STD (sexually transmitted disease)     Severe amphetamine substance use disorder (H)     Sinus tachycardia     Spontaneous  2021    Varicella     had disease     Past Surgical History:   Procedure Laterality Date    DILATION AND CURETTAGE  2021    NO HISTORY OF SURGERY       hydrOXYzine (VISTARIL) 50 MG capsule  OLANZapine (ZYPREXA) 10 MG tablet  prazosin (MINIPRESS) 1 MG capsule  traZODone (DESYREL) 100 MG tablet  venlafaxine (EFFEXOR XR) 37.5 MG 24 hr capsule  acetaminophen (TYLENOL) 325 MG tablet  cetirizine (ZYRTEC) 10 MG tablet  famotidine (PEPCID) 20 MG tablet  fluticasone (FLONASE) 50 MCG/ACT nasal spray  folic acid  "(FOLVITE) 1 MG tablet  hydrOXYzine (VISTARIL) 50 MG capsule  ibuprofen (ADVIL/MOTRIN) 200 MG tablet  OLANZapine (ZYPREXA) 10 MG tablet  prazosin (MINIPRESS) 1 MG capsule  Prenatal Vit-Fe Fumarate-FA (PRENATAL MULTIVITAMIN W/IRON) 27-0.8 MG tablet  pyridOXINE (VITAMIN B6) 25 MG tablet  thiamine (B-1) 100 MG tablet  traZODone (DESYREL) 100 MG tablet      Allergies   Allergen Reactions    No Known Allergies      Family History  Family History   Problem Relation Age of Onset    Diabetes Father     Substance Abuse Father     Diabetes Paternal Aunt     Thyroid Disease Mother         grave's disease    Bipolar Disorder Mother     Rheumatoid Arthritis Sister     Crohn's Disease Maternal Aunt     Schizophrenia Maternal Grandmother     Hypertension Other      Social History   Social History     Tobacco Use    Smoking status: Every Day     Packs/day: 0.25     Years: 15.00     Additional pack years: 0.00     Total pack years: 3.75     Types: Cigarettes     Last attempt to quit: 11/21/2020     Years since quitting: 3.3    Smokeless tobacco: Current    Tobacco comments:     3-4 cig/day   Substance Use Topics    Alcohol use: Yes     Comment: occasional drinker    Drug use: Yes     Types: Marijuana, Methamphetamines, Heroin, Cocaine     Comment: Drug use: Pt reports daily use of marijuana         A complete review of systems was performed with pertinent positives and negatives noted in the HPI, and all other systems negative.    Physical Exam   BP: (!) 146/106  Pulse: (!) 129  Temp: 99.1  F (37.3  C)  Resp: 18  Height: 172.7 cm (5' 8\")  Weight: 112.9 kg (249 lb)  SpO2: 98 %  Physical Exam  Constitutional:       General: She is not in acute distress.     Appearance: Normal appearance. She is not diaphoretic.   HENT:      Head: Atraumatic.      Mouth/Throat:      Mouth: Mucous membranes are moist.   Eyes:      General: No scleral icterus.     Conjunctiva/sclera: Conjunctivae normal.   Cardiovascular:      Rate and Rhythm: Normal " rate.      Heart sounds: Normal heart sounds.   Pulmonary:      Effort: No respiratory distress.      Breath sounds: Normal breath sounds.   Abdominal:      General: Abdomen is flat.   Musculoskeletal:      Cervical back: Neck supple.   Skin:     General: Skin is warm.      Findings: No rash.   Neurological:      General: No focal deficit present.      Mental Status: She is alert.      Sensory: No sensory deficit.      Motor: No weakness.      Coordination: Coordination normal.   Psychiatric:         Attention and Perception: She perceives auditory hallucinations.         Mood and Affect: Mood is anxious. Affect is tearful.         Speech: Speech is delayed.         Behavior: Behavior is cooperative.         Thought Content: Thought content includes suicidal ideation.           ED Course, Procedures, & Data      Procedures     Pregnancy test and urine drug screen are pending at this time     No results found for any visits on 04/07/24.  Medications   OLANZapine zydis (zyPREXA) ODT tab 10 mg (10 mg Oral $Given 4/7/24 2033)     Labs Ordered and Resulted from Time of ED Arrival to Time of ED Departure - No data to display  No orders to display          Critical care was not performed.     Medical Decision Making  The patient's presentation was of moderate complexity (a chronic illness mild to moderate exacerbation, progression, or side effect of treatment).    The patient's evaluation involved:  ordering and/or review of 2 test(s) in this encounter (see separate area of note for details)  discussion of management or test interpretation with another health professional (patient will be seen and evaluated by DEC  until then she will remain in the emergency room for observation and stabilization.)    The patient's management necessitated high risk (a decision regarding hospitalization).    Assessment & Plan        I have reviewed the nursing notes. I have reviewed the findings, diagnosis, plan and need for follow  up with the patient.    Patient with history of polysubstance abuse now presenting with underlying schizoaffective disorder having some question of auditory hallucinations with ongoing depression and anxiety and suicidal ideation patient will remain here in the emergency room until full DEC assessment she was given Zyprexa 10 mg and is resting comfortably once again will be under observation overnight until seen by DEC  for further disposition.    Final diagnoses:   Schizoaffective disorder, unspecified type (H)   Depression, unspecified depression type   Suicidal ideation       Garcia Ann MD  [facility] EMERGENCY DEPARTMENT  4/7/2024     Garcia Ann MD  04/07/24 [id number]

## 2024-04-08 NOTE — PROGRESS NOTES
"Triage & Transition Services, Extended Care     Therapy Progress Note    Patient: Rianna goes by \"Rianna,\" uses she/her pronouns  Date of Service: April 8, 2024  Site of Service: [facility] EMERGENCY DEPARTMENT                             ED10  Patient was seen yes  Mode of Assessment: In person    Presentation Summary: Pt reported that she is \"doing okay.\"  She reported that the voices have quieted down.  She reported that yesterday they were very negative and telling her to kill herself.  She was also paranoid, thinking that someone was going to hurt her or was following her.  Her anxiety was high and she couldn't calm down.  Today, she stated that she is \"starting to calm down.\"  She reported that her depression is a 6 or 7.  She stated that she is depressed, tired, drained and ready to give up.  She stated that she experiences anxiety, panic attacks (sweating, shaking) and feels on edge.   Pt stated that she needs a medication adjustment.  She stated that she has been on the same medications for a year and they aren't working as well.  She is open to anything that will help.  Pt is interested in ALEKSANDRA treeatment once she has stabilized.  She also needs to get a DA done to hopefully get case management services.  She stated that she has an appointment for a DA but it is not until May.  She is willing to complete a dual assessment while in the ED.    Therapeutic Intervention(s) Provided: Identified and practiced coping skills., Explored motivation for behavioral change.    Current Symptoms: panic attack, anxious sadness, crying or feels like crying sweating, flushing, shaking auditory hallucinations increased appetite    Mental Status Exam   Affect: Appropriate  Appearance: Appropriate  Attention Span/Concentration: Attentive  Eye Contact: Engaged    Fund of Knowledge: Appropriate   Language /Speech Content: Fluent  Language /Speech Volume: Normal  Language /Speech Rate/Productions: Normal  Recent " "Memory: Intact  Remote Memory: Intact  Mood: Anxious, Depressed  Orientation to Person: Yes   Orientation to Place: Yes  Orientation to Time of Day: Yes  Orientation to Date: Yes     Situation (Do they understand why they are here?): Yes  Psychomotor Behavior: Normal  Thought Content: Hallucinations, Delusions, Paranoia  Thought Form: Intact    Treatment Objective(s) Addressed: rapport building, processing feelings, identifying and practicing coping strategies    Patient Response to Interventions: eager to participate    Progress Towards Goals: Patient Reports Symptoms Are: improving  Patient Progress Toward Goals: is making progress  Comment: Pt reported that her symptoms (auditory hallucinations, paranoia, anxiety, suicidal ideation) are starting to calm down.  Next Step to Work Toward Discharge: symptom stabilization, patient ability to engage in safety planning    Case Management:  None    Plan: inpatient mental health  yes provider Dr. Stark  yes    Clinical Substantiation: It is the recommendation of this clinician that pt admit to IP MH for safety and stabilization. Pt displays the following risk factors that support IP admission: Patient hears voices telling her to kill herself, SI, panic attacks, AVH, paranoia, and delusional thoughts. Pt is unable to engage in safety planning to mitigate risk level in a non-secure setting. Lower levels of care would not be sufficient in managing the level of risk pt is presenting with. Due to this IP is the least restrictive option of care for pt. Pt should remain in IP until deemed safe to return to the community and engage in OP MH supports. Pt will need assistance establishing OP MH services prior to discharge.  Patient said one of her stressors were trying to set up case management with [facility] and she got the run around so it was too overwhelming for her.  She's willing to address her ALEKSANDRA.  She said \"I'll do whatever I need to do.\"    Legal Status: Legal Status " at Admission: Voluntary/Patient has signed consent for treatment    Session Status: Time session started: 1227  Time session ended: 1244  Session Duration (minutes): 17 minutes  Session Number: 1  Anticipated number of sessions or this episode of care: 3  Date of most recent diagnostic assessment:  (Placed on DA list)    Time Spent: 17 minutes    CPT Code: CPT Codes: 25599 - Psychotherapy (with patient) - 30 (16-37*) min    Diagnosis:   Patient Active Problem List   Diagnosis    Trouble in sleeping    Contact dermatitis and other eczema, due to unspecified cause    Schizoaffective disorder (H)    Striae distensae    Uncomplicated alcohol dependence (H)    Pap smear for cervical cancer screening    Iron deficiency anemia    Encounter for initial prescription of implantable subdermal contraceptive    OCD (obsessive compulsive disorder)    Generalized anxiety disorder    Lactase deficiency    Tobacco use    Intractable chronic migraine without aura    Burn    Suicidal ideation    Seasonal allergic rhinitis    Severe cannabis use disorder (H)    Spontaneous     Sinus tachycardia    Severe amphetamine substance use disorder (H)    Chronic post-traumatic stress disorder (PTSD)    Major depressive disorder, recurrent episode, moderate (H)    Screen for STD (sexually transmitted disease)    Alcohol dependence with intoxication with complication (H)    Depression, unspecified depression type    Morbid obesity (H)    Schizoaffective disorder, bipolar type (H)       Primary Problem This Admission: Active Hospital Problems    *Schizoaffective disorder, bipolar type (H)      Depression, unspecified depression type      Suicidal ideation        Renée Glover LP   Licensed Mental Health Professional (LMHP), [facility]  [phone]

## 2024-04-08 NOTE — ED PROVIDER NOTES
Patient received in signout from Dr. Maynard, see his note for further documentation.  In short, patient with history of schizoaffective disorder, polysubstance abuse, presents the ED for evaluation of suicidal ideation.  Plan on signout is to monitor patient in the ED and follow-up with recommendations from the mental health     Patient was monitored in the ED overnight.  She was evaluated by the assessment team.  See their notes for supporting documentation.  She continues to endorse command hallucinations telling her to hurt herself.  Unable to contract for safety.  Recommend admission for mental health stabilization     Eliseo Stark,   04/08/24 0612
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
## 2024-04-08 NOTE — TELEPHONE ENCOUNTER
5:35 PM Resident accepted pt to unit 20/[facility]     5:48 PM Intake called unit 20 - CRN is reviewing pt in queue.    6:35 PM Intake called ED with bed placement information - spoke with RN , she has given report to crn on unit 20.

## 2024-04-08 NOTE — ED PROVIDER NOTES
ED Observation History and Physical  [facility]  Observation Initiation Date: 2024    Rianna Man MRN: [id number]   Age: 34 year old YOB: 1990     History           Chief Complaint   Patient presents with    Suicidal       Patient presents feeling down and depressed with panic attacks. Patient reports feeling as though she would be better off dead; hopeless. Patient reports plan to cut herself or go to sleep and not wake up. Patient just wants to stop over thinking.      HPI  Rianna Man is a 34 year old female PMH of depression, OCD, obesity, schizoaffective disorder, polysubstance abuse, alcohol dependence who presents to the ER for SI.     Per chart review patient was seen on 3/24 for anxiety.  She was tachycardic.  Story and physical exam symptoms seem consistent with known mental health disorder.  Stated that she had not taken her medications for 2 days.  Given dose of hydroxyzine and seen by mental health crisis clinician who recommended discharge with outpatient follow-up.        At this time patient stated that she is now having ongoing increase in depression with thoughts of suicide she also admits that she is at times feeling increased paranoia hearing voices and feeling as though she would be better off dead.        Past Medical History  Past Medical History        Past Medical History:   Diagnosis Date    Anxiety      Depressive disorder      Schizoaffective disorder (H)      Screen for STD (sexually transmitted disease)      Severe amphetamine substance use disorder (H)      Sinus tachycardia      Spontaneous  2021    Varicella       had disease         Past Surgical History         Past Surgical History:   Procedure Laterality Date    DILATION AND CURETTAGE   2021    NO HISTORY OF SURGERY             hydrOXYzine (VISTARIL) 50 MG capsule  OLANZapine (ZYPREXA) 10 MG tablet  prazosin (MINIPRESS) 1 MG capsule  traZODone  "(DESYREL) 100 MG tablet  venlafaxine (EFFEXOR XR) 37.5 MG 24 hr capsule  acetaminophen (TYLENOL) 325 MG tablet  cetirizine (ZYRTEC) 10 MG tablet  famotidine (PEPCID) 20 MG tablet  fluticasone (FLONASE) 50 MCG/ACT nasal spray  folic acid (FOLVITE) 1 MG tablet  hydrOXYzine (VISTARIL) 50 MG capsule  ibuprofen (ADVIL/MOTRIN) 200 MG tablet  OLANZapine (ZYPREXA) 10 MG tablet  prazosin (MINIPRESS) 1 MG capsule  Prenatal Vit-Fe Fumarate-FA (PRENATAL MULTIVITAMIN W/IRON) 27-0.8 MG tablet  pyridOXINE (VITAMIN B6) 25 MG tablet  thiamine (B-1) 100 MG tablet  traZODone (DESYREL) 100 MG tablet             Allergies   Allergen Reactions    No Known Allergies        Family History  Family History         Family History   Problem Relation Age of Onset    Diabetes Father      Substance Abuse Father      Diabetes Paternal Aunt      Thyroid Disease Mother           grave's disease    Bipolar Disorder Mother      Rheumatoid Arthritis Sister      Crohn's Disease Maternal Aunt      Schizophrenia Maternal Grandmother      Hypertension Other           Social History   Social History            Tobacco Use    Smoking status: Every Day       Packs/day: 0.25       Years: 15.00       Additional pack years: 0.00       Total pack years: 3.75       Types: Cigarettes       Last attempt to quit: 11/21/2020       Years since quitting: 3.3    Smokeless tobacco: Current    Tobacco comments:       3-4 cig/day   Substance Use Topics    Alcohol use: Yes       Comment: occasional drinker    Drug use: Yes       Types: Marijuana, Methamphetamines, Heroin, Cocaine       Comment: Drug use: Pt reports daily use of marijuana          A complete review of systems was performed with pertinent positives and negatives noted in the HPI, and all other systems negative.     Physical Exam   BP: (!) 146/106  Pulse: (!) 129  Temp: 99.1  F (37.3  C)  Resp: 18  Height: 172.7 cm (5' 8\")  Weight: 112.9 kg (249 lb)  SpO2: 98 %  Physical Exam  Constitutional:       General: She " is not in acute distress.     Appearance: Normal appearance. She is not diaphoretic.   HENT:      Head: Atraumatic.      Mouth/Throat:      Mouth: Mucous membranes are moist.   Eyes:      General: No scleral icterus.     Conjunctiva/sclera: Conjunctivae normal.   Cardiovascular:      Rate and Rhythm: Normal rate.      Heart sounds: Normal heart sounds.   Pulmonary:      Effort: No respiratory distress.      Breath sounds: Normal breath sounds.   Abdominal:      General: Abdomen is flat.   Musculoskeletal:      Cervical back: Neck supple.   Skin:     General: Skin is warm.      Findings: No rash.   Neurological:      General: No focal deficit present.      Mental Status: She is alert.      Sensory: No sensory deficit.      Motor: No weakness.      Coordination: Coordination normal.   Psychiatric:         Attention and Perception: She perceives auditory hallucinations.         Mood and Affect: Mood is anxious. Affect is tearful.         Speech: Speech is delayed.         Behavior: Behavior is cooperative.         Thought Content: Thought content includes suicidal ideation.               ED Course, Procedures, & Data   Procedures     Pregnancy test and urine drug screen are pending at this time  No results found for any visits on 04/07/24.  Medications   OLANZapine zydis (zyPREXA) ODT tab 10 mg (10 mg Oral $Given 4/7/24 2033)      Labs Ordered and Resulted from Time of ED Arrival to Time of ED Departure - No data to display  No orders to display         Critical care was not performed.      Medical Decision Making  The patient's presentation was of moderate complexity (a chronic illness mild to moderate exacerbation, progression, or side effect of treatment).     The patient's evaluation involved:  ordering and/or review of 2 test(s) in this encounter (see separate area of note for details)  discussion of management or test interpretation with another health professional (patient will be seen and evaluated by DEC   until then she will remain in the emergency room for observation and stabilization.)     The patient's management necessitated high risk (a decision regarding hospitalization).     Assessment & Plan          I have reviewed the nursing notes. I have reviewed the findings, diagnosis, plan and need for follow up with the patient.     Patient with history of polysubstance abuse now presenting with underlying schizoaffective disorder having some question of auditory hallucinations with ongoing depression and anxiety and suicidal ideation patient will remain here in the emergency room until full DEC assessment she was given Zyprexa 10 mg and is resting comfortably once again will be under observation overnight until seen by DEC  for further disposition.     Final diagnoses:   Schizoaffective disorder, unspecified type (H)   Depression, unspecified depression type   Suicidal ideation         Garcia Ann MD  [facility] EMERGENCY DEPARTMENT  4/7/2024     Garcia Ann MD  04/07/24 [id number]        Garcia Ann MD  04/07/24 2148

## 2024-04-08 NOTE — PLAN OF CARE
"Rianan Man  April 8, 2024  Plan of Care Hand-off Note     Patient Care Path: inpatient mental health    Plan for Care:     It is the recommendation of this clinician that pt admit to IP MH for safety and stabilization. Pt displays the following risk factors that support IP admission: Patient hears voices telling her to kill herself, SI, panic attacks, AVH, paranoia, and delusional thoughts. Pt is unable to engage in safety planning to mitigate risk level in a non-secure setting. Lower levels of care would not be sufficient in managing the level of risk pt is presenting with. Due to this IP is the least restrictive option of care for pt. Pt should remain in IP until deemed safe to return to the community and engage in OP MH supports. Pt will need assistance establishing OP MH services prior to discharge.  Patient said one of her stressors were trying to set up case management with [facility] and she got the run around so it was too overwhelming for her.  She's willing to address her ALEKSANDRA.  She said \"I'll do whatever I need to do.\"        Identified Goals and Safety Issues: Stabilize patient from AH. Return to baseline from SI. Revisit medications and restart.    Overview:  No contacts noted and patient did not want to provide names or numbers.        Legal Status: Legal Status at Admission: Voluntary/Patient has signed consent for treatment    Psychiatry Consult:  Yes       Updated   regarding plan of care.           Susana Khoury, [facility]       "

## 2024-04-08 NOTE — TELEPHONE ENCOUNTER
S: [facility] [place] , DEC  Susana  calling at 6:28 AM  about a 34 year old/Female presenting with SI and AH Command in type     B: Pt arrived via Family. Presenting problem, stressors: Pt having AH command in type -  SI w/ plan of Cutting self or going to sleep and not waking up.  Voices are both telling her to kill self and that she is a bad parent.  Pt using substances -0 METH (last use Fri) and alcohol (Sat).      Pt affect in ED: Calm, Cooperative , and Depressed  Pt Dx: Major Depressive Disorder, Generalized Anxiety Disorder, Bipolar Disorder, Schizoaffective Disorder, PTSD, Borderline Personality Disorder, and Substance Use Disorder: Poly Substance Abuse  Previous [facility] hx? Yes: Several admissions (at least 10 admissions since 2018)      Pt endorses SI with a plan to go to sleep and not wake up.  Having AH Command in nature      Hx of suicide attempt? Yes: Multiple Attempts  Pt denies SIB  Pt denies HI   Pt endorses auditory hallucinations .   Pt RARS Score: 3    Hx of aggression/violence, sexual offenses, legal concerns, Epic care plan? describe: None  Current concerns for aggression this visit? No  Does pt have a history of Civil Commitment? No  Is Pt their own guardian? Yes    Pt is prescribed medication. Is patient medication compliant? No  Pt denies OP services   CD concerns: Actively using/consuming Marijuana recreationally  and used Meth fri, and Alcohol sat.      Acute or chronic medical concerns: None  Does Pt present with specific needs, assistive devices, or exclusionary criteria? None      Pt is ambulatory  Pt is able to perform ADLs independently      A: Pt to be reviewed for [facility] admission. Pt is Voluntary  Preferred placement: [facility] ONLY    COVID Symptoms: No  If yes, COVID test required   Utox: Positive for Amphetamiones, Cannaboids, Cocaine    CMP: N/A  CBC: N/A  HCG: Negative    R: Patient cleared and ready for behavioral bed placement: Yes  Pt placed on [facility] worklist? Yes    Does  Patient need a Transfer Center request created? No, Pt is located within [facility] ED, [facility] ED, or [place] ED    Pt added to worklist @ 6:30am
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
## 2024-04-09 PROBLEM — Z32.01 PREGNANCY TEST POSITIVE: Status: ACTIVE | Noted: 2024-04-09

## 2024-04-09 LAB
ALBUMIN SERPL BCG-MCNC: 3.6 G/DL (ref 3.5–5.2)
ALP SERPL-CCNC: 93 U/L (ref 40–150)
ALT SERPL W P-5'-P-CCNC: 22 U/L (ref 0–50)
ANION GAP SERPL CALCULATED.3IONS-SCNC: 14 MMOL/L (ref 7–15)
AST SERPL W P-5'-P-CCNC: 20 U/L (ref 0–45)
BASOPHILS # BLD AUTO: 0 10E3/UL (ref 0–0.2)
BASOPHILS NFR BLD AUTO: 1 %
BILIRUB SERPL-MCNC: 0.5 MG/DL
BUN SERPL-MCNC: 11.9 MG/DL (ref 6–20)
CALCIUM SERPL-MCNC: 9.6 MG/DL (ref 8.6–10)
CHLORIDE SERPL-SCNC: 101 MMOL/L (ref 98–107)
CHOLEST SERPL-MCNC: 165 MG/DL
CREAT SERPL-MCNC: 0.79 MG/DL (ref 0.51–0.95)
DEPRECATED HCO3 PLAS-SCNC: 22 MMOL/L (ref 22–29)
EGFRCR SERPLBLD CKD-EPI 2021: >90 ML/MIN/1.73M2
EOSINOPHIL # BLD AUTO: 0.1 10E3/UL (ref 0–0.7)
EOSINOPHIL NFR BLD AUTO: 1 %
ERYTHROCYTE [DISTWIDTH] IN BLOOD BY AUTOMATED COUNT: 12.6 % (ref 10–15)
FOLATE SERPL-MCNC: 6.2 NG/ML (ref 4.6–34.8)
GLUCOSE SERPL-MCNC: 108 MG/DL (ref 70–99)
HBA1C MFR BLD: 5.3 %
HCG INTACT+B SERPL-ACNC: 2112 MIU/ML
HCT VFR BLD AUTO: 40.4 % (ref 35–47)
HDLC SERPL-MCNC: 89 MG/DL
HGB BLD-MCNC: 13.4 G/DL (ref 11.7–15.7)
IMM GRANULOCYTES # BLD: 0 10E3/UL
IMM GRANULOCYTES NFR BLD: 0 %
LDLC SERPL CALC-MCNC: 64 MG/DL
LYMPHOCYTES # BLD AUTO: 2.4 10E3/UL (ref 0.8–5.3)
LYMPHOCYTES NFR BLD AUTO: 49 %
MCH RBC QN AUTO: 29.8 PG (ref 26.5–33)
MCHC RBC AUTO-ENTMCNC: 33.2 G/DL (ref 31.5–36.5)
MCV RBC AUTO: 90 FL (ref 78–100)
MONOCYTES # BLD AUTO: 0.4 10E3/UL (ref 0–1.3)
MONOCYTES NFR BLD AUTO: 9 %
NEUTROPHILS # BLD AUTO: 1.9 10E3/UL (ref 1.6–8.3)
NEUTROPHILS NFR BLD AUTO: 40 %
NONHDLC SERPL-MCNC: 76 MG/DL
NRBC # BLD AUTO: 0 10E3/UL
NRBC BLD AUTO-RTO: 0 /100
PLATELET # BLD AUTO: 216 10E3/UL (ref 150–450)
POTASSIUM SERPL-SCNC: 4.4 MMOL/L (ref 3.4–5.3)
PROT SERPL-MCNC: 6.3 G/DL (ref 6.4–8.3)
RBC # BLD AUTO: 4.49 10E6/UL (ref 3.8–5.2)
SODIUM SERPL-SCNC: 137 MMOL/L (ref 135–145)
TRIGL SERPL-MCNC: 59 MG/DL
TSH SERPL DL<=0.005 MIU/L-ACNC: 1.21 UIU/ML (ref 0.3–4.2)
VIT B12 SERPL-MCNC: 430 PG/ML (ref 232–1245)
WBC # BLD AUTO: 4.9 10E3/UL (ref 4–11)

## 2024-04-09 PROCEDURE — 80053 COMPREHEN METABOLIC PANEL: CPT

## 2024-04-09 PROCEDURE — 84443 ASSAY THYROID STIM HORMONE: CPT

## 2024-04-09 PROCEDURE — 80061 LIPID PANEL: CPT

## 2024-04-09 PROCEDURE — 82607 VITAMIN B-12: CPT

## 2024-04-09 PROCEDURE — 84702 CHORIONIC GONADOTROPIN TEST: CPT

## 2024-04-09 PROCEDURE — 99222 1ST HOSP IP/OBS MODERATE 55: CPT | Mod: AI | Performed by: STUDENT IN AN ORGANIZED HEALTH CARE EDUCATION/TRAINING PROGRAM

## 2024-04-09 PROCEDURE — 82746 ASSAY OF FOLIC ACID SERUM: CPT

## 2024-04-09 PROCEDURE — 83036 HEMOGLOBIN GLYCOSYLATED A1C: CPT

## 2024-04-09 PROCEDURE — 250N000013 HC RX MED GY IP 250 OP 250 PS 637: Performed by: STUDENT IN AN ORGANIZED HEALTH CARE EDUCATION/TRAINING PROGRAM

## 2024-04-09 PROCEDURE — 250N000013 HC RX MED GY IP 250 OP 250 PS 637

## 2024-04-09 PROCEDURE — 93005 ELECTROCARDIOGRAM TRACING: CPT

## 2024-04-09 PROCEDURE — 85025 COMPLETE CBC W/AUTO DIFF WBC: CPT

## 2024-04-09 PROCEDURE — 36415 COLL VENOUS BLD VENIPUNCTURE: CPT

## 2024-04-09 PROCEDURE — 124N000002 HC R&B MH UMMC

## 2024-04-09 RX ORDER — NICOTINE 21 MG/24HR
1 PATCH, TRANSDERMAL 24 HOURS TRANSDERMAL DAILY
Status: DISCONTINUED | OUTPATIENT
Start: 2024-04-09 | End: 2024-04-12 | Stop reason: HOSPADM

## 2024-04-09 RX ORDER — PRENATAL VIT/IRON FUM/FOLIC AC 27MG-0.8MG
1 TABLET ORAL DAILY
Status: DISCONTINUED | OUTPATIENT
Start: 2024-04-09 | End: 2024-04-12 | Stop reason: HOSPADM

## 2024-04-09 RX ORDER — OLANZAPINE 15 MG/1
15 TABLET ORAL AT BEDTIME
Status: DISCONTINUED | OUTPATIENT
Start: 2024-04-09 | End: 2024-04-12 | Stop reason: HOSPADM

## 2024-04-09 RX ORDER — PRAZOSIN HYDROCHLORIDE 1 MG/1
1 CAPSULE ORAL AT BEDTIME
Status: DISCONTINUED | OUTPATIENT
Start: 2024-04-09 | End: 2024-04-12 | Stop reason: HOSPADM

## 2024-04-09 RX ORDER — HYDROXYZINE HYDROCHLORIDE 50 MG/1
50 TABLET, FILM COATED ORAL EVERY 6 HOURS PRN
Status: DISCONTINUED | OUTPATIENT
Start: 2024-04-09 | End: 2024-04-12 | Stop reason: HOSPADM

## 2024-04-09 RX ADMIN — NICOTINE 1 PATCH: 14 PATCH, EXTENDED RELEASE TRANSDERMAL at 11:57

## 2024-04-09 RX ADMIN — ACETAMINOPHEN 650 MG: 325 TABLET ORAL at 21:05

## 2024-04-09 RX ADMIN — PRENATAL VIT W/ FE FUMARATE-FA TAB 27-0.8 MG 1 TABLET: 27-0.8 TAB at 12:37

## 2024-04-09 RX ADMIN — PRAZOSIN HYDROCHLORIDE 1 MG: 1 CAPSULE ORAL at 21:04

## 2024-04-09 RX ADMIN — ACETAMINOPHEN 650 MG: 325 TABLET ORAL at 08:37

## 2024-04-09 RX ADMIN — OLANZAPINE 15 MG: 15 TABLET, FILM COATED ORAL at 21:04

## 2024-04-09 RX ADMIN — HYDROXYZINE HYDROCHLORIDE 50 MG: 50 TABLET, FILM COATED ORAL at 19:07

## 2024-04-09 RX ADMIN — HYDROXYZINE HYDROCHLORIDE 50 MG: 50 TABLET, FILM COATED ORAL at 11:01

## 2024-04-09 ASSESSMENT — ACTIVITIES OF DAILY LIVING (ADL)
ORAL_HYGIENE: INDEPENDENT
DRESS: INDEPENDENT;SCRUBS (BEHAVIORAL HEALTH)
ADLS_ACUITY_SCORE: 31
ORAL_HYGIENE: INDEPENDENT
ADLS_ACUITY_SCORE: 31
HYGIENE/GROOMING: INDEPENDENT
ADLS_ACUITY_SCORE: 31
DRESS: INDEPENDENT;SCRUBS (BEHAVIORAL HEALTH)
ADLS_ACUITY_SCORE: 31
HYGIENE/GROOMING: INDEPENDENT

## 2024-04-09 NOTE — PLAN OF CARE
"  Problem: Adult Behavioral Health Plan of Care  Goal: Plan of Care Review  Outcome: Progressing  Flowsheets (Taken 4/8/2024 2100)  Patient Agreement with Plan of Care: agrees     Problem: Psychotic Signs/Symptoms  Goal: Improved Behavioral Control (Psychotic Signs/Symptoms)  Outcome: Progressing   Goal Outcome Evaluation:    Plan of Care Reviewed With: patient         Pt is a 34 years old lady with a past medical history significant for Major Depressive Disorder, Generalized Anxiety Disorder, Bipolar Disorder, Schizoaffective Disorder, PTSD, Borderline Personality Disorder, and Substance  use disorder. Pt presents to ED with SI. Pt stated that she has been suffering from depression secondary to homelessness, financial hardship, as well as financial issues. Pt stated that she has been having SI with plans to cut her wrist and bleed to death. Pt is compliant with the admission process. She endorsed auditory hallucination. \"Voices are telling me to hurt myself and at time they are querying within themselves\". Pt rated anxiety at 5/10, depression 8/10, but denied SI/SIB/HI, and contracted for safety. Pt is pleasant with a flat affect. Mood presents as calm and cooperative. Judgment and insight not appropriate to situation. Speech is clear and coherent. Pt took a shower and stated that she feels good. No safety or behavioral concerns noted or verbalized. Will continue with same plan of care.                  "

## 2024-04-09 NOTE — ED NOTES
Received patient from off going nurse at [facility] 19:30. Writer called station 20 and spoke with Narciso ALEJANDRA RN. Pt sent with transport and and Security belongings sent with security. Pt left ED at this time.

## 2024-04-09 NOTE — PLAN OF CARE
04/08/24 2045   Patient Belongings   Did you bring any home meds/supplements to the hospital?  No   Patient Belongings locker   Patient Belongings Put in Hospital Secure Location (Security or Locker, etc.) clothing;jewelry;keys;purse/wallet;watch   Belongings Search Yes   Clothing Search Yes   Second Staff Tamara PHILLIPS (RN)     Goal Outcome Evaluation:       Underwear, bra, smart watch, tablet, crocs, 2 rings, necklace, 2 anklets, black tights, green hoodie  Purse: loose cigarettes, lip gloss, keys, wrist watch, , conrad speakers, hair brush  No cash or ID', 1 cell phone  4 bank cards and EBT card sent down to security in envelope #[phone]    A               Admission:  I am responsible for any personal items that are not sent to the safe or pharmacy.  [facility] is not responsible for loss, theft or damage of any property in my possession.    Signature:  _________________________________ Date: _______  Time: _____                                              Staff Signature:  ____________________________ Date: ________  Time: _____      2nd Staff person, if patient is unable/unwilling to sign:    Signature: ________________________________ Date: ________  Time: _____     Discharge:  [facility] has returned all of my personal belongings:    Signature: _________________________________ Date: ________  Time: _____                                          Staff Signature:  ____________________________ Date: ________  Time: _____

## 2024-04-09 NOTE — PLAN OF CARE
No PRNs given or requested this shift. Safety checks completed every 15 minutes ; no concerns noted. Pt appears to have slept for 6.50 hours; will continue to monitor and  offer support.     Problem: Sleep Disturbance  Goal: Adequate Sleep/Rest  Outcome: Progressing   Goal Outcome Evaluation:
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
## 2024-04-09 NOTE — PLAN OF CARE
Problem: Adult Behavioral Health Plan of Care  Goal: Adheres to Safety Considerations for Self and Others  Outcome: Progressing  Intervention: Develop and Maintain Individualized Safety Plan  Recent Flowsheet Documentation  Taken 4/9/2024 1658 by Elza Pisano RN  Safety Measures: safety rounds completed     Problem: Adult Behavioral Health Plan of Care  Goal: Absence of New-Onset Illness or Injury  Intervention: Identify and Manage Fall Risk  Recent Flowsheet Documentation  Taken 4/9/2024 1658 by Elza Pisano RN  Safety Measures: safety rounds completed     Problem: Psychotic Signs/Symptoms  Goal: Increased Participation and Engagement (Psychotic Signs/Symptoms)  Intervention: Facilitate Participation and Engagement  Recent Flowsheet Documentation  Taken 4/9/2024 1658 by Elza Pisano RN  Diversional Activity: reading  Goal: Improved Mood Symptoms (Psychotic Signs/Symptoms)  Intervention: Optimize Emotion and Mood  Recent Flowsheet Documentation  Taken 4/9/2024 1658 by Elza Pisano RN  Diversional Activity: reading  Goal: Improved Psychomotor Symptoms (Psychotic Signs/Symptoms)  Intervention: Manage Psychomotor Movement  Recent Flowsheet Documentation  Taken 4/9/2024 1658 by Elza Pisano RN  Diversional Activity: reading  Activity (Behavioral Health): activity encouraged     Problem: Psychotic Signs/Symptoms  Goal: Increased Participation and Engagement (Psychotic Signs/Symptoms)  Intervention: Facilitate Participation and Engagement  Recent Flowsheet Documentation  Taken 4/9/2024 1658 by Elza Pisano RN  Diversional Activity: reading     Problem: Suicide Risk  Goal: Absence of Self-Harm  Outcome: Progressing   Goal Outcome Evaluation:    Pt was isolative and withdrawn to her room  napping and reading. She came out of her room intermittenly to look for snacks and returned to her room. Pt did not socialize or engage with peers. Affect flat but pleasant and cooperative on  approach. Mood depress and withdrawn . Speech is clear and coherent. She endorse neck pain and rated it at 8/10. PRN tylenol administered. Pt denied all psych symptoms. Pt came out for dinner and had adequate food and fluid intake. Pt contracted for safety. No behavioral concerns noted or verbalized by pt.

## 2024-04-09 NOTE — H&P
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    #PTSD:  - PTA Prazosin 1mg    Polysubstance Use Disorder  -CD eval placed at patient request  -No evidence of active withdrawal on unit or in ED.     2. Pertinent Labs/Monitoring:   -Labs, EKG pending     3. Additional Plans:  - Patient will be treated in therapeutic milieu with appropriate individual and group therapies as described       Psychiatric Hospital Course:      Rianna Man was admitted to [facility] as a voluntary patient.  Medications:  PTA olanzapine 15 mg hs & hydroxyzine 50 mg every 6 hours prn were continued.   PTA  venlafaxine ER 75 mg daily was held due to uncertainty around dosage.  Patient started at 37.5mg in the ED and this was held for unclear reasons ( work pending) and without evidence of withdrawal symptoms. Patient also unable to clarify if she has been taking this. Will continue to hold for now and monitor closely for withdrawal.   PTA prazosin 1mg initially held due to ability to cross placenta. Patient states that she needs this to sleep, per OB no clear reason to hold. Restarted on 4/9.     The risks, benefits, alternatives, and side effects were discussed and understood by the patient.     Medical Assessment and Plan      Medical diagnoses to be addressed this admission:      #1st trimester pregnancy  Likely approximately 6 weeks per quant, no abdominal pain or symptoms to suggest ectopic pregnancy at this time. Patient found out she was pregnant in the ED.   - Prenatal vitamin 1 tablet daily  - Ferrous sulfate 65 elemental iron    Medical course: Patient was physically examined by the ED prior to being transferred to the unit and was found to be medically stable and appropriate for admission.     Consults:  none     Checklist     Legal Status: Orders Placed This Encounter      Voluntary      Safety Assessment:   Behavioral Orders   Procedures    Code 1 - Restrict to Unit    Routine Programming     As clinically indicated    Status 15     Every 15 minutes.    Suicide precautions: Suicide Risk: HIGH     Patients on Suicide Precautions should have a Combination Diet ordered that includes a Diet selection(s) AND a Behavioral Tray selection for Safe Tray - with utensils, or Safe Tray - NO utensils       Order Specific Question:   Suicide Risk     Answer:   HIGH         Risk Assessment:  Risk for harm is moderate.  Risk factors: maladaptive coping, substance use, and trauma  Protective factors: family and engaged in treatment     SIO: none    Dispo: TBD. Disposition pending clinical stabilization, medication optimization and development of an appropriate discharge plan.     Attestations:     Brittny Fowler, MS3  [facility] Medical Student       I was present with the medical student who participated in the service and documentation of the note. I have verified the history, personally performed the physical/mental status exam, and was responsible for oversight with medical decision making. I agree with the assessment and plan documented in the note above.     Sandy Guevara MD  Psychiatry Resident  [facility]    This patient has been seen and evaluated by me, Susana Hewitt DO.  I have discussed this patient with the team  including the resident and medical student(s) and I agree with the findings and plan in this note.  Dr. Susana Hewitt DO, ADELINE

## 2024-04-09 NOTE — PROVIDER NOTIFICATION
04/09/24 1415   Individualization/Patient Specific Goals   Patient Personal Strengths motivated for recovery;motivated for treatment   Patient Vulnerabilities substance abuse/addiction;lacks insight into illness;housing insecurity;poor impulse control;food insecurity;limited support system   Anxieties, Fears or Concerns Pregnancy, mental health concerns, SI, housing concerns   Individualized Care Needs None   Patient/Family-Specific Goals (Include Timeframe) Wants residential tx for chemical dependency   Interprofessional Rounds   Summary Initial assessment, reason for admission   Participants CTC;psychiatrist;nursing;other (see comments);OT   Behavioral Team Discussion   Participants Dr. Hewitt, Psychiatry Resident, 3rd year med students, Benita LLOYD, Yaritza RAHMAN, Melanie Lechuga CTC   Progress New admission   Anticipated length of stay 5-7 days   Continued Stay Criteria/Rationale Admitted for SI with plan   Medical/Physical Pregnancy   Precautions Per unit protocol   Plan Psychiatry Team will meet with patient daily to assess psychiatric needs and to discuss medication options/side effects; during hospitalization patient will be encouraged to attend therapy groups and to participate in unit programming. CTC will coordinate disposition and aftercare plan   Rationale for change in precautions or plan None   Safety Plan Per unit protocol, see below   Anticipated Discharge Disposition substance use treatment     PRECAUTIONS AND SAFETY    Behavioral Orders   Procedures    Code 1 - Restrict to Unit    Routine Programming     As clinically indicated    Status 15     Every 15 minutes.    Suicide precautions: Suicide Risk: HIGH     Patients on Suicide Precautions should have a Combination Diet ordered that includes a Diet selection(s) AND a Behavioral Tray selection for Safe Tray - with utensils, or Safe Tray - NO utensils       Order Specific Question:   Suicide Risk     Answer:   HIGH       Safety  Safety WDL: WDL  Patient  Location: patient room, own  Observed Behavior: calm  Safety Measures: safety rounds completed

## 2024-04-09 NOTE — PLAN OF CARE
"Pt denies SI.  Pt reports auditory hallucinations pt states does not understand what they are saying. Pt stated is dealing with the voices by sleeping.   Pt has also been listening to head phones are reading. Pt so far this shift has not attended any groups. Pt reports anxiety and depression.  Pt requested and rec'd PRN Hydroxyzine 50mg.  Pt reported relief of anxiety with PRN Hydroxyzine.  Pt not observed interacting with other patients.  Pt reported neck pain and rec'd PRN Tylenol.  Pt reported little rekief with Tylenol. Pt also requested and rec'd egg crate mattress for her bed.  Pt stated she wants to go to CD tx.  Pt denied any stomach pain this shift.    Problem: Adult Behavioral Health Plan of Care  Goal: Plan of Care Review  Outcome: Not Progressing  Goal: Patient-Specific Goal (Individualization)  Description: You can add care plan individualizations to a care plan. Examples of Individualization might be:  \"Parent requests to be called daily at 9am for status\", \"I have a hard time hearing out of my right ear\", or \"Do not touch me to wake me up as it startles  me\".  Outcome: Not Progressing  Goal: Adheres to Safety Considerations for Self and Others  Outcome: Not Progressing  Intervention: Develop and Maintain Individualized Safety Plan  Recent Flowsheet Documentation  Taken 4/9/2024 1100 by Benita Temple RN  Safety Measures: safety rounds completed  Goal: Absence of New-Onset Illness or Injury  Outcome: Not Progressing  Intervention: Identify and Manage Fall Risk  Recent Flowsheet Documentation  Taken 4/9/2024 1100 by Benita Temple RN  Safety Measures: safety rounds completed  Goal: Optimized Coping Skills in Response to Life Stressors  Outcome: Not Progressing  Goal: Develops/Participates in Therapeutic [place] to Support Successful Transition  Outcome: Not Progressing   Goal Outcome Evaluation:                        "

## 2024-04-09 NOTE — PHARMACY-ADMISSION MEDICATION HISTORY
Please see Admission Medication History note completed by a med scribe on 04/08/2024 under previous encounter at [facility] Emergency Department for information regarding prior to admission medications.    Hailey Esqueda, PharmD, BCPP  Behavioral Health Pharmacist  [facility] ([facility])

## 2024-04-09 NOTE — CONSULTS
4/9/2024  ALEKSANDRA Consult acknowledged.  Staff will attempt to see pt for ALEKSANDRA Consult tomorrow, 4/10/24.  If pt discharges prior to consult, they can call [place] at [phone] to schedule an OP ALEKSANDRA Assessment.    Sandy Chiu MA [facility]  ALEKSANDRA Evaluation Counselor  [phone]  [email or web address]

## 2024-04-09 NOTE — PLAN OF CARE
BEH IP Unit Acuity Rating Score (UARS)  Patient is given one point for every criteria they meet.    CRITERIA SCORING   On a 72 hour hold, court hold, committed, stay of commitment, or revocation. 0    Patient LOS on BEH unit exceeds 20 days. 0  LOS: 1   Patient under guardianship, 55+, otherwise medically complex, or under age 11. 0   Suicide ideation without relief of precipitating factors. 1   Current plan for suicide. 0   Current plan for homicide. 0   Imminent risk or actual attempt to seriously harm another without relief of factors precipitating the attempt. 0   Severe dysfunction in daily living (ex: complete neglect for self care, extreme disruption in vegetative function, extreme deterioration in social interactions). 1   Recent (last 7 days) or current physical aggression in the ED or on unit. 0   Restraints or seclusion episode in past 72 hours. 0   Recent (last 7 days) or current verbal aggression, agitation, yelling, etc., while in the ED or unit. 0   Active psychosis. 1   Need for constant or near constant redirection (from leaving, from others, etc).  0   Intrusive or disruptive behaviors. 0   Patient requires 3 or more hours of individualized nursing care per 8-hour shift (i.e. for ADLs, meds, therapeutic interventions). 0   TOTAL 3

## 2024-04-09 NOTE — COMMUNITY RESOURCES LIST (ENGLISH)
April 9, 2024           YOUR PERSONALIZED LIST OF SERVICES & PROGRAMS           USE    Use Treatment      [facility] - Outpatient substance use treatment  [address] [place] [address] (Distance: 1.8 miles)  Language: English, Sami  Fee: Insurance, Self pay  Accessibility: Ada accessible      [facility] (ALEKSANDRA)  Phone: [phone]) [phone]  Website: https://Intellecap/programs-services/addiction-treatment/  Language: English  Hours: Mon 8:00 AM - 5:00 PM Tue 8:00 AM - 5:00 PM Wed 8:00 AM - 5:00 PM Thu 8:00 AM - 5:00 PM Fri 8:00 AM - 5:00 PM  Fee: Insurance, Self pay  Accessibility: Ada accessible      [facility] [employer]  Phone: [phone]) [phone]  Website: https://Effektif/  Language: English  Hours: Mon 7:00 AM - 7:00 PM Tue 7:00 AM - 7:00 PM Wed 7:00 AM - 7:00 PM Thu 7:00 AM - 7:00 PM Fri 7:00 AM - 7:00 PM  Fee: Free    Use Recovery Support      [facility]  [address] [place] [id number] (Distance: 1.8 miles)  Phone: [phone]) [phone]  Website: [email or web address]://[facility]  Language: English  Fee: Free  Accessibility: Ada accessible      [facility]  Phone: [phone]) [phone]  Website: https://[place].org/mnwarmline  Language: English  Hours: Mon 9:00 AM - 5:00 PM Tue 9:00 AM - 5:00 PM Wed 9:00 AM - 5:00 PM Thu 9:00 AM - 5:00 PM Fri 9:00 AM - 5:00 PM  Fee: Free  Accessibility: Ada accessible, Translation services      [facility]  Phone: [phone]  Website: https://Effektif/  Language: English  Hours: Mon 7:00 AM - 7:00 PM Tue 7:00 AM - 7:00 PM Wed 7:00 AM - 7:00 PM Thu 7:00 AM - 7:00 PM Fri 7:00 AM - 7:00 PM  Fee: Free               IMPORTANT NUMBERS & WEBSITES        Emergency Services  911  .   [facility]  211 http://211[facility].org  .   Poison Control  (678) 140-6361 http://[place]poison.org  http://wisconsinpoison.org  .     Suicide and Crisis Lifeline  988 http://988lifeline.org  .   Childhelp [place] Child Abuse Hotline  109.813.9254 http://Childhelphotline.org   .   National Sexual Assault Hotline  (133) 240-4838 (HOPE) http://Nutraboltn.org   .     National Runaway Safeline  (956) 189-2197 (RUNAWAY) http://Info Assembly.Stripe  .   Pregnancy & Postpartum Support  Call/text 613-088-7164  [place]: http://ppsupportmn.org  [place]: http://psichapters.com/wi  .   Substance Abuse National Helpline ([facility])  227-391-HELP (4783) http://Findtreatment.gov   .                DISCLAIMER: These resources have been generated via the [employer] Platform. [employer] does not endorse any service providers mentioned in this resource list. [employer] does not guarantee that the services mentioned in this resource list will be available to you or will improve your health or wellness.    [facility]

## 2024-04-09 NOTE — PROGRESS NOTES
"CLINICAL NUTRITION SERVICES - ASSESSMENT NOTE     Nutrition Prescription    RECOMMENDATIONS FOR MDs/PROVIDERS TO ORDER:  None at this time    Malnutrition Status:    Patient does not meet two of the established criteria necessary for diagnosing malnutrition    Recommendations already ordered by Registered Dietitian (RD):  -Double portions of entrees  -Handouts: Pregnancy Nutrition Therapy, Healthy Pregnancy Tips    Future/Additional Recommendations:  RD to sign off at this time, but will remain available by consult if new nutrition problem arises.       REASON FOR ASSESSMENT  Riannadevaughn Man is a/an 34 year old female assessed by the dietitian for Patient/Family Request - Patient newly pregnant and asking for nutrition recommendations both here and outside the hospital     CLINICAL HISTORY  PMH significant for schizoaffective disorder, MDD, FITZ, OCD, PTSD, BPD, and etoh abuse. Admitted from ED 4/8/24 due to concern for paranoia, anxiety, and AH in the context of medication non-adherence and polysubstance use.    NUTRITION HISTORY  RD met with Rianna at bedside who reports she learned about her pregnancy during this admission. Pt is requesting double portions d/t feeling hungry after meals. Pt reports feeling dehydrated, writer encouraged pt to increase fluid intake during pregnancy. Writer provided pt with handouts on nutrition recommendations for pregnancy and encouraged pt to let staff know if she has any questions.     GI: Pt denied N/V/D/C     CURRENT NUTRITION ORDERS  Diet: Regular  Supplement: none  Intake/Tolerance: ISAMAR    LABS  HCG +    MEDICATIONS  Zyprexa, prenatal MVI    ANTHROPOMETRICS  Height: 172.7 cm (5' 8\")  Most Recent Weight: 117.9 kg (259 lb 14.4 oz)    IBW: 63.6 kg   BMI: Obesity Grade II BMI 35-39.9  Weight History:   Wt Readings from Last 151 Encounters:   04/08/24 117.9 kg (259 lb 14.4 oz)   04/07/24 112.9 kg (249 lb)   10/06/23 113.4 kg (250 lb)   04/10/23 117.9 kg (260 lb)      12/22/22 " 117.9 kg (260 lb)   12/20/22 123.8 kg (273 lb)   12/12/22 121 kg (266 lb 12.8 oz)   11/20/22 116.6 kg (257 lb)   07/12/22 122.8 kg (270 lb 11.2 oz)   07/12/22 104.3 kg (230 lb)   07/29/21 117 kg (258 lb)   12/16/20 119.3 kg (263 lb)   12/01/20 116.6 kg (257 lb)   10/09/20 110.8 kg (244 lb 3.2 oz)   07/09/20 97.1 kg (214 lb)   05/13/20 102.6 kg (226 lb 3.2 oz)   09/03/19 96.2 kg (212 lb)   07/31/19 106.4 kg (234 lb 9.6 oz)   07/02/19 104.3 kg (230 lb)   06/11/19 102.5 kg (226 lb)   08/26/18 127 kg (280 lb)   07/03/18 118.4 kg (261 lb)   05/24/18 115.8 kg (255 lb 3.2 oz)   04/24/18 109.4 kg (241 lb 1.6 oz)   03/30/18 107.2 kg (236 lb 6.4 oz)   01/13/17 120.7 kg (266 lb)   03/21/16 113.5 kg (250 lb 3.2 oz)   06/15/15 99.5 kg (219 lb 6.4 oz)   06/10/15 99.6 kg (219 lb 9.6 oz)   02/13/15 99.5 kg (219 lb 6.4 oz)   02/09/15 100.2 kg (220 lb 12.8 oz)   01/23/15 98.9 kg (218 lb)   04/23/14 95.3 kg (210 lb)   02/18/13 96.2 kg (212 lb)   No recent wt loss noted    Dosing Weight: 77 kg (ABW)    ASSESSED NUTRITION NEEDS  Estimated Energy Needs: 1925 - 2310 kcals/day (25 - 30 kcals/kg)  Justification: Pregnancy Guidelines: +340 kcal/day during 2nd trimester, +452 kcal/day during 3rd trimester  Estimated Protein Needs: 94 - 117 grams protein/day (0.8 - 1 grams of pro/kg)  Justification: Obesity guidelines (minimum of 71 g/day for pregnancy)  Estimated Fluid Needs: 1 mL/kcal  Justification: Maintenance    PHYSICAL FINDINGS  See malnutrition section below.  Kiko: 23  No abnormal nutrition-related physical findings observed.     MALNUTRITION  % Intake: No decreased intake noted  % Weight Loss: None noted  Subcutaneous Fat Loss: None observed  Muscle Loss: None observed  Fluid Accumulation/Edema: None noted  Malnutrition Diagnosis: Patient does not meet two of the established criteria necessary for diagnosing malnutrition    NUTRITION DIAGNOSIS  No nutrition diagnosis at this time    INTERVENTIONS  Implementation  Nutrition  Education: RD role in care   Modify composition of meals/snacks - double portions  Nutrition education for nutrition relationship to health/disease - pregnancy nutrition therapy and healthy pregnancy tips handouts    Goals  Patient to consume % of nutritionally adequate meal trays TID, or the equivalent with supplements/snacks.     Monitoring/Evaluation  RD to sign off at this time, but will remain available by consult if new nutrition problem arises.      Mary High, MPH, RDN, LD  Behavioral Health Adult & Pediatric Dietitian  Contact via [employer] or [employer] phone: [phone]  On Call Pager (weekends only): [phone]

## 2024-04-10 LAB
ATRIAL RATE - MUSE: 102 BPM
DIASTOLIC BLOOD PRESSURE - MUSE: NORMAL MMHG
INTERPRETATION ECG - MUSE: NORMAL
P AXIS - MUSE: 73 DEGREES
PR INTERVAL - MUSE: 124 MS
QRS DURATION - MUSE: 84 MS
QT - MUSE: 362 MS
QTC - MUSE: 471 MS
R AXIS - MUSE: 83 DEGREES
SYSTOLIC BLOOD PRESSURE - MUSE: NORMAL MMHG
T AXIS - MUSE: 66 DEGREES
VENTRICULAR RATE- MUSE: 102 BPM

## 2024-04-10 PROCEDURE — 99232 SBSQ HOSP IP/OBS MODERATE 35: CPT | Mod: GC | Performed by: PSYCHIATRY & NEUROLOGY

## 2024-04-10 PROCEDURE — 250N000013 HC RX MED GY IP 250 OP 250 PS 637

## 2024-04-10 PROCEDURE — 124N000002 HC R&B MH UMMC

## 2024-04-10 PROCEDURE — 250N000013 HC RX MED GY IP 250 OP 250 PS 637: Performed by: STUDENT IN AN ORGANIZED HEALTH CARE EDUCATION/TRAINING PROGRAM

## 2024-04-10 RX ORDER — VENLAFAXINE HYDROCHLORIDE 37.5 MG/1
37.5 CAPSULE, EXTENDED RELEASE ORAL
Status: DISCONTINUED | OUTPATIENT
Start: 2024-04-10 | End: 2024-04-10

## 2024-04-10 RX ORDER — VENLAFAXINE HYDROCHLORIDE 37.5 MG/1
37.5 CAPSULE, EXTENDED RELEASE ORAL
Status: DISCONTINUED | OUTPATIENT
Start: 2024-04-10 | End: 2024-04-11

## 2024-04-10 RX ORDER — VENLAFAXINE HYDROCHLORIDE 37.5 MG/1
37.5 CAPSULE, EXTENDED RELEASE ORAL
Status: CANCELLED | OUTPATIENT
Start: 2024-04-11

## 2024-04-10 RX ADMIN — VENLAFAXINE HYDROCHLORIDE 37.5 MG: 37.5 CAPSULE, EXTENDED RELEASE ORAL at 12:27

## 2024-04-10 RX ADMIN — ACETAMINOPHEN 650 MG: 325 TABLET ORAL at 09:02

## 2024-04-10 RX ADMIN — PRAZOSIN HYDROCHLORIDE 1 MG: 1 CAPSULE ORAL at 20:00

## 2024-04-10 RX ADMIN — PRENATAL VIT W/ FE FUMARATE-FA TAB 27-0.8 MG 1 TABLET: 27-0.8 TAB at 09:02

## 2024-04-10 RX ADMIN — HYDROXYZINE HYDROCHLORIDE 50 MG: 50 TABLET, FILM COATED ORAL at 19:13

## 2024-04-10 RX ADMIN — OLANZAPINE 15 MG: 15 TABLET, FILM COATED ORAL at 20:00

## 2024-04-10 RX ADMIN — ACETAMINOPHEN 650 MG: 325 TABLET ORAL at 17:07

## 2024-04-10 RX ADMIN — NICOTINE 1 PATCH: 14 PATCH, EXTENDED RELEASE TRANSDERMAL at 09:05

## 2024-04-10 ASSESSMENT — ACTIVITIES OF DAILY LIVING (ADL)
ADLS_ACUITY_SCORE: 31
LAUNDRY: UNABLE TO COMPLETE
ADLS_ACUITY_SCORE: 31
ORAL_HYGIENE: INDEPENDENT
ADLS_ACUITY_SCORE: 31
DRESS: INDEPENDENT
ADLS_ACUITY_SCORE: 31
ORAL_HYGIENE: INDEPENDENT
DRESS: INDEPENDENT
ADLS_ACUITY_SCORE: 31
HYGIENE/GROOMING: INDEPENDENT
HYGIENE/GROOMING: INDEPENDENT
ADLS_ACUITY_SCORE: 31

## 2024-04-10 NOTE — PLAN OF CARE
"Pt has been in bed all shift with exception of eating breakfast and lunch in the dining room.  Initially this morning CD  called Pt stated she was too tired to talk to them but would later.  Later pt stated she no want CD assessment. Pt sated she wanted to [facility] to [facility] or [facility]. Pt stated she has been in [facility] in the past.  Pt Stated she was felling better now then when admitted. Pt has been in laying in bed all day with headphones on and reading a book.  Pt denies SI.  Reports auditory hallucinations are soft.  Pt denies depression. Pt pleasant and cooperative upon approach. Pt rec'd PRN Tylenol for back pain.  Pt reported relief.    Problem: Adult Behavioral Health Plan of Care  Goal: Plan of Care Review  Outcome: Not Progressing  Goal: Patient-Specific Goal (Individualization)  Description: You can add care plan individualizations to a care plan. Examples of Individualization might be:  \"Parent requests to be called daily at 9am for status\", \"I have a hard time hearing out of my right ear\", or \"Do not touch me to wake me up as it startles  me\".  Outcome: Not Progressing  Goal: Adheres to Safety Considerations for Self and Others  Outcome: Not Progressing  Intervention: Develop and Maintain Individualized Safety Plan  Recent Flowsheet Documentation  Taken 4/10/2024 1400 by Benita Temple RN  Safety Measures:   environmental rounds completed   safety rounds completed  Goal: Absence of New-Onset Illness or Injury  Outcome: Not Progressing  Intervention: Identify and Manage Fall Risk  Recent Flowsheet Documentation  Taken 4/10/2024 1400 by Benita Temple RN  Safety Measures:   environmental rounds completed   safety rounds completed  Goal: Optimized Coping Skills in Response to Life Stressors  Outcome: Not Progressing  Goal: Develops/Participates in Therapeutic [place] to Support Successful Transition  Outcome: Not Progressing   Goal Outcome Evaluation:                        "

## 2024-04-10 NOTE — PROGRESS NOTES
"  ----------------------------------------------------------------------------------------------------------  [facility]  Psychiatry Progress Note  Hospital Day #2     Interim History:     The patient's care was discussed with the treatment team and chart notes were reviewed.    Vitals: VSS  Sleep: 7 hours (04/10/24 0628)  Scheduled medications: Took all scheduled medications as prescribed  Psychiatric PRN medications: Took hydroxyzine for anxiety.    Last 24H PRN:     acetaminophen (TYLENOL) tablet 650 mg, 650 mg at 04/09/24 2105    hydrOXYzine HCl (ATARAX) tablet 50 mg, 50 mg at 04/09/24 1907      Staff Report:   No acute events or safety concerns overnight. She had been reporting 8/10 neck pain and requested Tylenol which provided minimal relief. Her pain started out just in her neck but now is reporting full body myalgias. Please see staff notes for details.      Subjective:     Patient Interview:  Rianna Man was interviewed in her room.  Reports she feels \"very tired, just very tired.\"  Notes she tends to feel this way when pregnancy secondary to history of iron deficiency anemia.  Had some neck pain yesterday and now today has more neck and back pain.  Neck pain started between when she ran out of medications and when she presented to the ED.  No fevers chills, body aches.  Got a mattress topper on her bed for last night and does feel like this has helped.  Tylenol also results in brief improvement of her pain, but not sustained.  No feelings of generally being ill or body aches.    Discussed that patient is likely displaying Venlafaxine withdrawal given neck pain, back pain, and exhaustion. Symptoms were present prior to admission and started after she ran out of her medications. Note she was feeling more depressed even before running out of medications.  Venlafaxine felt like was helpful for her and would like to continue.    Continues to have voices in her " "head - much quieter than normal.  No VH, no SI/SIB.      Discussed that Prazosin has not shown any harm in pregnancy, but is known to cross the placenta and has not been shown to be safe yet.  Discussed risks and benefits, and patient would like to continue this medication as it significantly helps her with PTSD and sleep.    ROS:  Patient has  neck and back pain  Patient denies fever and chills     Objective:     Vitals:  /89   Pulse 80   Temp 97.1  F (36.2  C)   Resp 18   Ht 1.727 m (5' 8\")   Wt 117.9 kg (259 lb 14.4 oz)   LMP 03/01/2024   SpO2 99%   BMI 39.52 kg/m      Allergies:  Allergies   Allergen Reactions    No Known Allergies        Current Medications:  Scheduled:  Current Facility-Administered Medications   Medication Dose Route Frequency Provider Last Rate Last Admin    acetaminophen (TYLENOL) tablet 650 mg  650 mg Oral Q4H PRN Sandy Guevara MD   650 mg at 04/09/24 2105    alum & mag hydroxide-simethicone (MAALOX) suspension 30 mL  30 mL Oral Q4H PRN Sandy Guevara MD        hydrOXYzine HCl (ATARAX) tablet 50 mg  50 mg Oral Q6H PRN Sandy Guevara MD   50 mg at 04/09/24 1907    nicotine (NICODERM CQ) 14 MG/24HR 24 hr patch 1 patch  1 patch Transdermal Daily Hailey Mccabe MD   1 patch at 04/09/24 1157    nicotine (NICORETTE) gum 2 mg  2 mg Buccal Q1H PRN Hailey Mccabe MD        OLANZapine (zyPREXA) tablet 10 mg  10 mg Oral TID PRN Sandy Guevara MD        Or    OLANZapine (zyPREXA) injection 10 mg  10 mg Intramuscular TID PRN Sandy Guevara MD        OLANZapine (zyPREXA) tablet 15 mg  15 mg Oral At Bedtime Sandy Guevara MD   15 mg at 04/09/24 2104    prazosin (MINIPRESS) capsule 1 mg  1 mg Oral At Bedtime Sandy Guevara MD   1 mg at 04/09/24 2104    prenatal multivitamin w/iron per tablet 1 tablet  1 tablet Oral Daily Sandy Guevara MD   1 tablet at 04/09/24 1237    senna-docusate (SENOKOT-S/PERICOLACE) 8.6-50 MG per tablet 1 tablet  1 " tablet Oral BID PRN Sandy Guevara MD           PRN:  Current Facility-Administered Medications   Medication Dose Route Frequency Provider Last Rate Last Admin    acetaminophen (TYLENOL) tablet 650 mg  650 mg Oral Q4H PRN Sandy Guevara MD   650 mg at 04/09/24 2105    alum & mag hydroxide-simethicone (MAALOX) suspension 30 mL  30 mL Oral Q4H PRN Sandy Guevara MD        hydrOXYzine HCl (ATARAX) tablet 50 mg  50 mg Oral Q6H PRN Sandy Guevara MD   50 mg at 04/09/24 1907    nicotine (NICODERM CQ) 14 MG/24HR 24 hr patch 1 patch  1 patch Transdermal Daily Hailey Mccabe MD   1 patch at 04/09/24 1157    nicotine (NICORETTE) gum 2 mg  2 mg Buccal Q1H PRN Hailey Mccabe MD        OLANZapine (zyPREXA) tablet 10 mg  10 mg Oral TID PRN Sandy Guevara MD        Or    OLANZapine (zyPREXA) injection 10 mg  10 mg Intramuscular TID PRN Sandy Guevara MD        OLANZapine (zyPREXA) tablet 15 mg  15 mg Oral At Bedtime Sandy Guevara MD   15 mg at 04/09/24 2104    prazosin (MINIPRESS) capsule 1 mg  1 mg Oral At Bedtime Sandy Guevara MD   1 mg at 04/09/24 2104    prenatal multivitamin w/iron per tablet 1 tablet  1 tablet Oral Daily Sandy Guevara MD   1 tablet at 04/09/24 1237    senna-docusate (SENOKOT-S/PERICOLACE) 8.6-50 MG per tablet 1 tablet  1 tablet Oral BID PRN Sandy Guevara MD           Labs and Imaging:  New results:   Recent Results (from the past 24 hour(s))   EKG 12-lead, complete    Collection Time: 04/09/24  2:42 PM   Result Value Ref Range    Systolic Blood Pressure  mmHg    Diastolic Blood Pressure  mmHg    Ventricular Rate 102 BPM    Atrial Rate 102 BPM    CO Interval 124 ms    QRS Duration 84 ms     ms    QTc 471 ms    P Axis 73 degrees    R AXIS 83 degrees    T Axis 66 degrees    Interpretation ECG       Sinus tachycardia  Otherwise normal ECG  When compared with ECG of 13-JUL-2022 10:10,  No significant change was found         Data this  "admission:  - CBC unremarkable  - CMP unremarkable  - TSH normal  - UDS positive for amphetamines and cannabinoids   - Lipids unremarkable  - Vit B12 normal  - Folate normal  - hCG quant 2,112 - consistent with pregnancy at 6 weeks  - EKG normal sinus rhythm, QTc 471 - corrected to 443 with tachycardia per Bazett formula     Mental Status Exam:     Oriented to:  Grossly Oriented  General:  Drowsy  Appearance:  appears stated age  Behavior/Attitude:  Calm and Engaged  Eye Contact:  Closed while lying in bed. Intermittently makes brief eye contact.  Psychomotor: Normal no catatonia present  Speech:  appropriate volume/tone  Language: Fluent in English with appropriate syntax and vocabulary.  Mood:  \"exhausted\"  Affect:   tired and congruent with mood  Thought Process:  linear  Thought Content:   No SI/HI/AH/VH; No apparent delusions  Associations:  intact  Insight:  fair due to recognizing her psychiatric symptoms and the need to be restarted on her home medications. Has difficulty naming specifics of her decompensation as well as deficits within her current medication regimen   Judgment:  fair due to requesting additional medications, support with CD treatment. Does not view substance use as addiction, stating she can stop at any time.   Impulse control: fair  Attention Span:  grossly intact  Concentration:  grossly intact  Recent and Remote Memory:  not formally assessed  Fund of Knowledge: average  Muscle Strength and Tone: normal  Gait and Station: Normal     Psychiatric Assessment     Rianna Man is a 34 year old female previously diagnosed with Schizoaffective disorder, MDD, FITZ, OCD, PTSD, BPD, and EtoH abuse issues who presented to the ER on 4/7/2024 due to concern for paranoia, anxiety, and auditory hallucinations telling her to kill herself in the context of medication non-adherence, polysubstance use, and homelessness. Patient also recently learned she was pregnant while in the ED which was surprising " "for her.  Most recent psychiatric hospitalization was 7/12/22 - 7/15/22 for SI also within the context of medication non-adherence. Significant symptoms on admission include depressed mood (feeling drained and tired),  High anxiety, nightmares, and hearing mumbled voices of \"dead women who speak to her\" that are not distressing and present at baseline.    The MSE on admission was pertinent for tired and blunted affect as well as partial insight/judgement. Biological contributions to mental health presentation include family history of bipolar disorder and schizophrenia on patient's maternal side, childhood trauma, and alcohol use since age 13 (with continued substance use that has expanded to cannabis and methamphetamines). Patient additionally notes that the voices she hears have been present since before age 11.  Psychological contributions to mental health presentation include limited coping skills and lack of awareness of chemical dependency issues. Social factors contributing to mental health presentation include housing instability, lack of employment, new unexpected pregnancy, and reports of high stress at baseline. Protective factors include supportive brother and mother, help-seeking behaviors, and engagement with treatment. Since arrival, she has expressed a strong desire to restart medications, pursue treatment for her substance use disorder, and get support with finding housing.       The patient's reported symptoms of SI with plan, depression mood, low energy, hypersomnia, feelings of worthlessness, and irritability - within the context of AH command hallucinations telling her to self harm/kill herself - appear consistent with a prior diagnosis of schizoaffective disorder however it may be worthwhile to consider other diagnoses such as MDD with psychotic features vs other mood/psychosis-spectrum disorders. While the patient reports AH as noted above, she states that the voices have been present \"her " "entire life\" before the age of 11 and have continued consistently since that time. These voices are not recognizable and patient states are of \"dead women\", but is presently unable to elaborate further on this. While patient states that she has had visual hallucinations, she endorses that these are \"shadow figures\" that present in states of high arousal which are more consistent with hallucinations that are seen in acute stress events than a true psychosis. These voices may be representative of acute stress and a byproduct of trauma, rather than psychosis, and will be considered within this evaluation. Similarly, while patient reports a history of maria isabel, per chart review these seem to be more escalated emotions within the setting of substance use rather than true maria isabel per what is reported in chart review. Further chart evaluation and diagnostic assessment is needed at this time and is presently pending evolution.     Patient demonstrates both a present and prior history of worries, fears, ruminations, and panic attacks consistent with a prior diagnosis of Generalized Anxiety Disorder. Similarly, patient endorses specific prior traumas with associated hyperarousal, reliving of events, and nightmares consistent with Post Traumatic Stress Disorder. Patient's polysubstance use is pending evolution however appears consistent with Polysubstance Use Disorder given persistent substance use despite knowledge that it may cause or exacerbate physical or psychological problems.      Given decompensated symptoms in the setting of medication adherence, recent SI, and polysubstance use, She will likely benefit from medication optimization and evaluation of adequate outpatient supports this admission.      Given that she currently has psychosis, patient warrants inpatient psychiatric hospitalization to maintain her safety.      Psychiatric Plan by Diagnosis      Today's changes:  - Start Venlafaxine 37.5 mg      # Psychosis " unspecified  # hx of schizoaffective disorder, MDD  1. Medications:  - Venlafaxine at 37.5mg  - PTA Olanzapine 15 mg hs  - PTA Hydroxyzine 50 mg every 6 hours prn for anxiety     #PTSD:  - PTA Prazosin 1mg     Polysubstance Use Disorder  -CD eval placed at patient request  -No evidence of active withdrawal on unit or in ED.     2. Pertinent Labs/Monitoring:   -Pregnancy: Patient was provided counseling on the known risks and benefits of her current medication regimen during pregnancy (Venlafaxine, Prazosin, and Hydroxyzine), patient expresses preference to continue these medications.   -EKG normal sinus rhythm, QTc 471 - corrected to 443 with tachycardia per Bazett formula  -Patient pregnant: approx 6 weeks per qualitative and LMP     3. Additional Plans:  - Patient will be treated in therapeutic milieu with appropriate individual and group therapies as described     Psychiatric Hospital Course:      Rianna Man was admitted to [facility] as a voluntary patient.  Medications:  PTA olanzapine 15 mg hs & hydroxyzine 50 mg every 6 hours prn were continued.   PTA venlafaxine ER 75 mg daily was held due to uncertainty around dosage.  Patient started at 37.5mg in the ED and this was held (likely due to discovery of pregnancy, not clear per chart review). Patient did not demonstrate withdrawal symptoms and was not able to clarify clearly whether she was taking this. As a result, and due to possibility of medicine being ineffective to begin with, venlafaxine was held on admission.   PTA prazosin 1mg initially held due to ability to cross placenta. Patient states that she needs this to sleep, per OB no clear reason to hold. Restarted on 4/9. Discussed risk and benefits and patient agreed to continue the medication.   4/10: Patient states mood is the same, reporting neck and back pain and generally feeling out of it. Slightly hypertensive to high 140s. Due to concern for possible venlafaxine withdrawal, risks and  benefits discussed with patient to restarting vs trialing different medication and patient preferred to restart venlafaxine. Started the ER at 37.5 mg.      The risks, benefits, alternatives, and side effects were discussed and understood by the patient.     Medical Assessment and Plan     Medical diagnoses to be addressed this admission:       #1st trimester pregnancy  Likely approximately 6 weeks per quant, no abdominal pain or symptoms to suggest ectopic pregnancy at this time. Patient found out she was pregnant in the ED.   - Prenatal vitamin with iron 1 tablet daily  - No contraindications with current medication regimen as above. Risks and benefit discussed, patient agreeable.   - Re-establish with OB after discharge.      Medical course: Patient was physically examined by the ED prior to being transferred to the unit and was found to be medically stable and appropriate for admission.      Consults:  none     Checklist     Legal Status: Voluntary     Safety Assessment:   Behavioral Orders   Procedures    Code 1 - Restrict to Unit    Routine Programming     As clinically indicated    Status 15     Every 15 minutes.    Suicide precautions: Suicide Risk: HIGH     Patients on Suicide Precautions should have a Combination Diet ordered that includes a Diet selection(s) AND a Behavioral Tray selection for Safe Tray - with utensils, or Safe Tray - NO utensils       Order Specific Question:   Suicide Risk     Answer:   HIGH       Risk Assessment:  Risk for harm is moderate.  Risk factors: maladaptive coping, substance use, and trauma  Protective factors: family and engaged in treatment     SIO: none    Disposition: TBD. Likely will discharge to  treatment facility, working on placement at this time. Pending stabilization, medication optimization, & development of a safe discharge plan.     Attestations     Brittny Fowler, MS3  [facility] Medical Student     I was present with the medical student who participated in the  service and documentation of the note. I have verified the history, personally performed the physical/mental status exam, and was responsible for oversight with medical decision making. I agree with the assessment and plan documented in the note above.     Sandy Guevara MD  Psychiatry Resident  [facility]    Attestation:  This patient has been seen and evaluated by me, Treva Rayo MD.  I have discussed this patient with the house staff team including the resident and medical student and I agree with the findings and plan in this note.    I have reviewed today's vital signs, medications, labs and imaging. Treva Rayo MD , PhD.

## 2024-04-10 NOTE — PLAN OF CARE
"Mood and Affect:Patient describes mood as \"Low, tired.\" Affect is flat, bright on approach.    LOC and Orientation: Alert, oriented to person, place, time and situation.    Behavior and Interaction: Patient is withdrawn and isolative to self. Spent the first three hours of the shift in room, resting and reading a book.    Patient is calm, pleasant and cooperative with nursing assessment.    Mental Health Symptoms: Patient endorses moderate anxiety and depression. Patient denies suicidal ideation.    Patient endorses auditory hallucinations.     Medical Concerns:Elevated blood pressure, and pulse (See flowsheets)    Other Concerns: Asking for pain medication options to alternate with tylenol for back pain.    Medication Compliant: Patient is compliant with all scheduled medication.    PRN: Hydroxyzine for anxiety. Tylenol for neck and back pain.    Medication Side Effects: Denies, not observed.    Fluid & Food Intake: Adequate fluid and food intake.    Bowel & Bladder/Elimination: Denies problems, last bowel movement, today.    Self Care: Independent. Fairly groomed.    Vital Signs: Abnormal blood pressure readings, 124/92; 131/92. Increased heart rate, 118; 107.  .  Neck and back pain.    BP (!) 131/92 (BP Location: Right arm, Patient Position: Sitting, Cuff Size: Adult Regular)   Pulse 107   Temp 97.8  F (36.6  C) (Oral)   Resp 16   Ht 1.727 m (5' 8\")   Wt 117.9 kg (259 lb 14.4 oz)   LMP 03/01/2024   SpO2 99%   BMI 39.52 kg/m        BP (!) 124/92   Pulse 118   Temp 97.8  F (36.6  C) (Oral)   Resp 16   Ht 1.727 m (5' 8\")   Wt 117.9 kg (259 lb 14.4 oz)   LMP 03/01/2024   SpO2 99%   BMI 39.52 kg/m        Problem: Suicide Risk  Goal: Absence of Self-Harm  Outcome: Progressing  Intervention: Assess Risk to Self and Maintain Safety  Recent Flowsheet Documentation  Taken 4/10/2024 8718 by Ayesha Henry, RN  Behavior Management: behavioral plan reviewed  Self-Harm Prevention: environmental self-harm risks " assessed  Enhanced Safety Measures: monitored by video  Intervention: Promote Psychosocial Wellbeing  Recent Flowsheet Documentation  Taken 4/10/2024 1748 by Ayesha Henry RN  Supportive Measures:   self-care encouraged   relaxation techniques promoted  Sleep/Rest Enhancement: other (see comments)  Family/Support System Care:   presence promoted   self-care encouraged  Intervention: Establish Safety Plan and Continuity of Care  Recent Flowsheet Documentation  Taken 4/10/2024 1748 by Ayesha Henry RN  Safe Transition Promotion: protective factors promoted     Problem: Anxiety  Goal: Anxiety Reduction or Resolution  Outcome: Progressing  Intervention: Promote Anxiety Reduction  Recent Flowsheet Documentation  Taken 4/10/2024 1748 by Ayesha Henry RN  Supportive Measures:   self-care encouraged   relaxation techniques promoted  Family/Support System Care:   presence promoted   self-care encouraged   Goal Outcome Evaluation:    Plan of Care Reviewed With: patient

## 2024-04-10 NOTE — PLAN OF CARE
BEH IP Unit Acuity Rating Score (UARS)  Patient is given one point for every criteria they meet.    CRITERIA SCORING   On a 72 hour hold, court hold, committed, stay of commitment, or revocation. 0    Patient LOS on BEH unit exceeds 20 days. 0  LOS: 2   Patient under guardianship, 55+, otherwise medically complex, or under age 11. 0   Suicide ideation without relief of precipitating factors. 1   Current plan for suicide. 0   Current plan for homicide. 0   Imminent risk or actual attempt to seriously harm another without relief of factors precipitating the attempt. 0   Severe dysfunction in daily living (ex: complete neglect for self care, extreme disruption in vegetative function, extreme deterioration in social interactions). 1   Recent (last 7 days) or current physical aggression in the ED or on unit. 0   Restraints or seclusion episode in past 72 hours. 0   Recent (last 7 days) or current verbal aggression, agitation, yelling, etc., while in the ED or unit. 0   Active psychosis. 0   Need for constant or near constant redirection (from leaving, from others, etc).  0   Intrusive or disruptive behaviors. 0   Patient requires 3 or more hours of individualized nursing care per 8-hour shift (i.e. for ADLs, meds, therapeutic interventions). 0   TOTAL 2

## 2024-04-10 NOTE — CONSULTS
4/10/2024  I was informed by pt's unit CTC that she is no longer interested in completing a ALEKSANDRA CA while in treatment. Patient can call [facility] at [phone] for an Outpatient ALEKSANDRA Assessment.  She can also contact these family programs directly to get started:    [employer]  Phone: [phone]   Email: [email or web address]  [address] [place] [address]  [email or web address]    [facility]   Phone: [phone]  Fax: [phone]  [facility] [place] [address]  [email or web address]    [facility]  [address] [place] [address]  Family Residential Treatment:  [facility] location  (up to 3 children age 11 and under)  Phone: [phone]  Fax: [phone]  email: [email or web address].  [email or web address]    Sandy Chiu MA [facility]  ALEKSANDRA Evaluation Counselor  [phone]  [email or web address]

## 2024-04-10 NOTE — PLAN OF CARE
Team Note Due:  Tuesday    Assessment/Intervention/Current Symtoms and Care Coordination:  Chart review and met with team, discussed pt progress, symptomology, and response to treatment.  Discussed the discharge plan and any potential impediments to discharge.    - Patient reports feeling tired and endorsing pain. She is medication compliant and spends most of her time in her room sleeping. She is agreeable to attend residential treatment for chemical dependency.   - CD  reached out to patient earlier this morning but decline to meet with  stating that she was too tired. CD  will attempt to meet with patient later this morning.        Discharge Plan or Goal:  Residential CD treatment.     Barriers to Discharge:  Admitted for substance induced psychosis and SI. Needs clinical stabilization and medication management.      Referral Status:  Pending      Legal Status:  [facility] [place]      Contacts:  [facility] Treatment Coordinator: Lory Sebastian [phone]     Upcoming Meetings and Dates/Important Information and next steps:  Follow up with referrals for CD tx

## 2024-04-10 NOTE — PLAN OF CARE
Problem: Sleep Disturbance  Goal: Adequate Sleep/Rest  Outcome: Progressing   Goal Outcome Evaluation:      Pt appears to have slept for about 7 hours. Pt denies SI/SIB/A&VH. No c/o pain and no PRN given this shift. Continue to monitor and follow care plan.

## 2024-04-11 PROCEDURE — 99232 SBSQ HOSP IP/OBS MODERATE 35: CPT | Mod: GC | Performed by: PSYCHIATRY & NEUROLOGY

## 2024-04-11 PROCEDURE — 250N000013 HC RX MED GY IP 250 OP 250 PS 637: Performed by: STUDENT IN AN ORGANIZED HEALTH CARE EDUCATION/TRAINING PROGRAM

## 2024-04-11 PROCEDURE — 124N000002 HC R&B MH UMMC

## 2024-04-11 PROCEDURE — 250N000013 HC RX MED GY IP 250 OP 250 PS 637

## 2024-04-11 RX ORDER — VENLAFAXINE HYDROCHLORIDE 37.5 MG/1
37.5 CAPSULE, EXTENDED RELEASE ORAL ONCE
Status: COMPLETED | OUTPATIENT
Start: 2024-04-11 | End: 2024-04-11

## 2024-04-11 RX ORDER — VENLAFAXINE HYDROCHLORIDE 75 MG/1
75 CAPSULE, EXTENDED RELEASE ORAL
Status: DISCONTINUED | OUTPATIENT
Start: 2024-04-12 | End: 2024-04-12 | Stop reason: HOSPADM

## 2024-04-11 RX ADMIN — VENLAFAXINE HYDROCHLORIDE 37.5 MG: 37.5 CAPSULE, EXTENDED RELEASE ORAL at 08:43

## 2024-04-11 RX ADMIN — OLANZAPINE 15 MG: 15 TABLET, FILM COATED ORAL at 21:49

## 2024-04-11 RX ADMIN — PRENATAL VIT W/ FE FUMARATE-FA TAB 27-0.8 MG 1 TABLET: 27-0.8 TAB at 08:43

## 2024-04-11 RX ADMIN — ACETAMINOPHEN 650 MG: 325 TABLET ORAL at 13:26

## 2024-04-11 RX ADMIN — VENLAFAXINE HYDROCHLORIDE 37.5 MG: 37.5 CAPSULE, EXTENDED RELEASE ORAL at 10:57

## 2024-04-11 RX ADMIN — NICOTINE 1 PATCH: 14 PATCH, EXTENDED RELEASE TRANSDERMAL at 08:57

## 2024-04-11 RX ADMIN — PRAZOSIN HYDROCHLORIDE 1 MG: 1 CAPSULE ORAL at 21:49

## 2024-04-11 RX ADMIN — HYDROXYZINE HYDROCHLORIDE 50 MG: 50 TABLET, FILM COATED ORAL at 13:26

## 2024-04-11 ASSESSMENT — ACTIVITIES OF DAILY LIVING (ADL)
ADLS_ACUITY_SCORE: 31
ADLS_ACUITY_SCORE: 32
ADLS_ACUITY_SCORE: 31
ORAL_HYGIENE: INDEPENDENT
HYGIENE/GROOMING: INDEPENDENT
ADLS_ACUITY_SCORE: 31
DRESS: INDEPENDENT
HYGIENE/GROOMING: INDEPENDENT
ADLS_ACUITY_SCORE: 32
ADLS_ACUITY_SCORE: 31
DRESS: INDEPENDENT
ADLS_ACUITY_SCORE: 32
ADLS_ACUITY_SCORE: 31
ADLS_ACUITY_SCORE: 31
ADLS_ACUITY_SCORE: 32
ADLS_ACUITY_SCORE: 31
ADLS_ACUITY_SCORE: 32
ORAL_HYGIENE: INDEPENDENT
ADLS_ACUITY_SCORE: 32

## 2024-04-11 NOTE — PROGRESS NOTES
"  ----------------------------------------------------------------------------------------------------------  [facility]  Psychiatry Progress Note  Hospital Day #3     Interim History:     The patient's care was discussed with the treatment team and chart notes were reviewed.    Vitals: Hypertensive (140s/90s) and tachycardic (105-118)  Sleep: 6.75 hours (04/11/24 0632)  Scheduled medications: Took all scheduled medications as prescribed  Psychiatric PRN medications: Took hydroxyzine for anxiety.    Last 24H PRN:     acetaminophen (TYLENOL) tablet 650 mg, 650 mg at 04/10/24 1707    hydrOXYzine HCl (ATARAX) tablet 50 mg, 50 mg at 04/10/24 1913      Staff Report:   No acute events or safety concerns overnight. Continuing to have neck and back pain and requesting Tylenol. Told nursing that she no longer wants to have a CD assessment. [facility]. Has been to [facility] in the past. Please see staff notes for details.      Subjective:     Patient Interview:  Rianna Man was interviewed in her room today.  Feels tired - same as yesterday.  Neck is better but back is unchanged from yesterday.  No change since starting Effexor.  Is not interested in CD treatment.  Would prefer to go to [facility] for further treatment as she has been there before and feels that it worked well.    Mood is \"a little bit better today.  Still a little sad.\"  Worried because the FOB isn't answering the phone.  Excited to be pregnant, but concerned about some of these other pieces like not having contact with the father.  Also has nausea, hunger, fatigue.  Reports it is not too bad right now.  Would like help setting up outpatient OBGYN.  No history of preeclampsia.    No SI/SIB/VH.  Still some mumbling voices in the background that she can't hear/understand what they are saying.  Was able to understand them yesterday - they were arguing with her, telling her " "that she was bad.  Sleeping tends to help the voices go away.  Zyprexa also helps - reduces them from a loud arguing to just whispers.  Patient is curious about when discharge will be, and it was explained that we would like to continue to up titrate her Effexor more prior to discharge.  She is agreeable with this plan.    ROS:  Patient has  neck and back pain  Patient denies fever and chills     Objective:     Vitals:  BP (!) 131/92 (BP Location: Right arm, Patient Position: Sitting, Cuff Size: Adult Regular)   Pulse 107   Temp 98  F (36.7  C) (Oral)   Resp 16   Ht 1.727 m (5' 8\")   Wt 116.4 kg (256 lb 11.2 oz)   LMP 03/01/2024   SpO2 100%   BMI 39.03 kg/m      Allergies:  Allergies   Allergen Reactions    No Known Allergies        Current Medications:  Scheduled:  Current Facility-Administered Medications   Medication Dose Route Frequency Provider Last Rate Last Admin    acetaminophen (TYLENOL) tablet 650 mg  650 mg Oral Q4H PRN Sandy Guevara MD   650 mg at 04/10/24 1707    alum & mag hydroxide-simethicone (MAALOX) suspension 30 mL  30 mL Oral Q4H PRN Sandy Guevara MD        hydrOXYzine HCl (ATARAX) tablet 50 mg  50 mg Oral Q6H PRN Sandy Guevara MD   50 mg at 04/10/24 1913    nicotine (NICODERM CQ) 14 MG/24HR 24 hr patch 1 patch  1 patch Transdermal Daily Hailey Mccabe MD   1 patch at 04/11/24 0857    nicotine (NICORETTE) gum 2 mg  2 mg Buccal Q1H PRN Hailey Mccabe MD        OLANZapine (zyPREXA) tablet 10 mg  10 mg Oral TID PRN Sandy Guevara MD        Or    OLANZapine (zyPREXA) injection 10 mg  10 mg Intramuscular TID PRN Sandy Guevara MD        OLANZapine (zyPREXA) tablet 15 mg  15 mg Oral At Bedtime Sandy Guevara MD   15 mg at 04/10/24 2000    prazosin (MINIPRESS) capsule 1 mg  1 mg Oral At Bedtime Sandy Guevara MD   1 mg at 04/10/24 2000    prenatal multivitamin w/iron per tablet 1 tablet  1 tablet Oral Daily Sandy Guevara MD   1 tablet at " 04/11/24 0843    senna-docusate (SENOKOT-S/PERICOLACE) 8.6-50 MG per tablet 1 tablet  1 tablet Oral BID PRN Sandy Guevara MD        venlafaxine (EFFEXOR XR) 24 hr capsule 37.5 mg  37.5 mg Oral Daily with breakfast Sandy Guevara MD   37.5 mg at 04/11/24 0843       PRN:  Current Facility-Administered Medications   Medication Dose Route Frequency Provider Last Rate Last Admin    acetaminophen (TYLENOL) tablet 650 mg  650 mg Oral Q4H PRN Sandy Guevara MD   650 mg at 04/10/24 1707    alum & mag hydroxide-simethicone (MAALOX) suspension 30 mL  30 mL Oral Q4H PRN Sandy Guevara MD        hydrOXYzine HCl (ATARAX) tablet 50 mg  50 mg Oral Q6H PRN Sandy Guevara MD   50 mg at 04/10/24 1913    nicotine (NICODERM CQ) 14 MG/24HR 24 hr patch 1 patch  1 patch Transdermal Daily Hailey Mccabe MD   1 patch at 04/11/24 0857    nicotine (NICORETTE) gum 2 mg  2 mg Buccal Q1H PRN Hailey Mccabe MD        OLANZapine (zyPREXA) tablet 10 mg  10 mg Oral TID PRN Sandy Guevara MD        Or    OLANZapine (zyPREXA) injection 10 mg  10 mg Intramuscular TID PRN Sandy Guevara MD        OLANZapine (zyPREXA) tablet 15 mg  15 mg Oral At Bedtime Sandy Guevara MD   15 mg at 04/10/24 2000    prazosin (MINIPRESS) capsule 1 mg  1 mg Oral At Bedtime Sandy Guevara MD   1 mg at 04/10/24 2000    prenatal multivitamin w/iron per tablet 1 tablet  1 tablet Oral Daily Sandy Guevara MD   1 tablet at 04/11/24 0843    senna-docusate (SENOKOT-S/PERICOLACE) 8.6-50 MG per tablet 1 tablet  1 tablet Oral BID PRN Sandy Guevara MD        venlafaxine (EFFEXOR XR) 24 hr capsule 37.5 mg  37.5 mg Oral Daily with breakfast Sandy Guevara MD   37.5 mg at 04/11/24 0843       Labs and Imaging:  New results:   No results found for this or any previous visit (from the past 24 hour(s)).      Data this admission:  - CBC unremarkable  - CMP unremarkable  - TSH normal  - UDS positive for amphetamines and  "cannabinoids   - Lipids unremarkable  - Vit B12 normal  - Folate normal  - hCG quant 2,112 - consistent with pregnancy at 6 weeks  - EKG normal sinus rhythm, QTc 471 - corrected to 443 with tachycardia per Bazett formula     Mental Status Exam:     Oriented to:  Grossly Oriented  General:  Drowsy  Appearance:  appears stated age  Behavior/Attitude:  Calm and Engaged  Eye Contact:  Closed while lying in bed. Intermittently makes brief eye contact.  Psychomotor: Normal no catatonia present  Speech:  appropriate volume/tone  Language: Fluent in English with appropriate syntax and vocabulary.  Mood:  \"better, but still a little sad\"  Affect:   tired and congruent with mood, blunted  Thought Process:  linear  Thought Content:   No SI/HI/AH/VH; No apparent delusions  Associations:  intact  Insight:  fair due to recognizing her psychiatric symptoms and the need to be restarted on her home medications. Has difficulty naming specifics of her decompensation as well as deficits within her current medication regimen   Judgment:  partial due to help seeking behaviors. Does not view substance use as addiction, stating she can stop at any time. Additionally limited due to pressures of concern outside the hospital taking precedence over current mental health symptoms.  Impulse control: fair  Attention Span:  grossly intact  Concentration:  grossly intact  Recent and Remote Memory:  not formally assessed  Fund of Knowledge: average  Muscle Strength and Tone: normal  Gait and Station: Normal     Psychiatric Assessment     Rianna Man is a 34 year old female previously diagnosed with Schizoaffective disorder, MDD, FITZ, OCD, PTSD, BPD, and EtoH abuse issues who presented to the ER on 4/7/2024 due to concern for paranoia, anxiety, and auditory hallucinations telling her to kill herself in the context of medication non-adherence, polysubstance use, and homelessness. Patient also recently learned she was pregnant while in the ED " "which was surprising for her.  Most recent psychiatric hospitalization was 7/12/22 - 7/15/22 for SI also within the context of medication non-adherence. Significant symptoms on admission include depressed mood (feeling drained and tired),  High anxiety, nightmares, and hearing mumbled voices of \"dead women who speak to her\" that are not distressing and present at baseline.    The MSE on admission was pertinent for tired and blunted affect as well as partial insight/judgement. Biological contributions to mental health presentation include family history of bipolar disorder and schizophrenia on patient's maternal side, childhood trauma, and alcohol use since age 13 (with continued substance use that has expanded to cannabis and methamphetamines). Patient additionally notes that the voices she hears have been present since before age 11.  Psychological contributions to mental health presentation include limited coping skills and lack of awareness of chemical dependency issues. Social factors contributing to mental health presentation include housing instability, lack of employment, new unexpected pregnancy, and reports of high stress at baseline. Protective factors include supportive brother and mother, help-seeking behaviors, and engagement with treatment. Since arrival, she has expressed a strong desire to restart medications, pursue treatment for her substance use disorder, and get support with finding housing.       The patient's reported symptoms of SI with plan, depression mood, low energy, hypersomnia, feelings of worthlessness, and irritability - within the context of AH command hallucinations telling her to self harm/kill herself - appear consistent with a prior diagnosis of schizoaffective disorder however it may be worthwhile to consider other diagnoses such as MDD with psychotic features vs other mood/psychosis-spectrum disorders. While the patient reports AH as noted above, she states that the voices have " "been present \"her entire life\" before the age of 11 and have continued consistently since that time. These voices are not recognizable and patient states are of \"dead women\", but is presently unable to elaborate further on this. While patient states that she has had visual hallucinations, she endorses that these are \"shadow figures\" that present in states of high arousal which are more consistent with hallucinations that are seen in acute stress events than a true psychosis. These voices may be representative of acute stress and a byproduct of trauma, rather than psychosis, and will be considered within this evaluation. Similarly, while patient reports a history of maria isabel, per chart review these seem to be more escalated emotions within the setting of substance use rather than true maria isabel per what is reported in chart review. Further chart evaluation and diagnostic assessment is needed at this time and is presently pending evolution.     Patient demonstrates both a present and prior history of worries, fears, ruminations, and panic attacks consistent with a prior diagnosis of Generalized Anxiety Disorder. Similarly, patient endorses specific prior traumas with associated hyperarousal, reliving of events, and nightmares consistent with Post Traumatic Stress Disorder. Patient's polysubstance use is pending evolution however appears consistent with Polysubstance Use Disorder given persistent substance use despite knowledge that it may cause or exacerbate physical or psychological problems.      Given decompensated symptoms in the setting of medication adherence, recent SI, and polysubstance use, She will likely benefit from medication optimization and evaluation of adequate outpatient supports this admission.      Given that she currently has psychosis, patient warrants inpatient psychiatric hospitalization to maintain her safety.      Psychiatric Plan by Diagnosis      Today's changes:  - Increased Venlafaxine to 75 mg "      # Psychosis unspecified  # hx of schizoaffective disorder, MDD  1. Medications:  - PTA Venlafaxine at 75 mg  - PTA Olanzapine 15 mg hs  - PTA Hydroxyzine 50 mg every 6 hours prn for anxiety     #PTSD:  - PTA Prazosin 1mg     Polysubstance Use Disorder  -CD eval placed at patient request. Patient subsequently declined, stating she would prefer to go to  [facility].   -No evidence of active withdrawal on unit or in ED.     2. Pertinent Labs/Monitoring:   -Pregnancy: Patient was provided counseling on the known risks and benefits of her current medication regimen during pregnancy (Venlafaxine, Prazosin, and Hydroxyzine), patient expresses preference to continue these medications.   -EKG normal sinus rhythm, QTc 471 - corrected to 443 with tachycardia per Bazett formula  -Patient pregnant: approx 6 weeks per qualitative and LMP     3. Additional Plans:  - Patient will be treated in therapeutic milieu with appropriate individual and group therapies as described     Psychiatric Hospital Course:      Rianna Man was admitted to [facility] as a voluntary patient.  Medications:  PTA olanzapine 15 mg hs & hydroxyzine 50 mg every 6 hours prn were continued.   PTA venlafaxine ER 75 mg daily was held due to uncertainty around dosage.  Patient started at 37.5mg in the ED and this was held (likely due to discovery of pregnancy, not clear per chart review). Patient did not demonstrate withdrawal symptoms and was not able to clarify clearly whether she was taking this. As a result, and due to possibility of medicine being ineffective to begin with, venlafaxine was held on admission.   PTA prazosin 1mg initially held due to ability to cross placenta. Patient states that she needs this to sleep, per OB no clear reason to hold. Restarted on 4/9. Discussed risk and benefits and patient agreed to continue the medication.   4/10: Patient states mood is the same, reporting neck and back pain and generally feeling  out of it. Slightly hypertensive to high 140s. Due to concern for possible venlafaxine withdrawal, risks and benefits discussed with patient to restarting vs trialing different medication and patient preferred to restart venlafaxine. Started the ER at 37.5 mg.   : Mood is a little better and neck pain is improving. Slightly hypertensive and tachycardic. Discussed increasing venlafaxine to PTA 75 mg dose. She is agreeable with plan and would like to discharge to [facility] tomorrow.      The risks, benefits, alternatives, and side effects were discussed and understood by the patient.     Medical Assessment and Plan     Medical diagnoses to be addressed this admission:       #1st trimester pregnancy  . Likely approximately 6 weeks per quant, no abdominal pain or symptoms to suggest ectopic pregnancy at this time. Patient found out she was pregnant in the ED and had active substance use (meth, cannabis, alcohol) during this period. Has a history of multiple miscarriages per review. Patient is happy about the pregnancy but is no longer in contact with the father and has not been able to contact him by phone.   - Prenatal vitamin with iron 1 tablet daily  - No contraindications with current medication regimen as above. Risks and benefit discussed, patient agreeable.   - Re-establish with OB after discharge.   - Provided counseling on heightened substance use risk during pregnancy, patient states she plans to maintain sobriety now that she knows she is pregnant.     Medical course: Patient was physically examined by the ED prior to being transferred to the unit and was found to be medically stable and appropriate for admission.      Consults:  none     Checklist     Legal Status: Voluntary     Safety Assessment:   Behavioral Orders   Procedures    Code 1 - Restrict to Unit    Routine Programming     As clinically indicated    Status 15     Every 15 minutes.    Suicide precautions: Suicide Risk: MODERATE;  Clinical rationale to override score: modification to the care environment, response to medication     Patients on Suicide Precautions should have a Combination Diet ordered that includes a Diet selection(s) AND a Behavioral Tray selection for Safe Tray - with utensils, or Safe Tray - NO utensils       Order Specific Question:   Suicide Risk     Answer:   MODERATE     Order Specific Question:   Clinical rationale to override score:     Answer:   modification to the care environment     Order Specific Question:   Clinical rationale to override score:     Answer:   response to medication       Risk Assessment:  Risk for harm is moderate.  Risk factors: maladaptive coping, substance use, and trauma  Protective factors: family and engaged in treatment     SIO: none    Disposition: Plan is to discharge to [facility] tomorrow per patient preference, may change depending on final dispo plan. Pending stabilization, medication optimization, & development of a safe discharge plan.     Attestations     Brittny Fowler, MS3  [employer] Medical Student     I was present with the medical student who participated in the service and documentation of the note. I have verified the history, personally performed the physical/mental status exam, and was responsible for oversight with medical decision making. I agree with the assessment and plan documented in the note above.     Sandy Guevara MD  Psychiatry Resident  [facility]    Attestation:  This patient has been seen and evaluated by me, Treva Rayo MD.  I have discussed this patient with the house staff team including the resident and medical student and I agree with the findings and plan in this note.    I have reviewed today's vital signs, medications, labs and imaging. Treva Rayo MD , PhD.

## 2024-04-11 NOTE — PLAN OF CARE
Team Note Due:  Tuesday    Assessment/Intervention/Current Symtoms and Care Coordination:  Chart review and met with team, discussed pt progress, symptomology, and response to treatment.  Discussed the discharge plan and any potential impediments to discharge.    - Patient reports that she is not interested in attending CD treatment at this time. Patient stated she will complete a CD assessment as outpatient. Patient would like to go [facility] instead.  - Writer provided patient with phone numbers for [facility] and [facility].      Discharge Plan or Goal:  Residential CD treatment.     Barriers to Discharge:  Admitted for substance induced psychosis and SI. Needs clinical stabilization and medication management.      Referral Status:  Pending      Legal Status:  [facility]   County: [place]      Contacts:  [facility] Treatment Coordinator: Lory Sebastian [phone]     Upcoming Meetings and Dates/Important Information and next steps:  Follow up with referrals for CD tx

## 2024-04-11 NOTE — PLAN OF CARE
Problem: Sleep Disturbance  Goal: Adequate Sleep/Rest  Outcome: Progressing    Pt appears to have slept for  hours. Pt did not complain of pain or discomfort. No PRN  medications were given this shift. 15 minutes safety checks were in place. Staff will continue to offer support as needed.

## 2024-04-11 NOTE — PLAN OF CARE
"  Problem: Anxiety  Goal: Anxiety Reduction or Resolution  Outcome: Progressing  Intervention: Promote Anxiety Reduction  Recent Flowsheet Documentation  Taken 4/11/2024 1700 by Pito Blair RN  Family/Support System Care:   self-care encouraged   presence promoted   involvement promoted     Problem: Psychotic Signs/Symptoms  Goal: Improved Behavioral Control (Psychotic Signs/Symptoms)  Outcome: Progressing   Goal Outcome Evaluation:    Plan of Care Reviewed With: patient      Patient visible in the milieu through the shift. She appeared depressed and with a flat affect. She interacted minimally with peers and staff, but spent a considerable amount her time watching TV. She denied most psych symptoms, but  endorsed back pain as \"mild\"and a depression of 3/10. No PRNs requested this shift and patient was compliant with medications.Will continue to monitor           "

## 2024-04-11 NOTE — PLAN OF CARE
BEH IP Unit Acuity Rating Score (UARS)  Patient is given one point for every criteria they meet.    CRITERIA SCORING   On a 72 hour hold, court hold, committed, stay of commitment, or revocation. 0    Patient LOS on BEH unit exceeds 20 days. 0  LOS: 3   Patient under guardianship, 55+, otherwise medically complex, or under age 11. 0   Suicide ideation without relief of precipitating factors. 0   Current plan for suicide. 0   Current plan for homicide. 0   Imminent risk or actual attempt to seriously harm another without relief of factors precipitating the attempt. 0   Severe dysfunction in daily living (ex: complete neglect for self care, extreme disruption in vegetative function, extreme deterioration in social interactions). 1   Recent (last 7 days) or current physical aggression in the ED or on unit. 0   Restraints or seclusion episode in past 72 hours. 0   Recent (last 7 days) or current verbal aggression, agitation, yelling, etc., while in the ED or unit. 0   Active psychosis. 0   Need for constant or near constant redirection (from leaving, from others, etc).  0   Intrusive or disruptive behaviors. 0   Patient requires 3 or more hours of individualized nursing care per 8-hour shift (i.e. for ADLs, meds, therapeutic interventions). 0   TOTAL 1

## 2024-04-11 NOTE — PLAN OF CARE
"  Pt presents with a flat affect. She comes out for meals at the dining area, minimally interactive with peers.  Nutrition and hydration is adequate. She is medication compliant. Complained of back pain at 6/10- denies anxiety, endorsed depression at 3/10 denies HI, SI and hallucinations. Pt vitals WNL. Pt was interested to know how her medications affect her pregnancy. Encouraged pt to speak with the team. Pt was provided warm pack for her back, and she was appreciative. Pt went back to lay down on her bed after breakfast.     After lunch, pt complained of back pain and anxiety. PRN Tylenol  and Hydroxyzine given. Pt was encouraged to join group but pt declined.     Problem: Adult Behavioral Health Plan of Care  Goal: Optimized Coping Skills in Response to Life Stressors  Outcome: Not Progressing  Intervention: Promote Effective Coping Strategies  Recent Flowsheet Documentation  Taken 4/11/2024 0858 by Carolee Escamilla, RN  Supportive Measures:   active listening utilized   counseling provided   verbalization of feelings encouraged     Problem: Adult Behavioral Health Plan of Care  Goal: Patient-Specific Goal (Individualization)  Description: You can add care plan individualizations to a care plan. Examples of Individualization might be:  \"Parent requests to be called daily at 9am for status\", \"I have a hard time hearing out of my right ear\", or \"Do not touch me to wake me up as it startles  me\".  Outcome: Progressing  Flowsheets (Taken 4/11/2024 1033)  Patient Personal Strengths:   expressive of needs   medication/treatment adherence  Goal: Adheres to Safety Considerations for Self and Others  Outcome: Progressing  Intervention: Develop and Maintain Individualized Safety Plan  Recent Flowsheet Documentation  Taken 4/11/2024 1000 by Carolee Escamilla, RN  Safety Measures: environmental rounds completed  Goal: Absence of New-Onset Illness or Injury  Outcome: Progressing  Intervention: Identify and " Manage Fall Risk  Recent Flowsheet Documentation  Taken 4/11/2024 1000 by Carolee Escamilla RN  Safety Measures: environmental rounds completed     Problem: Suicide Risk  Goal: Absence of Self-Harm  Outcome: Progressing  Intervention: Assess Risk to Self and Maintain Safety  Recent Flowsheet Documentation  Taken 4/11/2024 0858 by Carolee Escamilla RN  Self-Harm Prevention: environmental self-harm risks assessed  Intervention: Promote Psychosocial Wellbeing  Recent Flowsheet Documentation  Taken 4/11/2024 0858 by Carolee Escamilla RN  Supportive Measures:   active listening utilized   counseling provided   verbalization of feelings encouraged  Family/Support System Care:   involvement promoted   self-care encouraged   presence promoted     Problem: Sleep Disturbance  Goal: Adequate Sleep/Rest  Outcome: Progressing     Problem: Anxiety  Goal: Anxiety Reduction or Resolution  Outcome: Progressing  Intervention: Promote Anxiety Reduction  Recent Flowsheet Documentation  Taken 4/11/2024 0858 by Carolee Escamilla RN  Supportive Measures:   active listening utilized   counseling provided   verbalization of feelings encouraged  Family/Support System Care:   involvement promoted   self-care encouraged   presence promoted   Goal Outcome Evaluation:    Plan of Care Reviewed With: patient

## 2024-04-12 VITALS
HEIGHT: 68 IN | DIASTOLIC BLOOD PRESSURE: 74 MMHG | BODY MASS INDEX: 38.91 KG/M2 | RESPIRATION RATE: 16 BRPM | OXYGEN SATURATION: 99 % | WEIGHT: 256.7 LBS | SYSTOLIC BLOOD PRESSURE: 120 MMHG | HEART RATE: 107 BPM | TEMPERATURE: 97.4 F

## 2024-04-12 PROCEDURE — 93005 ELECTROCARDIOGRAM TRACING: CPT

## 2024-04-12 PROCEDURE — 250N000013 HC RX MED GY IP 250 OP 250 PS 637

## 2024-04-12 PROCEDURE — 99238 HOSP IP/OBS DSCHRG MGMT 30/<: CPT | Mod: GC | Performed by: PSYCHIATRY & NEUROLOGY

## 2024-04-12 PROCEDURE — 250N000013 HC RX MED GY IP 250 OP 250 PS 637: Performed by: STUDENT IN AN ORGANIZED HEALTH CARE EDUCATION/TRAINING PROGRAM

## 2024-04-12 RX ORDER — VENLAFAXINE HYDROCHLORIDE 75 MG/1
75 CAPSULE, EXTENDED RELEASE ORAL
Qty: 30 CAPSULE | Refills: 0 | Status: ON HOLD | OUTPATIENT
Start: 2024-04-13 | End: 2024-09-19

## 2024-04-12 RX ORDER — PRAZOSIN HYDROCHLORIDE 1 MG/1
1 CAPSULE ORAL AT BEDTIME
Qty: 30 CAPSULE | Refills: 0 | Status: SHIPPED | OUTPATIENT
Start: 2024-04-12

## 2024-04-12 RX ORDER — OLANZAPINE 15 MG/1
15 TABLET ORAL AT BEDTIME
Qty: 30 TABLET | Refills: 0 | Status: ON HOLD | OUTPATIENT
Start: 2024-04-12 | End: 2024-09-19

## 2024-04-12 RX ORDER — HYDROXYZINE HYDROCHLORIDE 50 MG/1
50 TABLET, FILM COATED ORAL EVERY 6 HOURS PRN
Qty: 60 TABLET | Refills: 0 | Status: ON HOLD | OUTPATIENT
Start: 2024-04-12 | End: 2024-09-19

## 2024-04-12 RX ADMIN — PRENATAL VIT W/ FE FUMARATE-FA TAB 27-0.8 MG 1 TABLET: 27-0.8 TAB at 08:43

## 2024-04-12 RX ADMIN — NICOTINE 1 PATCH: 14 PATCH, EXTENDED RELEASE TRANSDERMAL at 08:44

## 2024-04-12 RX ADMIN — ACETAMINOPHEN 650 MG: 325 TABLET ORAL at 08:44

## 2024-04-12 RX ADMIN — VENLAFAXINE HYDROCHLORIDE 75 MG: 75 CAPSULE, EXTENDED RELEASE ORAL at 08:44

## 2024-04-12 ASSESSMENT — ACTIVITIES OF DAILY LIVING (ADL)
ADLS_ACUITY_SCORE: 32

## 2024-04-12 NOTE — PLAN OF CARE
Team Note Due:  Tuesday    Assessment/Intervention/Current Symtoms and Care Coordination:  Chart review and met with team, discussed pt progress, symptomology, and response to treatment.  Discussed the discharge plan and any potential impediments to discharge.    - Patient will be discharged today. She requested resources for outpatient supports for pregnant women. Patient denies SI/HI/SIB. Patient will stay with her brother and will be picked up by her brother's girlfriend before 3pm today.   - Writer added resources to AVS.      Discharge Plan or Goal:  Residential CD treatment.     Barriers to Discharge:  None     Referral Status:  Pending      Legal Status:  Voluntary   County: [place]      Contacts:  [facility] Treatment Coordinator: Lory Sebastian [phone]     Upcoming Meetings and Dates/Important Information and next steps:  Discharge
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
## 2024-04-12 NOTE — DISCHARGE INSTRUCTIONS
Behavioral Discharge Planning and Instructions    Summary: You were admitted on 4/8/2024  due to  SI .  You were treated by Treva Rayo MD and discharged on 4/12/2024 from [facility] will go to his brother's home.     Main Diagnosis:  Schizoaffective Disorder, MDD, FITZ, OCD, PTSD, BPD    Commitment:  None. Voluntary    Health Care Follow-up:     If no appointments scheduled, explain patient declined referrals or CD tx and mental health. Reports she will schedule her own appointments and will seek CD treatment on outpatient basis. See below additional resources:    For pregnant patients:  [facility]  Phone: [phone]   Email: [email or web address]  [address] [place] [address]  [email or web address]    [facility]   Phone: [phone]  Fax: [phone]  [address] [place] [address]  [email or web address]    [facility] [address] [address]  Family Residential Treatment:  [facility] location  (up to 3 children age 11 and under)  Phone: [phone]  Fax: [phone]  email: [email or web address].  [email or web address]    [employer] [facility]   Phone: [phone]   Fax: [phone]  [email or web address]        Information will be faxed to your outpatient providers to ensure a healthy continuity of care for you.     Attend all scheduled appointments with your outpatient providers. Call at least 24 hours in advance if you need to reschedule an appointment to ensure continued access to your outpatient providers.     Major Treatments, Procedures and Findings:  You were provided with: a psychiatric assessment, assessed for medical stability, medication evaluation and/or management, group therapy, and milieu management    Symptoms to Report: feeling more aggressive, increased confusion, losing more sleep, mood getting worse, or thoughts of suicide    Early warning  "signs can include: increased depression or anxiety sleep disturbances increased thoughts or behaviors of suicide or self-harm     Safety and Wellness:  Take all medicines as directed.  Make no changes unless your doctor suggests them.  Follow treatment recommendations.  Refrain from alcohol and non-prescribed drugs.  Ask your support system to help you reduce your access to items that could harm yourself or others. Items could include:  Firearms  Medicines (both prescribed and over-the-counter)  Knives and other sharp objects  Ropes and like materials  Car keys  If there is a concern for safety, call 911. If there is a concern for safety, call 911.    Resources:   Crisis Intervention: 446.998.7502 or 995-197-3770 (TTY: 454.353.9818).  Call anytime for help.  National [place] on Mental Illness (www.[place].hailey.org): 806.673.9671 or 367-563-5251.  [place] Association for Children's Mental Health (www.mac.org): 635.830.7017.  Alcoholics Anonymous (www.alcoholics-anonymous.org): Check your phone book for your local chapter.  Suicide Awareness Voices of Education (SAVE) (www.save.org): 561-529-ZOBF (2593)  National Suicide Prevention Line (www.mentalhealthmn.org): 911-328-ARHE (8993)  Mental Health Consumer/Survivor Network of [place] (www.mhcsn.net): 707.605.7189 or 694-326-9746  Mental Health Association of [place] (www.mentalhealth.org): 437.482.9205 or 273-630-3847  Self- Management and Recovery Training., SMART-- Toll free: 829.415.9364  www.[employer].org  [facility] Crisis Response 291 260-9353  [facility] Crisis Response 684-979-3533  [facility] Crisis Response 573-698-5911  [facility] Crisis (COPE) Response - Adult [phone]  [facility] Crisis Response - Adult 880 130-3483  Text 4 Life: txt \"LIFE\" to 19474 for immediate support and crisis intervention  Crisis text line: Text \"[place]\" to 725849. Free, confidential, 24/7.  Crisis Intervention: 126.402.4167 or 592-864-4758. Call anytime for help. "   [facility] Mental Health Crisis Team - Child: 816.467.2195  [facility] Children's Mental Health Crisis Response Team - Child: 857.333.3473       RUP[employer] [place] (National [place] on Mental Illness) improves the lives of children and adults with mental illnesses and their families by providing free classes on mental illnesses and support groups for adults with mental illnesses, parents and family members. For more information: Phone: 592.854.3749 Toll free: 0-299-WRHS-HELPS Website: www.namHelpmycashValchemy.orghttp://www.namThe Jewish Hospitalps.org/      General Medication Instructions:   See your medication sheet(s) for instructions.   Take all medicines as directed.  Make no changes unless your doctor suggests them.   Go to all your doctor visits.  Be sure to have all your required lab tests. This way, your medicines can be refilled on time.  Do not use any drugs not prescribed by your doctor.  Avoid alcohol.    Advance Directives:   Scanned document on file with [facility]? Minor-N/A  Is document scanned? Pt states no documents  Honoring Choices Your Rights Handout: Minor - N/A  Was more information offered? Pt declined    The Treatment team has appreciated the opportunity to work with you. If you have any questions or concerns about your recent admission, you can contact the unit which can receive your call 24 hours a day, 7 days a week. They will be able to get in touch with a Provider if needed. The unit number is [phone].

## 2024-04-12 NOTE — PLAN OF CARE
Problem: Sleep Disturbance  Goal: Adequate Sleep/Rest  Outcome: Progressing   Goal Outcome Evaluation:  Patient appears to have slept for 7 hours. No acute events during the shift. No complaints of pain or requests for prn's. Respirations regular ans non-labored during routine safety checks. Patient remained in own room throughout the shift.

## 2024-04-12 NOTE — PLAN OF CARE
BEH IP Unit Acuity Rating Score (UARS)  Patient is given one point for every criteria they meet.    CRITERIA SCORING   On a 72 hour hold, court hold, committed, stay of commitment, or revocation. 0    Patient LOS on BEH unit exceeds 20 days. 0  LOS: 4   Patient under guardianship, 55+, otherwise medically complex, or under age 11. 0   Suicide ideation without relief of precipitating factors. 0   Current plan for suicide. 0   Current plan for homicide. 0   Imminent risk or actual attempt to seriously harm another without relief of factors precipitating the attempt. 0   Severe dysfunction in daily living (ex: complete neglect for self care, extreme disruption in vegetative function, extreme deterioration in social interactions). 1   Recent (last 7 days) or current physical aggression in the ED or on unit. 0   Restraints or seclusion episode in past 72 hours. 0   Recent (last 7 days) or current verbal aggression, agitation, yelling, etc., while in the ED or unit. 0   Active psychosis. 0   Need for constant or near constant redirection (from leaving, from others, etc).  0   Intrusive or disruptive behaviors. 0   Patient requires 3 or more hours of individualized nursing care per 8-hour shift (i.e. for ADLs, meds, therapeutic interventions). 0   TOTAL 1

## 2024-04-16 ENCOUNTER — PATIENT OUTREACH (OUTPATIENT)
Dept: CARE COORDINATION | Facility: CLINIC | Age: 34
End: 2024-04-16
Payer: COMMERCIAL

## 2024-04-16 LAB
ATRIAL RATE - MUSE: 94 BPM
DIASTOLIC BLOOD PRESSURE - MUSE: NORMAL MMHG
INTERPRETATION ECG - MUSE: NORMAL
P AXIS - MUSE: 70 DEGREES
PR INTERVAL - MUSE: 130 MS
QRS DURATION - MUSE: 84 MS
QT - MUSE: 324 MS
QTC - MUSE: 405 MS
R AXIS - MUSE: 77 DEGREES
SYSTOLIC BLOOD PRESSURE - MUSE: NORMAL MMHG
T AXIS - MUSE: 70 DEGREES
VENTRICULAR RATE- MUSE: 94 BPM

## 2024-04-16 NOTE — PROGRESS NOTES
Clinic Care Coordination Contact  [facility]/Voicemail    Clinical Data: Care Coordinator Outreach        Left message on patient's voicemail with call back information and requested return call.    Plan: Care Coordinator  via . Care Coordinator will try to reach patient again in 1-2 business days.

## 2024-04-17 ENCOUNTER — PATIENT OUTREACH (OUTPATIENT)
Dept: CARE COORDINATION | Facility: CLINIC | Age: 34
End: 2024-04-17
Payer: COMMERCIAL

## 2024-04-17 ENCOUNTER — TELEPHONE (OUTPATIENT)
Dept: FAMILY MEDICINE | Facility: CLINIC | Age: 34
End: 2024-04-17
Payer: COMMERCIAL

## 2024-04-17 NOTE — PROGRESS NOTES
Clinic Care Coordination Contact  [facility]/Voicemail    Clinical Data: Care Coordinator Outreach        Left message on patient's voicemail with call back information and requested return call.    Plan: Care Coordinator  via . Care Coordinator will try to reach patient again in 1-2 business days.

## 2024-04-17 NOTE — TELEPHONE ENCOUNTER
[facility] phone call message- medication clarification/question:    Full Medication Name: Prenatal Vit-Fe Fumarate-FA (PRENATAL MULTIVITAMIN W/IRON) 27-0.8 MG tablet        Question: patient called in requesting a prescription be sent for the prenatal vitamin above -- she sates she was at [facility]- found out she is pregnant-- was supposed to get a prescription but on was not sent -- will be doing OB at [facility].  Told her unsure if you melissa be able to send but please do if able -- thank you     Pharmacy confirmed as      [id number] - [address]: Yes    OK to leave a message on voice mail? Yes    Primary language: English      needed? No    Call taken on April 17, 2024 at 10:43 AM by Maira Iglesias

## 2024-04-18 NOTE — TELEPHONE ENCOUNTER
Called and lvm. Per Dr. Edouard. She advise patient will be needing a appt with us in order to get the medication, Can be with anyone. ( Upt may be needed)

## 2024-05-30 ENCOUNTER — HOSPITAL ENCOUNTER (EMERGENCY)
Facility: HOSPITAL | Age: 34
Discharge: HOME | End: 2024-05-30
Payer: COMMERCIAL

## 2024-05-30 VITALS
SYSTOLIC BLOOD PRESSURE: 120 MMHG | RESPIRATION RATE: 16 BRPM | TEMPERATURE: 98.3 F | BODY MASS INDEX: 42 KG/M2 | WEIGHT: 277.12 LBS | HEART RATE: 90 BPM | HEIGHT: 68 IN | OXYGEN SATURATION: 100 % | DIASTOLIC BLOOD PRESSURE: 80 MMHG

## 2024-05-30 DIAGNOSIS — L02.411 ABSCESS OF AXILLA, RIGHT: ICD-10-CM

## 2024-05-30 PROCEDURE — 250N000013 HC RX MED GY IP 250 OP 250 PS 637

## 2024-05-30 PROCEDURE — 10060 I&D ABSCESS SIMPLE/SINGLE: CPT

## 2024-05-30 PROCEDURE — 250N000009 HC RX 250

## 2024-05-30 PROCEDURE — 99283 EMERGENCY DEPT VISIT LOW MDM: CPT | Mod: 25

## 2024-05-30 RX ORDER — ACETAMINOPHEN 325 MG/1
975 TABLET ORAL ONCE
Status: COMPLETED | OUTPATIENT
Start: 2024-05-30 | End: 2024-05-30

## 2024-05-30 RX ORDER — CLINDAMYCIN HCL 150 MG
450 CAPSULE ORAL 3 TIMES DAILY
Qty: 45 CAPSULE | Refills: 0 | Status: SHIPPED | OUTPATIENT
Start: 2024-05-30 | End: 2024-06-04

## 2024-05-30 RX ORDER — GINSENG 100 MG
CAPSULE ORAL ONCE
Status: COMPLETED | OUTPATIENT
Start: 2024-05-30 | End: 2024-05-30

## 2024-05-30 RX ADMIN — BACITRACIN: 500 OINTMENT TOPICAL at 15:36

## 2024-05-30 RX ADMIN — ACETAMINOPHEN 975 MG: 325 TABLET ORAL at 14:13

## 2024-05-30 ASSESSMENT — COLUMBIA-SUICIDE SEVERITY RATING SCALE - C-SSRS
1. IN THE PAST MONTH, HAVE YOU WISHED YOU WERE DEAD OR WISHED YOU COULD GO TO SLEEP AND NOT WAKE UP?: NO
2. HAVE YOU ACTUALLY HAD ANY THOUGHTS OF KILLING YOURSELF IN THE PAST MONTH?: NO
6. HAVE YOU EVER DONE ANYTHING, STARTED TO DO ANYTHING, OR PREPARED TO DO ANYTHING TO END YOUR LIFE?: NO

## 2024-05-30 ASSESSMENT — ACTIVITIES OF DAILY LIVING (ADL)
ADLS_ACUITY_SCORE: 35
ADLS_ACUITY_SCORE: 33

## 2024-05-30 NOTE — ED PROVIDER NOTES
Emergency Department Encounter  [facility] EMERGENCY DEPARTMENT  Rianna Man   AGE: 34 year old SEX: female  YOB: 1990  MRN: [id number]      EVALUATION DATE & TIME: 5/30/2024  1:19 PM ; PCP: Hilary Edouard   ED PROVIDER: Comfort Lincoln PA-C    CHIEF COMPLAINT: Abscess      MEDICAL DECISION MAKING   Rianna Man is a 34 year old female who is currently 12w4d pregnant who presents to the ED for evaluation of abscess under her right armpit that has been present for the last 2 days and growing and becoming more painful.  No fevers, chills, nausea, vomiting, or diarrhea.  Pregnancy going well, no complications or complaints today.    Vitals reviewed and hemodynamically stable, afebrile.  On exam, patient is well-appearing and nontoxic.  There is a large, painful fluid pocket with fluctuance and surrounding induration under the right axilla.  No surrounding cellulitis.  No lymphangitic spread visible.    Presentation most consistent with abscess without surrounding cellulitis.  The abscess was anesthetized with lidocaine and then I&D was performed with loculation and purulence was expressed.  See procedure note for further detail. Minimal concern for necrotizing fasciitis or gas gangrene as pain is not out of proportion to exam findings, there is no tissue crepitus, and there are no signs of systemic toxicity. No evidence of septic arthritis as it does not overlie a septic joint and there is no joint pain, swelling, warmth, or limited ROM. No grouped vesicles in dermatomal distribution that would raise concern for herpes zoster. Lesion is not pruritic and patient denies new medications minimizing concern for drug reaction or dermatitis.    Patient is without severe sepsis, certain comorbidities, or atypical locations of infection warrant inpatient management, plan to discharge home.  Given patient's pregnancy status, pharmacy was consulted for antibiotic  choice.  Unfortunately, first choice antibiotics including TMP-SMX and doxycycline and not great to use in pregnancy.  Will plan to prescribe clindamycin but advised patient not to start this antibiotic unless she notices signs of infection.  I provided with clear, written instructions of potential danger signs an severe illness and where and when to return for emergency care or re-evaluation.    Medical Decision Making  Obtained supplemental history:Supplemental history obtained?: No  Reviewed external records: External records reviewed?: No  Care impacted by chronic illness:N/A  Care significantly affected by social determinants of health:N/A  Did you consider but not order tests?: Work up considered but not performed and documented in chart, if applicable  Did you interpret images independently?: Independent interpretation of ECG and images noted in documentation, when applicable.  Consultation discussion with other provider:Did you involve another provider (consultant, , pharmacy, etc.)?: I discussed the care with another health care provider, see documentation for details.  Discharge. I prescribed additional prescription strength medication(s) as charted. See documentation for any additional details.    FINAL IMPRESSION       ICD-10-CM    1. Abscess of axilla, right  L02.411           ED COURSE   2:12 PM I met and introduced myself to the patient. I gathered initial history and performed my physical exam. We discussed plan for initial workup.  3:00 PM I rechecked patient and completed I&D.  3:30 PM I rechecked the patient and discussed results, discharge, follow up, and reasons to return to the ED.     MEDICATIONS GIVEN IN THE EMERGENCY DEPARTMENT:   Medications   acetaminophen (TYLENOL) tablet 975 mg (975 mg Oral $Given 5/30/24 1416)   bacitracin ointment ( Topical $Given 5/30/24 1036)      NEW PRESCRIPTIONS STARTED AT TODAY'S ED VISIT:  Discharge Medication List as of 5/30/2024  3:37 PM        START taking  these medications    Details   clindamycin (CLEOCIN) 150 MG capsule Take 3 capsules (450 mg) by mouth 3 times daily for 5 days for abscess, Disp-45 capsule, R-0, Local Print                 =================================================================         HISTORY OF PRESENT ILLNESS   Patient information was obtained from: patient   Use of Intrepreter: N/A     Rianna BALTAZAR Donovan is a 34 year old female who is currently 12 weeks and 4 days pregnant with a pertinent history of alcohol dependence, obsessive-compulsive disorder, cannabis use disorder, and schizoaffective disorder who presents to the ED by private vehicle for evaluation of abscess.  Patient reports over the last 2 to 3 days, she has noticed increased swelling and pain under her right armpit.  She has had abscesses here before but this time it is bigger and more painful.  Patient is 4 months pregnant, pregnancy going well.  Took Tylenol at 6 AM today, no other medications.  No fevers, chills, nausea, vomiting, or diarrhea.    MEDICAL HISTORY     Past Medical History:   Diagnosis Date    Anxiety     Depressive disorder     Schizoaffective disorder (H)     Screen for STD (sexually transmitted disease)     Severe amphetamine substance use disorder (H)     Sinus tachycardia     Spontaneous  2021    Varicella        Past Surgical History:   Procedure Laterality Date    DILATION AND CURETTAGE  2021    NO HISTORY OF SURGERY         Family History   Problem Relation Age of Onset    Diabetes Father     Substance Abuse Father     Diabetes Paternal Aunt     Thyroid Disease Mother         grave's disease    Bipolar Disorder Mother     Rheumatoid Arthritis Sister     Crohn's Disease Maternal Aunt     Schizophrenia Maternal Grandmother     Hypertension Other        Social History     Tobacco Use    Smoking status: Every Day     Current packs/day: 0.00     Average packs/day: 0.3 packs/day for 15.0 years (3.8 ttl pk-yrs)     Types: Cigarettes      "Start date: 11/21/2005     Last attempt to quit: 11/21/2020     Years since quitting: 3.5    Smokeless tobacco: Current    Tobacco comments:     3-4 cig/day   Substance Use Topics    Alcohol use: Yes     Comment: occasional drinker    Drug use: Yes     Types: Marijuana, Methamphetamines, Heroin, Cocaine     Comment: Drug use: Pt reports daily use of marijuana       clindamycin (CLEOCIN) 150 MG capsule  acetaminophen (TYLENOL) 325 MG tablet  cetirizine (ZYRTEC) 10 MG tablet  folic acid (FOLVITE) 1 MG tablet  hydrOXYzine (VISTARIL) 50 MG capsule  hydrOXYzine HCl (ATARAX) 50 MG tablet  OLANZapine (ZYPREXA) 15 MG tablet  prazosin (MINIPRESS) 1 MG capsule  Prenatal Vit-Fe Fumarate-FA (PRENATAL MULTIVITAMIN W/IRON) 27-0.8 MG tablet  pyridOXINE (VITAMIN B6) 25 MG tablet  thiamine (B-1) 100 MG tablet  traZODone (DESYREL) 100 MG tablet  venlafaxine (EFFEXOR XR) 75 MG 24 hr capsule        PHYSICAL EXAM   VITALS:  Patient Vitals for the past 24 hrs:   BP Temp Temp src Pulse Resp SpO2 Height Weight   05/30/24 1538 120/80 -- -- 90 16 100 % -- --   05/30/24 1313 119/81 98.3  F (36.8  C) Oral 94 22 100 % 1.727 m (5' 8\") 125.7 kg (277 lb 1.9 oz)     Body mass index is 42.14 kg/m .     Vitals reviewed. Nursing notes reviewed.  CONSITUTIONAL: Generally well appearing female in no acute distress. Well developed and nourished.   EYES: Conjunctivae clear, no discharge. EOM grossly intact.  HENT: Normocephalic, atraumatic, mucous membranes moist, nose normal.  NECK: Supple, gross ROM intact.   RESPIRATORY: Normal work of breathing, normal rate, speaks in full sentences.  CARDIO: Normal heart rate.  GI: Nondistended.   MSK: Moving all 4 extremities intentionally and without pain.  SKIN: Warm, dry. Large, painful fluid pocket with fluctuance and surrounding induration under the right axilla.  No surrounding cellulitis.  No lymphangitic spread visible.    NEURO:  AxOx4. CN II-XII grossly intact, but not individually tested. No focal " neurological deficits.   PSYCH: Thoughts linear and responses appropriate. Cooperative. Appropriate mood and affect.   LABS AND IMAGING   All pertinent labs reviewed and interpreted  Labs Ordered and Resulted from Time of ED Arrival to Time of ED Departure - No data to display    No orders to display       PROCEDURES     PROCEDURE: Incision and Drainage   INDICATIONS: Localized abscess   PROCEDURE PROVIDER: Comfort Lincoln PA-C   SITE: Right axilla   MEDICATION: 10 mLs of 1% Lidocaine with epinephrine   NOTE: The area was prepped with chlorhexidine and draped off in the usual sterile fashion.  Local anesthetic was injected subcutaneously with anesthesia effects demonstrated prior to proceeding.  The area of maximal fluctuance was opened with a # 11 Blade (Sharp Point) using a Single Straight incision to allow for drainage.  The abscess was drained.  The abscess cavity was bluntly explored to separate any loculations. No Packing was placed into the abscess cavity.  A sterile dressing was placed over the area.   COMPLEXITY: Complex    Simple = single, furuncle, paronychia, superficial  Complex = multiple or abscess requiring probing, loculations, packing placement   COMPLICATIONS: Patient tolerated procedure well, without complication         Comfort Lincoln PA-C   Emergency Medicine   [facility] EMERGENCY DEPARTMENT  [address] [place] [id number]  [phone]  Dept: [phone]      Comfort Lincoln PA-C  05/30/24 3301

## 2024-05-30 NOTE — DISCHARGE INSTRUCTIONS
Today, I drained the abscess in your right armpit.      Please keep the area dry and covered for the next 24 hours and then clean this 1-2x daily with warm soapy water.    Take the prescribed antibiotics if you start noticing increased pain, swelling, warmth, or redness in the area.  These would be signs of infection and at this point I think the benefit of the antibiotic was be higher than the risk.     Call your doctor now or seek immediate medical care if:    You have signs of worsening infection, such as:  Increased pain, swelling, warmth, or redness.  Red streaks leading from the infected skin.  Pus draining from the wound.  A fever.   Watch closely for changes in your health, and be sure to contact your doctor if:    You do not get better as expected.

## 2024-05-30 NOTE — ED TRIAGE NOTES
Abscess under right arm first noted a day or two ago. Patient has had abscesses here before. This time bigger, painful, feeling sick. 4 months pregnant. Took tylenol last night and this morning around 0600, helped with pain. No noted fevers, but feeling ill today.

## 2024-05-30 NOTE — Clinical Note
Rianna Makiis was seen and treated in our emergency department on 5/30/2024.         Sincerely,     [facility] Emergency Department

## 2024-05-31 ENCOUNTER — PATIENT OUTREACH (OUTPATIENT)
Dept: CARE COORDINATION | Facility: CLINIC | Age: 34
End: 2024-05-31
Payer: COMMERCIAL

## 2024-05-31 NOTE — PROGRESS NOTES
Clinic Care Coordination Contact  Follow Up Progress Note      Assessment: The pt was recently in the ED, I called to check up on the pt, and help the pt setup a ED follow up.  The pt was at [facility] for an abscess. I called and talked to the pt, pt stated that she is doing better. Pt stated that she will call later, if she feels she needs a follow up.     Care Gaps:    Health Maintenance Due   Topic Date Due    DEPRESSION ACTION PLAN  Never done    Pneumococcal Vaccine: Pediatrics (0 to 5 Years) and At-Risk Patients (6 to 64 Years) (1 of 2 - PCV) 03/16/1997    YEARLY PREVENTIVE VISIT  01/13/2018    PHQ-9  01/12/2023    ADVANCE CARE PLANNING  08/23/2023    NICOTINE/TOBACCO CESSATION COUNSELING Q 1 YR  12/12/2023           Care Plans      Intervention/Education provided during outreach:               Plan:     Care Coordinator will follow up in

## 2024-06-03 ENCOUNTER — TRANSFERRED RECORDS (OUTPATIENT)
Dept: HEALTH INFORMATION MANAGEMENT | Facility: CLINIC | Age: 34
End: 2024-06-03

## 2024-06-03 ENCOUNTER — LAB REQUISITION (OUTPATIENT)
Dept: LAB | Facility: CLINIC | Age: 34
End: 2024-06-03

## 2024-06-03 DIAGNOSIS — Z34.90 ENCOUNTER FOR SUPERVISION OF NORMAL PREGNANCY, UNSPECIFIED, UNSPECIFIED TRIMESTER: ICD-10-CM

## 2024-06-03 LAB
ABO/RH(D): NORMAL
ANTIBODY SCREEN: NEGATIVE
BASOPHILS # BLD AUTO: 0 10E3/UL (ref 0–0.2)
BASOPHILS NFR BLD AUTO: 1 %
EOSINOPHIL # BLD AUTO: 0.1 10E3/UL (ref 0–0.7)
EOSINOPHIL NFR BLD AUTO: 2 %
ERYTHROCYTE [DISTWIDTH] IN BLOOD BY AUTOMATED COUNT: 12.3 % (ref 10–15)
HBA1C MFR BLD: 5.6 %
HCT VFR BLD AUTO: 36 % (ref 35–47)
HGB BLD-MCNC: 11.8 G/DL (ref 11.7–15.7)
IMM GRANULOCYTES # BLD: 0 10E3/UL
IMM GRANULOCYTES NFR BLD: 0 %
LYMPHOCYTES # BLD AUTO: 2.5 10E3/UL (ref 0.8–5.3)
LYMPHOCYTES NFR BLD AUTO: 42 %
MCH RBC QN AUTO: 29.6 PG (ref 26.5–33)
MCHC RBC AUTO-ENTMCNC: 32.8 G/DL (ref 31.5–36.5)
MCV RBC AUTO: 91 FL (ref 78–100)
MONOCYTES # BLD AUTO: 0.5 10E3/UL (ref 0–1.3)
MONOCYTES NFR BLD AUTO: 8 %
NEUTROPHILS # BLD AUTO: 2.8 10E3/UL (ref 1.6–8.3)
NEUTROPHILS NFR BLD AUTO: 47 %
NRBC # BLD AUTO: 0 10E3/UL
NRBC BLD AUTO-RTO: 0 /100
PLATELET # BLD AUTO: 284 10E3/UL (ref 150–450)
RBC # BLD AUTO: 3.98 10E6/UL (ref 3.8–5.2)
SPECIMEN EXPIRATION DATE: NORMAL
WBC # BLD AUTO: 5.9 10E3/UL (ref 4–11)

## 2024-06-03 PROCEDURE — 86900 BLOOD TYPING SEROLOGIC ABO: CPT | Performed by: NURSE PRACTITIONER

## 2024-06-03 PROCEDURE — 87389 HIV-1 AG W/HIV-1&-2 AB AG IA: CPT | Performed by: NURSE PRACTITIONER

## 2024-06-03 PROCEDURE — 86803 HEPATITIS C AB TEST: CPT | Performed by: NURSE PRACTITIONER

## 2024-06-03 PROCEDURE — 87340 HEPATITIS B SURFACE AG IA: CPT | Performed by: NURSE PRACTITIONER

## 2024-06-03 PROCEDURE — 87086 URINE CULTURE/COLONY COUNT: CPT | Performed by: NURSE PRACTITIONER

## 2024-06-03 PROCEDURE — 86762 RUBELLA ANTIBODY: CPT | Performed by: NURSE PRACTITIONER

## 2024-06-03 PROCEDURE — 85004 AUTOMATED DIFF WBC COUNT: CPT | Performed by: NURSE PRACTITIONER

## 2024-06-03 PROCEDURE — 83036 HEMOGLOBIN GLYCOSYLATED A1C: CPT | Performed by: NURSE PRACTITIONER

## 2024-06-03 PROCEDURE — 86592 SYPHILIS TEST NON-TREP QUAL: CPT | Performed by: NURSE PRACTITIONER

## 2024-06-04 LAB
HCV AB SERPL QL IA: NONREACTIVE
HIV 1+2 AB+HIV1 P24 AG SERPL QL IA: NONREACTIVE
RPR SER QL: NONREACTIVE
RUBV IGG SERPL QL IA: 1.26 INDEX
RUBV IGG SERPL QL IA: POSITIVE

## 2024-06-05 LAB
BACTERIA UR CULT: ABNORMAL
BACTERIA UR CULT: ABNORMAL
HBV SURFACE AG SERPL QL IA: NONREACTIVE

## 2024-06-22 ENCOUNTER — HEALTH MAINTENANCE LETTER (OUTPATIENT)
Age: 34
End: 2024-06-22

## 2024-06-28 ENCOUNTER — MEDICAL CORRESPONDENCE (OUTPATIENT)
Dept: HEALTH INFORMATION MANAGEMENT | Facility: CLINIC | Age: 34
End: 2024-06-28
Payer: COMMERCIAL

## 2024-06-28 ENCOUNTER — LAB REQUISITION (OUTPATIENT)
Dept: LAB | Facility: CLINIC | Age: 34
End: 2024-06-28

## 2024-06-28 ENCOUNTER — TRANSCRIBE ORDERS (OUTPATIENT)
Dept: MATERNAL FETAL MEDICINE | Facility: HOSPITAL | Age: 34
End: 2024-06-28
Payer: COMMERCIAL

## 2024-06-28 ENCOUNTER — TRANSFERRED RECORDS (OUTPATIENT)
Dept: HEALTH INFORMATION MANAGEMENT | Facility: CLINIC | Age: 34
End: 2024-06-28
Payer: COMMERCIAL

## 2024-06-28 DIAGNOSIS — Z34.92 ENCOUNTER FOR SUPERVISION OF NORMAL PREGNANCY, UNSPECIFIED, SECOND TRIMESTER: ICD-10-CM

## 2024-06-28 DIAGNOSIS — O26.90 PREGNANCY RELATED CONDITION, ANTEPARTUM: Primary | ICD-10-CM

## 2024-06-28 DIAGNOSIS — Z3A.17 17 WEEKS GESTATION OF PREGNANCY: ICD-10-CM

## 2024-06-28 PROCEDURE — 87491 CHLMYD TRACH DNA AMP PROBE: CPT | Performed by: OBSTETRICS & GYNECOLOGY

## 2024-06-28 PROCEDURE — 82105 ALPHA-FETOPROTEIN SERUM: CPT | Performed by: OBSTETRICS & GYNECOLOGY

## 2024-06-29 LAB
C TRACH DNA SPEC QL PROBE+SIG AMP: NEGATIVE
N GONORRHOEA DNA SPEC QL NAA+PROBE: NEGATIVE

## 2024-07-01 LAB
# FETUSES US: NORMAL
AFP MOM SERPL: 0.84
AFP SERPL-MCNC: 25 NG/ML
AGE - REPORTED: 33.7 YR
CURRENT SMOKER: YES
FAMILY MEMBER DISEASES HX: NORMAL
GA METHOD: NORMAL
GA: NORMAL WK
IDDM PATIENT QL: NO
INTEGRATED SCN PATIENT-IMP: NORMAL
SPECIMEN DRAWN SERPL: NORMAL

## 2024-07-02 DIAGNOSIS — O26.90 PREGNANCY RELATED CONDITION, ANTEPARTUM: Primary | ICD-10-CM

## 2024-07-08 ENCOUNTER — PRE VISIT (OUTPATIENT)
Dept: MATERNAL FETAL MEDICINE | Facility: CLINIC | Age: 34
End: 2024-07-08
Payer: COMMERCIAL

## 2024-07-19 ENCOUNTER — TRANSFERRED RECORDS (OUTPATIENT)
Dept: HEALTH INFORMATION MANAGEMENT | Facility: CLINIC | Age: 34
End: 2024-07-19
Payer: COMMERCIAL

## 2024-07-19 ENCOUNTER — CARE COORDINATION (OUTPATIENT)
Dept: MATERNAL FETAL MEDICINE | Facility: CLINIC | Age: 34
End: 2024-07-19
Payer: COMMERCIAL

## 2024-07-19 NOTE — PROGRESS NOTES
Writer contacted Dr. Mc's office [phone]. Spoke with GABINO Ramey. Updated that patient no showed L2 & consult on 7/11. Per our provider recommend weekly TV with their clinic & we will work on getting patient rescheduled for a L2 & consult.

## 2024-08-19 ENCOUNTER — TRANSCRIBE ORDERS (OUTPATIENT)
Dept: MATERNAL FETAL MEDICINE | Facility: CLINIC | Age: 34
End: 2024-08-19
Payer: COMMERCIAL

## 2024-08-19 DIAGNOSIS — O26.90 PREGNANCY RELATED CONDITION, ANTEPARTUM: Primary | ICD-10-CM

## 2024-08-20 ENCOUNTER — HOSPITAL ENCOUNTER (INPATIENT)
Facility: CLINIC | Age: 34
LOS: 1 days | Discharge: LEFT BEFORE DISCHARGE | End: 2024-08-23
Attending: STUDENT IN AN ORGANIZED HEALTH CARE EDUCATION/TRAINING PROGRAM | Admitting: OBSTETRICS & GYNECOLOGY
Payer: COMMERCIAL

## 2024-08-20 DIAGNOSIS — R42 VERTIGO: Primary | ICD-10-CM

## 2024-08-20 DIAGNOSIS — Z32.01 PREGNANCY TEST POSITIVE: ICD-10-CM

## 2024-08-20 LAB
ALBUMIN SERPL BCG-MCNC: 3.5 G/DL (ref 3.5–5.2)
ALBUMIN UR-MCNC: NEGATIVE MG/DL
ALP SERPL-CCNC: 66 U/L (ref 40–150)
ALT SERPL W P-5'-P-CCNC: 10 U/L (ref 0–50)
ANION GAP SERPL CALCULATED.3IONS-SCNC: 11 MMOL/L (ref 7–15)
APPEARANCE UR: CLEAR
AST SERPL W P-5'-P-CCNC: 12 U/L (ref 0–45)
BACTERIA #/AREA URNS HPF: ABNORMAL /HPF
BILIRUB SERPL-MCNC: 0.2 MG/DL
BILIRUB UR QL STRIP: NEGATIVE
BUN SERPL-MCNC: 6.2 MG/DL (ref 6–20)
CALCIUM SERPL-MCNC: 9.2 MG/DL (ref 8.8–10.4)
CHLORIDE SERPL-SCNC: 102 MMOL/L (ref 98–107)
CLUE CELLS: NORMAL
COLOR UR AUTO: ABNORMAL
CREAT SERPL-MCNC: 0.54 MG/DL (ref 0.51–0.95)
EGFRCR SERPLBLD CKD-EPI 2021: >90 ML/MIN/1.73M2
ERYTHROCYTE [DISTWIDTH] IN BLOOD BY AUTOMATED COUNT: 12 % (ref 10–15)
GLUCOSE SERPL-MCNC: 90 MG/DL (ref 70–99)
GLUCOSE UR STRIP-MCNC: NEGATIVE MG/DL
HCO3 SERPL-SCNC: 21 MMOL/L (ref 22–29)
HCT VFR BLD AUTO: 33 % (ref 35–47)
HGB BLD-MCNC: 11.2 G/DL (ref 11.7–15.7)
HGB UR QL STRIP: NEGATIVE
KETONES UR STRIP-MCNC: NEGATIVE MG/DL
LEUKOCYTE ESTERASE UR QL STRIP: ABNORMAL
MCH RBC QN AUTO: 29.6 PG (ref 26.5–33)
MCHC RBC AUTO-ENTMCNC: 33.9 G/DL (ref 31.5–36.5)
MCV RBC AUTO: 87 FL (ref 78–100)
NITRATE UR QL: NEGATIVE
PH UR STRIP: 6 [PH] (ref 5–7)
PLATELET # BLD AUTO: 238 10E3/UL (ref 150–450)
POTASSIUM SERPL-SCNC: 4.1 MMOL/L (ref 3.4–5.3)
PROT SERPL-MCNC: 6.5 G/DL (ref 6.4–8.3)
RBC # BLD AUTO: 3.78 10E6/UL (ref 3.8–5.2)
RBC URINE: <1 /HPF
SODIUM SERPL-SCNC: 134 MMOL/L (ref 135–145)
SP GR UR STRIP: 1 (ref 1–1.03)
SQUAMOUS EPITHELIAL: 5 /HPF
TRANSITIONAL EPI: <1 /HPF
TRICHOMONAS, WET PREP: NORMAL
UROBILINOGEN UR STRIP-MCNC: NORMAL MG/DL
WBC # BLD AUTO: 7.4 10E3/UL (ref 4–11)
WBC URINE: 2 /HPF
WBC'S/HIGH POWER FIELD, WET PREP: NORMAL
YEAST, WET PREP: NORMAL

## 2024-08-20 PROCEDURE — 87210 SMEAR WET MOUNT SALINE/INK: CPT

## 2024-08-20 PROCEDURE — 250N000013 HC RX MED GY IP 250 OP 250 PS 637

## 2024-08-20 PROCEDURE — 87081 CULTURE SCREEN ONLY: CPT

## 2024-08-20 PROCEDURE — 81001 URINALYSIS AUTO W/SCOPE: CPT | Performed by: FAMILY MEDICINE

## 2024-08-20 PROCEDURE — 85027 COMPLETE CBC AUTOMATED: CPT

## 2024-08-20 PROCEDURE — 87491 CHLMYD TRACH DNA AMP PROBE: CPT

## 2024-08-20 PROCEDURE — 93010 ELECTROCARDIOGRAM REPORT: CPT | Performed by: INTERNAL MEDICINE

## 2024-08-20 PROCEDURE — 80053 COMPREHEN METABOLIC PANEL: CPT

## 2024-08-20 PROCEDURE — G0463 HOSPITAL OUTPT CLINIC VISIT: HCPCS

## 2024-08-20 PROCEDURE — 93005 ELECTROCARDIOGRAM TRACING: CPT

## 2024-08-20 PROCEDURE — 36415 COLL VENOUS BLD VENIPUNCTURE: CPT

## 2024-08-20 PROCEDURE — 999N000104 HC STATISTIC NO CHARGE

## 2024-08-20 RX ORDER — ACETAMINOPHEN 325 MG/1
975 TABLET ORAL ONCE
Status: COMPLETED | OUTPATIENT
Start: 2024-08-20 | End: 2024-08-20

## 2024-08-20 RX ADMIN — ACETAMINOPHEN 975 MG: 325 TABLET ORAL at 22:20

## 2024-08-20 ASSESSMENT — COLUMBIA-SUICIDE SEVERITY RATING SCALE - C-SSRS
1. IN THE PAST MONTH, HAVE YOU WISHED YOU WERE DEAD OR WISHED YOU COULD GO TO SLEEP AND NOT WAKE UP?: NO
6. HAVE YOU EVER DONE ANYTHING, STARTED TO DO ANYTHING, OR PREPARED TO DO ANYTHING TO END YOUR LIFE?: YES
2. HAVE YOU ACTUALLY HAD ANY THOUGHTS OF KILLING YOURSELF IN THE PAST MONTH?: NO

## 2024-08-20 ASSESSMENT — ACTIVITIES OF DAILY LIVING (ADL)
ADLS_ACUITY_SCORE: 18

## 2024-08-20 NOTE — ED TRIAGE NOTES
Abd cramping in L abdomen and radiating to back, constant pain 6/10. Has not tried tylenol or ibuprofen at home. Started 2 days ago, states she feels like she is going to fall or pass out      Triage Assessment (Adult)       Row Name 08/20/24 3497          Triage Assessment    Airway WDL WDL        Respiratory WDL    Respiratory WDL WDL        Skin Circulation/Temperature WDL    Skin Circulation/Temperature WDL WDL        Cardiac WDL    Cardiac WDL WDL        Peripheral/Neurovascular WDL    Peripheral Neurovascular WDL WDL        Cognitive/Neuro/Behavioral WDL    Cognitive/Neuro/Behavioral WDL WDL

## 2024-08-21 PROBLEM — R10.9 ABDOMINAL PAIN IN PREGNANCY: Status: ACTIVE | Noted: 2024-08-21

## 2024-08-21 PROBLEM — R42 DIZZINESS: Status: ACTIVE | Noted: 2024-08-21

## 2024-08-21 PROBLEM — O26.899 ABDOMINAL PAIN IN PREGNANCY: Status: ACTIVE | Noted: 2024-08-21

## 2024-08-21 LAB
ATRIAL RATE - MUSE: 85 BPM
C TRACH DNA SPEC QL PROBE+SIG AMP: NEGATIVE
DIASTOLIC BLOOD PRESSURE - MUSE: NORMAL MMHG
INTERPRETATION ECG - MUSE: NORMAL
N GONORRHOEA DNA SPEC QL NAA+PROBE: POSITIVE
P AXIS - MUSE: 62 DEGREES
PR INTERVAL - MUSE: 134 MS
QRS DURATION - MUSE: 78 MS
QT - MUSE: 368 MS
QTC - MUSE: 437 MS
R AXIS - MUSE: 68 DEGREES
SYSTOLIC BLOOD PRESSURE - MUSE: NORMAL MMHG
T AXIS - MUSE: 58 DEGREES
VENTRICULAR RATE- MUSE: 85 BPM

## 2024-08-21 PROCEDURE — 250N000013 HC RX MED GY IP 250 OP 250 PS 637

## 2024-08-21 PROCEDURE — 96365 THER/PROPH/DIAG IV INF INIT: CPT

## 2024-08-21 PROCEDURE — 96374 THER/PROPH/DIAG INJ IV PUSH: CPT

## 2024-08-21 PROCEDURE — 250N000013 HC RX MED GY IP 250 OP 250 PS 637: Performed by: OBSTETRICS & GYNECOLOGY

## 2024-08-21 PROCEDURE — 250N000011 HC RX IP 250 OP 636

## 2024-08-21 PROCEDURE — G0378 HOSPITAL OBSERVATION PER HR: HCPCS

## 2024-08-21 PROCEDURE — 59025 FETAL NON-STRESS TEST: CPT

## 2024-08-21 RX ORDER — ONDANSETRON 4 MG/1
4 TABLET, ORALLY DISINTEGRATING ORAL EVERY 6 HOURS PRN
Status: DISCONTINUED | OUTPATIENT
Start: 2024-08-21 | End: 2024-08-23 | Stop reason: HOSPADM

## 2024-08-21 RX ORDER — CEFTRIAXONE 500 MG/1
500 INJECTION, POWDER, FOR SOLUTION INTRAMUSCULAR; INTRAVENOUS ONCE
Status: COMPLETED | OUTPATIENT
Start: 2024-08-21 | End: 2024-08-21

## 2024-08-21 RX ORDER — POLYETHYLENE GLYCOL 3350 17 G/17G
17 POWDER, FOR SOLUTION ORAL DAILY
Status: DISCONTINUED | OUTPATIENT
Start: 2024-08-22 | End: 2024-08-23 | Stop reason: HOSPADM

## 2024-08-21 RX ORDER — HYDROXYZINE HYDROCHLORIDE 50 MG/1
50 TABLET, FILM COATED ORAL EVERY 6 HOURS PRN
Status: DISCONTINUED | OUTPATIENT
Start: 2024-08-21 | End: 2024-08-23 | Stop reason: HOSPADM

## 2024-08-21 RX ORDER — METOCLOPRAMIDE 10 MG/1
10 TABLET ORAL EVERY 6 HOURS PRN
Status: DISCONTINUED | OUTPATIENT
Start: 2024-08-21 | End: 2024-08-23 | Stop reason: HOSPADM

## 2024-08-21 RX ORDER — METOCLOPRAMIDE HYDROCHLORIDE 5 MG/ML
10 INJECTION INTRAMUSCULAR; INTRAVENOUS EVERY 6 HOURS PRN
Status: DISCONTINUED | OUTPATIENT
Start: 2024-08-21 | End: 2024-08-23 | Stop reason: HOSPADM

## 2024-08-21 RX ORDER — PROCHLORPERAZINE MALEATE 10 MG
10 TABLET ORAL EVERY 6 HOURS PRN
Status: DISCONTINUED | OUTPATIENT
Start: 2024-08-21 | End: 2024-08-23 | Stop reason: HOSPADM

## 2024-08-21 RX ORDER — PROCHLORPERAZINE 25 MG
25 SUPPOSITORY, RECTAL RECTAL EVERY 12 HOURS PRN
Status: DISCONTINUED | OUTPATIENT
Start: 2024-08-21 | End: 2024-08-23 | Stop reason: HOSPADM

## 2024-08-21 RX ORDER — HYDROXYZINE HYDROCHLORIDE 25 MG/1
25 TABLET, FILM COATED ORAL EVERY 6 HOURS PRN
Status: DISCONTINUED | OUTPATIENT
Start: 2024-08-21 | End: 2024-08-23 | Stop reason: HOSPADM

## 2024-08-21 RX ORDER — ONDANSETRON 2 MG/ML
4 INJECTION INTRAMUSCULAR; INTRAVENOUS EVERY 6 HOURS PRN
Status: DISCONTINUED | OUTPATIENT
Start: 2024-08-21 | End: 2024-08-23 | Stop reason: HOSPADM

## 2024-08-21 RX ORDER — AMOXICILLIN 250 MG
1 CAPSULE ORAL 2 TIMES DAILY
Status: DISCONTINUED | OUTPATIENT
Start: 2024-08-21 | End: 2024-08-23 | Stop reason: HOSPADM

## 2024-08-21 RX ORDER — MECLIZINE HCL 12.5 MG 12.5 MG/1
12.5 TABLET ORAL EVERY 6 HOURS PRN
Status: DISCONTINUED | OUTPATIENT
Start: 2024-08-21 | End: 2024-08-23 | Stop reason: HOSPADM

## 2024-08-21 RX ORDER — ONDANSETRON 2 MG/ML
4 INJECTION INTRAMUSCULAR; INTRAVENOUS EVERY 6 HOURS PRN
Status: DISCONTINUED | OUTPATIENT
Start: 2024-08-21 | End: 2024-08-21

## 2024-08-21 RX ORDER — NICOTINE 21 MG/24HR
1 PATCH, TRANSDERMAL 24 HOURS TRANSDERMAL DAILY
Status: DISCONTINUED | OUTPATIENT
Start: 2024-08-21 | End: 2024-08-23 | Stop reason: HOSPADM

## 2024-08-21 RX ORDER — ONDANSETRON 4 MG/1
4 TABLET, ORALLY DISINTEGRATING ORAL EVERY 6 HOURS PRN
Status: DISCONTINUED | OUTPATIENT
Start: 2024-08-21 | End: 2024-08-21 | Stop reason: ALTCHOICE

## 2024-08-21 RX ORDER — ACETAMINOPHEN 325 MG/1
325-650 TABLET ORAL EVERY 4 HOURS PRN
Status: DISCONTINUED | OUTPATIENT
Start: 2024-08-21 | End: 2024-08-23 | Stop reason: HOSPADM

## 2024-08-21 RX ADMIN — MECLIZINE HYDROCHLORIDE 12.5 MG: 12.5 TABLET ORAL at 20:46

## 2024-08-21 RX ADMIN — ACETAMINOPHEN 650 MG: 325 TABLET ORAL at 09:58

## 2024-08-21 RX ADMIN — METOCLOPRAMIDE 10 MG: 10 TABLET ORAL at 13:55

## 2024-08-21 RX ADMIN — MECLIZINE HYDROCHLORIDE 12.5 MG: 12.5 TABLET ORAL at 14:42

## 2024-08-21 RX ADMIN — CEFTRIAXONE SODIUM 500 MG: 500 INJECTION, POWDER, FOR SOLUTION INTRAMUSCULAR; INTRAVENOUS at 14:37

## 2024-08-21 RX ADMIN — ACETAMINOPHEN 650 MG: 325 TABLET ORAL at 03:56

## 2024-08-21 RX ADMIN — HYDROXYZINE HYDROCHLORIDE 25 MG: 25 TABLET ORAL at 03:56

## 2024-08-21 RX ADMIN — SENNOSIDES AND DOCUSATE SODIUM 1 TABLET: 50; 8.6 TABLET ORAL at 20:46

## 2024-08-21 RX ADMIN — NICOTINE 1 PATCH: 14 PATCH, EXTENDED RELEASE TRANSDERMAL at 16:30

## 2024-08-21 RX ADMIN — ONDANSETRON 4 MG: 4 TABLET, ORALLY DISINTEGRATING ORAL at 04:12

## 2024-08-21 RX ADMIN — ACETAMINOPHEN 650 MG: 325 TABLET ORAL at 19:41

## 2024-08-21 ASSESSMENT — ACTIVITIES OF DAILY LIVING (ADL)
ADLS_ACUITY_SCORE: 18
ADLS_ACUITY_SCORE: 19
ADLS_ACUITY_SCORE: 18
ADLS_ACUITY_SCORE: 19
ADLS_ACUITY_SCORE: 18
ADLS_ACUITY_SCORE: 19
ADLS_ACUITY_SCORE: 18
ADLS_ACUITY_SCORE: 18
ADLS_ACUITY_SCORE: 19
ADLS_ACUITY_SCORE: 18

## 2024-08-21 NOTE — PROVIDER NOTIFICATION
08/21/24 1634   Provider Notification   Provider Name/Title Dr. Coleman   Method of Notification Electronic Page   Notification Reason Status Update     Pt reports resolution of symptoms (head spinning/dizziness) and denies pain at this time. Pt said she is OK to do outpatient PT but also is comfortable staying until tomorrow to meet with the team here and get set up for outpatient once discharged. Plan per provider is to keep pt overnight to make sure symptoms stay away.

## 2024-08-21 NOTE — PROGRESS NOTES
"Antepartum Progress Note    S: Patient feeling nausea this morning, continued to feel like room is spinning and everything is moving. Abdominal pain has improved. She lives on fourth floor, does not think she would be able to walk up stairs. No vaginal bleeding Nocontractions No leakage of fluid , good FM.     O:   Patient Vitals for the past 24 hrs:   BP Temp Temp src Pulse Resp SpO2 Height Weight   24 0943 122/73 98.7  F (37.1  C) Oral -- 16 -- -- --   24 0402 116/82 97.7  F (36.5  C) Oral -- 16 -- -- --   24 2200 104/57 98.4  F (36.9  C) Oral -- -- -- -- --   24 1750 126/72 -- -- -- -- -- -- --   24 1707 122/73 98.1  F (36.7  C) Oral 93 16 97 % 1.727 m (5' 8\") 122.5 kg (270 lb)     Gen: Appears uncomfortable  CV: warm, well perfused  Pulm: non-labored breathing  Abd: Gravid  Ext: Warm, well perfused    Weight:   Wt Readings from Last 2 Encounters:   24 122.5 kg (270 lb)   24 125.7 kg (277 lb 1.9 oz)       FHT: 145 baseline, mod variability,  accels, no decels  TOCO: 0 ctx in 10 minute period    Labs: + GC, neg CT;     A/P: Rianna BALTAZAR Donovan is a 34 year old  female at 24w5d by 13w4d US, HD#2 admitted for management of vertigo. Continues to endorse severe symptoms of vertigo, feels unsteady on her feet. Patient with no safe dispo plan at this time, and is a fall risk if she were to be sent home.     # Vertigo   -Symptoms concerning for benign positional vertigo.  -Ordered PT evaluation to address gait and steadiness  -Plan to give meclizine and reglan to see if symptoms improve.  Given her living situation in shelter, feel this needs to be addressed on inpatient basis for initial treatment and improvement of her symptoms.  Has risks of falling which in pregnancy can be harmful.  Will monitor response to these interventions.     #Gonorrhea  - will discuss diagnosis with patient, offer EPT and discuss SHIELA     # Fetal Well-Being  -Reactive and reassuring     # PNC  - Rh " positive, Rubella immune GBS pending      Medically Ready for Discharge: Anticipated Tomorrow     Patient seen and discussed with Dr. Lewis.     Yana Coleman MD  Ob/Gyn Resident, PGY-2  08/21/24 11:10 AM     Staff MD Note    I appreciate the note by Dr. Coleman.  Any necessary changes have been made by me.  I saw and evaluated the patient and agree with the findings and plan of care as documented in the note.  Patient does not have safe disposition plan with her current symptoms.  Will initiate Meclizine and have PT evaluate patient for potential maneuvers to help with vertigo.    Winter Lewis MD

## 2024-08-21 NOTE — PROVIDER NOTIFICATION
08/21/24 1357   Provider Notification   Provider Name/Title Dr. Coleman   Method of Notification Electronic Page   Notification Reason Status Update     Pt is awake and able to talk to provider at this time. Pt endorses nausea that she attributes to the head spinning sensation that she is still experiencing, reglan given.

## 2024-08-21 NOTE — PROGRESS NOTES
Pt came to the unit reporting having dizziness and abdominal pain. When the patient moves the dizziness increases and increases with ambulating as well. Pt reports her abdominal pain is low abdomen and vaginal. Pt was seen by the provider and vaginal swabs were collected. Cervix was not dilated. EKG was done. Vital signs were within normal limits. No contractions were monitored. Fetal heart rate appropriate fore gestational age.     Swabs were negative for infection, see results. Provider ruled out  labor at this time. Provider suggested making and appointment with primary care to test for vertigo. Provider okayed patient for discharge but pt did not have a ride and the shelter she is in hours stopped at 11pm. Order for patient to stay overnight in antepartum. Order to discontinue continuous monitoring and switch to BID.      Transferred to Antepartum via wheelchair. Report given to Bronson LLOYD to assume care of patient.

## 2024-08-21 NOTE — PLAN OF CARE
Assumed care at 2345. Oriented patient to surroundings. VSS, afebrile. Pt c/o nausea, headache, dizziness increase with ambulating and lower abdominal pain rating 6/10. Tylenol given, Zofran given for nausea.

## 2024-08-21 NOTE — PROVIDER NOTIFICATION
08/21/24 1688   Provider Notification   Provider Name/Title Dr. Coleman   Method of Notification Phone   Notification Reason Status Update     Provider notified that pt has appeared to be sleeping since noon, provider OK for pt to continue to rest undisturbed. PT informed RN that they would not be able to consult pt until tomorrow. Plan to start IV and rochepin once pt awake and discussed with provider. If vertigo symptoms persist pt can have reglan, too.
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
## 2024-08-21 NOTE — PLAN OF CARE
VSS, see flowsheets. EFM as charted. Pt denies LOF, vaginal bleeding and pain. Pt endorses resolution of vertigo symptoms following meclizine dose. Plan to observe overnight with possible discharge tomorrow.
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
## 2024-08-22 ENCOUNTER — APPOINTMENT (OUTPATIENT)
Dept: PHYSICAL THERAPY | Facility: CLINIC | Age: 34
End: 2024-08-22
Attending: OBSTETRICS & GYNECOLOGY
Payer: COMMERCIAL

## 2024-08-22 ENCOUNTER — APPOINTMENT (OUTPATIENT)
Dept: CARDIOLOGY | Facility: CLINIC | Age: 34
End: 2024-08-22
Payer: COMMERCIAL

## 2024-08-22 VITALS
SYSTOLIC BLOOD PRESSURE: 128 MMHG | DIASTOLIC BLOOD PRESSURE: 82 MMHG | HEART RATE: 93 BPM | WEIGHT: 270 LBS | OXYGEN SATURATION: 97 % | BODY MASS INDEX: 40.92 KG/M2 | TEMPERATURE: 98 F | HEIGHT: 68 IN | RESPIRATION RATE: 18 BRPM

## 2024-08-22 PROBLEM — R42 VERTIGO: Status: ACTIVE | Noted: 2024-08-22

## 2024-08-22 LAB — LVEF ECHO: NORMAL

## 2024-08-22 PROCEDURE — 95992 CANALITH REPOSITIONING PROC: CPT | Mod: GP

## 2024-08-22 PROCEDURE — G0378 HOSPITAL OBSERVATION PER HR: HCPCS

## 2024-08-22 PROCEDURE — 97162 PT EVAL MOD COMPLEX 30 MIN: CPT | Mod: GP

## 2024-08-22 PROCEDURE — 250N000013 HC RX MED GY IP 250 OP 250 PS 637

## 2024-08-22 PROCEDURE — 250N000013 HC RX MED GY IP 250 OP 250 PS 637: Performed by: OBSTETRICS & GYNECOLOGY

## 2024-08-22 PROCEDURE — 97530 THERAPEUTIC ACTIVITIES: CPT | Mod: GP,59

## 2024-08-22 PROCEDURE — 93306 TTE W/DOPPLER COMPLETE: CPT

## 2024-08-22 PROCEDURE — 120N000002 HC R&B MED SURG/OB UMMC

## 2024-08-22 PROCEDURE — 93306 TTE W/DOPPLER COMPLETE: CPT | Mod: 26 | Performed by: INTERNAL MEDICINE

## 2024-08-22 RX ADMIN — MECLIZINE HYDROCHLORIDE 12.5 MG: 12.5 TABLET ORAL at 10:06

## 2024-08-22 RX ADMIN — SENNOSIDES AND DOCUSATE SODIUM 1 TABLET: 50; 8.6 TABLET ORAL at 20:40

## 2024-08-22 RX ADMIN — MECLIZINE HYDROCHLORIDE 12.5 MG: 12.5 TABLET ORAL at 04:03

## 2024-08-22 RX ADMIN — POLYETHYLENE GLYCOL 3350 17 G: 17 POWDER, FOR SOLUTION ORAL at 08:25

## 2024-08-22 RX ADMIN — SENNOSIDES AND DOCUSATE SODIUM 1 TABLET: 50; 8.6 TABLET ORAL at 08:25

## 2024-08-22 RX ADMIN — ACETAMINOPHEN 650 MG: 325 TABLET ORAL at 16:09

## 2024-08-22 RX ADMIN — MECLIZINE HYDROCHLORIDE 12.5 MG: 12.5 TABLET ORAL at 16:09

## 2024-08-22 RX ADMIN — METOCLOPRAMIDE 10 MG: 10 TABLET ORAL at 00:00

## 2024-08-22 RX ADMIN — ACETAMINOPHEN 650 MG: 325 TABLET ORAL at 08:25

## 2024-08-22 ASSESSMENT — ACTIVITIES OF DAILY LIVING (ADL)
ADLS_ACUITY_SCORE: 19

## 2024-08-22 NOTE — PLAN OF CARE
Patient vitals WNL and patient afebrile. Patient denies decrease in fetal movement, LOF, vaginal bleeding, contractions, headache, vision changes, edema, and RUQ pain. The patient continued to have dizziness this morning and then PT came to assess the patient for vertigo. The patient was positive for BPPV and PT treated her twice. After the treatment the patient has felt a lot better and her dizziness has gone away. PT is planning on coming tomorrow to treat her again. She was constipated and was able to have a BM today. The patient feels better that she was able to go for a walk supervised by RN.Fetal heart monitoring is charted in flowsheet.

## 2024-08-22 NOTE — PROGRESS NOTES
"S: \" I am shocked at what just happened\" as PT successfully performed canalith repositioning procedures for presummed BPPV Benign Paroxysmal Positional Vertigo. Agrees to ECHO. States understanding of why she's here and our plans so far.     A/P: Rianna Man is a 34 year old  female at 24w5d by 13w4d US, HD#3 admitted for management of vertigo. Continues to endorse severe symptoms of vertigo, feels unsteady on her feet. Patient with no safe dispo plan at this time, and is a fall risk if she were to be sent home (which is a women's shelter).      BPPV  -s/p maneuvers today     Gonorrhea  treated       Fetal Well-Being  -Reactive and reassuring     PNC  - Rh positive, Rubella immune GBS pending    Talya Clark MD    "

## 2024-08-22 NOTE — PROVIDER NOTIFICATION
08/22/24 0933   Provider Notification   Provider Name/Title Dr. Silva   Method of Notification Electronic Page   Notification Reason Status Update  (Could you please review the fetal heart monitor strip for 421? Thank you!)

## 2024-08-22 NOTE — PLAN OF CARE
Goal Outcome Evaluation:      Plan of Care Reviewed With: patient    Overall Patient Progress: no changeOverall Patient Progress: no change    VSS, afebrile. See flowsheets for EFM & TOCO monitoring. Ongoing vertigo: tylenol, meclizine, and reglan in use. Patient denies LOF, bleeding, and contractions. Plan for PT consult today, continue with current POC.
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
## 2024-08-22 NOTE — PROGRESS NOTES
24 1000   Appointment Info   Signing Clinician's Name / Credentials (PT) Winter Harrison DPT   Quick Adds   Quick Adds Vestibular Eval   Living Environment   People in Home alone  (friends and family in the area but cannot go inside the shelter)   Current Living Arrangements shelter   Home Accessibility stairs within home  (elevator - power shortages at times; 4th floor right now, could ask to be on lower level)   Number of Stairs, Within Home, Primary greater than 10 stairs   Stair Railings, Within Home, Primary railings on both sides of stairs   Transportation Anticipated public transportation;health plan transportation   Living Environment Comments staying in [facility] - can stay  - need to be ind   Self-Care   Usual Activity Tolerance good  (no AD - torn ACL, careful of leg but moves well normally)   Current Activity Tolerance moderate  (touch and go - reports steady but feels ground is moving)   Equipment Currently Used at Home none   Fall history within last six months no   Activity/Exercise/Self-Care Comment ind baseline   General Information   Onset of Illness/Injury or Date of Surgery 24   Referring Physician Winter Lewis MD   Patient/Family Therapy Goals Statement (PT) feels like can go back to shelter but take it easy   Pertinent History of Current Problem (include personal factors and/or comorbidities that impact the POC) Rianna Man is a 34 year old  female at 24w5d by 13w4d US, HD#2 admitted for management of vertigo. Continues to endorse severe symptoms of vertigo, feels unsteady on her feet. Patient with no safe dispo plan at this time, and is a fall risk if she were to be sent home.   Existing Precautions/Restrictions fall   Weight-Bearing Status - LUE full weight-bearing   Weight-Bearing Status - RUE full weight-bearing   Weight-Bearing Status - LLE full weight-bearing   Weight-Bearing Status - RLE full weight-bearing   General Observations pt  supine in bed on PT arrival, agreeable to therapy   Cognition   Affect/Mental Status (Cognition) WFL   Pain Assessment   Patient Currently in Pain Yes, see Vital Sign flowsheet  (eyes hurt and headache - denies migraines)   Integumentary/Edema   Integumentary/Edema no deficits were identifed   Posture    Posture Forward head position;Protracted shoulders   Range of Motion (ROM)   Range of Motion ROM is WFL   Strength (Manual Muscle Testing)   Strength (Manual Muscle Testing) strength is WFL   Bed Mobility   Comment, (Bed Mobility) ind bed mob   Transfers   Comment, (Transfers) SBA transfers   Gait/Stairs (Locomotion)   Comment, (Gait/Stairs) SBA amb   Sensory Examination   Sensory Perception patient reports no sensory changes   Cervicogenic Screen   Neck ROM WFL   Oculomotor Exam   Ocular ROM Normal   Smooth Pursuit Normal   Saccades Normal   Head Impulse Test Normal   Infrared Goggle Exam or Frenzel Lense Exam   Spontaneous Nystagmus Negative   Gaze Evoked Nystagmus Negative   [place] (Left) Upbeating L torsional   Clinical Impression   Criteria for Skilled Therapeutic Intervention Yes, treatment indicated   PT Diagnosis (PT) impaired functional mobility, BPPV   Influenced by the following impairments dizziness, BPPV, instability   Functional limitations due to impairments impaired bed mob, transfers, amb, stairs, and overall safety   Clinical Presentation (PT Evaluation Complexity) evolving   Clinical Presentation Rationale meclizine was helping but no longer masking sx, pt pregnant so risk for falls even more imperative to get treatment while here, socially complex and unclear ability to follow up OP in timely fashion   Clinical Decision Making (Complexity) moderate complexity   Planned Therapy Interventions (PT) balance training;bed mobility training;gait training;home exercise program;patient/family education;stair training;strengthening;other (see comments);neuromuscular re-education;transfer  training;progressive activity/exercise;risk factor education;home program guidelines  (canalith repositioning maneuver)   Risk & Benefits of therapy have been explained evaluation/treatment results reviewed;care plan/treatment goals reviewed;risks/benefits reviewed;current/potential barriers reviewed;participants voiced agreement with care plan;participants included;patient   Clinical Impression Comments Pt w/ good initial response to canalith repositioning maneuver, however, still showing position signs. Would highly encourage that pt stay so PT can perform additional maneuvers/assessment tomorrow to ensure safety before pt DC back to women's shelter ind.   PT Total Evaluation Time   PT Eval, Moderate Complexity Minutes (47451) 4   Physical Therapy Goals   PT Frequency Daily   PT Predicted Duration/Target Date for Goal Attainment 08/29/24   PT Goals Bed Mobility;Transfers;Gait;Stairs;PT Goal 1   PT: Bed Mobility Independent;Goal Met   PT: Transfers Modified independent;Sit to/from stand   PT: Gait Modified independent;Assistive device;Rolling walker   PT: Stairs Modified independent;10 stairs   PT: Goal 1 Pt will verbalize resolution of dizziness sx and will test negative for L posterior canalithiasis BPPV   Interventions   Interventions Quick Adds Canalith Repositioning;Therapeutic Activity   Therapeutic Activity   Therapeutic Activities: dynamic activities to improve functional performance Minutes (35388) 21   Symptoms Noted During/After Treatment Dizziness;Fatigue   Treatment Detail/Skilled Intervention Pt supine in bed on PT arrival, agreeable to PT. Spent ample time educating on vestibular/mobility screening. Pt ind w/ sup>sit EOB. Seated EOB completed above vestibular/ocular assessment so ind sitting balance. Pt ind w/ sit>long sit repeatedly for canalith repositioning maneuver. Pt able to complete STS w/ HHA/SBA, amb w/out AD for total of ~100ft HHA/SBA for safety as continues to feel some dysequilibrium  though does feel the ground is moving less following treatment so she is encouraged by this. Did educate on use of FWW for home on discharge and pt agreeable to this for increased confidence. Did spend extensive time on educating of sx including pictures and videos to gain better understanding of sx as she was fearful. She was encouraged and grateful at end of session - did educate on role of OP PT as well if pt does not DC home today, left supine in bed w/ needs met.   Canalith Repositioning   Canalith Repositioning Minutes (28470) 10   Symptoms Noted During/After Treatment dizziness   Treatment Detail/Skilled Intervention Completed x2 canalith repositioning maneuvers for L posterior canalithiasis BPPV, did have lesser sx 2nd treatment but continues to show positive signs, encouraged pt that PT will complete again tomorrow as needed if still here or she needs to follow up w/ OP vestibular PT if they did discharge her today and she is on board w/ this plan. Did have reduction of sx but not resolution.   PT Discharge Planning   PT Plan trial FWW, CRM for L BPPV, trial stairs?   PT Discharge Recommendation (DC Rec) home;home with outpatient physical therapy  (vestibular PT)   PT Rationale for DC Rec Pt w/ good initial response to canalith repositioning maneuver, however, still showing position signs. Would highly encourage that pt stay so PT can perform additional maneuvers/assessment tomorrow to ensure safety before pt DC back to women's shelter ind. meclizine was helping but no longer masking sx, pt pregnant so risk for falls even more imperative to get treatment while here, socially complex and unclear ability to follow up OP in timely fashion   PT Brief overview of current status Ax1, encourage FWW   PT Equipment Needed at Discharge walker, rolling   Total Session Time   Timed Code Treatment Minutes 21   Total Session Time (sum of timed and untimed services) 35

## 2024-08-22 NOTE — DISCHARGE SUMMARY
[facility] Discharge Summary    Rianna Man MRN# [id number]   Age: 34 year old YOB: 1990     Date of Admission:  2024  Date of Discharge:  24   Admitting Physician:  Talya Clark MD  Discharge Physician:  Yesika Humphrey MD     Admission Diagnosis:  - Intrauterine Pregnancy at 24w4d  - Abdominal Pain in Pregnancy  - Dizziness    Discharge Diagnosis:  - Vertigo  - Gonorrhea  - Dizziness  - BPPV  - Self-directed discharge    Procedures:  none    Consultations:    - PT  - Socia work    Medications prior to admission:  No medications prior to admission.         Brief History of Presentation:    Rianna Man is a 34 year old  at 24w4d by 13w4d US, here for new onset abdominal pain and dizziness. Patient describes worsening abdominal pain radiating to her lower back over the last week most noticeable when she stands up. She also describes a room-spinning dizziness with a generalized headache and blurry vision for the last 3 days. She says this gets worse when she gets up and moves around/turns her head, with associated nausea, endorses one episode of vomiting 1 day ago. Denies any vaginal bleeding, leakage of fluid or abnormal discharge. She does not feel like she is in labor, but states the abdominal discomfort feels similar to the start of her previous pregnancy losses which resulted in spontaneous abortions in the second trimester. Her one living child was born at term. She denies any history of GDM, Htn, Pre-E, or any other chronic health conditions. She does note a history of anxiety/depression and schizoaffective disorder previously taking hydroxyzine, effexor, zyprexa, desyrel for but stopped taking with pregnancy. She sees a therapist for this and states she has been doing well. Has not started any new medications recently. Patient notes a history of anemia, not currently on any iron. She was initially seen in the ED and was admitted to  L&D for further evaluation and cares. Of note, patient is currently living in a women's shelter; she feels safe there.     Hospital Course:    Her evaluation for for  labor in the setting of abdominal pain during pregnancy was negative.  However infectious workup showed positive gonorrhea testing. Patient was treated with 1 dose of IV ceftriaxone 500 mg. She also received meclizine for her dizziness, with mild improvement of symptoms.  PT additionally saw her and performed canalith repositioning procedures for presumed BPPV which also helped significantly. The patient also had a cardiac evaluation done to rule-out any cardiac-related causes of dizziness which came back negative. A head MRI was recommended; however on HD #3, patient had a self-directed discharge. She doesn't have childcare and needs to pick her son up, per court order. The patient acknowledges the recommendation is to stay. She will follow-up outpatient on Monday with an M appointment, and a message was sent to clinic to make an appointment sometime next week. Additionally, an outpatient brain MRI without contrast will be ordered to ensure no intracranial etiology.      Post-partum hemoglobin:   Hemoglobin   Date Value Ref Range Status   2024 11.2 (L) 11.7 - 15.7 g/dL Final   2021 11.7 11.7 - 15.7 g/dL Final       Discharge Medications:      Review of your medicines        START taking        Dose / Directions   meclizine 12.5 MG tablet  Commonly known as: ANTIVERT  Used for: Vertigo      Dose: 12.5 mg  Take 1 tablet (12.5 mg) by mouth every 6 hours as needed for dizziness.  Quantity: 20 tablet  Refills: 0     senna-docusate 8.6-50 MG tablet  Commonly known as: SENOKOT-S/PERICOLACE  Used for: Vertigo      Dose: 1 tablet  Take 1 tablet by mouth 2 times daily.  Quantity: 30 tablet  Refills: 3            CONTINUE these medicines which have NOT CHANGED        Dose / Directions   acetaminophen 325 MG tablet  Commonly known as:  TYLENOL  Used for: Pregnancy examination or test, positive result      Dose: 325-650 mg  Take 1-2 tablets (325-650 mg) by mouth every 6 hours as needed for mild pain  Quantity: 90 tablet  Refills: 1     cetirizine 10 MG tablet  Commonly known as: zyrTEC  Used for: Itching, Urticaria      Dose: 10 mg  Take 1 tablet (10 mg) by mouth daily  Quantity: 30 tablet  Refills: 0     folic acid 1 MG tablet  Commonly known as: FOLVITE  Used for: Alcohol dependence with intoxication with complication (H)      TAKE 1 TABLET BY MOUTH ONCE DAILY  Quantity: 30 tablet  Refills: 0     hydrOXYzine 50 MG capsule  Commonly known as: VISTARIL      Dose: 50 mg  Take 1 capsule (50 mg) by mouth every 6 hours as needed for itching or anxiety  Quantity: 30 capsule  Refills: 0     hydrOXYzine HCl 50 MG tablet  Commonly known as: ATARAX  Used for: Generalized anxiety disorder      Dose: 50 mg  Take 1 tablet (50 mg) by mouth every 6 hours as needed for anxiety  Quantity: 60 tablet  Refills: 0     OLANZapine 15 MG tablet  Commonly known as: zyPREXA  Used for: Schizoaffective disorder, bipolar type (H)      Dose: 15 mg  Take 1 tablet (15 mg) by mouth at bedtime  Quantity: 30 tablet  Refills: 0     prazosin 1 MG capsule  Commonly known as: MINIPRESS  Used for: Chronic post-traumatic stress disorder (PTSD)      Dose: 1 mg  Take 1 capsule (1 mg) by mouth at bedtime  Quantity: 30 capsule  Refills: 0     prenatal multivitamin w/iron 27-0.8 MG tablet  Used for: Pregnancy examination or test, positive result      Dose: 1 tablet  Take 1 tablet by mouth daily  Quantity: 90 tablet  Refills: 3     pyridOXINE 25 MG tablet  Commonly known as: VITAMIN B6  Used for: Pregnancy examination or test, positive result      Dose: 25 mg  Take 1 tablet (25 mg) by mouth 3 times daily as needed (Nausea, vomiting)  Quantity: 90 tablet  Refills: 1     thiamine 100 MG tablet  Commonly known as: B-1  Used for: Alcohol dependence with intoxication with complication (H)      TAKE  1 TABLET BY MOUTH ONCE DAILY  Quantity: 30 tablet  Refills: 0     traZODone 100 MG tablet  Commonly known as: DESYREL      Dose: 100 mg  Take 1 tablet (100 mg) by mouth at bedtime  Quantity: 10 tablet  Refills: 0     venlafaxine 75 MG 24 hr capsule  Commonly known as: EFFEXOR XR  Used for: Schizoaffective disorder, bipolar type (H)      Dose: 75 mg  Take 1 capsule (75 mg) by mouth daily (with breakfast)  Quantity: 30 capsule  Refills: 0               Where to get your medicines        These medications were sent to [id number] - [place], [place] - [facility] AT [facility] [address]  [address], [facility] [address]      Phone: [phone]   meclizine 12.5 MG tablet  senna-docusate 8.6-50 MG tablet         Discharge Instructions and Follow-up:    Discharge diet: Regular   Discharge activity: Closely monitor balance and return to triage prn   Discharge follow-up: Follow up with primary OB within the week      Discharge disposition:     Self-directed discharge      Dr. Humphrey was updated.    Erik Smith MD  Obstetrics and Gynecology, PGY-2  08/23/2024 12:30 PM

## 2024-08-23 ENCOUNTER — HOSPITAL ENCOUNTER (INPATIENT)
Facility: CLINIC | Age: 34
End: 2024-08-23
Admitting: OBSTETRICS & GYNECOLOGY
Payer: COMMERCIAL

## 2024-08-23 ENCOUNTER — TELEPHONE (OUTPATIENT)
Dept: OBGYN | Facility: CLINIC | Age: 34
End: 2024-08-23
Payer: COMMERCIAL

## 2024-08-23 ENCOUNTER — MEDICAL CORRESPONDENCE (OUTPATIENT)
Dept: OBGYN | Facility: CLINIC | Age: 34
End: 2024-08-23
Payer: COMMERCIAL

## 2024-08-23 DIAGNOSIS — R42 VERTIGO: Primary | ICD-10-CM

## 2024-08-23 PROCEDURE — 250N000013 HC RX MED GY IP 250 OP 250 PS 637

## 2024-08-23 PROCEDURE — 93005 ELECTROCARDIOGRAM TRACING: CPT

## 2024-08-23 RX ORDER — MECLIZINE HCL 12.5 MG 12.5 MG/1
12.5 TABLET ORAL EVERY 6 HOURS PRN
Qty: 20 TABLET | Refills: 0 | Status: SHIPPED | OUTPATIENT
Start: 2024-08-23

## 2024-08-23 RX ORDER — AMOXICILLIN 250 MG
1 CAPSULE ORAL 2 TIMES DAILY
Qty: 30 TABLET | Refills: 3 | Status: ON HOLD | OUTPATIENT
Start: 2024-08-23 | End: 2024-09-19

## 2024-08-23 RX ADMIN — ACETAMINOPHEN 650 MG: 325 TABLET ORAL at 02:14

## 2024-08-23 ASSESSMENT — ACTIVITIES OF DAILY LIVING (ADL)
ADLS_ACUITY_SCORE: 18
ADLS_ACUITY_SCORE: 19
ADLS_ACUITY_SCORE: 18

## 2024-08-23 NOTE — TELEPHONE ENCOUNTER
----- Message from Erik Smith sent at 8/23/2024  9:49 AM CDT -----  Regarding: appontment next week  Hi there,    Could you make an appointment for Rianna next week? She had a self-directed discharge today due to childcare issues and was inpatient overnight for dizziness. She'll need a call to discuss availability.    Thanks!  Erik   [Dull/Aching] : dull/aching [Sharp] : sharp [Shooting] : shooting [Stabbing] : stabbing [Throbbing] : throbbing [Constant] : constant [Nothing helps with pain getting better] : Nothing helps with pain getting better [Full time] : Work status: full time [de-identified] : Patient presents today with bilateral shoulder pain, left shoulder over 1 year, and the right a few months with NKI. Previously saw Dr. Mcclure for the left shoulder. Pain is 7/10 in left shoulder and 3-4/10 in right shoulder. Taking Aleve PRN. Had left shoulder MRI done in 2020 at [facility]. no pain in right, just clicking. pain laterally on left. waking from pain, rhd. \par \par Patient been going to PT 2x a week since last appointment. Reports pain is a lot better, 80% improvement.  Reports taking Rx naproxen. \par \par  [] : no [FreeTextEntry1] : BIlateral Shoulders [de-identified] : Overhead motions [de-identified] : Dr Mcclure [de-identified] : Left Shoulder MRI at [facility] [de-identified] :  at [facility]

## 2024-08-23 NOTE — PLAN OF CARE
VSS. Pt denies cramping, bleeding, LOF, DFM. See flowsheet for FHT and uterine activity. Pt was able to walk outside last night, has not had any symptoms of vertigo since seeing PT. Reported a headache around 0230, resolved with tylenol. Pt was able to rest overnight, educated to call out with new onset s/s. Continue with plan of care.

## 2024-08-23 NOTE — PLAN OF CARE
Physical Therapy Discharge Summary    Reason for therapy discharge:    Discharged to home with outpatient therapy.    Progress towards therapy goal(s). See goals on Care Plan in [facility] electronic health record for goal details.  Goals partially met.  Barriers to achieving goals:   discharge from facility.    Therapy recommendation(s):    Continued therapy is recommended.  Rationale/Recommendations:  per chart review PT treatment of BPPV was successful and pt feeling resolution of sx, if any sx continue would encourage pt to follow up w/ OP vestibular PT, we did also plan to issue a FWW but pt discharged without therapy knowing so this was not received, anticipate if dizziness has resolved that balance has improved as well.

## 2024-08-23 NOTE — PROGRESS NOTES
"Patient requesting IV removal and that she had to leave to  her son.  She states that \"she co-parents with her ex-partner and she needs to leave and take care of her son per court order.\"  Dr Smith at bedside and reviewed risks with patient.  Patient has an clinic appointment on 8/26.  "
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
## 2024-08-25 LAB — BACTERIA SPEC CULT: ABNORMAL

## 2024-08-26 ENCOUNTER — VIRTUAL VISIT (OUTPATIENT)
Dept: INTERNAL MEDICINE | Facility: CLINIC | Age: 34
End: 2024-08-26
Payer: COMMERCIAL

## 2024-08-26 DIAGNOSIS — R42 VERTIGO: ICD-10-CM

## 2024-08-26 DIAGNOSIS — Z34.92 SECOND TRIMESTER PREGNANCY: Primary | ICD-10-CM

## 2024-08-26 PROCEDURE — 99213 OFFICE O/P EST LOW 20 MIN: CPT | Mod: 95 | Performed by: INTERNAL MEDICINE

## 2024-08-26 NOTE — PROGRESS NOTES
Rinana is a 34 year old who is being evaluated via a billable video visit.    How would you like to obtain your AVS? MyChart  If the video visit is dropped, the invitation should be resent by: Text to cell phone: [phone]  Will anyone else be joining your video visit? No      Assessment & Plan   Problem List Items Addressed This Visit          Other    Second trimester pregnancy - Primary     MTM follow up scheduled for tomorrow.          Vertigo     Here for hospital follow up after admission for vertigo, most likely BPPV. Symptoms significantly improved during admission with Epley maneuvers done by PT. Has not had recurrence of vertigo or dizziness since she got home.   - At this time, do feel presentation most c/w BPPV, since symptoms remain resolved, likely okay to hold off brain MRI at this time  - Will place vestibular PT order to have on file if BPPV symptoms return for outpatient Epley maneuvers  - Patient will  PRN meclizine to have on hand  - Discussed importance of maintaining hydration as well          Relevant Orders    Physical Therapy  Referral           MED REC REQUIRED  Post Medication Reconciliation Status:  Discharge medications reconciled, continue medications without change  Nicotine/Tobacco Cessation  She reports that she has been smoking cigarettes. She started smoking about 18 years ago. She has a 3.8 pack-year smoking history. She uses smokeless tobacco.      FUTURE APPOINTMENTS:       - Follow-up visit with M tomorrow       Subjective   Rianna is a 34 year old, presenting for the following health issues:  Hospital F/U (Pt states she is doing better today. )        8/26/2024     1:49 PM   Additional Questions   Roomed by Ralph ESPINO CMA     Video Start Time:  2:02    HPI     Hospital Follow-up Visit:    Hospital/Nursing Home/IP Rehab Facility: [facility]  Date of Admission: 08/20/24  Date of Discharge: 08/23/24  Reason(s)  for Admission: Vertigo, abdominal pain   Was the patient in the ICU or did the patient experience delirium during hospitalization?  No  Do you have any other stressors you would like to discuss with your provider? No    Problems taking medications regularly:  None  Medication changes since discharge: Added stool softener (senna) and meclizine for dizziness.   Problems adhering to non-medication therapy:  None    Summary of hospitalization:  [facility] discharge summary reviewed  Diagnostic Tests/Treatments reviewed.  Follow up needed: MRI if symptoms worsen   Other Healthcare Providers Involved in Patient s Care:         Specialist appointment - MTM tomorrow  Update since discharge: improved.         Plan of care communicated with patient           Patient was admitted 8/20-8/23 at [facility] for dizziness and vertigo after presenting with 3 days of abdominal pain, dizziness, headache and blurry vision.  She did receive meclizine with mild improvement in her dizziness.  PT saw her and also performed Epley maneuvers for presumed BPPV which helped the most.  Cardiac workup was negative.  Had MRI was ordered but unable to be done as she needed to leave and  her son.  Does have M appointment scheduled for tomorrow.     She tells me today that the dizziness and vertigo are much better. Hasn't needed the meclizine (picking up from pharmacy tomorrow). Resting and trying to drink lots of water.       Review of Systems  Constitutional, neuro, ENT, endocrine, pulmonary, cardiac, gastrointestinal, genitourinary, musculoskeletal, integument and psychiatric systems are negative, except as otherwise noted.      Objective           Vitals:  No vitals were obtained today due to virtual visit.    Physical Exam   GENERAL: alert and no distress  EYES: Eyes grossly normal to inspection.  No discharge or erythema, or obvious scleral/conjunctival abnormalities.  RESP: No audible wheeze, cough, or visible cyanosis.     SKIN: Visible skin clear. No significant rash, abnormal pigmentation or lesions.  NEURO: Cranial nerves grossly intact.  Mentation and speech appropriate for age.  PSYCH: Appropriate affect, tone, and pace of words        Video-Visit Details    Type of service:  Video Visit   Video End Time: 2:07  Originating Location (pt. Location): Home    Distant Location (provider location):  On-site  Platform used for Video Visit: Silvia  Signed Electronically by: Jasmin Camargo MD

## 2024-08-26 NOTE — ASSESSMENT & PLAN NOTE
Here for hospital follow up after admission for vertigo, most likely BPPV. Symptoms significantly improved during admission with Epley maneuvers done by PT. Has not had recurrence of vertigo or dizziness since she got home.   - At this time, do feel presentation most c/w BPPV, since symptoms remain resolved, likely okay to hold off brain MRI at this time  - Will place vestibular PT order to have on file if BPPV symptoms return for outpatient Epley maneuvers  - Patient will  PRN meclizine to have on hand  - Discussed importance of maintaining hydration as well

## 2024-08-27 ENCOUNTER — HOSPITAL ENCOUNTER (OUTPATIENT)
Dept: ULTRASOUND IMAGING | Facility: CLINIC | Age: 34
Discharge: HOME | End: 2024-08-27
Attending: OBSTETRICS & GYNECOLOGY
Payer: COMMERCIAL

## 2024-08-27 ENCOUNTER — OFFICE VISIT (OUTPATIENT)
Dept: MATERNAL FETAL MEDICINE | Facility: CLINIC | Age: 34
End: 2024-08-27
Attending: OBSTETRICS & GYNECOLOGY
Payer: COMMERCIAL

## 2024-08-27 ENCOUNTER — TELEPHONE (OUTPATIENT)
Dept: PSYCHOLOGY | Facility: CLINIC | Age: 34
End: 2024-08-27
Payer: COMMERCIAL

## 2024-08-27 VITALS
SYSTOLIC BLOOD PRESSURE: 121 MMHG | DIASTOLIC BLOOD PRESSURE: 77 MMHG | OXYGEN SATURATION: 98 % | HEART RATE: 86 BPM | RESPIRATION RATE: 20 BRPM

## 2024-08-27 DIAGNOSIS — F25.0 SCHIZOAFFECTIVE DISORDER, BIPOLAR TYPE (H): Primary | ICD-10-CM

## 2024-08-27 DIAGNOSIS — O26.90 PREGNANCY RELATED CONDITION, ANTEPARTUM: ICD-10-CM

## 2024-08-27 DIAGNOSIS — O35.9XX0 FETAL ABNORMALITY AFFECTING MANAGEMENT OF MOTHER, ANTEPARTUM, SINGLE OR UNSPECIFIED FETUS: ICD-10-CM

## 2024-08-27 PROCEDURE — 99417 PROLNG OP E/M EACH 15 MIN: CPT | Mod: 25 | Performed by: OBSTETRICS & GYNECOLOGY

## 2024-08-27 PROCEDURE — 76811 OB US DETAILED SNGL FETUS: CPT | Mod: 26 | Performed by: OBSTETRICS & GYNECOLOGY

## 2024-08-27 PROCEDURE — 99215 OFFICE O/P EST HI 40 MIN: CPT | Mod: 25 | Performed by: OBSTETRICS & GYNECOLOGY

## 2024-08-27 PROCEDURE — 76811 OB US DETAILED SNGL FETUS: CPT

## 2024-08-27 PROCEDURE — G0463 HOSPITAL OUTPT CLINIC VISIT: HCPCS | Mod: 25 | Performed by: OBSTETRICS & GYNECOLOGY

## 2024-08-27 ASSESSMENT — PATIENT HEALTH QUESTIONNAIRE - PHQ9: SUM OF ALL RESPONSES TO PHQ QUESTIONS 1-9: 16

## 2024-08-27 ASSESSMENT — PAIN SCALES - GENERAL: PAINLEVEL: NO PAIN (0)

## 2024-08-27 NOTE — NURSING NOTE
Rianna was seen in [facility] today for h/o PTD, H/O cystic structure on outside U/S, and h/o ALEKSANDRA.   Dr. Barba and Dr. Tolentino into see patient.   Follow up for pt includes, F/U comp at [facility] in 4 weeks and fetal echo in 1-2 weeks.  Message was sent to get pt in with [facility] as pt scored 16 on PHQ 9 and pt requested this.  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 Please see [facility] consult note for recommendations.  Patient was discharged ambulatory and stable.

## 2024-08-30 ENCOUNTER — TELEPHONE (OUTPATIENT)
Dept: PSYCHIATRY | Facility: CLINIC | Age: 34
End: 2024-08-30
Payer: COMMERCIAL
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
## 2024-09-03 ENCOUNTER — PATIENT OUTREACH (OUTPATIENT)
Dept: CARE COORDINATION | Facility: CLINIC | Age: 34
End: 2024-09-03
Payer: COMMERCIAL

## 2024-09-03 NOTE — LETTER
[facility] CARE COORDINATION  [facility]  September 5, 2024    Rianna Man  [address] [address]      Dear Rianna,    I am a clinic care coordinator who works with Marcia Solis MD with the [facility]. I have been trying to reach you recently to introduce Clinic Care Coordination. Below is a description of clinic care coordination and how I can further assist you.       The clinic care coordination team is made up of a registered nurse and  who understand the health care system. The goal of clinic care coordination is to help you manage your health and improve access to the health care system. Our team works alongside your provider to assist you in determining your health and social needs. We can help you obtain health care and community resources, providing you with necessary information and education. We can work with you through any barriers and develop a care plan that helps coordinate and strengthen the communication between you and your care team.  Our services are voluntary and are offered without charge to you personally.    Please feel free to contact me with any questions or concerns regarding care coordination and what we can offer.      We are focused on providing you with the highest-quality healthcare experience possible.    Sincerely,     RAHEL Pelaez  Clinic Care Coordination  [facility]  [email or web address]  [phone]    
No

## 2024-09-03 NOTE — PROGRESS NOTES
Clinic Care Coordination Contact  [facility]/Voicemail    Clinical Data: Care Coordinator Outreach    Outreach Documentation Number of Outreach Attempt   9/3/2024   9:37 AM 1     Left message on patient's voicemail with call back information and requested return call.    Plan: Care Coordinator will try to reach patient again in 1-2 business days.    Ronit Mayo SHANNON  Clinic Care Coordination  [facility]  [email or web address]  [phone]

## 2024-09-04 ENCOUNTER — TELEPHONE (OUTPATIENT)
Dept: PHARMACY | Facility: CLINIC | Age: 34
End: 2024-09-04
Payer: COMMERCIAL

## 2024-09-04 ENCOUNTER — TELEPHONE (OUTPATIENT)
Dept: SURGERY | Facility: CLINIC | Age: 34
End: 2024-09-04
Payer: COMMERCIAL

## 2024-09-04 NOTE — TELEPHONE ENCOUNTER
Called patient x 2 for scheduled MTM visit, though no answer.  Left voicemail with call back # of [phone] to reschedule appointment.  Per recent MFM note, patient had recently stopped all of her medications due to concern for fetal harm.    Chelsey Jorge, PharmD  Medication Therapy Management Pharmacist  [facility]

## 2024-09-04 NOTE — TELEPHONE ENCOUNTER
Outbound call from Pediatric General Surgery.    I called patient to help schedule a consultation with Dr. Shirley, per the request of Dr. Shirley. I was unable to reach the patient. I left a detailed voicemail with my direct number for a parent to call me back at [phone].    Thank you  Susana

## 2024-09-05 NOTE — PROGRESS NOTES
Clinic Care Coordination Contact  [facility]/Voicemail    Clinical Data: Care Coordinator Outreach    Outreach Documentation Number of Outreach Attempt   9/3/2024   9:37 AM 1   9/5/2024  10:38 AM 2     Patient's voicemail was full, so I was unable to leave a message.    Plan: Care Coordinator will send unable to contact letter to patient via [employer]. Care Coordinator will try to reach patient again in 3-5 business days.    Ronit Mayo SHANNON  Clinic Care Coordination  [facility]  [email or web address]  [phone]

## 2024-09-12 ENCOUNTER — PATIENT OUTREACH (OUTPATIENT)
Dept: CARE COORDINATION | Facility: CLINIC | Age: 34
End: 2024-09-12
Payer: COMMERCIAL

## 2024-09-12 NOTE — PROGRESS NOTES
Clinic Care Coordination Contact  [facility]/Voicemail    Clinical Data: Care Coordinator Outreach    Outreach Documentation Number of Outreach Attempt   9/3/2024   9:37 AM 1   9/5/2024  10:38 AM 2   9/12/2024  10:09 AM 3     Left message on patient's voicemail with call back information and requested return call.    Plan: Care Coordinator will do no further outreaches at this time.    Ronit Mayo SHANNON  Clinic Care Coordination  [facility]  [email or web address]  [phone]

## 2024-09-17 ENCOUNTER — HOSPITAL ENCOUNTER (INPATIENT)
Facility: CLINIC | Age: 34
LOS: 2 days | Discharge: NURSING, REHAB OR LONG-TERM CARE | End: 2024-09-19
Attending: OBSTETRICS & GYNECOLOGY | Admitting: OBSTETRICS & GYNECOLOGY
Payer: COMMERCIAL

## 2024-09-17 DIAGNOSIS — F25.9 SCHIZOAFFECTIVE DISORDER, UNSPECIFIED TYPE (H): Primary | ICD-10-CM

## 2024-09-17 DIAGNOSIS — F25.0 SCHIZOAFFECTIVE DISORDER, BIPOLAR TYPE (H): ICD-10-CM

## 2024-09-17 DIAGNOSIS — J02.9 SORE THROAT: ICD-10-CM

## 2024-09-17 DIAGNOSIS — R05.1 ACUTE COUGH: ICD-10-CM

## 2024-09-17 DIAGNOSIS — R42 VERTIGO: ICD-10-CM

## 2024-09-17 DIAGNOSIS — Z32.01 PREGNANCY EXAMINATION OR TEST, POSITIVE RESULT: ICD-10-CM

## 2024-09-17 PROBLEM — R50.9 FEVER AND CHILLS: Status: ACTIVE | Noted: 2024-09-17

## 2024-09-17 LAB
ALBUMIN SERPL BCG-MCNC: 3.5 G/DL (ref 3.5–5.2)
ALBUMIN UR-MCNC: NEGATIVE MG/DL
ALP SERPL-CCNC: 76 U/L (ref 40–150)
ALT SERPL W P-5'-P-CCNC: 19 U/L (ref 0–50)
ANION GAP SERPL CALCULATED.3IONS-SCNC: 11 MMOL/L (ref 7–15)
APPEARANCE UR: CLEAR
AST SERPL W P-5'-P-CCNC: 14 U/L (ref 0–45)
BACTERIA #/AREA URNS HPF: ABNORMAL /HPF
BILIRUB SERPL-MCNC: 0.2 MG/DL
BILIRUB UR QL STRIP: NEGATIVE
BUN SERPL-MCNC: 3.2 MG/DL (ref 6–20)
C PNEUM DNA SPEC QL NAA+PROBE: NOT DETECTED
CALCIUM SERPL-MCNC: 8.9 MG/DL (ref 8.8–10.4)
CHLORIDE SERPL-SCNC: 100 MMOL/L (ref 98–107)
CLUE CELLS: NORMAL
COLOR UR AUTO: ABNORMAL
CREAT SERPL-MCNC: 0.49 MG/DL (ref 0.51–0.95)
EGFRCR SERPLBLD CKD-EPI 2021: >90 ML/MIN/1.73M2
ERYTHROCYTE [DISTWIDTH] IN BLOOD BY AUTOMATED COUNT: 11.9 % (ref 10–15)
FLUAV H1 2009 PAND RNA SPEC QL NAA+PROBE: NOT DETECTED
FLUAV H1 RNA SPEC QL NAA+PROBE: NOT DETECTED
FLUAV H3 RNA SPEC QL NAA+PROBE: NOT DETECTED
FLUAV RNA SPEC QL NAA+PROBE: NOT DETECTED
FLUBV RNA SPEC QL NAA+PROBE: NOT DETECTED
GLUCOSE SERPL-MCNC: 88 MG/DL (ref 70–99)
GLUCOSE UR STRIP-MCNC: NEGATIVE MG/DL
HADV DNA SPEC QL NAA+PROBE: NOT DETECTED
HCO3 SERPL-SCNC: 20 MMOL/L (ref 22–29)
HCOV PNL SPEC NAA+PROBE: NOT DETECTED
HCT VFR BLD AUTO: 34.3 % (ref 35–47)
HGB BLD-MCNC: 11.7 G/DL (ref 11.7–15.7)
HGB UR QL STRIP: NEGATIVE
HMPV RNA SPEC QL NAA+PROBE: NOT DETECTED
HPIV1 RNA SPEC QL NAA+PROBE: NOT DETECTED
HPIV2 RNA SPEC QL NAA+PROBE: NOT DETECTED
HPIV3 RNA SPEC QL NAA+PROBE: NOT DETECTED
HPIV4 RNA SPEC QL NAA+PROBE: DETECTED
KETONES UR STRIP-MCNC: NEGATIVE MG/DL
LEUKOCYTE ESTERASE UR QL STRIP: NEGATIVE
M PNEUMO DNA SPEC QL NAA+PROBE: NOT DETECTED
MCH RBC QN AUTO: 29.6 PG (ref 26.5–33)
MCHC RBC AUTO-ENTMCNC: 34.1 G/DL (ref 31.5–36.5)
MCV RBC AUTO: 87 FL (ref 78–100)
NITRATE UR QL: NEGATIVE
PH UR STRIP: 6.5 [PH] (ref 5–7)
PLATELET # BLD AUTO: 196 10E3/UL (ref 150–450)
POTASSIUM SERPL-SCNC: 4.2 MMOL/L (ref 3.4–5.3)
PROT SERPL-MCNC: 6.5 G/DL (ref 6.4–8.3)
RBC # BLD AUTO: 3.95 10E6/UL (ref 3.8–5.2)
RBC URINE: 0 /HPF
RSV RNA SPEC QL NAA+PROBE: NOT DETECTED
RSV RNA SPEC QL NAA+PROBE: NOT DETECTED
RV+EV RNA SPEC QL NAA+PROBE: NOT DETECTED
SODIUM SERPL-SCNC: 131 MMOL/L (ref 135–145)
SP GR UR STRIP: 1 (ref 1–1.03)
SQUAMOUS EPITHELIAL: 3 /HPF
TRICHOMONAS, WET PREP: NORMAL
UROBILINOGEN UR STRIP-MCNC: NORMAL MG/DL
WBC # BLD AUTO: 7.1 10E3/UL (ref 4–11)
WBC URINE: 1 /HPF
WBC'S/HIGH POWER FIELD, WET PREP: NORMAL
YEAST, WET PREP: NORMAL

## 2024-09-17 PROCEDURE — 85027 COMPLETE CBC AUTOMATED: CPT

## 2024-09-17 PROCEDURE — G0463 HOSPITAL OUTPT CLINIC VISIT: HCPCS

## 2024-09-17 PROCEDURE — 250N000013 HC RX MED GY IP 250 OP 250 PS 637

## 2024-09-17 PROCEDURE — 87581 M.PNEUMON DNA AMP PROBE: CPT

## 2024-09-17 PROCEDURE — 999N000104 HC STATISTIC NO CHARGE

## 2024-09-17 PROCEDURE — 258N000003 HC RX IP 258 OP 636

## 2024-09-17 PROCEDURE — 120N000003 HC R&B IMCU UMMC

## 2024-09-17 PROCEDURE — 87633 RESP VIRUS 12-25 TARGETS: CPT

## 2024-09-17 PROCEDURE — 82040 ASSAY OF SERUM ALBUMIN: CPT

## 2024-09-17 PROCEDURE — 81001 URINALYSIS AUTO W/SCOPE: CPT

## 2024-09-17 PROCEDURE — 87210 SMEAR WET MOUNT SALINE/INK: CPT

## 2024-09-17 PROCEDURE — 83036 HEMOGLOBIN GLYCOSYLATED A1C: CPT

## 2024-09-17 RX ORDER — PROCHLORPERAZINE MALEATE 10 MG
10 TABLET ORAL EVERY 6 HOURS PRN
Status: DISCONTINUED | OUTPATIENT
Start: 2024-09-17 | End: 2024-09-19 | Stop reason: HOSPADM

## 2024-09-17 RX ORDER — PRENATAL VIT/IRON FUM/FOLIC AC 27MG-0.8MG
1 TABLET ORAL DAILY
Status: DISCONTINUED | OUTPATIENT
Start: 2024-09-18 | End: 2024-09-19 | Stop reason: HOSPADM

## 2024-09-17 RX ORDER — TRAZODONE HYDROCHLORIDE 100 MG/1
100 TABLET ORAL AT BEDTIME
Status: DISCONTINUED | OUTPATIENT
Start: 2024-09-17 | End: 2024-09-19 | Stop reason: HOSPADM

## 2024-09-17 RX ORDER — PRENATAL VIT/IRON FUM/FOLIC AC 27MG-0.8MG
1 TABLET ORAL DAILY
Status: DISCONTINUED | OUTPATIENT
Start: 2024-09-18 | End: 2024-09-17

## 2024-09-17 RX ORDER — AMOXICILLIN 250 MG
1 CAPSULE ORAL 2 TIMES DAILY PRN
Status: DISCONTINUED | OUTPATIENT
Start: 2024-09-18 | End: 2024-09-19 | Stop reason: HOSPADM

## 2024-09-17 RX ORDER — PRAZOSIN HYDROCHLORIDE 1 MG/1
1 CAPSULE ORAL AT BEDTIME
Status: DISCONTINUED | OUTPATIENT
Start: 2024-09-17 | End: 2024-09-18

## 2024-09-17 RX ORDER — PROCHLORPERAZINE 25 MG
25 SUPPOSITORY, RECTAL RECTAL EVERY 12 HOURS PRN
Status: DISCONTINUED | OUTPATIENT
Start: 2024-09-17 | End: 2024-09-19 | Stop reason: HOSPADM

## 2024-09-17 RX ORDER — ONDANSETRON 4 MG/1
4 TABLET, ORALLY DISINTEGRATING ORAL EVERY 6 HOURS PRN
Status: DISCONTINUED | OUTPATIENT
Start: 2024-09-17 | End: 2024-09-19 | Stop reason: HOSPADM

## 2024-09-17 RX ORDER — ONDANSETRON 2 MG/ML
4 INJECTION INTRAMUSCULAR; INTRAVENOUS EVERY 6 HOURS PRN
Status: DISCONTINUED | OUTPATIENT
Start: 2024-09-17 | End: 2024-09-19 | Stop reason: HOSPADM

## 2024-09-17 RX ORDER — METOCLOPRAMIDE 10 MG/1
10 TABLET ORAL EVERY 6 HOURS PRN
Status: DISCONTINUED | OUTPATIENT
Start: 2024-09-17 | End: 2024-09-19 | Stop reason: HOSPADM

## 2024-09-17 RX ORDER — AMOXICILLIN 250 MG
2 CAPSULE ORAL 2 TIMES DAILY PRN
Status: DISCONTINUED | OUTPATIENT
Start: 2024-09-18 | End: 2024-09-19 | Stop reason: HOSPADM

## 2024-09-17 RX ORDER — METOCLOPRAMIDE HYDROCHLORIDE 5 MG/ML
10 INJECTION INTRAMUSCULAR; INTRAVENOUS EVERY 6 HOURS PRN
Status: DISCONTINUED | OUTPATIENT
Start: 2024-09-17 | End: 2024-09-19 | Stop reason: HOSPADM

## 2024-09-17 RX ORDER — ACETAMINOPHEN 325 MG/1
650 TABLET ORAL EVERY 4 HOURS PRN
Status: DISCONTINUED | OUTPATIENT
Start: 2024-09-17 | End: 2024-09-19 | Stop reason: HOSPADM

## 2024-09-17 RX ORDER — ACETAMINOPHEN 325 MG/1
975 TABLET ORAL ONCE
Status: COMPLETED | OUTPATIENT
Start: 2024-09-17 | End: 2024-09-17

## 2024-09-17 RX ORDER — VENLAFAXINE HYDROCHLORIDE 75 MG/1
75 CAPSULE, EXTENDED RELEASE ORAL
Status: DISCONTINUED | OUTPATIENT
Start: 2024-09-18 | End: 2024-09-18

## 2024-09-17 RX ORDER — OLANZAPINE 15 MG/1
15 TABLET ORAL AT BEDTIME
Status: DISCONTINUED | OUTPATIENT
Start: 2024-09-17 | End: 2024-09-18

## 2024-09-17 RX ADMIN — ACETAMINOPHEN 975 MG: 325 TABLET ORAL at 19:46

## 2024-09-17 RX ADMIN — SODIUM CHLORIDE, POTASSIUM CHLORIDE, SODIUM LACTATE AND CALCIUM CHLORIDE 1000 ML: 600; 310; 30; 20 INJECTION, SOLUTION INTRAVENOUS at 20:00

## 2024-09-17 ASSESSMENT — ACTIVITIES OF DAILY LIVING (ADL)
ADLS_ACUITY_SCORE: 18
ADLS_ACUITY_SCORE: 35
ADLS_ACUITY_SCORE: 18

## 2024-09-18 LAB
C TRACH DNA SPEC QL PROBE+SIG AMP: NEGATIVE
EST. AVERAGE GLUCOSE BLD GHB EST-MCNC: 80 MG/DL
HBA1C MFR BLD: 4.4 %
HBV CORE AB SERPL QL IA: NONREACTIVE
HBV CORE IGM SERPL QL IA: NONREACTIVE
HBV SURFACE AG SERPL QL IA: NONREACTIVE
HCV AB SERPL QL IA: NONREACTIVE
HIV 1+2 AB+HIV1 P24 AG SERPL QL IA: NONREACTIVE
N GONORRHOEA DNA SPEC QL NAA+PROBE: NEGATIVE
T PALLIDUM AB SER QL: NONREACTIVE

## 2024-09-18 PROCEDURE — 86780 TREPONEMA PALLIDUM: CPT

## 2024-09-18 PROCEDURE — 86803 HEPATITIS C AB TEST: CPT

## 2024-09-18 PROCEDURE — 86704 HEP B CORE ANTIBODY TOTAL: CPT

## 2024-09-18 PROCEDURE — 120N000003 HC R&B IMCU UMMC

## 2024-09-18 PROCEDURE — 250N000013 HC RX MED GY IP 250 OP 250 PS 637

## 2024-09-18 PROCEDURE — 87491 CHLMYD TRACH DNA AMP PROBE: CPT

## 2024-09-18 PROCEDURE — 87340 HEPATITIS B SURFACE AG IA: CPT

## 2024-09-18 PROCEDURE — 250N000011 HC RX IP 250 OP 636

## 2024-09-18 PROCEDURE — 36415 COLL VENOUS BLD VENIPUNCTURE: CPT

## 2024-09-18 PROCEDURE — 87389 HIV-1 AG W/HIV-1&-2 AB AG IA: CPT

## 2024-09-18 PROCEDURE — 86705 HEP B CORE ANTIBODY IGM: CPT

## 2024-09-18 PROCEDURE — 99254 IP/OBS CNSLTJ NEW/EST MOD 60: CPT

## 2024-09-18 RX ORDER — CYCLOBENZAPRINE HCL 5 MG
5 TABLET ORAL EVERY 8 HOURS PRN
Status: DISCONTINUED | OUTPATIENT
Start: 2024-09-18 | End: 2024-09-19 | Stop reason: HOSPADM

## 2024-09-18 RX ORDER — OLANZAPINE 10 MG/1
10 TABLET ORAL AT BEDTIME
Status: DISCONTINUED | OUTPATIENT
Start: 2024-09-18 | End: 2024-09-19

## 2024-09-18 RX ORDER — VENLAFAXINE HYDROCHLORIDE 37.5 MG/1
37.5 CAPSULE, EXTENDED RELEASE ORAL
Status: DISCONTINUED | OUTPATIENT
Start: 2024-09-19 | End: 2024-09-19 | Stop reason: HOSPADM

## 2024-09-18 RX ADMIN — Medication 1 LOZENGE: at 09:48

## 2024-09-18 RX ADMIN — METOCLOPRAMIDE 10 MG: 10 TABLET ORAL at 09:48

## 2024-09-18 RX ADMIN — ONDANSETRON 4 MG: 2 INJECTION INTRAMUSCULAR; INTRAVENOUS at 06:58

## 2024-09-18 RX ADMIN — ONDANSETRON 4 MG: 2 INJECTION INTRAMUSCULAR; INTRAVENOUS at 00:17

## 2024-09-18 RX ADMIN — ACETAMINOPHEN 650 MG: 325 TABLET ORAL at 20:02

## 2024-09-18 RX ADMIN — TRAZODONE HYDROCHLORIDE 100 MG: 100 TABLET ORAL at 22:18

## 2024-09-18 RX ADMIN — Medication 1 LOZENGE: at 19:06

## 2024-09-18 RX ADMIN — Medication 1 LOZENGE: at 12:38

## 2024-09-18 RX ADMIN — ACETAMINOPHEN 650 MG: 325 TABLET ORAL at 05:58

## 2024-09-18 RX ADMIN — Medication 1 LOZENGE: at 06:02

## 2024-09-18 RX ADMIN — OLANZAPINE 10 MG: 10 TABLET, FILM COATED ORAL at 22:18

## 2024-09-18 RX ADMIN — ACETAMINOPHEN 650 MG: 325 TABLET ORAL at 00:16

## 2024-09-18 RX ADMIN — ACETAMINOPHEN 650 MG: 325 TABLET ORAL at 09:50

## 2024-09-18 RX ADMIN — ACETAMINOPHEN 650 MG: 325 TABLET ORAL at 16:01

## 2024-09-18 RX ADMIN — CYCLOBENZAPRINE HYDROCHLORIDE 5 MG: 5 TABLET, FILM COATED ORAL at 16:27

## 2024-09-18 RX ADMIN — Medication 1 LOZENGE: at 20:06

## 2024-09-18 RX ADMIN — Medication 1 LOZENGE: at 16:02

## 2024-09-18 ASSESSMENT — ACTIVITIES OF DAILY LIVING (ADL)
ADLS_ACUITY_SCORE: 18

## 2024-09-18 NOTE — CONSULTS
"  ----------------------------------------------------------------------------------------------------------  [facility]   Psychiatry  Consult                  Psychiatry Consult          Rianna Man is a 34 year old female who was referred by Ob/Gyn for evaluation of psych medications in antepartum period.      Psychiatrist: ARLENE English @ [facility]    PCP: Hilary Edouard  Other Providers: None     Chief Concern   / HPI                                                                                            \" Feeling sick and have been of psych meds \"       Rianna Man is a 34 year old  at 28w4d by LMP c/w 13w4d US who presented  for flu-like symptoms. Endorses cough, chills, nausea, vomiting, and body aches all starting around the same time. Has not been able to check her temperature but does feel that she has been feverish. Reports that she is currently living in a shelter, and her symptoms all started around three days ago, when the rest of the people staying in the shelter developed similar symptoms.     Previously on olanzapine, venlafaxine as well as prazosin and trazadone but has not been able to take meds in months. Was admitted to psychiatry in very early pregnancy (2024) for command auditory hallucinations. Was stabilized and discharged on Olanzapine 15mg at bedtime, Prazosin 1mg at bedtime, Trazodone 100mg at bedtime, Effexor 75mg daily. She then followed up with her outpatient psych provider at [facility] and that provider pulled her off medications due to being pregnancy per pt report. Pt states that she has had auditory hallucinations, not commanding in nature but just very negative. Also feels hopeless. No SI, HI, or VH. Is agreeable to going back on medications.      Substance Use History                                                               H/o methamphetamine, alcohol use disorder and " heroin use. Currently endorses Cannabis use -- last use a week ago. Denies illicit drug use since she learned she was pregnant         Psychiatric History   Previous diagnoses include Schizoaffective Disorder, FITZ, OCD, PTSD, Alcohol Dependence, and Cannabis Use Disorder.  She has had numerous psychiatric hospitalizations over several years -- last was at [facility] in 2024. Patient has lived in a group home in the past.      No history of commitments.      Total of 2 lifetime suicide attempts.     Psychiatric Medication Trials       Drug /  Start Date Dose (mg) Helpful Adverse Effects   DC Reason / Date   Olanzapine 15 yes N/a pregnancy   Effexor 75 yes N/a pregnancy   Prazosin 1 yes N/a pregnancy   Trazodone 100 yes N/a Pregnancy             Also Per Chart Review:  Abilify, Risperdal, Zyprexa, Celexa, Remeron, Zoloft, Prozac, Neurontin and Prazosin     Social/ Family History               [per patient report]                                      Has been living at [facility]     Medical / Surgical History                                                                                                                     Patient Active Problem List   Diagnosis    Contact dermatitis and other eczema, due to unspecified cause    Schizoaffective disorder (H)    Striae distensae    Uncomplicated alcohol dependence (H)    Pap smear for cervical cancer screening    Iron deficiency anemia    OCD (obsessive compulsive disorder)    Generalized anxiety disorder    Lactase deficiency    Tobacco use    Intractable chronic migraine without aura    Suicidal ideation    Seasonal allergic rhinitis    Severe cannabis use disorder (H)    Spontaneous     Sinus tachycardia    Severe amphetamine substance use disorder (H)    Chronic post-traumatic stress disorder (PTSD)    Major depressive disorder, recurrent episode, moderate (H)    Screen for STD (sexually transmitted disease)    Morbid obesity (H)    Schizoaffective disorder,  "bipolar type (H)    Second trimester pregnancy    Dizziness    Abdominal pain in pregnancy    Vertigo    Fever and chills       Past Surgical History:   Procedure Laterality Date    DILATION AND CURETTAGE  2021    NO HISTORY OF SURGERY          PREGNANCY/ OBGYN HISTORY:  # 1 - Date: 10/24/09, Sex: None, Weight: None, GA: 4w0d, Type: SAB, Apgar1: None, Apgar5: None, Living: None, Birth Comments: None    # 2 - Date: 2010, Sex: None, Weight: None, GA: 5w0d, Type: IAB, Apgar1: None, Apgar5: None, Living: None, Birth Comments: Passed spontaneously    # 3 - Date: 10/24/10, Sex: Male, Weight: 3.289 kg (7 lb 4 oz), GA: 41w0d, Type: Vaginal, Spontaneous, Apgar1: None, Apgar5: None, Living: Living, Birth Comments: None    # 4 - Date: , Sex: None, Weight: None, GA: None, Type: None, Apgar1: None, Apgar5: None, Living: None, Birth Comments: None    # 5 - Date: , Sex: None, Weight: None, GA: None, Type: None, Apgar1: None, Apgar5: None, Living: None, Birth Comments: None    # 6 - Date: 23, Sex: Female, Weight: 0.32 kg (11.3 oz), GA: 20w3d, Type: Vaginal, Breech, Apgar1: 1, Apgar5: 1, Living:  [place], Birth Comments: None    # 7 - Date: None, Sex: None, Weight: None, GA: None, Type: None, Apgar1: None, Apgar5: None, Living: None, Birth Comments: None      Medical Review of Systems                                                                                       none in addition to that documented above  Allergy                                No known allergies  Current Medications                                                                                                         No current outpatient medications on file.     Vitals                                                                                             /56   Pulse 99   Temp 98.2  F (36.8  C) (Oral)   Resp 20   Ht 1.727 m (5' 8\")   LMP 2024   SpO2 99%   BMI 41.05 kg/m     Mental Status Exam               " "                                                              Alertness: alert  and oriented  Appearance: adequately groomed  Behavior/Demeanor: cooperative, with fair  eye contact   Speech: normal  Language: no problems  Psychomotor: normal or unremarkable  Mood:  \"sick, uncomfortable\"  Affect: full range; was congruent to mood; was congruent to content  Thought Process/Associations: unremarkable  Thought Content:  Reports  recent auditory hallucinations ;  Denies  SI, HI  Insight: adequate  Judgment: appropriate  Cognition: (6) does  appear grossly intact; formal cognitive testing was not done  Gait and Station: unremarkable    Labs and Data                                                                                                                         7/29/2021     3:02 PM 7/14/2022    11:00 AM 8/27/2024     9:00 AM   PHQ   PHQ-9 Total Score 21 26 16   Q9: Thoughts of better off dead/self-harm past 2 weeks Several days Nearly every day Not at all       Recent Labs   Lab Test 09/17/24 2004 08/20/24 2124 04/09/24  0738   CR 0.49* 0.54 0.79   GFRESTIMATED >90 >90 >90     Recent Labs   Lab Test 09/17/24 2004 08/20/24 2124   AST 14 12   ALT 19 10   ALKPHOS 76 66         Diagnosis and Assessment                                                                        Schizoaffective disorder, bipolar type   PTSD    Rianna Man is a [id number] 34 year old female 28 weeks pregnant with past psych hx significant for schizoaffective, PTSD. Psychiatry was consulted today related to patient being off psychotropic medication and concerns regarding risks/benefits/alternatives of psychiatric medication use in pregnancy. She was admitted to [facility] psychiatry in April of 2024 related to auditory hallucinations commanding to attempt suicde. She was stabilized and discharged on olanzapine, prazosin, effexor, and trazodone and discharged to a shelter. She reports that her outpatient psych provider at [facility] pulled her off " medication when she found out she was pregnant and has had increased hallucinations and depression since.     Today, she denies SI, HI, AVH but does endorse hopelessness and recent negative voices. She is agreeable to restarting medications and The r/b/a of the above medications in lactation/pregnancy were discussed with Rianna Man today. We also reviewed the risks of untreated  mood and anxiety disorders to both mother and fetus. We reviewed SE as well as general signs/symptoms in her breastfeeding child to monitor for (letheargy, decreased suck, change in behavior).  Rianna Man has had her questions answered, expresses her understanding of the information provided, and appears to have the capacity to give informed consent regarding treatment options. She is not assessed to be an imminent danger to self or others and does not require inpatient psychiatry hospitalization at this time.       Plan                                                                                                                  1) MEDICATIONS:    - Olanzapine 10mg at bedtime  - Trazodone 100mg at bedtime   - Venlafaxine 37.5mg qam   - Can add back Prazosin 1mg if experiencing night terrors, will defer for now   - Appointment for new psych provider will be in AVS      2) RESOURCES:  -To find additional local resources, refer to Postpartum Support International (PSI). Available at: http://www.postpartum.net/get-help/locations/united-[facility]/  -LactMed is a good source for information on medications in breastfeeding  -www.womensmentalhealth.org is a good resource for information about psychiatric medication in pregnancy/lactation  -Mother To Baby A service of the nonprofit Organization of [employer]at[employer] (LISA), provides evidence based information online and in printable handouts to provide patients and providers regarding medications and other exposures during pregnancy and lactation Available at:  "https://mothertobaby.org/fact-sheets-parent/.  -Consider using \"The Pregnancy and Postpartum Anxiety Workbook,\" by Georgina Wren PhD and Kenneth Mitchell PsyD.    3) CRISIS NUMBERS:   Provided routinely in AVS.  Pregnancy & [place]partum Support [place] (PPSM) Helpline: [phone] (Call or Text)      Zonia James, [facility] Health System Galion HospitalP-BC  Consult/Liaison Psychiatry   [facility]           "
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
## 2024-09-18 NOTE — PROVIDER NOTIFICATION
09/18/24 0633   Provider Notification   Provider Name/Title Dr. Jordan   Method of Notification At Bedside   Request Evaluate in Person   Notification Reason SVE;Status Update     SVE per provider was 1cm, unchanged from previous. D/t pt having intense abdominal pain, plan to keep pt on the monitor to observe for any ctx's. SW & Psych to consult pt. Was planning to do GCT, but holding off on it as pt is not feeling well enough to eat. Plan to continue orally hydrate & manage symptoms.

## 2024-09-18 NOTE — DISCHARGE INSTRUCTIONS
MENTAL HEALTH RESOURCES & SERVICES:   Behavioral Healthcare Providers Scheduling  During your hospitalization, you were seen by a licensed mental health professional through Triage and Transition sevices, Behavioral Healthcare Providers (P)  for a brief mental health assessment and/or psychotherapy at [facility].  It is recommended that you follow up with your established providers (psychiatrist, mental health therapist, and/or primary care doctor - as relevant) as soon as possible. Coordinators from [facility] will be calling you in the next 24-48 hours to ensure that you have the resources you need.  You can also contact [facility] coordinators directly at [phone].    You have been scheduled for the following mental health appointments:     Date: Thursday, 9/26/2024  Time: 11:00 am - 12:00 pm  Provider: Teetee Albert MS, CNP,PMHAYP,RN  Location: [address], [address], [place] [address]  Phone: [phone]  Type: Medication Mgmt - Initial (In-Person)    Patient instructions  [email or web address]       [facility] maintains an extensive network of licensed behavioral health providers to connect patients with the services they need.  We do not charge providers a fee to participate in our referral network.  We match patients with providers based on a patient s specific needs, insurance coverage, and location.  Our first effort will be to refer you to a provider within your care system, and will utilize providers outside your care system as needed.

## 2024-09-18 NOTE — PLAN OF CARE
Goal Outcome Evaluation:      Plan of Care Reviewed With: patient    Overall Patient Progress: no changeOverall Patient Progress: no change         Pt uncomfortable with body aches, headache, coughing and sore throat. Using Tylenol, throat lozenges and flexeril for pain control. Pt states it works for a couple hours then it comes right back. Trying to sleep on and off during the day. Will do evening monitoring around 2000. Pt has call light within reach.

## 2024-09-18 NOTE — PLAN OF CARE
VSS. Afebrile. Respiratory panel came back positive for parainfluenza virus 4. Pt is on droplet and contact precautions for that & MRSA. She continues to report pain all over her body. She reports having HA, pressure behind her ears, a sore throat with cough, feeling chills/hot, crushing stomach pain that is 10/10 this morning, with overall body aches and pain. Tylenol has been used for pain management as well as lozenge for her sore throat. Pt complains of just wanting to get some sleep which has been difficult because of all the pain she is having.
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
## 2024-09-18 NOTE — PLAN OF CARE
Data: Patient presented to [facility]: 2024  6:27 PM.  Reason for maternal/fetal assessment is  due to respiratory symptoms . Patient reports she lives in a shelter for [facility] and on  she had noro virus symptoms: nausea, diarrhea, chills, she does not have a thermometer so she does not know if she was febrile, her symptoms yesterday and today are cough, sore throat, body-aches, headache occasional virtego which has been ongoing. Patient denies uterine contractions, leaking of vaginal fluid/rupture of membranes, vaginal bleeding, abdominal pain, visual disturbances, epigastric or RUQ pain, significant edema. Patient reports fetal movement is normal. Patient is a 28w4d .  Prenatal record reviewed. Pregnancy has been complicated by fetal lung condition  . Patient reports she hasn't taken her prescribed medication because she does not have them in her possession and does not have the resources to get them.    Vital signs wnl.     Action: Verbal consent for EFM. Triage assessment completed. Notified Dr Winter Gupta of triage admit received orders, and that the G2 resident will follow. Gave bedside report to Michelle Padilla RN who assumes care at 1925    Response: Patient verbalized agreement with plan. Will contact Dr Dory Jordan with update and further orders.
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
## 2024-09-18 NOTE — DISCHARGE SUMMARY
[facility] Discharge Summary    Rianna Man MRN# [id number]   Age: 34 year old YOB: 1990     Date of Admission:  9/17/2024  Date of Discharge::  9/19/2024  Admitting Physician:  Lila Delgado MD  Discharge Physician:  Lila Delgado MD          Admission Diagnoses:   - IUP at 28w5d  - Gonorrhea, treated 8/20  - THC use in pregnancy  - Benign Paroxysmal Positional Vertigo  - H/o PSUD, denies use in this pregnancy  - Housing instability  - Schizoaffective disorder  - FITZ/PTSD          Discharge Diagnosis:   - Same       Procedures:   none         Medications Prior to Admission:     No medications prior to admission.          Discharge Medications:        Review of your medicines        START taking        Dose / Directions   benzocaine-menthol 6-10 MG lozenge  Commonly known as: CHLORASEPTIC  Used for: Sore throat      Dose: 1 lozenge  Place 1 lozenge inside cheek every hour as needed for sore throat.  Quantity: 20 lozenge  Refills: 1     guaiFENesin 20 mg/mL liquid  Commonly known as: ROBITUSSIN  Used for: Acute cough      Dose: 200 mg  Take 10 mLs (200 mg) by mouth every 4 hours as needed for cough.  Quantity: 180 mL  Refills: 1            CONTINUE these medicines which may have CHANGED, or have new prescriptions. If we are uncertain of the size of tablets/capsules you have at home, strength may be listed as something that might have changed.        Dose / Directions   venlafaxine 37.5 MG 24 hr capsule  Commonly known as: EFFEXOR XR  This may have changed:   medication strength  how much to take  Used for: Schizoaffective disorder, unspecified type (H)      Dose: 37.5 mg  Take 1 capsule (37.5 mg) by mouth daily (with breakfast).  Quantity: 30 capsule  Refills: 2            CONTINUE these medicines which have NOT CHANGED        Dose / Directions   acetaminophen 325 MG tablet  Commonly known as: TYLENOL  Used for: Pregnancy examination or test, positive result       Dose: 325-650 mg  Take 1-2 tablets (325-650 mg) by mouth every 6 hours as needed for mild pain.  Quantity: 60 tablet  Refills: 1     cetirizine 10 MG tablet  Commonly known as: zyrTEC  Used for: Itching, Urticaria      Dose: 10 mg  Take 1 tablet (10 mg) by mouth daily  Quantity: 30 tablet  Refills: 0     folic acid 1 MG tablet  Commonly known as: FOLVITE  Used for: Alcohol dependence with intoxication with complication (H)      TAKE 1 TABLET BY MOUTH ONCE DAILY  Quantity: 30 tablet  Refills: 0     hydrOXYzine 50 MG capsule  Commonly known as: VISTARIL      Dose: 50 mg  Take 1 capsule (50 mg) by mouth every 6 hours as needed for itching or anxiety  Quantity: 30 capsule  Refills: 0     meclizine 12.5 MG tablet  Commonly known as: ANTIVERT  Used for: Vertigo      Dose: 12.5 mg  Take 1 tablet (12.5 mg) by mouth every 6 hours as needed for dizziness.  Quantity: 20 tablet  Refills: 0     OLANZapine 15 MG tablet  Commonly known as: zyPREXA  Used for: Schizoaffective disorder, bipolar type (H)      Dose: 15 mg  Take 1 tablet (15 mg) by mouth at bedtime.  Quantity: 30 tablet  Refills: 2     prazosin 1 MG capsule  Commonly known as: MINIPRESS  Used for: Chronic post-traumatic stress disorder (PTSD)      Dose: 1 mg  Take 1 capsule (1 mg) by mouth at bedtime  Quantity: 30 capsule  Refills: 0     prenatal multivitamin w/iron 27-0.8 MG tablet  Used for: Pregnancy examination or test, positive result      Dose: 1 tablet  Take 1 tablet by mouth daily.  Quantity: 90 tablet  Refills: 3     pyridOXINE 25 MG tablet  Commonly known as: VITAMIN B6  Used for: Pregnancy examination or test, positive result      Dose: 25 mg  Take 1 tablet (25 mg) by mouth 3 times daily as needed (Nausea, vomiting)  Quantity: 90 tablet  Refills: 1     senna-docusate 8.6-50 MG tablet  Commonly known as: SENOKOT-S/PERICOLACE  Used for: Vertigo      Dose: 1 tablet  Take 1 tablet by mouth 2 times daily.  Quantity: 30 tablet  Refills: 3     thiamine 100 MG  tablet  Commonly known as: B-1  Used for: Alcohol dependence with intoxication with complication (H)      TAKE 1 TABLET BY MOUTH ONCE DAILY  Quantity: 30 tablet  Refills: 0     traZODone 100 MG tablet  Commonly known as: DESYREL      Dose: 100 mg  Take 1 tablet (100 mg) by mouth at bedtime  Quantity: 10 tablet  Refills: 0            STOP taking      hydrOXYzine HCl 50 MG tablet  Commonly known as: ATARAX                  Where to get your medicines        These medications were sent to [facility] [place], [place] - [address]  [address] [facility], [facility] [address]      Phone: [phone]   acetaminophen 325 MG tablet  benzocaine-menthol 6-10 MG lozenge  guaiFENesin 20 mg/mL liquid  OLANZapine 15 MG tablet  prenatal multivitamin w/iron 27-0.8 MG tablet  senna-docusate 8.6-50 MG tablet  venlafaxine 37.5 MG 24 hr capsule             Consultations:   Psychiatry          Brief History of Admission and Antepartum Course:   Rianna aMn is a 34 year old  at 28w4d by LMP c/w 13w4d US here for flu-like symptoms. Endorses cough, chills, nausea, vomiting, and body aches all starting around the same time. Has not been able to check her temperature but does feel that she has been feverish. Reports that she is currently living in a shelter, and her symptoms all started around three days ago, when the rest of the people staying in the shelter developed similar symptoms. Her symptoms don't feel like they are getting any worse over time but also have not been improving. The nausea and vomiting are persistent but she has been able to tolerate fruit today and has been able to drink water and other fluids without vomiting. She has had pelvic pain for the last few weeks that comes and goes. She feels that it might be worse but can't tell whether or not it's the body aches. She has not taken any medications yet for her symptoms. Denies ctx, VB, or LOF.  Endorses good fetal movement.     Antepartum  Course:   Patient currently staying in a women's shelter that does not allow entry for the night after 2300. Patient admitted to antepartum overnight for supportive care and safe disposition planning. On HD#2 psychiatry was consulted and saw her. They titrated home medications and provided additional mental health resources.    She remained in house without acute events or concerns. Once a safe disposition plan was identified, she was discharged.           Discharge Instructions and Follow-Up:     Discharge diet: Regular   Discharge activity: Activity as tolerated   Discharge follow-up: Follow up with routine prenatal care as soon as possible             Discharge Disposition:   Discharged to home    Ysabel Rucker MD  Obstetrics & Gynecology, PGY-3  9/19/2024 6:04 PM     The patient was seen and examined by me on the day of discharge.  I have reviewed and agree with the resident's above note.    Lila Delgado MD, FACOG

## 2024-09-18 NOTE — PROVIDER NOTIFICATION
09/17/24 2101   Provider Notification   Provider Name/Title Dr Estrella   Method of Notification At Bedside   Request Evaluate in Person   Notification Reason Patient Arrived;Status Update     Spec and Sve completed by provider. 1 cm dilated noted. Cervix thick and high. Plan for recheck in 1-2 hour. Pt still reporting all flu like symptoms. Awaiting respiratory panel results. EFM and TOCO as charted. Pt reports symptoms got slightly better after taking tylenol.

## 2024-09-18 NOTE — PROVIDER NOTIFICATION
09/17/24 8015   Provider Notification   Provider Name/Title Dr Estrella   Method of Notification Electronic Page   Request Evaluate - Remote   Notification Reason Other (Comment)     Writer contacted provider to ensure that she wanted pysch meds given overnight even though pt has not been taking d/t barriers to accessing and paying for medications. Plan per provider is to hold meds overnight and consult psych in the AM before starting the medications. Pt aware.    Pt transferred to room 0422 to stay overnight. Provider to see pt and update her with the full plan. Pt oriented to room and given call light. Instructed to call out with any questions or concerns. Respiratory symptoms remain the same per pt. Plan for BID monitoring. EFM and TOCO as charted. Report given to Ciera Mancilla RN.

## 2024-09-19 ENCOUNTER — HOSPITAL ENCOUNTER (INPATIENT)
Facility: CLINIC | Age: 34
End: 2024-09-19
Admitting: OBSTETRICS & GYNECOLOGY
Payer: COMMERCIAL

## 2024-09-19 VITALS
BODY MASS INDEX: 41.05 KG/M2 | HEIGHT: 68 IN | OXYGEN SATURATION: 99 % | RESPIRATION RATE: 18 BRPM | TEMPERATURE: 98 F | HEART RATE: 99 BPM | DIASTOLIC BLOOD PRESSURE: 79 MMHG | SYSTOLIC BLOOD PRESSURE: 132 MMHG

## 2024-09-19 PROCEDURE — 90715 TDAP VACCINE 7 YRS/> IM: CPT

## 2024-09-19 PROCEDURE — 250N000011 HC RX IP 250 OP 636

## 2024-09-19 PROCEDURE — 99232 SBSQ HOSP IP/OBS MODERATE 35: CPT

## 2024-09-19 PROCEDURE — 250N000013 HC RX MED GY IP 250 OP 250 PS 637

## 2024-09-19 PROCEDURE — 90471 IMMUNIZATION ADMIN: CPT

## 2024-09-19 RX ORDER — ACETAMINOPHEN 325 MG/1
325-650 TABLET ORAL EVERY 6 HOURS PRN
Qty: 60 TABLET | Refills: 1 | Status: SHIPPED | OUTPATIENT
Start: 2024-09-19

## 2024-09-19 RX ORDER — OLANZAPINE 15 MG/1
15 TABLET ORAL AT BEDTIME
Status: DISCONTINUED | OUTPATIENT
Start: 2024-09-19 | End: 2024-09-19 | Stop reason: HOSPADM

## 2024-09-19 RX ORDER — VENLAFAXINE HYDROCHLORIDE 37.5 MG/1
37.5 CAPSULE, EXTENDED RELEASE ORAL
Qty: 30 CAPSULE | Refills: 2 | Status: SHIPPED | OUTPATIENT
Start: 2024-09-20

## 2024-09-19 RX ORDER — GUAIFENESIN 200 MG/10ML
200 LIQUID ORAL EVERY 4 HOURS PRN
Qty: 180 ML | Refills: 1 | Status: SHIPPED | OUTPATIENT
Start: 2024-09-19

## 2024-09-19 RX ORDER — OLANZAPINE 15 MG/1
15 TABLET ORAL AT BEDTIME
Qty: 30 TABLET | Refills: 2 | Status: SHIPPED | OUTPATIENT
Start: 2024-09-19

## 2024-09-19 RX ORDER — PRENATAL VIT/IRON FUM/FOLIC AC 27MG-0.8MG
1 TABLET ORAL DAILY
Qty: 90 TABLET | Refills: 3 | Status: SHIPPED | OUTPATIENT
Start: 2024-09-19

## 2024-09-19 RX ORDER — AMOXICILLIN 250 MG
1 CAPSULE ORAL 2 TIMES DAILY
Qty: 30 TABLET | Refills: 3 | Status: SHIPPED | OUTPATIENT
Start: 2024-09-19

## 2024-09-19 RX ADMIN — ACETAMINOPHEN 650 MG: 325 TABLET ORAL at 00:14

## 2024-09-19 RX ADMIN — Medication 1 LOZENGE: at 04:01

## 2024-09-19 RX ADMIN — PRENATAL VIT W/ FE FUMARATE-FA TAB 27-0.8 MG 1 TABLET: 27-0.8 TAB at 07:31

## 2024-09-19 RX ADMIN — Medication 1 LOZENGE: at 15:51

## 2024-09-19 RX ADMIN — ACETAMINOPHEN 650 MG: 325 TABLET ORAL at 12:17

## 2024-09-19 RX ADMIN — CLOSTRIDIUM TETANI TOXOID ANTIGEN (FORMALDEHYDE INACTIVATED), CORYNEBACTERIUM DIPHTHERIAE TOXOID ANTIGEN (FORMALDEHYDE INACTIVATED), BORDETELLA PERTUSSIS TOXOID ANTIGEN (GLUTARALDEHYDE INACTIVATED), BORDETELLA PERTUSSIS FILAMENTOUS HEMAGGLUTININ ANTIGEN (FORMALDEHYDE INACTIVATED), BORDETELLA PERTUSSIS PERTACTIN ANTIGEN, AND BORDETELLA PERTUSSIS FIMBRIAE 2/3 ANTIGEN 0.5 ML: 5; 2; 2.5; 5; 3; 5 INJECTION, SUSPENSION INTRAMUSCULAR at 15:52

## 2024-09-19 RX ADMIN — VENLAFAXINE HYDROCHLORIDE 37.5 MG: 37.5 CAPSULE, EXTENDED RELEASE ORAL at 07:31

## 2024-09-19 RX ADMIN — Medication 1 LOZENGE: at 00:22

## 2024-09-19 RX ADMIN — ACETAMINOPHEN 650 MG: 325 TABLET ORAL at 08:02

## 2024-09-19 RX ADMIN — Medication 1 LOZENGE: at 12:22

## 2024-09-19 RX ADMIN — Medication 1 LOZENGE: at 00:15

## 2024-09-19 RX ADMIN — ACETAMINOPHEN 650 MG: 325 TABLET ORAL at 16:02

## 2024-09-19 RX ADMIN — Medication 1 LOZENGE: at 08:02

## 2024-09-19 RX ADMIN — ACETAMINOPHEN 650 MG: 325 TABLET ORAL at 04:00

## 2024-09-19 RX ADMIN — CYCLOBENZAPRINE HYDROCHLORIDE 5 MG: 5 TABLET, FILM COATED ORAL at 00:14

## 2024-09-19 ASSESSMENT — ACTIVITIES OF DAILY LIVING (ADL)
ADLS_ACUITY_SCORE: 18

## 2024-09-19 NOTE — CONSULTS
"  ----------------------------------------------------------------------------------------------------------  [facility], [place]   Psychiatry  Consult                  Psychiatry Consult          Rianna Man is a 34 year old female who was referred by Ob/Gyn for evaluation of psych medications in antepartum period.      Psychiatrist: ARLENE English @ [facility]    PCP: Hilary Edouard  Other Providers: None     Chief Concern   / HPI                                                                                            \" Feeling sick and have been of psych meds \"       Rianna Man is a 34 year old  at 28w4d by LMP c/w 13w4d US who presented  for flu-like symptoms. Endorses cough, chills, nausea, vomiting, and body aches all starting around the same time. Has not been able to check her temperature but does feel that she has been feverish. Reports that she is currently living in a shelter, and her symptoms all started around three days ago, when the rest of the people staying in the shelter developed similar symptoms.     Previously on olanzapine, venlafaxine as well as prazosin and trazadone but has not been able to take meds in months. Was admitted to psychiatry in very early pregnancy (2024) for command auditory hallucinations. Was stabilized and discharged on Olanzapine 15mg at bedtime, Prazosin 1mg at bedtime, Trazodone 100mg at bedtime, Effexor 75mg daily. She then followed up with her outpatient psych provider at [facility] and that provider pulled her off medications due to being pregnancy per pt report. Pt states that she has had auditory hallucinations, not commanding in nature but just very negative. Also feels hopeless. No SI, HI, or VH. Is agreeable to going back on medications.     Follow up  after restarting Olanzapine, Venlafaxine, and Trazodone. Patient reports still feeling tired and sick but had no mental " health concerns. Slept better and denies SI, HI, AVH.      Substance Use History                                                               H/o methamphetamine, alcohol use disorder and heroin use. Currently endorses Cannabis use -- last use a week ago. Denies illicit drug use since she learned she was pregnant         Psychiatric History   Previous diagnoses include Schizoaffective Disorder, FITZ, OCD, PTSD, Alcohol Dependence, and Cannabis Use Disorder.  She has had numerous psychiatric hospitalizations over several years -- last was at [facility] in 2024. Patient has lived in a group home in the past.      No history of commitments.      Total of 2 lifetime suicide attempts.     Psychiatric Medication Trials       Drug /  Start Date Dose (mg) Helpful Adverse Effects   DC Reason / Date   Olanzapine 15 yes N/a pregnancy   Effexor 75 yes N/a pregnancy   Prazosin 1 yes N/a pregnancy   Trazodone 100 yes N/a Pregnancy             Also Per Chart Review:  Abilify, Risperdal, Zyprexa, Celexa, Remeron, Zoloft, Prozac, Neurontin and Prazosin     Social/ Family History               [per patient report]                                      Has been living at [facility]     Medical / Surgical History                                                                                                                     Patient Active Problem List   Diagnosis    Contact dermatitis and other eczema, due to unspecified cause    Schizoaffective disorder (H)    Striae distensae    Uncomplicated alcohol dependence (H)    Pap smear for cervical cancer screening    Iron deficiency anemia    OCD (obsessive compulsive disorder)    Generalized anxiety disorder    Lactase deficiency    Tobacco use    Intractable chronic migraine without aura    Suicidal ideation    Seasonal allergic rhinitis    Severe cannabis use disorder (H)    Spontaneous     Sinus tachycardia    Severe amphetamine substance use disorder (H)    Chronic  post-traumatic stress disorder (PTSD)    Major depressive disorder, recurrent episode, moderate (H)    Screen for STD (sexually transmitted disease)    Morbid obesity (H)    Schizoaffective disorder, bipolar type (H)    Second trimester pregnancy    Dizziness    Abdominal pain in pregnancy    Vertigo    Fever and chills       Past Surgical History:   Procedure Laterality Date    DILATION AND CURETTAGE  2021    NO HISTORY OF SURGERY          PREGNANCY/ OBGYN HISTORY:  # 1 - Date: 10/24/09, Sex: None, Weight: None, GA: 4w0d, Type: SAB, Apgar1: None, Apgar5: None, Living: None, Birth Comments: None    # 2 - Date: 2010, Sex: None, Weight: None, GA: 5w0d, Type: IAB, Apgar1: None, Apgar5: None, Living: None, Birth Comments: Passed spontaneously    # 3 - Date: 10/24/10, Sex: Male, Weight: 3.289 kg (7 lb 4 oz), GA: 41w0d, Type: Vaginal, Spontaneous, Apgar1: None, Apgar5: None, Living: Living, Birth Comments: None    # 4 - Date: , Sex: None, Weight: None, GA: None, Type: None, Apgar1: None, Apgar5: None, Living: None, Birth Comments: None    # 5 - Date: , Sex: None, Weight: None, GA: None, Type: None, Apgar1: None, Apgar5: None, Living: None, Birth Comments: None    # 6 - Date: 23, Sex: Female, Weight: 0.32 kg (11.3 oz), GA: 20w3d, Type: Vaginal, Breech, Apgar1: 1, Apgar5: 1, Living:  [place], Birth Comments: None    # 7 - Date: None, Sex: None, Weight: None, GA: None, Type: None, Apgar1: None, Apgar5: None, Living: None, Birth Comments: None      Medical Review of Systems                                                                                       none in addition to that documented above  Allergy                                No known allergies  Current Medications                                                                                                         No current outpatient medications on file.     Vitals                                                                   "                           /79 (BP Location: Right arm, Patient Position: Sitting, Cuff Size: Adult Large)   Pulse 99   Temp 98  F (36.7  C) (Oral)   Resp 18   Ht 1.727 m (5' 8\")   LMP 03/01/2024   SpO2 99%   BMI 41.05 kg/m     Mental Status Exam                                                                             Alertness: alert  and oriented  Appearance: adequately groomed  Behavior/Demeanor: cooperative, with fair  eye contact   Speech: normal  Language: no problems  Psychomotor: normal or unremarkable  Mood:  \"sick, uncomfortable\"  Affect: full range; was congruent to mood; was congruent to content  Thought Process/Associations: unremarkable  Thought Content:  Reports  recent auditory hallucinations ;  Denies  SI, HI  Insight: adequate  Judgment: appropriate  Cognition: (6) does  appear grossly intact; formal cognitive testing was not done  Gait and Station: unremarkable    Labs and Data                                                                                                                         7/29/2021     3:02 PM 7/14/2022    11:00 AM 8/27/2024     9:00 AM   PHQ   PHQ-9 Total Score 21 26 16   Q9: Thoughts of better off dead/self-harm past 2 weeks Several days Nearly every day Not at all       Recent Labs   Lab Test 09/17/24 2004 08/20/24 2124 04/09/24  0738   CR 0.49* 0.54 0.79   GFRESTIMATED >90 >90 >90     Recent Labs   Lab Test 09/17/24 2004 08/20/24 2124   AST 14 12   ALT 19 10   ALKPHOS 76 66         Diagnosis and Assessment                                                                        Schizoaffective disorder, bipolar type   PTSD    Rianna Man is a [id number] 34 year old female 28 weeks pregnant with past psych hx significant for schizoaffective, PTSD. Psychiatry was consulted today related to patient being off psychotropic medication and concerns regarding risks/benefits/alternatives of psychiatric medication use in pregnancy. [facility] related to auditory hallucinations commanding to attempt suicde. She was stabilized and discharged on olanzapine, prazosin, effexor, and trazodone and discharged to a shelter. She reports that her outpatient psych provider at [facility] pulled her off medication when she found out she was pregnant and has had increased hallucinations and depression since.     Again , she denies SI, HI, AVH but does endorse hopelessness and recent negative voices. She is agreeable to restarting medications and The r/b/a of the above medications in lactation/pregnancy were discussed with Rianna Man today. We also reviewed the risks of untreated  mood and anxiety disorders to both mother and fetus. We reviewed SE as well as general signs/symptoms in her breastfeeding child to monitor for (letheargy, decreased suck, change in behavior).  Rianna Man has had her questions answered, expresses her understanding of the information provided, and appears to have the capacity to give informed consent regarding treatment options. She is not assessed to be an imminent danger to self or others and does not require inpatient psychiatry hospitalization at this time. Slept better after resumption of mental health medications, she would like her zyprexa increased to PTA dose.       Plan                                                                                                                  1) MEDICATIONS:    - Olanzapine 15mg at bedtime  - Trazodone 100mg at bedtime   - Venlafaxine 37.5mg qam   - Can add back Prazosin 1mg if experiencing night terrors, will defer for now   - Appointment for new psych provider will be in AVS      2) RESOURCES:  -To find additional local resources, refer to Postpartum Support International (PSI). Available at: http://www.postpartum.net/get-help/locations/[facility]/  -LactMed is a good source for information on medications in  "breastfeeding  -www.womensmentalhealth.org is a good resource for information about psychiatric medication in pregnancy/lactation  -Mother To Baby A service of the nonprofit Organization of Teratology Information Specialists (LISA), provides evidence based information online and in printable handouts to provide patients and providers regarding medications and other exposures during pregnancy and lactation Available at: https://mothertobaby.org/fact-sheets-parent/.  -Consider using \"The Pregnancy and Postpartum Anxiety Workbook,\" by Georgina Wren PhD and Kenneth Mitchell PsyD.    3) CRISIS NUMBERS:   Provided routinely in AVS.  Pregnancy & Postpartum Support [place] (PPSM) Helpline: [phone] (Call or Text)      Znoia James, PMP-BC  Consult/Liaison Psychiatry   [facility]           "

## 2024-09-19 NOTE — PROGRESS NOTES
"SW paged by Charge RN regarding patient discharging and needing a ride home. Primary SW in meeting, NICKI Arnett sent imgfave message to bedside RN, Dannie, with phone number for patient to utilize her medical rides through [facility]. [facility] transportation phone number: [phone]. Patient should specify when scheduling ride that it is for a hospital discharge. Patient will need her [facility] member ID number.     GUANAKITO Hernandez, [facility]  Maternal and Child Health   M-F 08:00-16:30 on imgfave   Office Phone: [phone]  [email or web address]    After hours social work can be reached via imgfave @ \"Peds SW After Hours On Call 1620 to 08\"  Weekend on-site social work can be reached via imgfave @ \"Peds SW Weekend Onsite 08 to 1630\"    "

## 2024-09-19 NOTE — PROVIDER NOTIFICATION
09/19/24 1600   Provider Notification   Provider Name/Title Dr. Delgado   Method of Notification In Department     Per provider pt to discharge home this evening. Dr. Delgado stated no EFM before discharge is necessary.

## 2024-09-19 NOTE — PLAN OF CARE
VSS. Pt reports active fetal movement. EFM as charted, see flowsheets. Pt denies contractions, LOF, vaginal bleeding, visual disturbances, RUQ pain. Pt reports sore throat, headache, cough, and body aches. Pt endorses improvement in body aches, rating them now 3/10. Tylenol given q4. Pt sleeping/resting in between cares overnight and through the morning.

## 2024-09-19 NOTE — PLAN OF CARE
VSS, afebrile. She endorses + fetal movement. Rianna denies HA, blurry vision, SOB and RUQ pain. She also denies cramping/contractions, LOF or vaginal bleeding. She reports a sore throat that is resolved with lozenges and tylenol. She has been able to eat and rest between cares. Discharge medications given to Rianna. Discharge instructions given to Rianna, all questions answered. [facility] Y[facility]ie called for her. Rianna was discharged ambulatory @ 1728.

## 2024-09-20 ENCOUNTER — TELEPHONE (OUTPATIENT)
Dept: CARDIOLOGY | Facility: CLINIC | Age: 34
End: 2024-09-20
Payer: COMMERCIAL

## 2024-09-23 ENCOUNTER — TELEPHONE (OUTPATIENT)
Dept: CARDIOLOGY | Facility: CLINIC | Age: 34
End: 2024-09-23
Payer: COMMERCIAL

## 2024-09-25 ENCOUNTER — TELEPHONE (OUTPATIENT)
Dept: CARDIOLOGY | Facility: CLINIC | Age: 34
End: 2024-09-25
Payer: COMMERCIAL

## 2024-09-25 NOTE — LETTER
2024  Re: Rianna Man   : 1990       Dear Parent/Guardian,          A member of your healthcare team referred you to the [facility] for an imaging study. We have not been able to reach you to schedule this. Please reach out to us at your earliest convenience to schedule.    We can be reached at [phone].         Sincerely,      Melany Ernst  [facility]

## 2024-10-14 ENCOUNTER — TELEPHONE (OUTPATIENT)
Dept: OBGYN | Facility: CLINIC | Age: 34
End: 2024-10-14

## 2024-10-14 ENCOUNTER — VIRTUAL VISIT (OUTPATIENT)
Dept: OBGYN | Facility: CLINIC | Age: 34
End: 2024-10-14
Attending: ADVANCED PRACTICE MIDWIFE
Payer: COMMERCIAL

## 2024-10-14 DIAGNOSIS — Z91.199 NO-SHOW FOR APPOINTMENT: Primary | ICD-10-CM

## 2024-10-14 NOTE — LETTER
10/14/2024       RE: Rianna Man  [address] [address]     Dear Colleague,    Thank you for referring your patient, Rianna Man, to the [facility]. Please see a copy of my visit note below.    Tried to reach Rianna wood, but received voicemail.  Left message to call back to reschedule.      Again, thank you for allowing me to participate in the care of your patient.      Sincerely,    [facility] NURSE EDUCATION

## 2024-10-14 NOTE — PROGRESS NOTES
Tried to reach [facility] x2, but received voicemail.  Left message to call back to reschedule.

## 2024-10-30 ENCOUNTER — TELEPHONE (OUTPATIENT)
Dept: CARDIOLOGY | Facility: CLINIC | Age: 34
End: 2024-10-30
Payer: COMMERCIAL

## 2024-10-30 NOTE — TELEPHONE ENCOUNTER
Called patient to remind her of the fetal echocardiogram appointment on 10/31/24 @ 2:00 pm. Also sent [employer] message.

## 2024-10-31 ENCOUNTER — OFFICE VISIT (OUTPATIENT)
Dept: CARDIOLOGY | Facility: CLINIC | Age: 34
End: 2024-10-31
Payer: COMMERCIAL

## 2024-10-31 ENCOUNTER — HOSPITAL ENCOUNTER (OUTPATIENT)
Dept: CARDIOLOGY | Facility: CLINIC | Age: 34
Discharge: HOME | End: 2024-10-31
Attending: OBSTETRICS & GYNECOLOGY | Admitting: OBSTETRICS & GYNECOLOGY
Payer: COMMERCIAL

## 2024-10-31 DIAGNOSIS — O35.9XX0 FETAL ABNORMALITY AFFECTING MANAGEMENT OF MOTHER, ANTEPARTUM, SINGLE OR UNSPECIFIED FETUS: ICD-10-CM

## 2024-10-31 DIAGNOSIS — O35.BXX0 FETAL CARDIAC DISEASE AFFECTING PREGNANCY, SINGLE OR UNSPECIFIED FETUS: Primary | ICD-10-CM

## 2024-10-31 PROCEDURE — 93325 DOPPLER ECHO COLOR FLOW MAPG: CPT | Mod: 26 | Performed by: PEDIATRICS

## 2024-10-31 PROCEDURE — 76825 ECHO EXAM OF FETAL HEART: CPT | Mod: 26 | Performed by: PEDIATRICS

## 2024-10-31 PROCEDURE — 99202 OFFICE O/P NEW SF 15 MIN: CPT | Mod: 25 | Performed by: PEDIATRICS

## 2024-10-31 PROCEDURE — 93325 DOPPLER ECHO COLOR FLOW MAPG: CPT

## 2024-10-31 PROCEDURE — 76827 ECHO EXAM OF FETAL HEART: CPT | Mod: 26 | Performed by: PEDIATRICS

## 2024-10-31 NOTE — PROGRESS NOTES
Fetal Cardiology Consultation    Patient:  Rianna Man MRN:  [id number]   YOB: 1990 Age:  34 year old   Date of Visit:  10/31/2024 PCP:  Hilary Edouard MD   KWAKU: 2024, by Last Menstrual Period EGA: 34w6d weeks     Dear Doctor,     I had the pleasure of seeing Rianna Man at the [facility] in [place] on 10/31/2024 in consultation for fetal echocardiography results. She presented today by herself. As you know, she is a 34 year old female with current pregnancy affected by cystic mass above the .    The fetal echocardiogram was technically difficult study. Likely normal fetal echocardiogram. Borderline dilated right ventricle with normal systolic function. Normal left ventricular size and systolic function. The aortic arch was not well seen on today's study. No pericardial effusion.    I reviewed and interpreted the fetal echocardiogram today. I discussed the normal results with Ms. Man. While these results are normal, it is important to note that fetal echocardiography cannot exclude small atrial or ventricular septal defects, persistent ductus arteriosus, mild coarctation of the aorta, partial anomalous pulmonary venous return, minor anatomic valve anomalies, or coronary artery anomalies.     -- A post- transthoracic echocardiogram is recommended to evaluate aortic arch and right ventricular size within 24 hours of life.    Thank you for allowing me to participate in Ms. Man's care. Please don't hesitate to contact me or the Fetal Cardiology team at [facility] with any questions or concerns.     I spent a total of 20 minutes on the date of the encounter doing chart review, patient history, documentation, counseling, and coordinating care.    Nancy Calderon MD  Pediatric Cardiology  [facility]  Phone [phone]

## 2024-11-06 ENCOUNTER — TELEPHONE (OUTPATIENT)
Dept: MATERNAL FETAL MEDICINE | Facility: CLINIC | Age: 34
End: 2024-11-06
Payer: COMMERCIAL

## 2024-11-06 ENCOUNTER — CARE COORDINATION (OUTPATIENT)
Dept: MATERNAL FETAL MEDICINE | Facility: CLINIC | Age: 34
End: 2024-11-06
Payer: COMMERCIAL

## 2024-11-06 NOTE — TELEPHONE ENCOUNTER
Left message on patient's phone as well as sent My Chart message to follow up with patient. Unsure where patient is receiving prenatal care or if she has delivered.     Winter Iyer RN

## 2024-11-29 ENCOUNTER — ANESTHESIA EVENT (OUTPATIENT)
Dept: OBGYN | Facility: CLINIC | Age: 34
End: 2024-11-29
Payer: COMMERCIAL

## 2024-11-29 ENCOUNTER — ANESTHESIA (OUTPATIENT)
Dept: OBGYN | Facility: CLINIC | Age: 34
End: 2024-11-29
Payer: COMMERCIAL

## 2024-11-29 ENCOUNTER — HOSPITAL ENCOUNTER (INPATIENT)
Facility: CLINIC | Age: 34
LOS: 2 days | Discharge: HOME | End: 2024-12-01
Attending: STUDENT IN AN ORGANIZED HEALTH CARE EDUCATION/TRAINING PROGRAM | Admitting: OBSTETRICS & GYNECOLOGY
Payer: COMMERCIAL

## 2024-11-29 DIAGNOSIS — G56.03 BILATERAL CARPAL TUNNEL SYNDROME: Primary | ICD-10-CM

## 2024-11-29 DIAGNOSIS — O13.3 GESTATIONAL HYPERTENSION, THIRD TRIMESTER: ICD-10-CM

## 2024-11-29 PROBLEM — Z36.89 ENCOUNTER FOR TRIAGE IN PREGNANT PATIENT: Status: ACTIVE | Noted: 2024-11-29

## 2024-11-29 PROBLEM — Z34.90 ENCOUNTER FOR INDUCTION OF LABOR: Status: ACTIVE | Noted: 2024-11-29

## 2024-11-29 LAB
ABO + RH BLD: NORMAL
ALBUMIN UR-MCNC: NEGATIVE MG/DL
APPEARANCE UR: CLEAR
BACTERIA #/AREA URNS HPF: ABNORMAL /HPF
BILIRUB UR QL STRIP: NEGATIVE
BLD GP AB SCN SERPL QL: NEGATIVE
CLUE CELLS: ABNORMAL
COLOR UR AUTO: ABNORMAL
ERYTHROCYTE [DISTWIDTH] IN BLOOD BY AUTOMATED COUNT: 12.1 % (ref 10–15)
GLUCOSE BLDC GLUCOMTR-MCNC: 99 MG/DL (ref 70–99)
GLUCOSE UR STRIP-MCNC: NEGATIVE MG/DL
HCT VFR BLD AUTO: 38.8 % (ref 35–47)
HGB BLD-MCNC: 13.4 G/DL (ref 11.7–15.7)
HGB UR QL STRIP: NEGATIVE
HOLD SPECIMEN: NORMAL
KETONES UR STRIP-MCNC: NEGATIVE MG/DL
LEUKOCYTE ESTERASE UR QL STRIP: NEGATIVE
MCH RBC QN AUTO: 29.1 PG (ref 26.5–33)
MCHC RBC AUTO-ENTMCNC: 34.5 G/DL (ref 31.5–36.5)
MCV RBC AUTO: 84 FL (ref 78–100)
MRSA DNA SPEC QL NAA+PROBE: NEGATIVE
MUCOUS THREADS #/AREA URNS LPF: PRESENT /LPF
NITRATE UR QL: NEGATIVE
PH UR STRIP: 6.5 [PH] (ref 5–7)
PLATELET # BLD AUTO: 252 10E3/UL (ref 150–450)
RBC # BLD AUTO: 4.6 10E6/UL (ref 3.8–5.2)
RBC URINE: 0 /HPF
SA TARGET DNA: NEGATIVE
SP GR UR STRIP: 1.01 (ref 1–1.03)
SPECIMEN EXP DATE BLD: NORMAL
SQUAMOUS EPITHELIAL: 1 /HPF
T PALLIDUM AB SER QL: NONREACTIVE
TRICHOMONAS, WET PREP: ABNORMAL
UROBILINOGEN UR STRIP-MCNC: NORMAL MG/DL
WBC # BLD AUTO: 10.8 10E3/UL (ref 4–11)
WBC URINE: <1 /HPF
WBC'S/HIGH POWER FIELD, WET PREP: ABNORMAL
YEAST, WET PREP: ABNORMAL

## 2024-11-29 PROCEDURE — 120N000002 HC R&B MED SURG/OB UMMC

## 2024-11-29 PROCEDURE — 86850 RBC ANTIBODY SCREEN: CPT

## 2024-11-29 PROCEDURE — 87641 MR-STAPH DNA AMP PROBE: CPT

## 2024-11-29 PROCEDURE — G0463 HOSPITAL OUTPT CLINIC VISIT: HCPCS

## 2024-11-29 PROCEDURE — 81003 URINALYSIS AUTO W/O SCOPE: CPT

## 2024-11-29 PROCEDURE — 36415 COLL VENOUS BLD VENIPUNCTURE: CPT

## 2024-11-29 PROCEDURE — 00HU33Z INSERTION OF INFUSION DEVICE INTO SPINAL CANAL, PERCUTANEOUS APPROACH: ICD-10-PCS | Performed by: ANESTHESIOLOGY

## 2024-11-29 PROCEDURE — 3E0P7VZ INTRODUCTION OF HORMONE INTO FEMALE REPRODUCTIVE, VIA NATURAL OR ARTIFICIAL OPENING: ICD-10-PCS | Performed by: OBSTETRICS & GYNECOLOGY

## 2024-11-29 PROCEDURE — 370N000003 HC ANESTHESIA WARD SERVICE: Performed by: ANESTHESIOLOGY

## 2024-11-29 PROCEDURE — 85014 HEMATOCRIT: CPT

## 2024-11-29 PROCEDURE — 87491 CHLMYD TRACH DNA AMP PROBE: CPT

## 2024-11-29 PROCEDURE — 250N000011 HC RX IP 250 OP 636: Performed by: STUDENT IN AN ORGANIZED HEALTH CARE EDUCATION/TRAINING PROGRAM

## 2024-11-29 PROCEDURE — 3E0R3BZ INTRODUCTION OF ANESTHETIC AGENT INTO SPINAL CANAL, PERCUTANEOUS APPROACH: ICD-10-PCS | Performed by: ANESTHESIOLOGY

## 2024-11-29 PROCEDURE — 250N000011 HC RX IP 250 OP 636

## 2024-11-29 PROCEDURE — 87210 SMEAR WET MOUNT SALINE/INK: CPT

## 2024-11-29 PROCEDURE — 250N000013 HC RX MED GY IP 250 OP 250 PS 637

## 2024-11-29 PROCEDURE — 87640 STAPH A DNA AMP PROBE: CPT

## 2024-11-29 PROCEDURE — 86923 COMPATIBILITY TEST ELECTRIC: CPT | Performed by: OBSTETRICS & GYNECOLOGY

## 2024-11-29 PROCEDURE — 258N000003 HC RX IP 258 OP 636

## 2024-11-29 PROCEDURE — 86900 BLOOD TYPING SEROLOGIC ABO: CPT

## 2024-11-29 PROCEDURE — 85041 AUTOMATED RBC COUNT: CPT

## 2024-11-29 PROCEDURE — 86780 TREPONEMA PALLIDUM: CPT

## 2024-11-29 RX ORDER — ACETAMINOPHEN 325 MG/1
650 TABLET ORAL EVERY 4 HOURS PRN
Status: DISCONTINUED | OUTPATIENT
Start: 2024-11-29 | End: 2024-11-30 | Stop reason: HOSPADM

## 2024-11-29 RX ORDER — MISOPROSTOL 200 UG/1
400 TABLET ORAL
Status: DISCONTINUED | OUTPATIENT
Start: 2024-11-29 | End: 2024-11-30 | Stop reason: HOSPADM

## 2024-11-29 RX ORDER — METOCLOPRAMIDE HYDROCHLORIDE 5 MG/ML
10 INJECTION INTRAMUSCULAR; INTRAVENOUS EVERY 6 HOURS PRN
Status: DISCONTINUED | OUTPATIENT
Start: 2024-11-29 | End: 2024-11-30 | Stop reason: HOSPADM

## 2024-11-29 RX ORDER — OXYTOCIN 10 [USP'U]/ML
10 INJECTION, SOLUTION INTRAMUSCULAR; INTRAVENOUS
Status: DISCONTINUED | OUTPATIENT
Start: 2024-11-29 | End: 2024-12-01

## 2024-11-29 RX ORDER — PROCHLORPERAZINE MALEATE 10 MG
10 TABLET ORAL EVERY 6 HOURS PRN
Status: DISCONTINUED | OUTPATIENT
Start: 2024-11-29 | End: 2024-11-30 | Stop reason: HOSPADM

## 2024-11-29 RX ORDER — KETOROLAC TROMETHAMINE 30 MG/ML
30 INJECTION, SOLUTION INTRAMUSCULAR; INTRAVENOUS
Status: COMPLETED | OUTPATIENT
Start: 2024-11-29 | End: 2024-11-30

## 2024-11-29 RX ORDER — FAMOTIDINE 10 MG
10 TABLET ORAL 2 TIMES DAILY
Status: DISCONTINUED | OUTPATIENT
Start: 2024-11-29 | End: 2024-12-01 | Stop reason: HOSPADM

## 2024-11-29 RX ORDER — FENTANYL/ROPIVACAINE/NS/PF 2MCG/ML-.1
PLASTIC BAG, INJECTION (ML) EPIDURAL
Status: DISCONTINUED | OUTPATIENT
Start: 2024-11-29 | End: 2024-11-30 | Stop reason: HOSPADM

## 2024-11-29 RX ORDER — LOPERAMIDE HYDROCHLORIDE 2 MG/1
4 CAPSULE ORAL
Status: DISCONTINUED | OUTPATIENT
Start: 2024-11-29 | End: 2024-11-30 | Stop reason: HOSPADM

## 2024-11-29 RX ORDER — ONDANSETRON 2 MG/ML
4 INJECTION INTRAMUSCULAR; INTRAVENOUS EVERY 6 HOURS PRN
Status: DISCONTINUED | OUTPATIENT
Start: 2024-11-29 | End: 2024-11-30 | Stop reason: HOSPADM

## 2024-11-29 RX ORDER — OXYTOCIN/0.9 % SODIUM CHLORIDE 30/500 ML
100-340 PLASTIC BAG, INJECTION (ML) INTRAVENOUS CONTINUOUS PRN
Status: DISCONTINUED | OUTPATIENT
Start: 2024-11-29 | End: 2024-12-01

## 2024-11-29 RX ORDER — LOPERAMIDE HYDROCHLORIDE 2 MG/1
2 CAPSULE ORAL
Status: DISCONTINUED | OUTPATIENT
Start: 2024-11-29 | End: 2024-11-30 | Stop reason: HOSPADM

## 2024-11-29 RX ORDER — NALOXONE HYDROCHLORIDE 0.4 MG/ML
0.2 INJECTION, SOLUTION INTRAMUSCULAR; INTRAVENOUS; SUBCUTANEOUS
Status: DISCONTINUED | OUTPATIENT
Start: 2024-11-29 | End: 2024-11-30 | Stop reason: HOSPADM

## 2024-11-29 RX ORDER — FENTANYL CITRATE-0.9 % NACL/PF 10 MCG/ML
100 PLASTIC BAG, INJECTION (ML) INTRAVENOUS EVERY 5 MIN PRN
Status: DISCONTINUED | OUTPATIENT
Start: 2024-11-29 | End: 2024-11-30 | Stop reason: HOSPADM

## 2024-11-29 RX ORDER — VENLAFAXINE HYDROCHLORIDE 37.5 MG/1
37.5 CAPSULE, EXTENDED RELEASE ORAL AT BEDTIME
Status: DISCONTINUED | OUTPATIENT
Start: 2024-11-29 | End: 2024-12-01 | Stop reason: HOSPADM

## 2024-11-29 RX ORDER — OXYTOCIN/0.9 % SODIUM CHLORIDE 30/500 ML
1-24 PLASTIC BAG, INJECTION (ML) INTRAVENOUS CONTINUOUS
Status: DISCONTINUED | OUTPATIENT
Start: 2024-11-29 | End: 2024-11-30 | Stop reason: HOSPADM

## 2024-11-29 RX ORDER — SODIUM CHLORIDE, SODIUM LACTATE, POTASSIUM CHLORIDE, CALCIUM CHLORIDE 600; 310; 30; 20 MG/100ML; MG/100ML; MG/100ML; MG/100ML
INJECTION, SOLUTION INTRAVENOUS CONTINUOUS
Status: DISCONTINUED | OUTPATIENT
Start: 2024-11-29 | End: 2024-11-30 | Stop reason: HOSPADM

## 2024-11-29 RX ORDER — OXYTOCIN/0.9 % SODIUM CHLORIDE 30/500 ML
340 PLASTIC BAG, INJECTION (ML) INTRAVENOUS CONTINUOUS PRN
Status: DISCONTINUED | OUTPATIENT
Start: 2024-11-29 | End: 2024-11-30 | Stop reason: HOSPADM

## 2024-11-29 RX ORDER — OLANZAPINE 15 MG/1
15 TABLET ORAL AT BEDTIME
Status: DISCONTINUED | OUTPATIENT
Start: 2024-11-29 | End: 2024-12-01 | Stop reason: HOSPADM

## 2024-11-29 RX ORDER — LIDOCAINE 40 MG/G
CREAM TOPICAL
Status: DISCONTINUED | OUTPATIENT
Start: 2024-11-29 | End: 2024-11-30 | Stop reason: HOSPADM

## 2024-11-29 RX ORDER — CITRIC ACID/SODIUM CITRATE 334-500MG
30 SOLUTION, ORAL ORAL
Status: DISCONTINUED | OUTPATIENT
Start: 2024-11-29 | End: 2024-11-30 | Stop reason: HOSPADM

## 2024-11-29 RX ORDER — NALOXONE HYDROCHLORIDE 0.4 MG/ML
0.4 INJECTION, SOLUTION INTRAMUSCULAR; INTRAVENOUS; SUBCUTANEOUS
Status: DISCONTINUED | OUTPATIENT
Start: 2024-11-29 | End: 2024-11-30 | Stop reason: HOSPADM

## 2024-11-29 RX ORDER — PENICILLIN G POTASSIUM 5000000 [IU]/1
5 INJECTION, POWDER, FOR SOLUTION INTRAMUSCULAR; INTRAVENOUS ONCE
Status: COMPLETED | OUTPATIENT
Start: 2024-11-29 | End: 2024-11-29

## 2024-11-29 RX ORDER — CALCIUM CARBONATE 500 MG/1
500 TABLET, CHEWABLE ORAL DAILY PRN
Status: DISCONTINUED | OUTPATIENT
Start: 2024-11-29 | End: 2024-12-01 | Stop reason: HOSPADM

## 2024-11-29 RX ORDER — MISOPROSTOL 200 UG/1
800 TABLET ORAL
Status: DISCONTINUED | OUTPATIENT
Start: 2024-11-29 | End: 2024-11-30 | Stop reason: HOSPADM

## 2024-11-29 RX ORDER — TRANEXAMIC ACID 10 MG/ML
1 INJECTION, SOLUTION INTRAVENOUS EVERY 30 MIN PRN
Status: DISCONTINUED | OUTPATIENT
Start: 2024-11-29 | End: 2024-11-30 | Stop reason: HOSPADM

## 2024-11-29 RX ORDER — MISOPROSTOL 100 UG/1
25 TABLET ORAL EVERY 4 HOURS PRN
Status: DISCONTINUED | OUTPATIENT
Start: 2024-11-29 | End: 2024-11-30 | Stop reason: HOSPADM

## 2024-11-29 RX ORDER — TRAZODONE HYDROCHLORIDE 100 MG/1
100 TABLET ORAL AT BEDTIME
Status: DISCONTINUED | OUTPATIENT
Start: 2024-11-29 | End: 2024-12-01 | Stop reason: HOSPADM

## 2024-11-29 RX ORDER — METHYLERGONOVINE MALEATE 0.2 MG/ML
200 INJECTION INTRAVENOUS
Status: DISCONTINUED | OUTPATIENT
Start: 2024-11-29 | End: 2024-11-30 | Stop reason: HOSPADM

## 2024-11-29 RX ORDER — PENICILLIN G 3000000 [IU]/50ML
3 INJECTION, SOLUTION INTRAVENOUS EVERY 4 HOURS
Status: DISCONTINUED | OUTPATIENT
Start: 2024-11-29 | End: 2024-11-30 | Stop reason: HOSPADM

## 2024-11-29 RX ORDER — OXYTOCIN 10 [USP'U]/ML
10 INJECTION, SOLUTION INTRAMUSCULAR; INTRAVENOUS
Status: DISCONTINUED | OUTPATIENT
Start: 2024-11-29 | End: 2024-11-30 | Stop reason: HOSPADM

## 2024-11-29 RX ORDER — ONDANSETRON 4 MG/1
4 TABLET, ORALLY DISINTEGRATING ORAL EVERY 6 HOURS PRN
Status: DISCONTINUED | OUTPATIENT
Start: 2024-11-29 | End: 2024-11-30 | Stop reason: HOSPADM

## 2024-11-29 RX ORDER — METOCLOPRAMIDE 10 MG/1
10 TABLET ORAL EVERY 6 HOURS PRN
Status: DISCONTINUED | OUTPATIENT
Start: 2024-11-29 | End: 2024-11-30 | Stop reason: HOSPADM

## 2024-11-29 RX ORDER — LIDOCAINE 40 MG/G
CREAM TOPICAL
Status: DISCONTINUED | OUTPATIENT
Start: 2024-11-29 | End: 2024-11-29

## 2024-11-29 RX ORDER — SODIUM CHLORIDE, SODIUM LACTATE, POTASSIUM CHLORIDE, CALCIUM CHLORIDE 600; 310; 30; 20 MG/100ML; MG/100ML; MG/100ML; MG/100ML
INJECTION, SOLUTION INTRAVENOUS CONTINUOUS PRN
Status: DISCONTINUED | OUTPATIENT
Start: 2024-11-29 | End: 2024-11-30 | Stop reason: HOSPADM

## 2024-11-29 RX ORDER — FENTANYL CITRATE 50 UG/ML
100 INJECTION, SOLUTION INTRAMUSCULAR; INTRAVENOUS
Status: DISCONTINUED | OUTPATIENT
Start: 2024-11-29 | End: 2024-11-30 | Stop reason: HOSPADM

## 2024-11-29 RX ORDER — CARBOPROST TROMETHAMINE 250 UG/ML
250 INJECTION, SOLUTION INTRAMUSCULAR
Status: DISCONTINUED | OUTPATIENT
Start: 2024-11-29 | End: 2024-11-30 | Stop reason: HOSPADM

## 2024-11-29 RX ORDER — IBUPROFEN 800 MG/1
800 TABLET, FILM COATED ORAL
Status: COMPLETED | OUTPATIENT
Start: 2024-11-29 | End: 2024-11-30

## 2024-11-29 RX ORDER — NALBUPHINE HYDROCHLORIDE 10 MG/ML
2.5-5 INJECTION INTRAMUSCULAR; INTRAVENOUS; SUBCUTANEOUS EVERY 6 HOURS PRN
OUTPATIENT
Start: 2024-11-29

## 2024-11-29 RX ADMIN — FAMOTIDINE 10 MG: 10 TABLET ORAL at 20:43

## 2024-11-29 RX ADMIN — PENICILLIN G POTASSIUM 5 MILLION UNITS: 5000000 POWDER, FOR SOLUTION INTRAMUSCULAR; INTRAPLEURAL; INTRATHECAL; INTRAVENOUS at 20:35

## 2024-11-29 RX ADMIN — MISOPROSTOL 25 MCG: 100 TABLET ORAL at 17:21

## 2024-11-29 RX ADMIN — Medication: at 20:21

## 2024-11-29 RX ADMIN — SODIUM CHLORIDE, POTASSIUM CHLORIDE, SODIUM LACTATE AND CALCIUM CHLORIDE 500 ML: 600; 310; 30; 20 INJECTION, SOLUTION INTRAVENOUS at 19:41

## 2024-11-29 ASSESSMENT — ACTIVITIES OF DAILY LIVING (ADL)
ADLS_ACUITY_SCORE: 22

## 2024-11-29 NOTE — ANESTHESIA PREPROCEDURE EVALUATION
Anesthesia Pre-Procedure Evaluation    Patient: Rianna Man   MRN: [id number] : 1990        Procedure :           Past Medical History:   Diagnosis Date     Anxiety      Depressive disorder      Encounter for initial prescription of implantable subdermal contraceptive 11/10/2010    Overview:   Inserted 2010       Schizoaffective disorder (H)      Screen for STD (sexually transmitted disease)      Severe amphetamine substance use disorder (H)      Sinus tachycardia      Spontaneous  2021     Varicella     had disease      Past Surgical History:   Procedure Laterality Date     DILATION AND CURETTAGE  2021     NO HISTORY OF SURGERY        Allergies   Allergen Reactions     No Known Allergies       Social History     Tobacco Use     Smoking status: Every Day     Current packs/day: 0.00     Average packs/day: 0.3 packs/day for 15.0 years (3.8 ttl pk-yrs)     Types: Cigarettes     Start date: 2005     Last attempt to quit: 2020     Years since quittin.0     Smokeless tobacco: Current     Tobacco comments:     3-4 cig/day, currently 2 day 24   Substance Use Topics     Alcohol use: Not Currently     Comment: occasional drinker      Wt Readings from Last 1 Encounters:   24 122.5 kg (270 lb)        Anesthesia Evaluation   Pt has had prior anesthetic. Type: General and OB Labor Epidural.    No history of anesthetic complications       ROS/MED HX  ENT/Pulmonary:  - neg pulmonary ROS     Neurologic:  - neg neurologic ROS     Cardiovascular:  - neg cardiovascular ROS     METS/Exercise Tolerance:     Hematologic:  - neg hematologic  ROS     Musculoskeletal:       GI/Hepatic:  - neg GI/hepatic ROS     Renal/Genitourinary:       Endo: Comment: Hx MRSA, Gonorrhea    (+)               Obesity,       Psychiatric/Substance Use: Comment: Schizoaffective disorder/FITZ/PTSD, Hx substance use d/o    (+) psychiatric history        Infectious Disease:       Malignancy:    "    Other:   Fetal possible bronchogenic cyst (nl echo 10/31, needs echo within 24h life)   - vaginal delivery in  c/b cord avulsion, requiring a manual extraction of placenta, denies H/o PPH, SD; two second trimester losses (16w and 20w)         Physical Exam    Airway        Mallampati: III   TM distance: > 3 FB   Neck ROM: full   Mouth opening: > 3 cm    Respiratory Devices and Support         Dental  no notable dental history         Cardiovascular   cardiovascular exam normal          Pulmonary   pulmonary exam normal              OUTSIDE LABS:  CBC:   Lab Results   Component Value Date    WBC 10.8 2024    WBC 7.1 2024    HGB 13.4 2024    HGB 11.7 2024    HCT 38.8 2024    HCT 34.3 (L) 2024     2024     2024     BMP:   Lab Results   Component Value Date     (L) 2024     (L) 2024    POTASSIUM 4.2 2024    POTASSIUM 4.1 2024    CHLORIDE 100 2024    CHLORIDE 102 2024    CO2 20 (L) 2024    CO2 21 (L) 2024    BUN 3.2 (L) 2024    BUN 6.2 2024    CR 0.49 (L) 2024    CR 0.54 2024    GLC 99 2024    GLC 88 2024     COAGS: No results found for: \"PTT\", \"INR\", \"FIBR\"  POC:   Lab Results   Component Value Date    HCG Positive (A) 2024     HEPATIC:   Lab Results   Component Value Date    ALBUMIN 3.5 2024    PROTTOTAL 6.5 2024    ALT 19 2024    AST 14 2024    ALKPHOS 76 2024    BILITOTAL 0.2 2024     OTHER:   Lab Results   Component Value Date    A1C 4.4 2024    GISELLA 8.9 2024    LIPASE 17 12/15/2020    TSH 1.21 2024       Anesthesia Plan    ASA Status:  3       Anesthesia Type: Epidural, General, Spinal.              Consents    Anesthesia Plan(s) and associated risks, benefits, and realistic alternatives discussed. Questions answered and patient/representative(s) expressed understanding.     - " Discussed:     - Discussed with:  Patient            Postoperative Care            Comments:               Mata Allen MD    I have reviewed the pertinent notes and labs in the chart from the past 30 days and (re)examined the patient.  Any updates or changes from those notes are reflected in this note.               # Hypertension: Home medication list includes antihypertensive(s)               # Financial/Environmental Concerns:

## 2024-11-29 NOTE — PROVIDER NOTIFICATION
11/29/24 1431   Provider Notification   Provider Name/Title Dr Coleman   Method of Notification Electronic Page   Request Evaluate in Person   Notification Reason Patient Arrived     Pt arrived reporting numb hands and pelvic pain. Pt has been missing appointments d/t difficult life circumstances.

## 2024-11-29 NOTE — H&P
OB HISTORY AND PHYSICAL    Patient: Rianna Man   MRN#: [id number]  YOB: 1990     HPI: Rianna Man is a 34 year old  at 39w0d by 13w4d US who presented to triage today with tingling in her hands. This has been going on for the last couple days, is worse at night. She has had difficulty accessing pre trish care this pregnancy, and also wanted to make sure everything is okay with baby.  She reports good fetal movement. Denies LOF, vaginal bleeding, or contractions.  Feels like baby is very low in her pelvis, and has been increasingly more uncomfortable.     Endorses good FM. Denies vaginal bleeding, leakage of fluid, painful contractions. She reports tHC use in this pregnancy, has not used other illicit drugs. Currently living in a domestic shelter, feels safe at home, has a restraining order against FOB, with whom she has a history of IPV.     Pregnancy notable for:   Limited PNC  GBS pos  Poss bronchogenic cyst - nl echo 10/31, needs echo within 24h life   Gonorrhea +  - s/p SHIELA   Housing instability   Schizoaffective disorder/FITZ/PTSD    No history of asthma or high blood pressure.    OBGYN History:   -  - vaginal delivery in  c/b cord avulsion, requiring a manual extraction of placenta, denies H/o PPH, SD; two second trimester losses (16w and 20w)  - Last Pap: NILM    - STI Hx: gonorrhea positive in August, negative SHIELA in September, has not been sexually active with this partner   - Sexually active: has not been sexually active since     Prenatal Lab Results:  Lab Results   Component Value Date    ABO B 10/23/2010    HCT 34.3 2024    HCT 36.6 2021    HGB 11.7 2024    HGB 11.7 2021    RPR Nonreactive 2024     Patient Active Problem List    Diagnosis Date Noted    Encounter for triage in pregnant patient 2024     Priority: Medium    Encounter for induction of labor 2024     Priority: Medium    Fever and chills  2024     Priority: Medium    Vertigo 2024     Priority: Medium    Dizziness 2024     Priority: Medium    Abdominal pain in pregnancy 2024     Priority: Medium    Second trimester pregnancy 2024     Priority: Medium    Schizoaffective disorder, bipolar type (H) 2024     Priority: Medium    Morbid obesity (H) 2022     Priority: Medium    Sinus tachycardia 2021     Priority: Medium    Screen for STD (sexually transmitted disease) 2021     Priority: Medium    Severe amphetamine substance use disorder (H) 2021     Priority: Medium    Chronic post-traumatic stress disorder (PTSD) 2021     Priority: Medium    Major depressive disorder, recurrent episode, moderate (H) 2021     Priority: Medium    Spontaneous  2021     Priority: Medium    Seasonal allergic rhinitis 2020     Priority: Medium    Severe cannabis use disorder (H) 2020     Priority: Medium    Suicidal ideation 2018     Priority: Medium    Intractable chronic migraine without aura 2018     Priority: Medium    OCD (obsessive compulsive disorder) 2018     Priority: Medium     Obsessions are sexual thoughts, and compulsions are ego-dystonic masturbating behavior, promiscuous sex and excessive pornography.       Generalized anxiety disorder 2018     Priority: Medium    Lactase deficiency 2018     Priority: Medium    Tobacco use 2018     Priority: Medium    Pap smear for cervical cancer screening 10/31/2016     Priority: Medium     2015  Normal cytology, no HPV done, repeat in 3 years.      Uncomplicated alcohol dependence (H) 2015     Priority: Medium    Contact dermatitis and other eczema, due to unspecified cause 2012     Priority: Medium    Schizoaffective disorder (H) 2012     Priority: Medium     Problem list name updated by automated process. Provider to review  Diagnosis updated by automated process.  Provider to review and confirm.      Striae distensae 2012     Priority: Medium    Iron deficiency anemia 2010     Priority: Medium     Overview:   Ferritin 2010.  Hgb electrophoresis normal.  Plan: iron supplementation and recheck ferritin/Hgb end . smw         HISTORY  Past Medical History:   Diagnosis Date    Anxiety     Depressive disorder     Encounter for initial prescription of implantable subdermal contraceptive 11/10/2010    Overview:   Inserted 2010      Schizoaffective disorder (H)     Screen for STD (sexually transmitted disease)     Severe amphetamine substance use disorder (H)     Sinus tachycardia     Spontaneous  2021    Varicella     had disease       Past Surgical History:   Procedure Laterality Date    DILATION AND CURETTAGE  2021    NO HISTORY OF SURGERY         Family History   Problem Relation Age of Onset    Diabetes Father     Substance Abuse Father     Diabetes Paternal Aunt     Thyroid Disease Mother         grave's disease    Bipolar Disorder Mother     Rheumatoid Arthritis Sister     Crohn's Disease Maternal Aunt     Schizophrenia Maternal Grandmother     Hypertension Other        Social History     Tobacco Use    Smoking status: Every Day     Current packs/day: 0.00     Average packs/day: 0.3 packs/day for 15.0 years (3.8 ttl pk-yrs)     Types: Cigarettes     Start date: 2005     Last attempt to quit: 2020     Years since quittin.0    Smokeless tobacco: Current    Tobacco comments:     3-4 cig/day, currently 2 day 24   Substance Use Topics    Alcohol use: Not Currently     Comment: occasional drinker    Drug use: Yes     Types: Marijuana, Methamphetamines, Heroin, Cocaine     Comment: Drug use: Pt report use of marijuana- every other day, NO OTHER DRUGS SINCE PREGNANT       Medications Prior to Admission   Medication Sig Dispense Refill Last Dose/Taking    acetaminophen (TYLENOL) 325 MG tablet Take  1-2 tablets (325-650 mg) by mouth every 6 hours as needed for mild pain. 60 tablet 1 Past Week    benzocaine-menthol (CHLORASEPTIC) 6-10 MG lozenge Place 1 lozenge inside cheek every hour as needed for sore throat. 20 lozenge 1 More than a month    cetirizine (ZYRTEC) 10 MG tablet Take 1 tablet (10 mg) by mouth daily 30 tablet 0 More than a month    folic acid (FOLVITE) 1 MG tablet Take 1 tablet (1,000 mcg) by mouth daily. 30 tablet 2 More than a month    guaiFENesin (ROBITUSSIN) 20 mg/mL liquid Take 10 mLs (200 mg) by mouth every 4 hours as needed for cough. 180 mL 1 More than a month    hydrOXYzine (VISTARIL) 50 MG capsule Take 1 capsule (50 mg) by mouth every 6 hours as needed for itching or anxiety 30 capsule 0 11/28/2024 Bedtime    meclizine (ANTIVERT) 12.5 MG tablet Take 1 tablet (12.5 mg) by mouth every 6 hours as needed for dizziness. 20 tablet 0 More than a month    OLANZapine (ZYPREXA) 15 MG tablet Take 1 tablet (15 mg) by mouth at bedtime. 30 tablet 2 11/28/2024 Bedtime    prazosin (MINIPRESS) 1 MG capsule Take 1 capsule (1 mg) by mouth at bedtime 30 capsule 0 More than a month    Prenatal Vit-Fe Fumarate-FA (PRENATAL MULTIVITAMIN W/IRON) 27-0.8 MG tablet Take 1 tablet by mouth daily. 90 tablet 3 11/28/2024 Bedtime    pyridOXINE (VITAMIN B6) 25 MG tablet Take 1 tablet (25 mg) by mouth 3 times daily as needed (Nausea, vomiting) 90 tablet 1 More than a month    senna-docusate (SENOKOT-S/PERICOLACE) 8.6-50 MG tablet Take 1 tablet by mouth 2 times daily. 30 tablet 3 More than a month    traZODone (DESYREL) 100 MG tablet Take 1 tablet (100 mg) by mouth at bedtime 10 tablet 0 11/28/2024 Bedtime    venlafaxine (EFFEXOR XR) 37.5 MG 24 hr capsule Take 1 capsule (37.5 mg) by mouth daily (with breakfast). 30 capsule 2 11/28/2024 Bedtime    thiamine (B-1) 100 MG tablet TAKE 1 TABLET BY MOUTH ONCE DAILY (Patient taking differently: Take 1 tablet by mouth daily.) 30 tablet 0        Allergies   Allergen Reactions    No  "Known Allergies         REVIEW OF SYSTEMS:  A 10 point review of systems was completed and was negative other than as noted in the HPI.    PHYSICAL EXAM  Patient Vitals for the past 24 hrs:   Height   24 1456 1.727 m (5' 8\")     Gen: NAD   CV: warm, well perfused   Lungs: non-labored breathing  Abd: Gravid, non-tender, non-distended  Ext: trace peripheral extremity edema     Cervix: 1/40/-3, soft, posterior   Membranes: Intact  Presentation: cephalic by BSUS.  Estimated Fetal Weight: 7.5#     FHT:  Monitoring External  FHT: Baseline 155 bpm; mod variability; accels present; no decelerations  TOCO 0 contractions in 10 minutes    Studies: T&S, CBC, RPR, CT/GC     Assessment & Plan: 34 year old  at 39w0d by 13w4d US, who presented.    # Induction of Labor  - Admit for eIOL  - Will plan to start with miso + balloon, balloon placed with 30 ml, without difficulty. Patient tolerated well.   - Membranes: Intact   - Augmentation: Pitocin, AROM prn  - GBS pos in urine culture in September; Antibiotics  indicated. Will wait to rupture until adequate on antibiotics.  - Pain Control: Per patient request; Discussed options. Desires epidural   - Diet: Regular  - PPH Meds: Standard Meds  - PPx: SCDs for prolonged periods of immobility    #Gonorrhea   - treated , s/p negative SHIELA   - offered repeat testing today, patient accepts     #GERD  - Protonix daily, TUMS prn     #Schizoaffective disorder/FITZ/PTSD   - continue PTA effexor, zyprexa     #Housing Instability  - currently living in domestic shelter, patient is able to be discharged with her baby to shelter, but has to leave in January   - SW consult placed     #H/o PSUD  - patient reports THC use in pregnancy, has not used other illicit substances since learning she was pregnant     #Poss bronchogenic cyst  - seen on repeat comprehensive US   - 10/31 normal fetal echo, though sub optimal study / views   - needs echo within 24h life     #CTS  - wrist " splints ordered to be worn at night     #MRSA  - history of abscess in 2021 with MRSA, plan to re-test today to see if contact precautions still needed     # PNC  - Rh pos, Rubella immune, GCT not performed, A1c in September was 4.4% , GBS urine culture positive in early pregnancy   - Other prenatal labs wnl  - Imaging: placenta anterior   - did not get Tdap, will offer flu PTD    - Pap last 2/2023 NILM   - Contraception: desires nexlpanon PTD, has had one in the past         # FWB:   Cat I tracing, reactive ; Ceph by BSUS; EFW 7.5#  - Continuous Fetal Monitoring  - Intrauterine resuscitative measures prn    # PPH Risk:  - Medium (IOL, augment with pit, HTN, mag, triple III, prolonged labor, precipitous labor/delivery, h/o abruption, >5 deliveries, fibroids, large baby, poly, multiples, general anesthesia, shoulder dystocia, lacs/epis, op delivery, hematoma)- IV, type and screen      Patient discussed with Dr. Kam Coleman MD   OBGYN Resident, PGY-2  11/29/24 4:01 PM

## 2024-11-29 NOTE — PROGRESS NOTES
[facility]  OB Triage Note    CC: ***    HPI: Rianna Man is a 34 year old  at 39w0d by ***, who presents with ***. She *** contractions, leaking fluid, vaginal bleeding.  + Good fetal movement***.    Obstetric Complications  - Gonorrhea, treated   - THC use in pregnancy  - Benign Paroxysmal Positional Vertigo  - H/o PSUD, denies use in this pregnancy  - Housing instability  - Schizoaffective disorder  - PTSD  - FITZ    O:  No data found.  Gen: Well-appearing, NAD***  CV: RRR, no murmurs***  Pulm: CTAB, no wheezes***  Abd: Soft, gravid***  Ext: *** LE edema b/l    Spec: ***  Cervix: ***    FHT: BL ***, *** lee, *** accels, *** decels  [place]: *** ctx in 10 mins    Labs:  ***    A/P:  Rianna Man is a 34 year old  at 39w0d by *** here with ***.  - ***  #FWB: - Category *** FHT    Mina Morales MD  Obstetrics & Gynecology, PGY-2  2024 2:56 PM

## 2024-11-30 ENCOUNTER — ANESTHESIA (OUTPATIENT)
Dept: OBGYN | Facility: CLINIC | Age: 34
End: 2024-11-30
Payer: COMMERCIAL

## 2024-11-30 ENCOUNTER — ANESTHESIA EVENT (OUTPATIENT)
Dept: OBGYN | Facility: CLINIC | Age: 34
End: 2024-11-30
Payer: COMMERCIAL

## 2024-11-30 LAB
ALT SERPL W P-5'-P-CCNC: 8 U/L (ref 0–50)
AMPHETAMINES UR QL SCN: ABNORMAL
APTT PPP: 24 SECONDS (ref 22–38)
AST SERPL W P-5'-P-CCNC: 13 U/L (ref 0–45)
BARBITURATES UR QL SCN: ABNORMAL
BENZODIAZ UR QL SCN: ABNORMAL
BLD PROD TYP BPU: NORMAL
BLD PROD TYP BPU: NORMAL
BLOOD COMPONENT TYPE: NORMAL
BLOOD COMPONENT TYPE: NORMAL
BZE UR QL SCN: ABNORMAL
C TRACH DNA SPEC QL PROBE+SIG AMP: NEGATIVE
CANNABINOIDS UR QL SCN: ABNORMAL
CODING SYSTEM: NORMAL
CODING SYSTEM: NORMAL
CREAT SERPL-MCNC: 0.68 MG/DL (ref 0.51–0.95)
CREAT UR-MCNC: 72 MG/DL
CROSSMATCH: NORMAL
CROSSMATCH: NORMAL
EGFRCR SERPLBLD CKD-EPI 2021: >90 ML/MIN/1.73M2
ERYTHROCYTE [DISTWIDTH] IN BLOOD BY AUTOMATED COUNT: 12.1 % (ref 10–15)
ERYTHROCYTE [DISTWIDTH] IN BLOOD BY AUTOMATED COUNT: 12.3 % (ref 10–15)
FENTANYL UR QL: ABNORMAL
FIBRINOGEN PPP-MCNC: 444 MG/DL (ref 170–510)
HCT VFR BLD AUTO: 32.9 % (ref 35–47)
HCT VFR BLD AUTO: 35.8 % (ref 35–47)
HGB BLD-MCNC: 10.9 G/DL (ref 11.7–15.7)
HGB BLD-MCNC: 12.7 G/DL (ref 11.7–15.7)
INR PPP: 1.02 (ref 0.85–1.15)
MCH RBC QN AUTO: 29 PG (ref 26.5–33)
MCH RBC QN AUTO: 30.3 PG (ref 26.5–33)
MCHC RBC AUTO-ENTMCNC: 33.1 G/DL (ref 31.5–36.5)
MCHC RBC AUTO-ENTMCNC: 35.5 G/DL (ref 31.5–36.5)
MCV RBC AUTO: 85 FL (ref 78–100)
MCV RBC AUTO: 88 FL (ref 78–100)
N GONORRHOEA DNA SPEC QL NAA+PROBE: NEGATIVE
OPIATES UR QL SCN: ABNORMAL
PCP QUAL URINE (ROCHE): ABNORMAL
PLATELET # BLD AUTO: 200 10E3/UL (ref 150–450)
PLATELET # BLD AUTO: 213 10E3/UL (ref 150–450)
RBC # BLD AUTO: 3.76 10E6/UL (ref 3.8–5.2)
RBC # BLD AUTO: 4.19 10E6/UL (ref 3.8–5.2)
UNIT ABO/RH: NORMAL
UNIT ABO/RH: NORMAL
UNIT NUMBER: NORMAL
UNIT NUMBER: NORMAL
UNIT STATUS: NORMAL
UNIT STATUS: NORMAL
UNIT TYPE ISBT: 7300
UNIT TYPE ISBT: 7300
WBC # BLD AUTO: 11.6 10E3/UL (ref 4–11)
WBC # BLD AUTO: 8.8 10E3/UL (ref 4–11)

## 2024-11-30 PROCEDURE — 85027 COMPLETE CBC AUTOMATED: CPT

## 2024-11-30 PROCEDURE — 370N000017 HC ANESTHESIA TECHNICAL FEE, PER MIN: Performed by: OBSTETRICS & GYNECOLOGY

## 2024-11-30 PROCEDURE — 999N000141 HC STATISTIC PRE-PROCEDURE NURSING ASSESSMENT: Performed by: OBSTETRICS & GYNECOLOGY

## 2024-11-30 PROCEDURE — 80349 CANNABINOIDS NATURAL: CPT | Performed by: OBSTETRICS & GYNECOLOGY

## 2024-11-30 PROCEDURE — 250N000013 HC RX MED GY IP 250 OP 250 PS 637

## 2024-11-30 PROCEDURE — 59409 OBSTETRICAL CARE: CPT | Performed by: MIDWIFE

## 2024-11-30 PROCEDURE — 76998 US GUIDE INTRAOP: CPT | Mod: 26 | Performed by: OBSTETRICS & GYNECOLOGY

## 2024-11-30 PROCEDURE — 10D17Z9 MANUAL EXTRACTION OF PRODUCTS OF CONCEPTION, RETAINED, VIA NATURAL OR ARTIFICIAL OPENING: ICD-10-PCS | Performed by: OBSTETRICS & GYNECOLOGY

## 2024-11-30 PROCEDURE — 250N000011 HC RX IP 250 OP 636

## 2024-11-30 PROCEDURE — 85384 FIBRINOGEN ACTIVITY: CPT

## 2024-11-30 PROCEDURE — 250N000009 HC RX 250: Performed by: NURSE ANESTHETIST, CERTIFIED REGISTERED

## 2024-11-30 PROCEDURE — 84460 ALANINE AMINO (ALT) (SGPT): CPT

## 2024-11-30 PROCEDURE — 88305 TISSUE EXAM BY PATHOLOGIST: CPT | Mod: TC | Performed by: OBSTETRICS & GYNECOLOGY

## 2024-11-30 PROCEDURE — 80307 DRUG TEST PRSMV CHEM ANLYZR: CPT | Performed by: OBSTETRICS & GYNECOLOGY

## 2024-11-30 PROCEDURE — 250N000011 HC RX IP 250 OP 636: Performed by: NURSE ANESTHETIST, CERTIFIED REGISTERED

## 2024-11-30 PROCEDURE — 999N000016 HC STATISTIC ATTENDANCE AT DELIVERY

## 2024-11-30 PROCEDURE — 10D17ZZ EXTRACTION OF PRODUCTS OF CONCEPTION, RETAINED, VIA NATURAL OR ARTIFICIAL OPENING: ICD-10-PCS | Performed by: OBSTETRICS & GYNECOLOGY

## 2024-11-30 PROCEDURE — 258N000003 HC RX IP 258 OP 636: Performed by: NURSE ANESTHETIST, CERTIFIED REGISTERED

## 2024-11-30 PROCEDURE — 36415 COLL VENOUS BLD VENIPUNCTURE: CPT

## 2024-11-30 PROCEDURE — 250N000011 HC RX IP 250 OP 636: Performed by: STUDENT IN AN ORGANIZED HEALTH CARE EDUCATION/TRAINING PROGRAM

## 2024-11-30 PROCEDURE — 272N000001 HC OR GENERAL SUPPLY STERILE: Performed by: OBSTETRICS & GYNECOLOGY

## 2024-11-30 PROCEDURE — 120N000003 HC R&B IMCU UMMC

## 2024-11-30 PROCEDURE — 82565 ASSAY OF CREATININE: CPT

## 2024-11-30 PROCEDURE — 99140 ANES COMP EMERGENCY COND: CPT | Performed by: STUDENT IN AN ORGANIZED HEALTH CARE EDUCATION/TRAINING PROGRAM

## 2024-11-30 PROCEDURE — 84450 TRANSFERASE (AST) (SGOT): CPT

## 2024-11-30 PROCEDURE — 250N000009 HC RX 250

## 2024-11-30 PROCEDURE — 258N000003 HC RX IP 258 OP 636

## 2024-11-30 PROCEDURE — 88305 TISSUE EXAM BY PATHOLOGIST: CPT | Mod: 26 | Performed by: PATHOLOGY

## 2024-11-30 PROCEDURE — 80354 DRUG SCREENING FENTANYL: CPT | Performed by: OBSTETRICS & GYNECOLOGY

## 2024-11-30 PROCEDURE — 85610 PROTHROMBIN TIME: CPT

## 2024-11-30 PROCEDURE — 250N000009 HC RX 250: Performed by: STUDENT IN AN ORGANIZED HEALTH CARE EDUCATION/TRAINING PROGRAM

## 2024-11-30 PROCEDURE — 10907ZC DRAINAGE OF AMNIOTIC FLUID, THERAPEUTIC FROM PRODUCTS OF CONCEPTION, VIA NATURAL OR ARTIFICIAL OPENING: ICD-10-PCS | Performed by: MIDWIFE

## 2024-11-30 PROCEDURE — 250N000011 HC RX IP 250 OP 636: Mod: JZ | Performed by: OBSTETRICS & GYNECOLOGY

## 2024-11-30 PROCEDURE — 710N000010 HC RECOVERY PHASE 1, LEVEL 2, PER MIN: Performed by: OBSTETRICS & GYNECOLOGY

## 2024-11-30 PROCEDURE — 59160 D & C AFTER DELIVERY: CPT | Mod: GC | Performed by: OBSTETRICS & GYNECOLOGY

## 2024-11-30 PROCEDURE — 360N000075 HC SURGERY LEVEL 2, PER MIN: Performed by: OBSTETRICS & GYNECOLOGY

## 2024-11-30 PROCEDURE — 85730 THROMBOPLASTIN TIME PARTIAL: CPT

## 2024-11-30 PROCEDURE — 59409 OBSTETRICAL CARE: CPT | Performed by: STUDENT IN AN ORGANIZED HEALTH CARE EDUCATION/TRAINING PROGRAM

## 2024-11-30 PROCEDURE — 722N000001 HC LABOR CARE VAGINAL DELIVERY SINGLE

## 2024-11-30 RX ORDER — OXYTOCIN 10 [USP'U]/ML
10 INJECTION, SOLUTION INTRAMUSCULAR; INTRAVENOUS
Status: DISCONTINUED | OUTPATIENT
Start: 2024-11-30 | End: 2024-12-01 | Stop reason: HOSPADM

## 2024-11-30 RX ORDER — DIMENHYDRINATE 50 MG/ML
25 INJECTION, SOLUTION INTRAMUSCULAR; INTRAVENOUS
Status: CANCELLED | OUTPATIENT
Start: 2024-11-30

## 2024-11-30 RX ORDER — DEXAMETHASONE SODIUM PHOSPHATE 4 MG/ML
4 INJECTION, SOLUTION INTRA-ARTICULAR; INTRALESIONAL; INTRAMUSCULAR; INTRAVENOUS; SOFT TISSUE
Status: CANCELLED | OUTPATIENT
Start: 2024-11-30

## 2024-11-30 RX ORDER — BISACODYL 10 MG
10 SUPPOSITORY, RECTAL RECTAL DAILY PRN
Status: DISCONTINUED | OUTPATIENT
Start: 2024-11-30 | End: 2024-12-01 | Stop reason: HOSPADM

## 2024-11-30 RX ORDER — DOCUSATE SODIUM 100 MG/1
100 CAPSULE, LIQUID FILLED ORAL DAILY
Status: DISCONTINUED | OUTPATIENT
Start: 2024-11-30 | End: 2024-12-01 | Stop reason: HOSPADM

## 2024-11-30 RX ORDER — EPHEDRINE SULFATE 50 MG/ML
INJECTION, SOLUTION INTRAVENOUS PRN
Status: DISCONTINUED | OUTPATIENT
Start: 2024-11-30 | End: 2024-11-30

## 2024-11-30 RX ORDER — CEFAZOLIN SODIUM 2 G/100ML
2 INJECTION, SOLUTION INTRAVENOUS ONCE
Status: COMPLETED | OUTPATIENT
Start: 2024-11-30 | End: 2024-11-30

## 2024-11-30 RX ORDER — HYDROCORTISONE 25 MG/G
CREAM TOPICAL 3 TIMES DAILY PRN
Status: DISCONTINUED | OUTPATIENT
Start: 2024-11-30 | End: 2024-12-01 | Stop reason: HOSPADM

## 2024-11-30 RX ORDER — MISOPROSTOL 200 UG/1
800 TABLET ORAL
Status: DISCONTINUED | OUTPATIENT
Start: 2024-11-30 | End: 2024-12-01 | Stop reason: HOSPADM

## 2024-11-30 RX ORDER — DIPHENHYDRAMINE HYDROCHLORIDE 50 MG/ML
25 INJECTION INTRAMUSCULAR; INTRAVENOUS ONCE
Status: COMPLETED | OUTPATIENT
Start: 2024-11-30 | End: 2024-11-30

## 2024-11-30 RX ORDER — LIDOCAINE HYDROCHLORIDE 10 MG/ML
INJECTION, SOLUTION EPIDURAL; INFILTRATION; INTRACAUDAL; PERINEURAL
Status: DISCONTINUED
Start: 2024-11-30 | End: 2024-11-30 | Stop reason: HOSPADM

## 2024-11-30 RX ORDER — NALBUPHINE HYDROCHLORIDE 10 MG/ML
10 INJECTION INTRAMUSCULAR; INTRAVENOUS; SUBCUTANEOUS ONCE
Status: COMPLETED | OUTPATIENT
Start: 2024-11-30 | End: 2024-11-30

## 2024-11-30 RX ORDER — NALOXONE HYDROCHLORIDE 0.4 MG/ML
0.1 INJECTION, SOLUTION INTRAMUSCULAR; INTRAVENOUS; SUBCUTANEOUS
Status: CANCELLED | OUTPATIENT
Start: 2024-11-30

## 2024-11-30 RX ORDER — NIFEDIPINE 30 MG/1
30 TABLET, EXTENDED RELEASE ORAL ONCE
Status: COMPLETED | OUTPATIENT
Start: 2024-11-30 | End: 2024-11-30

## 2024-11-30 RX ORDER — LABETALOL HYDROCHLORIDE 5 MG/ML
10 INJECTION, SOLUTION INTRAVENOUS
Status: CANCELLED | OUTPATIENT
Start: 2024-11-30

## 2024-11-30 RX ORDER — FENTANYL CITRATE 50 UG/ML
25 INJECTION, SOLUTION INTRAMUSCULAR; INTRAVENOUS EVERY 5 MIN PRN
Status: CANCELLED | OUTPATIENT
Start: 2024-11-30

## 2024-11-30 RX ORDER — LOPERAMIDE HYDROCHLORIDE 2 MG/1
2 CAPSULE ORAL
Status: DISCONTINUED | OUTPATIENT
Start: 2024-11-30 | End: 2024-12-01 | Stop reason: HOSPADM

## 2024-11-30 RX ORDER — LOPERAMIDE HYDROCHLORIDE 2 MG/1
4 CAPSULE ORAL
Status: DISCONTINUED | OUTPATIENT
Start: 2024-11-30 | End: 2024-12-01 | Stop reason: HOSPADM

## 2024-11-30 RX ORDER — MISOPROSTOL 200 UG/1
TABLET ORAL
Status: DISCONTINUED
Start: 2024-11-30 | End: 2024-11-30 | Stop reason: HOSPADM

## 2024-11-30 RX ORDER — IBUPROFEN 800 MG/1
800 TABLET, FILM COATED ORAL EVERY 6 HOURS PRN
Status: DISCONTINUED | OUTPATIENT
Start: 2024-11-30 | End: 2024-12-01 | Stop reason: HOSPADM

## 2024-11-30 RX ORDER — ACETAMINOPHEN 325 MG/1
650 TABLET ORAL EVERY 4 HOURS PRN
Status: DISCONTINUED | OUTPATIENT
Start: 2024-11-30 | End: 2024-12-01 | Stop reason: HOSPADM

## 2024-11-30 RX ORDER — OXYTOCIN/0.9 % SODIUM CHLORIDE 30/500 ML
340 PLASTIC BAG, INJECTION (ML) INTRAVENOUS CONTINUOUS PRN
Status: DISCONTINUED | OUTPATIENT
Start: 2024-11-30 | End: 2024-12-01 | Stop reason: HOSPADM

## 2024-11-30 RX ORDER — HYDROMORPHONE HCL IN WATER/PF 6 MG/30 ML
0.4 PATIENT CONTROLLED ANALGESIA SYRINGE INTRAVENOUS EVERY 5 MIN PRN
Status: CANCELLED | OUTPATIENT
Start: 2024-11-30

## 2024-11-30 RX ORDER — ONDANSETRON 4 MG/1
4 TABLET, ORALLY DISINTEGRATING ORAL EVERY 30 MIN PRN
Status: CANCELLED | OUTPATIENT
Start: 2024-11-30

## 2024-11-30 RX ORDER — MODIFIED LANOLIN
OINTMENT (GRAM) TOPICAL
Status: DISCONTINUED | OUTPATIENT
Start: 2024-11-30 | End: 2024-12-01 | Stop reason: HOSPADM

## 2024-11-30 RX ORDER — METHYLERGONOVINE MALEATE 0.2 MG/ML
200 INJECTION INTRAVENOUS
Status: DISCONTINUED | OUTPATIENT
Start: 2024-11-30 | End: 2024-12-01 | Stop reason: HOSPADM

## 2024-11-30 RX ORDER — OXYTOCIN 10 [USP'U]/ML
INJECTION, SOLUTION INTRAMUSCULAR; INTRAVENOUS
Status: DISCONTINUED
Start: 2024-11-30 | End: 2024-11-30 | Stop reason: HOSPADM

## 2024-11-30 RX ORDER — ACETAMINOPHEN 325 MG/1
975 TABLET ORAL ONCE
Status: CANCELLED | OUTPATIENT
Start: 2024-11-30 | End: 2024-11-30

## 2024-11-30 RX ORDER — CARBOPROST TROMETHAMINE 250 UG/ML
250 INJECTION, SOLUTION INTRAMUSCULAR
Status: DISCONTINUED | OUTPATIENT
Start: 2024-11-30 | End: 2024-12-01 | Stop reason: HOSPADM

## 2024-11-30 RX ORDER — FENTANYL CITRATE 50 UG/ML
50 INJECTION, SOLUTION INTRAMUSCULAR; INTRAVENOUS EVERY 5 MIN PRN
Status: CANCELLED | OUTPATIENT
Start: 2024-11-30

## 2024-11-30 RX ORDER — ONDANSETRON 2 MG/ML
4 INJECTION INTRAMUSCULAR; INTRAVENOUS EVERY 30 MIN PRN
Status: CANCELLED | OUTPATIENT
Start: 2024-11-30

## 2024-11-30 RX ORDER — TRANEXAMIC ACID 10 MG/ML
1 INJECTION, SOLUTION INTRAVENOUS EVERY 30 MIN PRN
Status: DISCONTINUED | OUTPATIENT
Start: 2024-11-30 | End: 2024-12-01 | Stop reason: HOSPADM

## 2024-11-30 RX ORDER — HYDROMORPHONE HCL IN WATER/PF 6 MG/30 ML
0.2 PATIENT CONTROLLED ANALGESIA SYRINGE INTRAVENOUS EVERY 5 MIN PRN
Status: CANCELLED | OUTPATIENT
Start: 2024-11-30

## 2024-11-30 RX ORDER — OXYTOCIN/0.9 % SODIUM CHLORIDE 30/500 ML
PLASTIC BAG, INJECTION (ML) INTRAVENOUS
Status: COMPLETED
Start: 2024-11-30 | End: 2024-11-30

## 2024-11-30 RX ORDER — SODIUM CHLORIDE, SODIUM LACTATE, POTASSIUM CHLORIDE, CALCIUM CHLORIDE 600; 310; 30; 20 MG/100ML; MG/100ML; MG/100ML; MG/100ML
INJECTION, SOLUTION INTRAVENOUS CONTINUOUS PRN
Status: DISCONTINUED | OUTPATIENT
Start: 2024-11-30 | End: 2024-11-30

## 2024-11-30 RX ORDER — SODIUM CHLORIDE, SODIUM LACTATE, POTASSIUM CHLORIDE, CALCIUM CHLORIDE 600; 310; 30; 20 MG/100ML; MG/100ML; MG/100ML; MG/100ML
INJECTION, SOLUTION INTRAVENOUS CONTINUOUS
Status: CANCELLED | OUTPATIENT
Start: 2024-11-30

## 2024-11-30 RX ORDER — HALOPERIDOL 5 MG/ML
1 INJECTION INTRAMUSCULAR
Status: CANCELLED | OUTPATIENT
Start: 2024-11-30

## 2024-11-30 RX ORDER — MISOPROSTOL 200 UG/1
400 TABLET ORAL
Status: DISCONTINUED | OUTPATIENT
Start: 2024-11-30 | End: 2024-12-01 | Stop reason: HOSPADM

## 2024-11-30 RX ADMIN — ACETAMINOPHEN 650 MG: 325 TABLET, FILM COATED ORAL at 18:09

## 2024-11-30 RX ADMIN — FAMOTIDINE 10 MG: 10 TABLET ORAL at 08:17

## 2024-11-30 RX ADMIN — DIPHENHYDRAMINE HYDROCHLORIDE 25 MG: 50 INJECTION INTRAMUSCULAR; INTRAVENOUS at 04:20

## 2024-11-30 RX ADMIN — VENLAFAXINE HYDROCHLORIDE 37.5 MG: 37.5 CAPSULE, EXTENDED RELEASE ORAL at 22:13

## 2024-11-30 RX ADMIN — TRAZODONE HYDROCHLORIDE 100 MG: 100 TABLET ORAL at 22:13

## 2024-11-30 RX ADMIN — FAMOTIDINE 10 MG: 10 TABLET ORAL at 22:17

## 2024-11-30 RX ADMIN — TRAZODONE HYDROCHLORIDE 100 MG: 100 TABLET ORAL at 00:12

## 2024-11-30 RX ADMIN — SODIUM CHLORIDE, POTASSIUM CHLORIDE, SODIUM LACTATE AND CALCIUM CHLORIDE: 600; 310; 30; 20 INJECTION, SOLUTION INTRAVENOUS at 10:51

## 2024-11-30 RX ADMIN — LIDOCAINE HYDROCHLORIDE 10 ML: 20 INJECTION, SOLUTION INTRAVENOUS at 10:52

## 2024-11-30 RX ADMIN — MISOPROSTOL 800 MCG: 200 TABLET ORAL at 10:15

## 2024-11-30 RX ADMIN — Medication 2 MILLI-UNITS/MIN: at 02:39

## 2024-11-30 RX ADMIN — PENICILLIN G 3 MILLION UNITS: 3000000 INJECTION, SOLUTION INTRAVENOUS at 00:37

## 2024-11-30 RX ADMIN — VENLAFAXINE HYDROCHLORIDE 37.5 MG: 37.5 CAPSULE, EXTENDED RELEASE ORAL at 00:11

## 2024-11-30 RX ADMIN — PENICILLIN G 3 MILLION UNITS: 3000000 INJECTION, SOLUTION INTRAVENOUS at 04:20

## 2024-11-30 RX ADMIN — IBUPROFEN 800 MG: 800 TABLET, FILM COATED ORAL at 18:09

## 2024-11-30 RX ADMIN — OLANZAPINE 15 MG: 15 TABLET, FILM COATED ORAL at 22:12

## 2024-11-30 RX ADMIN — ACETAMINOPHEN 650 MG: 325 TABLET, FILM COATED ORAL at 14:43

## 2024-11-30 RX ADMIN — NALBUPHINE HYDROCHLORIDE 10 MG: 10 INJECTION, SOLUTION INTRAMUSCULAR; INTRAVENOUS; SUBCUTANEOUS at 00:59

## 2024-11-30 RX ADMIN — EPHEDRINE SULFATE 25 MG: 50 INJECTION INTRAVENOUS at 12:02

## 2024-11-30 RX ADMIN — NIFEDIPINE 30 MG: 30 TABLET, FILM COATED, EXTENDED RELEASE ORAL at 16:17

## 2024-11-30 RX ADMIN — CARBOPROST TROMETHAMINE 250 MCG: 250 INJECTION, SOLUTION INTRAMUSCULAR at 10:16

## 2024-11-30 RX ADMIN — TRANEXAMIC ACID 1 G: 1 INJECTION, SOLUTION INTRAVENOUS at 10:59

## 2024-11-30 RX ADMIN — PENICILLIN G 3 MILLION UNITS: 3000000 INJECTION, SOLUTION INTRAVENOUS at 08:17

## 2024-11-30 RX ADMIN — ONDANSETRON 4 MG: 2 INJECTION INTRAMUSCULAR; INTRAVENOUS at 03:36

## 2024-11-30 RX ADMIN — OLANZAPINE 15 MG: 15 TABLET, FILM COATED ORAL at 00:11

## 2024-11-30 RX ADMIN — LIDOCAINE HYDROCHLORIDE 10 ML: 20 INJECTION, SOLUTION INTRAVENOUS at 10:57

## 2024-11-30 RX ADMIN — Medication: at 02:41

## 2024-11-30 RX ADMIN — CEFAZOLIN SODIUM 2 G: 2 INJECTION, SOLUTION INTRAVENOUS at 10:21

## 2024-11-30 RX ADMIN — SODIUM CHLORIDE, POTASSIUM CHLORIDE, SODIUM LACTATE AND CALCIUM CHLORIDE: 600; 310; 30; 20 INJECTION, SOLUTION INTRAVENOUS at 01:02

## 2024-11-30 RX ADMIN — KETOROLAC TROMETHAMINE 30 MG: 30 INJECTION, SOLUTION INTRAMUSCULAR at 12:49

## 2024-11-30 RX ADMIN — Medication: at 08:40

## 2024-11-30 ASSESSMENT — ACTIVITIES OF DAILY LIVING (ADL)
ADLS_ACUITY_SCORE: 22
ADLS_ACUITY_SCORE: 26
ADLS_ACUITY_SCORE: 22
ADLS_ACUITY_SCORE: 26
ADLS_ACUITY_SCORE: 26
ADLS_ACUITY_SCORE: 22
ADLS_ACUITY_SCORE: 26
ADLS_ACUITY_SCORE: 26
ADLS_ACUITY_SCORE: 22
ADLS_ACUITY_SCORE: 26
ADLS_ACUITY_SCORE: 22

## 2024-11-30 NOTE — PROGRESS NOTES
[facility]  Brief Labor Progress Note - FHT Review      O:   Patient Vitals for the past 4 hrs:   BP Temp Temp src SpO2   24 0300 -- 98.1  F (36.7  C) Oral --   24 (!) 142/94 -- -- --   24 014 -- -- -- 100 %       FHT: Baseline 135, moderate variability, accelerations present, absent decelerations  [place]: 3-4 contractions in 10 minutes    A/P: Rianna Man is a 34 year old  at 39w1d by 13w4d US who is currently undergoing IOL.     Labor:  - Cervix: last /-2 at 0400, will plan to reassess at 0600  - Membranes: S/p AROM for clear fluid following adequate treatment for GBS   - Plan: Reactive, reassuring, and Category 1 FHT. Will plan to continue labor augmentation with pitocin at this time.   - Pain: Comfortable with epidural.   - PPH: avoid methergine given gHTN     #MRSA  - History of abscess in  with MRSA, retest on admission negative and contact precautions removed via infection prevention     # PNC  - Rh pos, Rubella immune, GCT not performed, A1c in September was 4.4%  and random blood glucose on admission within normal limits  - GBS positive (bacteruria)  - Other prenatal labs wnl  - Imaging: placenta anterior   - Contraception: desires nexlpanon PTD, has had one in the past       # FWB:   Cat 1 tracing as above, reactive and reassuring  - Continuous Fetal Monitoring  - Intrauterine resuscitative measures prn    #gHTN  - Patient has met criteria during labor induction given elevated blood pressures >4 hours apart; HELLP labs collected and within normal limits  - No signs/symptoms of severe features on review of systems     # PPH Risk:  - Medium (IOL, augment with pit, HTN, mag, triple III, prolonged labor, precipitous labor/delivery, h/o abruption, >5 deliveries, fibroids, large baby, poly, multiples, general anesthesia, shoulder dystocia, lacs/epis, op delivery, hematoma)- IV, type and screen    Venecia Schofield Resident,  PGY-2  11/30/2024 4:49 AM

## 2024-11-30 NOTE — ANESTHESIA CARE TRANSFER NOTE
Patient: Rianna Man    Procedure: Procedure(s):  Dilation and curettage       Diagnosis: * No pre-op diagnosis entered *  Diagnosis Additional Information: No value filed.    Anesthesia Type:   No value filed.     Note:    Oropharynx: oropharynx clear of all foreign objects and spontaneously breathing  Level of Consciousness: awake  Oxygen Supplementation: room air    Independent Airway: airway patency satisfactory and stable  Dentition: dentition unchanged  Vital Signs Stable: post-procedure vital signs reviewed and stable  Report to RN Given: handoff report given  Patient transferred to: PACU    Handoff Report: Identifed the Patient, Identified the Reponsible Provider, Reviewed the pertinent medical history, Discussed the surgical course, Reviewed Intra-OP anesthesia mangement and issues during anesthesia, Set expectations for post-procedure period and Allowed opportunity for questions and acknowledgement of understanding      Vitals:  Vitals Value Taken Time   /60 11/30/24 1212   Temp     Pulse 77 11/30/24 1216   Resp 15 11/30/24 1216   SpO2 100 % 11/30/24 1216   Vitals shown include unfiled device data.    Electronically Signed By: Richie Saab MD  November 30, 2024  12:16 PM

## 2024-11-30 NOTE — ANESTHESIA POSTPROCEDURE EVALUATION
Patient: Rianna Man    Procedure: Procedure(s):  Dilation and curettage       Anesthesia Type:  Epidural    Note:  Disposition: Inpatient   Postop Pain Control: Uneventful            Sign Out: Well controlled pain   PONV: No   Neuro/Psych: Uneventful            Sign Out: Acceptable/Baseline neuro status   Airway/Respiratory: Uneventful            Sign Out: Acceptable/Baseline resp. status   CV/Hemodynamics: Uneventful            Sign Out: Acceptable CV status; No obvious hypovolemia; No obvious fluid overload   Other NRE: NONE   DID A NON-ROUTINE EVENT OCCUR? YES    Event details/Postop Comments:  PPH and retained placenta  after vaginal delivery. Required emergent revision/D&C in the operating room.            Last vitals:  Vitals Value Taken Time   /93 11/30/24 1340   Temp 36.7  C (98  F) 11/30/24 1310   Pulse 103 11/30/24 1312   Resp 16 11/30/24 1340   SpO2 99 % 11/30/24 1342   Vitals shown include unfiled device data.    Electronically Signed By: Pat Riojas MD  November 30, 2024  1:49 PM

## 2024-11-30 NOTE — PROVIDER NOTIFICATION
11/29/24 2037   Provider Notification   Provider Name/Title Dr. Crockett   Method of Notification Electronic Page   Request Evaluate - Remote   Notification Reason Maternal Vital Sign Change     FYI patient having mild range Bps

## 2024-11-30 NOTE — PLAN OF CARE
Goal Outcome Evaluation:    Dr Coleman at bedside to do SVE. 1/40/-3. Dr Coleman spoke with pt about placing cote balloon, pt agreed with plan. Cote placed.     Dr Coleman ordered misoprostol started for cervical ripening.  Discussed with patient, patient given time to ask questions, patient agreed with plan. Will closely monitor uterine contractions and FHT's.  Notify provider of progressing labor, needs for pain medication, or maternal/fetal distress.

## 2024-11-30 NOTE — PROGRESS NOTES
[facility]  Brief Labor Progress Note - FHT Review    O:   Patient Vitals for the past 4 hrs:   BP SpO2   24 0145 (!) 142/94 --   24 0142 -- 100 %   24 0015 (!) 146/92 --   24 2312 (!) 152/82 --       FHT: Baseline 130, moderate variability, accelerations present, no decelerations  [place]: 3-4 contractions in 10 minutes    A/P: Rianna Man is a 34 year old  at 39w1d by 13w4d US who is currently undergoing IOL.     Labor:  - Cervix: Last /-2; Given contractions have spaced out, now able to start augmentation with pitocin. Anticipate   - Membranes: S/p AROM for clear fluid following adequate treatment for GBS   - Pain: Comfortable with epidural.   - PPH: avoid methergine given gHTN     #MRSA  - History of abscess in  with MRSA, retest on admission negative and contact precautions removed via infection prevention     # PNC  - Rh pos, Rubella immune, GCT not performed, A1c in September was 4.4%  and random blood glucose on admission within normal limits  - GBS positive (bacteruria)  - Other prenatal labs wnl  - Imaging: placenta anterior   - Contraception: desires nexlpanon PTD, has had one in the past       # FWB:   Cat I tracing, reactive and reassuring  - Continuous Fetal Monitoring  - Intrauterine resuscitative measures prn    #gHTN  - Patient has met criteria during labor induction given elevated blood pressures >4 hours apart; HELLP labs ordered at this time     # PPH Risk:  - Medium (IOL, augment with pit, HTN, mag, triple III, prolonged labor, precipitous labor/delivery, h/o abruption, >5 deliveries, fibroids, large baby, poly, multiples, general anesthesia, shoulder dystocia, lacs/epis, op delivery, hematoma)- IV, type and screen    Venecia Schofield Resident, PGY-2  2024 2:56 AM

## 2024-11-30 NOTE — PROGRESS NOTES
[facility]  Labor Progress Note    S: Presented to patient room to assess labor progress. Amenable to SVE and IUPC placement if unchanged.    O:   Patient Vitals for the past 4 hrs:   BP Temp Temp src   24 0300 -- 98.1  F (36.7  C) Oral   24 0145 (!) 142/94 -- --       FHT: Baseline 140, moderate variability, accelerations present, rare variable decelerations   [place]: 4-5 contractions in 10 minutes    A/P: Rianna Man is a 34 year old  at 39w1d by 13w4d US who is currently undergoing IOL.     Labor:  - Cervix: 680/-2, cervix no longer swollen, however, unchanged from prior  - Membranes: S/p AROM for clear fluid following adequate treatment for GBS   - Plan: Given cervix remains unchanged, IUPC placed to help assess adequacy of contractions. Will plan to continue labor augmentation with pitocin at this time.   - Pain: Comfortable with epidural.   - PPH: avoid methergine given gHTN     #MRSA  - History of abscess in  with MRSA, retest on admission negative and contact precautions removed via infection prevention     # PNC  - Rh pos, Rubella immune, GCT not performed, A1c in September was 4.4%  and random blood glucose on admission within normal limits  - GBS positive (bacteruria)  - Other prenatal labs wnl  - Imaging: placenta anterior   - Contraception: desires nexlpanon PTD, has had one in the past       # FWB:   Cat 2 tracing as above with rare variable decelerations as above, however, reactive and reassuring with moderate variability and accelerations; tracing continues to respond to intrauterine resuscitative measures  - Continuous Fetal Monitoring  - Intrauterine resuscitative measures prn    #gHTN  - Patient has met criteria during labor induction given elevated blood pressures >4 hours apart; HELLP labs collected and within normal limits  - No signs/symptoms of severe features on review of systems     # PPH Risk:  - Medium (IOL, augment with pit, HTN,  mag, triple III, prolonged labor, precipitous labor/delivery, h/o abruption, >5 deliveries, fibroids, large baby, poly, multiples, general anesthesia, shoulder dystocia, lacs/epis, op delivery, hematoma)- IV, type and screen    Venecia Schofield Resident, PGY-2  11/30/2024 5:42 AM

## 2024-11-30 NOTE — PLAN OF CARE
Patient arrived to [facility] unit via zoom cart at 1350 ,with belongings, accompanied by  RN , with infant in arms. Received report from  GABINO Alicia  and checked bands. Unit and room orientation completd. Call light given; no concerns present at this time. Continue with plan of care.

## 2024-11-30 NOTE — ANESTHESIA PROCEDURE NOTES
"Epidural catheter Procedure Note    Pre-Procedure   Staff -        Anesthesiologist:  Prince Muhammad MD       Resident/Fellow: Mata Allen MD       Performed By: resident       Location: floor       Procedure Start/Stop Times: 11/29/2024 8:00 PM and 11/29/2024 8:30 PM       Pre-Anesthestic Checklist: patient identified, IV checked, risks and benefits discussed, informed consent, monitors and equipment checked, pre-op evaluation, at physician/surgeon's request and post-op pain management  Timeout:       Correct Patient: Yes        Correct Procedure: Yes        Correct Site: Yes        Correct Position: Yes   Procedure Documentation  Procedure: epidural catheter       Patient Position: sitting       Skin prep: Chloraprep       Local skin infiltrated with 2 mL of 1% lidocaine.        Insertion Site: L3-4. (midline approach).       Technique: LORT saline        FERNANDA at 9 cm.       Needle Type: ToCold Genesysy needle       Needle Gauge: 17.        Needle Length (Inches): 3.5        Catheter: 19 G.          Catheter threaded easily.         5.5 cm epidural space.         Threaded 14.5 cm at skin.         # of attempts: 1 and  # of redirects:  1    Assessment/Narrative         Paresthesias: No.       Test dose of 3 mL lidocaine 1.5% w/ 1:200,000 epinephrine at 20:16 CST.         Test dose negative, 3 minutes after injection, for signs of intravascular, subdural, or intrathecal injection.       Insertion/Infusion Method: LORT saline       Aspiration negative for Heme or CSF via Epidural Catheter.    Medication(s) Administered   0.1% ropivacaine + 2 mcg/mL fentaNYL in NS - EPIDURAL   10 mL - 11/29/2024 8:25:00 PM  Medication Administration Time: 11/29/2024 8:00 PM      FOR [facility] ([facility]) ONLY:   Pain Team Contact information: please page the Pain Team Via [employer]. Search \"Pain\". During daytime hours, please page the attending first. At night please page the resident first.      "

## 2024-11-30 NOTE — PROGRESS NOTES
[facility]   Labor Progress Note     S:  Comfortable with epidural in place. Amenable to SVE and AROM.     O:   Patient Vitals for the past 4 hrs:   BP Temp SpO2   24 0015 (!) 146/92 -- --   24 231 (!) 152/82 -- --   24 -- -- 100 %   24 (!) 142/87 -- --   24 137/89 -- 100 %   24 137/89 -- --   24 137/85 -- --   24 135/66 -- --   24 -- -- 98 %   24 134/80 -- --   24 135/70 -- --   24 133/82 -- --   24 -- -- 99 %   24 133/84 -- --   24 129/86 -- --   24 127/85 98  F (36.7  C) 99 %   24 131/86 -- --   24 -- -- 99 %   24 (!) 142/78 -- --       FHT: Baseline 120, moderate variability, accelerations present, no decelerations  [place]: 4-5 contractions in 10 minutes    A/P: Rianna Man is a 34 year old  at 39w0d by 13w4d US who is currently undergoing IOL.     Labor:  - Cervix: /-2, AROM for clear fluid; patient sindy frequently on her own; will start labor augmentation with pitocin as contraction frequency allows   - Pain: Comfortable with epidural in place  - PPH: standard medications (avoid methergine pending blood pressures as below)    #MRSA  - history of abscess in  with MRSA, retest on admission negative and contact precautions removed     # PNC  - Rh pos, Rubella immune, GCT not performed, A1c in September was 4.4%  and random blood glucose on admission within normal limits  - GBS bacteruria  - Other prenatal labs wnl  - Imaging: placenta anterior   - Pap last 2023 NILM   - Contraception: desires nexlpanon PTD, has had one in the past       # FWB:   Cat I tracing, reactive and reassuring  - Continuous Fetal Monitoring  - Intrauterine resuscitative measures prn    #MR BP<4 hours apart  - If patient has subsequent mild range blood pressure will meet diagnostic criteria  for gestational hypertension and HELLP labs will be collected     # PPH Risk:  - Medium (IOL, augment with pit, HTN, mag, triple III, prolonged labor, precipitous labor/delivery, h/o abruption, >5 deliveries, fibroids, large baby, poly, multiples, general anesthesia, shoulder dystocia, lacs/epis, op delivery, hematoma)- IV, type and screen    Venecia Schofield Resident, PGY-2  11/30/2024 12:38 AM

## 2024-11-30 NOTE — PROGRESS NOTES
Pt states that she is no longer tolerating pain and requests epidural. IV bolus started. Anesthesia called to bedside. Informed consent obtained. Pt positioned and epidural placed. Pt reports adequate contraction pain relief.

## 2024-11-30 NOTE — PROGRESS NOTES
Patient having mild range blood pressures, patient has met requirements for GHTN and labs drawn, all other VSS. Patient denies PreE symptoms and reports active fetal movements. Patient received epidural at 2017. AROM at 0032 clear fluid. RN assisted patient with multiple position changes to help with fetal position. Comfort measures for labor include epidural, 1:1 RN care, hot packs, and position changes. See MAR for PCN and pitocin administration. EFM as charted. Continue with POC and anticipate .

## 2024-11-30 NOTE — PLAN OF CARE
Goal Outcome Evaluation:  Data: Rianna Man transferred to [id number] via cart at 1350. Baby transferred via parent's arms.  Action: Receiving unit notified of transfer: Yes. Patient and family notified of room change. Report given to Trista REIS at 1330. Belongings sent to receiving unit. Accompanied by Registered Nurse. Oriented patient to surroundings. Call light within reach. ID bands double-checked with receiving RN.  Response: Patient tolerated transfer and is stable.

## 2024-11-30 NOTE — L&D DELIVERY NOTE
Delivery Summary    Rianna Man MRN# [id number]   Age: 34 year old YOB: 1990        Delivery Course: CNM on standby of OB MD in OR for delivery   Rianna Man is a 34 year old   at  39w1d presented to labor floor  with tingling in hands   Brief labor course:    IUP at 39 weeks gestation delivered on 2024.    Labor was induced. See MD documentation   Medications administered  in labor:  Pain Rx Epidural; Antibiotics Yes: ;Progressed to complete and pushy at 0940  .  Pushed effectively . FHR cat one   NICU team called to attend birth   Head delivered,. Over intact perineum  nuchal cord. Shoulders easily delivered under maternal effort.    birth of a viable Male infant at 0954     Spontaneous breath, vigorous cry, well flexed, HR>100. Infant directly to maternal abdomen, skin to skin.   Delayed cord clamping for 2 minutes then clamped x2 and cut by CNM.     Cord segment for peds.   Apgars of 9 at 1 minute and 9 at 5 minutes.  Placenta-mechanism: placenta gradually delivered with some resistance  cord avulsed with placenta at perineum remainder came out  fragmented with trailing membranes , with a 3 vessel cord. IV oxytocin was given. at delivery of infant  MD Dr Galindo available whle Dr Lewis in OR to come assess for  suspected retained placenta and manual sweep  QBL prior to this  200 ml  .   .   SEE SEPARATE MD DOCUMENTATION for retained placenta management  and taken to OR for suction/curretage    Vagina, perineum, and rectum inspected, Perineum: Intact. Mother and infant stable, continued skin to skin.    QBLs was 500 in room after manual sweep x   by Dr Galindo .  Birth attendants:ARLENE Browne CNM  Medically Ready for Discharge: Anticipated in 2-4 Days       ASSESSMENT & PLAN:        Kenn Man-Rianna [[id number]]      Labor Event Times      Latent labor onset date/time: 2024 1900    Active labor onset date: 24 Onset time: 12:00 AM   Dilation  complete date: 24 Complete time:  9:40 AM          Labor Length      1st Stage (hrs): 9 (min): 40   2nd Stage (hrs): 0 (min): 14   3rd Stage (hrs): 1 (min): 36          Labor Events     labor?: No   steroids: None  Labor Type: Induction/Cervical ripening, AROM  Predominate monitoring during 1st stage: continuous electronic fetal monitoring     Antibiotics received during labor?: Yes  Reason for Antibiotics: GBS  Antibiotics received for GBS: Penicillin  Antibiotics Given (GBS): Greater than 4 hours prior to delivery       Rupture date/time: 24 0032   Rupture type: Artificial Rupture of Membranes  Fluid color: Clear, Pink  Fluid odor: Normal       Induction date/time: 24 1500    Cervical ripening date/time: 24 1500           Delivery/Placenta Date and Time      Delivery Date: 24 Delivery Time:  9:54 AM   Placenta Date/Time: 2024 11:30 AM  Oxytocin given at the time of delivery: after delivery of baby  Delivering clinician: Janny Lamas APRN CNM              Vaginal Counts       Initial count performed by 2 team members:  Two Team Members   GIOVANNY Lamas CNM         Needles Suture Needles Sponges (RETIRED) Instruments   Initial counts 2  5    Added to count       Relief counts       Final counts 2  5            Placed during labor Accounted for at the end of labor   FSE     IUPC     Cervidil                    Final count performed by 2 team members:  Two Team Members   GIOVANNY Lamas CNM      Final count correct?: Yes       Apgars    Living status: Living   1 Minute 5 Minute 10 Minute 15 Minute 20 Minute   Skin color: 1  1       Heart rate: 2  2       Reflex irritability: 2  2       Muscle tone: 2  2       Respiratory effort: 2  2       Total: 9  9       Apgars assigned by: OLIVIER ECHEVERRIA       Cord      Vessels: 3 Vessels                Cord Blood Disposition: Lab    Gases Sent?: No    Delayed cord clamping?: Yes    Cord  "Clamping Delay (seconds):  seconds           [place] Resuscitation    Methods: Suctioning   Care at Delivery: [facility] Delivery Note    Requested by Dr. Lamas to attend the delivery of this term, male infant with a gestational age of 39 1/7 weeks secondary to limited prenatal care and maternal use of effexor born by .      Infant delivered at 09:54 hours on 2024. Infant had spontaneous respirations at birth with delayed cord clamping for 60 seconds. He was placed on a warmer, dried, stimulated, and bulb suctioned at birth. Due to large amount secretions deep suctioned for moderate amounts of clear amniotic fluid. Heart rate was above 150BPM and lung sounds were equal bilaterally with good aeration. No signs of respiratory distress. Skin color pink besides acrocyanosis. Gross PE is WNL except for intermittent arching and mild hypertonia. Infant was left in care of labor and delivery nurse at 7 minutes of life.     Zonia Lopez, CARLIN, [facility]-BC 2024, 10:30 AM     d       [place] Measurements      Weight: 6 lb 0.7 oz Length: 1' 7.25\"     Head circumference: 33 cm           Skin to Skin and Feeding Plan      Skin to skin initiation date/time: 1841    Skin to skin with: Mother  Skin to skin end date/time: 1841           Delivery (Maternal) (Provider to Complete) ([id number])    Episiotomy: None  Perineal lacerations: None    Repair suture: None  Genital tract inspection done: Pos       Blood Loss  Mother: Rianna Man #[id number]     Start of Mother's Information      Delivery Blood Loss   Intrapartum & Postpartum: 24 0000 - 24 1225    Delivery Admission: 24 1410 - 24 1225         Intrapartum & Postpartum Delivery Admission    None                  End of Mother's Information  Mother: Rianna Man #[id number]                Delivery - Provider to Complete ([id number])    Delivering clinician: Janny Lamas APRN CNM  Delivery Type (Choose the 1 that " will go to the Birth History): Vaginal, Spontaneous                                           Placenta    Date/Time: 11/30/2024 11:30 AM  Removal: Manual Removal, Curettage             Anesthesia    Method: Epidural                    Presentation and Position    Presentation: Vertex     Occiput Anterior                     ARLENE Browne CNM

## 2024-11-30 NOTE — PROGRESS NOTES
[facility]  Labor Progress Note    S: Patient reporting intermittent pelvic pressure. Amenable to SVE.     O:   Patient Vitals for the past 4 hrs:   BP Temp Temp src SpO2   24 0300 -- 98.1  F (36.7  C) Oral --   24 0145 (!) 142/94 -- -- --   24 0142 -- -- -- 100 %   24 0015 (!) 146/92 -- -- --       FHT: Baseline 130, moderate variability, accelerations present, rare late decelerations  [place]: 4-5 contractions in 10 minutes    A/P: Rianna Man is a 34 year old  at 39w1d by 13w4d US who is currently undergoing IOL.     Labor:  - Cervix: 680/-2, cervix slightly swollen; will administer benadryl at this time   - Membranes: S/p AROM for clear fluid following adequate treatment for GBS   - Plan: Given Category 2 FHT with rare late decelerations, will continue position changes and intrauterine resuscitative measures at this time. Discussed with Rianna that we want to ensure we are continuing to progress towards a vaginal delivery. FHT remains reassuring with moderate variability and accelerations.  - Pain: Comfortable with epidural.   - PPH: avoid methergine given gHTN     #MRSA  - History of abscess in  with MRSA, retest on admission negative and contact precautions removed via infection prevention     # PNC  - Rh pos, Rubella immune, GCT not performed, A1c in September was 4.4%  and random blood glucose on admission within normal limits  - GBS positive (bacteruria)  - Other prenatal labs wnl  - Imaging: placenta anterior   - Contraception: desires nexlpanon PTD, has had one in the past       # FWB:   Cat 2 tracing as above, reactive and reassuring  - Continuous Fetal Monitoring  - Intrauterine resuscitative measures prn    #gHTN  - Patient has met criteria during labor induction given elevated blood pressures >4 hours apart; HELLP labs collected and within normal limits  - No signs/symptoms of severe features on review of systems     # PPH Risk:  -  Medium (IOL, augment with pit, HTN, mag, triple III, prolonged labor, precipitous labor/delivery, h/o abruption, >5 deliveries, fibroids, large baby, poly, multiples, general anesthesia, shoulder dystocia, lacs/epis, op delivery, hematoma)- IV, type and screen    Venecia Schofield Resident, PGY-2  11/30/2024 3:51 AM

## 2024-11-30 NOTE — PROGRESS NOTES
OB Progress Note     Called by Hannah Lamas CNM following  with concern for retained placenta. Per Hannah's report the placenta started to delivery shortly after delivery of the  but then felt that the placenta was adherent and following avulsion of the umbilical cord, the placenta delivered in a piece meal fashion and Hannah was concerned about retained placental tissue.     Upon entry to the room, Rianna was comfortable with an epidural and her bleeding since delivery was about 250 mL. The placenta was examined and noted to be fragmented with less than expected placental tissue and membranes. A bedside ultrasound was done which showed a heterogenous mass in the MARKO with increased vascular flow consistent with retained placenta. 2 g of Ancef was given and the patient's epidural was working well. A bimanual examination was done with two large (3-4 cm) pieces of placenta removed on the first two passes. Several more passes were made with ragged tissue palpated along the anterior uterine wall and a small amount of placental fragments and membranes were removed but a clear placental plane was not identified and I had concerns regarding further retained tissue/PAS.     I recommended proceeding with a suction D&C under ultrasound guidance and risks of the procedure were discussed including risk of bleeding, infection, uterine perforation which can cause damage to surrounding structures and require further procedures/laparotomy to repair. We also discussed the risk of intrauterine adhesions and inability to remove all placental tissue requiring further procedures. We discussed the risks of blood transfusion and the possible need for hysterectomy if life threatening bleeding were to occur. All questions answered.     Patient received pitocin, 800 mcg of rectal misoprostol and IM hemabate prior to transfer to OR (methergine held due to gHTN). EBL at the time of transfer to OR was 500 mL. Type and Cross for 2 units  called to blood bank.     Aide Galindo MD

## 2024-11-30 NOTE — OP NOTE
[facility]  OPERATIVE NOTE: DILATION AND CURETTAGE   DATE: 2024  PATIENT: Rianna Man    SURGEON: Winter Lewis MD  ASSISTANT(S):  MD Carmen Toscano MD PGY-3 Coral Victor MD PGY-1,     PRE-OPERATIVE DIAGNOSIS:   1. Retained placental products    POST-OPERATIVE DIAGNOSIS:   Same, s/p procedure    PROCEDURE: Dilation and Curettage  ANESTHESIA:Epidural  EBL: 50 mL from procedure, total postpartum blood loss 700 mL     IVF: 300 mL LR   UOP: 100 cc clear   COMPLICATIONS: None apparent   SPECIMEN(S):   -Retained placenta    INDICATIONS: Rianna Man is a 34 year old  who recently delivered at 39ww0d. Delivery was uncomplicated but cord avulsed during placental delivery ad placental delivered with trailing membrane. POCUS showed retained products concerning for retained placenta/PAS.  After counseling regarding these findings, the patient has elected for surgical management in the form of a suction D&C.  The patient was counseled on risks, benefits and alternatives to the above listed procedure. Informed consent was signed prior to the procedure.    FINDINGS:   Cervix dilated, uterine size consistent with gestational age.  Suction D&C completed with return of tissue. Sharp curettage completed with some return of fragments.        PROCEDURE:   The patient was taken to the operating room. Epidural anesthesia in place from recent vaginal delivery.  She was then placed in the dorsal lithotomy position.  The patient was then prepped and draped in the usual sterile fashion and a safety timeout performed.    An open-sided speculum was placed into the vagina and the anterior lip of the cervix grasped with a ring forcep. A 14 mm firm, curved suction cannula was inserted and suction aspiration was performed with electrosuction under ultrasound guidance. Sharp curettage was then performed until a gritty texture was noted throughout the cavity.      The cervix and vaginal vault were inspected.  Good hemostasis was noted. The instruments were removed from the vagina.  Sponge and needle counts were correct at the close of the case x 2.  After anesthesia reversal, the patient was transferred to the recovery room in stable condition.      Dr. Lewis was present for the entire case.     Coral Victor MD  Obstetrics, Gynecology & Women's Health   Resident, PGY-1  11/30/2024 1:24 PM    *Note entry delayed due to patient care needs    Staff MD Note  I was present and scrubbed for the entire procedure noted above.  I agree with the description above and any necessary changes have been made by me.  Dr. Galindo kindly assessed this patient immediately postpartum due to concerns for retained placenta while I was the OR with another patient and consented the patient for the procedure.  She was also present to assist with ultrasound guidance for this procedure throughout the case.  Winter Lewis MD

## 2024-11-30 NOTE — PROVIDER NOTIFICATION
11/30/24 1243   Provider Notification   Provider Name/Title Dr Antonio   Method of Notification Electronic Page   Notification Reason Vital Signs Change     FYI right before the discharge pt had a high /96 (1040) am. Ha snot had a repeat severe range BP. At the time she felt nauseous

## 2024-11-30 NOTE — PROVIDER NOTIFICATION
11/29/24 1947   Provider Notification   Provider Name/Title Dr. Murphy   Method of Notification At Bedside   Request Evaluate - Remote   Notification Reason Status Update     Please order UA. Asked provider if they want urine Tox screen, it is not needed per Dr. Crockett. Also providers updated that patients cote came out and per providers pitocin and PCN to be started.

## 2024-11-30 NOTE — ANESTHESIA PREPROCEDURE EVALUATION
Anesthesia Pre-Procedure Evaluation    Patient: Rianna Man   MRN: [id number] : 1990        Procedure : Procedure(s):  Dilation and curettage          Past Medical History:   Diagnosis Date    Anxiety     Depressive disorder     Encounter for initial prescription of implantable subdermal contraceptive 11/10/2010    Overview:   Inserted 2010      Schizoaffective disorder (H)     Screen for STD (sexually transmitted disease)     Severe amphetamine substance use disorder (H)     Sinus tachycardia     Spontaneous  2021    Varicella     had disease      Past Surgical History:   Procedure Laterality Date    DILATION AND CURETTAGE  2021    NO HISTORY OF SURGERY        Allergies   Allergen Reactions    No Known Allergies       Social History     Tobacco Use    Smoking status: Every Day     Current packs/day: 0.00     Average packs/day: 0.3 packs/day for 15.0 years (3.8 ttl pk-yrs)     Types: Cigarettes     Start date: 2005     Last attempt to quit: 2020     Years since quittin.0    Smokeless tobacco: Current    Tobacco comments:     3-4 cig/day, currently 2 day 24   Substance Use Topics    Alcohol use: Not Currently     Comment: occasional drinker      Wt Readings from Last 1 Encounters:   24 122.5 kg (270 lb)        Anesthesia Evaluation   Pt has had prior anesthetic. Type: General and OB Labor Epidural.    No history of anesthetic complications       ROS/MED HX  ENT/Pulmonary:  - neg pulmonary ROS     Neurologic:  - neg neurologic ROS     Cardiovascular:  - neg cardiovascular ROS     METS/Exercise Tolerance:     Hematologic:  - neg hematologic  ROS     Musculoskeletal:       GI/Hepatic:  - neg GI/hepatic ROS     Renal/Genitourinary:       Endo: Comment: Hx MRSA, Gonorrhea    (+)               Obesity,       Psychiatric/Substance Use: Comment: Schizoaffective disorder/FITZ/PTSD, Hx substance use d/o    (+) psychiatric history        Infectious  "Disease:       Malignancy:       Other:               OUTSIDE LABS:  CBC:   Lab Results   Component Value Date    WBC 11.6 (H) 11/30/2024    WBC 8.8 11/30/2024    HGB 10.9 (L) 11/30/2024    HGB 12.7 11/30/2024    HCT 32.9 (L) 11/30/2024    HCT 35.8 11/30/2024     11/30/2024     11/30/2024     BMP:   Lab Results   Component Value Date     (L) 09/17/2024     (L) 08/20/2024    POTASSIUM 4.2 09/17/2024    POTASSIUM 4.1 08/20/2024    CHLORIDE 100 09/17/2024    CHLORIDE 102 08/20/2024    CO2 20 (L) 09/17/2024    CO2 21 (L) 08/20/2024    BUN 3.2 (L) 09/17/2024    BUN 6.2 08/20/2024    CR 0.68 11/30/2024    CR 0.49 (L) 09/17/2024    GLC 99 11/29/2024    GLC 88 09/17/2024     COAGS: No results found for: \"PTT\", \"INR\", \"FIBR\"  POC:   Lab Results   Component Value Date    HCG Positive (A) 04/08/2024     HEPATIC:   Lab Results   Component Value Date    ALBUMIN 3.5 09/17/2024    PROTTOTAL 6.5 09/17/2024    ALT 8 11/30/2024    AST 13 11/30/2024    ALKPHOS 76 09/17/2024    BILITOTAL 0.2 09/17/2024     OTHER:   Lab Results   Component Value Date    A1C 4.4 09/17/2024    GISELLA 8.9 09/17/2024    LIPASE 17 12/15/2020    TSH 1.21 04/09/2024       Anesthesia Plan    ASA Status:  3, emergent    NPO Status:  ELEVATED Aspiration Risk/Unknown                  Consents    Anesthesia Plan(s) and associated risks, benefits, and realistic alternatives discussed. Questions answered and patient/representative(s) expressed understanding.     - Discussed:     - Discussed with:  Patient      - Extended Intubation/Ventilatory Support Discussed: No.      - Patient is DNR/DNI Status: No     Use of blood products discussed: Yes.     - Discussed with: Patient.     - Consented: consented to blood products     Postoperative Care    Pain management: Oral pain medications.   PONV prophylaxis: Ondansetron (or other 5HT-3), Dexamethasone or Solumedrol     Comments:    Other Comments: Interval note: Patient labored and delivered under " epidural catheter. Delivery complicated by retained placenta requiring emergent OR case for D&C. Proceeding to OR under epidural with back up plan to convert to General. Patient is not fasting/ high aspiration risk. Consented for blood transfusion.   MD Pat Campebll MD    I have reviewed the pertinent notes and labs in the chart from the past 30 days and (re)examined the patient.  Any updates or changes from those notes are reflected in this note.               # Hypertension: Home medication list includes antihypertensive(s)      # Anemia: based on hgb <11           # Financial/Environmental Concerns:

## 2024-11-30 NOTE — PLAN OF CARE
Goal Outcome Evaluation:  Pt reported use of THC 2-3 times per week to this RN. This RN asked Dr Humphrey and Dr Coleman to speak with patient about urine drug screening. Per Dr Humphrey and Dr Coleman urine drug screening not needed.

## 2024-11-30 NOTE — PROGRESS NOTES
[facility]  Brief Labor Progress Note - FHT Review    S:  Per RN, patient's cote balloon removed and patient would like to start working towards epidural placement.    O:   Patient Vitals for the past 4 hrs:   BP Temp Temp src   24 1752 125/67 98.1  F (36.7  C) Oral       FHT: Baseline 120, moderate variability, accelerations present, no decelerations  [place]: 2-3 contractions in 10 minutes    A/P:  Ms. Rianna Man is a 34 year old  at 39w0d by 13w4d US who is currently undergoing IOL.     Labor:  - Cervix last -3, however, has since received one dose of vaginal misoprostol and had cote balloon placed and removed; will now start labor augmentation with pitocin as well as PCN for GBS positive status  - Pain: Patient would like to start working towards epidural placement at this time  - PPH: standard medications    #MRSA  - history of abscess in  with MRSA, retest from admission remains in process; will discontinue contact precautions if negative     # PNC  - Rh pos, Rubella immune, GCT not performed, A1c in September was 4.4%  and random blood glucose on admission within normal limits  - GBS bacteruria  - Other prenatal labs wnl  - Imaging: placenta anterior   - Pap last 2023 NILM   - Contraception: desires nexlpanon PTD, has had one in the past       # FWB:   Cat I tracing, reactive and reassuring  - Continuous Fetal Monitoring  - Intrauterine resuscitative measures prn     # PPH Risk:  - Medium (IOL, augment with pit, HTN, mag, triple III, prolonged labor, precipitous labor/delivery, h/o abruption, >5 deliveries, fibroids, large baby, poly, multiples, general anesthesia, shoulder dystocia, lacs/epis, op delivery, hematoma)- IV, type and screen    Venecia Schofield Resident, PGY-2  2024 8:10 PM

## 2024-12-01 VITALS
TEMPERATURE: 98.1 F | BODY MASS INDEX: 44.41 KG/M2 | DIASTOLIC BLOOD PRESSURE: 85 MMHG | RESPIRATION RATE: 18 BRPM | HEIGHT: 68 IN | WEIGHT: 293 LBS | OXYGEN SATURATION: 100 % | SYSTOLIC BLOOD PRESSURE: 127 MMHG | HEART RATE: 113 BPM

## 2024-12-01 PROBLEM — O13.9 GESTATIONAL HYPERTENSION: Status: ACTIVE | Noted: 2024-12-01

## 2024-12-01 LAB — HGB BLD-MCNC: 8.9 G/DL (ref 11.7–15.7)

## 2024-12-01 PROCEDURE — 85018 HEMOGLOBIN: CPT

## 2024-12-01 PROCEDURE — 250N000011 HC RX IP 250 OP 636: Performed by: OBSTETRICS & GYNECOLOGY

## 2024-12-01 PROCEDURE — 99238 HOSP IP/OBS DSCHRG MGMT 30/<: CPT | Mod: GC | Performed by: OBSTETRICS & GYNECOLOGY

## 2024-12-01 PROCEDURE — 250N000013 HC RX MED GY IP 250 OP 250 PS 637

## 2024-12-01 PROCEDURE — 250N000009 HC RX 250: Performed by: OBSTETRICS & GYNECOLOGY

## 2024-12-01 PROCEDURE — 36415 COLL VENOUS BLD VENIPUNCTURE: CPT

## 2024-12-01 RX ORDER — NIFEDIPINE 30 MG
30 TABLET, EXTENDED RELEASE ORAL DAILY
Qty: 60 TABLET | Refills: 1 | Status: SHIPPED | OUTPATIENT
Start: 2024-12-02 | End: 2024-12-03

## 2024-12-01 RX ORDER — NIFEDIPINE 30 MG/1
30 TABLET, EXTENDED RELEASE ORAL DAILY
Status: DISCONTINUED | OUTPATIENT
Start: 2024-12-01 | End: 2024-12-01 | Stop reason: HOSPADM

## 2024-12-01 RX ORDER — LIDOCAINE HYDROCHLORIDE 10 MG/ML
10 INJECTION, SOLUTION EPIDURAL; INFILTRATION; INTRACAUDAL; PERINEURAL ONCE
Status: COMPLETED | OUTPATIENT
Start: 2024-12-01 | End: 2024-12-01

## 2024-12-01 RX ORDER — LIDOCAINE HYDROCHLORIDE 10 MG/ML
INJECTION, SOLUTION EPIDURAL; INFILTRATION; INTRACAUDAL; PERINEURAL
Status: COMPLETED
Start: 2024-12-01 | End: 2024-12-01

## 2024-12-01 RX ADMIN — ACETAMINOPHEN 650 MG: 325 TABLET, FILM COATED ORAL at 04:11

## 2024-12-01 RX ADMIN — LIDOCAINE HYDROCHLORIDE 10 ML: 10 INJECTION, SOLUTION EPIDURAL; INFILTRATION; INTRACAUDAL; PERINEURAL at 14:29

## 2024-12-01 RX ADMIN — ACETAMINOPHEN 650 MG: 325 TABLET, FILM COATED ORAL at 14:02

## 2024-12-01 RX ADMIN — IBUPROFEN 800 MG: 800 TABLET, FILM COATED ORAL at 09:57

## 2024-12-01 RX ADMIN — FAMOTIDINE 10 MG: 10 TABLET ORAL at 09:57

## 2024-12-01 RX ADMIN — NIFEDIPINE 30 MG: 30 TABLET, FILM COATED, EXTENDED RELEASE ORAL at 09:57

## 2024-12-01 RX ADMIN — IBUPROFEN 800 MG: 800 TABLET, FILM COATED ORAL at 00:16

## 2024-12-01 RX ADMIN — ACETAMINOPHEN 650 MG: 325 TABLET, FILM COATED ORAL at 09:57

## 2024-12-01 RX ADMIN — ACETAMINOPHEN 650 MG: 325 TABLET, FILM COATED ORAL at 00:16

## 2024-12-01 RX ADMIN — ETONOGESTREL 68 MG: 68 IMPLANT SUBCUTANEOUS at 14:26

## 2024-12-01 RX ADMIN — DOCUSATE SODIUM 100 MG: 100 CAPSULE, LIQUID FILLED ORAL at 09:57

## 2024-12-01 ASSESSMENT — ACTIVITIES OF DAILY LIVING (ADL)
ADLS_ACUITY_SCORE: 26
ADLS_ACUITY_SCORE: 25
ADLS_ACUITY_SCORE: 25
ADLS_ACUITY_SCORE: 26
ADLS_ACUITY_SCORE: 26
ADLS_ACUITY_SCORE: 25
ADLS_ACUITY_SCORE: 26
ADLS_ACUITY_SCORE: 25
ADLS_ACUITY_SCORE: 26
ADLS_ACUITY_SCORE: 25
ADLS_ACUITY_SCORE: 26
ADLS_ACUITY_SCORE: 25

## 2024-12-01 NOTE — PROVIDER NOTIFICATION
11/30/24 2241   Provider Notification   Provider Name/Title G3   Method of Notification Electronic Page   Request Evaluate-Remote   Notification Reason Status Update  (EDS score of 12)     Notified the G3 that the pt scored a 12 on her EDS, question 10 was a 1 with no active plan. Social work consult placed.

## 2024-12-01 NOTE — DISCHARGE SUMMARY
[facility] Discharge Summary    Rianna Man MRN# [id number]   Age: 34 year old YOB: 1990     Date of Admission:  2024  Date of Discharge::  2024  Admitting Physician:  Yesika Humphrey MD  Discharge Physician:  Yesika Humphrey MD            Admission Diagnoses:   -   - IUP at 39w1d  - Limited PNC  - GBS bacteruria  - Poss bronchogenic cyst - nl echo 10/31, needs echo within 24h life   - Gonorrhea +  - s/p SHIELA   - Housing instability   - Schizoaffective disorder/FITZ/PTSD  - Hx MRSA positive abscess () - MRSA neg on admission            Discharge Diagnosis:   - Postpartum, s/p delivery of viable infant  - Gestational hypertension             Procedures:     Procedure(s): - Spontaneous vaginal delivery complicated by cord avulsion requiring suction D&C for retained placental products  - Nexplanon insertion                Medications Prior to Admission:     Medications Prior to Admission   Medication Sig Dispense Refill Last Dose/Taking    acetaminophen (TYLENOL) 325 MG tablet Take 1-2 tablets (325-650 mg) by mouth every 6 hours as needed for mild pain. 60 tablet 1 Past Week    benzocaine-menthol (CHLORASEPTIC) 6-10 MG lozenge Place 1 lozenge inside cheek every hour as needed for sore throat. 20 lozenge 1 More than a month    cetirizine (ZYRTEC) 10 MG tablet Take 1 tablet (10 mg) by mouth daily 30 tablet 0 More than a month    guaiFENesin (ROBITUSSIN) 20 mg/mL liquid Take 10 mLs (200 mg) by mouth every 4 hours as needed for cough. 180 mL 1 More than a month    hydrOXYzine (VISTARIL) 50 MG capsule Take 1 capsule (50 mg) by mouth every 6 hours as needed for itching or anxiety 30 capsule 0 2024 Bedtime    meclizine (ANTIVERT) 12.5 MG tablet Take 1 tablet (12.5 mg) by mouth every 6 hours as needed for dizziness. 20 tablet 0 More than a month    OLANZapine (ZYPREXA) 15 MG tablet Take 1 tablet (15 mg) by mouth at bedtime. 30 tablet 2 2024 Bedtime     Prenatal Vit-Fe Fumarate-FA (PRENATAL MULTIVITAMIN W/IRON) 27-0.8 MG tablet Take 1 tablet by mouth daily. 90 tablet 3 11/28/2024 Bedtime    senna-docusate (SENOKOT-S/PERICOLACE) 8.6-50 MG tablet Take 1 tablet by mouth 2 times daily. 30 tablet 3 More than a month    traZODone (DESYREL) 100 MG tablet Take 1 tablet (100 mg) by mouth at bedtime 10 tablet 0 11/28/2024 Bedtime    venlafaxine (EFFEXOR XR) 37.5 MG 24 hr capsule Take 1 capsule (37.5 mg) by mouth daily (with breakfast). 30 capsule 2 11/28/2024 Bedtime    [DISCONTINUED] folic acid (FOLVITE) 1 MG tablet Take 1 tablet (1,000 mcg) by mouth daily. 30 tablet 2 More than a month    [DISCONTINUED] prazosin (MINIPRESS) 1 MG capsule Take 1 capsule (1 mg) by mouth at bedtime 30 capsule 0 More than a month    [DISCONTINUED] pyridOXINE (VITAMIN B6) 25 MG tablet Take 1 tablet (25 mg) by mouth 3 times daily as needed (Nausea, vomiting) 90 tablet 1 More than a month    [DISCONTINUED] thiamine (B-1) 100 MG tablet TAKE 1 TABLET BY MOUTH ONCE DAILY (Patient taking differently: Take 1 tablet by mouth daily.) 30 tablet 0              Discharge Medications:        Review of your medicines        START taking        Dose / Directions   NIFEdipine ER 30 MG 24 hr tablet  Commonly known as: ADALAT CC  Used for: Vaginal delivery      Dose: 30 mg  Start taking on: December 2, 2024  Take 1 tablet (30 mg) by mouth daily.  Quantity: 60 tablet  Refills: 1            CONTINUE these medicines which have NOT CHANGED        Dose / Directions   acetaminophen 325 MG tablet  Commonly known as: TYLENOL  Used for: Pregnancy examination or test, positive result      Dose: 325-650 mg  Take 1-2 tablets (325-650 mg) by mouth every 6 hours as needed for mild pain.  Quantity: 60 tablet  Refills: 1     benzocaine-menthol 6-10 MG lozenge  Commonly known as: CHLORASEPTIC  Used for: Sore throat      Dose: 1 lozenge  Place 1 lozenge inside cheek every hour as needed for sore throat.  Quantity: 20  lozenge  Refills: 1     cetirizine 10 MG tablet  Commonly known as: zyrTEC  Used for: Itching, Urticaria      Dose: 10 mg  Take 1 tablet (10 mg) by mouth daily  Quantity: 30 tablet  Refills: 0     guaiFENesin 20 mg/mL liquid  Commonly known as: ROBITUSSIN  Used for: Acute cough      Dose: 200 mg  Take 10 mLs (200 mg) by mouth every 4 hours as needed for cough.  Quantity: 180 mL  Refills: 1     hydrOXYzine 50 MG capsule  Commonly known as: VISTARIL      Dose: 50 mg  Take 1 capsule (50 mg) by mouth every 6 hours as needed for itching or anxiety  Quantity: 30 capsule  Refills: 0     meclizine 12.5 MG tablet  Commonly known as: ANTIVERT  Used for: Vertigo      Dose: 12.5 mg  Take 1 tablet (12.5 mg) by mouth every 6 hours as needed for dizziness.  Quantity: 20 tablet  Refills: 0     OLANZapine 15 MG tablet  Commonly known as: zyPREXA  Used for: Schizoaffective disorder, bipolar type (H)      Dose: 15 mg  Take 1 tablet (15 mg) by mouth at bedtime.  Quantity: 30 tablet  Refills: 2     prenatal multivitamin w/iron 27-0.8 MG tablet  Used for: Pregnancy examination or test, positive result      Dose: 1 tablet  Take 1 tablet by mouth daily.  Quantity: 90 tablet  Refills: 3     senna-docusate 8.6-50 MG tablet  Commonly known as: SENOKOT-S/PERICOLACE  Used for: Vertigo      Dose: 1 tablet  Take 1 tablet by mouth 2 times daily.  Quantity: 30 tablet  Refills: 3     traZODone 100 MG tablet  Commonly known as: DESYREL      Dose: 100 mg  Take 1 tablet (100 mg) by mouth at bedtime  Quantity: 10 tablet  Refills: 0     venlafaxine 37.5 MG 24 hr capsule  Commonly known as: EFFEXOR XR  Used for: Schizoaffective disorder, unspecified type (H)      Dose: 37.5 mg  Take 1 capsule (37.5 mg) by mouth daily (with breakfast).  Quantity: 30 capsule  Refills: 2            STOP taking      folic acid 1 MG tablet  Commonly known as: FOLVITE        prazosin 1 MG capsule  Commonly known as: MINIPRESS        pyridOXINE 25 MG tablet  Commonly known as:  VITAMIN B6        thiamine 100 MG tablet  Commonly known as: B-1                  Where to get your medicines        These medications were sent to [place], [place] - [address]  [address] [facility] [address], [facility] [address]      Phone: [phone]   NIFEdipine ER 30 MG 24 hr tablet               Consultations:   Anesthesiology  Social Work  Lactation          Brief History of Admission and Labor:   Rianna Man is a 34 year old now  who presented at 39w0d by 13w4d US who was admitted for an elective induction of labor. She received misoprostol and cote balloon for cervical ripening. Her labor was augmented by pitocin and AROM. Her delivery was complicated by retained placental products secondary to cord avulsion. She underwent a suction D&C under ultrasound guidance.          Postpartum Hospital Course:   The patient's postpartum course was notable for meeting diagnostic criteria for gestational hypertension. She was started on nifedipine 30 mg and referred to the [facility] program. On PPD#1, she requested discharge home. She was counseled extensively on the symptoms of pre-eclampsia, increased risk of stroke and heart attack, and return precautions in the setting of pre-eclampsia symptoms and BP>160/110. On discharge, her pain was well controlled. Vaginal bleeding is similar to peak menstrual flow.  Voiding without difficulty.  Ambulating well and tolerating a normal diet. No fever. Breastfeeding well.  Infant is stable. A Nexplanon implant was placed prior to discharge per patient request. She was discharged on post-partum day #1.    Post-partum hemoglobin: 8.9    Contraception: Nexplanon (placed 2024)    Rhogam was not indicated          Discharge Instructions and Follow-Up:     Discharge diet: Regular   Discharge activity: Activity as tolerated   Discharge follow-up: Follow up with your primary OBGYN provider in 1 week for a blood pressure check.   Follow up with  your primary OBGYN provider in six weeks for a routine postpartum visit.     Wound care: Drink plenty of fluids  Ice to area for comfort           Discharge Disposition:     Discharged to home against medical advice       Attestation:  I have reviewed today's vital signs, notes, medications, labs and imaging.     Viki Oro DO, MPH  Obstetrics and Gynecology, PGY-1

## 2024-12-01 NOTE — PLAN OF CARE
Goal Outcome Evaluation: Postpartum stable. BP's elevated, no severe ranges. PIV SL. Ibuprofen and tylenol given for pain. Procardia given this shift. Ambulating in her room, tolerated well. Voiding adequate amounts. Denies pre e sx. Significant other in room for support. Continue POC.      Plan of Care Reviewed With: patient    Overall Patient Progress: improvingOverall Patient Progress: improving

## 2024-12-01 NOTE — PROGRESS NOTES
Nexplanon Insertion Procedure Note    Rianna Man desired a Nexplanon  insertion. We discussed the risks/benefits of the procedure, including small scar on the arm, mild pain/bruising after the procedure, menstrual changes including irregular bleeding/spotting, and that the device is effective for 3 years and must be removed after that time.     The skin of the left upper arm 3 finger widths from the olecranon process was cleansed with betadine. 10 mL of 1% lidocaine was injected into the same arm and then the nexplanon device was inserted subdermally according to the 's instructions. Both myself and the patient felt the constance after placement. The site was dressed with a Band-aid and pressure dressing. SN [id number], Lot [id number], exp 2026-09.     Rianna GIOVANNY Man tolerated the procedure well.     Viki Oro DO, MPH  Obstetrics and Gynecology, PGY-1  December 1, 2024, 14:40
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
## 2024-12-01 NOTE — DISCHARGE INSTRUCTIONS
Warning Signs after Having a Baby    Keep this paper on your fridge or somewhere else where you can see it.    Call your provider if you have any of these symptoms up to 12 weeks after having your baby.    Thoughts of hurting yourself or your baby  Pain in your chest or trouble breathing  Severe headache not helped by pain medicine  Eyesight concerns (blurry vision, seeing spots or flashes of light, other changes to eyesight)  Fainting, shaking or other signs of a seizure    Call 9-1-1 if you feel that it is an emergency.     The symptoms below can happen to anyone after giving birth. They can be very serious. Call your provider if you have any of these warning signs.    My provider s phone number: _______________________    Losing too much blood (hemorrhage)    Call your provider if you soak through a pad in less than an hour or pass blood clots bigger than a golf ball. These may be signs that you are bleeding too much.    Blood clots in the legs or lungs    After you give birth, your body naturally clots its blood to help prevent blood loss. Sometimes this increased clotting can happen in other areas of the body, like the legs or lungs. This can block your blood flow and be very dangerous.     Call your provider if you:  Have a red, swollen spot on the back of your leg that is warm or painful when you touch it.   Are coughing up blood.     Infection    Call your provider if you have any of these symptoms:  Fever of 100.4 F (38 C) or higher.  Pain or redness around your stitches if you had an incision.   Any yellow, white, or green fluid coming from places where you had stitches or surgery.    Mood Problems (postpartum depression)    Many people feel sad or have mood changes after having a baby. But for some people, these mood swings are worse.     Call your provider right away if you feel so anxious or nervous that you can't care for yourself or your baby.    Preeclampsia (high blood pressure)    Even if you  "didn't have high blood pressure when you were pregnant, you are at risk for the high blood pressure disease called preeclampsia. This risk can last up to 12 weeks after giving birth.     Call your provider if you have:   Pain on your right side under your rib cage  Sudden swelling in the hands and face    Remember: You know your body. If something doesn't feel right, get medical help.     For informational purposes only. Not to replace the advice of your health care provider. Copyright 2020 [facility]. All rights reserved. Clinically reviewed by Fidelina Araya, RNC-OB, MSN. [facility] [id number] - Rev .    After Your Delivery (the Postpartum Period): Care Instructions  Overview     After childbirth (postpartum period), your body goes through many changes as you recover. In these weeks after delivery, try to take good care of yourself. Get rest whenever you can and accept help from others.  It may take 4 to 6 weeks to feel like yourself again, and possibly longer if you had a  birth. You may feel sore or very tired as you recover. After delivery, you may continue to have contractions as the uterus returns to the size it was before your pregnancy. You will also have some vaginal bleeding. And you may have pain around the vagina as you heal. Several days after delivery you may also have pain and swelling in your breasts as they fill with milk. There are things you can do at home to help ease these discomforts.  After childbirth, it's common to feel emotional. You may feel irritable, cry easily, and feel happy one minute and sad the next. This is called the \"baby blues.\" Hormone changes are one cause of these emotional changes. These feelings usually get better within a couple of weeks. If they don't, talk to your doctor or midwife.  In the first couple of weeks after you give birth, your doctor or midwife may want to check in with you and make a plan for follow-up care. You will likely have a " complete postpartum visit in the first 3 months after delivery. At that time, your doctor or midwife will check on your recovery and see how you're doing. But if you have questions or concerns before then, you can always call your doctor or midwife.  Follow-up care is a key part of your treatment and safety. Be sure to make and go to all appointments, and call your doctor if you are having problems. It's also a good idea to know your test results and keep a list of the medicines you take.  How can you care for yourself at home?  Taking care of your body  Use pads instead of tampons for bleeding. After birth, you will have bloody vaginal discharge. You may also pass some blood clots that shouldn't be bigger than an egg. Over the next 6 weeks or so, your bleeding should decrease a little every day and slowly change to a pinkish and then whitish discharge.  For cramps or mild pain, try an over-the-counter pain medicine, such as acetaminophen (Tylenol) or ibuprofen (Advil, Motrin). Read and follow all instructions on the label.  To ease pain around the vagina or from hemorrhoids:  Put ice or a cold pack on the area for 10 to 20 minutes at a time. Put a thin cloth between the ice and your skin.  Try sitting in a few inches of warm water (sitz bath) when you can or after bowel movements.  Clean yourself with a gentle squeeze of warm water from a bottle instead of wiping with toilet paper.  Use witch hazel or hemorrhoid pads (such as Tucks).  Try using a cold compress for sore and swollen breasts. And wear a supportive bra that fits.  Ease constipation by drinking plenty of fluids and eating high-fiber foods. Ask your doctor or midwife about over-the-counter stool softeners.  Activity  Rest when you can.  Ask for help from family or friends when you need it.  If you can, have another adult in your home for at least 2 or 3 days after birth.  When you feel ready, try to get some exercise every day. For many people, walking  is a good choice. Don't do any heavy exercise until your doctor or midwife says it's okay.  Ask your doctor or midwife when it is okay to have vaginal sex.  If you don't want to get pregnant, talk to your doctor or midwife about birth control options. You can get pregnant even before your period returns. Also, you can get pregnant while you are breastfeeding.  Talk to your doctor or midwife if you want to get pregnant again. They can talk to you about when it is safe.  Emotional health  It's normal to have some sadness, anxiety, and mood swings after delivery. It may help to talk with a trusted friend or family member. You can also call the Maternal Mental Health Hotline at 1-149-GNM-\[facility] [place] [facility]\"" (1-956.412.9155) for support. If these mood changes last more than a couple of weeks, talk to your doctor or midwife.  When should you call for help?  Share this information with your partner, family, or a friend. They can help you watch for warning signs.  Call 911  anytime you think you may need emergency care. For example, call if:    You feel you cannot stop from hurting yourself, your baby, or someone else.     You passed out (lost consciousness).     You have chest pain, are short of breath, or cough up blood.     You have a seizure.   Where to get help 24 hours a day, 7 days a week   If you or someone you know talks about suicide, self-harm, a mental health crisis, a substance use crisis, or any other kind of emotional distress, get help right away. You can:    Call the Suicide and Crisis Lifeline at 298.     Call 1-870-022-TALK (1-751.153.3159).     Text HOME to 361892 to access the Crisis Text Line.   Consider saving these numbers in your phone.  Go to [employer] for more information or to chat online.  Call your doctor or midwife now or seek immediate medical care if:    You have signs of hemorrhage (too much bleeding), such as:  Heavy vaginal bleeding. This means that you are soaking through one or more pads in an  "hour. Or you pass blood clots bigger than an egg.  Feeling dizzy or lightheaded, or you feel like you may faint.  Feeling so tired or weak that you cannot do your usual activities.  A fast or irregular heartbeat.  New or worse belly pain.     You have signs of infection, such as:  A fever.  Increased pain, swelling, warmth, or redness from an incision or wound.  Frequent or painful urination or blood in your urine.  Vaginal discharge that smells bad.  New or worse belly pain.     You have symptoms of a blood clot in your leg (called a deep vein thrombosis), such as:  Pain in the calf, back of the knee, thigh, or groin.  Swelling in the leg or groin.  A color change on the leg or groin. The skin may be reddish or purplish, depending on your usual skin color.     You have signs of preeclampsia, such as:  Sudden swelling of your face, hands, or feet.  New vision problems (such as dimness, blurring, or seeing spots).  A severe headache.     You have signs of heart failure, such as:  New or increased shortness of breath.  New or worse swelling in your legs, ankles, or feet.  Sudden weight gain, such as more than 2 to 3 pounds in a day or 5 pounds in a week.  Feeling so tired or weak that you cannot do your usual activities.     You had spinal or epidural pain relief and have:  New or worse back pain.  Increased pain, swelling, warmth, or redness at the injection site.  Tingling, weakness, or numbness in your legs or groin.   Watch closely for changes in your health, and be sure to contact your doctor or midwife if:    Your vaginal bleeding isn't decreasing.     You feel sad, anxious, or hopeless for more than a few days.     You are having problems with your breasts or breastfeeding.   Where can you learn more?  Go to https://www.healthwise.net/patiented  Enter A461 in the search box to learn more about \"After Your Delivery (the Postpartum Period): Care Instructions.\"  Current as of: July 10, 2023  Content Version: " 14.2    2024 [facility] [employer].   Care instructions adapted under license by your healthcare professional. If you have questions about a medical condition or this instruction, always ask your healthcare professional. Healthwise, Incorporated disclaims any warranty or liability for your use of this information.    Implant for Birth Control: Care Instructions  Overview     The implant is used to prevent pregnancy. It's a thin constance about the size of a matchstick that is inserted under the skin (subdermal) on the inside of your upper arm.  The implant prevents pregnancy for up to 5 years. Talk to your doctor about how long you can use it. After it is put in, you don't have to do anything else to prevent pregnancy.  Follow-up care is a key part of your treatment and safety. Be sure to make and go to all appointments, and call your doctor if you are having problems. It's also a good idea to know your test results and keep a list of the medicines you take.  How can you care for yourself at home?  How do you use the subdermal implant?  The implant is put in by your doctor or another trained health professional. It only takes a few minutes. This can also be done right after you give birth. Your doctor will remove the implant when it needs to be taken out.  An adhesive bandage and a tight (pressure) bandage will be placed over the site. This helps reduce swelling and bruising.  Ask your doctor if you need to use backup birth control, such as a condom, for a week after insertion. Whether you need to do this depends on where you are in your cycle.  How can you care for the insertion site?  Remove the pressure bandage after 24 hours. Keep the area dry.  Keep an adhesive bandage on the site for 3 to 5 days after the procedure.  If you have pain, use an ice pack or take an over-the-counter pain medicine. Some soreness or bruising is normal.  What else do you need to know?  It's safe to use while breastfeeding.  The implant has  side effects.  You may have changes in your period. Your period may stop. You may also have spotting or bleeding between periods.  You may have mood changes, less interest in sex, or weight gain.  The implant can be removed at any time for any reason if you choose. Otherwise, it will be removed at the end of its effectiveness. When the implant is removed, you could get pregnant. If you don't want to get pregnant, your doctor can replace the implant or you can ask your doctor about other birth control methods.  Check with your doctor before you use any other medicines. This includes over-the-counter medicines, vitamins, herbal products, and supplements. Birth control hormones may not work as well to prevent pregnancy when combined with other medicines.  The implant doesn't protect against sexually transmitted infections (STIs), such as herpes or HIV. A condom can be used to reduce your risk of getting an STI.  When should you call for help?   Call 911  anytime you think you may need emergency care. For example, call if:    You have shortness of breath.     You have chest pain.     You passed out (lost consciousness).     You cough up blood.   Call your doctor now or seek immediate medical care if:    You have severe pain or numbness and tingling in the arm where the implant was inserted.     You have increased pain, swelling, warmth, or redness at the insertion site.     You have signs of a blood clot in your leg (called a deep vein thrombosis), such as:  Pain in your calf, back of the knee, thigh, or groin.  Swelling in the leg or groin.  A color change on the leg or groin. The skin may be reddish or purplish, depending on your usual skin color.   Watch closely for changes in your health, and be sure to contact your doctor if:    You can't feel your implant.     You think your implant might be bent or broken in your arm.     You think you might be pregnant.     You have any problems with your birth control method.  "  Where can you learn more?  Go to https://www.[employer].net/patiented  Enter V768 in the search box to learn more about \"Implant for Birth Control: Care Instructions.\"  Current as of: November 27, 2023  Content Version: 14.2 2024 [facility].   Care instructions adapted under license by your healthcare professional. If you have questions about a medical condition or this instruction, always ask your healthcare professional. Healthwise, Incorporated disclaims any warranty or liability for your use of this information.    "

## 2024-12-01 NOTE — LACTATION NOTE
This note was copied from a baby's chart.  Consult for:  parent request     Infant Name: Dao Ramos    Infant's Primary Care Clinic: [facility]    Delivery Information:  Dao Ramos was born at Gestational Age: 39w1d via   delivery on 2024 9:54 AM     Maternal Health History:    Information for the patient's mother:  Rianna Man [[id number]]     Past Medical History:   Diagnosis Date    Anxiety     Depressive disorder     Encounter for initial prescription of implantable subdermal contraceptive 11/10/2010    Overview:   Inserted 2010      Schizoaffective disorder (H)     Screen for STD (sexually transmitted disease)     Severe amphetamine substance use disorder (H)     Sinus tachycardia     Spontaneous  2021    Varicella     had disease    and   Information for the patient's mother:  Rianna Man [[id number]]     Medications Prior to Admission   Medication Sig Dispense Refill Last Dose/Taking    acetaminophen (TYLENOL) 325 MG tablet Take 1-2 tablets (325-650 mg) by mouth every 6 hours as needed for mild pain. 60 tablet 1 Past Week    benzocaine-menthol (CHLORASEPTIC) 6-10 MG lozenge Place 1 lozenge inside cheek every hour as needed for sore throat. 20 lozenge 1 More than a month    cetirizine (ZYRTEC) 10 MG tablet Take 1 tablet (10 mg) by mouth daily 30 tablet 0 More than a month    folic acid (FOLVITE) 1 MG tablet Take 1 tablet (1,000 mcg) by mouth daily. 30 tablet 2 More than a month    guaiFENesin (ROBITUSSIN) 20 mg/mL liquid Take 10 mLs (200 mg) by mouth every 4 hours as needed for cough. 180 mL 1 More than a month    hydrOXYzine (VISTARIL) 50 MG capsule Take 1 capsule (50 mg) by mouth every 6 hours as needed for itching or anxiety 30 capsule 0 2024 Bedtime    meclizine (ANTIVERT) 12.5 MG tablet Take 1 tablet (12.5 mg) by mouth every 6 hours as needed for dizziness. 20 tablet 0 More than a month    OLANZapine (ZYPREXA) 15 MG  tablet Take 1 tablet (15 mg) by mouth at bedtime. 30 tablet 2 11/28/2024 Bedtime    prazosin (MINIPRESS) 1 MG capsule Take 1 capsule (1 mg) by mouth at bedtime 30 capsule 0 More than a month    Prenatal Vit-Fe Fumarate-FA (PRENATAL MULTIVITAMIN W/IRON) 27-0.8 MG tablet Take 1 tablet by mouth daily. 90 tablet 3 11/28/2024 Bedtime    pyridOXINE (VITAMIN B6) 25 MG tablet Take 1 tablet (25 mg) by mouth 3 times daily as needed (Nausea, vomiting) 90 tablet 1 More than a month    senna-docusate (SENOKOT-S/PERICOLACE) 8.6-50 MG tablet Take 1 tablet by mouth 2 times daily. 30 tablet 3 More than a month    traZODone (DESYREL) 100 MG tablet Take 1 tablet (100 mg) by mouth at bedtime 10 tablet 0 11/28/2024 Bedtime    venlafaxine (EFFEXOR XR) 37.5 MG 24 hr capsule Take 1 capsule (37.5 mg) by mouth daily (with breakfast). 30 capsule 2 11/28/2024 Bedtime    thiamine (B-1) 100 MG tablet TAKE 1 TABLET BY MOUTH ONCE DAILY (Patient taking differently: Take 1 tablet by mouth daily.) 30 tablet 0          Maternal Breast Exam:  Leatha noted breast growth and sensitivity in early pregnancy. She denies any history of breast/chest injury or surgery. Her breasts are soft and symmetrical with bilateral intact, everted nipples. She has been able to hand express colostrum. ?    Breastfeeding/ Lactation History: Rianna attempted breastfeeding with her now 14 year old, but he never really latched.      Rianna has very large breasts and she had had some difficulties with positioning due to her breast size.  She did not breastfeed overnight because she was too tired.    Rianna is not sure how long she will breastfeed because she also smokes THC frequently and she knows it is not recommended for breastfeeding, but she thinks maybe she'll try to breastfeed for a few weeks.    Infant information: Dao Acevedo was SGA at birth and has age appropriate output and weight loss.      Weight Change Since Birth: -5% at 1 day old     Oral exam of baby:   not assessed    Feeding History: bottle fed overnight, tolerating small amounts of formula    Feeding Assessment:  Attempted to latch on the left breast in football hold.  Rianna had her breast supported with pillows underneath and Dao Acevedo was brought high enough to have the nipple at head level.  Discussed sandwiching the breast so that Dao has a ridge to latch onto.  Rianna was able to express colostrum to her nipple and Dao was brought to the breast, but did not wake enough to latch.  He was unswaddled and stimulated, but still was too sleepy to wake and latch.    Education:   [x] Expected  feeding patterns in the first few days (pg. 38 of Your Guide to To Postpartum and  Care)/ the Second Night  [x] Stages of milk production  [x] Benefits of hand expression of colostrum  [] Early feeding cues     [] Benefits of feeding on cue  [] Benefits of skin to skin  [x] Breastfeeding positions  [x] Tips to get and maintain a deep latch  [] Nutritive vs.non-nutritive sucking  [x] Gentle breast compressions as needed to enhance milk transfer  [x] How to tell when baby is finished  [x] How to tell if baby is getting enough  [x] Expected  output  [x]  weight loss  [] Infant Feeding Log  [] [facility] Breastfeeding/Pumping videos  [] Signs breastfeeding is going well (comfortable latch, audible swallows, age appropriate output and weight loss)    [] Tips to prevent engorgement  [] Signs of engorgement  [] Tips to manage engorgement  [x] Pumping recommendations (based on patient need)  [] [facility] breast pump part/infant feeding supplies cleaning recommendations  [] Inpatient breastfeeding support  [] Outpatient lactation resources      Home Breast Pump: Motif in room from insurance.  We looked at it together and Rianna was assisted in pumping one of her breasts.    Plan: Breastfeeding on cue with RN support as needed, goal of 8-12 feedings per day.  If formula feeding via bottle,  and she desires to bring in her milk, Rianna should also be pumping in addition to bottle feeding.    Encouraged follow up with outpatient lactation consultant  as needed after discharge. Family plans to follow up with [facility]      Tayla Blanton RN, IBCLC   Lactation Consultant  [place]: [facility] [phone]  Office: [phone]

## 2024-12-01 NOTE — PLAN OF CARE
"Goal Outcome Evaluation:  Shift 0700-discharge  Afebrile. VSS, except 0-3/10. Fundus U2, scant, rubra lochia. LS clear on RA. Good PO intake, good appetite. Denies PreE Symptoms at this time. voiding independently, passing gas. Taking IBU and tylenol as needed. Bonding well with infant in room. Helping with infant cares. Patient is formula feeding every 2-3 hours. Patient discharged. All education reviewed, questions addressed, medication reviewed and verified. Discharge weight completed. EDS 12, BC completed, Videos reviewed. ROP info given. Has a pump at home. Saw Lactation. Nexplanon placed prior to discharge. Received a BP Cuff. Discharged at 1515. Hourly monitoring completed.     Problem: Adult Inpatient Plan of Care  Goal: Plan of Care Review  Description: The Plan of Care Review/Shift note should be completed every shift.  The Outcome Evaluation is a brief statement about your assessment that the patient is improving, declining, or no change.  This information will be displayed automatically on your shift  note.  Outcome: Met  Goal: Patient-Specific Goal (Individualized)  Description: You can add care plan individualizations to a care plan. Examples of Individualization might be:  \"Parent requests to be called daily at 9am for status\", \"I have a hard time hearing out of my right ear\", or \"Do not touch me to wake me up as it startles  me\".  Outcome: Met  Goal: Absence of Hospital-Acquired Illness or Injury  Outcome: Met  Intervention: Prevent Skin Injury  Recent Flowsheet Documentation  Taken 12/1/2024 0955 by Gilda Treviño RN  Body Position: position changed independently  Intervention: Prevent and Manage VTE (Venous Thromboembolism) Risk  Recent Flowsheet Documentation  Taken 12/1/2024 0955 by Gilda Treviño RN  VTE Prevention/Management: (fluids and ambulation promoted) other (see comments)  Intervention: Prevent Infection  Recent Flowsheet Documentation  Taken 12/1/2024 0955 by Gilda Treviño" RN  Infection Prevention:   cohorting utilized   hand hygiene promoted   personal protective equipment utilized   rest/sleep promoted  Goal: Optimal Comfort and Wellbeing  Outcome: Met  Intervention: Monitor Pain and Promote Comfort  Recent Flowsheet Documentation  Taken 12/1/2024 0957 by Gilda Treviño RN  Pain Management Interventions: medication (see MAR)  Intervention: Provide Person-Centered Care  Recent Flowsheet Documentation  Taken 12/1/2024 0955 by Gilda Treviño RN  Trust Relationship/Rapport:   care explained   choices provided   emotional support provided   empathic listening provided   questions answered   questions encouraged   reassurance provided   thoughts/feelings acknowledged  Goal: Readiness for Transition of Care  Outcome: Met     Problem: Suicide Risk  Goal: Absence of Self-Harm  Outcome: Met  Intervention: Assess Risk to Self and Maintain Safety  Recent Flowsheet Documentation  Taken 12/1/2024 0955 by Gilda Treviño RN  Behavior Management: boundaries reinforced  Intervention: Promote Psychosocial Wellbeing  Recent Flowsheet Documentation  Taken 12/1/2024 0955 by Gilda Treviño RN  Supportive Measures:   active listening utilized   positive reinforcement provided   self-care encouraged  Family/Support System Care:   involvement promoted   presence promoted   self-care encouraged   support provided     Problem: Postpartum (Vaginal Delivery)  Goal: Successful Parent Role Transition  Outcome: Met  Intervention: Support Parent Role Transition  Recent Flowsheet Documentation  Taken 12/1/2024 0955 by Gilda Treviño RN  Supportive Measures:   active listening utilized   positive reinforcement provided   self-care encouraged  Parent-Child Attachment Promotion:   caring behavior modeled   cue recognition promoted   face-to-face positioning promoted   interaction encouraged   parent/caregiver presence encouraged   participation in care promoted   positive reinforcement provided   rooming-in  promoted  Goal: Hemostasis  Outcome: Met  Goal: Absence of Infection Signs and Symptoms  Outcome: Met  Intervention: Prevent or Manage Infection  Recent Flowsheet Documentation  Taken 12/1/2024 0955 by Gilda Treviño RN  Infection Management: aseptic technique maintained  Goal: Anesthesia/Sedation Recovery  Outcome: Met  Goal: Optimal Pain Control and Function  Outcome: Met  Intervention: Prevent or Manage Pain  Recent Flowsheet Documentation  Taken 12/1/2024 0957 by Gilda Treviño RN  Pain Management Interventions: medication (see MAR)  Taken 12/1/2024 0955 by Gilda Treviño, RN  Perineal Care:   perineal hygiene encouraged   perineal spray bottle/warm water use encouraged  Goal: Effective Urinary Elimination  Outcome: Met

## 2024-12-01 NOTE — PROGRESS NOTES
[facility]   Post-partum Progress Note    Name:  Rianna Man  MRN: [id number]    S: Patient very sleepy this morning. However pain is overall well controlled. Tolerating regular diet without nausea or vomiting.  Ambulating without dizziness.  Lochia appropriate. No other concerns at this time     O:   Patient Vitals for the past 24 hrs:   BP Temp Temp src Pulse Resp SpO2   12/01/24 0411 123/69 98.3  F (36.8  C) Oral 97 18 --   12/01/24 0016 121/60 97.5  F (36.4  C) Oral 86 -- --   12/01/24 0014 -- -- -- -- 18 --   11/30/24 2005 (!) 117/97 -- -- -- -- --   11/30/24 1945 (!) 144/85 97.6  F (36.4  C) Oral -- 18 --   11/30/24 1616 (!) 136/93 -- -- -- -- --   11/30/24 1419 (!) 141/95 98  F (36.7  C) Oral -- 16 100 %   11/30/24 1340 (!) 123/93 -- -- -- 16 99 %   11/30/24 1332 129/87 -- -- -- 18 99 %   11/30/24 1310 (!) 131/95 98  F (36.7  C) Oral 91 22 100 %   11/30/24 1250 (!) 142/89 -- -- 79 13 --   11/30/24 1243 135/79 -- -- 78 12 100 %   11/30/24 1230 121/67 -- -- 82 16 100 %   11/30/24 1220 107/59 -- -- 80 12 100 %   11/30/24 1215 95/56 -- -- 79 14 100 %   11/30/24 1210 103/45 -- -- 77 17 100 %   11/30/24 1205 101/57 -- -- 80 16 100 %   11/30/24 1200 98/50 -- -- 89 17 100 %   11/30/24 1154 (!) 70/38 -- -- 84 20 100 %   11/30/24 1152 (!) 71/56 -- -- 79 18 100 %   11/30/24 1043 (!) 140/78 -- -- -- -- --   11/30/24 1042 (!) 140/74 -- -- -- -- --   11/30/24 1040 (!) 168/96 -- -- -- -- --   11/30/24 0940 -- -- -- -- -- 99 %     Gen:  Resting comfortably, NAD  CV:  RR, well perfused  Pulm:  Non-labored breathing on room air  Abd:  Soft, appropriately ttp, non-distended.Fundus at level of umbilicus, firm and non-tender.   Ext:  non-tender, trace edema to bilateral lower extremities    I/O last 3 completed shifts:  In: 700 [P.O.:400; I.V.:300]  Out: 1885 [Urine:1225; Blood:660]    Hgb:   Hemoglobin   Date Value Ref Range Status   12/01/2024 8.9 (L) 11.7 - 15.7 g/dL Final   02/24/2021 11.7  11.7 - 15.7 g/dL Final       Assessment/Plan:  Rianna Man 34 year old  on PPD #2 s/p  complicated by retained placenta and now POD#1 s/p sD&C for retained products. Doing well in the early postoperative and postpartum periods.     # Postpartum management  Pain: Well-controlled with ibuprofen, tylenol PRN   GI:  PRN bowel regimen, anti-emetics  : Voiding spontaneously  Hgb: Hgb 12.7  >  + 50 > 10.9. Asymptomatic from acute blood loss anemia; will discharge with oral iron if <10.   Rh: Positive  Rubella: Immune  Feed: Breast feeding  Infant:  Stable at bedside    BC: Will discuss PTD   PPx:  Encourage ambulation, IS, SCDs while confined to bed    gHTN  - D#2 Nifed 30   - HELLP wnl    # Schizoaffective disorder  # FITZ  # PTSD  - PTA effexor, zyprexa   - Mood stable    # Housing Instability   - SW consult placed. Appreciate assistance     Medically Ready for Discharge: Anticipated Tomorrow      Carmen Antonio MD, MPH, MS  Obstetrics, Gynecology & Women's Health   Resident, PGY-3  2024 9:16 AM

## 2024-12-01 NOTE — PLAN OF CARE
Goal Outcome Evaluation:      Plan of Care Reviewed With: patient    Overall Patient Progress: improvingOverall Patient Progress: improving    Postpartum assessments WDL. VSS except intermittently elevated BP, has been stable this shift. Pain managed with Tylenol and Ibuprofen. Fundus firm, midline and below the U. Scant amounts of lochia with no clots present. Pt said she tried breastfeeding yesterday and it wasn't working well, writer offered to help get baby latched on but pt said she just wanted to rest and feed baby via bottle throughout the night. Patient was okay with switching from donor milk to formula as she plans on breast/formula feeding once home. Encouraged patient to start pumping today and she would still like to see lactation. Support person present during the night. Call light within reach. Continue with plan of care.    Cris Mendes RN

## 2024-12-02 ENCOUNTER — TELEPHONE (OUTPATIENT)
Dept: MATERNAL FETAL MEDICINE | Facility: CLINIC | Age: 34
End: 2024-12-02
Payer: COMMERCIAL

## 2024-12-02 NOTE — TELEPHONE ENCOUNTER
[facility]- BP welcome call completed. Pt reports slight headache but unsure if it is from disrupted sleep cycle or BP. She does report a history of migraines but she does not believe this is a migraine. Encouraged pt to take dose of tylenol to see if the headache improved. Pt states she does not like taking many medications but she will try this. Pt states understanding of program and will put afternoon BP in shortly.

## 2024-12-03 ENCOUNTER — TELEPHONE (OUTPATIENT)
Dept: MATERNAL FETAL MEDICINE | Facility: CLINIC | Age: 34
End: 2024-12-03
Payer: COMMERCIAL

## 2024-12-03 DIAGNOSIS — O13.3 GESTATIONAL HYPERTENSION, THIRD TRIMESTER: ICD-10-CM

## 2024-12-03 LAB
FENTANYL UR-MCNC: 1.5 NG/ML
NORFENTANYL UR-MCNC: NORMAL NG/ML

## 2024-12-03 RX ORDER — NIFEDIPINE 30 MG
60 TABLET, EXTENDED RELEASE ORAL DAILY
Qty: 60 TABLET | Refills: 1 | Status: SHIPPED | OUTPATIENT
Start: 2024-12-03

## 2024-12-03 NOTE — TELEPHONE ENCOUNTER
"Home Observation of Postpartum Elevated BP ([facility]OPE-BP) Program     Rianna Man enrolled in the [facility]OPE-BP Program on 12/1/2024, 2 days ago. Delivered on 11/30/2024. Diagnosis: Gestational hypertension. Medication(s) prescribed: Nifedipine ER.  Patient reported the following blood pressures in the past 72 hours:       12/1/2024 12/2/2024 12/3/2024   [facility] Health Trends BP Review Flowsheet   Systolic (Patient Reported) 134  144  145     149    Diastolic (Patient Reported) 74  88  79     79        Patient-reported    Multiple values from one day are sorted in reverse-chronological order     Spoke with patient to follow up on blood pressure. Based on self-report, Rianna's blood pressures were in the High Range (-159 OR DBP ). Patient reported a mild to moderate headache relieved by medication or non-pharmacologic interventions. Patient denied: intractable headache, chest pain, difficulty breathing or shortness of breath, unsteady gait, weakness, blurred vision, dizziness, right upper quadrant pain, lip swelling (angioedema), difficulty swallowing, palpitations, rash, acute joint redness or pain (gout), leg edema, and symptomatic hypotension.  Patient met the required inclusion criteria for a medication INCREASE using the Standing Order (SO): Licensed Practitioner placed order, \"RN Remote Postpartum Hypertension Medication Titration Standing Order\" and enrolled patient in the [facility] Maternal Hypertension Pathway.  Antihypertensive medication had been initiated by LP prior to use of the SO and the patient reported taking dose as prescribed for at least 24 hours.  The patient reported BP greater than or equal to 140/90 over at least 2 consecutive BPs at least 6 hours apart when patient has taken medications as prescribed.   SO used to make medication change: Patient was on Nifedipine ER (Procardia XL) 30 mg daily. Increased dose to 60 mg daily.. Updated medication list and discussed with " patient. Patient verbalized understanding.    Ally Ray RN

## 2024-12-04 LAB
CANNABINOIDS UR CFM-MCNC: 105 NG/ML
CARBOXYTHC/CREAT UR: 146 NG/MG CREAT

## 2024-12-09 ENCOUNTER — TELEPHONE (OUTPATIENT)
Dept: MATERNAL FETAL MEDICINE | Facility: CLINIC | Age: 34
End: 2024-12-09
Payer: COMMERCIAL

## 2024-12-09 NOTE — TELEPHONE ENCOUNTER
Home Observation of Postpartum Elevated BP ([facility]OPE-BP) Program     Riannadevaughn Man enrolled in the HOPE-BP Program on 12/1/2024, 8 days ago. Delivered on 11/30/2024. Diagnosis: Gestational hypertension. Medication(s) prescribed: Nifedipine ER.  Patient reported the following blood pressures in the past 72 hours:       12/1/2024 12/2/2024 12/3/2024 12/4/2024 12/6/2024   [facility] Health Trends BP Review Flowsheet   Systolic (Patient Reported) 134  144  136     145     149  150  155    Diastolic (Patient Reported) 74  88  73     79     79  86  84        Patient-reported    Multiple values from one day are sorted in reverse-chronological order     Left message for Rianna to call [facility] BP RN at [phone] to discuss BP and meds.    PHIL MOSER RN

## 2024-12-10 ENCOUNTER — APPOINTMENT (OUTPATIENT)
Dept: RADIOLOGY | Facility: HOSPITAL | Age: 34
End: 2024-12-10
Payer: COMMERCIAL

## 2024-12-10 ENCOUNTER — HOSPITAL ENCOUNTER (INPATIENT)
Facility: HOSPITAL | Age: 34
LOS: 2 days | Discharge: HOME | End: 2024-12-12
Attending: EMERGENCY MEDICINE | Admitting: OBSTETRICS & GYNECOLOGY
Payer: COMMERCIAL

## 2024-12-10 DIAGNOSIS — O13.9 GESTATIONAL HYPERTENSION, ANTEPARTUM: Primary | ICD-10-CM

## 2024-12-10 LAB
ALBUMIN MFR UR ELPH: 10.5 MG/DL
ALBUMIN SERPL BCG-MCNC: 3.9 G/DL (ref 3.5–5.2)
ALBUMIN SERPL BCG-MCNC: 3.9 G/DL (ref 3.5–5.2)
ALBUMIN UR-MCNC: NEGATIVE MG/DL
ALP SERPL-CCNC: 111 U/L (ref 40–150)
ALP SERPL-CCNC: 111 U/L (ref 40–150)
ALT SERPL W P-5'-P-CCNC: 22 U/L (ref 0–50)
ALT SERPL W P-5'-P-CCNC: 23 U/L (ref 0–50)
ANION GAP SERPL CALCULATED.3IONS-SCNC: 10 MMOL/L (ref 7–15)
ANION GAP SERPL CALCULATED.3IONS-SCNC: 10 MMOL/L (ref 7–15)
APPEARANCE UR: CLEAR
AST SERPL W P-5'-P-CCNC: 24 U/L (ref 0–45)
AST SERPL W P-5'-P-CCNC: 27 U/L (ref 0–45)
BACTERIA #/AREA URNS HPF: ABNORMAL /HPF
BASOPHILS # BLD AUTO: 0 10E3/UL (ref 0–0.2)
BASOPHILS NFR BLD AUTO: 0 %
BILIRUB SERPL-MCNC: 0.2 MG/DL
BILIRUB SERPL-MCNC: <0.2 MG/DL
BILIRUB UR QL STRIP: NEGATIVE
BUN SERPL-MCNC: 10.5 MG/DL (ref 6–20)
BUN SERPL-MCNC: 10.7 MG/DL (ref 6–20)
CALCIUM SERPL-MCNC: 9.2 MG/DL (ref 8.8–10.4)
CALCIUM SERPL-MCNC: 9.2 MG/DL (ref 8.8–10.4)
CHLORIDE SERPL-SCNC: 106 MMOL/L (ref 98–107)
CHLORIDE SERPL-SCNC: 107 MMOL/L (ref 98–107)
COLOR UR AUTO: YELLOW
CREAT SERPL-MCNC: 0.91 MG/DL (ref 0.51–0.95)
CREAT SERPL-MCNC: 0.92 MG/DL (ref 0.51–0.95)
CREAT UR-MCNC: 182.1 MG/DL
EGFRCR SERPLBLD CKD-EPI 2021: 84 ML/MIN/1.73M2
EGFRCR SERPLBLD CKD-EPI 2021: 85 ML/MIN/1.73M2
EOSINOPHIL # BLD AUTO: 0.1 10E3/UL (ref 0–0.7)
EOSINOPHIL NFR BLD AUTO: 1 %
ERYTHROCYTE [DISTWIDTH] IN BLOOD BY AUTOMATED COUNT: 13.1 % (ref 10–15)
ERYTHROCYTE [DISTWIDTH] IN BLOOD BY AUTOMATED COUNT: 13.2 % (ref 10–15)
GLUCOSE SERPL-MCNC: 85 MG/DL (ref 70–99)
GLUCOSE SERPL-MCNC: 87 MG/DL (ref 70–99)
GLUCOSE UR STRIP-MCNC: NEGATIVE MG/DL
HCO3 SERPL-SCNC: 24 MMOL/L (ref 22–29)
HCO3 SERPL-SCNC: 24 MMOL/L (ref 22–29)
HCT VFR BLD AUTO: 32 % (ref 35–47)
HCT VFR BLD AUTO: 35.9 % (ref 35–47)
HGB BLD-MCNC: 10.6 G/DL (ref 11.7–15.7)
HGB BLD-MCNC: 11.5 G/DL (ref 11.7–15.7)
HGB UR QL STRIP: ABNORMAL
HOLD SPECIMEN: NORMAL
IMM GRANULOCYTES # BLD: 0 10E3/UL
IMM GRANULOCYTES NFR BLD: 0 %
KETONES UR STRIP-MCNC: NEGATIVE MG/DL
LEUKOCYTE ESTERASE UR QL STRIP: NEGATIVE
LYMPHOCYTES # BLD AUTO: 2.5 10E3/UL (ref 0.8–5.3)
LYMPHOCYTES NFR BLD AUTO: 37 %
MCH RBC QN AUTO: 28 PG (ref 26.5–33)
MCH RBC QN AUTO: 28.7 PG (ref 26.5–33)
MCHC RBC AUTO-ENTMCNC: 32 G/DL (ref 31.5–36.5)
MCHC RBC AUTO-ENTMCNC: 33.1 G/DL (ref 31.5–36.5)
MCV RBC AUTO: 87 FL (ref 78–100)
MCV RBC AUTO: 87 FL (ref 78–100)
MONOCYTES # BLD AUTO: 0.4 10E3/UL (ref 0–1.3)
MONOCYTES NFR BLD AUTO: 7 %
MUCOUS THREADS #/AREA URNS LPF: PRESENT /LPF
NEUTROPHILS # BLD AUTO: 3.7 10E3/UL (ref 1.6–8.3)
NEUTROPHILS NFR BLD AUTO: 54 %
NITRATE UR QL: NEGATIVE
NRBC # BLD AUTO: 0 10E3/UL
NRBC BLD AUTO-RTO: 0 /100
PH UR STRIP: 5.5 [PH] (ref 5–7)
PLATELET # BLD AUTO: 311 10E3/UL (ref 150–450)
PLATELET # BLD AUTO: 321 10E3/UL (ref 150–450)
POTASSIUM SERPL-SCNC: 4.6 MMOL/L (ref 3.4–5.3)
POTASSIUM SERPL-SCNC: 4.8 MMOL/L (ref 3.4–5.3)
PROT SERPL-MCNC: 6.6 G/DL (ref 6.4–8.3)
PROT SERPL-MCNC: 6.6 G/DL (ref 6.4–8.3)
PROT/CREAT 24H UR: 0.06 MG/MG CR (ref 0–0.2)
RBC # BLD AUTO: 3.69 10E6/UL (ref 3.8–5.2)
RBC # BLD AUTO: 4.11 10E6/UL (ref 3.8–5.2)
RBC URINE: 1 /HPF
SODIUM SERPL-SCNC: 140 MMOL/L (ref 135–145)
SODIUM SERPL-SCNC: 141 MMOL/L (ref 135–145)
SP GR UR STRIP: 1.02 (ref 1–1.03)
SQUAMOUS EPITHELIAL: 2 /HPF
UROBILINOGEN UR STRIP-MCNC: <2 MG/DL
WBC # BLD AUTO: 6.3 10E3/UL (ref 4–11)
WBC # BLD AUTO: 6.7 10E3/UL (ref 4–11)
WBC URINE: 2 /HPF

## 2024-12-10 PROCEDURE — 93005 ELECTROCARDIOGRAM TRACING: CPT | Performed by: EMERGENCY MEDICINE

## 2024-12-10 PROCEDURE — 120N000001 HC R&B MED SURG/OB

## 2024-12-10 PROCEDURE — 85014 HEMATOCRIT: CPT | Performed by: OBSTETRICS & GYNECOLOGY

## 2024-12-10 PROCEDURE — 85048 AUTOMATED LEUKOCYTE COUNT: CPT | Performed by: EMERGENCY MEDICINE

## 2024-12-10 PROCEDURE — 81001 URINALYSIS AUTO W/SCOPE: CPT | Performed by: EMERGENCY MEDICINE

## 2024-12-10 PROCEDURE — 84156 ASSAY OF PROTEIN URINE: CPT | Performed by: EMERGENCY MEDICINE

## 2024-12-10 PROCEDURE — 85004 AUTOMATED DIFF WBC COUNT: CPT | Performed by: EMERGENCY MEDICINE

## 2024-12-10 PROCEDURE — 36415 COLL VENOUS BLD VENIPUNCTURE: CPT | Performed by: EMERGENCY MEDICINE

## 2024-12-10 PROCEDURE — 73562 X-RAY EXAM OF KNEE 3: CPT | Mod: RT

## 2024-12-10 PROCEDURE — 250N000013 HC RX MED GY IP 250 OP 250 PS 637: Performed by: OBSTETRICS & GYNECOLOGY

## 2024-12-10 PROCEDURE — 85048 AUTOMATED LEUKOCYTE COUNT: CPT | Performed by: OBSTETRICS & GYNECOLOGY

## 2024-12-10 PROCEDURE — 84450 TRANSFERASE (AST) (SGOT): CPT | Performed by: OBSTETRICS & GYNECOLOGY

## 2024-12-10 PROCEDURE — 250N000013 HC RX MED GY IP 250 OP 250 PS 637: Performed by: EMERGENCY MEDICINE

## 2024-12-10 PROCEDURE — 84155 ASSAY OF PROTEIN SERUM: CPT | Performed by: OBSTETRICS & GYNECOLOGY

## 2024-12-10 PROCEDURE — 80053 COMPREHEN METABOLIC PANEL: CPT | Performed by: EMERGENCY MEDICINE

## 2024-12-10 PROCEDURE — 84460 ALANINE AMINO (ALT) (SGPT): CPT | Performed by: OBSTETRICS & GYNECOLOGY

## 2024-12-10 PROCEDURE — 99285 EMERGENCY DEPT VISIT HI MDM: CPT | Mod: 25

## 2024-12-10 PROCEDURE — 258N000003 HC RX IP 258 OP 636: Performed by: OBSTETRICS & GYNECOLOGY

## 2024-12-10 RX ORDER — NIFEDIPINE 30 MG
30 TABLET, EXTENDED RELEASE ORAL DAILY
Status: DISCONTINUED | OUTPATIENT
Start: 2024-12-10 | End: 2024-12-10

## 2024-12-10 RX ORDER — NIFEDIPINE 10 MG/1
10-20 CAPSULE ORAL
Status: DISCONTINUED | OUTPATIENT
Start: 2024-12-10 | End: 2024-12-12 | Stop reason: HOSPADM

## 2024-12-10 RX ORDER — HYDRALAZINE HYDROCHLORIDE 20 MG/ML
10 INJECTION INTRAMUSCULAR; INTRAVENOUS
Status: DISCONTINUED | OUTPATIENT
Start: 2024-12-10 | End: 2024-12-12 | Stop reason: HOSPADM

## 2024-12-10 RX ORDER — CETIRIZINE HYDROCHLORIDE 10 MG/1
10 TABLET ORAL DAILY PRN
COMMUNITY

## 2024-12-10 RX ORDER — NIFEDIPINE 30 MG
30 TABLET, EXTENDED RELEASE ORAL ONCE
Status: COMPLETED | OUTPATIENT
Start: 2024-12-10 | End: 2024-12-10

## 2024-12-10 RX ORDER — LIDOCAINE 40 MG/G
CREAM TOPICAL
Status: DISCONTINUED | OUTPATIENT
Start: 2024-12-10 | End: 2024-12-12 | Stop reason: HOSPADM

## 2024-12-10 RX ORDER — VENLAFAXINE HYDROCHLORIDE 37.5 MG/1
37.5 CAPSULE, EXTENDED RELEASE ORAL AT BEDTIME
COMMUNITY

## 2024-12-10 RX ORDER — LABETALOL 200 MG/1
200 TABLET, FILM COATED ORAL 2 TIMES DAILY
Status: DISCONTINUED | OUTPATIENT
Start: 2024-12-10 | End: 2024-12-10

## 2024-12-10 RX ORDER — TRAZODONE HYDROCHLORIDE 100 MG/1
100 TABLET ORAL AT BEDTIME
Status: DISCONTINUED | OUTPATIENT
Start: 2024-12-10 | End: 2024-12-12 | Stop reason: HOSPADM

## 2024-12-10 RX ORDER — VENLAFAXINE HYDROCHLORIDE 37.5 MG/1
37.5 CAPSULE, EXTENDED RELEASE ORAL
Status: DISCONTINUED | OUTPATIENT
Start: 2024-12-11 | End: 2024-12-12 | Stop reason: HOSPADM

## 2024-12-10 RX ORDER — IBUPROFEN 600 MG/1
600 TABLET, FILM COATED ORAL EVERY 6 HOURS PRN
Status: DISCONTINUED | OUTPATIENT
Start: 2024-12-10 | End: 2024-12-12 | Stop reason: HOSPADM

## 2024-12-10 RX ORDER — LABETALOL 100 MG/1
100 TABLET, FILM COATED ORAL EVERY 12 HOURS SCHEDULED
Status: DISCONTINUED | OUTPATIENT
Start: 2024-12-10 | End: 2024-12-11

## 2024-12-10 RX ORDER — LABETALOL HYDROCHLORIDE 5 MG/ML
20 INJECTION, SOLUTION INTRAVENOUS
Status: DISCONTINUED | OUTPATIENT
Start: 2024-12-10 | End: 2024-12-12 | Stop reason: HOSPADM

## 2024-12-10 RX ORDER — NIFEDIPINE 30 MG
60 TABLET, EXTENDED RELEASE ORAL DAILY
Status: DISCONTINUED | OUTPATIENT
Start: 2024-12-11 | End: 2024-12-12 | Stop reason: HOSPADM

## 2024-12-10 RX ORDER — LABETALOL HYDROCHLORIDE 5 MG/ML
20-40 INJECTION, SOLUTION INTRAVENOUS EVERY 10 MIN PRN
Status: DISCONTINUED | OUTPATIENT
Start: 2024-12-10 | End: 2024-12-12 | Stop reason: HOSPADM

## 2024-12-10 RX ORDER — ACETAMINOPHEN 325 MG/1
975 TABLET ORAL EVERY 6 HOURS PRN
Status: DISCONTINUED | OUTPATIENT
Start: 2024-12-10 | End: 2024-12-12 | Stop reason: HOSPADM

## 2024-12-10 RX ORDER — SODIUM CHLORIDE, SODIUM LACTATE, POTASSIUM CHLORIDE, CALCIUM CHLORIDE 600; 310; 30; 20 MG/100ML; MG/100ML; MG/100ML; MG/100ML
10-125 INJECTION, SOLUTION INTRAVENOUS CONTINUOUS
Status: DISCONTINUED | OUTPATIENT
Start: 2024-12-10 | End: 2024-12-11

## 2024-12-10 RX ADMIN — TRAZODONE HYDROCHLORIDE 100 MG: 100 TABLET ORAL at 21:23

## 2024-12-10 RX ADMIN — NIFEDIPINE 10 MG: 10 CAPSULE ORAL at 14:30

## 2024-12-10 RX ADMIN — ACETAMINOPHEN 975 MG: 325 TABLET ORAL at 17:43

## 2024-12-10 RX ADMIN — SODIUM CHLORIDE, POTASSIUM CHLORIDE, SODIUM LACTATE AND CALCIUM CHLORIDE 125 ML/HR: 600; 310; 30; 20 INJECTION, SOLUTION INTRAVENOUS at 15:14

## 2024-12-10 RX ADMIN — IBUPROFEN 600 MG: 600 TABLET, FILM COATED ORAL at 17:43

## 2024-12-10 RX ADMIN — NIFEDIPINE 30 MG: 30 TABLET, EXTENDED RELEASE ORAL at 21:23

## 2024-12-10 RX ADMIN — NIFEDIPINE 20 MG: 10 CAPSULE ORAL at 14:54

## 2024-12-10 RX ADMIN — OLANZAPINE 15 MG: 10 TABLET, FILM COATED ORAL at 21:22

## 2024-12-10 ASSESSMENT — ACTIVITIES OF DAILY LIVING (ADL)
ADLS_ACUITY_SCORE: 52
ADLS_ACUITY_SCORE: 53
ADLS_ACUITY_SCORE: 52
ADLS_ACUITY_SCORE: 53
ADLS_ACUITY_SCORE: 52
ADLS_ACUITY_SCORE: 53
ADLS_ACUITY_SCORE: 52

## 2024-12-10 NOTE — PROGRESS NOTES
Update    Talked to RN about orders. Will restart home medications. She was on nifedipine XL 60 mg at home which was increased on 12/3/24. Rianna states that she only took nifedipine XL 30 mg today as she was running out of medication.     She had an isolated severe range BP in the ER. Her labs were normal. She lives in a shelter and has baby in room with her. Magnesium is being held at this time.     BP now are normal. Will given nifedipine XL 30 mg x 1 tonight. Will increase nifedipine XL to 60 mg tomorrow am. Will hold labetalol for now. Plan labs again in the morning.     SW to see tomorrow.     She has had right knee pain from recent injury. Plan to consult FP to assess.     Susana Ohara MD

## 2024-12-10 NOTE — ED TRIAGE NOTES
Patient arrives to triage from home with her baby with her chief complaint of hypertension.  Patient is post partum x1 week.  Patient's blood pressure prior to coming in was 154/84 mmHg and is now 136/87 mmHg in triage.  Patient did take blood pressure medication prior to ED arrival.  Patient c/o bilateral hand pain and swelling in lower extremities.  Alert and oriented x4.
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
## 2024-12-10 NOTE — H&P
[facility]    History and Physical  Obstetrics and Gynecology     Date of Admission:  12/10/2024    Assessment & Plan   Rianna Man is a 34 year old  at 39w1d who is admitted for treatment of postpartum hypertension.  She delivered at the [facility] on  after elective induction.  She was discharged on Nifedipine ER for treatment of gestational hypertension and has been checking her BP at home.  Today she has worsening blood pressures and came to the ED for evaluation.      PLAN:   Admit - see  orders  Control of BP, she took her oral Nifedipine ER this morning.    Chandrika Sánchez MD on 12/10/2024 at 4:44 PM         History of Present Illness   Rianna Man is a 34 year old female  at 39w1d who delivered at the [facility] on  is [facility] for blood pressure control.    PRENATAL COURSE  Prenatal course was complicated by    Patient Active Problem List    Diagnosis Date Noted    Postpartum hypertension 12/10/2024     Priority: Medium    Gestational hypertension 2024     Priority: Medium    Encounter for triage in pregnant patient 2024     Priority: Medium    Encounter for induction of labor 2024     Priority: Medium    Fever and chills 2024     Priority: Medium    Vertigo 2024     Priority: Medium    Dizziness 2024     Priority: Medium    Abdominal pain in pregnancy 2024     Priority: Medium    Second trimester pregnancy 2024     Priority: Medium    Schizoaffective disorder, bipolar type (H) 2024     Priority: Medium    Morbid obesity (H) 2022     Priority: Medium    Sinus tachycardia 2021     Priority: Medium    Screen for STD (sexually transmitted disease) 2021     Priority: Medium    Severe amphetamine substance use disorder (H) 2021     Priority: Medium    Chronic post-traumatic stress disorder (PTSD) 2021     Priority: Medium    Major  depressive disorder, recurrent episode, moderate (H) 2021     Priority: Medium    Spontaneous  2021     Priority: Medium    Seasonal allergic rhinitis 2020     Priority: Medium    Severe cannabis use disorder (H) 2020     Priority: Medium    Suicidal ideation 2018     Priority: Medium    Intractable chronic migraine without aura 2018     Priority: Medium    OCD (obsessive compulsive disorder) 2018     Priority: Medium     Obsessions are sexual thoughts, and compulsions are ego-dystonic masturbating behavior, promiscuous sex and excessive pornography.       Generalized anxiety disorder 2018     Priority: Medium    Lactase deficiency 2018     Priority: Medium    Tobacco use 2018     Priority: Medium    Pap smear for cervical cancer screening 10/31/2016     Priority: Medium     2015  Normal cytology, no HPV done, repeat in 3 years.      Uncomplicated alcohol dependence (H) 2015     Priority: Medium    Contact dermatitis and other eczema, due to unspecified cause 2012     Priority: Medium    Schizoaffective disorder (H) 2012     Priority: Medium     Problem list name updated by automated process. Provider to review  Diagnosis updated by automated process. Provider to review and confirm.      Striae distensae 2012     Priority: Medium    Iron deficiency anemia 2010     Priority: Medium     Overview:   Ferritin 2010.  Hgb electrophoresis normal.  Plan: iron supplementation and recheck ferritin/Hgb end of December. smw           Past Medical History    I have reviewed this patient's medical history and updated it with pertinent information if needed.   Past Medical History:   Diagnosis Date    Anxiety     Depressive disorder     Encounter for initial prescription of implantable subdermal contraceptive 11/10/2010    Overview:   Inserted 2010      Schizoaffective disorder (H)     Screen for STD  (sexually transmitted disease)     Severe amphetamine substance use disorder (H)     Sinus tachycardia     Spontaneous  2021    Varicella     had disease       Past Surgical History   I have reviewed this patient's surgical history and updated it with pertinent information if needed.  Past Surgical History:   Procedure Laterality Date    DILATION AND CURETTAGE  2021    DILATION AND CURETTAGE N/A 2024    Procedure: Dilation and curettage;  Surgeon: Winter Lewis MD;  Location: [facility] L[facility]    NO HISTORY OF SURGERY         Prior to Admission Medications   Prior to Admission Medications   Prescriptions Last Dose Informant Patient Reported? Taking?   NIFEdipine ER (ADALAT CC) 30 MG 24 hr tablet 12/10/2024 Morning Self No Yes   Sig: Take 2 tablets (60 mg) by mouth daily.   Nutritional Supplements (GRAPESEED EXTRACT PO) 2024 Bedtime Self Yes Yes   Sig: Take 1 tablet by mouth at bedtime.   OLANZapine (ZYPREXA) 15 MG tablet 2024 Bedtime Self No Yes   Sig: Take 1 tablet (15 mg) by mouth at bedtime.   Prenatal Vit-Fe Fumarate-FA (PRENATAL MULTIVITAMIN W/IRON) 27-0.8 MG tablet 2024 Bedtime Self No Yes   Sig: Take 1 tablet by mouth daily.   acetaminophen (TYLENOL) 325 MG tablet 2024 Self No Yes   Sig: Take 1-2 tablets (325-650 mg) by mouth every 6 hours as needed for mild pain.   cetirizine (ZYRTEC) 10 MG tablet  Self Yes Yes   Sig: Take 10 mg by mouth daily as needed for allergies.   guaiFENesin (ROBITUSSIN) 20 mg/mL liquid  Self No Yes   Sig: Take 10 mLs (200 mg) by mouth every 4 hours as needed for cough.   hydrOXYzine (VISTARIL) 50 MG capsule 12/10/2024 Self No Yes   Sig: Take 1 capsule (50 mg) by mouth every 6 hours as needed for itching or anxiety   meclizine (ANTIVERT) 12.5 MG tablet  Self No Yes   Sig: Take 1 tablet (12.5 mg) by mouth every 6 hours as needed for dizziness.   traZODone (DESYREL) 100 MG tablet 2024 Bedtime Self No Yes   Sig: Take 1 tablet (100 mg) by mouth  at bedtime   venlafaxine (EFFEXOR XR) 37.5 MG 24 hr capsule 12/9/2024 Bedtime Self Yes Yes   Sig: Take 37.5 mg by mouth at bedtime.      Facility-Administered Medications: None     Allergies   Allergies   Allergen Reactions    Lactose GI Disturbance       Social History   I have reviewed this patient's social history and updated it with pertinent information if needed. Rianna Man  reports that she has been smoking cigarettes. She started smoking about 19 years ago. She has a 3.8 pack-year smoking history. She uses smokeless tobacco. She reports that she does not currently use alcohol. She reports current drug use. Drugs: Marijuana, Methamphetamines, Heroin, and Cocaine.      Physical Exam   Temp: 98.6  F (37  C) Temp src: Oral BP: 127/67 Pulse: 106   Resp: (!) 32 SpO2: 96 %      Vital Signs with Ranges  Temp:  [98.6  F (37  C)] 98.6  F (37  C)  Pulse:  [] 106  Resp:  [11-34] 32  BP: (127-166)/() 127/67  SpO2:  [95 %-100 %] 96 %    Abdomen: non-tender  Constitutional: healthy, alert  Respiratory: No increased work of breathing, good air exchange.  Cardiovascular: RRR

## 2024-12-10 NOTE — PHARMACY-ADMISSION MEDICATION HISTORY
Pharmacist Admission Medication History    Admission medication history is complete. The information provided in this note is only as accurate as the sources available at the time of the update.    Information Source(s): Patient, Hospital records, and CareEverywhere/SureScripts via in-person    Pertinent Information:   Nifedipine - patient reports only taking 1 tablet (30 mg) today, due to running out of medication.     Changes made to PTA medication list:  Added: grapeseed oil supplement  Deleted: Chloraseptic lozenge, Senokot-S  Changed:   Cetirizine daily -> daily PRN allergies  Venlafaxine 37.5 mg daily with breakfast -> daily at bedtime  (patient reports taking most of her daily medications at night)     Allergies reviewed with patient and updates made in EHR: yes    Medication History Completed By: Nicollette McMann, RPH 12/10/2024 4:12 PM    PTA Med List   Medication Sig Note Last Dose/Taking    acetaminophen (TYLENOL) 325 MG tablet Take 1-2 tablets (325-650 mg) by mouth every 6 hours as needed for mild pain.  12/9/2024    cetirizine (ZYRTEC) 10 MG tablet Take 10 mg by mouth daily as needed for allergies.  Taking As Needed    guaiFENesin (ROBITUSSIN) 20 mg/mL liquid Take 10 mLs (200 mg) by mouth every 4 hours as needed for cough.  Taking As Needed    hydrOXYzine (VISTARIL) 50 MG capsule Take 1 capsule (50 mg) by mouth every 6 hours as needed for itching or anxiety  12/10/2024    meclizine (ANTIVERT) 12.5 MG tablet Take 1 tablet (12.5 mg) by mouth every 6 hours as needed for dizziness.  Taking As Needed    NIFEdipine ER (ADALAT CC) 30 MG 24 hr tablet Take 2 tablets (60 mg) by mouth daily. 12/10/2024: Took 1 tablet (30 mg), patient reports running out of medication. 12/10/2024 Morning    Nutritional Supplements (GRAPESEED EXTRACT PO) Take 1 tablet by mouth at bedtime.  12/9/2024 Bedtime    OLANZapine (ZYPREXA) 15 MG tablet Take 1 tablet (15 mg) by mouth at bedtime.  12/9/2024 Bedtime    Prenatal Vit-Fe  Fumarate-FA (PRENATAL MULTIVITAMIN W/IRON) 27-0.8 MG tablet Take 1 tablet by mouth daily.  12/9/2024 Bedtime    traZODone (DESYREL) 100 MG tablet Take 1 tablet (100 mg) by mouth at bedtime  12/9/2024 Bedtime    venlafaxine (EFFEXOR XR) 37.5 MG 24 hr capsule Take 37.5 mg by mouth at bedtime.  12/9/2024 Bedtime

## 2024-12-11 ENCOUNTER — TELEPHONE (OUTPATIENT)
Dept: MATERNAL FETAL MEDICINE | Facility: CLINIC | Age: 34
End: 2024-12-11
Payer: COMMERCIAL

## 2024-12-11 LAB
ALT SERPL W P-5'-P-CCNC: 20 U/L (ref 0–50)
AST SERPL W P-5'-P-CCNC: 21 U/L (ref 0–45)
CREAT SERPL-MCNC: 0.82 MG/DL (ref 0.51–0.95)
EGFRCR SERPLBLD CKD-EPI 2021: >90 ML/MIN/1.73M2
HGB BLD-MCNC: 11.2 G/DL (ref 11.7–15.7)
PLATELET # BLD AUTO: 296 10E3/UL (ref 150–450)

## 2024-12-11 PROCEDURE — 84450 TRANSFERASE (AST) (SGOT): CPT | Performed by: OBSTETRICS & GYNECOLOGY

## 2024-12-11 PROCEDURE — 250N000013 HC RX MED GY IP 250 OP 250 PS 637: Performed by: OBSTETRICS & GYNECOLOGY

## 2024-12-11 PROCEDURE — 82565 ASSAY OF CREATININE: CPT | Performed by: OBSTETRICS & GYNECOLOGY

## 2024-12-11 PROCEDURE — 84460 ALANINE AMINO (ALT) (SGPT): CPT | Performed by: OBSTETRICS & GYNECOLOGY

## 2024-12-11 PROCEDURE — 36415 COLL VENOUS BLD VENIPUNCTURE: CPT | Performed by: OBSTETRICS & GYNECOLOGY

## 2024-12-11 PROCEDURE — 250N000013 HC RX MED GY IP 250 OP 250 PS 637

## 2024-12-11 PROCEDURE — 99253 IP/OBS CNSLTJ NEW/EST LOW 45: CPT | Performed by: FAMILY MEDICINE

## 2024-12-11 PROCEDURE — 85018 HEMOGLOBIN: CPT | Performed by: OBSTETRICS & GYNECOLOGY

## 2024-12-11 PROCEDURE — 999N000111 HC STATISTIC OT IP EVAL DEFER

## 2024-12-11 PROCEDURE — 85049 AUTOMATED PLATELET COUNT: CPT | Performed by: OBSTETRICS & GYNECOLOGY

## 2024-12-11 PROCEDURE — 120N000001 HC R&B MED SURG/OB

## 2024-12-11 RX ORDER — LABETALOL 200 MG/1
200 TABLET, FILM COATED ORAL EVERY 12 HOURS SCHEDULED
Status: DISCONTINUED | OUTPATIENT
Start: 2024-12-11 | End: 2024-12-12 | Stop reason: HOSPADM

## 2024-12-11 RX ORDER — CALCIUM CARBONATE 500 MG/1
1000 TABLET, CHEWABLE ORAL 3 TIMES DAILY PRN
Status: DISCONTINUED | OUTPATIENT
Start: 2024-12-11 | End: 2024-12-12 | Stop reason: HOSPADM

## 2024-12-11 RX ADMIN — LABETALOL HYDROCHLORIDE 200 MG: 200 TABLET ORAL at 11:23

## 2024-12-11 RX ADMIN — ACETAMINOPHEN 975 MG: 325 TABLET ORAL at 06:09

## 2024-12-11 RX ADMIN — LABETALOL HYDROCHLORIDE 200 MG: 200 TABLET ORAL at 20:23

## 2024-12-11 RX ADMIN — ACETAMINOPHEN 975 MG: 325 TABLET ORAL at 00:03

## 2024-12-11 RX ADMIN — ACETAMINOPHEN 975 MG: 325 TABLET ORAL at 15:03

## 2024-12-11 RX ADMIN — OLANZAPINE 15 MG: 10 TABLET, FILM COATED ORAL at 22:08

## 2024-12-11 RX ADMIN — VENLAFAXINE HYDROCHLORIDE 37.5 MG: 37.5 CAPSULE, EXTENDED RELEASE ORAL at 08:34

## 2024-12-11 RX ADMIN — CALCIUM CARBONATE (ANTACID) CHEW TAB 500 MG 1000 MG: 500 CHEW TAB at 03:50

## 2024-12-11 RX ADMIN — TRAZODONE HYDROCHLORIDE 100 MG: 100 TABLET ORAL at 22:12

## 2024-12-11 RX ADMIN — NIFEDIPINE 60 MG: 30 TABLET, EXTENDED RELEASE ORAL at 08:33

## 2024-12-11 RX ADMIN — IBUPROFEN 600 MG: 600 TABLET, FILM COATED ORAL at 15:03

## 2024-12-11 RX ADMIN — IBUPROFEN 600 MG: 600 TABLET, FILM COATED ORAL at 06:09

## 2024-12-11 RX ADMIN — IBUPROFEN 600 MG: 600 TABLET, FILM COATED ORAL at 00:03

## 2024-12-11 RX ADMIN — IBUPROFEN 600 MG: 600 TABLET, FILM COATED ORAL at 22:08

## 2024-12-11 RX ADMIN — ACETAMINOPHEN 975 MG: 325 TABLET ORAL at 22:07

## 2024-12-11 ASSESSMENT — ACTIVITIES OF DAILY LIVING (ADL)
ADLS_ACUITY_SCORE: 26
ADLS_ACUITY_SCORE: 53
ADLS_ACUITY_SCORE: 26
ADLS_ACUITY_SCORE: 53
ADLS_ACUITY_SCORE: 26
ADLS_ACUITY_SCORE: 53
ADLS_ACUITY_SCORE: 26
ADLS_ACUITY_SCORE: 53
ADLS_ACUITY_SCORE: 52
ADLS_ACUITY_SCORE: 53
ADLS_ACUITY_SCORE: 53
ADLS_ACUITY_SCORE: 26
ADLS_ACUITY_SCORE: 26
ADLS_ACUITY_SCORE: 53
ADLS_ACUITY_SCORE: 53
ADLS_ACUITY_SCORE: 26
ADLS_ACUITY_SCORE: 52
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
## 2024-12-11 NOTE — PLAN OF CARE
Last blood pressure elevated @ 0353 (140/87), denies pre-eclampsia symptoms, reflexes normal, no clonus noted.   Pain managed with PRN ibuprofen and tylenol. R knee elevated overnight, ice and heat offered. Patient has been sleeping most of shift and falling back to sleep during assessments. Ambulating steadily to bathroom. Strict I&O. Patient states there is no one to come support her and baby while she is in the hospital. CNAs providing cares to infant overnight as able.
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
## 2024-12-11 NOTE — PLAN OF CARE
Problem: Adult Inpatient Plan of Care  Goal: Absence of Hospital-Acquired Illness or Injury  Outcome: Progressing  Intervention: Prevent Skin Injury  Recent Flowsheet Documentation  Taken 12/11/2024 0820 by Lorin Porras RN  Body Position: position changed independently  Intervention: Prevent Infection  Recent Flowsheet Documentation  Taken 12/11/2024 0820 by Lorin Porras RN  Infection Prevention:   environmental surveillance performed   hand hygiene promoted   rest/sleep promoted     Problem: Adult Inpatient Plan of Care  Goal: Optimal Comfort and Wellbeing  Outcome: Progressing  Intervention: Monitor Pain and Promote Comfort  Recent Flowsheet Documentation  Taken 12/11/2024 0820 by Lorin Porras RN  Pain Management Interventions: rest  Intervention: Provide Person-Centered Care  Recent Flowsheet Documentation  Taken 12/11/2024 0820 by Lorin Porras RN  Trust Relationship/Rapport:   care explained   choices provided   questions answered   questions encouraged   reassurance provided   thoughts/feelings acknowledged     Goal Outcome Evaluation:      Plan of Care Reviewed With: patient    Overall Patient Progress: improvingOverall Patient Progress: improving    Outcome Evaluation: Pt BP is elevated - managed with 60mg Nifedipine daily and MD restarted labetalol 200mg BID. Pt denies headache, visual changes, and epigastric pain. Tylenol and ibuprofen given PRN for pain. Pt is a deep sleeper, but is alert when you wake her up. Pt is oriented x4. RN encouraged mom to set alarms for feeds and infant cares - patient verbalized understanding and set the alarms. Mom has been taking care of infant appropriately during the day. Was able to meet with Social Work (view SW note): pt states she has housing plans, mental health gameplan, and  will place a public health nurse referral as needed. Residents rounded for knee joint pain - placed Occupational therapy, OT stated they recommend a outpatient visit rather than  inpatient, RN robina messaged resident to see if they would like outpatient or would like to push for inpatient due to social situations. Residents also placed wrist and knee bracing - PT and OT notified RN that that would be orthotics, resident said they placed orthotic consult in. [facility] said they should automatically be flagged to come, but if not, gave charge nurse contact number. Potential discharge tomorrow.

## 2024-12-11 NOTE — PROVIDER NOTIFICATION
Dr. Middleton came to bedside to describe the findings of knee xray to patient. Discussed further imaging outpatient, possible follow up with [facility] for MRI of the knee.     RN will encourage PRN pain medication as available. Assist with elevating extremity and encourage use of ice.     Provider updated of patient expressing numbness and tingling in both her hands while down in x-ray. States it has been going on for a couple weeks. No further action right now, patient encouraged by Dr. Middleton to mention it to her provider during her follow up appointment.

## 2024-12-11 NOTE — PROGRESS NOTES
Rianna was seen for fit and delivery of bilateral cock-up splints and a right Hinged knee brace per order of her physician. Her mood today was pleasant.     Size medium cock-up splints were chosen for optimal fit around her hand and wrist. The straps were trimmed to appropriate length and the volar stay fit well to maintain a neutral wrist position. Upon donning her right one, she reported a significant improvement in comfort. Due to the IV in her left hand, did not don the left sided splint due to risk needle impingement at this time but was left bedside for her.     The right hinged knee brace was custom fit by contouring the uprights to her anatomy and all straps were cinched to appropriate tightness and trimmed for appropriate length. Upon tightening and sitting up at edge of bed, she reported that this also made her knee feel significantly more comfortable.     Instructions on use and application of all orthoses was given to her along with an orthotics department card for contact after discharge.     I plan to see her PRN.     Electronically signed by LOUIE Flores CPO

## 2024-12-11 NOTE — PROGRESS NOTES
Occupational Therapy: Pt is not appropriate for skilled inpatient OT services - carpal tunnel needs to be addressed in outpatient OT/hand therapy setting if able. Recommend conservative methods to treat (heat, rest, ergonomics). Orthotics or store room to provide wrist support brace during hospitalization. Will defer to MD for discharge recommendations.  Plan was discussed with RN.  Will D/C current OT orders - OP hand therapy orders placed in chart by OT. Thank you.    12/11/2024 by Marcia Schwarz, OTR, OTR/L

## 2024-12-11 NOTE — PLAN OF CARE
Goal Outcome Evaluation:      Plan of Care Reviewed With: patient    Overall Patient Progress: improving    Patient admit to birth center room #28 at approx 1640. VS currently stable and assessment completed. Dr Sánchez in room and orders received. Patient is here alone with baby. Does not plan for anyone else to come for support. Reassured staff support. RN assisting with baby cares, feeding and diaper changes. Patient reports her  from shelter dropped her off at ER, she has been doing the best she can with baby. Reports she was able to make it to one infant follow up appointment but missed the next one for baby to have imaging of his chest (follow up known mass) because ride from insurance did not show up. She also states baby was small and lost 9% so she is supposed to be feeding him q 2 hours, RN assisted with a feed for him to tkae 1.5 ounces. Oriented to room, call light, monitoring I/O, call for assistance with anything, safe sleep (bassinette provided for baby). Updated Dr Ohara and received addidiotnal orders. Patient reports headache pain 5 and right knee pain 7. Ice pack, dimmed lights, grounding oil, tyelnol and ibuprofen for pain. Food ordered. Patient reports she hurt her knee a few years ago, but fell 2 days ago which caused increased pain and swelling. Family medicine consult placed to lavon and provider updated. social consult placed. Patient updated on both consults and appreciative.
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
## 2024-12-11 NOTE — TELEPHONE ENCOUNTER
Left message for patient to call VITO BP RN at [phone] to follow up and discuss medications.    Winter Iyer RN

## 2024-12-12 VITALS
OXYGEN SATURATION: 98 % | HEIGHT: 68 IN | RESPIRATION RATE: 18 BRPM | HEART RATE: 88 BPM | SYSTOLIC BLOOD PRESSURE: 114 MMHG | TEMPERATURE: 98.2 F | DIASTOLIC BLOOD PRESSURE: 66 MMHG | BODY MASS INDEX: 42.5 KG/M2 | WEIGHT: 280.4 LBS

## 2024-12-12 PROCEDURE — 250N000013 HC RX MED GY IP 250 OP 250 PS 637

## 2024-12-12 PROCEDURE — 250N000013 HC RX MED GY IP 250 OP 250 PS 637: Performed by: OBSTETRICS & GYNECOLOGY

## 2024-12-12 RX ORDER — IBUPROFEN 600 MG/1
600 TABLET, FILM COATED ORAL EVERY 6 HOURS PRN
Qty: 60 TABLET | Refills: 1 | Status: SHIPPED | OUTPATIENT
Start: 2024-12-12

## 2024-12-12 RX ORDER — ACETAMINOPHEN 325 MG/1
975 TABLET ORAL EVERY 6 HOURS PRN
Qty: 100 TABLET | Refills: 1 | Status: SHIPPED | OUTPATIENT
Start: 2024-12-12

## 2024-12-12 RX ORDER — LABETALOL 200 MG/1
200 TABLET, FILM COATED ORAL EVERY 12 HOURS
Qty: 60 TABLET | Refills: 2 | Status: SHIPPED | OUTPATIENT
Start: 2024-12-12

## 2024-12-12 RX ADMIN — ACETAMINOPHEN 975 MG: 325 TABLET ORAL at 08:24

## 2024-12-12 RX ADMIN — LABETALOL HYDROCHLORIDE 200 MG: 200 TABLET ORAL at 08:24

## 2024-12-12 RX ADMIN — NIFEDIPINE 60 MG: 30 TABLET, EXTENDED RELEASE ORAL at 08:24

## 2024-12-12 RX ADMIN — VENLAFAXINE HYDROCHLORIDE 37.5 MG: 37.5 CAPSULE, EXTENDED RELEASE ORAL at 08:24

## 2024-12-12 RX ADMIN — IBUPROFEN 600 MG: 600 TABLET, FILM COATED ORAL at 08:24

## 2024-12-12 ASSESSMENT — ACTIVITIES OF DAILY LIVING (ADL)
ADLS_ACUITY_SCORE: 26

## 2024-12-12 NOTE — PLAN OF CARE
Problem: Adult Inpatient Plan of Care  Goal: Readiness for Transition of Care  Outcome: Met     Goal Outcome Evaluation:      Plan of Care Reviewed With: patient    Overall Patient Progress: improvingOverall Patient Progress: improving    Outcome Evaluation: Pt vitals and assessments are WDL. Pt is up ad rito. Scurry and attentive to infant. Pt is discharging today - medication being delievered here from hospital pharmacy. Pt stated Shelter will send and provide her with the needed taxi to travel back home. Patient has been formula feeding infant. Has blood pressure cuff at home. Weight obtained. Educated patient on medications, worsening symptoms, and follow-up care. Pt verbalized understanding with no further questions. RN followed up with pharmacy when her discharge medications will arrive and said it should be shipped within 30 minutes - RN relayed that information to pt and pt refused to wait, she said she just wanted to go already and wouldn't wait that long, and to tell them she will pick it up later. Pt refused to wait for medication to arrive. RN encouraged her to wait and that it would be worth it to not have to make another trip, but patient refused and said she will pick it up herself later as she just wants to go and her ride was here.     D:  Patient desires discharge home.  Discharge orders received and entered by provider.  A:  Discharge instructions reviewed with the patient.  All questions and concerns addressed.  R:  Discharge criteria met.  4 Part ID bands double checked.  Buckley discharged in car seat with mother.  The nurse escorted patient to car .     139

## 2024-12-12 NOTE — PLAN OF CARE
"Rianna's VSS. Denies preeclampsia symptoms. Pain well controlled with PRN ibuprofen and tylenol. Up and independent. Infant rooming with her. She is waking up to feed baby, but is a heavy sleeper. Upon CPS visiting her, she was very tearful and quiet, but seems to be doing better.   Problem: Adult Inpatient Plan of Care  Goal: Plan of Care Review  Description: The Plan of Care Review/Shift note should be completed every shift.  The Outcome Evaluation is a brief statement about your assessment that the patient is improving, declining, or no change.  This information will be displayed automatically on your shift  note.  Outcome: Progressing  Flowsheets (Taken 12/12/2024 0522)  Plan of Care Reviewed With: patient  Overall Patient Progress: improving  Goal: Patient-Specific Goal (Individualized)  Description: You can add care plan individualizations to a care plan. Examples of Individualization might be:  \"Parent requests to be called daily at 9am for status\", \"I have a hard time hearing out of my right ear\", or \"Do not touch me to wake me up as it startles  me\".  Outcome: Progressing  Goal: Absence of Hospital-Acquired Illness or Injury  Outcome: Progressing  Intervention: Prevent Skin Injury  Recent Flowsheet Documentation  Taken 12/11/2024 2019 by Chastity Villasenor, RN  Body Position: position changed independently  Intervention: Prevent Infection  Recent Flowsheet Documentation  Taken 12/11/2024 2019 by Chastity Villasenor, RN  Infection Prevention:   environmental surveillance performed   hand hygiene promoted   rest/sleep promoted  Goal: Optimal Comfort and Wellbeing  Outcome: Progressing  Intervention: Provide Person-Centered Care  Recent Flowsheet Documentation  Taken 12/11/2024 2019 by Chastity Villasenor RN  Trust Relationship/Rapport:   care explained   choices provided   questions answered   questions encouraged   reassurance provided   thoughts/feelings acknowledged  Goal: Readiness for Transition of Care  Outcome: " Progressing     Goal Outcome Evaluation:      Plan of Care Reviewed With: patient    Overall Patient Progress: improvingOverall Patient Progress: improving

## 2024-12-12 NOTE — PROGRESS NOTES
NICKI faxed pts discharge summary to [facility] to complete referral which was sent yesterday.    ELIZABETH Tan on 12/12/2024 at 12:33 PM

## 2024-12-12 NOTE — DISCHARGE SUMMARY
Discharge Summary    Rianna Man MRN# [id number]   Age: 34 year old YOB: 1990     Date of Admission:  12/10/2024  Date of Discharge::  12/12/2024  Admitting Physician:  Chandrika Sánchez MD  Discharge Physician:  Jessica Moritz, APRN CNP     Home clinic: [facility] [place]          Admission Diagnoses:   Postpartum hypertension [O16.5]  Intrauterine pregnancy at 39w1d  Complex psychosocial issues--has psychiatrist  OCD  Schizoaffective disorder  Anxiety          Discharge Diagnosis:   Same   S/p vaginal delivery          Procedures:     Procedure(s): None              Medications Prior to Admission:     Medications Prior to Admission   Medication Sig Dispense Refill Last Dose/Taking    acetaminophen (TYLENOL) 325 MG tablet Take 1-2 tablets (325-650 mg) by mouth every 6 hours as needed for mild pain. 60 tablet 1 12/9/2024    cetirizine (ZYRTEC) 10 MG tablet Take 10 mg by mouth daily as needed for allergies.   Taking As Needed    guaiFENesin (ROBITUSSIN) 20 mg/mL liquid Take 10 mLs (200 mg) by mouth every 4 hours as needed for cough. 180 mL 1 Taking As Needed    hydrOXYzine (VISTARIL) 50 MG capsule Take 1 capsule (50 mg) by mouth every 6 hours as needed for itching or anxiety 30 capsule 0 12/10/2024    meclizine (ANTIVERT) 12.5 MG tablet Take 1 tablet (12.5 mg) by mouth every 6 hours as needed for dizziness. 20 tablet 0 Taking As Needed    Nutritional Supplements (GRAPESEED EXTRACT PO) Take 1 tablet by mouth at bedtime.   12/9/2024 Bedtime    OLANZapine (ZYPREXA) 15 MG tablet Take 1 tablet (15 mg) by mouth at bedtime. 30 tablet 2 12/9/2024 Bedtime    Prenatal Vit-Fe Fumarate-FA (PRENATAL MULTIVITAMIN W/IRON) 27-0.8 MG tablet Take 1 tablet by mouth daily. 90 tablet 3 12/9/2024 Bedtime    traZODone (DESYREL) 100 MG tablet Take 1 tablet (100 mg) by mouth at bedtime 10 tablet 0 12/9/2024 Bedtime    venlafaxine (EFFEXOR XR) 37.5 MG 24 hr capsule Take 37.5 mg by mouth at bedtime.    12/9/2024 Bedtime    [DISCONTINUED] NIFEdipine ER (ADALAT CC) 30 MG 24 hr tablet Take 2 tablets (60 mg) by mouth daily. 60 tablet 1 12/10/2024 Morning             Discharge Medications:        Review of your medicines        START taking        Dose / Directions   ibuprofen 600 MG tablet  Commonly known as: ADVIL/MOTRIN  Used for: Gestational hypertension, antepartum      Dose: 600 mg  Take 1 tablet (600 mg) by mouth every 6 hours as needed for inflammatory pain.  Quantity: 60 tablet  Refills: 1     labetalol 200 MG tablet  Commonly known as: NORMODYNE  Used for: Gestational hypertension, antepartum      Dose: 200 mg  Take 1 tablet (200 mg) by mouth every 12 hours.  Quantity: 60 tablet  Refills: 2            CONTINUE these medicines which may have CHANGED, or have new prescriptions. If we are uncertain of the size of tablets/capsules you have at home, strength may be listed as something that might have changed.        Dose / Directions   * acetaminophen 325 MG tablet  Commonly known as: TYLENOL  This may have changed: Another medication with the same name was added. Make sure you understand how and when to take each.  Used for: Pregnancy examination or test, positive result      Dose: 325-650 mg  Take 1-2 tablets (325-650 mg) by mouth every 6 hours as needed for mild pain.  Quantity: 60 tablet  Refills: 1     * acetaminophen 325 MG tablet  Commonly known as: TYLENOL  This may have changed: You were already taking a medication with the same name, and this prescription was added. Make sure you understand how and when to take each.  Used for: Gestational hypertension, antepartum      Dose: 975 mg  Take 3 tablets (975 mg) by mouth every 6 hours as needed for mild pain.  Quantity: 100 tablet  Refills: 1     NIFEdipine ER 60 MG 24 hr tablet  Commonly known as: ADALAT CC  This may have changed: medication strength  Used for: Gestational hypertension, antepartum      Dose: 60 mg  Start taking on: December 13, 2024  Take 1  tablet (60 mg) by mouth daily.  Quantity: 60 tablet  Refills: 2           * This list has 2 medication(s) that are the same as other medications prescribed for you. Read the directions carefully, and ask your doctor or other care provider to review them with you.                CONTINUE these medicines which have NOT CHANGED        Dose / Directions   cetirizine 10 MG tablet  Commonly known as: zyrTEC      Dose: 10 mg  Take 10 mg by mouth daily as needed for allergies.  Refills: 0     GRAPESEED EXTRACT PO      Dose: 1 tablet  Take 1 tablet by mouth at bedtime.  Refills: 0     guaiFENesin 20 mg/mL liquid  Commonly known as: ROBITUSSIN  Used for: Acute cough      Dose: 200 mg  Take 10 mLs (200 mg) by mouth every 4 hours as needed for cough.  Quantity: 180 mL  Refills: 1     hydrOXYzine 50 MG capsule  Commonly known as: VISTARIL      Dose: 50 mg  Take 1 capsule (50 mg) by mouth every 6 hours as needed for itching or anxiety  Quantity: 30 capsule  Refills: 0     meclizine 12.5 MG tablet  Commonly known as: ANTIVERT  Used for: Vertigo      Dose: 12.5 mg  Take 1 tablet (12.5 mg) by mouth every 6 hours as needed for dizziness.  Quantity: 20 tablet  Refills: 0     OLANZapine 15 MG tablet  Commonly known as: zyPREXA  Used for: Schizoaffective disorder, bipolar type (H)      Dose: 15 mg  Take 1 tablet (15 mg) by mouth at bedtime.  Quantity: 30 tablet  Refills: 2     prenatal multivitamin w/iron 27-0.8 MG tablet  Used for: Pregnancy examination or test, positive result      Dose: 1 tablet  Take 1 tablet by mouth daily.  Quantity: 90 tablet  Refills: 3     traZODone 100 MG tablet  Commonly known as: DESYREL      Dose: 100 mg  Take 1 tablet (100 mg) by mouth at bedtime  Quantity: 10 tablet  Refills: 0     venlafaxine 37.5 MG 24 hr capsule  Commonly known as: EFFEXOR XR      Dose: 37.5 mg  Take 37.5 mg by mouth at bedtime.  Refills: 0               Where to get your medicines        These medications were sent to [facility] [place] [id number] [facility]  [address] [facility] [address], [place] [address]      Phone: [phone]   acetaminophen 325 MG tablet  ibuprofen 600 MG tablet  labetalol 200 MG tablet  NIFEdipine ER 60 MG 24 hr tablet               Consultations:   Social work          Brief History of Labor:   Rianna Man is a 34 year old  who presented to the ED with elevated BP readings from home. She delivered at the [facility] on  after elective induction. She was discharged on Nifedipine ER for treatment of gestational hypertension and had been checking her BP at home. On 12/10 she had worsening blood pressures and came to the ED for evaluation. [facility]. Has OFP against FOB. Her nifedipine was increased to 60 mg, but she had only been taking 30 mg because she was running out      Labs WNL    Medications titrated to achieve target Bps x 24 hours    Denies HA, visual changes, epigastric pain, RUQ pain          Hospital Course:   The patient's hospital course was significant for postpartum hypertension. On discharge, her pain was well controlled. Vaginal bleeding is similar to peak menstrual flow.  Voiding without difficulty.  Ambulating well and tolerating a normal diet. Bps in target range.  Formula.  Infant is stable. She was discharged on post-partum day #12, HD 2.    Post-partum hemoglobin:   Hemoglobin   Date Value Ref Range Status   2024 11.2 (L) 11.7 - 15.7 g/dL Final   2021 11.7 11.7 - 15.7 g/dL Final         Day of discharge assessment:   /66 (BP Location: Right arm, Patient Position: Semi-Graham's, Cuff Size: Adult Regular)   Pulse 88   Temp 98.2  F (36.8  C) (Oral)   Resp 18   Ht 1.727 m (5' 8\")   Wt 127.2 kg (280 lb 6.4 oz)   LMP 2024   SpO2 98%   Breastfeeding No   BMI 42.63 kg/m    Abdomen: Soft, non-tender. Fundus appropriate.  Patellar reflexes +2 bilaterally  No clonus bilaterally  +1 " bilateral lower extremity edema           Discharge Instructions and Follow-Up:     Discharge diet: Regular   Discharge activity: Activity as tolerated, pelvic rest for 6 weeks   Discharge follow-up: Follow up with OB in 3-5 days  Follow up with OB in 6 weeks   [facility] Phone number: [phone]. Please call to make appts   Wound care: Sitz baths           Discharge Disposition:     Discharged to home      Attestation:  I have reviewed today's vital signs, notes, medications, labs and imaging.  Amount of time performed on this discharge summary: 10 minutes.  Total time: 20 minutes    Jessica Moritz, APRN CNP

## 2024-12-13 LAB
ATRIAL RATE - MUSE: 90 BPM
DIASTOLIC BLOOD PRESSURE - MUSE: 98 MMHG
INTERPRETATION ECG - MUSE: NORMAL
P AXIS - MUSE: 88 DEGREES
PR INTERVAL - MUSE: 126 MS
QRS DURATION - MUSE: 80 MS
QT - MUSE: 354 MS
QTC - MUSE: 433 MS
R AXIS - MUSE: 86 DEGREES
SYSTOLIC BLOOD PRESSURE - MUSE: 147 MMHG
T AXIS - MUSE: 54 DEGREES
VENTRICULAR RATE- MUSE: 90 BPM

## 2024-12-24 ENCOUNTER — DOCUMENTATION ONLY (OUTPATIENT)
Dept: MATERNAL FETAL MEDICINE | Facility: CLINIC | Age: 34
End: 2024-12-24
Payer: COMMERCIAL

## 2024-12-24 LAB
PATH REPORT.COMMENTS IMP SPEC: NORMAL
PATH REPORT.COMMENTS IMP SPEC: NORMAL
PATH REPORT.FINAL DX SPEC: NORMAL
PATH REPORT.GROSS SPEC: NORMAL
PATH REPORT.MICROSCOPIC SPEC OTHER STN: NORMAL
PATH REPORT.RELEVANT HX SPEC: NORMAL
PHOTO IMAGE: NORMAL

## 2024-12-30 ENCOUNTER — TELEPHONE (OUTPATIENT)
Dept: OBGYN | Facility: CLINIC | Age: 34
End: 2024-12-30
Payer: COMMERCIAL

## 2024-12-30 NOTE — TELEPHONE ENCOUNTER
----- Message from Winter Lewis sent at 12/30/2024  1:47 PM CST -----  Regarding: Postpartum visit  Can someone please reach out to this patient and help her get a PP visit set up with staff or resident MD in 2ish weeks please?  We need to discuss her placenta path results with her and also ensure she has appropriate followup.  Thanks, Dr. Lewis

## 2025-04-14 ENCOUNTER — MEDICAL CORRESPONDENCE (OUTPATIENT)
Dept: HEALTH INFORMATION MANAGEMENT | Facility: CLINIC | Age: 35
End: 2025-04-14
Payer: COMMERCIAL

## 2025-05-10 ENCOUNTER — TELEPHONE (OUTPATIENT)
Dept: BEHAVIORAL HEALTH | Facility: CLINIC | Age: 35
End: 2025-05-10

## 2025-05-10 ENCOUNTER — HOSPITAL ENCOUNTER (EMERGENCY)
Facility: CLINIC | Age: 35
Discharge: OTHER INSTITUTION | End: 2025-05-11
Attending: EMERGENCY MEDICINE | Admitting: EMERGENCY MEDICINE
Payer: COMMERCIAL

## 2025-05-10 DIAGNOSIS — F32.9 REACTIVE DEPRESSION: ICD-10-CM

## 2025-05-10 LAB
AMPHETAMINES UR QL SCN: ABNORMAL
BARBITURATES UR QL SCN: ABNORMAL
BENZODIAZ UR QL SCN: ABNORMAL
BZE UR QL SCN: ABNORMAL
CANNABINOIDS UR QL SCN: ABNORMAL
FENTANYL UR QL: ABNORMAL
HCG UR QL: NEGATIVE
OPIATES UR QL SCN: ABNORMAL
PCP QUAL URINE (ROCHE): ABNORMAL

## 2025-05-10 PROCEDURE — 99285 EMERGENCY DEPT VISIT HI MDM: CPT | Performed by: EMERGENCY MEDICINE

## 2025-05-10 PROCEDURE — 81025 URINE PREGNANCY TEST: CPT | Performed by: EMERGENCY MEDICINE

## 2025-05-10 PROCEDURE — 250N000013 HC RX MED GY IP 250 OP 250 PS 637: Performed by: PSYCHIATRY & NEUROLOGY

## 2025-05-10 PROCEDURE — 80307 DRUG TEST PRSMV CHEM ANLYZR: CPT | Performed by: EMERGENCY MEDICINE

## 2025-05-10 PROCEDURE — 36415 COLL VENOUS BLD VENIPUNCTURE: CPT | Performed by: EMERGENCY MEDICINE

## 2025-05-10 PROCEDURE — 80048 BASIC METABOLIC PNL TOTAL CA: CPT | Performed by: EMERGENCY MEDICINE

## 2025-05-10 PROCEDURE — 82077 ASSAY SPEC XCP UR&BREATH IA: CPT | Performed by: EMERGENCY MEDICINE

## 2025-05-10 PROCEDURE — 99245 OFF/OP CONSLTJ NEW/EST HI 55: CPT | Performed by: PSYCHIATRY & NEUROLOGY

## 2025-05-10 RX ORDER — NICOTINE 21 MG/24HR
1 PATCH, TRANSDERMAL 24 HOURS TRANSDERMAL DAILY
Status: DISCONTINUED | OUTPATIENT
Start: 2025-05-10 | End: 2025-05-11 | Stop reason: HOSPADM

## 2025-05-10 RX ORDER — PRAZOSIN HYDROCHLORIDE 2 MG/1
2 CAPSULE ORAL AT BEDTIME
COMMUNITY
Start: 2024-05-17

## 2025-05-10 RX ORDER — HYDROXYZINE HYDROCHLORIDE 25 MG/1
25 TABLET, FILM COATED ORAL EVERY 6 HOURS PRN
Status: DISCONTINUED | OUTPATIENT
Start: 2025-05-10 | End: 2025-05-11 | Stop reason: HOSPADM

## 2025-05-10 RX ORDER — HYDROXYZINE HYDROCHLORIDE 50 MG/1
50 TABLET, FILM COATED ORAL EVERY 6 HOURS PRN
Status: DISCONTINUED | OUTPATIENT
Start: 2025-05-10 | End: 2025-05-11 | Stop reason: HOSPADM

## 2025-05-10 RX ADMIN — OLANZAPINE 15 MG: 10 TABLET, FILM COATED ORAL at 22:04

## 2025-05-10 RX ADMIN — NICOTINE 1 PATCH: 21 PATCH, EXTENDED RELEASE TRANSDERMAL at 18:07

## 2025-05-10 RX ADMIN — HYDROXYZINE HYDROCHLORIDE 25 MG: 25 TABLET, FILM COATED ORAL at 20:23

## 2025-05-10 ASSESSMENT — ACTIVITIES OF DAILY LIVING (ADL)
ADLS_ACUITY_SCORE: 52

## 2025-05-10 ASSESSMENT — COLUMBIA-SUICIDE SEVERITY RATING SCALE - C-SSRS
5. HAVE YOU STARTED TO WORK OUT OR WORKED OUT THE DETAILS OF HOW TO KILL YOURSELF? DO YOU INTEND TO CARRY OUT THIS PLAN?: YES
3. HAVE YOU BEEN THINKING ABOUT HOW YOU MIGHT KILL YOURSELF?: YES
1. IN THE PAST MONTH, HAVE YOU WISHED YOU WERE DEAD OR WISHED YOU COULD GO TO SLEEP AND NOT WAKE UP?: YES
6. HAVE YOU EVER DONE ANYTHING, STARTED TO DO ANYTHING, OR PREPARED TO DO ANYTHING TO END YOUR LIFE?: YES
2. HAVE YOU ACTUALLY HAD ANY THOUGHTS OF KILLING YOURSELF IN THE PAST MONTH?: YES
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
## 2025-05-10 NOTE — ED TRIAGE NOTES
Triage Assessment (Adult)       Row Name 05/10/25 4479          Triage Assessment    Airway WDL WDL        Respiratory WDL    Respiratory WDL WDL        Skin Circulation/Temperature WDL    Skin Circulation/Temperature WDL WDL        Cardiac WDL    Cardiac WDL WDL        Peripheral/Neurovascular WDL    Peripheral Neurovascular WDL WDL        Cognitive/Neuro/Behavioral WDL    Cognitive/Neuro/Behavioral WDL WDL

## 2025-05-10 NOTE — PLAN OF CARE
Rianna Man  May 10, 2025  Plan of Care Hand-off Note     Patient Recommended Care Path: inpatient mental health    Clinical Substantiation:  Pt presents to ED for concerns of suicidal ideation. Pt endorses active SI with plan to jump from a bridge. Pt grieving the loss of her 5 month old child who passed away one week ago. Pt not eating, sleeping, or taking care of herself. Recommending [facility] for safety and stability. Pt at increased risk of harming herself and requires a higher level of care. Pt is voluntary for a mental health admission.    Goals for crisis stabilization:  [facility]    Next steps for Care Team:  psych consult, [facility] admission    Treatment Objectives Addressed:  processing feelings    Therapeutic Interventions:  Engaged in cognitive restructuring/ reframing, looked at common cognitive distortions and challenged negative thoughts., Taught the link between thoughts, feelings, and behaviors.    Has a specific means been identified for suicidal.homicide actions: Yes  If yes, describe: Jump from a bridge or crash a vehicle  Explain action steps toward mitigation: [facility]  Document completion of mitigation action: Pt agreeable to [facility]  The follow up action still needed prior to discharge: Decrease in SI    Patient coping skills attempted to reduce the crisis:  trying to avoid substances, seeking out professional help, talking with family.       Imminent risk of harm: Suicidal Behavior  Severe psychiatric, behavioral or other comorbid conditions are appropriate for management at inpatient mental health as indicated by at least one of the following: Psychiatric Symptoms, Impaired impulse control, judgement, or insight, Symptoms of impact to function, Comorbid substance use disorder  Severe dysfunction in daily living is present as indicated by at least one of the following: Extreme deterioration in social interactions, Complete withdrawal from all social interactions, Other evidence of severe  dysfunction  Situation and expectations are appropriate for inpatient care: Voluntary treatment at lower level of care is not feasible  Inpatient mental health services are necessary to meet patient needs and at least one of the following: Specific condition related to admission diagnosis is present and judged likely to further improve at proposed level of care      Collateral contact information:  Pt's mother,  [phone]    Legal Status: Voluntary/Patient has signed consent for treatment                                                                                                                                 Reviewed court records: yes     Psychiatry Consult: Patient has Psychiatry Consult Order    MEGHA Moore

## 2025-05-10 NOTE — ED PROVIDER NOTES
"ED PROVIDER NOTE  [facility]  [facility] [place]      History     Chief Complaint   Patient presents with    Suicidal     PT report \" my 5 month old baby passed away on , and since then I have SI thought with a plan of jumping\"      HPI  Rianna Man is a 35 year old female who presents to the Emergency Department for evaluation of increasing depression, anhedonia, dysphoric mood, and suicidal ideation since her baby  1 week ago at 5 mos of age.  The patient denies any vomiting, fevers, diarrhea, melena, or bright blood per rectum. She states that she has not been eating well, has not been sleeping well.    Patient was seen by DEC , please see consult note for further details. She maintains feeling suicidal and is voluntary for admission.     I have reviewed the Medications, Allergies, Past Medical and Surgical History, and Social History in the [employer] system.    Past Medical History:   Diagnosis Date    Anxiety     Depressive disorder     Encounter for initial prescription of implantable subdermal contraceptive 11/10/2010    Overview:   Inserted 2010      PTSD (post-traumatic stress disorder)     Schizoaffective disorder (H)     Screen for STD (sexually transmitted disease)     Severe amphetamine substance use disorder (H)     Sinus tachycardia     Spontaneous  2021    Varicella     had disease       Past Surgical History:   Procedure Laterality Date    DILATION AND CURETTAGE  2021    DILATION AND CURETTAGE N/A 2024    Procedure: Dilation and curettage;  Surgeon: Winter Lewis MD;  Location:  L+D    NO HISTORY OF SURGERY           Dose / Directions   * acetaminophen 325 MG tablet  Commonly known as: TYLENOL  Used for: Pregnancy examination or test, positive result      Dose: 325-650 mg  Take 1-2 tablets (325-650 mg) by mouth every 6 hours as needed for mild pain.  Quantity: 60 tablet  Refills: 1     * acetaminophen 325 MG " tablet  Commonly known as: TYLENOL  Used for: Gestational hypertension, antepartum      Dose: 975 mg  Take 3 tablets (975 mg) by mouth every 6 hours as needed for mild pain.  Quantity: 100 tablet  Refills: 1     cetirizine 10 MG tablet  Commonly known as: zyrTEC      Dose: 10 mg  Take 10 mg by mouth daily as needed for allergies.  Refills: 0     guaiFENesin 20 mg/mL liquid  Commonly known as: ROBITUSSIN  Used for: Acute cough      Dose: 200 mg  Take 10 mLs (200 mg) by mouth every 4 hours as needed for cough.  Quantity: 180 mL  Refills: 1     hydrOXYzine 50 MG capsule  Commonly known as: VISTARIL      Dose: 50 mg  Take 1 capsule (50 mg) by mouth every 6 hours as needed for itching or anxiety  Quantity: 30 capsule  Refills: 0     ibuprofen 600 MG tablet  Commonly known as: ADVIL/MOTRIN  Used for: Gestational hypertension, antepartum      Dose: 600 mg  Take 1 tablet (600 mg) by mouth every 6 hours as needed for inflammatory pain.  Quantity: 60 tablet  Refills: 1     labetalol 200 MG tablet  Commonly known as: NORMODYNE  Used for: Gestational hypertension, antepartum      Dose: 200 mg  Take 1 tablet (200 mg) by mouth every 12 hours.  Quantity: 60 tablet  Refills: 2     meclizine 12.5 MG tablet  Commonly known as: ANTIVERT  Used for: Vertigo      Dose: 12.5 mg  Take 1 tablet (12.5 mg) by mouth every 6 hours as needed for dizziness.  Quantity: 20 tablet  Refills: 0     NIFEdipine ER 60 MG 24 hr tablet  Commonly known as: ADALAT CC  Used for: Gestational hypertension, antepartum      Dose: 60 mg  Take 1 tablet (60 mg) by mouth daily.  Quantity: 60 tablet  Refills: 2     OLANZapine 15 MG tablet  Commonly known as: zyPREXA  Used for: Schizoaffective disorder, bipolar type (H)      Dose: 15 mg  Take 1 tablet (15 mg) by mouth at bedtime.  Quantity: 30 tablet  Refills: 2     prenatal multivitamin w/iron 27-0.8 MG tablet  Used for: Pregnancy examination or test, positive result      Dose: 1 tablet  Take 1 tablet by mouth  "daily.  Quantity: 90 tablet  Refills: 3     traZODone 100 MG tablet  Commonly known as: DESYREL      Dose: 100 mg  Take 1 tablet (100 mg) by mouth at bedtime  Quantity: 10 tablet  Refills: 0     venlafaxine 37.5 MG 24 hr capsule  Commonly known as: EFFEXOR XR      Dose: 37.5 mg  Take 37.5 mg by mouth at bedtime.  Refills: 0         Past medical history, past surgical history, medications, and allergies were reviewed with the patient. Additional pertinent items: None    Family History   Problem Relation Age of Onset    Diabetes Father     Substance Abuse Father     Diabetes Paternal Aunt     Thyroid Disease Mother         grave's disease    Bipolar Disorder Mother     Rheumatoid Arthritis Sister     Crohn's Disease Maternal Aunt     Schizophrenia Maternal Grandmother     Hypertension Other        Social History     Tobacco Use    Smoking status: Every Day     Current packs/day: 0.00     Average packs/day: 0.3 packs/day for 15.0 years (3.8 ttl pk-yrs)     Types: Cigarettes     Start date: 2005     Last attempt to quit: 2020     Years since quittin.4    Smokeless tobacco: Current    Tobacco comments:     3-4 cig/day, currently 2 day 24   Substance Use Topics    Alcohol use: Yes     Comment: occasional drinker     Social history was reviewed with the patient. Additional pertinent items: None    Allergies   Allergen Reactions    Lactose GI Disturbance       Review of Systems  A medically appropriate review of systems was performed with pertinent positives and negatives noted in the HPI, and all other systems negative.    Physical Exam   BP: 122/88  Pulse: 114  Temp: 98.2  F (36.8  C)  Resp: 18  Height: 172.7 cm (5' 8\")  Weight: 104.3 kg (230 lb)  SpO2: 97 %      Physical Exam  Vitals and nursing note reviewed.   HENT:      Head: Atraumatic.   Pulmonary:      Effort: No respiratory distress.   Abdominal:      Palpations: Abdomen is soft.   Musculoskeletal:         General: No deformity. "   Neurological:      General: No focal deficit present.      Mental Status: She is alert and oriented to person, place, and time.   Psychiatric:      Comments: Flat         ED Course     Orders Placed This Encounter   Procedures    HCG qualitative urine    Bedside Attendant    [facility] Assessment Consult Order:    Psychiatry IP Consult: managment; Consultant may enter orders: Yes; Requesting provider? ED Provider    [facility]    Request an IP [facility] Bed    Urine Drug Screen          Procedures         Behavioral medicine saw the patient and felt the patient was unsafe to go home and needed emergency hospitalization on a mental health basis.        Assessments & Plan (with Medical Decision Making)     I have reviewed the nursing notes.    I have discussed the case with behavioral medicine and agree with their assessment.        Final diagnoses:   Reactive depression - severe with pyschomotor retardation and SI     Adm       RAEANN ROGERS MD    5/10/2025   [facility] EMERGENCY DEPARTMENT             Raeann Rogers MD  05/10/25 1540

## 2025-05-10 NOTE — CONSULTS
"[facility]  Department of Psychiatry  Consultation note    Rianna Man MRN: [id number]   Age: 35 year old YOB: 1990     History     Chief Complaint   Patient presents with    Suicidal     PT report \" my 5 month old baby passed away on , and since then I have SI thought with a plan of jumping\"      HPI  Rianna Man is a 35 year old female with history of schizoaffective disorder, PTSD, FITZ, polysubstance abuse who came in today for SI with a plan of \"jumping\". Acute stressor is the death of her 5 month old baby a week ago. She has not been able to eat or sleep very well since. When I went to see her she was laying in bed with the lights out. She spoke with me about her medications but deferred further conversation. She was cooperative and calm, presented as very sad and withdrawn. She said she is very tired and doesn't feel like she can talk about things any more right now. She did speak with DEC  earlier, see their note dated 5/10 for details. She was taking olanzapine 15 mg at bedtime and hydroxyzine prn before coming in today. She feels both medications are effective and would like to continue with those. She tolerates them without side effects. She reports no recent changes have been made to her medications. She has a history of hospitalizations, last in our records was in 2024 for paranoia, AH to kill self, substance use. UDS ordered but not yet completed.        Past Medical History  Past Medical History:   Diagnosis Date    Anxiety     Depressive disorder     Encounter for initial prescription of implantable subdermal contraceptive 11/10/2010    Overview:   Inserted 2010      PTSD (post-traumatic stress disorder)     Schizoaffective disorder (H)     Screen for STD (sexually transmitted disease)     Severe amphetamine substance use disorder (H)     Sinus tachycardia     Spontaneous  2021    " Varicella     had disease     Past Surgical History:   Procedure Laterality Date    DILATION AND CURETTAGE  2021    DILATION AND CURETTAGE N/A 2024    Procedure: Dilation and curettage;  Surgeon: Winter Lewis MD;  Location: [facility] L+D    NO HISTORY OF SURGERY       acetaminophen (TYLENOL) 325 MG tablet  cetirizine (ZYRTEC) 10 MG tablet  guaiFENesin (ROBITUSSIN) 20 mg/mL liquid  hydrOXYzine (VISTARIL) 50 MG capsule  ibuprofen (ADVIL/MOTRIN) 600 MG tablet  labetalol (NORMODYNE) 200 MG tablet  NIFEdipine ER (ADALAT CC) 60 MG 24 hr tablet  OLANZapine (ZYPREXA) 15 MG tablet  prazosin (MINIPRESS) 2 MG capsule  Prenatal Vit-Fe Fumarate-FA (PRENATAL MULTIVITAMIN W/IRON) 27-0.8 MG tablet  traZODone (DESYREL) 100 MG tablet  venlafaxine (EFFEXOR XR) 37.5 MG 24 hr capsule  acetaminophen (TYLENOL) 325 MG tablet      Allergies   Allergen Reactions    Lactose GI Disturbance     Family History  Family History   Problem Relation Age of Onset    Diabetes Father     Substance Abuse Father     Diabetes Paternal Aunt     Thyroid Disease Mother         grave's disease    Bipolar Disorder Mother     Rheumatoid Arthritis Sister     Crohn's Disease Maternal Aunt     Schizophrenia Maternal Grandmother     Hypertension Other      Social History   Social History     Tobacco Use    Smoking status: Every Day     Current packs/day: 0.00     Average packs/day: 0.3 packs/day for 15.0 years (3.8 ttl pk-yrs)     Types: Cigarettes     Start date: 2005     Last attempt to quit: 2020     Years since quittin.4    Smokeless tobacco: Current    Tobacco comments:     3-4 cig/day, currently 2 day 24   Substance Use Topics    Alcohol use: Yes     Comment: occasional drinker    Drug use: Yes     Types: Marijuana, Methamphetamines     Comment: Drug use: Pt report use of marijuana- every other day, NO OTHER DRUGS SINCE PREGNANT          Review of Systems  A medically appropriate review of systems was performed with pertinent  "positives and negatives noted in the HPI, and all other systems negative.    Physical Examination   BP: 122/88  Pulse: 114  Temp: 98.2  F (36.8  C)  Resp: 18  Height: 172.7 cm (5' 8\")  Weight: 104.3 kg (230 lb)  SpO2: 97 %    Physical Exam  General: Appears stated age.   Neuro: Alert and fully oriented. Extremities appear to demonstrate normal strength on visual inspection.   Integumentary/Skin: no rash visualized, normal color    Psychiatric Examination   Appearance: in bed covered with blankets  Attitude:  cooperative  Eye Contact:  none  Mood:  depressed  Affect:  mood congruent  Speech:  clear, coherent  Psychomotor Behavior:  no evidence of tardive dyskinesia, dystonia, or tics  Thought Process:  logical, linear, and goal oriented  Associations:  no loose associations  Thought Content:  +SI, denies HI, does not respond to internal sitmuli  Insight:  good  Judgement:  intact  Oriented to:  time, person, and place  Attention Span and Concentration:  limited  Recent and Remote Memory:  intact  Language: able to name/identify objects without impairment  Fund of Knowledge: intact with awareness of current and past events    ED Course        Labs Ordered and Resulted from Time of ED Arrival to Time of ED Departure - No data to display    Assessments & Plan (with Medical Decision Making)   Patient presenting with SI with plan to \"jump\". Nursing notes reviewed noting no acute issues. Acute stressor is the death of her 5 month old baby a week ago. She presents as depressed and withdrawn. I do think she needs inpatient stabilization, she is amenable to that. Depressive symptoms have been present over the past week coinciding with a clear stressor. For that reason I would recommend trying supportive care for her acute loss before medications. It would also be prudent to wait until patient is able to engage in more detailed conversation about past medication trials before proceeding, chart review shows multiple past meds. "     I have reviewed the assessment completed by the [facility].     Preliminary diagnosis:    ICD-10-CM    1. Reactive depression  F32.9     severe with pyschomotor retardation and SI           Treatment Plan:  -Patient is on the list for an inpatient bed  -PTA psychiatric medications ordered, do not recommend changes as explained above    --  Lila Enriquez MD   [facility] EMERGENCY DEPARTMENT

## 2025-05-11 ENCOUNTER — TELEPHONE (OUTPATIENT)
Dept: BEHAVIORAL HEALTH | Facility: CLINIC | Age: 35
End: 2025-05-11
Payer: COMMERCIAL

## 2025-05-11 VITALS
BODY MASS INDEX: 34.86 KG/M2 | HEIGHT: 68 IN | DIASTOLIC BLOOD PRESSURE: 92 MMHG | WEIGHT: 230 LBS | TEMPERATURE: 98.1 F | OXYGEN SATURATION: 97 % | RESPIRATION RATE: 16 BRPM | HEART RATE: 88 BPM | SYSTOLIC BLOOD PRESSURE: 145 MMHG

## 2025-05-11 LAB
ANION GAP SERPL CALCULATED.3IONS-SCNC: 14 MMOL/L (ref 7–15)
BUN SERPL-MCNC: 13.7 MG/DL (ref 6–20)
CALCIUM SERPL-MCNC: 8.5 MG/DL (ref 8.8–10.4)
CHLORIDE SERPL-SCNC: 105 MMOL/L (ref 98–107)
CREAT SERPL-MCNC: 1.1 MG/DL (ref 0.51–0.95)
EGFRCR SERPLBLD CKD-EPI 2021: 67 ML/MIN/1.73M2
ETHANOL SERPL-MCNC: <0.01 G/DL
GLUCOSE SERPL-MCNC: 101 MG/DL (ref 70–99)
HCO3 SERPL-SCNC: 22 MMOL/L (ref 22–29)
POTASSIUM SERPL-SCNC: 3.4 MMOL/L (ref 3.4–5.3)
SODIUM SERPL-SCNC: 141 MMOL/L (ref 135–145)

## 2025-05-11 ASSESSMENT — ACTIVITIES OF DAILY LIVING (ADL)
ADLS_ACUITY_SCORE: 52

## 2025-05-11 NOTE — TELEPHONE ENCOUNTER
8:25 AM [facility] is requesting Any withdrawal symptoms? Any trauma from miscarriage? How often and last use of alcohol? Medical clearance note.    08:33 AM Per [facility]/RN no known or other trauma from miscarriage, other questions answered via night RN chart note and will have provider enter medically clear note to chart.    8:39 AM Notified [facility] who advised will need medically cleared note before pt can transfer.    9:03 AM Requested medically cleared note again from RN due to note entered was discharge summary.    9:12 AM Fax sent to [facility].    9:23 AM [facility] stating they do not see medically cleared within the fax.    9:26 AM Fax resent.

## 2025-05-11 NOTE — ED NOTES
Pt reports she drinks about two shots of tequila 2-3x/week. No withdrawal seizures or DT only some withdrawal tremors. Per intake [facility] requesting BMP and blood alcohol level.

## 2025-05-11 NOTE — ED NOTES
Patient has been accepted to [facility] under Dr. Forrester, they are ready for nurse-to-nurse report at [phone]. [facility] can take the patient after 8am.

## 2025-05-11 NOTE — TELEPHONE ENCOUNTER
2:06am - [facility] is reviewing pt and they still need a BMP and CBC labs. Writer will fax those to continue review.     3:14am - Faxed labs to [facility]. Awaiting update.     4:02am - [facility] accepts pt for [facility] placement. N2N # [phone] and pt can arrive anytime after 8am.    4:05am - Notified ED of pt placement and information above.     R: Patient Placement to [facility]/Dr. Forrester

## 2025-05-11 NOTE — ED PROVIDER NOTES
"ED Observation Discharge Summary  [facility]  Discharge Date: 5/11/2025    Rianna Man MRN: [id number]   Age: 35 year old YOB: 1990     Brief HPI & Initial ED Course     Chief Complaint   Patient presents with    Suicidal     PT report \" my 5 month old baby passed away on 05/3rd, and since then I have SI thought with a plan of jumping\"      HPI  Rianna Man is a 35 year old female with PMH notable for PTSD, schizoaffective disorder who presented to the ED with a psychiatric concern,   suicidal ideation.      The patient was evaluated in the emergency department by a physician and DEC behavioral health . The DEC  recommended inpatient admission. See separate DEC note from this encounter for details on the assessment. The patient's psychiatric state was such that she would benefit from ongoing monitoring. Observation care was initiated with the plan including serial assessments of psychiatric condition, potential administration of medications if indicated, and further disposition pending the patient's psychiatric course during the monitoring period.     See ED Observation H&P for further details on the patient's presenting history and initial evaluation.     Physical Exam   BP: 122/88  Pulse: 114  Temp: 98.2  F (36.8  C)  Resp: 18  Height: 172.7 cm (5' 8\")  Weight: 104.3 kg (230 lb)  SpO2: 97 %    Physical Exam  General: Sleeping comfortably and arousable. Appears stated age.   HENT: MMM, no oropharyngeal lesions  Eyes: PERRL, normal sclerae   Cardio: Regular rate, extremities well perfused  Resp: Normal work of breathing, normal respiratory rate  Neuro: alert and fully oriented. CN II-XII grossly intact. Grossly normal strength and sensation in all extremities.   MSK: no deformities.   Integumentary/Skin: no rash visualized, normal color  Psych: flat affect, normal behavior. + SI. no HI. no hallucinations.    Results      Procedures          Labs " Ordered and Resulted from Time of ED Arrival to Time of ED Departure   URINE DRUG SCREEN PANEL - Abnormal       Result Value    Amphetamines Urine Screen Positive (*)     Barbituates Urine Screen Negative      Benzodiazepine Urine Screen Negative      Cannabinoids Urine Screen Positive (*)     Cocaine Urine Screen Positive (*)     Fentanyl Qual Urine Screen Negative      Opiates Urine Screen Negative      PCP Urine Screen Negative     BASIC METABOLIC PANEL - Abnormal    Sodium 141      Potassium 3.4      Chloride 105      Carbon Dioxide (CO2) 22      Anion Gap 14      Urea Nitrogen 13.7      Creatinine 1.10 (*)     GFR Estimate 67      Calcium 8.5 (*)     Glucose 101 (*)    HCG QUALITATIVE URINE - Normal    hCG Urine Qualitative Negative     ETHYL ALCOHOL LEVEL - Normal    Alcohol ethyl <0.01              Observation Course   The patient was found to have a psychiatric condition that would benefit from an observation stay in the emergency department for further psychiatric stabilization and/or coordination of a safe disposition. The plan upon observation admission included serial assessments of psychiatric condition, potential administration of medications if indicated, further disposition pending the patient's psychiatric course during the monitoring period.     Serial assessments of the patient's psychiatric condition were performed. Nursing notes were reviewed. During the observation period, the patient did not require medications for agitation, and did not require restraints/seclusion for patient and/or provider safety.     Patient is medically cleared for admission to inpatient status, no trauma.       After the period in observation care, the patient's circumstances and mental state were appropriate for inpatient hospitalization, accepted by [facility].    Discharge Diagnoses:   Final diagnoses:   Reactive depression - severe with pyschomotor retardation and SI       --  Flower Sandoval MD  [facility]  [facility] EMERGENCY DEPARTMENT  5/11/2025      Flower Sandoval MD  05/11/25 0849       Flower Sandoval MD  05/11/25 0911

## 2025-07-12 ENCOUNTER — HEALTH MAINTENANCE LETTER (OUTPATIENT)
Age: 35
End: 2025-07-12

## (undated) DEVICE — SOL NACL 0.9% IRRIG 1000ML BOTTLE 2F7124

## (undated) DEVICE — Device

## (undated) DEVICE — LINEN TOWEL PACK X5 5464

## (undated) DEVICE — PREP DYNA-HEX 4% CHG SCRUB 4OZ BOTTLE MDS098710

## (undated) DEVICE — GOWN XLG DISP 9545

## (undated) DEVICE — SOL WATER IRRIG 1000ML BOTTLE 2F7114

## (undated) DEVICE — STRAP KNEE/BODY 31143004

## (undated) DEVICE — PREP SKIN SCRUB TRAY 4461A

## (undated) DEVICE — CATH TRAY FOLEY 16FR BARDEX W/DRAIN BAG STATLOCK 300316A

## (undated) DEVICE — LINEN GOWN X4 5410

## (undated) RX ORDER — EPHEDRINE SULFATE 50 MG/ML
INJECTION, SOLUTION INTRAVENOUS
Status: DISPENSED
Start: 2024-11-30

## (undated) RX ORDER — OXYTOCIN/0.9 % SODIUM CHLORIDE 30/500 ML
PLASTIC BAG, INJECTION (ML) INTRAVENOUS
Status: DISPENSED
Start: 2024-11-30

## (undated) RX ORDER — KETOROLAC TROMETHAMINE 30 MG/ML
INJECTION, SOLUTION INTRAMUSCULAR; INTRAVENOUS
Status: DISPENSED
Start: 2024-11-30

## (undated) RX ORDER — PHENYLEPHRINE HCL IN 0.9% NACL 50MG/250ML
PLASTIC BAG, INJECTION (ML) INTRAVENOUS
Status: DISPENSED
Start: 2024-11-30